# Patient Record
Sex: FEMALE | Race: BLACK OR AFRICAN AMERICAN | Employment: UNEMPLOYED | URBAN - METROPOLITAN AREA
[De-identification: names, ages, dates, MRNs, and addresses within clinical notes are randomized per-mention and may not be internally consistent; named-entity substitution may affect disease eponyms.]

---

## 2019-12-09 ENCOUNTER — APPOINTMENT (OUTPATIENT)
Dept: GENERAL RADIOLOGY | Age: 49
DRG: 286 | End: 2019-12-09
Payer: COMMERCIAL

## 2019-12-09 ENCOUNTER — HOSPITAL ENCOUNTER (INPATIENT)
Age: 49
LOS: 4 days | Discharge: HOME | DRG: 286 | End: 2019-12-13
Attending: EMERGENCY MEDICINE | Admitting: INTERNAL MEDICINE
Payer: COMMERCIAL

## 2019-12-09 DIAGNOSIS — R06.00 DYSPNEA, UNSPECIFIED TYPE: Primary | ICD-10-CM

## 2019-12-09 DIAGNOSIS — R73.9 HYPERGLYCEMIA: ICD-10-CM

## 2019-12-09 PROBLEM — I50.43 CHF (CONGESTIVE HEART FAILURE), NYHA CLASS I, ACUTE ON CHRONIC, COMBINED (HCC): Status: ACTIVE | Noted: 2019-12-09

## 2019-12-09 LAB
A/G RATIO: 1.2 (ref 1.1–2.2)
ALBUMIN SERPL-MCNC: 3.9 G/DL (ref 3.4–5)
ALP BLD-CCNC: 133 U/L (ref 40–129)
ALT SERPL-CCNC: 34 U/L (ref 10–40)
ANION GAP SERPL CALCULATED.3IONS-SCNC: 12 MMOL/L (ref 3–16)
AST SERPL-CCNC: 21 U/L (ref 15–37)
BACTERIA: ABNORMAL /HPF
BASE EXCESS VENOUS: 0 (ref -3–3)
BILIRUB SERPL-MCNC: 0.3 MG/DL (ref 0–1)
BILIRUBIN URINE: NEGATIVE
BLOOD, URINE: NEGATIVE
BUN BLDV-MCNC: 44 MG/DL (ref 7–20)
CALCIUM SERPL-MCNC: 9.9 MG/DL (ref 8.3–10.6)
CHLORIDE BLD-SCNC: 95 MMOL/L (ref 99–110)
CLARITY: CLEAR
CO2: 22 MMOL/L (ref 21–32)
COLOR: YELLOW
CREAT SERPL-MCNC: 1.8 MG/DL (ref 0.6–1.1)
EPITHELIAL CELLS, UA: ABNORMAL /HPF
GFR AFRICAN AMERICAN: 36
GFR NON-AFRICAN AMERICAN: 30
GLOBULIN: 3.2 G/DL
GLUCOSE BLD-MCNC: 252 MG/DL (ref 70–99)
GLUCOSE BLD-MCNC: 433 MG/DL (ref 70–99)
GLUCOSE BLD-MCNC: 620 MG/DL (ref 70–99)
GLUCOSE BLD-MCNC: 87 MG/DL (ref 70–99)
GLUCOSE BLD-MCNC: >600 MG/DL (ref 70–99)
GLUCOSE URINE: >=1000 MG/DL
HCO3 VENOUS: 26 MMOL/L (ref 23–29)
HCT VFR BLD CALC: 31.5 % (ref 36–48)
HEMOGLOBIN: 10.2 G/DL (ref 12–16)
KETONES, URINE: NEGATIVE MG/DL
LACTATE: 0.89 MMOL/L (ref 0.4–2)
LEUKOCYTE ESTERASE, URINE: NEGATIVE
MCH RBC QN AUTO: 30.4 PG (ref 26–34)
MCHC RBC AUTO-ENTMCNC: 32.3 G/DL (ref 31–36)
MCV RBC AUTO: 94 FL (ref 80–100)
MICROSCOPIC EXAMINATION: YES
MUCUS: ABNORMAL /LPF
NITRITE, URINE: NEGATIVE
O2 SAT, VEN: 81 %
PCO2, VEN: 48.8 MM HG (ref 40–50)
PDW BLD-RTO: 13.2 % (ref 12.4–15.4)
PERFORMED ON: ABNORMAL
PERFORMED ON: NORMAL
PH UA: 6 (ref 5–8)
PH VENOUS: 7.33 (ref 7.35–7.45)
PLATELET # BLD: 159 K/UL (ref 135–450)
PMV BLD AUTO: 10.4 FL (ref 5–10.5)
PO2, VEN: 49 MM HG
POC SAMPLE TYPE: ABNORMAL
POTASSIUM REFLEX MAGNESIUM: 4.9 MMOL/L (ref 3.5–5.1)
PRO-BNP: 1106 PG/ML (ref 0–124)
PROCALCITONIN: 0.04 NG/ML (ref 0–0.15)
PROTEIN UA: ABNORMAL MG/DL
RBC # BLD: 3.35 M/UL (ref 4–5.2)
RBC UA: ABNORMAL /HPF (ref 0–2)
SODIUM BLD-SCNC: 129 MMOL/L (ref 136–145)
SPECIFIC GRAVITY UA: 1.01 (ref 1–1.03)
TCO2 CALC VENOUS: 27 MMOL/L
TOTAL PROTEIN: 7.1 G/DL (ref 6.4–8.2)
TROPONIN: 0.02 NG/ML
TROPONIN: 0.03 NG/ML
URINE TYPE: ABNORMAL
UROBILINOGEN, URINE: 0.2 E.U./DL
WBC # BLD: 3.6 K/UL (ref 4–11)
WBC UA: ABNORMAL /HPF (ref 0–5)

## 2019-12-09 PROCEDURE — 83550 IRON BINDING TEST: CPT

## 2019-12-09 PROCEDURE — 83880 ASSAY OF NATRIURETIC PEPTIDE: CPT

## 2019-12-09 PROCEDURE — 2580000003 HC RX 258: Performed by: INTERNAL MEDICINE

## 2019-12-09 PROCEDURE — 83540 ASSAY OF IRON: CPT

## 2019-12-09 PROCEDURE — 81001 URINALYSIS AUTO W/SCOPE: CPT

## 2019-12-09 PROCEDURE — 6370000000 HC RX 637 (ALT 250 FOR IP): Performed by: STUDENT IN AN ORGANIZED HEALTH CARE EDUCATION/TRAINING PROGRAM

## 2019-12-09 PROCEDURE — 6370000000 HC RX 637 (ALT 250 FOR IP): Performed by: INTERNAL MEDICINE

## 2019-12-09 PROCEDURE — 84145 PROCALCITONIN (PCT): CPT

## 2019-12-09 PROCEDURE — 84443 ASSAY THYROID STIM HORMONE: CPT

## 2019-12-09 PROCEDURE — 1200000000 HC SEMI PRIVATE

## 2019-12-09 PROCEDURE — 6360000002 HC RX W HCPCS: Performed by: INTERNAL MEDICINE

## 2019-12-09 PROCEDURE — 84484 ASSAY OF TROPONIN QUANT: CPT

## 2019-12-09 PROCEDURE — 83605 ASSAY OF LACTIC ACID: CPT

## 2019-12-09 PROCEDURE — 82803 BLOOD GASES ANY COMBINATION: CPT

## 2019-12-09 PROCEDURE — 80053 COMPREHEN METABOLIC PANEL: CPT

## 2019-12-09 PROCEDURE — 93005 ELECTROCARDIOGRAM TRACING: CPT | Performed by: INTERNAL MEDICINE

## 2019-12-09 PROCEDURE — 85027 COMPLETE CBC AUTOMATED: CPT

## 2019-12-09 PROCEDURE — 99285 EMERGENCY DEPT VISIT HI MDM: CPT

## 2019-12-09 PROCEDURE — 71046 X-RAY EXAM CHEST 2 VIEWS: CPT

## 2019-12-09 PROCEDURE — 96374 THER/PROPH/DIAG INJ IV PUSH: CPT

## 2019-12-09 PROCEDURE — 82010 KETONE BODYS QUAN: CPT

## 2019-12-09 PROCEDURE — 83036 HEMOGLOBIN GLYCOSYLATED A1C: CPT

## 2019-12-09 PROCEDURE — 6360000002 HC RX W HCPCS: Performed by: STUDENT IN AN ORGANIZED HEALTH CARE EDUCATION/TRAINING PROGRAM

## 2019-12-09 PROCEDURE — 36415 COLL VENOUS BLD VENIPUNCTURE: CPT

## 2019-12-09 RX ORDER — FUROSEMIDE 40 MG/1
40 TABLET ORAL DAILY
Status: ON HOLD | COMMUNITY
End: 2019-12-13 | Stop reason: SDUPTHER

## 2019-12-09 RX ORDER — INSULIN LISPRO 100 [IU]/ML
0-6 INJECTION, SOLUTION INTRAVENOUS; SUBCUTANEOUS NIGHTLY
Status: DISCONTINUED | OUTPATIENT
Start: 2019-12-09 | End: 2019-12-13 | Stop reason: HOSPADM

## 2019-12-09 RX ORDER — FUROSEMIDE 10 MG/ML
40 INJECTION INTRAMUSCULAR; INTRAVENOUS ONCE
Status: COMPLETED | OUTPATIENT
Start: 2019-12-09 | End: 2019-12-09

## 2019-12-09 RX ORDER — INSULIN LISPRO 100 [IU]/ML
0-18 INJECTION, SOLUTION INTRAVENOUS; SUBCUTANEOUS
Status: DISCONTINUED | OUTPATIENT
Start: 2019-12-09 | End: 2019-12-09

## 2019-12-09 RX ORDER — CARVEDILOL 12.5 MG/1
25 TABLET ORAL 2 TIMES DAILY WITH MEALS
Status: ON HOLD | COMMUNITY
End: 2019-12-13 | Stop reason: SDUPTHER

## 2019-12-09 RX ORDER — FUROSEMIDE 10 MG/ML
40 INJECTION INTRAMUSCULAR; INTRAVENOUS 2 TIMES DAILY
Status: DISCONTINUED | OUTPATIENT
Start: 2019-12-10 | End: 2019-12-10

## 2019-12-09 RX ORDER — AMLODIPINE BESYLATE 10 MG/1
10 TABLET ORAL DAILY
Status: ON HOLD | COMMUNITY
End: 2019-12-11 | Stop reason: HOSPADM

## 2019-12-09 RX ORDER — SODIUM CHLORIDE 0.9 % (FLUSH) 0.9 %
10 SYRINGE (ML) INJECTION EVERY 12 HOURS SCHEDULED
Status: DISCONTINUED | OUTPATIENT
Start: 2019-12-09 | End: 2019-12-13 | Stop reason: HOSPADM

## 2019-12-09 RX ORDER — INSULIN LISPRO 100 [IU]/ML
0-12 INJECTION, SOLUTION INTRAVENOUS; SUBCUTANEOUS
Status: DISCONTINUED | OUTPATIENT
Start: 2019-12-10 | End: 2019-12-13 | Stop reason: HOSPADM

## 2019-12-09 RX ORDER — SODIUM CHLORIDE 0.9 % (FLUSH) 0.9 %
10 SYRINGE (ML) INJECTION PRN
Status: DISCONTINUED | OUTPATIENT
Start: 2019-12-09 | End: 2019-12-13 | Stop reason: HOSPADM

## 2019-12-09 RX ORDER — INSULIN GLARGINE 100 [IU]/ML
10 INJECTION, SOLUTION SUBCUTANEOUS NIGHTLY
Status: ON HOLD | COMMUNITY
End: 2019-12-13 | Stop reason: SDUPTHER

## 2019-12-09 RX ORDER — INDAPAMIDE 2.5 MG/1
2.5 TABLET, FILM COATED ORAL EVERY MORNING
Status: ON HOLD | COMMUNITY
End: 2019-12-11 | Stop reason: HOSPADM

## 2019-12-09 RX ORDER — CARVEDILOL 25 MG/1
25 TABLET ORAL 2 TIMES DAILY WITH MEALS
Status: DISCONTINUED | OUTPATIENT
Start: 2019-12-10 | End: 2019-12-13 | Stop reason: HOSPADM

## 2019-12-09 RX ORDER — NICOTINE POLACRILEX 4 MG
15 LOZENGE BUCCAL PRN
Status: DISCONTINUED | OUTPATIENT
Start: 2019-12-09 | End: 2019-12-13 | Stop reason: HOSPADM

## 2019-12-09 RX ORDER — INSULIN LISPRO 100 [IU]/ML
0-9 INJECTION, SOLUTION INTRAVENOUS; SUBCUTANEOUS NIGHTLY
Status: DISCONTINUED | OUTPATIENT
Start: 2019-12-09 | End: 2019-12-09

## 2019-12-09 RX ORDER — GABAPENTIN 300 MG/1
300 CAPSULE ORAL 2 TIMES DAILY
Status: DISCONTINUED | OUTPATIENT
Start: 2019-12-09 | End: 2019-12-13 | Stop reason: HOSPADM

## 2019-12-09 RX ORDER — DEXTROSE MONOHYDRATE 50 MG/ML
100 INJECTION, SOLUTION INTRAVENOUS PRN
Status: DISCONTINUED | OUTPATIENT
Start: 2019-12-09 | End: 2019-12-13 | Stop reason: HOSPADM

## 2019-12-09 RX ORDER — PIOGLITAZONEHYDROCHLORIDE 30 MG/1
30 TABLET ORAL DAILY
COMMUNITY
End: 2021-04-29 | Stop reason: ALTCHOICE

## 2019-12-09 RX ORDER — DEXTROSE MONOHYDRATE 25 G/50ML
12.5 INJECTION, SOLUTION INTRAVENOUS PRN
Status: DISCONTINUED | OUTPATIENT
Start: 2019-12-09 | End: 2019-12-13 | Stop reason: HOSPADM

## 2019-12-09 RX ORDER — LOSARTAN POTASSIUM 50 MG/1
50 TABLET ORAL DAILY
Status: ON HOLD | COMMUNITY
End: 2019-12-13 | Stop reason: SDUPTHER

## 2019-12-09 RX ORDER — ONDANSETRON 2 MG/ML
4 INJECTION INTRAMUSCULAR; INTRAVENOUS EVERY 6 HOURS PRN
Status: DISCONTINUED | OUTPATIENT
Start: 2019-12-09 | End: 2019-12-13 | Stop reason: HOSPADM

## 2019-12-09 RX ADMIN — INSULIN HUMAN 10 UNITS: 100 INJECTION, SOLUTION PARENTERAL at 15:23

## 2019-12-09 RX ADMIN — Medication 10 ML: at 22:18

## 2019-12-09 RX ADMIN — INSULIN LISPRO 9 UNITS: 100 INJECTION, SOLUTION INTRAVENOUS; SUBCUTANEOUS at 19:23

## 2019-12-09 RX ADMIN — GABAPENTIN 300 MG: 300 CAPSULE ORAL at 23:57

## 2019-12-09 RX ADMIN — ENOXAPARIN SODIUM 40 MG: 40 INJECTION SUBCUTANEOUS at 19:24

## 2019-12-09 RX ADMIN — FUROSEMIDE 40 MG: 10 INJECTION, SOLUTION INTRAMUSCULAR; INTRAVENOUS at 14:17

## 2019-12-09 ASSESSMENT — ENCOUNTER SYMPTOMS
VOMITING: 0
NAUSEA: 0
SHORTNESS OF BREATH: 1
ABDOMINAL PAIN: 0
SORE THROAT: 0
VOICE CHANGE: 0
CONSTIPATION: 0
CHEST TIGHTNESS: 1
COUGH: 0
DIARRHEA: 0
RHINORRHEA: 0

## 2019-12-10 ENCOUNTER — APPOINTMENT (OUTPATIENT)
Dept: CT IMAGING | Age: 49
DRG: 286 | End: 2019-12-10
Payer: COMMERCIAL

## 2019-12-10 LAB
ANION GAP SERPL CALCULATED.3IONS-SCNC: 14 MMOL/L (ref 3–16)
BUN BLDV-MCNC: 50 MG/DL (ref 7–20)
CALCIUM SERPL-MCNC: 9.6 MG/DL (ref 8.3–10.6)
CHLORIDE BLD-SCNC: 102 MMOL/L (ref 99–110)
CHLORIDE URINE RANDOM: 45 MMOL/L
CHOLESTEROL, TOTAL: 277 MG/DL (ref 0–199)
CO2: 23 MMOL/L (ref 21–32)
CREAT SERPL-MCNC: 2.4 MG/DL (ref 0.6–1.1)
CREATININE URINE: 178.2 MG/DL (ref 28–259)
EKG ATRIAL RATE: 71 BPM
EKG DIAGNOSIS: NORMAL
EKG P AXIS: 45 DEGREES
EKG P-R INTERVAL: 172 MS
EKG Q-T INTERVAL: 368 MS
EKG QRS DURATION: 82 MS
EKG QTC CALCULATION (BAZETT): 399 MS
EKG R AXIS: 6 DEGREES
EKG T AXIS: 47 DEGREES
EKG VENTRICULAR RATE: 71 BPM
ESTIMATED AVERAGE GLUCOSE: 449.8 MG/DL
GFR AFRICAN AMERICAN: 26
GFR NON-AFRICAN AMERICAN: 21
GLUCOSE BLD-MCNC: 117 MG/DL (ref 70–99)
GLUCOSE BLD-MCNC: 130 MG/DL (ref 70–99)
GLUCOSE BLD-MCNC: 133 MG/DL (ref 70–99)
GLUCOSE BLD-MCNC: 269 MG/DL (ref 70–99)
GLUCOSE BLD-MCNC: 411 MG/DL (ref 70–99)
GLUCOSE BLD-MCNC: 446 MG/DL (ref 70–99)
GLUCOSE BLD-MCNC: 536 MG/DL (ref 70–99)
HBA1C MFR BLD: 17.3 %
HDLC SERPL-MCNC: 72 MG/DL (ref 40–60)
IRON SATURATION: 20 % (ref 15–50)
IRON: 62 UG/DL (ref 37–145)
LDL CHOLESTEROL CALCULATED: 182 MG/DL
MAGNESIUM: 2.2 MG/DL (ref 1.8–2.4)
PERFORMED ON: ABNORMAL
POTASSIUM SERPL-SCNC: 4.8 MMOL/L (ref 3.5–5.1)
PROTEIN PROTEIN: 83.4 MG/DL
SODIUM BLD-SCNC: 139 MMOL/L (ref 136–145)
SODIUM URINE: 69 MMOL/L
TOTAL IRON BINDING CAPACITY: 304 UG/DL (ref 260–445)
TRIGL SERPL-MCNC: 116 MG/DL (ref 0–150)
TSH SERPL DL<=0.05 MIU/L-ACNC: 0.11 UIU/ML (ref 0.27–4.2)
VLDLC SERPL CALC-MCNC: 23 MG/DL

## 2019-12-10 PROCEDURE — 80061 LIPID PANEL: CPT

## 2019-12-10 PROCEDURE — 36415 COLL VENOUS BLD VENIPUNCTURE: CPT

## 2019-12-10 PROCEDURE — 6370000000 HC RX 637 (ALT 250 FOR IP): Performed by: INTERNAL MEDICINE

## 2019-12-10 PROCEDURE — 82570 ASSAY OF URINE CREATININE: CPT

## 2019-12-10 PROCEDURE — 99254 IP/OBS CNSLTJ NEW/EST MOD 60: CPT | Performed by: INTERNAL MEDICINE

## 2019-12-10 PROCEDURE — 82436 ASSAY OF URINE CHLORIDE: CPT

## 2019-12-10 PROCEDURE — 2580000003 HC RX 258: Performed by: INTERNAL MEDICINE

## 2019-12-10 PROCEDURE — 71250 CT THORAX DX C-: CPT

## 2019-12-10 PROCEDURE — 1200000000 HC SEMI PRIVATE

## 2019-12-10 PROCEDURE — G0008 ADMIN INFLUENZA VIRUS VAC: HCPCS | Performed by: STUDENT IN AN ORGANIZED HEALTH CARE EDUCATION/TRAINING PROGRAM

## 2019-12-10 PROCEDURE — 80048 BASIC METABOLIC PNL TOTAL CA: CPT

## 2019-12-10 PROCEDURE — 6360000002 HC RX W HCPCS: Performed by: INTERNAL MEDICINE

## 2019-12-10 PROCEDURE — 84156 ASSAY OF PROTEIN URINE: CPT

## 2019-12-10 PROCEDURE — 6360000002 HC RX W HCPCS: Performed by: STUDENT IN AN ORGANIZED HEALTH CARE EDUCATION/TRAINING PROGRAM

## 2019-12-10 PROCEDURE — 90686 IIV4 VACC NO PRSV 0.5 ML IM: CPT | Performed by: STUDENT IN AN ORGANIZED HEALTH CARE EDUCATION/TRAINING PROGRAM

## 2019-12-10 PROCEDURE — 83735 ASSAY OF MAGNESIUM: CPT

## 2019-12-10 PROCEDURE — 84300 ASSAY OF URINE SODIUM: CPT

## 2019-12-10 RX ORDER — ONDANSETRON 4 MG/1
4 TABLET, FILM COATED ORAL EVERY 8 HOURS PRN
Status: ON HOLD | COMMUNITY
End: 2019-12-11 | Stop reason: HOSPADM

## 2019-12-10 RX ORDER — BUTALBITAL, ACETAMINOPHEN AND CAFFEINE 50; 325; 40 MG/1; MG/1; MG/1
1 TABLET ORAL EVERY 4 HOURS PRN
Status: DISCONTINUED | OUTPATIENT
Start: 2019-12-10 | End: 2019-12-13 | Stop reason: HOSPADM

## 2019-12-10 RX ORDER — AMLODIPINE BESYLATE 10 MG/1
10 TABLET ORAL DAILY
Status: DISCONTINUED | OUTPATIENT
Start: 2019-12-10 | End: 2019-12-13 | Stop reason: HOSPADM

## 2019-12-10 RX ORDER — ACETAMINOPHEN 500 MG
1000 TABLET ORAL ONCE
Status: COMPLETED | OUTPATIENT
Start: 2019-12-10 | End: 2019-12-10

## 2019-12-10 RX ORDER — BUTALBITAL, ACETAMINOPHEN AND CAFFEINE 50; 325; 40 MG/1; MG/1; MG/1
1 TABLET ORAL ONCE
Status: COMPLETED | OUTPATIENT
Start: 2019-12-10 | End: 2019-12-10

## 2019-12-10 RX ADMIN — ACETAMINOPHEN 1000 MG: 500 TABLET ORAL at 00:12

## 2019-12-10 RX ADMIN — BUTALBITAL, ACETAMINOPHEN AND CAFFEINE 1 TABLET: 50; 325; 40 TABLET ORAL at 07:14

## 2019-12-10 RX ADMIN — INFLUENZA A VIRUS A/BRISBANE/02/2018 IVR-190 (H1N1) ANTIGEN (PROPIOLACTONE INACTIVATED), INFLUENZA A VIRUS A/KANSAS/14/2017 X-327 (H3N2) ANTIGEN (PROPIOLACTONE INACTIVATED), INFLUENZA B VIRUS B/MARYLAND/15/2016 ANTIGEN (PROPIOLACTONE INACTIVATED), INFLUENZA B VIRUS B/PHUKET/3073/2013 BVR-1B ANTIGEN (PROPIOLACTONE INACTIVATED) 0.5 ML: 15; 15; 15; 15 INJECTION, SUSPENSION INTRAMUSCULAR at 09:16

## 2019-12-10 RX ADMIN — GABAPENTIN 300 MG: 300 CAPSULE ORAL at 09:16

## 2019-12-10 RX ADMIN — INSULIN LISPRO 6 UNITS: 100 INJECTION, SOLUTION INTRAVENOUS; SUBCUTANEOUS at 20:20

## 2019-12-10 RX ADMIN — Medication 10 ML: at 09:16

## 2019-12-10 RX ADMIN — ENOXAPARIN SODIUM 40 MG: 40 INJECTION SUBCUTANEOUS at 17:16

## 2019-12-10 RX ADMIN — GABAPENTIN 300 MG: 300 CAPSULE ORAL at 20:19

## 2019-12-10 RX ADMIN — Medication 10 ML: at 20:21

## 2019-12-10 RX ADMIN — CARVEDILOL 25 MG: 25 TABLET, FILM COATED ORAL at 09:16

## 2019-12-10 RX ADMIN — FUROSEMIDE 40 MG: 10 INJECTION, SOLUTION INTRAMUSCULAR; INTRAVENOUS at 09:16

## 2019-12-10 RX ADMIN — INSULIN LISPRO 12 UNITS: 100 INJECTION, SOLUTION INTRAVENOUS; SUBCUTANEOUS at 17:20

## 2019-12-10 RX ADMIN — CARVEDILOL 25 MG: 25 TABLET, FILM COATED ORAL at 17:16

## 2019-12-10 RX ADMIN — INSULIN GLARGINE 20 UNITS: 100 INJECTION, SOLUTION SUBCUTANEOUS at 20:21

## 2019-12-10 RX ADMIN — INSULIN LISPRO 6 UNITS: 100 INJECTION, SOLUTION INTRAVENOUS; SUBCUTANEOUS at 12:10

## 2019-12-10 RX ADMIN — AMLODIPINE BESYLATE 10 MG: 10 TABLET ORAL at 20:20

## 2019-12-10 RX ADMIN — BUTALBITAL, ACETAMINOPHEN, AND CAFFEINE 1 TABLET: 50; 325; 40 TABLET ORAL at 17:29

## 2019-12-10 ASSESSMENT — PAIN DESCRIPTION - ONSET
ONSET: ON-GOING
ONSET: ON-GOING
ONSET: GRADUAL
ONSET: AWAKENED FROM SLEEP

## 2019-12-10 ASSESSMENT — PAIN DESCRIPTION - LOCATION
LOCATION: LEG
LOCATION: HEAD

## 2019-12-10 ASSESSMENT — PAIN - FUNCTIONAL ASSESSMENT
PAIN_FUNCTIONAL_ASSESSMENT: PREVENTS OR INTERFERES SOME ACTIVE ACTIVITIES AND ADLS
PAIN_FUNCTIONAL_ASSESSMENT: PREVENTS OR INTERFERES SOME ACTIVE ACTIVITIES AND ADLS
PAIN_FUNCTIONAL_ASSESSMENT: ACTIVITIES ARE NOT PREVENTED
PAIN_FUNCTIONAL_ASSESSMENT: ACTIVITIES ARE NOT PREVENTED

## 2019-12-10 ASSESSMENT — PAIN SCALES - GENERAL
PAINLEVEL_OUTOF10: 8
PAINLEVEL_OUTOF10: 10
PAINLEVEL_OUTOF10: 3
PAINLEVEL_OUTOF10: 9
PAINLEVEL_OUTOF10: 7
PAINLEVEL_OUTOF10: 5
PAINLEVEL_OUTOF10: 0

## 2019-12-10 ASSESSMENT — PAIN DESCRIPTION - PAIN TYPE
TYPE: CHRONIC PAIN
TYPE: CHRONIC PAIN
TYPE: NEUROPATHIC PAIN
TYPE: ACUTE PAIN

## 2019-12-10 ASSESSMENT — PAIN DESCRIPTION - PROGRESSION
CLINICAL_PROGRESSION: NOT CHANGED
CLINICAL_PROGRESSION: GRADUALLY WORSENING

## 2019-12-10 ASSESSMENT — PAIN DESCRIPTION - DESCRIPTORS
DESCRIPTORS: HEADACHE
DESCRIPTORS: BURNING
DESCRIPTORS: HEADACHE
DESCRIPTORS: HEADACHE

## 2019-12-10 ASSESSMENT — PAIN DESCRIPTION - FREQUENCY
FREQUENCY: INTERMITTENT
FREQUENCY: CONTINUOUS

## 2019-12-10 ASSESSMENT — PAIN DESCRIPTION - ORIENTATION
ORIENTATION: ANTERIOR
ORIENTATION: INNER
ORIENTATION: ANTERIOR
ORIENTATION: RIGHT;LEFT

## 2019-12-11 LAB
ANION GAP SERPL CALCULATED.3IONS-SCNC: 16 MMOL/L (ref 3–16)
BETA-HYDROXYBUTYRATE: 0.15 MMOL/L (ref 0–0.27)
BUN BLDV-MCNC: 54 MG/DL (ref 7–20)
CALCIUM SERPL-MCNC: 9.9 MG/DL (ref 8.3–10.6)
CHLORIDE BLD-SCNC: 101 MMOL/L (ref 99–110)
CO2: 22 MMOL/L (ref 21–32)
CREAT SERPL-MCNC: 2 MG/DL (ref 0.6–1.1)
GFR AFRICAN AMERICAN: 32
GFR NON-AFRICAN AMERICAN: 26
GLUCOSE BLD-MCNC: 146 MG/DL (ref 70–99)
GLUCOSE BLD-MCNC: 166 MG/DL (ref 70–99)
GLUCOSE BLD-MCNC: 170 MG/DL (ref 70–99)
GLUCOSE BLD-MCNC: 265 MG/DL (ref 70–99)
GLUCOSE BLD-MCNC: 341 MG/DL (ref 70–99)
LV EF: 58 %
LV EF: 71 %
LVEF MODALITY: NORMAL
LVEF MODALITY: NORMAL
MAGNESIUM: 2 MG/DL (ref 1.8–2.4)
PERFORMED ON: ABNORMAL
POTASSIUM SERPL-SCNC: 4.3 MMOL/L (ref 3.5–5.1)
SODIUM BLD-SCNC: 139 MMOL/L (ref 136–145)

## 2019-12-11 PROCEDURE — 80048 BASIC METABOLIC PNL TOTAL CA: CPT

## 2019-12-11 PROCEDURE — 78452 HT MUSCLE IMAGE SPECT MULT: CPT

## 2019-12-11 PROCEDURE — 2580000003 HC RX 258: Performed by: INTERNAL MEDICINE

## 2019-12-11 PROCEDURE — 3430000000 HC RX DIAGNOSTIC RADIOPHARMACEUTICAL: Performed by: INTERNAL MEDICINE

## 2019-12-11 PROCEDURE — A9502 TC99M TETROFOSMIN: HCPCS | Performed by: INTERNAL MEDICINE

## 2019-12-11 PROCEDURE — 1200000000 HC SEMI PRIVATE

## 2019-12-11 PROCEDURE — 36415 COLL VENOUS BLD VENIPUNCTURE: CPT

## 2019-12-11 PROCEDURE — 83735 ASSAY OF MAGNESIUM: CPT

## 2019-12-11 PROCEDURE — 93306 TTE W/DOPPLER COMPLETE: CPT

## 2019-12-11 PROCEDURE — 97530 THERAPEUTIC ACTIVITIES: CPT

## 2019-12-11 PROCEDURE — 6370000000 HC RX 637 (ALT 250 FOR IP): Performed by: INTERNAL MEDICINE

## 2019-12-11 PROCEDURE — 6360000002 HC RX W HCPCS: Performed by: INTERNAL MEDICINE

## 2019-12-11 PROCEDURE — 93017 CV STRESS TEST TRACING ONLY: CPT

## 2019-12-11 PROCEDURE — 99232 SBSQ HOSP IP/OBS MODERATE 35: CPT | Performed by: INTERNAL MEDICINE

## 2019-12-11 PROCEDURE — 97165 OT EVAL LOW COMPLEX 30 MIN: CPT

## 2019-12-11 RX ORDER — DOXAZOSIN 2 MG/1
2 TABLET ORAL NIGHTLY
Qty: 30 TABLET | Refills: 3 | Status: SHIPPED | OUTPATIENT
Start: 2019-12-11 | End: 2019-12-13 | Stop reason: SDUPTHER

## 2019-12-11 RX ORDER — SODIUM CHLORIDE 0.9 % (FLUSH) 0.9 %
10 SYRINGE (ML) INJECTION PRN
Status: DISCONTINUED | OUTPATIENT
Start: 2019-12-11 | End: 2019-12-13 | Stop reason: HOSPADM

## 2019-12-11 RX ORDER — GABAPENTIN 300 MG/1
300 CAPSULE ORAL 2 TIMES DAILY
Qty: 60 CAPSULE | Refills: 2 | Status: SHIPPED | OUTPATIENT
Start: 2019-12-11 | End: 2021-04-29 | Stop reason: SDUPTHER

## 2019-12-11 RX ADMIN — REGADENOSON 0.4 MG: 0.08 INJECTION, SOLUTION INTRAVENOUS at 14:38

## 2019-12-11 RX ADMIN — INSULIN LISPRO 6 UNITS: 100 INJECTION, SOLUTION INTRAVENOUS; SUBCUTANEOUS at 17:50

## 2019-12-11 RX ADMIN — TETROFOSMIN 33.2 MILLICURIE: 1.38 INJECTION, POWDER, LYOPHILIZED, FOR SOLUTION INTRAVENOUS at 14:39

## 2019-12-11 RX ADMIN — INSULIN GLARGINE 20 UNITS: 100 INJECTION, SOLUTION SUBCUTANEOUS at 20:01

## 2019-12-11 RX ADMIN — INSULIN LISPRO 4 UNITS: 100 INJECTION, SOLUTION INTRAVENOUS; SUBCUTANEOUS at 20:01

## 2019-12-11 RX ADMIN — GABAPENTIN 300 MG: 300 CAPSULE ORAL at 20:01

## 2019-12-11 RX ADMIN — CARVEDILOL 25 MG: 25 TABLET, FILM COATED ORAL at 09:21

## 2019-12-11 RX ADMIN — Medication 10 ML: at 20:14

## 2019-12-11 RX ADMIN — Medication 10 ML: at 13:54

## 2019-12-11 RX ADMIN — AMLODIPINE BESYLATE 10 MG: 10 TABLET ORAL at 09:21

## 2019-12-11 RX ADMIN — CARVEDILOL 25 MG: 25 TABLET, FILM COATED ORAL at 17:47

## 2019-12-11 RX ADMIN — INSULIN LISPRO 2 UNITS: 100 INJECTION, SOLUTION INTRAVENOUS; SUBCUTANEOUS at 09:20

## 2019-12-11 RX ADMIN — ENOXAPARIN SODIUM 40 MG: 40 INJECTION SUBCUTANEOUS at 17:47

## 2019-12-11 RX ADMIN — TETROFOSMIN 11 MILLICURIE: 1.38 INJECTION, POWDER, LYOPHILIZED, FOR SOLUTION INTRAVENOUS at 13:54

## 2019-12-11 RX ADMIN — Medication 10 ML: at 14:39

## 2019-12-11 RX ADMIN — GABAPENTIN 300 MG: 300 CAPSULE ORAL at 09:20

## 2019-12-11 ASSESSMENT — PAIN DESCRIPTION - DESCRIPTORS
DESCRIPTORS: TINGLING
DESCRIPTORS: ACHING
DESCRIPTORS: BURNING
DESCRIPTORS: BURNING

## 2019-12-11 ASSESSMENT — PAIN DESCRIPTION - LOCATION
LOCATION: BACK
LOCATION: LEG

## 2019-12-11 ASSESSMENT — PAIN SCALES - GENERAL
PAINLEVEL_OUTOF10: 8
PAINLEVEL_OUTOF10: 5
PAINLEVEL_OUTOF10: 5
PAINLEVEL_OUTOF10: 4
PAINLEVEL_OUTOF10: 5

## 2019-12-11 ASSESSMENT — PAIN DESCRIPTION - PROGRESSION
CLINICAL_PROGRESSION: NOT CHANGED
CLINICAL_PROGRESSION: GRADUALLY IMPROVING
CLINICAL_PROGRESSION: NOT CHANGED

## 2019-12-11 ASSESSMENT — PAIN - FUNCTIONAL ASSESSMENT
PAIN_FUNCTIONAL_ASSESSMENT: ACTIVITIES ARE NOT PREVENTED

## 2019-12-11 ASSESSMENT — PAIN DESCRIPTION - ONSET
ONSET: ON-GOING

## 2019-12-11 ASSESSMENT — PAIN DESCRIPTION - ORIENTATION
ORIENTATION: UPPER
ORIENTATION: RIGHT;LEFT

## 2019-12-11 ASSESSMENT — PAIN DESCRIPTION - FREQUENCY
FREQUENCY: CONTINUOUS

## 2019-12-11 ASSESSMENT — PAIN DESCRIPTION - PAIN TYPE
TYPE: NEUROPATHIC PAIN
TYPE: ACUTE PAIN

## 2019-12-12 ENCOUNTER — APPOINTMENT (OUTPATIENT)
Dept: CARDIAC CATH/INVASIVE PROCEDURES | Age: 49
DRG: 286 | End: 2019-12-12
Payer: COMMERCIAL

## 2019-12-12 PROBLEM — R79.89 ELEVATED TROPONIN: Status: ACTIVE | Noted: 2019-12-12

## 2019-12-12 PROBLEM — R77.8 ELEVATED TROPONIN: Status: ACTIVE | Noted: 2019-12-12

## 2019-12-12 PROBLEM — R94.39 ABNORMAL MYOCARDIAL PERFUSION STUDY: Status: ACTIVE | Noted: 2019-12-12

## 2019-12-12 LAB
ANION GAP SERPL CALCULATED.3IONS-SCNC: 15 MMOL/L (ref 3–16)
BUN BLDV-MCNC: 48 MG/DL (ref 7–20)
CALCIUM SERPL-MCNC: 10.1 MG/DL (ref 8.3–10.6)
CHLORIDE BLD-SCNC: 101 MMOL/L (ref 99–110)
CO2: 22 MMOL/L (ref 21–32)
CREAT SERPL-MCNC: 1.8 MG/DL (ref 0.6–1.1)
CREATININE URINE: 87.6 MG/DL (ref 28–259)
GFR AFRICAN AMERICAN: 36
GFR NON-AFRICAN AMERICAN: 30
GLUCOSE BLD-MCNC: 135 MG/DL (ref 70–99)
GLUCOSE BLD-MCNC: 164 MG/DL (ref 70–99)
GLUCOSE BLD-MCNC: 185 MG/DL (ref 70–99)
GLUCOSE BLD-MCNC: 233 MG/DL (ref 70–99)
GLUCOSE BLD-MCNC: 250 MG/DL (ref 70–99)
MAGNESIUM: 1.9 MG/DL (ref 1.8–2.4)
PERFORMED ON: ABNORMAL
POTASSIUM SERPL-SCNC: 4.2 MMOL/L (ref 3.5–5.1)
PRO-BNP: 623 PG/ML (ref 0–124)
PROTEIN PROTEIN: 67.2 MG/DL
SODIUM BLD-SCNC: 138 MMOL/L (ref 136–145)

## 2019-12-12 PROCEDURE — 6360000004 HC RX CONTRAST MEDICATION: Performed by: INTERNAL MEDICINE

## 2019-12-12 PROCEDURE — 2060000000 HC ICU INTERMEDIATE R&B

## 2019-12-12 PROCEDURE — 6370000000 HC RX 637 (ALT 250 FOR IP): Performed by: INTERNAL MEDICINE

## 2019-12-12 PROCEDURE — 2500000003 HC RX 250 WO HCPCS

## 2019-12-12 PROCEDURE — 93458 L HRT ARTERY/VENTRICLE ANGIO: CPT | Performed by: INTERNAL MEDICINE

## 2019-12-12 PROCEDURE — C1769 GUIDE WIRE: HCPCS

## 2019-12-12 PROCEDURE — 82570 ASSAY OF URINE CREATININE: CPT

## 2019-12-12 PROCEDURE — 36415 COLL VENOUS BLD VENIPUNCTURE: CPT

## 2019-12-12 PROCEDURE — 6360000002 HC RX W HCPCS

## 2019-12-12 PROCEDURE — 2580000003 HC RX 258: Performed by: INTERNAL MEDICINE

## 2019-12-12 PROCEDURE — 93458 L HRT ARTERY/VENTRICLE ANGIO: CPT

## 2019-12-12 PROCEDURE — 99233 SBSQ HOSP IP/OBS HIGH 50: CPT | Performed by: INTERNAL MEDICINE

## 2019-12-12 PROCEDURE — 83880 ASSAY OF NATRIURETIC PEPTIDE: CPT

## 2019-12-12 PROCEDURE — B2111ZZ FLUOROSCOPY OF MULTIPLE CORONARY ARTERIES USING LOW OSMOLAR CONTRAST: ICD-10-PCS | Performed by: INTERNAL MEDICINE

## 2019-12-12 PROCEDURE — 83735 ASSAY OF MAGNESIUM: CPT

## 2019-12-12 PROCEDURE — 80048 BASIC METABOLIC PNL TOTAL CA: CPT

## 2019-12-12 PROCEDURE — C1760 CLOSURE DEV, VASC: HCPCS

## 2019-12-12 PROCEDURE — 4A023N7 MEASUREMENT OF CARDIAC SAMPLING AND PRESSURE, LEFT HEART, PERCUTANEOUS APPROACH: ICD-10-PCS | Performed by: INTERNAL MEDICINE

## 2019-12-12 PROCEDURE — 2709999900 HC NON-CHARGEABLE SUPPLY

## 2019-12-12 PROCEDURE — 6360000002 HC RX W HCPCS: Performed by: INTERNAL MEDICINE

## 2019-12-12 PROCEDURE — 84156 ASSAY OF PROTEIN URINE: CPT

## 2019-12-12 PROCEDURE — C1894 INTRO/SHEATH, NON-LASER: HCPCS

## 2019-12-12 RX ORDER — ACETAMINOPHEN 325 MG/1
650 TABLET ORAL EVERY 4 HOURS PRN
Status: DISCONTINUED | OUTPATIENT
Start: 2019-12-12 | End: 2019-12-13 | Stop reason: HOSPADM

## 2019-12-12 RX ORDER — SODIUM CHLORIDE 9 MG/ML
INJECTION, SOLUTION INTRAVENOUS CONTINUOUS
Status: ACTIVE | OUTPATIENT
Start: 2019-12-12 | End: 2019-12-12

## 2019-12-12 RX ADMIN — GABAPENTIN 300 MG: 300 CAPSULE ORAL at 08:19

## 2019-12-12 RX ADMIN — INSULIN LISPRO 6 UNITS: 100 INJECTION, SOLUTION INTRAVENOUS; SUBCUTANEOUS at 12:11

## 2019-12-12 RX ADMIN — AMLODIPINE BESYLATE 10 MG: 10 TABLET ORAL at 08:19

## 2019-12-12 RX ADMIN — Medication 10 ML: at 08:21

## 2019-12-12 RX ADMIN — CARVEDILOL 25 MG: 25 TABLET, FILM COATED ORAL at 08:19

## 2019-12-12 RX ADMIN — CARVEDILOL 25 MG: 25 TABLET, FILM COATED ORAL at 19:00

## 2019-12-12 RX ADMIN — SODIUM CHLORIDE: 9 INJECTION, SOLUTION INTRAVENOUS at 15:00

## 2019-12-12 RX ADMIN — INSULIN LISPRO 2 UNITS: 100 INJECTION, SOLUTION INTRAVENOUS; SUBCUTANEOUS at 21:56

## 2019-12-12 RX ADMIN — INSULIN LISPRO 2 UNITS: 100 INJECTION, SOLUTION INTRAVENOUS; SUBCUTANEOUS at 08:21

## 2019-12-12 RX ADMIN — ENOXAPARIN SODIUM 40 MG: 40 INJECTION SUBCUTANEOUS at 19:00

## 2019-12-12 RX ADMIN — INSULIN GLARGINE 20 UNITS: 100 INJECTION, SOLUTION SUBCUTANEOUS at 21:55

## 2019-12-12 RX ADMIN — GABAPENTIN 300 MG: 300 CAPSULE ORAL at 20:11

## 2019-12-12 RX ADMIN — IOPAMIDOL 100 ML: 755 INJECTION, SOLUTION INTRAVENOUS at 14:50

## 2019-12-12 ASSESSMENT — PAIN DESCRIPTION - PROGRESSION
CLINICAL_PROGRESSION: NOT CHANGED

## 2019-12-12 ASSESSMENT — PAIN SCALES - GENERAL
PAINLEVEL_OUTOF10: 0
PAINLEVEL_OUTOF10: 3
PAINLEVEL_OUTOF10: 0
PAINLEVEL_OUTOF10: 6
PAINLEVEL_OUTOF10: 0
PAINLEVEL_OUTOF10: 0

## 2019-12-12 ASSESSMENT — PAIN DESCRIPTION - ONSET
ONSET: ON-GOING
ONSET: ON-GOING

## 2019-12-12 ASSESSMENT — PAIN DESCRIPTION - DIRECTION: RADIATING_TOWARDS: BACK

## 2019-12-12 ASSESSMENT — PAIN DESCRIPTION - DESCRIPTORS
DESCRIPTORS: PRESSURE
DESCRIPTORS: TINGLING

## 2019-12-12 ASSESSMENT — PAIN DESCRIPTION - LOCATION
LOCATION: LEG
LOCATION: CHEST

## 2019-12-12 ASSESSMENT — PAIN - FUNCTIONAL ASSESSMENT
PAIN_FUNCTIONAL_ASSESSMENT: ACTIVITIES ARE NOT PREVENTED
PAIN_FUNCTIONAL_ASSESSMENT: ACTIVITIES ARE NOT PREVENTED

## 2019-12-12 ASSESSMENT — PAIN DESCRIPTION - FREQUENCY
FREQUENCY: CONTINUOUS
FREQUENCY: CONTINUOUS

## 2019-12-12 ASSESSMENT — PAIN DESCRIPTION - PAIN TYPE
TYPE: CHRONIC PAIN
TYPE: NEUROPATHIC PAIN

## 2019-12-12 ASSESSMENT — PAIN DESCRIPTION - ORIENTATION
ORIENTATION: RIGHT;LEFT
ORIENTATION: ANTERIOR;POSTERIOR

## 2019-12-13 VITALS
HEIGHT: 61 IN | BODY MASS INDEX: 43.45 KG/M2 | SYSTOLIC BLOOD PRESSURE: 146 MMHG | OXYGEN SATURATION: 95 % | TEMPERATURE: 97 F | HEART RATE: 70 BPM | RESPIRATION RATE: 18 BRPM | DIASTOLIC BLOOD PRESSURE: 86 MMHG | WEIGHT: 230.16 LBS

## 2019-12-13 LAB
ANION GAP SERPL CALCULATED.3IONS-SCNC: 12 MMOL/L (ref 3–16)
BUN BLDV-MCNC: 38 MG/DL (ref 7–20)
CALCIUM SERPL-MCNC: 9.5 MG/DL (ref 8.3–10.6)
CHLORIDE BLD-SCNC: 105 MMOL/L (ref 99–110)
CO2: 23 MMOL/L (ref 21–32)
CREAT SERPL-MCNC: 1.5 MG/DL (ref 0.6–1.1)
GFR AFRICAN AMERICAN: 44
GFR NON-AFRICAN AMERICAN: 37
GLUCOSE BLD-MCNC: 124 MG/DL (ref 70–99)
GLUCOSE BLD-MCNC: 148 MG/DL (ref 70–99)
MAGNESIUM: 1.9 MG/DL (ref 1.8–2.4)
PERFORMED ON: ABNORMAL
POTASSIUM SERPL-SCNC: 4.2 MMOL/L (ref 3.5–5.1)
SODIUM BLD-SCNC: 140 MMOL/L (ref 136–145)

## 2019-12-13 PROCEDURE — 80048 BASIC METABOLIC PNL TOTAL CA: CPT

## 2019-12-13 PROCEDURE — 6370000000 HC RX 637 (ALT 250 FOR IP): Performed by: INTERNAL MEDICINE

## 2019-12-13 PROCEDURE — 83735 ASSAY OF MAGNESIUM: CPT

## 2019-12-13 PROCEDURE — 2580000003 HC RX 258: Performed by: INTERNAL MEDICINE

## 2019-12-13 PROCEDURE — 36415 COLL VENOUS BLD VENIPUNCTURE: CPT

## 2019-12-13 PROCEDURE — 99232 SBSQ HOSP IP/OBS MODERATE 35: CPT | Performed by: INTERNAL MEDICINE

## 2019-12-13 RX ORDER — INSULIN LISPRO 100 [IU]/ML
0-6 INJECTION, SOLUTION INTRAVENOUS; SUBCUTANEOUS NIGHTLY
Qty: 1 PEN | Refills: 1 | Status: SHIPPED | OUTPATIENT
Start: 2019-12-13 | End: 2019-12-13

## 2019-12-13 RX ORDER — INSULIN LISPRO 100 [IU]/ML
0-6 INJECTION, SOLUTION INTRAVENOUS; SUBCUTANEOUS NIGHTLY
Qty: 1 PEN | Refills: 1 | Status: SHIPPED | OUTPATIENT
Start: 2019-12-13 | End: 2021-07-06 | Stop reason: CLARIF

## 2019-12-13 RX ORDER — FUROSEMIDE 40 MG/1
40 TABLET ORAL DAILY
Status: DISCONTINUED | OUTPATIENT
Start: 2019-12-13 | End: 2019-12-13 | Stop reason: HOSPADM

## 2019-12-13 RX ORDER — FUROSEMIDE 40 MG/1
40 TABLET ORAL DAILY
Qty: 60 TABLET | Refills: 3 | Status: SHIPPED | OUTPATIENT
Start: 2019-12-13 | End: 2020-02-07 | Stop reason: ALTCHOICE

## 2019-12-13 RX ORDER — ASPIRIN 81 MG/1
81 TABLET, CHEWABLE ORAL DAILY
Status: DISCONTINUED | OUTPATIENT
Start: 2019-12-13 | End: 2019-12-13 | Stop reason: HOSPADM

## 2019-12-13 RX ORDER — CARVEDILOL 12.5 MG/1
25 TABLET ORAL 2 TIMES DAILY WITH MEALS
Qty: 60 TABLET | Refills: 3 | Status: SHIPPED | OUTPATIENT
Start: 2019-12-13 | End: 2021-07-06 | Stop reason: CLARIF

## 2019-12-13 RX ORDER — ASPIRIN 81 MG/1
81 TABLET, CHEWABLE ORAL DAILY
Qty: 30 TABLET | Refills: 3 | Status: ON HOLD | OUTPATIENT
Start: 2019-12-13 | End: 2021-07-13 | Stop reason: SDUPTHER

## 2019-12-13 RX ORDER — DOXAZOSIN 2 MG/1
2 TABLET ORAL NIGHTLY
Qty: 30 TABLET | Refills: 3 | Status: SHIPPED | OUTPATIENT
Start: 2019-12-13 | End: 2021-04-29

## 2019-12-13 RX ORDER — INSULIN LISPRO 100 [IU]/ML
0-12 INJECTION, SOLUTION INTRAVENOUS; SUBCUTANEOUS
Qty: 1 PEN | Refills: 1 | Status: ON HOLD | OUTPATIENT
Start: 2019-12-13 | End: 2021-08-26 | Stop reason: HOSPADM

## 2019-12-13 RX ORDER — INSULIN GLARGINE 100 [IU]/ML
20 INJECTION, SOLUTION SUBCUTANEOUS NIGHTLY
Qty: 1 VIAL | Refills: 3 | Status: ON HOLD | OUTPATIENT
Start: 2019-12-13 | End: 2021-07-13 | Stop reason: SDUPTHER

## 2019-12-13 RX ORDER — LOSARTAN POTASSIUM 50 MG/1
50 TABLET ORAL DAILY
Qty: 30 TABLET | Refills: 3 | Status: SHIPPED | OUTPATIENT
Start: 2019-12-13 | End: 2021-04-29 | Stop reason: ALTCHOICE

## 2019-12-13 RX ORDER — INSULIN LISPRO 100 [IU]/ML
0-12 INJECTION, SOLUTION INTRAVENOUS; SUBCUTANEOUS
Qty: 1 PEN | Refills: 1 | Status: SHIPPED | OUTPATIENT
Start: 2019-12-13 | End: 2019-12-13

## 2019-12-13 RX ORDER — INSULIN GLARGINE 100 [IU]/ML
20 INJECTION, SOLUTION SUBCUTANEOUS NIGHTLY
Qty: 1 VIAL | Refills: 3 | Status: SHIPPED | OUTPATIENT
Start: 2019-12-13 | End: 2019-12-13 | Stop reason: SDUPTHER

## 2019-12-13 RX ADMIN — GABAPENTIN 300 MG: 300 CAPSULE ORAL at 08:40

## 2019-12-13 RX ADMIN — CARVEDILOL 25 MG: 25 TABLET, FILM COATED ORAL at 08:40

## 2019-12-13 RX ADMIN — AMLODIPINE BESYLATE 10 MG: 10 TABLET ORAL at 08:40

## 2019-12-13 RX ADMIN — Medication 10 ML: at 08:41

## 2019-12-13 ASSESSMENT — PAIN SCALES - GENERAL
PAINLEVEL_OUTOF10: 0

## 2019-12-13 ASSESSMENT — PAIN DESCRIPTION - PROGRESSION
CLINICAL_PROGRESSION: NOT CHANGED

## 2020-01-08 NOTE — PROGRESS NOTES
[address]   Congestive Heart Failure    Primary Care Doctor:  No primary care provider on file. Primary Cardiologist: Timo Doss       Chief Complaint:  Fatigue and chest pain    History of Present Illness:  Jd Hudson is a 47 y.o. female with PMH HTN, HLD, DM,  who presents today for hospital f/u. Jd Hudson was admitted 12/9/19 and discharged 12/17/19. Hospital course: presented with sudden onset of chest pressure shortness of breath while she was walking towards the parking lot.  She has been having increased weight gain and leg swelling for the last 2 days, she did not check her weight.   Past medical history of CHF diabetes mellitus hypertension, noncompliant with medications  ED work-up revealed sodium of 129 glucose of 620 creatinine of 1.8, she was 87% on room air on arrival, the time of my examination she is feeling better but is still on nasal cannula.  She received 1 dose of Lasix 40 mg with improvement in chest pressure. Chest x-ray with mild cardiomegaly with pulmonary vascular congestion, right paramediastinal opacity which is indeterminate likely lymphadenopathy.    Acute hypoxic respiratory failure requiting oxygen support, hypoxic on arrival. , likely due to pulmonary congestion and volume overload. Stress test with intermediate risk leading to LHC which was non obstructive   -BNP elevated to 1100  -On discharge she is on  losartan, lasix, propranol and cardura per nephro    Since hospitalization she reports continued chest pain, dyspnea, and fatigue and denies palpitations, orthopnea, PND, exertional chest pressure/discomfort, early saiety, edema, syncope. She has a sleep study about 4 years ago positive for JEAN, never treated- will f/u. We discussed CAD risk factors today and she is agreeable to start statin. Also she admits to craving corn starch - will check iron studies    hospital weight on d/c was 230lb and discharge home weight was 232lb.   Daily wts since that time have decreased. Today's home wt: 227      Baseline Weight: 230    Admit BNP: 1106   Discharge BNP: 623    EF: 55-60%  Cardiac Imaging: Echo 12/9/19   Summary   Left ventricular cavity size is normal. There is mild concentric left   ventricular hypertrophy. Left ventricular function is normal with ejection fraction estimated at   55-60%. No regional wall motion abnormalities are noted. Normal diastolic function. Mild tricuspid regurgitation. Estimated pulmonary artery systolic pressure is at 38 mmHg assuming a right   atrial pressure of 3 mmHg. ST 12/9/19  Summary    Overall findings represent a intermediate risk scan.    Normal LV size and systolic function.    Moderate size, reversible defect involving the apex and apical lateral    cardiac segments. Above findings are concerning for ischemia.    ECG: Non-diagnostic EKG response due to failure to reach target heart rate.    No ischemic changes with Lexiscan. [facility] 12/9/19  LEFT MAIN:  Normal.     LEFT ANTERIOR DESCENDING:  The LAD courses to and wraps around the apex. It gave off two very small diagonal branches in the mid vessel. There  is mild diffuse disease especially in the mid vessel, but no significant  focal critical obstructive lesions. There was diffuse disease in the  small diagonal branches.     LEFT CIRCUMFLEX:  Circumflex consists of a very small high rising  marginal branch followed by small-to-moderate sized second marginal  branch, a small-to-moderate sized posterolateral branch. The circumflex  was free of significant obstructive disease.     RIGHT CORONARY ARTERY:  Right coronary artery is a dominant vessel. It  gives off a large PDA and two posterolateral branches. The right  coronary artery is normal.     IMPRESSION:  1. Mild small branch vessel disease, but otherwise no significant major  critical obstructive disease in the main epicardial vessel.   2.  Successful Angio-Seal of the right femoral arteriotomy site.     Device: No     Activity: below baseline  Can you walk 1-2 blocks or do a moderate amount of house/yard work? Yes  Cardiac Rehab Referral: No     NYHA Class: II     Sodium Restrictions: 3g  Fluid Restrictions: 48-64 oz/day  Sodium and fluid restriction compliance: improved since hosp    Pt Education: The patient has received education on the following topics: dietary sodium restriction, heart failure medications, the importance of physical activity, symptom management and weight monitoring     JEAN Yes Treated: No        Past Medical History:   has a past medical history of CHF (congestive heart failure) ([facility] [address] Diabetes mellitus ([facility] [address] and Hypertension. Surgical History:   has no past surgical history on file. Social History:   reports that she has never smoked. She has never used smokeless tobacco. She reports previous alcohol use. She reports previous drug use. Family History:   No family history on file. Home Medications:  Prior to Admission medications    Medication Sig Start Date End Date Taking?  Authorizing Provider   aspirin 81 MG chewable tablet Take 1 tablet by mouth daily 12/13/19   Shantelle Thorne MD   insulin glargine (LANTUS) 100 UNIT/ML injection vial Inject 20 Units into the skin nightly Pt reports noncompliant 12/13/19   Shantelle Thorne MD   insulin lispro, 1 Unit Dial, 100 UNIT/ML SOPN Inject 0-12 Units into the skin 3 times daily (with meals) 12/13/19   Shantelle Thorne MD   insulin lispro, 1 Unit Dial, 100 UNIT/ML SOPN Inject 0-6 Units into the skin nightly 12/13/19   Shantelle Thorne MD   carvedilol (COREG) 12.5 MG tablet Take 2 tablets by mouth 2 times daily (with meals) 12/13/19   Shantelle Thorne MD   doxazosin (CARDURA) 2 MG tablet Take 1 tablet by mouth nightly 12/13/19   Shantelle Thorne MD   losartan (COZAAR) 50 MG tablet Take 1 tablet by mouth daily 12/13/19   Shantelle Thorne MD   furosemide (LASIX) 40 MG tablet Take 1 tablet by mouth daily 12/13/19   Shantelle Thorne MD gabapentin (NEURONTIN) 300 MG capsule Take 1 capsule by mouth 2 times daily for 90 days. 12/11/19 3/10/20  Kellie Zacarias MD   pioglitazone (ACTOS) 30 MG tablet Take 30 mg by mouth daily    Historical Provider, MD   butalbital-acetaminophen-caffeine (FIORICET, ESGIC) -40 MG per tablet Take 1 tablet by mouth every 4 hours as needed for Headaches 5/27/15   Jennifer Beyer MD        Allergies:  Patient has no known allergies. ROS:   Review of Systems   Constitutional: Positive for fatigue. Respiratory: Positive for shortness of breath. Cardiovascular: Positive for chest pain. Negative for palpitations and leg swelling. Gastrointestinal: Negative. Genitourinary: Negative. Musculoskeletal: Positive for myalgias. Leg cramps   Skin: Negative. Neurological: Positive for dizziness. Hematological: Negative. Psychiatric/Behavioral: Negative. Physical Examination:    Vitals:    01/10/20 0914   BP: (!) 152/90   Site: Left Upper Arm   Position: Sitting   Cuff Size: Medium Adult   Pulse: 81   SpO2: 97%   Weight: 235 lb 12.8 oz (107 kg)   Height: 5' 1\" (1.549 m)           Physical Exam  Constitutional:       Appearance: Normal appearance. She is obese. HENT:      Head: Normocephalic and atraumatic. Eyes:      Extraocular Movements: Extraocular movements intact. Pupils: Pupils are equal, round, and reactive to light. Neck:      Musculoskeletal: Normal range of motion and neck supple. Cardiovascular:      Rate and Rhythm: Normal rate and regular rhythm. Pulses: Normal pulses. Heart sounds: Normal heart sounds. Pulmonary:      Effort: Pulmonary effort is normal.      Breath sounds: Normal breath sounds. Abdominal:      Palpations: Abdomen is soft. Musculoskeletal: Normal range of motion. Skin:     General: Skin is warm and dry. Neurological:      General: No focal deficit present. Mental Status: She is alert and oriented to person, place, and time.

## 2020-01-10 ENCOUNTER — OFFICE VISIT (OUTPATIENT)
Dept: CARDIOLOGY CLINIC | Age: 50
End: 2020-01-10
Payer: COMMERCIAL

## 2020-01-10 VITALS
DIASTOLIC BLOOD PRESSURE: 90 MMHG | BODY MASS INDEX: 44.52 KG/M2 | HEIGHT: 61 IN | OXYGEN SATURATION: 97 % | WEIGHT: 235.8 LBS | HEART RATE: 81 BPM | SYSTOLIC BLOOD PRESSURE: 152 MMHG

## 2020-01-10 PROBLEM — E11.65 TYPE 2 DIABETES MELLITUS WITH HYPERGLYCEMIA, WITH LONG-TERM CURRENT USE OF INSULIN (HCC): Status: ACTIVE | Noted: 2020-01-10

## 2020-01-10 PROBLEM — I10 ESSENTIAL HYPERTENSION: Status: ACTIVE | Noted: 2020-01-10

## 2020-01-10 PROBLEM — G47.33 OSA (OBSTRUCTIVE SLEEP APNEA): Status: ACTIVE | Noted: 2020-01-10

## 2020-01-10 PROBLEM — Z79.4 TYPE 2 DIABETES MELLITUS WITH HYPERGLYCEMIA, WITH LONG-TERM CURRENT USE OF INSULIN (HCC): Status: ACTIVE | Noted: 2020-01-10

## 2020-01-10 PROCEDURE — 99214 OFFICE O/P EST MOD 30 MIN: CPT | Performed by: NURSE PRACTITIONER

## 2020-01-10 RX ORDER — ONDANSETRON 4 MG/1
4 TABLET, FILM COATED ORAL EVERY 12 HOURS PRN
Status: ON HOLD | COMMUNITY
Start: 2019-12-17 | End: 2021-07-13 | Stop reason: SDUPTHER

## 2020-01-10 RX ORDER — ATORVASTATIN CALCIUM 20 MG/1
20 TABLET, FILM COATED ORAL DAILY
Qty: 90 TABLET | Refills: 1 | Status: SHIPPED | OUTPATIENT
Start: 2020-01-10 | End: 2021-07-06 | Stop reason: CLARIF

## 2020-01-10 RX ORDER — NITROGLYCERIN 0.4 MG/1
0.4 TABLET SUBLINGUAL EVERY 5 MIN PRN
Qty: 25 TABLET | Refills: 3 | Status: ON HOLD | OUTPATIENT
Start: 2020-01-10 | End: 2021-07-13 | Stop reason: SDUPTHER

## 2020-01-10 ASSESSMENT — ENCOUNTER SYMPTOMS
SHORTNESS OF BREATH: 1
GASTROINTESTINAL NEGATIVE: 1

## 2020-01-10 NOTE — LETTER
Can someone please figure out how to refer her to the resident's clinic for DM management and f/u.  Thanks, nay

## 2020-01-10 NOTE — PATIENT INSTRUCTIONS
Instructions:   1. Medications: start atorvastatin 20mg once a day in the evening  2. Labs: one month before next OV  3. Referrals: call sleep center to re-evaluate sleep apnea  4. Lifestyle Recommendations: Weigh yourself every day in the morning after urination, call Ashley Valverde if wt increases 2-3lb in one day or 5lb in one week, Limit sodium to 2000mg/day and fluids to 2L or 64oz/day. 5. BP goal <135/85  6.  Follow up: one month with Dr. Kenya Escamilla: [phone]

## 2020-01-11 PROBLEM — R77.8 ELEVATED TROPONIN: Status: RESOLVED | Noted: 2019-12-12 | Resolved: 2020-01-11

## 2020-01-11 PROBLEM — R79.89 ELEVATED TROPONIN: Status: RESOLVED | Noted: 2019-12-12 | Resolved: 2020-01-11

## 2020-02-07 ENCOUNTER — OFFICE VISIT (OUTPATIENT)
Dept: CARDIOLOGY CLINIC | Age: 50
End: 2020-02-07
Payer: COMMERCIAL

## 2020-02-07 VITALS
BODY MASS INDEX: 46.14 KG/M2 | DIASTOLIC BLOOD PRESSURE: 80 MMHG | WEIGHT: 244.2 LBS | SYSTOLIC BLOOD PRESSURE: 166 MMHG | HEART RATE: 75 BPM

## 2020-02-07 PROCEDURE — 99214 OFFICE O/P EST MOD 30 MIN: CPT | Performed by: INTERNAL MEDICINE

## 2020-02-07 RX ORDER — TORSEMIDE 100 MG/1
100 TABLET ORAL DAILY
Qty: 90 TABLET | Refills: 1 | Status: SHIPPED | OUTPATIENT
Start: 2020-02-07 | End: 2021-03-16 | Stop reason: ALTCHOICE

## 2020-02-07 RX ORDER — SPIRONOLACTONE 25 MG/1
25 TABLET ORAL DAILY
Qty: 90 TABLET | Refills: 1 | Status: SHIPPED | OUTPATIENT
Start: 2020-02-07 | End: 2020-02-13

## 2020-02-07 RX ORDER — SPIRONOLACTONE 50 MG/1
25 TABLET, FILM COATED ORAL DAILY
Qty: 90 TABLET | Refills: 2 | Status: SHIPPED
Start: 2020-02-07 | End: 2020-02-07 | Stop reason: DRUGHIGH

## 2020-02-07 RX ORDER — SPIRONOLACTONE 50 MG/1
50 TABLET, FILM COATED ORAL DAILY
COMMUNITY
End: 2020-02-07

## 2020-02-07 NOTE — PROGRESS NOTES
MG SL tablet Place 1 tablet under the tongue every 5 minutes as needed for Chest pain 1/10/20  Yes Evan Cantu, APRN - CNP   aspirin 81 MG chewable tablet Take 1 tablet by mouth daily 12/13/19  Yes Shilrey Montilla MD   insulin glargine (LANTUS) 100 UNIT/ML injection vial Inject 20 Units into the skin nightly Pt reports noncompliant 12/13/19  Yes Shirley Montilla MD   insulin lispro, 1 Unit Dial, 100 UNIT/ML SOPN Inject 0-12 Units into the skin 3 times daily (with meals) 12/13/19  Yes Shirley Montilla MD   insulin lispro, 1 Unit Dial, 100 UNIT/ML SOPN Inject 0-6 Units into the skin nightly 12/13/19  Yes Shirley Montilla MD   carvedilol (COREG) 12.5 MG tablet Take 2 tablets by mouth 2 times daily (with meals) 12/13/19  Yes Shirley Montilla MD   doxazosin (CARDURA) 2 MG tablet Take 1 tablet by mouth nightly 12/13/19  Yes Shirley Montilla MD   losartan (COZAAR) 50 MG tablet Take 1 tablet by mouth daily 12/13/19  Yes Shirley Montilla MD   gabapentin (NEURONTIN) 300 MG capsule Take 1 capsule by mouth 2 times daily for 90 days. 12/11/19 3/10/20 Yes Marcos Loera MD   pioglitazone (ACTOS) 30 MG tablet Take 30 mg by mouth daily   Yes Historical Provider, MD   butalbital-acetaminophen-caffeine (FIORICET, ESGIC) -40 MG per tablet Take 1 tablet by mouth every 4 hours as needed for Headaches 5/27/15  Yes Avery Portillo MD          Allergy:     Patient has no known allergies. Review of Systems:     All 12 point review of symptoms completed. Pertinent positives identified in the HPI, all other review of symptoms negative as below. CONSTITUTIONAL: No fatigue  SKIN: No rash or pruritis. EYES: No visual changes or diplopia. No scleral icterus. ENT: No Headaches, hearing loss or vertigo. No mouth sores or sore throat. CARDIOVASCULAR: No chest pain/chest pressure/chest discomfort. No palpitations. No edema. RESPIRATORY: No dyspnea. No cough or wheezing, no sputum production. GASTROINTESTINAL: No N/V/D. Normal mood and affect   Neurologic: Normal gross motor and sensory exam.  Cranial nerves intact        Labs:     Lab Results   Component Value Date    WBC 3.6 (L) 12/09/2019    HGB 10.2 (L) 12/09/2019    HCT 31.5 (L) 12/09/2019    MCV 94.0 12/09/2019     12/09/2019     Lab Results   Component Value Date     12/13/2019    K 4.2 12/13/2019     12/13/2019    CO2 23 12/13/2019    BUN 38 (H) 12/13/2019    CREATININE 1.5 (H) 12/13/2019    GLUCOSE 148 (H) 12/13/2019    CALCIUM 9.5 12/13/2019    PROT 7.1 12/09/2019    LABALBU 3.9 12/09/2019    BILITOT 0.3 12/09/2019    ALKPHOS 133 (H) 12/09/2019    AST 21 12/09/2019    ALT 34 12/09/2019    LABGLOM 37 (A) 12/13/2019    GFRAA 44 (A) 12/13/2019    AGRATIO 1.2 12/09/2019    GLOB 3.2 12/09/2019         Lab Results   Component Value Date    CHOL 277 (H) 12/10/2019    CHOL 164 05/25/2015     Lab Results   Component Value Date    TRIG 116 12/10/2019    TRIG 95 05/25/2015     Lab Results   Component Value Date    HDL 72 (H) 12/10/2019    HDL 55 05/25/2015     Lab Results   Component Value Date    LDLCALC 182 (H) 12/10/2019    [address] 05/25/2015     Lab Results   Component Value Date    LABVLDL 23 12/10/2019    LABVLDL 19 05/25/2015     No results found for: CHOLHDLRATIO    No results found for: INR, PROTIME    The 10-year ASCVD risk score (Esme Curry, et al., 2013) is: 20.4%    Values used to calculate the score:      Age: 47 years      Sex: Female      Is Non- : Yes      Diabetic: Yes      Tobacco smoker: No      Systolic Blood Pressure: 398 mmHg      Is BP treated: Yes      HDL Cholesterol: 72 mg/dL      Total Cholesterol: 277 mg/dL      Assessment / Plan:      Diagnosis Orders   1. CHF (congestive heart failure), NYHA class I, acute on chronic, combined ([address]  Basic Metabolic Panel   2. Essential hypertension  Basic Metabolic Panel      1.  Chronic HFpEF:   Most likely patient has chronic HFpEF (though echo suggests normal systolic

## 2020-02-17 ENCOUNTER — TELEPHONE (OUTPATIENT)
Dept: CARDIOLOGY CLINIC | Age: 50
End: 2020-02-17

## 2020-02-17 NOTE — TELEPHONE ENCOUNTER
Pt saw Alba Araujo on 2/7/2020. GEB switched Lasix to Torsemide 100 mg daily. However, pt stated she was not informed to stop the lasix and she took a lasix with the torsemide 2/8/2020 and 2/10/2020. Pt c/o of head spinning, dizziness, \"vertigo feeling. \"  She saw Dr. Ignacia Ellis on 2/13/2020 who instructed pt on how to take the Torsemide in regards to weight gain. Pt has not had to take anymore Torsemide. Last dose was on 2/10/2020. She stated her weight is around 227 lb, with actual 2-3 lb weight loss. Pt stopped her Spirolactone on 2/15/2020 d/t to BP running low all weekend:  88/77, 88/70, 90/77, and this morning 89/72. Pt still c/o of feeling disoriented. Pt would like a return call to discuss BP and medications.      [phone]

## 2020-02-18 NOTE — TELEPHONE ENCOUNTER
GEB would like the pt to get a BMP now, decrease coreg to 12.5 mg bid, and torsemide 50 mg daily. Pt verbalized understanding. Pt needs to f/u in 2-3 weeks. Pt refused to make appointment now explained she will have to call back later to schedule, she said she needs to get her finances in order before making the appointment.

## 2021-03-24 RX ORDER — TORSEMIDE 100 MG/1
200 TABLET ORAL 2 TIMES DAILY
Qty: 120 TABLET | Refills: 5 | Status: SHIPPED | OUTPATIENT
Start: 2021-03-24 | End: 2021-04-14 | Stop reason: SDUPTHER

## 2021-04-14 RX ORDER — TORSEMIDE 100 MG/1
200 TABLET ORAL 2 TIMES DAILY
Qty: 240 TABLET | Refills: 5 | Status: SHIPPED | OUTPATIENT
Start: 2021-04-14 | End: 2021-07-06 | Stop reason: ALTCHOICE

## 2021-06-26 DIAGNOSIS — N18.5 CKD (CHRONIC KIDNEY DISEASE) STAGE 5, GFR LESS THAN 15 ML/MIN (HCC): Primary | ICD-10-CM

## 2021-07-06 ENCOUNTER — APPOINTMENT (OUTPATIENT)
Dept: GENERAL RADIOLOGY | Age: 51
DRG: 981 | End: 2021-07-06
Payer: MEDICARE

## 2021-07-06 ENCOUNTER — HOSPITAL ENCOUNTER (INPATIENT)
Age: 51
LOS: 7 days | Discharge: HOME | DRG: 981 | End: 2021-07-13
Attending: EMERGENCY MEDICINE | Admitting: INTERNAL MEDICINE
Payer: MEDICARE

## 2021-07-06 DIAGNOSIS — N17.9 AKI (ACUTE KIDNEY INJURY) (HCC): Primary | ICD-10-CM

## 2021-07-06 LAB
A/G RATIO: 0.7 (ref 1.1–2.2)
ALBUMIN SERPL-MCNC: 3.6 G/DL (ref 3.4–5)
ALP BLD-CCNC: 156 U/L (ref 40–129)
ALT SERPL-CCNC: 21 U/L (ref 10–40)
ANION GAP SERPL CALCULATED.3IONS-SCNC: 13 MMOL/L (ref 3–16)
AST SERPL-CCNC: 15 U/L (ref 15–37)
BACTERIA: ABNORMAL /HPF
BASOPHILS ABSOLUTE: 0 K/UL (ref 0–0.2)
BASOPHILS RELATIVE PERCENT: 0.6 %
BILIRUB SERPL-MCNC: <0.2 MG/DL (ref 0–1)
BILIRUBIN URINE: NEGATIVE
BLOOD, URINE: NEGATIVE
BUN BLDV-MCNC: 106 MG/DL (ref 7–20)
CALCIUM SERPL-MCNC: 9.4 MG/DL (ref 8.3–10.6)
CHLORIDE BLD-SCNC: 97 MMOL/L (ref 99–110)
CLARITY: CLEAR
CO2: 22 MMOL/L (ref 21–32)
COLOR: YELLOW
CREAT SERPL-MCNC: 5.1 MG/DL (ref 0.6–1.1)
EOSINOPHILS ABSOLUTE: 0.3 K/UL (ref 0–0.6)
EOSINOPHILS RELATIVE PERCENT: 4.9 %
EPITHELIAL CELLS, UA: ABNORMAL /HPF (ref 0–5)
GFR AFRICAN AMERICAN: 11
GFR NON-AFRICAN AMERICAN: 9
GLOBULIN: 4.9 G/DL
GLUCOSE BLD-MCNC: 255 MG/DL (ref 70–99)
GLUCOSE URINE: NEGATIVE MG/DL
HCT VFR BLD CALC: 29.3 % (ref 36–48)
HEMOGLOBIN: 9.6 G/DL (ref 12–16)
KETONES, URINE: NEGATIVE MG/DL
LEUKOCYTE ESTERASE, URINE: ABNORMAL
LYMPHOCYTES ABSOLUTE: 0.9 K/UL (ref 1–5.1)
LYMPHOCYTES RELATIVE PERCENT: 17.9 %
MCH RBC QN AUTO: 30.1 PG (ref 26–34)
MCHC RBC AUTO-ENTMCNC: 32.8 G/DL (ref 31–36)
MCV RBC AUTO: 92 FL (ref 80–100)
MICROSCOPIC EXAMINATION: YES
MONOCYTES ABSOLUTE: 0.7 K/UL (ref 0–1.3)
MONOCYTES RELATIVE PERCENT: 12.8 %
NEUTROPHILS ABSOLUTE: 3.3 K/UL (ref 1.7–7.7)
NEUTROPHILS RELATIVE PERCENT: 63.8 %
NITRITE, URINE: NEGATIVE
OSMOLALITY URINE: 339 MOSM/KG (ref 390–1070)
PDW BLD-RTO: 16.4 % (ref 12.4–15.4)
PH UA: 6 (ref 5–8)
PLATELET # BLD: 175 K/UL (ref 135–450)
PMV BLD AUTO: 9.1 FL (ref 5–10.5)
POTASSIUM SERPL-SCNC: 4.6 MMOL/L (ref 3.5–5.1)
PRO-BNP: 2769 PG/ML (ref 0–124)
PROTEIN UA: 30 MG/DL
RBC # BLD: 3.18 M/UL (ref 4–5.2)
RBC UA: ABNORMAL /HPF (ref 0–4)
SODIUM BLD-SCNC: 132 MMOL/L (ref 136–145)
SODIUM URINE: 77 MMOL/L
SPECIFIC GRAVITY UA: 1.02 (ref 1–1.03)
TOTAL PROTEIN: 8.5 G/DL (ref 6.4–8.2)
URINE REFLEX TO CULTURE: YES
URINE TYPE: ABNORMAL
UROBILINOGEN, URINE: 0.2 E.U./DL
WBC # BLD: 5.2 K/UL (ref 4–11)
WBC UA: ABNORMAL /HPF (ref 0–5)

## 2021-07-06 PROCEDURE — 81001 URINALYSIS AUTO W/SCOPE: CPT

## 2021-07-06 PROCEDURE — 84300 ASSAY OF URINE SODIUM: CPT

## 2021-07-06 PROCEDURE — 82570 ASSAY OF URINE CREATININE: CPT

## 2021-07-06 PROCEDURE — 36415 COLL VENOUS BLD VENIPUNCTURE: CPT

## 2021-07-06 PROCEDURE — 87184 SC STD DISK METHOD PER PLATE: CPT

## 2021-07-06 PROCEDURE — 87077 CULTURE AEROBIC IDENTIFY: CPT

## 2021-07-06 PROCEDURE — 83880 ASSAY OF NATRIURETIC PEPTIDE: CPT

## 2021-07-06 PROCEDURE — 83935 ASSAY OF URINE OSMOLALITY: CPT

## 2021-07-06 PROCEDURE — 1200000000 HC SEMI PRIVATE

## 2021-07-06 PROCEDURE — 87186 SC STD MICRODIL/AGAR DIL: CPT

## 2021-07-06 PROCEDURE — 80053 COMPREHEN METABOLIC PANEL: CPT

## 2021-07-06 PROCEDURE — 99285 EMERGENCY DEPT VISIT HI MDM: CPT

## 2021-07-06 PROCEDURE — 85025 COMPLETE CBC W/AUTO DIFF WBC: CPT

## 2021-07-06 PROCEDURE — 2580000003 HC RX 258: Performed by: PHYSICIAN ASSISTANT

## 2021-07-06 PROCEDURE — 87086 URINE CULTURE/COLONY COUNT: CPT

## 2021-07-06 PROCEDURE — 71046 X-RAY EXAM CHEST 2 VIEWS: CPT

## 2021-07-06 PROCEDURE — 93005 ELECTROCARDIOGRAM TRACING: CPT | Performed by: PHYSICIAN ASSISTANT

## 2021-07-06 PROCEDURE — 83036 HEMOGLOBIN GLYCOSYLATED A1C: CPT

## 2021-07-06 PROCEDURE — 73562 X-RAY EXAM OF KNEE 3: CPT

## 2021-07-06 RX ORDER — INSULIN LISPRO 100 [IU]/ML
0-12 INJECTION, SOLUTION INTRAVENOUS; SUBCUTANEOUS
Status: DISCONTINUED | OUTPATIENT
Start: 2021-07-07 | End: 2021-07-13 | Stop reason: HOSPADM

## 2021-07-06 RX ORDER — DEXTROSE MONOHYDRATE 50 MG/ML
100 INJECTION, SOLUTION INTRAVENOUS PRN
Status: DISCONTINUED | OUTPATIENT
Start: 2021-07-06 | End: 2021-07-13 | Stop reason: HOSPADM

## 2021-07-06 RX ORDER — PANTOPRAZOLE SODIUM 40 MG/1
40 TABLET, DELAYED RELEASE ORAL DAILY PRN
COMMUNITY
End: 2022-02-02

## 2021-07-06 RX ORDER — NIFEDIPINE 30 MG/1
30 TABLET, FILM COATED, EXTENDED RELEASE ORAL 2 TIMES DAILY
Status: DISCONTINUED | OUTPATIENT
Start: 2021-07-06 | End: 2021-07-09

## 2021-07-06 RX ORDER — HEPARIN SODIUM 5000 [USP'U]/ML
5000 INJECTION, SOLUTION INTRAVENOUS; SUBCUTANEOUS EVERY 8 HOURS SCHEDULED
Status: DISCONTINUED | OUTPATIENT
Start: 2021-07-06 | End: 2021-07-13 | Stop reason: HOSPADM

## 2021-07-06 RX ORDER — NICOTINE POLACRILEX 4 MG
15 LOZENGE BUCCAL PRN
Status: DISCONTINUED | OUTPATIENT
Start: 2021-07-06 | End: 2021-07-13 | Stop reason: HOSPADM

## 2021-07-06 RX ORDER — SODIUM CHLORIDE 0.9 % (FLUSH) 0.9 %
5-40 SYRINGE (ML) INJECTION PRN
Status: DISCONTINUED | OUTPATIENT
Start: 2021-07-06 | End: 2021-07-13 | Stop reason: HOSPADM

## 2021-07-06 RX ORDER — CARVEDILOL 12.5 MG/1
12.5 TABLET ORAL 2 TIMES DAILY
Status: DISCONTINUED | OUTPATIENT
Start: 2021-07-06 | End: 2021-07-09

## 2021-07-06 RX ORDER — DEXTROSE MONOHYDRATE 25 G/50ML
12.5 INJECTION, SOLUTION INTRAVENOUS PRN
Status: DISCONTINUED | OUTPATIENT
Start: 2021-07-06 | End: 2021-07-13 | Stop reason: HOSPADM

## 2021-07-06 RX ORDER — ATORVASTATIN CALCIUM 40 MG/1
40 TABLET, FILM COATED ORAL DAILY
Status: ON HOLD | COMMUNITY
End: 2021-07-13 | Stop reason: SDUPTHER

## 2021-07-06 RX ORDER — HYDRALAZINE HYDROCHLORIDE 100 MG/1
100 TABLET, FILM COATED ORAL EVERY 6 HOURS PRN
Status: ON HOLD | COMMUNITY
End: 2021-07-13 | Stop reason: HOSPADM

## 2021-07-06 RX ORDER — GABAPENTIN 300 MG/1
300 CAPSULE ORAL NIGHTLY
Status: DISCONTINUED | OUTPATIENT
Start: 2021-07-06 | End: 2021-07-13 | Stop reason: HOSPADM

## 2021-07-06 RX ORDER — ONDANSETRON 4 MG/1
4 TABLET, ORALLY DISINTEGRATING ORAL EVERY 8 HOURS PRN
Status: DISCONTINUED | OUTPATIENT
Start: 2021-07-06 | End: 2021-07-13 | Stop reason: HOSPADM

## 2021-07-06 RX ORDER — ONDANSETRON 2 MG/ML
4 INJECTION INTRAMUSCULAR; INTRAVENOUS EVERY 6 HOURS PRN
Status: DISCONTINUED | OUTPATIENT
Start: 2021-07-06 | End: 2021-07-13 | Stop reason: HOSPADM

## 2021-07-06 RX ORDER — SODIUM CHLORIDE 0.9 % (FLUSH) 0.9 %
5-40 SYRINGE (ML) INJECTION EVERY 12 HOURS SCHEDULED
Status: DISCONTINUED | OUTPATIENT
Start: 2021-07-06 | End: 2021-07-13 | Stop reason: HOSPADM

## 2021-07-06 RX ORDER — ATORVASTATIN CALCIUM 40 MG/1
40 TABLET, FILM COATED ORAL NIGHTLY
Status: DISCONTINUED | OUTPATIENT
Start: 2021-07-06 | End: 2021-07-13 | Stop reason: HOSPADM

## 2021-07-06 RX ORDER — INSULIN LISPRO 100 [IU]/ML
0-6 INJECTION, SOLUTION INTRAVENOUS; SUBCUTANEOUS NIGHTLY
Status: DISCONTINUED | OUTPATIENT
Start: 2021-07-06 | End: 2021-07-13 | Stop reason: HOSPADM

## 2021-07-06 RX ORDER — SODIUM CHLORIDE 450 MG/100ML
INJECTION, SOLUTION INTRAVENOUS CONTINUOUS
Status: DISCONTINUED | OUTPATIENT
Start: 2021-07-06 | End: 2021-07-07

## 2021-07-06 RX ORDER — SODIUM CHLORIDE 9 MG/ML
25 INJECTION, SOLUTION INTRAVENOUS PRN
Status: DISCONTINUED | OUTPATIENT
Start: 2021-07-06 | End: 2021-07-13 | Stop reason: HOSPADM

## 2021-07-06 RX ORDER — ASPIRIN 81 MG/1
81 TABLET, CHEWABLE ORAL DAILY
Status: DISCONTINUED | OUTPATIENT
Start: 2021-07-07 | End: 2021-07-07

## 2021-07-06 RX ORDER — ACETAMINOPHEN 650 MG/1
650 SUPPOSITORY RECTAL EVERY 6 HOURS PRN
Status: DISCONTINUED | OUTPATIENT
Start: 2021-07-06 | End: 2021-07-13 | Stop reason: HOSPADM

## 2021-07-06 RX ORDER — POLYETHYLENE GLYCOL 3350 17 G/17G
17 POWDER, FOR SOLUTION ORAL DAILY PRN
Status: DISCONTINUED | OUTPATIENT
Start: 2021-07-06 | End: 2021-07-13 | Stop reason: HOSPADM

## 2021-07-06 RX ORDER — ACETAMINOPHEN 325 MG/1
650 TABLET ORAL EVERY 6 HOURS PRN
Status: DISCONTINUED | OUTPATIENT
Start: 2021-07-06 | End: 2021-07-13 | Stop reason: HOSPADM

## 2021-07-06 RX ADMIN — SODIUM CHLORIDE: 450 INJECTION, SOLUTION INTRAVENOUS at 20:08

## 2021-07-06 ASSESSMENT — PAIN SCALES - GENERAL: PAINLEVEL_OUTOF10: 5

## 2021-07-06 NOTE — ED PROVIDER NOTES
[address] [facility] ENCOUNTER          PHYSICIAN ASSISTANT NOTE       Date of evaluation: 7/6/2021    Chief Complaint     Abnormal Lab (states she was at her nephrologist today and he told her to come to the ED because he wants labs and to start her on dialysis.)      History of Present Illness     Amy Rogel is a 45 y.o. female with past medical history significant for diastolic heart failure, diabetes, hypertension, CKD stage 4/5, who presents for abnormal lab. Patient reportedly was evaluated earlier today at outpatient nephrology appt and was told to present to the emergency department for further lab work and likely admission. Patient does not regularly follow-up, so did not have recent lab work. Nephrologist told patient that she may need to start dialysis. Patient states she does still make urine regularly and has no complaints with this. Does state that she tripped and fell a couple of days ago and landed on her right knee. States she has still been able to ambulate, but does endorse some pain to her right knee. Denies any fevers, chills, chest pain, shortness of breath, abdominal pain, nausea, vomiting, peripheral edema. Review of Systems     Review of Systems   See HPI, all others negative. Past Medical, Surgical, Family, and Social History     She has a past medical history of CHF (congestive heart failure) ([address] Diabetes mellitus ([address] and Hypertension. She has no past surgical history on file. Her family history is not on file. She reports that she has never smoked. She has never used smokeless tobacco. She reports previous alcohol use. She reports previous drug use.     Medications     Previous Medications    ASPIRIN 81 MG CHEWABLE TABLET    Take 1 tablet by mouth daily    ATORVASTATIN (LIPITOR) 20 MG TABLET    Take 1 tablet by mouth daily    BUTALBITAL-ACETAMINOPHEN-CAFFEINE (FIORICET, ESGIC) -40 MG PER TABLET    Take 1 tablet by mouth every 4 hours as needed for Headaches    CARVEDILOL (COREG) 12.5 MG TABLET    Take 2 tablets by mouth 2 times daily (with meals)    CARVEDILOL (COREG) 12.5 MG TABLET    Take 1 tablet by mouth 2 times daily    GABAPENTIN (NEURONTIN) 100 MG CAPSULE    Take 3 capsules by mouth 2 times daily for 90 days. INSULIN GLARGINE (LANTUS) 100 UNIT/ML INJECTION VIAL    Inject 20 Units into the skin nightly Pt reports noncompliant    INSULIN LISPRO, 1 UNIT DIAL, 100 UNIT/ML SOPN    Inject 0-12 Units into the skin 3 times daily (with meals)    INSULIN LISPRO, 1 UNIT DIAL, 100 UNIT/ML SOPN    Inject 0-6 Units into the skin nightly    NIFEDIPINE (PROCARDIA XL) 30 MG EXTENDED RELEASE TABLET    Take 30 mg by mouth 2 times daily    NITROGLYCERIN (NITROSTAT) 0.4 MG SL TABLET    Place 1 tablet under the tongue every 5 minutes as needed for Chest pain    ONDANSETRON (ZOFRAN) 4 MG TABLET    Take 4 mg by mouth daily    SITAGLIPTIN (JANUVIA) 50 MG TABLET    Take 50 mg by mouth daily       Allergies     She has No Known Allergies. Physical Exam     INITIAL VITALS: BP: 115/61, Temp: 97.8 °F (36.6 °C), Pulse: 81, Resp: 18, SpO2: 97 %  Physical Exam  Constitutional:       General: She is not in acute distress. Appearance: Normal appearance. She is normal weight. She is not ill-appearing, toxic-appearing or diaphoretic. HENT:      Head: Normocephalic and atraumatic. Mouth/Throat:      Mouth: Mucous membranes are moist.      Pharynx: Oropharynx is clear. Eyes:      Conjunctiva/sclera: Conjunctivae normal.   Cardiovascular:      Rate and Rhythm: Normal rate. Pulmonary:      Effort: Pulmonary effort is normal. No respiratory distress. Abdominal:      General: There is no distension. Palpations: Abdomen is soft. Tenderness: There is no abdominal tenderness. Musculoskeletal:         General: Normal range of motion. Cervical back: Normal range of motion. Right lower leg: No edema. Left lower leg: No edema.       Comments: Mild tenderness along medial joint line of right knee. Minimal swelling noted. No overlying warmth or erythema. No laxity noted. Skin:     General: Skin is warm and dry. Neurological:      General: No focal deficit present. Mental Status: She is alert and oriented to person, place, and time. Diagnostic Results     RADIOLOGY:  XR CHEST (2 VW)   Final Result      Stable mild cardiomegaly and pulmonary vascular congestion without evidence of pulmonary edema. XR KNEE RIGHT (3 VIEWS)   Final Result   Impression:    No acute osseous injury. Joint effusion. Mild medial compartment osteoarthritis.           LABS:   Results for orders placed or performed during the hospital encounter of 07/06/21   CBC Auto Differential   Result Value Ref Range    WBC 5.2 4.0 - 11.0 K/uL    RBC 3.18 (L) 4.00 - 5.20 M/uL    Hemoglobin 9.6 (L) 12.0 - 16.0 g/dL    Hematocrit 29.3 (L) 36.0 - 48.0 %    MCV 92.0 80.0 - 100.0 fL    MCH 30.1 26.0 - 34.0 pg    MCHC 32.8 31.0 - 36.0 g/dL    RDW 16.4 (H) 12.4 - 15.4 %    Platelets 209 881 - 540 K/uL    MPV 9.1 5.0 - 10.5 fL    Neutrophils % 63.8 %    Lymphocytes % 17.9 %    Monocytes % 12.8 %    Eosinophils % 4.9 %    Basophils % 0.6 %    Neutrophils Absolute 3.3 1.7 - 7.7 K/uL    Lymphocytes Absolute 0.9 (L) 1.0 - 5.1 K/uL    Monocytes Absolute 0.7 0.0 - 1.3 K/uL    Eosinophils Absolute 0.3 0.0 - 0.6 K/uL    Basophils Absolute 0.0 0.0 - 0.2 K/uL   Comprehensive Metabolic Panel   Result Value Ref Range    Sodium 132 (L) 136 - 145 mmol/L    Potassium 4.6 3.5 - 5.1 mmol/L    Chloride 97 (L) 99 - 110 mmol/L    CO2 22 21 - 32 mmol/L    Anion Gap 13 3 - 16    Glucose 255 (H) 70 - 99 mg/dL     (HH) 7 - 20 mg/dL    CREATININE 5.1 (HH) 0.6 - 1.1 mg/dL    GFR Non-African American 9 (A) >60    GFR  11 (A) >60    Calcium 9.4 8.3 - 10.6 mg/dL    Total Protein 8.5 (H) 6.4 - 8.2 g/dL    Albumin 3.6 3.4 - 5.0 g/dL    Albumin/Globulin Ratio 0.7 (L) 1.1 - 2.2 Total Bilirubin <0.2 0.0 - 1.0 mg/dL    Alkaline Phosphatase 156 (H) 40 - 129 U/L    ALT 21 10 - 40 U/L    AST 15 15 - 37 U/L    Globulin 4.9 g/dL   Urinalysis Reflex to Culture    Specimen: Urine, clean catch   Result Value Ref Range    Color, UA Yellow Straw/Yellow    Clarity, UA Clear Clear    Glucose, Ur Negative Negative mg/dL    Bilirubin Urine Negative Negative    Ketones, Urine Negative Negative mg/dL    Specific Gravity, UA 1.020 1.005 - 1.030    Blood, Urine Negative Negative    pH, UA 6.0 5.0 - 8.0    Protein, UA 30 (A) Negative mg/dL    Urobilinogen, Urine 0.2 <2.0 E.U./dL    Nitrite, Urine Negative Negative    Leukocyte Esterase, Urine MODERATE (A) Negative    Microscopic Examination YES     Urine Type Voided     Urine Reflex to Culture Yes    Brain Natriuretic Peptide   Result Value Ref Range    Pro-BNP 2,769 (H) 0 - 124 pg/mL   Microscopic Urinalysis   Result Value Ref Range    WBC, UA  (A) 0 - 5 /HPF    RBC, UA 0-2 0 - 4 /HPF    Epithelial Cells, UA 0-1 0 - 5 /HPF    Bacteria, UA 4+ (A) None Seen /HPF     RECENT VITALS:  BP: (!) 148/77, Temp: 97.8 °F (36.6 °C), Pulse: 81, Resp: 18, SpO2: 93 %     Procedures       ED Course     Nursing Notes, Past Medical Hx,Past Surgical Hx, Social Hx, Allergies, and Family Hx were reviewed. The patient was given the following medications:  Orders Placed This Encounter   Medications    0.45 % sodium chloride infusion       CONSULTS:  IP CONSULT TO NEPHROLOGY  IP CONSULT TO HOSPITALIST    MEDICAL DECISION MAKING / ASSESSMENT / Edward Ladi is a 45 y.o. female presenting from outpatient nephrology appointment for lab work and further evaluation for worsening CKD. Lab work performed including CBC and BMP. CBC shows no leukocytosis, does show mild anemia with hemoglobin of 9.6, which does appear to be around patient's baseline. BMP shows normal electrolytes with potassium of 4.2, but does show BUN of 106 and creatinine of 5.1.   Most recent lab values in our system were from approximately 2 years ago and revealed creatinine between 1.5 and 2.5. Patient is not having any difficulty urinating, was able to provide urine sample without any problems. Patient does not appear to be fluid overloaded, has no significant lower extremity edema and lungs sound clear, normal O2 saturation on room air. Patient also does not appear to be dry, oropharynx is moist.  Spoke with on-call nephrologist who recommended add-on of urine sodium, urine osmolality, and urine creatinine. Also recommended starting patient on half-normal saline at 75ml/hr with addition of 75 mEq bicarb. X-ray right knee ordered due to patient's complaints of recent mechanical fall and show medial osteoarthritis but no acute abnormality. Patient has no significant laxity and is able to ambulate without difficulty. At this time, we feel patient warrants admission for ongoing management of elevated creatinine in the setting of also elevated BNP and chest x-ray showing mild pulmonary vascular congestion without signs of pulmonary edema. This patient was also evaluated by the attending physician. All care plans were discussed and agreed upon. Clinical Impression     1. HARRIETT (acute kidney injury) ([facility] [address]        Disposition     PATIENT REFERRED TO:  No follow-up provider specified.     DISCHARGE MEDICATIONS:  New Prescriptions    No medications on file       DISPOSITION Decision To Admit 07/06/2021 08:03:14 PM       Bridgett Leblanc PA-C  07/07/21 0025

## 2021-07-07 ENCOUNTER — TELEPHONE (OUTPATIENT)
Dept: BARIATRICS/WEIGHT MGMT | Age: 51
End: 2021-07-07

## 2021-07-07 ENCOUNTER — APPOINTMENT (OUTPATIENT)
Dept: GENERAL RADIOLOGY | Age: 51
DRG: 981 | End: 2021-07-07
Payer: MEDICARE

## 2021-07-07 LAB
ANION GAP SERPL CALCULATED.3IONS-SCNC: 12 MMOL/L (ref 3–16)
BASOPHILS ABSOLUTE: 0.1 K/UL (ref 0–0.2)
BASOPHILS RELATIVE PERCENT: 1.4 %
BUN BLDV-MCNC: 112 MG/DL (ref 7–20)
CALCIUM SERPL-MCNC: 9.1 MG/DL (ref 8.3–10.6)
CHLORIDE BLD-SCNC: 98 MMOL/L (ref 99–110)
CO2: 25 MMOL/L (ref 21–32)
CREAT SERPL-MCNC: 4.3 MG/DL (ref 0.6–1.1)
CREATININE URINE: 49.6 MG/DL (ref 28–259)
EKG ATRIAL RATE: 79 BPM
EKG DIAGNOSIS: NORMAL
EKG P AXIS: 51 DEGREES
EKG P-R INTERVAL: 150 MS
EKG Q-T INTERVAL: 384 MS
EKG QRS DURATION: 80 MS
EKG QTC CALCULATION (BAZETT): 440 MS
EKG R AXIS: -8 DEGREES
EKG T AXIS: 45 DEGREES
EKG VENTRICULAR RATE: 79 BPM
EOSINOPHILS ABSOLUTE: 0.2 K/UL (ref 0–0.6)
EOSINOPHILS RELATIVE PERCENT: 5.7 %
ESTIMATED AVERAGE GLUCOSE: 151.3 MG/DL
GFR AFRICAN AMERICAN: 13
GFR NON-AFRICAN AMERICAN: 11
GLUCOSE BLD-MCNC: 138 MG/DL (ref 70–99)
GLUCOSE BLD-MCNC: 141 MG/DL (ref 70–99)
GLUCOSE BLD-MCNC: 141 MG/DL (ref 70–99)
GLUCOSE BLD-MCNC: 171 MG/DL (ref 70–99)
GLUCOSE BLD-MCNC: 192 MG/DL (ref 70–99)
GLUCOSE BLD-MCNC: 201 MG/DL (ref 70–99)
HBA1C MFR BLD: 6.9 %
HCT VFR BLD CALC: 25.1 % (ref 36–48)
HEMOGLOBIN: 8.2 G/DL (ref 12–16)
INR BLD: 1.1 (ref 0.88–1.12)
LYMPHOCYTES ABSOLUTE: 1.1 K/UL (ref 1–5.1)
LYMPHOCYTES RELATIVE PERCENT: 27.7 %
MCH RBC QN AUTO: 29.5 PG (ref 26–34)
MCHC RBC AUTO-ENTMCNC: 32.5 G/DL (ref 31–36)
MCV RBC AUTO: 90.9 FL (ref 80–100)
MONOCYTES ABSOLUTE: 0.6 K/UL (ref 0–1.3)
MONOCYTES RELATIVE PERCENT: 15.8 %
NEUTROPHILS ABSOLUTE: 2 K/UL (ref 1.7–7.7)
NEUTROPHILS RELATIVE PERCENT: 49.4 %
PDW BLD-RTO: 16 % (ref 12.4–15.4)
PERFORMED ON: ABNORMAL
PLATELET # BLD: 156 K/UL (ref 135–450)
PMV BLD AUTO: 9.2 FL (ref 5–10.5)
POTASSIUM REFLEX MAGNESIUM: 4 MMOL/L (ref 3.5–5.1)
PROTHROMBIN TIME: 12.5 SEC (ref 9.9–12.7)
RBC # BLD: 2.76 M/UL (ref 4–5.2)
SODIUM BLD-SCNC: 135 MMOL/L (ref 136–145)
WBC # BLD: 4 K/UL (ref 4–11)

## 2021-07-07 PROCEDURE — 2580000003 HC RX 258: Performed by: INTERNAL MEDICINE

## 2021-07-07 PROCEDURE — 71045 X-RAY EXAM CHEST 1 VIEW: CPT

## 2021-07-07 PROCEDURE — 1200000000 HC SEMI PRIVATE

## 2021-07-07 PROCEDURE — 99223 1ST HOSP IP/OBS HIGH 75: CPT | Performed by: SURGERY

## 2021-07-07 PROCEDURE — 76937 US GUIDE VASCULAR ACCESS: CPT | Performed by: SURGERY

## 2021-07-07 PROCEDURE — 85025 COMPLETE CBC W/AUTO DIFF WBC: CPT

## 2021-07-07 PROCEDURE — 90935 HEMODIALYSIS ONE EVALUATION: CPT | Performed by: INTERNAL MEDICINE

## 2021-07-07 PROCEDURE — 02HV33Z INSERTION OF INFUSION DEVICE INTO SUPERIOR VENA CAVA, PERCUTANEOUS APPROACH: ICD-10-PCS | Performed by: STUDENT IN AN ORGANIZED HEALTH CARE EDUCATION/TRAINING PROGRAM

## 2021-07-07 PROCEDURE — 6360000002 HC RX W HCPCS: Performed by: INTERNAL MEDICINE

## 2021-07-07 PROCEDURE — 6370000000 HC RX 637 (ALT 250 FOR IP): Performed by: INTERNAL MEDICINE

## 2021-07-07 PROCEDURE — 86704 HEP B CORE ANTIBODY TOTAL: CPT

## 2021-07-07 PROCEDURE — 99223 1ST HOSP IP/OBS HIGH 75: CPT | Performed by: INTERNAL MEDICINE

## 2021-07-07 PROCEDURE — 86706 HEP B SURFACE ANTIBODY: CPT

## 2021-07-07 PROCEDURE — 90935 HEMODIALYSIS ONE EVALUATION: CPT

## 2021-07-07 PROCEDURE — 85610 PROTHROMBIN TIME: CPT

## 2021-07-07 PROCEDURE — 36556 INSERT NON-TUNNEL CV CATH: CPT | Performed by: SURGERY

## 2021-07-07 PROCEDURE — 36415 COLL VENOUS BLD VENIPUNCTURE: CPT

## 2021-07-07 PROCEDURE — 5A1D70Z PERFORMANCE OF URINARY FILTRATION, INTERMITTENT, LESS THAN 6 HOURS PER DAY: ICD-10-PCS | Performed by: INTERNAL MEDICINE

## 2021-07-07 PROCEDURE — 87340 HEPATITIS B SURFACE AG IA: CPT

## 2021-07-07 PROCEDURE — 80048 BASIC METABOLIC PNL TOTAL CA: CPT

## 2021-07-07 RX ORDER — HEPARIN SODIUM 1000 [USP'U]/ML
10000 INJECTION, SOLUTION INTRAVENOUS; SUBCUTANEOUS ONCE
Status: COMPLETED | OUTPATIENT
Start: 2021-07-07 | End: 2021-07-08

## 2021-07-07 RX ORDER — HEPARIN SODIUM 1000 [USP'U]/ML
1500 INJECTION, SOLUTION INTRAVENOUS; SUBCUTANEOUS ONCE
Status: COMPLETED | OUTPATIENT
Start: 2021-07-07 | End: 2021-07-07

## 2021-07-07 RX ORDER — TRAMADOL HYDROCHLORIDE 50 MG/1
50 TABLET ORAL EVERY 6 HOURS PRN
Status: COMPLETED | OUTPATIENT
Start: 2021-07-07 | End: 2021-07-11

## 2021-07-07 RX ORDER — PROCHLORPERAZINE EDISYLATE 5 MG/ML
10 INJECTION INTRAMUSCULAR; INTRAVENOUS ONCE
Status: COMPLETED | OUTPATIENT
Start: 2021-07-07 | End: 2021-07-07

## 2021-07-07 RX ADMIN — Medication 5 ML: at 20:16

## 2021-07-07 RX ADMIN — CARVEDILOL 12.5 MG: 12.5 TABLET, FILM COATED ORAL at 08:54

## 2021-07-07 RX ADMIN — NIFEDIPINE 30 MG: 30 TABLET, EXTENDED RELEASE ORAL at 08:55

## 2021-07-07 RX ADMIN — TRAMADOL HYDROCHLORIDE 50 MG: 50 TABLET, FILM COATED ORAL at 13:48

## 2021-07-07 RX ADMIN — CARVEDILOL 12.5 MG: 12.5 TABLET, FILM COATED ORAL at 20:14

## 2021-07-07 RX ADMIN — ATORVASTATIN CALCIUM 40 MG: 40 TABLET, FILM COATED ORAL at 20:14

## 2021-07-07 RX ADMIN — INSULIN GLARGINE 20 UNITS: 100 INJECTION, SOLUTION SUBCUTANEOUS at 20:06

## 2021-07-07 RX ADMIN — HEPARIN SODIUM 1500 UNITS: 1000 INJECTION INTRAVENOUS; SUBCUTANEOUS at 18:27

## 2021-07-07 RX ADMIN — ASPIRIN 81 MG: 81 TABLET, CHEWABLE ORAL at 08:55

## 2021-07-07 RX ADMIN — HEPARIN SODIUM 5000 UNITS: 5000 INJECTION INTRAVENOUS; SUBCUTANEOUS at 13:50

## 2021-07-07 RX ADMIN — INSULIN LISPRO 1 UNITS: 100 INJECTION, SOLUTION INTRAVENOUS; SUBCUTANEOUS at 08:55

## 2021-07-07 RX ADMIN — NIFEDIPINE 30 MG: 30 TABLET, EXTENDED RELEASE ORAL at 20:13

## 2021-07-07 RX ADMIN — SODIUM CHLORIDE: 450 INJECTION, SOLUTION INTRAVENOUS at 08:54

## 2021-07-07 RX ADMIN — INSULIN LISPRO 2 UNITS: 100 INJECTION, SOLUTION INTRAVENOUS; SUBCUTANEOUS at 13:50

## 2021-07-07 RX ADMIN — PROCHLORPERAZINE EDISYLATE 10 MG: 5 INJECTION INTRAMUSCULAR; INTRAVENOUS at 04:03

## 2021-07-07 RX ADMIN — EPOETIN ALFA-EPBX 5000 UNITS: 10000 INJECTION, SOLUTION INTRAVENOUS; SUBCUTANEOUS at 18:28

## 2021-07-07 RX ADMIN — HEPARIN SODIUM 5000 UNITS: 5000 INJECTION INTRAVENOUS; SUBCUTANEOUS at 20:11

## 2021-07-07 RX ADMIN — IRON SUCROSE 100 MG: 20 INJECTION, SOLUTION INTRAVENOUS at 20:48

## 2021-07-07 RX ADMIN — GABAPENTIN 300 MG: 300 CAPSULE ORAL at 20:14

## 2021-07-07 RX ADMIN — INSULIN LISPRO 1 UNITS: 100 INJECTION, SOLUTION INTRAVENOUS; SUBCUTANEOUS at 20:06

## 2021-07-07 RX ADMIN — ACETAMINOPHEN 650 MG: 325 TABLET, FILM COATED ORAL at 04:03

## 2021-07-07 ASSESSMENT — PAIN DESCRIPTION - PAIN TYPE: TYPE: ACUTE PAIN;SURGICAL PAIN

## 2021-07-07 ASSESSMENT — PAIN SCALES - GENERAL
PAINLEVEL_OUTOF10: 0
PAINLEVEL_OUTOF10: 9
PAINLEVEL_OUTOF10: 7
PAINLEVEL_OUTOF10: 0

## 2021-07-07 ASSESSMENT — PAIN DESCRIPTION - PROGRESSION: CLINICAL_PROGRESSION: NOT CHANGED

## 2021-07-07 ASSESSMENT — PAIN DESCRIPTION - FREQUENCY: FREQUENCY: CONTINUOUS

## 2021-07-07 ASSESSMENT — PAIN DESCRIPTION - ONSET: ONSET: ON-GOING

## 2021-07-07 ASSESSMENT — PAIN DESCRIPTION - DESCRIPTORS: DESCRIPTORS: ACHING;TIGHTNESS

## 2021-07-07 ASSESSMENT — PAIN DESCRIPTION - LOCATION: LOCATION: NECK

## 2021-07-07 ASSESSMENT — PAIN DESCRIPTION - ORIENTATION: ORIENTATION: RIGHT

## 2021-07-07 NOTE — ED NOTES
Pharmacy  Note  - Admission Medication History    List of [id number] medications is complete. I reviewed Rx fill history via \"Complete Dispense Report\" in [employer], and spoke to patient.       The following changes made to [id number] medication list:    ADDED:   1) hydralazine 100 mg   2) pantoprazole 40 mg      REMOVED:  1) sitagliptin 50 mg       Please call with questions:  [phone] ([address] [place] [address])    7/6/2021 8:47 PM  Mitesh Wong  PharmD Candidate   Class of 2023

## 2021-07-07 NOTE — CONSULTS
Cardiology Consultation                                                                    Pt Name: Carmen Jimenes  Age: 45 y.o. Sex: female  : 1970  Location: 2942/8021-07    Referring Physician: Vijay La MD  Primary cardiologist: Dr. Eamon Garza MD.      Reason for Consult:     Reason for Consultation/Chief Complaint: Clearance for peritoneal dialysis    HPI:      Carmen Jimenes is a 45 y.o. female with a past medical history of CHF, DM, HTN, CKD 4/5 presented to the hospital from her nephrologist office due to abnormal labs. She was told that she may possibly need to be started on dialysis. The ED labs showed a BUN of 106, creatinine of 5.14 from 2 years ago that was approximately 1.5-2.5. Nephrology was consulted and recommended patient start a dialysis Vas-Cath. However patient will likely need a more permanent solution for dialysis and patient chose to have peritoneal dialysis. General surgery consulted cardiology to have preop clearance. Histories     Past Medical History:   has a past medical history of CHF (congestive heart failure) ([facility] [address] Diabetes mellitus ([facility] [address] and Hypertension. Surgical History:   has no past surgical history on file. Social History:   reports that she has never smoked. She has never used smokeless tobacco. She reports previous alcohol use. She reports previous drug use. Family History:  No evidence for sudden cardiac death or premature CAD      Medications:       Home Medications  Were reviewed and are listed in nursing record. and/or listed below  Prior to Admission medications    Medication Sig Start Date End Date Taking?  Authorizing Provider   carvedilol (COREG) 12.5 MG tablet Take 1 tablet by mouth 2 times daily 21  Yes Rodney Urrutia MD   atorvastatin (LIPITOR) 40 MG tablet Take 40 mg by mouth daily   Yes Historical Provider, MD   pantoprazole (PROTONIX) 40 MG tablet Take 40 mg by mouth daily as needed   Yes Historical Provider, MD   hydrALAZINE (APRESOLINE) 100 MG tablet Take 100 mg by mouth every 6 hours as needed    Yes Historical Provider, MD   NIFEdipine (PROCARDIA XL) 30 MG extended release tablet Take 30 mg by mouth 2 times daily 3/11/21  Yes Historical Provider, MD   gabapentin (NEURONTIN) 100 MG capsule Take 3 capsules by mouth 2 times daily for 90 days.  4/29/21 7/28/21 Yes Liam Grey MD   ondansetron (ZOFRAN) 4 MG tablet Take 4 mg by mouth every 12 hours as needed  12/17/19  Yes Historical Provider, MD   aspirin 81 MG chewable tablet Take 1 tablet by mouth daily 12/13/19  Yes Aron Kay MD   insulin glargine (LANTUS) 100 UNIT/ML injection vial Inject 20 Units into the skin nightly Pt reports noncompliant 12/13/19  Yes Aron Kay MD   insulin lispro, 1 Unit Dial, 100 UNIT/ML SOPN Inject 0-12 Units into the skin 3 times daily (with meals) 12/13/19  Yes Aron Kay MD   butalbital-acetaminophen-caffeine (FIORICET, ESGIC) -95 MG per tablet Take 1 tablet by mouth every 4 hours as needed for Headaches 5/27/15  Yes Rema Colvin MD   nitroGLYCERIN (NITROSTAT) 0.4 MG SL tablet Place 1 tablet under the tongue every 5 minutes as needed for Chest pain 1/10/20   Barrtet Choudhary, APRN - CNP          Inpatient Medications:   heparin (porcine)  10,000 Units Intracatheter Once    atorvastatin  40 mg Oral Nightly    carvedilol  12.5 mg Oral BID    gabapentin  300 mg Oral Nightly    insulin glargine  20 Units Subcutaneous Nightly    NIFEdipine  30 mg Oral BID    sodium chloride flush  5-40 mL Intravenous 2 times per day    heparin (porcine)  5,000 Units Subcutaneous 3 times per day    insulin lispro  0-12 Units Subcutaneous TID WC    insulin lispro  0-6 Units Subcutaneous Nightly       IV drips:   dextrose      sodium chloride         PRN:  traMADol, glucose, dextrose, glucagon (rDNA), dextrose, sodium chloride flush, sodium chloride, ondansetron **OR** ondansetron, polyethylene glycol, acetaminophen **OR** acetaminophen    Allergy:     Patient has no known allergies. Review of Systems:     All 12 point review of symptoms completed. Pertinent positives identified in the HPI, all other review of symptoms negative as below. CONSTITUTIONAL: No fatigue  SKIN: No rash or pruritis. EYES: No visual changes or diplopia. No scleral icterus. ENT: No Headaches, hearing loss or vertigo. No mouth sores or sore throat. CARDIOVASCULAR: No chest pain/chest pressure/chest discomfort. No palpitations. No edema. RESPIRATORY: No dyspnea. No cough or wheezing, no sputum production. GASTROINTESTINAL: No N/V/D. No abdominal pain, appetite loss, blood in stools. GENITOURINARY: No dysuria, trouble voiding, or hematuria. MUSCULOSKELETAL:  No gait disturbance, weakness or joint complaints. NEUROLOGICAL: No headache, diplopia, change in muscle strength, numbness or tingling. No change in gait, balance, coordination, mood, affect, memory, mentation, behavior. ENDOCRINE: No excessive thirst, fluid intake, or urination. No tremor. HEMATOLOGIC: No abnormal bruising or bleeding. ALLERGY: No nasal congestion or hives.       Physical Examination:     Vitals:    07/07/21 0645 07/07/21 0820 07/07/21 0822 07/07/21 1306   BP: (!) 147/78 122/74  114/71   Pulse: 78 72 69 73   Resp: 16 16  16   Temp: 98.1 °F (36.7 °C) 97.9 °F (36.6 °C)  98 °F (36.7 °C)   TempSrc: Oral Oral  Oral   SpO2: 98% 95%  93%   Weight:       Height:           Wt Readings from Last 3 Encounters:   07/06/21 189 lb 13.1 oz (86.1 kg)   07/06/21 189 lb 12.8 oz (86.1 kg)   04/29/21 190 lb (86.2 kg)         General Appearance:  Alert, cooperative, no distress, appears stated age Appropriate weight   Head:  Normocephalic, without obvious abnormality, atraumatic   Eyes:  PERRL, conjunctiva/corneas clear EOM intact  Ears normal   Throat no lesions       Nose: Nares normal, no drainage or sinus tenderness   Throat: Lips, mucosa, and tongue normal   Neck: Supple, symmetrical, trachea midline, no adenopathy, thyroid: not enlarged, symmetric, no tenderness/mass/nodules, no carotid bruit. Lungs:   Respirations unlabored, clear to auscultation bilaterally, without any wheezes, rubs or ronchi. Chest Wall:  No tenderness or deformity   Heart:  Regular rhythm, rate is controlled, S1, S2 normal, there is no murmur, there is no rub or gallop, no jvd, no bilateral lower extremity edema   Abdomen:   Soft, non-tender, bowel sounds active all four quadrants,  no masses, no organomegaly       Extremities: Extremities normal, atraumatic, no cyanosis. Pulses: 2+ and symmetric   Skin: Skin color, texture, turgor normal, no rashes or lesions   Pysch: Normal mood and affect   Neurologic: Normal gross motor and sensory exam.  Cranial nerves intact        Labs:     Recent Labs     07/06/21  1633 07/07/21  0630   * 135*   K 4.6 4.0   * 112*   CREATININE 5.1* 4.3*   CL 97* 98*   CO2 22 25   GLUCOSE 255* 138*   CALCIUM 9.4 9.1     Recent Labs     07/06/21  1633 07/06/21  1633 07/07/21  0630   WBC 5.2  --  4.0   HGB 9.6*  --  8.2*   HCT 29.3*  --  25.1*     --  156   MCV 92.0   < > 90.9    < > = values in this interval not displayed. No results for input(s): CHOLTOT, TRIG, HDL in the last 72 hours. Invalid input(s): LIPIDCOMM, CHOLHDL, VLDCHOL, LDL  Recent Labs     07/07/21  1035   INR 1.10     No results for input(s): CKTOTAL, CKMB, CKMBINDEX, TROPONINI in the last 72 hours. No results for input(s): BNP in the last 72 hours. No results for input(s): TSH in the last 72 hours.   No results for input(s): CHOL, HDL, LDLCALC, TRIG in the last 72 hours.]    Lab Results   Component Value Date    TROPONINI 0.03 (H) 12/09/2019         Imaging:     Telemetry:    I have personally reviewed patient's ECG which shows     Assessment / Plan:     ESRD currently on hemodialysis  Presented with a creatinine of 5.1, surgery was consulted to place Vas-Cath however need for long-term dialysis was discussed with patient; patient wants to seed with peritoneal dialysis. General surgery was consulted and required cardiac clearance prior to surgery. Patient currently does not complain of chest pain, with last LHC being normal, echocardiogram shows  EF of 55 to 60%, no wall motion abnormalities, normal diastolic function, mitral valve normal, and normal aortic valve. EKG shows NSR.   -Patient is moderate risk for a low risk procedure. No more cardiac work-up needed. May proceed with PD catheter placement with acceptable risk. CHF  Currently not in acute exacerbation  -Continue Coreg 12.5 mg p.o. twice daily. Patient has been seen and staffed with Dr. Everardo Luz MD.    Kari BebetoDO PGY-2  07/07/21             Chiang is a 47years old overweight woman with history of hypertension, hyperlipidemia, poorly controlled diabetes mellitus, poor compliance with medications, HFpEF.     Echo 10/2018: Normal     Nuclear stress test 2015: Normal     Nuc stress 12/11/19: apical and apicolateral wall ischemia.      Echo 12/11/19: normal.      LHC 12/12/19: Very mild diffuse disease. Patient presented to the emergency room on 7/6 from her nephrologist office due to abnormal labs. She was told that she may possibly need to be started on dialysis. The ED labs showed a BUN of 106, creatinine of 5.14 from 2 years ago that was approximately 1.5-2.5. Nephrology was consulted and recommended patient start a dialysis Vas-Cath. However patient will likely need a more permanent solution for dialysis and patient chose to have peritoneal dialysis. General surgery consulted cardiology to have preop clearance. Assessment and plan:  1. Chronic HFpEF. Patient is not in acute exacerbation. 2.  Essential hypertension. BP is well controlled  3. HLP  4.  Mild CAD  5.   Preoperative evaluation for peritoneal dialysis catheter placement    -Patient is intermediate risk for any perioperative cardiovascular complications and may proceed with surgery without any further testing.  -Please continue Coreg 12.5 twice daily and Lipitor perioperatively  -Continue with nifedipine 30 mg twice daily for optimal BP. Original note by resident was edited based on my personal history and exam of the patient. I have personally reviewed the reports and images of labs, radiological studies, cardiac studies including ECG's and telemetry, current and old medical records. The note was completed using EMR. Every effort was made to ensure accuracy; however, inadvertent computerized transcription errors may be present. All questions and concerns were addressed to the patient/family. Alternatives to my treatment were discussed.      Regino Pak MD, [facility], [address]  [address] [address] [address] [address]  Office Phone Number: [phone]

## 2021-07-07 NOTE — TELEPHONE ENCOUNTER
Patient is currently admitted at [facility] on 6th floor rm 2248.     Please contact April, [address] [facility] [employer]    Regarding HD cath placement

## 2021-07-07 NOTE — PROGRESS NOTES
Hospitalist Progress Note      PCP: Enrique MENESES    Date of Admission: 7/6/2021    CC uremia. Hospital course  Patient is a 67-year-old female with history of diabetes mellitus type 2, CKD 4, hypertension was sent from nephrology office for uremia. Subjective  Seen and examined today. Complain of generalized weakness. Denies any lightheadedness, nausea, vomiting. Medications:  Reviewed    Infusion Medications    dextrose      sodium chloride       Scheduled Medications    heparin (porcine)  10,000 Units Intracatheter Once    atorvastatin  40 mg Oral Nightly    carvedilol  12.5 mg Oral BID    gabapentin  300 mg Oral Nightly    insulin glargine  20 Units Subcutaneous Nightly    NIFEdipine  30 mg Oral BID    sodium chloride flush  5-40 mL Intravenous 2 times per day    heparin (porcine)  5,000 Units Subcutaneous 3 times per day    insulin lispro  0-12 Units Subcutaneous TID WC    insulin lispro  0-6 Units Subcutaneous Nightly     PRN Meds: traMADol, glucose, dextrose, glucagon (rDNA), dextrose, sodium chloride flush, sodium chloride, ondansetron **OR** ondansetron, polyethylene glycol, acetaminophen **OR** acetaminophen      Intake/Output Summary (Last 24 hours) at 7/7/2021 1347  Last data filed at 7/7/2021 0645  Gross per 24 hour   Intake --   Output 950 ml   Net -950 ml       Physical Exam Performed:    /71   Pulse 73   Temp 98 °F (36.7 °C) (Oral)   Resp 16   Ht 5' (1.524 m)   Wt 189 lb 13.1 oz (86.1 kg)   SpO2 93%   BMI 37.07 kg/m²     General appearance: No apparent distress, appears stated age and cooperative. HEENT: Pupils equal, round, and reactive to light. Conjunctivae/corneas clear. Neck: Supple, with full range of motion. No jugular venous distention. Trachea midline. Respiratory:  Normal respiratory effort. Clear to auscultation, bilaterally without Rales/Wheezes/Rhonchi.   Cardiovascular: Regular rate and rhythm with normal S1/S2 without murmurs, rubs or gallops. Abdomen: Soft, non-tender, non-distended with normal bowel sounds. Musculoskeletal: No clubbing, cyanosis or edema bilaterally. Full range of motion without deformity. Skin: Skin color, texture, turgor normal.  No rashes or lesions. Neurologic:  Neurovascularly intact without any focal sensory/motor deficits. Cranial nerves: II-XII intact, grossly non-focal.  Psychiatric: Alert and oriented, thought content appropriate, normal insight  Capillary Refill: Brisk,< 3 seconds   Peripheral Pulses: +2 palpable, equal bilaterally       Labs:   Recent Labs     07/06/21  1633 07/07/21  0630   WBC 5.2 4.0   HGB 9.6* 8.2*   HCT 29.3* 25.1*    156     Recent Labs     07/06/21  1633 07/07/21  0630   * 135*   K 4.6 4.0   CL 97* 98*   CO2 22 25   * 112*   CREATININE 5.1* 4.3*   CALCIUM 9.4 9.1     Recent Labs     07/06/21  1633   AST 15   ALT 21   BILITOT <0.2   ALKPHOS 156*     Recent Labs     07/07/21  1035   INR 1.10     No results for input(s): Migdalia Dec in the last 72 hours. Urinalysis:      Lab Results   Component Value Date    NITRU Negative 07/06/2021    WBCUA  07/06/2021    BACTERIA 4+ 07/06/2021    RBCUA 0-2 07/06/2021    BLOODU Negative 07/06/2021    SPECGRAV 1.020 07/06/2021    GLUCOSEU Negative 07/06/2021       Radiology:  XR CHEST PORTABLE   Final Result      Right internal jugular line with tip overlying the distal superior vena cava. No pneumothorax is seen                  XR CHEST (2 VW)   Final Result      Stable mild cardiomegaly and pulmonary vascular congestion without evidence of pulmonary edema. XR KNEE RIGHT (3 VIEWS)   Final Result   Impression:    No acute osseous injury. Joint effusion. Mild medial compartment osteoarthritis. Assessment/Plan:    Active Hospital Problems    Diagnosis Date Noted    HARRIETT (acute kidney injury) ([facility] [address] [N17.9] 07/06/2021     #HARRIETT on CKD stage IV now ESRD  Patient is having uremia.   Discussed with nephrology Vas-Cath placed today. Plan for HD. Surgery on board patient will need PD catheter. #Hypertension stable  Continue nifedipine, Coreg. #Diabetes mellitus type 2 with diabetic neuropathy  Hemoglobin A1c 6.9% 7/6/2021. Continue Lantus 20 units along with insulin sliding scale. We will adjust doses per need. #Chronic diastolic heart failure not in exacerbation. DVT Prophylaxis: heparin  Diet: ADULT DIET; Regular; 4 carb choices (60 gm/meal); Low Fat/Low Chol/High Fiber/2 gm Na; Low Potassium (Less than 3000 mg/day); Low Phosphorus (Less than 1000 mg); Less than 60 gm  Code Status: Full Code    PT/OT Eval Status: N/A    Dispo - inpatient. PD cath placement on Friday.      Margot Plummer MD

## 2021-07-07 NOTE — CARE COORDINATION
Case Management Assessment           Initial Evaluation                Date / Time of Evaluation: 7/7/2021 2:14 PM                 Assessment Completed by: JULIAN Ogden LSW    Patient Name: Olayinka Peterson     YOB: 1970  Diagnosis: HARRIETT (acute kidney injury) [facility]) [N17.9]     Date / Time: 7/6/2021  6:49 PM    Patient Admission Status: Inpatient    If patient is discharged prior to next notation, then this note serves as note for discharge by case management. Current PCP: Angel Haywood Patient: No    Chart Reviewed: Yes  Patient/ Family Interviewed: Yes    Initial assessment completed at bedside with: Patient    Hospitalization in the last 30 days: No    Emergency Contacts:  Extended Emergency Contact Information  Primary Emergency Contact: [address] Phone: [phone]  Relation: Parent  Secondary Emergency Contact: Yeimi Chiang Phone: [phone]  Relation: Brother/Sister    Advance Directives:   Code Status: Full 2021 Guerda Burrelly: No    Financial  Payor: /     Pre-cert required for SNF: Yes    Pharmacy    [address], [address] [place] [address] Thelda Flatten [phone]  [facility]  Phone: [phone] Fax: [phone]    [address] [place] [address], [address]  [address] Located within [facility] [address]  Phone: [phone] Fax: [phone]      Potential assistance Purchasing Medications: Potential Assistance Purchasing Medications: Yes  Does Patient want to participate in local refill/ meds to beds program?: Yes    Meds To Beds General Rules:  1. Can ONLY be done Monday- Friday between 8:30am-5pm  2. Prescription(s) must be in pharmacy by 3pm to be filled same day  3. Copy of patient's insurance/ prescription drug card and patient face sheet must be sent along with the prescription(s)  4.  Cost of Rx cannot be added to hospital bill. If financial assistance is needed, please contact unit  or ;  or  CANNOT provide pharmacy voucher for patients co-pays  5. Patients can then  the prescription on their way out of the hospital at discharge, or pharmacy can deliver to the bedside if staff is available. (payment due at time of pick-up or delivery - cash, check, or card accepted)     Able to afford home medications/ co-pay costs: No    ADLS  Support Systems: Children, Family Members, Friends/Neighbors, Parent    PT AM-PAC:   /24  OT AM-PAC:   /24    HOUSING  Home Environment: Pt lives in a 1st floor apartment with family  Steps: 3 step to get in     Plans to RETURN to current housing: Yes  Barriers to RETURNING to current housing: None    Home Care Information  Currently ACTIVE with [facility] [address]: No  Home Care Agency: Not Applicable    Currently ACTIVE with [facility]: No      Durable Medical Equipment  DME Provider: None   Equipment:     Home Oxygen and [address] [place] prior to admission: No  [address]: Not Applicable  Other Respiratory Equipment: Pt identified that she has an O2 machine at home but does not use it. Informed of need to bring portable home O2 tank on day of DISCHARGE for nursing to connect prior to leaving: No  Verbalized agreement/Understanding: No  Person to bring portable tank at discharge: N/A    Dialysis  Active with HD/PD prior to admission: No  Nephrologist: [address] [place] [address] Avenue:  Not Applicable    DISCHARGE PLAN:  Disposition: Home- No Services Needed    Transportation PLAN for discharge: family     Factors facilitating achievement of predicted outcomes: Family support    Barriers to discharge: Not medically ready    Additional Case Management Notes: Pt is from home w/family, plans to return home at [facility], family will transport. Pt will have new HD needs.  SW will continue to follow    The Plan for Transition of Care is related to the following treatment goals of HARRIETT (acute kidney injury) ([facility] [address] [N17.9]    The Patient and/or patient representative Shar Sun and her family were provided with a choice of provider and agrees with the discharge plan Yes    Freedom of choice list was provided with basic dialogue that supports the patient's individualized plan of care/goals and shares the quality data associated with the providers.  Not Indicated    Care Transition patient: No    JULIAN Mas, [facility]  Case Management Department  Ph: [phone]   Fax: [phone]

## 2021-07-07 NOTE — CONSULTS
Bariatric Surgery   Resident Consult Note    Reason for Consult: Long-term dialysis access/PD catheter placement; Temporary HD line    History of Present Illness:   Annette Palomino is a 45 y.o. female with CHF (EF on ECHO in 2019 55-60%), DM, HTN, and CKD that has progressed to ESRD for whom Surgery has been consulted for establishment of long-term dialysis access after the patient has chosen to pursue peritoneal dialysis. The patient was admitted for her current hospitalization secondary to uremia discovered at an outpatient Nephrology follow-up appointment. The patient currently is complaining of generalized malaise. She reports no fevers, no chills, no nausea, and no vomiting. The patient reports inconsistent bowel movements, though she is passing flatus and has no abdominal complaints. She reports no bulges/no hernias. The patient has undergone a laparoscopic cholecystectomy in the past. She recalls no other previous abdominal surgeries. The patient reports no personal or family history of IBD. She states that she has never undergone a colonoscopy. Past Medical History:        Diagnosis Date    [facility]estive [facility]) [facility] [address]     Diabetes mellitus ([facility] [address]     Hypertension      Past Surgical History:     History reviewed. No pertinent surgical history. Allergies:  Patient has no known allergies. Medications:   Home Meds  No current facility-administered medications on file prior to encounter.      Current Outpatient Medications on File Prior to Encounter   Medication Sig Dispense Refill    carvedilol (COREG) 12.5 MG tablet Take 1 tablet by mouth 2 times daily 60 tablet 3    atorvastatin (LIPITOR) 40 MG tablet Take 40 mg by mouth daily      pantoprazole (PROTONIX) 40 MG tablet Take 40 mg by mouth daily as needed      hydrALAZINE (APRESOLINE) 100 MG tablet Take 100 mg by mouth every 6 hours as needed       NIFEdipine (PROCARDIA XL) 30 MG extended release tablet Take 30 mg by mouth 2 times daily  gabapentin (NEURONTIN) 100 MG capsule Take 3 capsules by mouth 2 times daily for 90 days. 180 capsule 2    ondansetron (ZOFRAN) 4 MG tablet Take 4 mg by mouth every 12 hours as needed       aspirin 81 MG chewable tablet Take 1 tablet by mouth daily 30 tablet 3    insulin glargine (LANTUS) 100 UNIT/ML injection vial Inject 20 Units into the skin nightly Pt reports noncompliant 1 vial 3    insulin lispro, 1 Unit Dial, 100 UNIT/ML SOPN Inject 0-12 Units into the skin 3 times daily (with meals) 1 pen 1    butalbital-acetaminophen-caffeine (FIORICET, ESGIC) -40 MG per tablet Take 1 tablet by mouth every 4 hours as needed for Headaches 30 tablet 0    nitroGLYCERIN (NITROSTAT) 0.4 MG SL tablet Place 1 tablet under the tongue every 5 minutes as needed for Chest pain 25 tablet 3     Current Meds  heparin (porcine) injection 10,000 Units, Once  atorvastatin (LIPITOR) tablet 40 mg, Nightly  carvedilol (COREG) tablet 12.5 mg, BID  gabapentin (NEURONTIN) capsule 300 mg, Nightly  insulin glargine (LANTUS;BASAGLAR) injection pen 20 Units, Nightly  NIFEdipine (ADALAT CC) extended release tablet 30 mg, BID  glucose (GLUTOSE) 40 % oral gel 15 g, PRN  dextrose 50 % IV solution, PRN  glucagon (rDNA) injection 1 mg, PRN  dextrose 5 % solution, PRN  sodium chloride flush 0.9 % injection 5-40 mL, 2 times per day  sodium chloride flush 0.9 % injection 5-40 mL, PRN  0.9 % sodium chloride infusion, PRN  ondansetron (ZOFRAN-ODT) disintegrating tablet 4 mg, Q8H PRN   Or  ondansetron (ZOFRAN) injection 4 mg, Q6H PRN  polyethylene glycol (GLYCOLAX) packet 17 g, Daily PRN  acetaminophen (TYLENOL) tablet 650 mg, Q6H PRN   Or  acetaminophen (TYLENOL) suppository 650 mg, Q6H PRN  heparin (porcine) injection 5,000 Units, 3 times per day  insulin lispro (1 Unit Dial) 0-12 Units, TID WC  insulin lispro (1 Unit Dial) 0-6 Units, Nightly      Family History:   History reviewed. No pertinent family history.     Social History:   TOBACCO: reports that she has never smoked. She has never used smokeless tobacco.  ETOH:   reports previous alcohol use. DRUGS:   reports previous drug use. ROS: A 14 point review of systems was conducted, significant findings as noted in HPI. All other systems negative. Physical exam:    Vitals:    07/07/21 0010 07/07/21 0645 07/07/21 0820 07/07/21 0822   BP: (!) 151/74 (!) 147/78 122/74    Pulse: 75 78 72 69   Resp: 16 16 16    Temp: 98.6 °F (37 °C) 98.1 °F (36.7 °C) 97.9 °F (36.6 °C)    TempSrc: Oral Oral Oral    SpO2: 97% 98% 95%    Weight:       Height:         General appearance: Alert, no acute distress, grooming appropriate  Eyes: No gross deficits  Neck: Trachea midline, small well-healed scar on lateral right base of the neck consistent with previous tunneled CVC  Chest/Lungs: Normal effort, breathing room air, symmetric chest rise  Cardiovascular: Regular rate and rhythm  Abdomen: Soft, non-tender, non-distended, no guarding/rigidity, no hernias, well-healed scars consistent with previous laparoscopic cholecystectomy   Skin: Warm and dry, no rashes  Extremities: No edema, no cyanosis  Neuro: A&Ox3, no focal deficits, sensation intact    Labs:    CBC:   Recent Labs     07/06/21  1633 07/07/21  0630   WBC 5.2 4.0   HGB 9.6* 8.2*   HCT 29.3* 25.1*   MCV 92.0 90.9    156     BMP:   Recent Labs     07/06/21  1633 07/07/21  0630   * 135*   K 4.6 4.0   CL 97* 98*   CO2 22 25   * 112*   CREATININE 5.1* 4.3*     PT/INR: No results for input(s): PROTIME, INR in the last 72 hours. APTT: No results for input(s): APTT in the last 72 hours.   Liver Profile:  Lab Results   Component Value Date    AST 15 07/06/2021    ALT 21 07/06/2021    BILITOT <0.2 07/06/2021    ALKPHOS 156 07/06/2021     Lab Results   Component Value Date    CHOL 277 12/10/2019    HDL 72 12/10/2019    TRIG 116 12/10/2019     UA:   Lab Results   Component Value Date    COLORU Yellow 07/06/2021    PHUR 6.0 07/06/2021    WBCUA  07/06/2021    RBCUA 0-2 07/06/2021    MUCUS Rare 12/09/2019    BACTERIA 4+ 07/06/2021    CLARITYU Clear 07/06/2021    SPECGRAV 1.020 07/06/2021    LEUKOCYTESUR MODERATE 07/06/2021    UROBILINOGEN 0.2 07/06/2021    BILIRUBINUR Negative 07/06/2021    BLOODU Negative 07/06/2021    GLUCOSEU Negative 07/06/2021       Imaging:   XR CHEST (2 VW)   Final Result      Stable mild cardiomegaly and pulmonary vascular congestion without evidence of pulmonary edema. XR KNEE RIGHT (3 VIEWS)   Final Result   Impression:    No acute osseous injury. Joint effusion. Mild medial compartment osteoarthritis. Assessment/Plan: This is a 45 y.o. female with CHF (EF on ECHO in 2019 55-60%), DM, HTN, and CKD that has progressed to ESRD for whom Surgery has been consulted for establishment of long-term dialysis access after the patient has chosen to pursue peritoneal dialysis. - Temporary vascath placed at the bedside for treatment of uremia/pre-operative optimization      - Post-procedural CXR reveals line termination in the SVC and no PTX     - Line is OK to use   - Risks, benefits, and alternatives of PD catheter placement discussed with the patient, after which she wishes to pursue laparoscopic PD catheter placement.       - Patient consented for the procedure     - Surgery tentatively scheduled for Friday  - Patient follows with Dr. Carly Goddard; therefore, he has been consulted for pre-operative cardiology risk stratification/optimization  - Bowel regimen begun   - The patient was discussed with Dr. Consuello Holstein, MD, MPH  PGY-3 General Surgery  07/07/21  12:14 PM

## 2021-07-07 NOTE — CONSULTS
Nephrology Consult Note                                                                                                                                                                                                                                                                                                                                                               Office : [phone]     Fax :[phone]              Patient's Name: Mariza Manzanares  11:08 AM  7/7/2021    Reason for Consult:  Abnormal Renal labs   Requesting Physician:  Bruce Haywood      Chief Complaint:  Abnormal renal labs and was told by nephrologist will need dialysis    History of Present Ilness:    Mariza Manzanares is a 45 y.o. female with DM type 2, HTN, CKD stage 4/5 and CHF-diastolic who was initially seen by nephrologist at clinic. Pt complained of malaise x 3 weeks which had not been improving. Did not produce ordered labs on visit request due to issues with losing her insurance coverage and not being able to afford. Pt c/o pain with ambulation due to recent fall on right knee however still able to ambulate. Denies SOB, dyspnea, orthopnea, lower limb edema. No fever. Past Medical History:   Diagnosis Date    [facility]congestive heart failure) ([facility] [address]     Diabetes mellitus ([facility] [address]     Hypertension        History reviewed. No pertinent surgical history. History reviewed. No pertinent family history. reports that she has never smoked. She has never used smokeless tobacco. She reports previous alcohol use. She reports previous drug use. Allergies:  Patient has no known allergies.     Current Medications:    heparin (porcine) injection 10,000 Units, Once  atorvastatin (LIPITOR) tablet 40 mg, Nightly  carvedilol (COREG) tablet 12.5 mg, BID  gabapentin (NEURONTIN) capsule 300 mg, Nightly  insulin glargine (LANTUS;BASAGLAR) injection pen 20 Units, Nightly  NIFEdipine (ADALAT CC) extended release tablet 30 mg, BID  glucose (GLUTOSE) 40 % oral gel 15 g, PRN  dextrose 50 % IV solution, PRN  glucagon (rDNA) injection 1 mg, PRN  dextrose 5 % solution, PRN  sodium chloride flush 0.9 % injection 5-40 mL, 2 times per day  sodium chloride flush 0.9 % injection 5-40 mL, PRN  0.9 % sodium chloride infusion, PRN  ondansetron (ZOFRAN-ODT) disintegrating tablet 4 mg, Q8H PRN   Or  ondansetron (ZOFRAN) injection 4 mg, Q6H PRN  polyethylene glycol (GLYCOLAX) packet 17 g, Daily PRN  acetaminophen (TYLENOL) tablet 650 mg, Q6H PRN   Or  acetaminophen (TYLENOL) suppository 650 mg, Q6H PRN  heparin (porcine) injection 5,000 Units, 3 times per day  insulin lispro (1 Unit Dial) 0-12 Units, TID WC  insulin lispro (1 Unit Dial) 0-6 Units, Nightly        Review of Systems:   14 point ROS obtained but were negative except mentioned in HPI      Physical exam:     Vitals:  /74   Pulse 69   Temp 97.9 °F (36.6 °C) (Oral)   Resp 16   Ht 5' (1.524 m)   Wt 189 lb 13.1 oz (86.1 kg)   SpO2 95%   BMI 37.07 kg/m²   Constitutional:  Alert and oriented x3. Left lateral decubitus position with nil distress  Skin: no rash, turgor wnl  Heent:  eomi, mmm  Neck: no bruits or jvd noted  Cardiovascular:  S1, S2 without m/r/g  Respiratory: Clear to auscultation without w/r/r  Abdomen:  +bs, soft, nt, nd  Ext: Nil lower extremity edema  Psychiatric: mood and affect appropriate  Musculoskeletal:  Rom, muscular strength intact, slight pain with movement of right lower limb.      Data:   Labs:  CBC:   Recent Labs     07/06/21  1633 07/07/21  0630   WBC 5.2 4.0   HGB 9.6* 8.2*    156     BMP:    Recent Labs     07/06/21  1633 07/07/21  0630   * 135*   K 4.6 4.0   CL 97* 98*   CO2 22 25   * 112*   CREATININE 5.1* 4.3*   GLUCOSE 255* 138*     Ca/Mg/Phos:   Recent Labs     07/06/21  1633 07/07/21  0630   CALCIUM 9.4 9.1     Hepatic:   Recent Labs     07/06/21  1633   AST 15   ALT 21   BILITOT <0.2   ALKPHOS 156*     Troponin: No results for input(s): TROPONINI in the last 72 hours. BNP: No results for input(s): BNP in the last 72 hours. Lipids: No results for input(s): CHOL, TRIG, HDL, LDLCALC, LABVLDL in the last 72 hours. ABGs: No results for input(s): PHART, PO2ART, JRQ2LKF in the last 72 hours. INR:   Recent Labs     07/07/21  1035   INR 1.10     UA:  Recent Labs     07/06/21  1924   COLORU Yellow   CLARITYU Clear   GLUCOSEU Negative   BILIRUBINUR Negative   KETUA Negative   SPECGRAV 1.020   BLOODU Negative   PHUR 6.0   PROTEINU 30*   UROBILINOGEN 0.2   NITRU Negative   LEUKOCYTESUR MODERATE*   LABMICR YES   URINETYPE Voided      Urine Microscopic:   Recent Labs     07/06/21 1924   BACTERIA 4+*   WBCUA *   RBCUA 0-2   EPIU 0-1     Urine Culture: No results for input(s): LABURIN in the last 72 hours. Urine Chemistry:   Recent Labs     07/06/21  2012   NAUR 77             IMAGING:  XR CHEST (2 VW)   Final Result      Stable mild cardiomegaly and pulmonary vascular congestion without evidence of pulmonary edema. XR KNEE RIGHT (3 VIEWS)   Final Result   Impression:    No acute osseous injury. Joint effusion. Mild medial compartment osteoarthritis. Assessment/Plan   1. CKD 4/5   - Pt appears uremic   - For HD catheter. Consulted Surgery for placement so pt could receive hemodialysis. 2. HTN   - On carvedilol and Nifedipine. To maintain SPB < 140    3. DM type 2   - On sliding scale    3. Anemia   - Hb: 8.2   - On procrit    4. Acid- base/ Electrolyte imbalance    - Pt for hemodialysis.      5. DM Neuropathy   - On Gabapentin        Thank you for allowing us to participate in care of Thelorene Steele MD  Feel free to contact me   [facility] [address]  Office : [phone]  Fax :[phone]

## 2021-07-07 NOTE — PROGRESS NOTES
Physician Progress Note      PATIENT:               Haris Cardozo  CSN #:                  [id number]  :                       1970  ADMIT DATE:       2021 6:49 PM  [address] [place] DATE:  RESPONDING  PROVIDER #:        Lisa Carias MD          QUERY TEXT:    Pt admitted with HARRIETT and has CHF documented. If possible, please document in   progress notes and discharge summary further specificity regarding the type   and acuity of CHF:      The medical record reflects the following:  Risk Factors: HTN, CKD 4/5, HARRIETT  Clinical Indicators: PROBNP 2,769. CXRAY: Stable mild cardiomegaly and   pulmonary vascular congestion without evidence of pulmonary edema. ECHO: EF   55-60%, 2 years ago. Per H&P: Elevated proBNP and chest x-ray consistent with mild CHF. Treatment: Continue on Coreg, hydralazine, nifedipine to achieve good blood   pressure control, I&), daily weights  Options provided:  -- Acute on Chronic Diastolic CHF/HFpEF  -- Acute Diastolic CHF/HFpEF  -- Chronic Diastolic CHF/HFpEF  -- Other - I will add my own diagnosis  -- Disagree - Not applicable / Not valid  -- Disagree - Clinically unable to determine / Unknown  -- Refer to Clinical Documentation Reviewer    PROVIDER RESPONSE TEXT:    This patient has chronic diastolic CHF/HFpEF.     Query created by: Beatriz Grubbs on 2021 9:54 AM      Electronically signed by:  Lisa Carias MD 2021 10:19 AM

## 2021-07-07 NOTE — PROGRESS NOTES
Pt c/o soreness and \"pulling\" at right vascath site. Site is clean dry and intact with secure dressing. New order received for tramadol. Pt educated on non-medical interventions to increase comfort. Tramadol given as ordered. Will cont to monitor for comfort.

## 2021-07-07 NOTE — ED PROVIDER NOTES
ED Attending Attestation Note     Date of evaluation: 7/6/2021    This patient was seen by the advance practice provider. I have seen and examined the patient, agree with the workup, evaluation, management and diagnosis. The care plan has been discussed. My assessment reveals well-appearing female in no acute distress and in by nephrology for worsening renal function with elevated BUN. She is minimally symptomatic with no signs of fluid overload alert oriented in no acute distress. Will admit for continued evaluation possible dialysis but no emergent need at this time.      Guera Lilly MD  07/06/21 2556

## 2021-07-07 NOTE — H&P
Hospital Medicine History & Physical      PCP: [address] [place] [address]    Date of Admission: 7/6/2021    Date of Service: Pt seen/examined on 7/6/2021 and Admitted to Inpatient with expected LOS greater than two midnights due to medical therapy. Chief Complaint: Abnormal lab    History Of Present Illness:      45 y.o. female who presents from his nephrologist office due to abnormal labs. Patient was told that her labs needs to be monitored and possibly will be started on dialysis. Patient has no complaints at this time. Denies fever, chills, chest pain, shortness of breath, leg swelling, orthopnea, abdominal pain, urinary symptoms, diarrhea. Patient states that she had nausea and vomiting over the past week but it has resolved since then. Patient has had a mechanical fall couple of days ago, landing on her right knee. Patient is able to ambulate but complains of some pain during movement. Past Medical History:          Diagnosis Date    [facility]congestive [facility] failure) ([facility] [address]     Diabetes mellitus ([facility] [address]     Hypertension        Past Surgical History:      History reviewed. No pertinent surgical history. Medications Prior to Admission:      Prior to Admission medications    Medication Sig Start Date End Date Taking? Authorizing Provider   carvedilol (COREG) 12.5 MG tablet Take 1 tablet by mouth 2 times daily 7/6/21  Yes Romina Kang MD   atorvastatin (LIPITOR) 40 MG tablet Take 40 mg by mouth daily   Yes Historical Provider, MD   pantoprazole (PROTONIX) 40 MG tablet Take 40 mg by mouth daily as needed   Yes Historical Provider, MD   hydrALAZINE (APRESOLINE) 100 MG tablet Take 100 mg by mouth every 6 hours as needed    Yes Historical Provider, MD   NIFEdipine (PROCARDIA XL) 30 MG extended release tablet Take 30 mg by mouth 2 times daily 3/11/21  Yes Historical Provider, MD   gabapentin (NEURONTIN) 100 MG capsule Take 3 capsules by mouth 2 times daily for 90 days.  4/29/21 7/28/21 Yes Bhavin MD Patrick   ondansetron (ZOFRAN) 4 MG tablet Take 4 mg by mouth every 12 hours as needed  12/17/19  Yes Historical Provider, MD   aspirin 81 MG chewable tablet Take 1 tablet by mouth daily 12/13/19  Yes Ingrid Napier MD   insulin glargine (LANTUS) 100 UNIT/ML injection vial Inject 20 Units into the skin nightly Pt reports noncompliant 12/13/19  Yes Ingrid Napier MD   insulin lispro, 1 Unit Dial, 100 UNIT/ML SOPN Inject 0-12 Units into the skin 3 times daily (with meals) 12/13/19  Yes Ingrid Napier MD   butalbital-acetaminophen-caffeine (FIORICET, ESGIC) -41 MG per tablet Take 1 tablet by mouth every 4 hours as needed for Headaches 5/27/15  Yes Kobe Wilks MD   nitroGLYCERIN (NITROSTAT) 0.4 MG SL tablet Place 1 tablet under the tongue every 5 minutes as needed for Chest pain 1/10/20   ROGER Mccracken - CNP       Allergies:  Patient has no known allergies. Social History:    TOBACCO:   reports that she has never smoked. She has never used smokeless tobacco.  ETOH:   reports previous alcohol use. E-Cigarettes/Vaping Use     Questions Responses    E-Cigarette/Vaping Use     Start Date     Passive Exposure     Quit Date     Counseling Given     Comments           Family History:    Reviewed in detail and negative for DM, CAD, Cancer, CVA. Positive as follows:    History reviewed. No pertinent family history. REVIEW OF SYSTEMS COMPLETED:   Pertinent positives as noted in the HPI. All other systems reviewed and negative. PHYSICAL EXAM PERFORMED:    BP (!) 148/77   Pulse 81   Temp 97.8 °F (36.6 °C) (Oral)   Resp 18   SpO2 93%     General appearance:  No apparent distress, appears stated age and cooperative. HEENT:  Normal cephalic, atraumatic without obvious deformity. Pupils equal, round, and reactive to light. Extra ocular muscles intact. Conjunctivae/corneas clear. Neck: Supple, with full range of motion. No jugular venous distention. Trachea midline.   Respiratory:  Normal respiratory effort. Clear to auscultation, bilaterally without Rales/Wheezes/Rhonchi. Cardiovascular:  Regular rate and rhythm with normal S1/S2 without murmurs, rubs or gallops. Abdomen: Soft, non-tender, non-distended with normal bowel sounds. Musculoskeletal:  No clubbing, cyanosis or edema bilaterally. Full range of motion without deformity. Skin: Skin color, texture, turgor normal.  No rashes or lesions. Neurologic:  Neurovascularly intact without any focal sensory/motor deficits. Cranial nerves: II-XII intact, grossly non-focal.  Psychiatric:  Alert and oriented, thought content appropriate, normal insight  Capillary Refill: Brisk,3 seconds, normal  Peripheral Pulses: +2 palpable, equal bilaterally       Labs:     Recent Labs     07/06/21  1633   WBC 5.2   HGB 9.6*   HCT 29.3*        Recent Labs     07/06/21  1633   *   K 4.6   CL 97*   CO2 22   *   CREATININE 5.1*   CALCIUM 9.4     Recent Labs     07/06/21  1633   AST 15   ALT 21   BILITOT <0.2   ALKPHOS 156*     No results for input(s): INR in the last 72 hours. No results for input(s): [place] in the last 72 hours. Urinalysis:      Lab Results   Component Value Date    NITRU Negative 07/06/2021    WBCUA  07/06/2021    BACTERIA 4+ 07/06/2021    RBCUA 0-2 07/06/2021    BLOODU Negative 07/06/2021    SPECGRAV 1.020 07/06/2021    GLUCOSEU Negative 07/06/2021       Radiology:   EKG:  I have reviewed the EKG with the following interpretation: N/A      XR CHEST (2 VW)   Final Result      Stable mild cardiomegaly and pulmonary vascular congestion without evidence of pulmonary edema. XR KNEE RIGHT (3 VIEWS)   Final Result   Impression:    No acute osseous injury. Joint effusion. Mild medial compartment osteoarthritis. 2D echocardiogram from December 2019   Left ventricular cavity size is normal. There is mild concentric left   ventricular hypertrophy.    Left ventricular function is normal with ejection fraction estimated at   55-60%. No regional wall motion abnormalities are noted. Normal diastolic function. Mild tricuspid regurgitation. Estimated pulmonary artery systolic pressure is at 38 mmHg assuming a right   atrial pressure of 3 mmHg. ASSESSMENT:    Active Hospital Problems    Diagnosis Date Noted    HARRIETT (acute kidney injury) ([facility] [address] [N17.9] 07/06/2021   #HARRIETT on CKD with azotemia  #Essential hypertension  #DM-2 with hyperglycemia and neuropathy  #Abnormal urine analysis, patient remains asymptomatic  #Elevated proBNP and chest x-ray consistent with mild CHF. Her echocardiogram from 2 years ago has above    PLAN:  -Will continue on bicarb infusion as recommended by nephrology  -Monitor electrolytes and renal function  -Continue on Coreg, hydralazine, nifedipine to achieve good blood pressure control  -Continue home doses of Lantus and add medium dose SSI  -Continue on home doses of statins  -Continue on home medications appropriately  -Nephrology has been consulted by ED  -Supportive therapy    DVT Prophylaxis: Heparin  Diet: ADULT DIET; Regular; 4 carb choices (60 gm/meal); Low Fat/Low Chol/High Fiber/2 gm Na; Low Potassium (Less than 3000 mg/day); Low Phosphorus (Less than 1000 mg); Less than 60 gm  Code Status: Full Code    PT/OT Eval Status: As tolerated    Dispo -GMF with telemetry       Xiomy Cronin MD    Thank you Evelina Downs for the opportunity to be involved in this patient's care. If you have any questions or concerns please feel free to contact me at [phone].

## 2021-07-07 NOTE — PROCEDURES
Marino Rodriguez is a 45 y.o. female patient. 1. HARRIETT (acute kidney injury) ([facility]      Past Medical History:   Diagnosis Date    CHF (congestive heart failure) ([facility])     Diabetes mellitus ([facility]     Hypertension      Blood pressure 122/74, pulse 69, temperature 97.9 °F (36.6 °C), temperature source Oral, resp. rate 16, height 5' (1.524 m), weight 189 lb 13.1 oz (86.1 kg), SpO2 95 %. Central Line    Date/Time: 7/7/2021 11:35 AM  Performed by: Mary Kay Anaya MD  Authorized by: Olaf Ramirez MD   Consent: Written consent obtained. Risks and benefits: risks, benefits and alternatives were discussed  Consent given by: patient  Patient understanding: patient states understanding of the procedure being performed  Patient consent: the patient's understanding of the procedure matches consent given  Procedure consent: procedure consent matches procedure scheduled  Relevant documents: relevant documents present and verified  Test results: test results available and properly labeled  Site marked: the operative site was marked  Imaging studies: imaging studies available  Required items: required blood products, implants, devices, and special equipment available  Patient identity confirmed: verbally with patient  Time out: Immediately prior to procedure a \"time out\" was called to verify the correct patient, procedure, equipment, support staff and site/side marked as required.   Indications: vascular access  Anesthesia: local infiltration    Sedation:  Patient sedated: no    Preparation: skin prepped with ChloraPrep  Skin prep agent dried: skin prep agent completely dried prior to procedure  Sterile barriers: all five maximum sterile barriers used - cap, mask, sterile gown, sterile gloves, and large sterile sheet  Hand hygiene: hand hygiene performed prior to central venous catheter insertion  Location details: right internal jugular  Patient position: Trendelenburg  Catheter type: triple lumen  Catheter size: 12 Fr  Pre-procedure: landmarks identified  Ultrasound guidance: yes  Sterile ultrasound techniques: sterile gel and sterile probe covers were used  Number of attempts: 1  Successful placement: yes  Post-procedure: line sutured and dressing applied  Assessment: blood return through all ports,  free fluid flow and placement verified by x-ray  Patient tolerance: patient tolerated the procedure well with no immediate complications  Comments: EBL: 3 cc             Insertion of non-tunneled R IJ central venous catheter. CXR confirms Right internal jugular line with tip termination in the distal superior vena cava.     Deisi Francis MD  7/7/2021

## 2021-07-07 NOTE — PROGRESS NOTES
Gabapentin 300 mg BID ordered from home med list.  This medication is renally eliminated. Patient with HARRIETT. Will change to gabapentin 300 mg nightly per renal dose adjustment policy. Estimated Creatinine Clearance: 13 mL/min (A) (based on SCr of 5.1 mg/dL [facility])). Pharmacy will continue to monitor renal function and adjust dose as necessary. Please call with any questions.     Thanks  Henna Lord RPh 7/6/2021, 10:51 PM

## 2021-07-08 LAB
ALBUMIN SERPL-MCNC: 3.5 G/DL (ref 3.4–5)
ANION GAP SERPL CALCULATED.3IONS-SCNC: 10 MMOL/L (ref 3–16)
BASOPHILS ABSOLUTE: 0 K/UL (ref 0–0.2)
BASOPHILS RELATIVE PERCENT: 0.3 %
BUN BLDV-MCNC: 72 MG/DL (ref 7–20)
CALCIUM SERPL-MCNC: 9.1 MG/DL (ref 8.3–10.6)
CHLORIDE BLD-SCNC: 100 MMOL/L (ref 99–110)
CO2: 26 MMOL/L (ref 21–32)
CREAT SERPL-MCNC: 3.5 MG/DL (ref 0.6–1.1)
EOSINOPHILS ABSOLUTE: 0.1 K/UL (ref 0–0.6)
EOSINOPHILS RELATIVE PERCENT: 2.5 %
GFR AFRICAN AMERICAN: 17
GFR NON-AFRICAN AMERICAN: 14
GLUCOSE BLD-MCNC: 127 MG/DL (ref 70–99)
GLUCOSE BLD-MCNC: 131 MG/DL (ref 70–99)
GLUCOSE BLD-MCNC: 131 MG/DL (ref 70–99)
GLUCOSE BLD-MCNC: 147 MG/DL (ref 70–99)
GLUCOSE BLD-MCNC: 179 MG/DL (ref 70–99)
HBV SURFACE AB TITR SER: <3.5 MIU/ML
HCT VFR BLD CALC: 26.4 % (ref 36–48)
HEMOGLOBIN: 8.7 G/DL (ref 12–16)
HEPATITIS B SURFACE ANTIGEN INTERPRETATION: NORMAL
LYMPHOCYTES ABSOLUTE: 1.1 K/UL (ref 1–5.1)
LYMPHOCYTES RELATIVE PERCENT: 18.4 %
MAGNESIUM: 1.9 MG/DL (ref 1.8–2.4)
MCH RBC QN AUTO: 29.7 PG (ref 26–34)
MCHC RBC AUTO-ENTMCNC: 32.9 G/DL (ref 31–36)
MCV RBC AUTO: 90.2 FL (ref 80–100)
MONOCYTES ABSOLUTE: 0.8 K/UL (ref 0–1.3)
MONOCYTES RELATIVE PERCENT: 14.7 %
NEUTROPHILS ABSOLUTE: 3.7 K/UL (ref 1.7–7.7)
NEUTROPHILS RELATIVE PERCENT: 64.1 %
PDW BLD-RTO: 15.7 % (ref 12.4–15.4)
PERFORMED ON: ABNORMAL
PHOSPHORUS: 4.3 MG/DL (ref 2.5–4.9)
PLATELET # BLD: 190 K/UL (ref 135–450)
PMV BLD AUTO: 9.1 FL (ref 5–10.5)
POTASSIUM SERPL-SCNC: 4.3 MMOL/L (ref 3.5–5.1)
RBC # BLD: 2.93 M/UL (ref 4–5.2)
SODIUM BLD-SCNC: 136 MMOL/L (ref 136–145)
WBC # BLD: 5.7 K/UL (ref 4–11)

## 2021-07-08 PROCEDURE — 2580000003 HC RX 258: Performed by: INTERNAL MEDICINE

## 2021-07-08 PROCEDURE — 6370000000 HC RX 637 (ALT 250 FOR IP): Performed by: INTERNAL MEDICINE

## 2021-07-08 PROCEDURE — 6360000002 HC RX W HCPCS: Performed by: STUDENT IN AN ORGANIZED HEALTH CARE EDUCATION/TRAINING PROGRAM

## 2021-07-08 PROCEDURE — 80069 RENAL FUNCTION PANEL: CPT

## 2021-07-08 PROCEDURE — 90935 HEMODIALYSIS ONE EVALUATION: CPT | Performed by: INTERNAL MEDICINE

## 2021-07-08 PROCEDURE — 99232 SBSQ HOSP IP/OBS MODERATE 35: CPT | Performed by: SURGERY

## 2021-07-08 PROCEDURE — 6360000002 HC RX W HCPCS

## 2021-07-08 PROCEDURE — 36415 COLL VENOUS BLD VENIPUNCTURE: CPT

## 2021-07-08 PROCEDURE — 83735 ASSAY OF MAGNESIUM: CPT

## 2021-07-08 PROCEDURE — 85025 COMPLETE CBC W/AUTO DIFF WBC: CPT

## 2021-07-08 PROCEDURE — 90935 HEMODIALYSIS ONE EVALUATION: CPT

## 2021-07-08 PROCEDURE — 1200000000 HC SEMI PRIVATE

## 2021-07-08 PROCEDURE — 6360000002 HC RX W HCPCS: Performed by: INTERNAL MEDICINE

## 2021-07-08 RX ORDER — SODIUM CHLORIDE 9 MG/ML
INJECTION, SOLUTION INTRAVENOUS CONTINUOUS
Status: DISCONTINUED | OUTPATIENT
Start: 2021-07-09 | End: 2021-07-08

## 2021-07-08 RX ORDER — MAGNESIUM SULFATE IN WATER 40 MG/ML
2000 INJECTION, SOLUTION INTRAVENOUS ONCE
Status: COMPLETED | OUTPATIENT
Start: 2021-07-08 | End: 2021-07-08

## 2021-07-08 RX ORDER — ZOLPIDEM TARTRATE 5 MG/1
5 TABLET ORAL NIGHTLY PRN
Status: DISCONTINUED | OUTPATIENT
Start: 2021-07-08 | End: 2021-07-13 | Stop reason: HOSPADM

## 2021-07-08 RX ADMIN — NIFEDIPINE 30 MG: 30 TABLET, EXTENDED RELEASE ORAL at 21:21

## 2021-07-08 RX ADMIN — Medication 10 ML: at 12:54

## 2021-07-08 RX ADMIN — HEPARIN SODIUM 10000 UNITS: 1000 INJECTION INTRAVENOUS; SUBCUTANEOUS at 10:26

## 2021-07-08 RX ADMIN — CARVEDILOL 12.5 MG: 12.5 TABLET, FILM COATED ORAL at 12:48

## 2021-07-08 RX ADMIN — HEPARIN SODIUM 5000 UNITS: 5000 INJECTION INTRAVENOUS; SUBCUTANEOUS at 21:28

## 2021-07-08 RX ADMIN — INSULIN LISPRO 1 UNITS: 100 INJECTION, SOLUTION INTRAVENOUS; SUBCUTANEOUS at 21:25

## 2021-07-08 RX ADMIN — ZOLPIDEM TARTRATE 5 MG: 5 TABLET ORAL at 23:59

## 2021-07-08 RX ADMIN — CARVEDILOL 12.5 MG: 12.5 TABLET, FILM COATED ORAL at 21:21

## 2021-07-08 RX ADMIN — TRAMADOL HYDROCHLORIDE 50 MG: 50 TABLET, FILM COATED ORAL at 12:51

## 2021-07-08 RX ADMIN — HEPARIN SODIUM 5000 UNITS: 5000 INJECTION INTRAVENOUS; SUBCUTANEOUS at 06:43

## 2021-07-08 RX ADMIN — ATORVASTATIN CALCIUM 40 MG: 40 TABLET, FILM COATED ORAL at 21:21

## 2021-07-08 RX ADMIN — NIFEDIPINE 30 MG: 30 TABLET, EXTENDED RELEASE ORAL at 12:48

## 2021-07-08 RX ADMIN — GABAPENTIN 300 MG: 300 CAPSULE ORAL at 21:21

## 2021-07-08 RX ADMIN — MAGNESIUM SULFATE HEPTAHYDRATE 2000 MG: 40 INJECTION, SOLUTION INTRAVENOUS at 12:47

## 2021-07-08 RX ADMIN — Medication 10 ML: at 21:21

## 2021-07-08 RX ADMIN — HEPARIN SODIUM 5000 UNITS: 5000 INJECTION INTRAVENOUS; SUBCUTANEOUS at 15:38

## 2021-07-08 RX ADMIN — INSULIN GLARGINE 20 UNITS: 100 INJECTION, SOLUTION SUBCUTANEOUS at 21:27

## 2021-07-08 ASSESSMENT — PAIN DESCRIPTION - ORIENTATION: ORIENTATION: RIGHT

## 2021-07-08 ASSESSMENT — PAIN DESCRIPTION - PAIN TYPE: TYPE: ACUTE PAIN

## 2021-07-08 ASSESSMENT — PAIN DESCRIPTION - ONSET: ONSET: ON-GOING

## 2021-07-08 ASSESSMENT — PAIN DESCRIPTION - DESCRIPTORS: DESCRIPTORS: ACHING;CONSTANT

## 2021-07-08 ASSESSMENT — PAIN SCALES - GENERAL
PAINLEVEL_OUTOF10: 0
PAINLEVEL_OUTOF10: 0
PAINLEVEL_OUTOF10: 10
PAINLEVEL_OUTOF10: 0

## 2021-07-08 ASSESSMENT — PAIN - FUNCTIONAL ASSESSMENT: PAIN_FUNCTIONAL_ASSESSMENT: ACTIVITIES ARE NOT PREVENTED

## 2021-07-08 ASSESSMENT — PAIN DESCRIPTION - FREQUENCY: FREQUENCY: CONTINUOUS

## 2021-07-08 ASSESSMENT — PAIN DESCRIPTION - LOCATION: LOCATION: NECK

## 2021-07-08 ASSESSMENT — PAIN DESCRIPTION - PROGRESSION: CLINICAL_PROGRESSION: NOT CHANGED

## 2021-07-08 NOTE — CARE COORDINATION
SW Following, Pt from home w/family, plans to return home at DC, family will transport, Pt will have new HD needs. SW will continue to follow for DC needs. Pt scheduled for PD cath placement tomorrow.     Electronically signed by JULIAN Lafleur, FORREST on 7/8/2021 at 2:57 [phone]

## 2021-07-08 NOTE — PROGRESS NOTES
Lucien Peralta with surgery in regards to vancomycin order for  On call to the OR expiring. Asking if order can be re-written.  Dr. Radha Peralta to reorder/retime

## 2021-07-08 NOTE — PROGRESS NOTES
PRE-OP NOTE  Department of Surgery      Chief Complaint or Reason for Surgery: Need for long term peritoneal dialysis    Procedure: Peritoneal dialysis catheter placement  Expected time:     Plan  1. Diet: NPO at midnight  2. IVF: NS 50 cc/hr, starting at midnight  3. Antibiotics: Vancomycin on call to the OR. 4. Labs to be drawn: INR  1.10, CBC, renal panel and magnesium sent with morning labs tomorrow  5. Anesthesia: to see patient  6. Consent: Obtained and placed in the patient's chart  7. Pulmonary: CXR:  WNL  8. Cardiac: EK/6 NSR  9.  Pregnancy test: Ordered with morning labs    Heena Lao DO  21  6:57 AM

## 2021-07-08 NOTE — PROGRESS NOTES
Pt complained of right IJ vascath site discomfort. Ice pack applied and assisted pt with reposition. Dressing C/D/I. Will continue to monitor.

## 2021-07-08 NOTE — PROGRESS NOTES
Hospitalist Progress Note      PCP: Noe MENESES    Date of Admission: 7/6/2021    CC uremia. Hospital course  Patient is a 58-year-old female with history of diabetes mellitus type 2, CKD 4, hypertension was sent from nephrology office for uremia. Status post Vas-Cath placement on 7/7    Subjective  Seen and examined today during dialysis. Complaining that her Leung catheter is bothering her. Tolerating dialysis. Denies any nausea, vomiting, fever, chills. No acute event reported overnight    Medications:  Reviewed    Infusion Medications    dextrose      sodium chloride       Scheduled Medications    magnesium sulfate  2,000 mg Intravenous Once    vancomycin  1,250 mg Intravenous On Call to OR    atorvastatin  40 mg Oral Nightly    carvedilol  12.5 mg Oral BID    gabapentin  300 mg Oral Nightly    insulin glargine  20 Units Subcutaneous Nightly    NIFEdipine  30 mg Oral BID    sodium chloride flush  5-40 mL Intravenous 2 times per day    heparin (porcine)  5,000 Units Subcutaneous 3 times per day    insulin lispro  0-12 Units Subcutaneous TID WC    insulin lispro  0-6 Units Subcutaneous Nightly     PRN Meds: traMADol, glucose, dextrose, glucagon (rDNA), dextrose, sodium chloride flush, sodium chloride, ondansetron **OR** ondansetron, polyethylene glycol, acetaminophen **OR** acetaminophen      Intake/Output Summary (Last 24 hours) at 7/8/2021 1412  Last data filed at 7/8/2021 1247  Gross per 24 hour   Intake 860 ml   Output 5250 ml   Net -4390 ml       Physical Exam Performed:    /76   Pulse 83   Temp 98.4 °F (36.9 °C) (Oral)   Resp 16   Ht 5' (1.524 m)   Wt 182 lb 15.7 oz (83 kg)   SpO2 95%   BMI 35.74 kg/m²     General appearance: No apparent distress, appears stated age and cooperative. HEENT: Pupils equal, round, and reactive to light. Conjunctivae/corneas clear. Neck: Supple, with full range of motion. No jugular venous distention. Trachea midline.   Vas-Cath on right side. Respiratory:  Normal respiratory effort. Clear to auscultation, bilaterally without Rales/Wheezes/Rhonchi. Cardiovascular: Regular rate and rhythm with normal S1/S2 without murmurs, rubs or gallops. Abdomen: Soft, non-tender, non-distended with normal bowel sounds. Musculoskeletal: No clubbing, cyanosis or edema bilaterally. Full range of motion without deformity. Skin: Skin color, texture, turgor normal.  No rashes or lesions. Neurologic:  Neurovascularly intact without any focal sensory/motor deficits. Cranial nerves: II-XII intact, grossly non-focal.  Psychiatric: Alert and oriented, thought content appropriate, normal insight  Capillary Refill: Brisk,< 3 seconds   Peripheral Pulses: +2 palpable, equal bilaterally       Labs:   Recent Labs     07/06/21  1633 07/07/21  0630 07/08/21  0425   WBC 5.2 4.0 5.7   HGB 9.6* 8.2* 8.7*   HCT 29.3* 25.1* 26.4*    156 190     Recent Labs     07/06/21  1633 07/07/21  0630 07/08/21  0425   * 135* 136   K 4.6 4.0 4.3   CL 97* 98* 100   CO2 22 25 26   * 112* 72*   CREATININE 5.1* 4.3* 3.5*   CALCIUM 9.4 9.1 9.1   PHOS  --   --  4.3     Recent Labs     07/06/21  1633   AST 15   ALT 21   BILITOT <0.2   ALKPHOS 156*     Recent Labs     07/07/21  1035   INR 1.10     No results for input(s): CKTOTAL, TROPONINI in the last 72 hours. Urinalysis:      Lab Results   Component Value Date    NITRU Negative 07/06/2021    WBCUA  07/06/2021    BACTERIA 4+ 07/06/2021    RBCUA 0-2 07/06/2021    BLOODU Negative 07/06/2021    SPECGRAV 1.020 07/06/2021    GLUCOSEU Negative 07/06/2021       Radiology:  XR CHEST PORTABLE   Final Result      Right internal jugular line with tip overlying the distal superior vena cava. No pneumothorax is seen                  XR CHEST (2 VW)   Final Result      Stable mild cardiomegaly and pulmonary vascular congestion without evidence of pulmonary edema.          XR KNEE RIGHT (3 VIEWS)   Final Result   Impression:    No acute osseous injury. Joint effusion. Mild medial compartment osteoarthritis. Assessment/Plan:    Active Hospital Problems    Diagnosis Date Noted    Uremia [N19]     CKD (chronic kidney disease) stage 5, GFR less than 15 ml/min ([facility]) [N18.5]     Electrolyte imbalance [E87.8]     Anemia [D64.9]     HARRIETT (acute kidney injury) ([facility] [N17.9] 07/06/2021     #HARRIETT on CKD stage IV now ESRD  Patient is having uremia. S/p Vas-Cath placement on 7/7 received HD on 7/7 and today. Surgery on board patient plan for PD catheter on Friday. #Hypertension stable  Continue nifedipine, Coreg. #Diabetes mellitus type 2 with diabetic neuropathy  Hemoglobin A1c 6.9% 7/6/2021. Continue Lantus 20 units along with insulin sliding scale. We will adjust doses per need. #Chronic diastolic heart failure not in exacerbation. DVT Prophylaxis: heparin  Diet: ADULT DIET; Regular; 4 carb choices (60 gm/meal); Low Fat/Low Chol/High Fiber/2 gm Na; Low Potassium (Less than 3000 mg/day); Low Phosphorus (Less than 1000 mg); Less than 60 gm  Diet NPO  Code Status: Full Code    PT/OT Eval Status: N/A    Dispo - inpatient. PD cath placement on Friday.      Esme Diaz MD

## 2021-07-08 NOTE — PROGRESS NOTES
Treatment time 2 hr first treatment  Net UF: 1000 ml    Pre weight: 85.5 KG  Post weight:84.5 kg bedscale  EDW: TBD    Access used: RT IJ CVC  Access function:good    Medications or blood products given: retacrit, heparin,     Regular outpatient schedule: TBD    Summary of response to treatment:tolerated HD tx well    Copy of dialysis treatment record placed in chart, to be scanned into EMR.

## 2021-07-08 NOTE — PROGRESS NOTES
Received report from dialysis nurse, pt returned to room. A+O x3. RA resp easy. Vital signs stable,afebrile. Voices no complaints. Call light in reach. Bed alarm in place.

## 2021-07-08 NOTE — PROGRESS NOTES
Treatment time: 3    Net UF: 1000    Pre weight: 84  Post weight: 83  EDW: tbd    Access used: cvc  Access function: well    Medications or blood products given: none    Regular outpatient schedule: tbd    Summary of response to treatment: tolerated well. No issues voiced during HD. Report called to primary RN. Pt stable upon leaving unit    Copy of dialysis treatment record placed in chart, to be scanned into EMR.

## 2021-07-08 NOTE — PROGRESS NOTES
Pt returned from hd. Vs as charted complaining of pain 10/10 at right vas cath site. Dressing reinforced. Pt given ice pack and pain medication repositioned with pillows. Fall precautions in place. Lenug removed. Pt given morning meds. Pt htn at 150/72 but given morning bp meds. IV mag infusing.

## 2021-07-08 NOTE — PROGRESS NOTES
PROCEDURE:  Patient seen on hemodialysis at 11:09 AM    PHYSICIAN:  Owen Buck MD    INDICATION:  End-stage renal disease    Wt Readings from Last 3 Encounters:   07/06/21 189 lb 13.1 oz (86.1 kg)   07/06/21 189 lb 12.8 oz (86.1 kg)   04/29/21 190 lb (86.2 kg)     Temp Readings from Last 3 Encounters:   07/08/21 98.5 °F (36.9 °C) (Oral)   07/06/21 97.5 °F (36.4 °C)   04/29/21 98.2 °F (36.8 °C)     BP Readings from Last 3 Encounters:   07/08/21 (!) 143/88   07/06/21 111/65   04/29/21 (!) 147/74     Pulse Readings from Last 3 Encounters:   07/08/21 84   07/06/21 75   04/29/21 77      In: 400   Out: 3500 [Urine:2100]     CBC:   Recent Labs     07/06/21  1633 07/07/21  0630 07/08/21  0425   WBC 5.2 4.0 5.7   HGB 9.6* 8.2* 8.7*    156 190     BMP:    Recent Labs     07/06/21  1633 07/07/21  0630 07/08/21  0425   * 135* 136   K 4.6 4.0 4.3   CL 97* 98* 100   CO2 22 25 26   * 112* 72*   CREATININE 5.1* 4.3* 3.5*   GLUCOSE 255* 138* 147*     BMD:   Lab Results   Component Value Date    CALCIUM 9.1 07/08/2021    PHOS 4.3 07/08/2021       Iron:     Lab Results   Component Value Date    IRON 62 12/09/2019    TIBC 304 12/09/2019            RX:  See dialysis flowsheet for specifics on access, blood flow rate, dialysate baths, duration of dialysis, anticoagulation and other technical information.     COMMENTS:      Owen Buck MD, MD  Cell - [phone]  Pager -  [phone]

## 2021-07-09 ENCOUNTER — ANESTHESIA (OUTPATIENT)
Dept: OPERATING ROOM | Age: 51
DRG: 981 | End: 2021-07-09
Payer: MEDICARE

## 2021-07-09 ENCOUNTER — ANESTHESIA EVENT (OUTPATIENT)
Dept: OPERATING ROOM | Age: 51
DRG: 981 | End: 2021-07-09
Payer: MEDICARE

## 2021-07-09 VITALS — DIASTOLIC BLOOD PRESSURE: 91 MMHG | SYSTOLIC BLOOD PRESSURE: 200 MMHG | OXYGEN SATURATION: 97 % | TEMPERATURE: 95.2 F

## 2021-07-09 LAB
ABO/RH: NORMAL
ALBUMIN SERPL-MCNC: 3.6 G/DL (ref 3.4–5)
ANION GAP SERPL CALCULATED.3IONS-SCNC: 12 MMOL/L (ref 3–16)
ANTIBODY SCREEN: NORMAL
BASOPHILS ABSOLUTE: 0 K/UL (ref 0–0.2)
BASOPHILS RELATIVE PERCENT: 0.6 %
BUN BLDV-MCNC: 44 MG/DL (ref 7–20)
CALCIUM SERPL-MCNC: 9.6 MG/DL (ref 8.3–10.6)
CHLORIDE BLD-SCNC: 97 MMOL/L (ref 99–110)
CO2: 28 MMOL/L (ref 21–32)
CREAT SERPL-MCNC: 3.5 MG/DL (ref 0.6–1.1)
EOSINOPHILS ABSOLUTE: 0.2 K/UL (ref 0–0.6)
EOSINOPHILS RELATIVE PERCENT: 3.1 %
GFR AFRICAN AMERICAN: 17
GFR NON-AFRICAN AMERICAN: 14
GLUCOSE BLD-MCNC: 104 MG/DL (ref 70–99)
GLUCOSE BLD-MCNC: 114 MG/DL (ref 70–99)
GLUCOSE BLD-MCNC: 147 MG/DL (ref 70–99)
GLUCOSE BLD-MCNC: 152 MG/DL (ref 70–99)
GONADOTROPIN, CHORIONIC (HCG) QUANT: <5 MIU/ML
HCT VFR BLD CALC: 27.8 % (ref 36–48)
HEMOGLOBIN: 9.3 G/DL (ref 12–16)
HEPATITIS B CORE TOTAL ANTIBODY: NEGATIVE
LYMPHOCYTES ABSOLUTE: 1.3 K/UL (ref 1–5.1)
LYMPHOCYTES RELATIVE PERCENT: 25.9 %
MAGNESIUM: 2.4 MG/DL (ref 1.8–2.4)
MCH RBC QN AUTO: 30 PG (ref 26–34)
MCHC RBC AUTO-ENTMCNC: 33.5 G/DL (ref 31–36)
MCV RBC AUTO: 89.6 FL (ref 80–100)
MONOCYTES ABSOLUTE: 1 K/UL (ref 0–1.3)
MONOCYTES RELATIVE PERCENT: 19.5 %
NEUTROPHILS ABSOLUTE: 2.6 K/UL (ref 1.7–7.7)
NEUTROPHILS RELATIVE PERCENT: 50.9 %
ORGANISM: ABNORMAL
PDW BLD-RTO: 16 % (ref 12.4–15.4)
PERFORMED ON: ABNORMAL
PHOSPHORUS: 4.9 MG/DL (ref 2.5–4.9)
PLATELET # BLD: 196 K/UL (ref 135–450)
PMV BLD AUTO: 8.8 FL (ref 5–10.5)
POTASSIUM SERPL-SCNC: 4.5 MMOL/L (ref 3.5–5.1)
RBC # BLD: 3.1 M/UL (ref 4–5.2)
SODIUM BLD-SCNC: 137 MMOL/L (ref 136–145)
URINE CULTURE, ROUTINE: ABNORMAL
WBC # BLD: 5 K/UL (ref 4–11)

## 2021-07-09 PROCEDURE — 6360000002 HC RX W HCPCS: Performed by: INTERNAL MEDICINE

## 2021-07-09 PROCEDURE — 2580000003 HC RX 258: Performed by: STUDENT IN AN ORGANIZED HEALTH CARE EDUCATION/TRAINING PROGRAM

## 2021-07-09 PROCEDURE — 6370000000 HC RX 637 (ALT 250 FOR IP): Performed by: STUDENT IN AN ORGANIZED HEALTH CARE EDUCATION/TRAINING PROGRAM

## 2021-07-09 PROCEDURE — 2709999900 HC NON-CHARGEABLE SUPPLY: Performed by: SURGERY

## 2021-07-09 PROCEDURE — 6360000002 HC RX W HCPCS: Performed by: STUDENT IN AN ORGANIZED HEALTH CARE EDUCATION/TRAINING PROGRAM

## 2021-07-09 PROCEDURE — 3700000001 HC ADD 15 MINUTES (ANESTHESIA): Performed by: SURGERY

## 2021-07-09 PROCEDURE — 2500000003 HC RX 250 WO HCPCS: Performed by: NURSE ANESTHETIST, CERTIFIED REGISTERED

## 2021-07-09 PROCEDURE — 7100000000 HC PACU RECOVERY - FIRST 15 MIN: Performed by: SURGERY

## 2021-07-09 PROCEDURE — 2580000003 HC RX 258: Performed by: SURGERY

## 2021-07-09 PROCEDURE — 2500000003 HC RX 250 WO HCPCS: Performed by: SURGERY

## 2021-07-09 PROCEDURE — 7100000001 HC PACU RECOVERY - ADDTL 15 MIN: Performed by: SURGERY

## 2021-07-09 PROCEDURE — 2580000003 HC RX 258: Performed by: INTERNAL MEDICINE

## 2021-07-09 PROCEDURE — 3700000000 HC ANESTHESIA ATTENDED CARE: Performed by: SURGERY

## 2021-07-09 PROCEDURE — 99233 SBSQ HOSP IP/OBS HIGH 50: CPT | Performed by: INTERNAL MEDICINE

## 2021-07-09 PROCEDURE — 3E1M39Z IRRIGATION OF PERITONEAL CAVITY USING DIALYSATE, PERCUTANEOUS APPROACH: ICD-10-PCS | Performed by: SURGERY

## 2021-07-09 PROCEDURE — 49324 LAP INSERT TUNNEL IP CATH: CPT | Performed by: SURGERY

## 2021-07-09 PROCEDURE — 36415 COLL VENOUS BLD VENIPUNCTURE: CPT

## 2021-07-09 PROCEDURE — 86900 BLOOD TYPING SEROLOGIC ABO: CPT

## 2021-07-09 PROCEDURE — 86901 BLOOD TYPING SEROLOGIC RH(D): CPT

## 2021-07-09 PROCEDURE — 0DQU4ZZ REPAIR OMENTUM, PERCUTANEOUS ENDOSCOPIC APPROACH: ICD-10-PCS | Performed by: SURGERY

## 2021-07-09 PROCEDURE — 49435 INSERT SUBQ EXTEN TO IP CATH: CPT | Performed by: SURGERY

## 2021-07-09 PROCEDURE — 6360000002 HC RX W HCPCS: Performed by: SURGERY

## 2021-07-09 PROCEDURE — 80069 RENAL FUNCTION PANEL: CPT

## 2021-07-09 PROCEDURE — 86923 COMPATIBILITY TEST ELECTRIC: CPT

## 2021-07-09 PROCEDURE — 85025 COMPLETE CBC W/AUTO DIFF WBC: CPT

## 2021-07-09 PROCEDURE — C1750 CATH, HEMODIALYSIS,LONG-TERM: HCPCS | Performed by: SURGERY

## 2021-07-09 PROCEDURE — 84702 CHORIONIC GONADOTROPIN TEST: CPT

## 2021-07-09 PROCEDURE — 6360000002 HC RX W HCPCS: Performed by: NURSE ANESTHETIST, CERTIFIED REGISTERED

## 2021-07-09 PROCEDURE — 49326 LAP W/OMENTOPEXY ADD-ON: CPT | Performed by: SURGERY

## 2021-07-09 PROCEDURE — 3600000004 HC SURGERY LEVEL 4 BASE: Performed by: SURGERY

## 2021-07-09 PROCEDURE — 2580000003 HC RX 258: Performed by: NURSE ANESTHETIST, CERTIFIED REGISTERED

## 2021-07-09 PROCEDURE — 86850 RBC ANTIBODY SCREEN: CPT

## 2021-07-09 PROCEDURE — P9016 RBC LEUKOCYTES REDUCED: HCPCS

## 2021-07-09 PROCEDURE — 0WHG43Z INSERTION OF INFUSION DEVICE INTO PERITONEAL CAVITY, PERCUTANEOUS ENDOSCOPIC APPROACH: ICD-10-PCS | Performed by: SURGERY

## 2021-07-09 PROCEDURE — 6370000000 HC RX 637 (ALT 250 FOR IP): Performed by: SURGERY

## 2021-07-09 PROCEDURE — 3600000014 HC SURGERY LEVEL 4 ADDTL 15MIN: Performed by: SURGERY

## 2021-07-09 PROCEDURE — 83735 ASSAY OF MAGNESIUM: CPT

## 2021-07-09 PROCEDURE — 1200000000 HC SEMI PRIVATE

## 2021-07-09 RX ORDER — FENTANYL CITRATE 50 UG/ML
INJECTION, SOLUTION INTRAMUSCULAR; INTRAVENOUS PRN
Status: DISCONTINUED | OUTPATIENT
Start: 2021-07-09 | End: 2021-07-09 | Stop reason: SDUPTHER

## 2021-07-09 RX ORDER — SODIUM CHLORIDE 9 MG/ML
INJECTION, SOLUTION INTRAVENOUS CONTINUOUS PRN
Status: DISCONTINUED | OUTPATIENT
Start: 2021-07-09 | End: 2021-07-09 | Stop reason: SDUPTHER

## 2021-07-09 RX ORDER — MAGNESIUM HYDROXIDE 1200 MG/15ML
LIQUID ORAL CONTINUOUS PRN
Status: COMPLETED | OUTPATIENT
Start: 2021-07-09 | End: 2021-07-09

## 2021-07-09 RX ORDER — OXYCODONE HYDROCHLORIDE 5 MG/1
5 TABLET ORAL EVERY 4 HOURS PRN
Status: DISCONTINUED | OUTPATIENT
Start: 2021-07-09 | End: 2021-07-13 | Stop reason: HOSPADM

## 2021-07-09 RX ORDER — HEPARIN SODIUM (PORCINE) LOCK FLUSH IV SOLN 100 UNIT/ML 100 UNIT/ML
SOLUTION INTRAVENOUS PRN
Status: DISCONTINUED | OUTPATIENT
Start: 2021-07-09 | End: 2021-07-09 | Stop reason: ALTCHOICE

## 2021-07-09 RX ORDER — ROCURONIUM BROMIDE 10 MG/ML
INJECTION, SOLUTION INTRAVENOUS PRN
Status: DISCONTINUED | OUTPATIENT
Start: 2021-07-09 | End: 2021-07-09 | Stop reason: SDUPTHER

## 2021-07-09 RX ORDER — ONDANSETRON 2 MG/ML
INJECTION INTRAMUSCULAR; INTRAVENOUS PRN
Status: DISCONTINUED | OUTPATIENT
Start: 2021-07-09 | End: 2021-07-09 | Stop reason: SDUPTHER

## 2021-07-09 RX ORDER — OXYCODONE HYDROCHLORIDE 5 MG/1
10 TABLET ORAL EVERY 4 HOURS PRN
Status: DISCONTINUED | OUTPATIENT
Start: 2021-07-09 | End: 2021-07-13 | Stop reason: HOSPADM

## 2021-07-09 RX ORDER — CARVEDILOL 6.25 MG/1
6.25 TABLET ORAL 2 TIMES DAILY
Status: DISCONTINUED | OUTPATIENT
Start: 2021-07-09 | End: 2021-07-13

## 2021-07-09 RX ORDER — LIDOCAINE HCL/PF 100 MG/5ML
SYRINGE (ML) INJECTION PRN
Status: DISCONTINUED | OUTPATIENT
Start: 2021-07-09 | End: 2021-07-09 | Stop reason: SDUPTHER

## 2021-07-09 RX ORDER — BUPIVACAINE HYDROCHLORIDE 5 MG/ML
INJECTION, SOLUTION EPIDURAL; INTRACAUDAL PRN
Status: DISCONTINUED | OUTPATIENT
Start: 2021-07-09 | End: 2021-07-09 | Stop reason: ALTCHOICE

## 2021-07-09 RX ORDER — PROPOFOL 10 MG/ML
INJECTION, EMULSION INTRAVENOUS PRN
Status: DISCONTINUED | OUTPATIENT
Start: 2021-07-09 | End: 2021-07-09 | Stop reason: SDUPTHER

## 2021-07-09 RX ORDER — MAGNESIUM CARB/ALUMINUM HYDROX 105-160MG
TABLET,CHEWABLE ORAL PRN
Status: DISCONTINUED | OUTPATIENT
Start: 2021-07-09 | End: 2021-07-09 | Stop reason: ALTCHOICE

## 2021-07-09 RX ORDER — PROMETHAZINE HYDROCHLORIDE 25 MG/ML
12.5 INJECTION, SOLUTION INTRAMUSCULAR; INTRAVENOUS EVERY 6 HOURS PRN
Status: COMPLETED | OUTPATIENT
Start: 2021-07-09 | End: 2021-07-12

## 2021-07-09 RX ADMIN — ONDANSETRON 4 MG: 2 INJECTION INTRAMUSCULAR; INTRAVENOUS at 15:49

## 2021-07-09 RX ADMIN — HEPARIN SODIUM 5000 UNITS: 5000 INJECTION INTRAVENOUS; SUBCUTANEOUS at 06:31

## 2021-07-09 RX ADMIN — HEPARIN SODIUM 5000 UNITS: 5000 INJECTION INTRAVENOUS; SUBCUTANEOUS at 20:40

## 2021-07-09 RX ADMIN — Medication 100 MG: at 15:41

## 2021-07-09 RX ADMIN — CARVEDILOL 6.25 MG: 6.25 TABLET, FILM COATED ORAL at 20:40

## 2021-07-09 RX ADMIN — ROCURONIUM BROMIDE 10 MG: 10 INJECTION INTRAVENOUS at 16:10

## 2021-07-09 RX ADMIN — ROCURONIUM BROMIDE 10 MG: 10 INJECTION INTRAVENOUS at 15:58

## 2021-07-09 RX ADMIN — ROCURONIUM BROMIDE 30 MG: 10 INJECTION INTRAVENOUS at 15:41

## 2021-07-09 RX ADMIN — FENTANYL CITRATE 25 MCG: 50 INJECTION, SOLUTION INTRAMUSCULAR; INTRAVENOUS at 16:29

## 2021-07-09 RX ADMIN — ATORVASTATIN CALCIUM 40 MG: 40 TABLET, FILM COATED ORAL at 20:40

## 2021-07-09 RX ADMIN — OXYCODONE 10 MG: 5 TABLET ORAL at 20:40

## 2021-07-09 RX ADMIN — ONDANSETRON 4 MG: 2 INJECTION INTRAMUSCULAR; INTRAVENOUS at 11:51

## 2021-07-09 RX ADMIN — ROCURONIUM BROMIDE 10 MG: 10 INJECTION INTRAVENOUS at 16:08

## 2021-07-09 RX ADMIN — FENTANYL CITRATE 25 MCG: 50 INJECTION, SOLUTION INTRAMUSCULAR; INTRAVENOUS at 15:41

## 2021-07-09 RX ADMIN — ZOLPIDEM TARTRATE 5 MG: 5 TABLET ORAL at 23:17

## 2021-07-09 RX ADMIN — ACETAMINOPHEN 650 MG: 325 TABLET, FILM COATED ORAL at 23:16

## 2021-07-09 RX ADMIN — PHENYLEPHRINE HYDROCHLORIDE 100 MCG: 10 INJECTION, SOLUTION INTRAMUSCULAR; INTRAVENOUS; SUBCUTANEOUS at 16:20

## 2021-07-09 RX ADMIN — FENTANYL CITRATE 25 MCG: 50 INJECTION, SOLUTION INTRAMUSCULAR; INTRAVENOUS at 15:49

## 2021-07-09 RX ADMIN — SODIUM CHLORIDE: 9 INJECTION, SOLUTION INTRAVENOUS at 15:41

## 2021-07-09 RX ADMIN — PROPOFOL 100 MG: 10 INJECTION, EMULSION INTRAVENOUS at 15:41

## 2021-07-09 RX ADMIN — ONDANSETRON 4 MG: 2 INJECTION INTRAMUSCULAR; INTRAVENOUS at 04:55

## 2021-07-09 RX ADMIN — Medication 10 ML: at 09:10

## 2021-07-09 RX ADMIN — VANCOMYCIN HYDROCHLORIDE 1250 MG: 10 INJECTION, POWDER, LYOPHILIZED, FOR SOLUTION INTRAVENOUS at 15:48

## 2021-07-09 RX ADMIN — PROMETHAZINE HYDROCHLORIDE 12.5 MG: 25 INJECTION INTRAMUSCULAR; INTRAVENOUS at 23:16

## 2021-07-09 RX ADMIN — Medication 10 ML: at 20:41

## 2021-07-09 RX ADMIN — ONDANSETRON 4 MG: 2 INJECTION INTRAMUSCULAR; INTRAVENOUS at 17:49

## 2021-07-09 RX ADMIN — FENTANYL CITRATE 25 MCG: 50 INJECTION, SOLUTION INTRAMUSCULAR; INTRAVENOUS at 16:08

## 2021-07-09 RX ADMIN — SUGAMMADEX 200 MG: 100 INJECTION, SOLUTION INTRAVENOUS at 16:42

## 2021-07-09 RX ADMIN — GABAPENTIN 300 MG: 300 CAPSULE ORAL at 20:40

## 2021-07-09 ASSESSMENT — PAIN DESCRIPTION - ONSET: ONSET: ON-GOING

## 2021-07-09 ASSESSMENT — PULMONARY FUNCTION TESTS
PIF_VALUE: 0
PIF_VALUE: 22
PIF_VALUE: 0
PIF_VALUE: 22
PIF_VALUE: 0
PIF_VALUE: 0
PIF_VALUE: 1
PIF_VALUE: 20
PIF_VALUE: 0
PIF_VALUE: 20
PIF_VALUE: 23
PIF_VALUE: 0
PIF_VALUE: 1
PIF_VALUE: 19
PIF_VALUE: 23
PIF_VALUE: 23
PIF_VALUE: 22
PIF_VALUE: 21
PIF_VALUE: 19
PIF_VALUE: 1
PIF_VALUE: 20
PIF_VALUE: 22
PIF_VALUE: 0
PIF_VALUE: 23
PIF_VALUE: 22
PIF_VALUE: 20
PIF_VALUE: 0
PIF_VALUE: 22
PIF_VALUE: 0
PIF_VALUE: 21
PIF_VALUE: 22
PIF_VALUE: 19
PIF_VALUE: 21
PIF_VALUE: 0
PIF_VALUE: 24
PIF_VALUE: 1
PIF_VALUE: 19
PIF_VALUE: 0
PIF_VALUE: 0
PIF_VALUE: 23
PIF_VALUE: 22
PIF_VALUE: 21
PIF_VALUE: 1
PIF_VALUE: 22
PIF_VALUE: 0
PIF_VALUE: 13
PIF_VALUE: 20
PIF_VALUE: 23
PIF_VALUE: 20
PIF_VALUE: 20
PIF_VALUE: 22
PIF_VALUE: 22
PIF_VALUE: 19
PIF_VALUE: 20
PIF_VALUE: 23
PIF_VALUE: 21
PIF_VALUE: 19
PIF_VALUE: 1
PIF_VALUE: 0
PIF_VALUE: 22
PIF_VALUE: 4
PIF_VALUE: 20
PIF_VALUE: 1
PIF_VALUE: 23
PIF_VALUE: 22
PIF_VALUE: 20
PIF_VALUE: 3
PIF_VALUE: 19
PIF_VALUE: 0
PIF_VALUE: 20
PIF_VALUE: 19
PIF_VALUE: 23
PIF_VALUE: 22
PIF_VALUE: 23
PIF_VALUE: 2
PIF_VALUE: 22
PIF_VALUE: 22
PIF_VALUE: 24
PIF_VALUE: 0
PIF_VALUE: 22
PIF_VALUE: 0
PIF_VALUE: 22
PIF_VALUE: 0
PIF_VALUE: 19
PIF_VALUE: 13
PIF_VALUE: 19
PIF_VALUE: 22
PIF_VALUE: 1
PIF_VALUE: 3
PIF_VALUE: 22
PIF_VALUE: 1
PIF_VALUE: 4
PIF_VALUE: 23
PIF_VALUE: 22
PIF_VALUE: 0
PIF_VALUE: 21

## 2021-07-09 ASSESSMENT — PAIN SCALES - GENERAL
PAINLEVEL_OUTOF10: 0
PAINLEVEL_OUTOF10: 0
PAINLEVEL_OUTOF10: 7
PAINLEVEL_OUTOF10: 0
PAINLEVEL_OUTOF10: 0
PAINLEVEL_OUTOF10: 6
PAINLEVEL_OUTOF10: 10
PAINLEVEL_OUTOF10: 0
PAINLEVEL_OUTOF10: 7
PAINLEVEL_OUTOF10: 0
PAINLEVEL_OUTOF10: 3

## 2021-07-09 ASSESSMENT — PAIN DESCRIPTION - LOCATION: LOCATION: NECK

## 2021-07-09 ASSESSMENT — PAIN DESCRIPTION - PROGRESSION
CLINICAL_PROGRESSION: GRADUALLY IMPROVING

## 2021-07-09 ASSESSMENT — PAIN DESCRIPTION - PAIN TYPE: TYPE: ACUTE PAIN

## 2021-07-09 ASSESSMENT — PAIN DESCRIPTION - ORIENTATION: ORIENTATION: RIGHT

## 2021-07-09 ASSESSMENT — PAIN DESCRIPTION - FREQUENCY: FREQUENCY: CONTINUOUS

## 2021-07-09 ASSESSMENT — PAIN - FUNCTIONAL ASSESSMENT: PAIN_FUNCTIONAL_ASSESSMENT: ACTIVITIES ARE NOT PREVENTED

## 2021-07-09 ASSESSMENT — PAIN DESCRIPTION - DESCRIPTORS: DESCRIPTORS: NAGGING

## 2021-07-09 NOTE — ANESTHESIA PRE PROCEDURE
Department of Anesthesiology  Preprocedure Note       Name:  Marino Rodriguez   Age:  45 y.o.  :  1970                                          MRN:  [id number]         Date:  2021      Surgeon: Daryl Martinez): Andrews Londono DO    Procedure: Procedure(s):  LAPAROSCOPIC PERITONEAL DIALYSIS CATHETER INSERTION    Medications prior to admission:   Prior to Admission medications    Medication Sig Start Date End Date Taking? Authorizing Provider   carvedilol (COREG) 12.5 MG tablet Take 1 tablet by mouth 2 times daily 21  Yes Apolinar Ventura MD   atorvastatin (LIPITOR) 40 MG tablet Take 40 mg by mouth daily   Yes Historical Provider, MD   pantoprazole (PROTONIX) 40 MG tablet Take 40 mg by mouth daily as needed   Yes Historical Provider, MD   hydrALAZINE (APRESOLINE) 100 MG tablet Take 100 mg by mouth every 6 hours as needed    Yes Historical Provider, MD   NIFEdipine (PROCARDIA XL) 30 MG extended release tablet Take 30 mg by mouth 2 times daily 3/11/21  Yes Historical Provider, MD   gabapentin (NEURONTIN) 100 MG capsule Take 3 capsules by mouth 2 times daily for 90 days.  21 Yes Apolinar Ventura MD   ondansetron (ZOFRAN) 4 MG tablet Take 4 mg by mouth every 12 hours as needed  19  Yes Historical Provider, MD   aspirin 81 MG chewable tablet Take 1 tablet by mouth daily 19  Yes Patrice Person MD   insulin glargine (LANTUS) 100 UNIT/ML injection vial Inject 20 Units into the skin nightly Pt reports noncompliant 19  Yes Patrice Person MD   insulin lispro, 1 Unit Dial, 100 UNIT/ML SOPN Inject 0-12 Units into the skin 3 times daily (with meals) 19  Yes Patrice Person MD   butalbital-acetaminophen-caffeine (FIORICET, ESGIC) -08 MG per tablet Take 1 tablet by mouth every 4 hours as needed for Headaches 5/27/15  Yes Lynette Pratt MD   nitroGLYCERIN (NITROSTAT) 0.4 MG SL tablet Place 1 tablet under the tongue every 5 minutes as needed for Chest pain 1/10/20   Nay Hitchcock APRN - CNP       Current medications:    Current Facility-Administered Medications   Medication Dose Route Frequency Provider Last Rate Last Admin    carvedilol (COREG) tablet 6.25 mg  6.25 mg Oral BID Monica Machuca MD        sodium chloride 0.9 % irrigation    Continuous PRN Fatoumata Shade, DO   3,000 mL at 07/09/21 1606    heparin flush 100 UNIT/ML injection    PRN Fatoumata Shade, DO   10 mL at 07/09/21 1606    mineral oil liquid    PRN Fatoumata Shade, DO   15 mL at 07/09/21 1606    zolpidem (AMBIEN) tablet 5 mg  5 mg Oral Nightly PRN Philipp Heath MD   5 mg at 07/08/21 2359    traMADol (ULTRAM) tablet 50 mg  50 mg Oral Q6H PRN Philipp Heath MD   50 mg at 07/08/21 1251    atorvastatin (LIPITOR) tablet 40 mg  40 mg Oral Nightly Ted Romo MD   40 mg at 07/08/21 2121    gabapentin (NEURONTIN) capsule 300 mg  300 mg Oral Nightly Ted Romo MD   300 mg at 07/08/21 2121    insulin glargine (LANTUS;BASAGLAR) injection pen 20 Units  20 Units Subcutaneous Nightly Ted Romo MD   20 Units at 07/08/21 2127    glucose (GLUTOSE) 40 % oral gel 15 g  15 g Oral PRN Ted Romo MD        dextrose 50 % IV solution  12.5 g Intravenous PRN Ted Romo MD        glucagon (rDNA) injection 1 mg  1 mg Intramuscular PRN Ted Rmoo MD        dextrose 5 % solution  100 mL/hr Intravenous PRN Ted Romo MD        sodium chloride flush 0.9 % injection 5-40 mL  5-40 mL Intravenous 2 times per day Ted Romo MD   10 mL at 07/09/21 0910    sodium chloride flush 0.9 % injection 5-40 mL  5-40 mL Intravenous PRN Ted Romo MD        0.9 % sodium chloride infusion  25 mL Intravenous NISH Romo MD        ondansetron (ZOFRAN-ODT) disintegrating tablet 4 mg  4 mg Oral Q8H PRN Ted Romo MD        Or    ondansetron (ZOFRAN) injection 4 mg  4 mg Intravenous Q6H PRN Ted Romo MD   4 mg at 07/09/21 1151  polyethylene glycol (GLYCOLAX) packet 17 g  17 g Oral Daily PRN Jorge Luis Santacruz MD        acetaminophen (TYLENOL) tablet 650 mg  650 mg Oral Q6H PRN Jorge Luis Santacruz MD   650 mg at 07/07/21 0403    Or    acetaminophen (TYLENOL) suppository 650 mg  650 mg Rectal Q6H PRN Jorge Luis Santacruz MD        heparin (porcine) injection 5,000 Units  5,000 Units Subcutaneous 3 times per day Jorge Luis Santacruz MD   5,000 Units at 07/09/21 0631    insulin lispro (1 Unit Dial) 0-12 Units  0-12 Units Subcutaneous TID WC Jorge Luis Santacruz MD   2 Units at 07/07/21 1350    insulin lispro (1 Unit Dial) 0-6 Units  0-6 Units Subcutaneous Nightly Jorge Luis Santacruz MD   1 Units at 07/08/21 2125     Facility-Administered Medications Ordered in Other Encounters   Medication Dose Route Frequency Provider Last Rate Last Admin    0.9 % sodium chloride infusion   Intravenous Continuous PRN Vinod Alert, APRN - CRNA   New Bag at 07/09/21 1541    propofol injection   Intravenous PRN [place] Alert, APRN - CRNA   100 mg at 07/09/21 1541    rocuronium (ZEMURON) injection   Intravenous PRN [place] Alert, APRN - CRNA   10 mg at 07/09/21 1610    lidocaine injection   Intravenous PRN Vinod Alert, APRN - CRNA   100 mg at 07/09/21 1541    fentaNYL (SUBLIMAZE) injection   Intravenous PRN [place] Alert, APRN - CRNA   25 mcg at 07/09/21 1608    ondansetron (ZOFRAN) injection   Intravenous PRN Vinod Alert, APRN - CRNA   4 mg at 07/09/21 1549    phenylephrine (JAVAD-SYNEPHRINE) injection   Intravenous PRN [place] Alert, APRN - CRNA   100 mcg at 07/09/21 1620       Allergies:  No Known Allergies    Problem List:    Patient Active Problem List   Diagnosis Code    CHF (congestive heart failure), NYHA class I, acute on chronic, combined ([facility]) I50.43    Abnormal myocardial perfusion study R94.39    Essential hypertension I10    Type 2 diabetes mellitus with hyperglycemia, with long-term current use of insulin (HCC) E11.65, Z79.4    JEAN (obstructive sleep apnea) G47.33    HARRIETT (acute kidney injury) ([facility] N17.9    Uremia N19    Chronic kidney disease with peritoneal dialysis as preferred modality, stage 5 (HCC) N18.5    Electrolyte imbalance E87.8    Anemia D64.9       Past Medical History:        Diagnosis Date    CHF (congestive heart failure) ([facility]     Diabetes mellitus ([facility]     Hypertension        Past Surgical History:  History reviewed. No pertinent surgical history. Social History:    Social History     Tobacco Use    Smoking status: Never Smoker    Smokeless tobacco: Never Used   Substance Use Topics    Alcohol use: Not Currently                                Counseling given: Not Answered      Vital Signs (Current):   Vitals:    07/08/21 2353 07/09/21 0445 07/09/21 0905 07/09/21 1146   BP: 102/66 (!) 144/82 (!) 89/58 121/73   Pulse: 72 74 77 73   Resp: 16 16 17 16   Temp: 98.4 °F (36.9 °C) 98.5 °F (36.9 °C) 97.8 °F (36.6 °C) 97.2 °F (36.2 °C)   TempSrc: Oral Oral Oral Oral   SpO2: 95% 95% 96% 97%   Weight:       Height:                                                  BP Readings from Last 3 Encounters:   07/09/21 121/73   07/09/21 (!) 78/48   07/06/21 111/65       NPO Status:                                                                                 BMI:   Wt Readings from Last 3 Encounters:   07/08/21 182 lb 15.7 oz (83 kg)   07/06/21 189 lb 12.8 oz (86.1 kg)   04/29/21 190 lb (86.2 kg)     Body mass index is 35.74 kg/m².     CBC:   Lab Results   Component Value Date    WBC 5.0 07/09/2021    RBC 3.10 07/09/2021    HGB 9.3 07/09/2021    HCT 27.8 07/09/2021    MCV 89.6 07/09/2021    RDW 16.0 07/09/2021     07/09/2021       CMP:   Lab Results   Component Value Date     07/09/2021    K 4.5 07/09/2021    K 4.0 07/07/2021    CL 97 07/09/2021    CO2 28 07/09/2021    BUN 44 07/09/2021    CREATININE 3.5 07/09/2021    GFRAA 17 07/09/2021    AGRATIO 0.7 07/06/2021    LABGLOM 14 07/09/2021    GLUCOSE 114 07/09/2021    PROT 8.5 07/06/2021    CALCIUM 9.6 07/09/2021    BILITOT <0.2 07/06/2021    ALKPHOS 156 07/06/2021    AST 15 07/06/2021    ALT 21 07/06/2021       POC Tests:   Recent Labs     07/09/21  0703   POCGLU 104*       Coags:   Lab Results   Component Value Date    PROTIME 12.5 07/07/2021    INR 1.10 07/07/2021       HCG (If Applicable): No results found for: PREGTESTUR, PREGSERUM, HCG, HCGQUANT     ABGs: No results found for: PHART, PO2ART, EOE7PBZ, VEZ5VYY, BEART, N1UZXQQW     Type & Screen (If Applicable):  No results found for: LABABO, LABRH    Drug/Infectious Status (If Applicable):  No results found for: HIV, HEPCAB    COVID-19 Screening (If Applicable): No results found for: COVID19        Anesthesia Evaluation  Patient summary reviewed and Nursing notes reviewed  Airway: Mallampati: II  TM distance: >3 FB   Neck ROM: full  Mouth opening: > = 3 FB Dental:          Pulmonary:normal exam  breath sounds clear to auscultation  (+) sleep apnea: on CPAP,                             Cardiovascular:  Exercise tolerance: no interval change,   (+) hypertension: mild, CHF: no interval change,       ECG reviewed    Rate: normal  Echocardiogram reviewed         Beta Blocker:  Dose within 24 Hrs         Neuro/Psych:   Negative Neuro/Psych ROS              GI/Hepatic/Renal:   (+) renal disease:,           Endo/Other:    (+) DiabetesType II DM, well controlled, , .                 Abdominal:       Abdomen: soft. Vascular: negative vascular ROS. Other Findings:             Anesthesia Plan      general     ASA 3       Induction: intravenous. MIPS: Postoperative opioids intended and Prophylactic antiemetics administered. Anesthetic plan and risks discussed with patient. Use of blood products discussed with patient whom consented to blood products. Plan discussed with attending and CRNA.     Attending anesthesiologist reviewed and agrees with Preprocedure content              Chriss Goes, DO   7/9/2021

## 2021-07-09 NOTE — PROGRESS NOTES
PACU Transfer to Floor Note  LAPAROSCOPIC PERITONEAL DIALYSIS CATHETER Devonte Loco    Current Allergies: Patient has no known allergies. Pt meets criteria as per Davon Score and ASPAN Standards to transfer to next phase of care. Recent Labs     07/09/21  0703 07/09/21  1703   POCGLU 104* 152*       Vitals:    07/09/21 1715   BP: (!)157/73   Pulse: 78   Resp: 15   Temp: 96.9   SpO2: 99%     Vitals within 20% of pt's admission vitals as per DAVON SCORE    SpO2: 99 %    O2 Flow Rate (L/min): 3 L/min      Intake/Output Summary (Last 24 hours) at 7/9/2021 1748  Last data filed at 7/9/2021 1707  Gross per 24 hour   Intake 260 ml   Output 15 ml   Net 245 ml       Pain assessment:  none    Pain Level: 0    Patient was assessed for alterations to skin integrity. There were not alterations observed.     Is patient incontinent: no    Handoff report given by phone returning to room [facility] RN        7/9/2021 5:48 PM

## 2021-07-09 NOTE — PROGRESS NOTES
Office : [phone]     Fax :[phone]         Renal Progress Note  Subjective:   Admit Date: 7/6/2021     HPI: Fabricio Rodriguez is a 45 y.o. female with DM type 2, HTN, CKD stage 4/5 and CHF-diastolic who was initially seen by nephrologist at clinic. Pt complained of malaise x 3 weeks which had not been improving. Did not produce ordered labs on visit request due to issues with losing her insurance coverage and not being able to afford. Pt c/o pain with ambulation due to recent fall on right knee however still able to ambulate. Denies SOB, dyspnea, orthopnea, lower limb edema. No fever. Interval History: Pt c/o nausea today. BP low 89/58. Tolerated dialysis well yesterday. Overall, feels better and more energetic after receiving first round of hemodialysis. Denies vomiting. No lightheadedness or dizziness.        DIET Diet NPO  Medications:   Scheduled Meds:   vancomycin  1,250 mg Intravenous On Call to OR    carvedilol  6.25 mg Oral BID    atorvastatin  40 mg Oral Nightly    gabapentin  300 mg Oral Nightly    insulin glargine  20 Units Subcutaneous Nightly    sodium chloride flush  5-40 mL Intravenous 2 times per day    heparin (porcine)  5,000 Units Subcutaneous 3 times per day    insulin lispro  0-12 Units Subcutaneous TID WC    insulin lispro  0-6 Units Subcutaneous Nightly     Continuous Infusions:   dextrose      sodium chloride         Labs:  CBC:   Recent Labs     07/07/21  0630 07/08/21  0425 07/09/21  0443   WBC 4.0 5.7 5.0   HGB 8.2* 8.7* 9.3*    190 196     BMP:    Recent Labs     07/07/21  0630 07/08/21  0425 07/09/21  0443   * 136 137   K 4.0 4.3 4.5   CL 98* 100 97*   CO2 25 26 28   * 72* 44*   CREATININE 4.3* 3.5* 3.5*   GLUCOSE 138* 147* 114*     Ca/Mg/Phos:   Recent Labs     07/07/21  0630 07/08/21  0425 07/09/21  0443   CALCIUM 9.1 9.1 9.6   MG  --  1.90 2.40   PHOS  --  4.3 4.9     Hepatic:   Recent Labs     07/06/21  1633   AST 15   ALT 21   BILITOT <0.2   ALKPHOS 156*     Troponin: No results for input(s): TROPONINI in the last 72 hours. BNP: No results for input(s): BNP in the last 72 hours. Lipids: No results for input(s): CHOL, TRIG, HDL, LDLCALC, LABVLDL in the last 72 hours. ABGs: No results for input(s): PHART, PO2ART, TVV8HCF in the last 72 hours.   INR:   Recent Labs     07/07/21  1035   INR 1.10     UA:  Recent Labs     07/06/21 1924   COLORU Yellow   CLARITYU Clear   GLUCOSEU Negative   BILIRUBINUR Negative   KETUA Negative   SPECGRAV 1.020   BLOODU Negative   PHUR 6.0   PROTEINU 30*   UROBILINOGEN 0.2   NITRU Negative   LEUKOCYTESUR MODERATE*   LABMICR YES   URINETYPE Voided      Urine Microscopic:   Recent Labs     07/06/21 1924   BACTERIA 4+*   WBCUA *   RBCUA 0-2   EPIU 0-1     Urine Culture:   Recent Labs     07/06/21 2013   LABURIN >100,000 CFU/ml     Urine Chemistry:   Recent Labs     07/06/21  2012   LABCREA 49.6   NAUR 77       Objective:   Vitals: BP (!) 89/58   Pulse 77   Temp 97.8 °F (36.6 °C) (Oral)   Resp 17   Ht 5' (1.524 m)   Wt 182 lb 15.7 oz (83 kg)   SpO2 96%   BMI 35.74 kg/m²    Wt Readings from Last 3 Encounters:   07/08/21 182 lb 15.7 oz (83 kg)   07/06/21 189 lb 12.8 oz (86.1 kg)   04/29/21 190 lb (86.2 kg)      24HR INTAKE/OUTPUT:      Intake/Output Summary (Last 24 hours) at 7/9/2021 1023  Last data filed at 7/9/2021 0910  Gross per 24 hour   Intake 470 ml   Output 1750 ml   Net -1280 ml     Constitutional:  Alert, awake, sitting up in bed in no apparent distress  NECK: supple, no JVD  Cardiovascular:  S1, S2 without m/r/g  Respiratory:  CTAB without w/r/r  Abdomen: +bs, soft, nt  Ext: nil LE edema    Assessment and Plan:       IMAGING:  XR CHEST PORTABLE   Final Result      Right internal jugular line with tip overlying the distal superior vena cava. No pneumothorax is seen                  XR CHEST (2 VW)   Final Result      Stable mild cardiomegaly and pulmonary vascular congestion without evidence of pulmonary edema. XR KNEE RIGHT (3 VIEWS)   Final Result   Impression:    No acute osseous injury. Joint effusion. Mild medial compartment osteoarthritis. Assessment/Plan     Assessment/Plan   1. CKD 4/5 -> ESRD              - Received dialysis and tolerated well   - For PD catheter placement   - For HD cesar.                   2. HTN   - Hypotensive today: 89/58               - On carvedilol and Nifedipine. To maintain SPB < 140.     - Hold antiHTN meds.      3. DM type 2              - On sliding scale     3. Anemia              - Hb: 9.3              - On procrit   - Stable     4. Acid- base/ Electrolyte imbalance                - Received hemodialysis. Electrolytes normal      5.  DM Neuropathy              - On Gabapentin          Kade Elizabeth MD , MD  [facility] [address] [place] [address] [phone]

## 2021-07-09 NOTE — PROGRESS NOTES
From OR to pacu bay 16 accompanied by Vikram Lui and monitors applied. Intraop meds discussed and pt arrives able to state needs and denies abd pain. She has pre-existing pain to hemodialysis site on right neck which she requests an ice pack be placed.

## 2021-07-09 NOTE — CARE COORDINATION
Case Management Assessment           Daily Note                 Date/ Time of Note: 7/9/2021 12:56 PM         Note completed by: Irving Wilcox RN    Patient Name: Amy Rogel  YOB: 1970    Diagnosis:HARRIETT (acute kidney injury) [facility]) [N17.9]  Patient Admission Status: Inpatient    Date of Admission:7/6/2021  6:49 PM Length of Stay: 3 GLOS: GMLOS: 4    Current Plan of Care: PD catheter placement today per surgery, HD 07/10/21 and then plan for HD line removal  ________________________________________________________________________________________  PT AM-PAC:   / 24 per last evaluation on: not ordered    OT AM-PAC:   / 24 per last evaluation on: not ordered    DME Needs for discharge:   ________________________________________________________________________________________  Discharge Plan: Home    Tentative discharge date: 07/10/21     Current barriers to discharge: PD catheter placement, HD on 07/10/21 and then line removal, medical stability and clearance for DC    Referrals completed: Not Applicable    Resources/ information provided: Not indicated at this time  ________________________________________________________________________________________  Case Management Notes: MARK continues to follow for discharge planning and needs. MARK spoke with Dr Isabel Garcia in regards to plans for DC 07/10/21, CM noted patient is pending medicaid and encouraged new scripts be sent to pharmacy to be filled today via meds to beds in case of need for medication voucher assistance from CM. Dr Isabel Garcia agreeable and will review, CM also updated nursing to be aware of meds to beds to be completed today. MARK spoke with Dr Jennifre Lantigua regarding HD for patient at [facility]. Dr Jennifer Lantigua reports patient WILL NOT need OP HD set up at [facility], he plans to have patient get HD 07/10/21 and then plans to remove HD line after HD and prior to DC.  Patient will then see Dr Jennifer Lantigua in office next week and t hen 10 days after PD catheter placement patient will start in-office training for home PD. Patient plans for return home at [facility] and family to transport at [facility]. No further needs noted at this time. Allean Epley and her family were provided with choice of provider; she and her family are in agreement with the discharge plan.     Care Transition Patient: Macrina Marcelo RN  [facility]  Case Management Department  Ph: [phone]  Fax: [phone]

## 2021-07-09 NOTE — PROGRESS NOTES
Cardiology Progress note                                                                  Pt Name: Donis Guillaume  Age: 45 y.o. Sex: female  : 1970  Location: 7647/7429-26    Referring Physician: Ankit Campbell MD  Primary cardiologist: Dr. Jovi Davis MD.      Reason for Consult:     Reason for Consultation/Chief Complaint: Clearance for peritoneal dialysis    HPI:      Donis Guillaume is a 45 y.o. female with a past medical history of CHF, DM, HTN, CKD 4/5 presented to the hospital from her nephrologist office due to abnormal labs. She was told that she may possibly need to be started on dialysis. The ED labs showed a BUN of 106, creatinine of 5.14 from 2 years ago that was approximately 1.5-2.5. Nephrology was consulted and recommended patient start a dialysis Vas-Cath. However patient will likely need a more permanent solution for dialysis and patient chose to have peritoneal dialysis. General surgery consulted cardiology to have preop clearance. Interval Hx:  Patient is planned for PD catheter placement today. She denies any complaints. She had HD yesterday, UF 1 liter, today low BP. Histories     Past Medical History:   has a past medical history of CHF (congestive heart failure) ([address] Diabetes mellitus ([address] and Hypertension. Surgical History:   has no past surgical history on file. Social History:   reports that she has never smoked. She has never used smokeless tobacco. She reports previous alcohol use. She reports previous drug use. Family History:  No evidence for sudden cardiac death or premature CAD      Medications:       Home Medications  Were reviewed and are listed in nursing record. and/or listed below  Prior to Admission medications    Medication Sig Start Date End Date Taking?  Authorizing Provider   carvedilol (COREG) 12.5 MG tablet Take 1 tablet by mouth 2 times daily 21  Yes Liam Grey MD   atorvastatin (LIPITOR) 40 MG tablet Take 40 mg by mouth daily   Yes Historical Provider, MD   pantoprazole (PROTONIX) 40 MG tablet Take 40 mg by mouth daily as needed   Yes Historical Provider, MD   hydrALAZINE (APRESOLINE) 100 MG tablet Take 100 mg by mouth every 6 hours as needed    Yes Historical Provider, MD   NIFEdipine (PROCARDIA XL) 30 MG extended release tablet Take 30 mg by mouth 2 times daily 3/11/21  Yes Historical Provider, MD   gabapentin (NEURONTIN) 100 MG capsule Take 3 capsules by mouth 2 times daily for 90 days.  4/29/21 7/28/21 Yes Carolyn Jang MD   ondansetron (ZOFRAN) 4 MG tablet Take 4 mg by mouth every 12 hours as needed  12/17/19  Yes Historical Provider, MD   aspirin 81 MG chewable tablet Take 1 tablet by mouth daily 12/13/19  Yes Gerardo Ochoa MD   insulin glargine (LANTUS) 100 UNIT/ML injection vial Inject 20 Units into the skin nightly Pt reports noncompliant 12/13/19  Yes Gerardo Ochoa MD   insulin lispro, 1 Unit Dial, 100 UNIT/ML SOPN Inject 0-12 Units into the skin 3 times daily (with meals) 12/13/19  Yes Gerardo Ochoa MD   butalbital-acetaminophen-caffeine (FIORICET, ESGIC) -09 MG per tablet Take 1 tablet by mouth every 4 hours as needed for Headaches 5/27/15  Yes Geeta Rasmussen MD   nitroGLYCERIN (NITROSTAT) 0.4 MG SL tablet Place 1 tablet under the tongue every 5 minutes as needed for Chest pain 1/10/20   Kane Done, APRN - CNP          Inpatient Medications:   vancomycin  1,250 mg Intravenous On Call to OR    carvedilol  6.25 mg Oral BID    atorvastatin  40 mg Oral Nightly    gabapentin  300 mg Oral Nightly    insulin glargine  20 Units Subcutaneous Nightly    sodium chloride flush  5-40 mL Intravenous 2 times per day    heparin (porcine)  5,000 Units Subcutaneous 3 times per day    insulin lispro  0-12 Units Subcutaneous TID WC    insulin lispro  0-6 Units Subcutaneous Nightly       IV drips:   dextrose      sodium chloride         PRN:  zolpidem, traMADol, glucose, dextrose, glucagon (rDNA), dextrose, sodium chloride flush, sodium chloride, ondansetron **OR** ondansetron, polyethylene glycol, acetaminophen **OR** acetaminophen    Allergy:     Patient has no known allergies. Review of Systems:     All 12 point review of symptoms completed. Pertinent positives identified in the HPI, all other review of symptoms negative as below. CONSTITUTIONAL: No fatigue  SKIN: No rash or pruritis. EYES: No visual changes or diplopia. No scleral icterus. ENT: No Headaches, hearing loss or vertigo. No mouth sores or sore throat. CARDIOVASCULAR: No chest pain/chest pressure/chest discomfort. No palpitations. No edema. RESPIRATORY: No dyspnea. No cough or wheezing, no sputum production. GASTROINTESTINAL: No N/V/D. No abdominal pain, appetite loss, blood in stools. GENITOURINARY: No dysuria, trouble voiding, or hematuria. MUSCULOSKELETAL:  No gait disturbance, weakness or joint complaints. NEUROLOGICAL: No headache, diplopia, change in muscle strength, numbness or tingling. No change in gait, balance, coordination, mood, affect, memory, mentation, behavior. ENDOCRINE: No excessive thirst, fluid intake, or urination. No tremor. HEMATOLOGIC: No abnormal bruising or bleeding. ALLERGY: No nasal congestion or hives.       Physical Examination:     Vitals:    07/08/21 2353 07/09/21 0445 07/09/21 0905 07/09/21 1146   BP: 102/66 (!) 144/82 (!) 89/58 121/73   Pulse: 72 74 77 73   Resp: 16 16 17 16   Temp: 98.4 °F (36.9 °C) 98.5 °F (36.9 °C) 97.8 °F (36.6 °C) 97.2 °F (36.2 °C)   TempSrc: Oral Oral Oral Oral   SpO2: 95% 95% 96% 97%   Weight:       Height:           Wt Readings from Last 3 Encounters:   07/08/21 182 lb 15.7 oz (83 kg)   07/06/21 189 lb 12.8 oz (86.1 kg)   04/29/21 190 lb (86.2 kg)         General Appearance:  Alert, cooperative, no distress, appears stated age Appropriate weight   Head:  Normocephalic, without obvious abnormality, atraumatic   Eyes:  PERRL, conjunctiva/corneas clear EOM intact  Ears normal   Throat no lesions       Nose: Nares normal, no drainage or sinus tenderness   Throat: Lips, mucosa, and tongue normal   Neck: Supple, symmetrical, trachea midline, no adenopathy, thyroid: not enlarged, symmetric, no tenderness/mass/nodules, no carotid bruit. Lungs:   Respirations unlabored, clear to auscultation bilaterally, without any wheezes, rubs or ronchi. Chest Wall:  No tenderness or deformity   Heart:  Regular rhythm, rate is controlled, S1, S2 normal, there is no murmur, there is no rub or gallop, no jvd, no bilateral lower extremity edema   Abdomen:   Soft, non-tender, bowel sounds active all four quadrants,  no masses, no organomegaly       Extremities: Extremities normal, atraumatic, no cyanosis. Pulses: 2+ and symmetric   Skin: Skin color, texture, turgor normal, no rashes or lesions   Pysch: Normal mood and affect   Neurologic: Normal gross motor and sensory exam.  Cranial nerves intact        Labs:     Recent Labs     07/06/21  1633 07/07/21  0630 07/08/21 0425 07/09/21 0443   * 135* 136 137   K 4.6 4.0 4.3 4.5   * 112* 72* 44*   CREATININE 5.1* 4.3* 3.5* 3.5*   CL 97* 98* 100 97*   CO2 22 25 26 28   GLUCOSE 255* 138* 147* 114*   CALCIUM 9.4 9.1 9.1 9.6   MG  --   --  1.90 2.40     Recent Labs     07/06/21  1633 07/06/21  1633 07/07/21  0630 07/07/21  0630 07/08/21 0425 07/08/21  0425 07/09/21  0443   WBC 5.2  --  4.0  --  5.7  --  5.0   HGB 9.6*  --  8.2*  --  8.7*  --  9.3*   HCT 29.3*  --  25.1*  --  26.4*  --  27.8*     --  156  --  190  --  196   MCV 92.0   < > 90.9   < > 90.2   < > 89.6    < > = values in this interval not displayed. No results for input(s): CHOLTOT, TRIG, HDL in the last 72 hours. Invalid input(s): LIPIDCOMM, CHOLHDL, VLDCHOL, LDL  Recent Labs     07/07/21  1035   INR 1.10     No results for input(s): CKTOTAL, CKMB, CKMBINDEX, TROPONINI in the last 72 hours. No results for input(s): BNP in the last 72 hours.   No results for input(s): TSH in the last 72 hours. No results for input(s): CHOL, HDL, LDLCALC, TRIG in the last 72 hours.]    Lab Results   Component Value Date    TROPONINI 0.03 (H) 12/09/2019         Imaging:     Telemetry:    I have personally reviewed patient's ECG which shows     Assessment / Plan:     ESRD currently on hemodialysis  Presented with a creatinine of 5.1, surgery was consulted to place Vas-Cath however need for long-term dialysis was discussed with patient; patient wants to seed with peritoneal dialysis. General surgery was consulted and required cardiac clearance prior to surgery. Patient currently does not complain of chest pain, with last LHC being normal, echocardiogram shows  EF of 55 to 60%, no wall motion abnormalities, normal diastolic function, mitral valve normal, and normal aortic valve. EKG shows NSR.   -Patient is moderate risk for a low risk procedure. No more cardiac work-up needed. May proceed with PD catheter placement with acceptable risk. Planned for Surgery 7/9. Dispo: patient may proceed to surgery. Will be signing off at this time, please contact us if there is any question. CHF  Currently not in acute exacerbation  -Continue Coreg 12.5 mg p.o. twice daily. Patient has been seen and staffed with Dr. Danielle Jackson MD.    Jude Metz DO PGY-2  07/09/21               Chiang is a 47years old overweight woman with history of hypertension, hyperlipidemia, poorly controlled diabetes mellitus, poor compliance with medications, HFpEF.     Echo 10/2018: Normal     Nuclear stress test 2015: Normal     Nuc stress 12/11/19: apical and apicolateral wall ischemia.      Echo 12/11/19: normal.      LHC 12/12/19: Very mild diffuse disease.       Patient presented to the emergency room on 7/6 from her nephrologist office due to abnormal labs. She was told that she may possibly need to be started on dialysis.  The ED labs showed a BUN of 106, creatinine of 5.14 from 2 years ago that was approximately 1.5-2.5.  Nephrology was consulted and recommended patient start a dialysis Vas-Cath.  However patient will likely need a more permanent solution for dialysis and patient chose to have peritoneal dialysis.  General surgery consulted cardiology to have preop clearance.     Assessment and plan:  1. Chronic HFpEF. Patient is not in acute exacerbation. 2.  Essential hypertension. BP is low. 3. HLP  4.  Mild CAD  5. Preoperative evaluation for peritoneal dialysis catheter placement     -Patient is intermediate risk for any perioperative cardiovascular complications and may proceed with surgery without any further testing.  -Will decrease Coreg 6.25 twice daily  -Cw Lipitor perioperatively  -Will stop nifedipine 30 mg due to low BP. There are no further recommendations at this time and hence we will now sign off. Patient may follow up in our clinic once discharged from the hospital for further cardiac care. Original note by resident was edited based on my personal history and exam of the patient. I have personally reviewed the reports and images of labs, radiological studies, cardiac studies including ECG's and telemetry, current and old medical records. The note was completed using EMR. Every effort was made to ensure accuracy; however, inadvertent computerized transcription errors may be present. All questions and concerns were addressed to the patient/family. Alternatives to my treatment were discussed.      Efrain Knight MD, [facility], [address]  [address] [address]  Office Phone Number: [phone]

## 2021-07-09 NOTE — PROGRESS NOTES
normal S1/S2 without murmurs, rubs or gallops. Abdomen: Soft, non-tender, non-distended with normal bowel sounds. Musculoskeletal: No clubbing, cyanosis or edema bilaterally. Full range of motion without deformity. Skin: Skin color, texture, turgor normal.  No rashes or lesions. Neurologic:  Neurovascularly intact without any focal sensory/motor deficits. Cranial nerves: II-XII intact, grossly non-focal.  Psychiatric: Alert and oriented, thought content appropriate, normal insight  Capillary Refill: Brisk,< 3 seconds   Peripheral Pulses: +2 palpable, equal bilaterally       Labs:   Recent Labs     07/07/21 0630 07/08/21  0425 07/09/21  0443   WBC 4.0 5.7 5.0   HGB 8.2* 8.7* 9.3*   HCT 25.1* 26.4* 27.8*    190 196     Recent Labs     07/07/21 0630 07/08/21 0425 07/09/21  0443   * 136 137   K 4.0 4.3 4.5   CL 98* 100 97*   CO2 25 26 28   * 72* 44*   CREATININE 4.3* 3.5* 3.5*   CALCIUM 9.1 9.1 9.6   PHOS  --  4.3 4.9     Recent Labs     07/06/21  1633   AST 15   ALT 21   BILITOT <0.2   ALKPHOS 156*     Recent Labs     07/07/21  1035   INR 1.10     No results for input(s): CKTOTAL, TROPONINI in the last 72 hours. Urinalysis:      Lab Results   Component Value Date    NITRU Negative 07/06/2021    WBCUA  07/06/2021    BACTERIA 4+ 07/06/2021    RBCUA 0-2 07/06/2021    BLOODU Negative 07/06/2021    SPECGRAV 1.020 07/06/2021    GLUCOSEU Negative 07/06/2021       Radiology:  XR CHEST PORTABLE   Final Result      Right internal jugular line with tip overlying the distal superior vena cava. No pneumothorax is seen                  XR CHEST (2 VW)   Final Result      Stable mild cardiomegaly and pulmonary vascular congestion without evidence of pulmonary edema. XR KNEE RIGHT (3 VIEWS)   Final Result   Impression:    No acute osseous injury. Joint effusion. Mild medial compartment osteoarthritis.               Assessment/Plan:    Active Hospital Problems    Diagnosis Date Noted    Uremia [N19]     Chronic kidney disease with peritoneal dialysis as preferred modality, stage 5 (HCC) [N18.5]     Electrolyte imbalance [E87.8]     Anemia [D64.9]     HARRIETT (acute kidney injury) ([address] [N17.9] 07/06/2021     #HARRIETT on CKD stage IV now ESRD  Patient is having uremia. S/p Vas-Cath placement on 7/7 received HD on 7/7 and today. Surgery on board patient plan for PD catheter today    #Hypertension   Pressure on low side. Discontinue nifedipine. Continue Coreg. #Diabetes mellitus type 2 with diabetic neuropathy  Hemoglobin A1c 6.9% 7/6/2021. Continue Lantus 20 units along with insulin sliding scale. We will adjust doses per need. #Chronic diastolic heart failure not in exacerbation. DVT Prophylaxis: heparin  Diet: Diet NPO  Code Status: Full Code    PT/OT Eval Status: N/A    Dispo - inpatient. PD cath placement today. Discussed with patient she has medication refills at home. She will be needing prescription if there are any changes of medication.     Talita Beavers MD

## 2021-07-09 NOTE — PROGRESS NOTES
Surgery Daily Progress Note      CC: ESRD    Subjective :  No acute events, tolerating diet, tolerating HD throught temporary HD chuckie, Hemodynamically stable. ROS: A 14 point review of systems was conducted, significant findings as noted in HPI. All other systems negative. Objective     Exam:  Vitals:    07/08/21 1556 07/08/21 2038 07/08/21 2353 07/09/21 0445   BP: (!) 109/57 135/78 102/66 (!) 144/82   Pulse: 76 75 72 74   Resp: 14 16 16 16   Temp: 97.6 °F (36.4 °C) 98.1 °F (36.7 °C) 98.4 °F (36.9 °C) 98.5 °F (36.9 °C)   TempSrc: Oral Oral Oral Oral   SpO2: 95% 98% 95% 95%   Weight:       Height:             General appearance: alert, no acute distress, grooming appropriate  Neuro: A&Ox3, no focal deficits, sensation intact  Chest/Lungs: normal effort, no accessory muscle use, on RA  HEENT: R IJ vas cath in place   Cardiovascular: RRR  Abdomen: soft, non/appropriately-tender, non-distended, no guarding/rigidity  Extremities: no edema, no cyanosis        ASSESSMENT/PLAN:   This is a 45 y.o. female with CHF (EF on ECHO in 2019 55-60%), DM, HTN, and CKD that has progressed to ESRD for whom Surgery has been consulted for establishment of long-term dialysis access after the patient has chosen to pursue peritoneal dialysis. - plan for PD catheter tomorrow  - will preop and consent is in the chart  - NPO at midnight  - Encourage IS and deep breathing  - Encourage OOB, ambulation  - will discuss with staff      Leda Arreola MD  Gen.  Surgery Resident PGY4  07/09/21  5:06 AM  [phone]

## 2021-07-09 NOTE — ANESTHESIA POSTPROCEDURE EVALUATION
Department of Anesthesiology  Postprocedure Note    Patient: Raeann Pascual  MRN: [id number]  YOB: 1970  Date of evaluation: 7/9/2021  Time:  7:34 PM     Procedure Summary     Date: 07/09/21 Room / Location: [address] [place] [address]    Anesthesia Start: 0427 Anesthesia Stop: 1707    Procedure: LAPAROSCOPIC PERITONEAL DIALYSIS CATHETER INSERTION, OMENTOPLEXY (N/A ) Diagnosis:       ESRD (end stage renal disease) ([facility] [address]      (ESRD (end stage renal disease) ([facility] [address] [N18.6])    Surgeons: Jeanette Dick DO Responsible Provider: Mehdi Oneal DO    Anesthesia Type: general ASA Status: 3          Anesthesia Type: general    Otto Phase I: Otto Score: 9    Otto Phase II:      Last vitals: Reviewed and per EMR flowsheets.        Anesthesia Post Evaluation    Patient location during evaluation: PACU  Patient participation: complete - patient participated  Level of consciousness: awake  Pain score: 2  Airway patency: patent  Nausea & Vomiting: no nausea and no vomiting  Complications: no  Cardiovascular status: blood pressure returned to baseline  Respiratory status: acceptable  Hydration status: euvolemic

## 2021-07-10 ENCOUNTER — APPOINTMENT (OUTPATIENT)
Dept: GENERAL RADIOLOGY | Age: 51
DRG: 981 | End: 2021-07-10
Payer: MEDICARE

## 2021-07-10 LAB
ALBUMIN SERPL-MCNC: 3.7 G/DL (ref 3.4–5)
ANION GAP SERPL CALCULATED.3IONS-SCNC: 16 MMOL/L (ref 3–16)
ANION GAP SERPL CALCULATED.3IONS-SCNC: 18 MMOL/L (ref 3–16)
BANDED NEUTROPHILS RELATIVE PERCENT: 12 % (ref 0–7)
BASOPHILS ABSOLUTE: 0 K/UL (ref 0–0.2)
BASOPHILS RELATIVE PERCENT: 0 %
BUN BLDV-MCNC: 60 MG/DL (ref 7–20)
BUN BLDV-MCNC: 64 MG/DL (ref 7–20)
CALCIUM SERPL-MCNC: 9.2 MG/DL (ref 8.3–10.6)
CALCIUM SERPL-MCNC: 9.3 MG/DL (ref 8.3–10.6)
CHLORIDE BLD-SCNC: 95 MMOL/L (ref 99–110)
CHLORIDE BLD-SCNC: 98 MMOL/L (ref 99–110)
CO2: 22 MMOL/L (ref 21–32)
CO2: 24 MMOL/L (ref 21–32)
CREAT SERPL-MCNC: 5 MG/DL (ref 0.6–1.1)
CREAT SERPL-MCNC: 5.4 MG/DL (ref 0.6–1.1)
EOSINOPHILS ABSOLUTE: 0.1 K/UL (ref 0–0.6)
EOSINOPHILS RELATIVE PERCENT: 1 %
GFR AFRICAN AMERICAN: 10
GFR AFRICAN AMERICAN: 11
GFR NON-AFRICAN AMERICAN: 8
GFR NON-AFRICAN AMERICAN: 9
GLUCOSE BLD-MCNC: 113 MG/DL (ref 70–99)
GLUCOSE BLD-MCNC: 123 MG/DL (ref 70–99)
GLUCOSE BLD-MCNC: 126 MG/DL (ref 70–99)
GLUCOSE BLD-MCNC: 127 MG/DL (ref 70–99)
GLUCOSE BLD-MCNC: 143 MG/DL (ref 70–99)
GLUCOSE BLD-MCNC: 170 MG/DL (ref 70–99)
HCT VFR BLD CALC: 23.9 % (ref 36–48)
HCT VFR BLD CALC: 28 % (ref 36–48)
HEMOGLOBIN: 7.8 G/DL (ref 12–16)
HEMOGLOBIN: 9.2 G/DL (ref 12–16)
LACTIC ACID, SEPSIS: 1 MMOL/L (ref 0.4–1.9)
LYMPHOCYTES ABSOLUTE: 1.4 K/UL (ref 1–5.1)
LYMPHOCYTES RELATIVE PERCENT: 10 %
MAGNESIUM: 2.3 MG/DL (ref 1.8–2.4)
MCH RBC QN AUTO: 29.8 PG (ref 26–34)
MCH RBC QN AUTO: 30.1 PG (ref 26–34)
MCHC RBC AUTO-ENTMCNC: 32.7 G/DL (ref 31–36)
MCHC RBC AUTO-ENTMCNC: 32.8 G/DL (ref 31–36)
MCV RBC AUTO: 90.8 FL (ref 80–100)
MCV RBC AUTO: 92 FL (ref 80–100)
MONOCYTES ABSOLUTE: 1.5 K/UL (ref 0–1.3)
MONOCYTES RELATIVE PERCENT: 11 %
NEUTROPHILS ABSOLUTE: 10.5 K/UL (ref 1.7–7.7)
NEUTROPHILS RELATIVE PERCENT: 66 %
PDW BLD-RTO: 15.9 % (ref 12.4–15.4)
PDW BLD-RTO: 15.9 % (ref 12.4–15.4)
PERFORMED ON: ABNORMAL
PHOSPHORUS: 6.8 MG/DL (ref 2.5–4.9)
PLATELET # BLD: 181 K/UL (ref 135–450)
PLATELET # BLD: 185 K/UL (ref 135–450)
PMV BLD AUTO: 8.4 FL (ref 5–10.5)
PMV BLD AUTO: 8.8 FL (ref 5–10.5)
POTASSIUM REFLEX MAGNESIUM: 4.6 MMOL/L (ref 3.5–5.1)
POTASSIUM SERPL-SCNC: 4.9 MMOL/L (ref 3.5–5.1)
PROCALCITONIN: 75.07 NG/ML (ref 0–0.15)
RBC # BLD: 2.6 M/UL (ref 4–5.2)
RBC # BLD: 3.09 M/UL (ref 4–5.2)
SODIUM BLD-SCNC: 135 MMOL/L (ref 136–145)
SODIUM BLD-SCNC: 138 MMOL/L (ref 136–145)
WBC # BLD: 13.5 K/UL (ref 4–11)
WBC # BLD: 8.6 K/UL (ref 4–11)

## 2021-07-10 PROCEDURE — 80069 RENAL FUNCTION PANEL: CPT

## 2021-07-10 PROCEDURE — 6360000002 HC RX W HCPCS: Performed by: INTERNAL MEDICINE

## 2021-07-10 PROCEDURE — 2580000003 HC RX 258: Performed by: INTERNAL MEDICINE

## 2021-07-10 PROCEDURE — 71045 X-RAY EXAM CHEST 1 VIEW: CPT

## 2021-07-10 PROCEDURE — 87641 MR-STAPH DNA AMP PROBE: CPT

## 2021-07-10 PROCEDURE — 6360000002 HC RX W HCPCS: Performed by: STUDENT IN AN ORGANIZED HEALTH CARE EDUCATION/TRAINING PROGRAM

## 2021-07-10 PROCEDURE — 6370000000 HC RX 637 (ALT 250 FOR IP): Performed by: STUDENT IN AN ORGANIZED HEALTH CARE EDUCATION/TRAINING PROGRAM

## 2021-07-10 PROCEDURE — 84145 PROCALCITONIN (PCT): CPT

## 2021-07-10 PROCEDURE — 85027 COMPLETE CBC AUTOMATED: CPT

## 2021-07-10 PROCEDURE — 87040 BLOOD CULTURE FOR BACTERIA: CPT

## 2021-07-10 PROCEDURE — 36415 COLL VENOUS BLD VENIPUNCTURE: CPT

## 2021-07-10 PROCEDURE — 1200000000 HC SEMI PRIVATE

## 2021-07-10 PROCEDURE — 99233 SBSQ HOSP IP/OBS HIGH 50: CPT | Performed by: INTERNAL MEDICINE

## 2021-07-10 PROCEDURE — 83735 ASSAY OF MAGNESIUM: CPT

## 2021-07-10 PROCEDURE — 85025 COMPLETE CBC W/AUTO DIFF WBC: CPT

## 2021-07-10 PROCEDURE — 83605 ASSAY OF LACTIC ACID: CPT

## 2021-07-10 RX ORDER — SODIUM CHLORIDE 9 MG/ML
INJECTION, SOLUTION INTRAVENOUS CONTINUOUS
Status: ACTIVE | OUTPATIENT
Start: 2021-07-10 | End: 2021-07-10

## 2021-07-10 RX ADMIN — INSULIN LISPRO 1 UNITS: 100 INJECTION, SOLUTION INTRAVENOUS; SUBCUTANEOUS at 21:13

## 2021-07-10 RX ADMIN — ZOLPIDEM TARTRATE 5 MG: 5 TABLET ORAL at 22:57

## 2021-07-10 RX ADMIN — CEFEPIME HYDROCHLORIDE 1000 MG: 1 INJECTION, POWDER, FOR SOLUTION INTRAMUSCULAR; INTRAVENOUS at 08:49

## 2021-07-10 RX ADMIN — CARVEDILOL 6.25 MG: 6.25 TABLET, FILM COATED ORAL at 21:10

## 2021-07-10 RX ADMIN — ATORVASTATIN CALCIUM 40 MG: 40 TABLET, FILM COATED ORAL at 21:10

## 2021-07-10 RX ADMIN — INSULIN LISPRO 2 UNITS: 100 INJECTION, SOLUTION INTRAVENOUS; SUBCUTANEOUS at 18:12

## 2021-07-10 RX ADMIN — HEPARIN SODIUM 5000 UNITS: 5000 INJECTION INTRAVENOUS; SUBCUTANEOUS at 21:20

## 2021-07-10 RX ADMIN — OXYCODONE 5 MG: 5 TABLET ORAL at 14:31

## 2021-07-10 RX ADMIN — SODIUM CHLORIDE: 9 INJECTION, SOLUTION INTRAVENOUS at 03:28

## 2021-07-10 RX ADMIN — OXYCODONE 5 MG: 5 TABLET ORAL at 23:00

## 2021-07-10 RX ADMIN — GABAPENTIN 300 MG: 300 CAPSULE ORAL at 21:10

## 2021-07-10 RX ADMIN — HEPARIN SODIUM 5000 UNITS: 5000 INJECTION INTRAVENOUS; SUBCUTANEOUS at 14:31

## 2021-07-10 RX ADMIN — INSULIN GLARGINE 20 UNITS: 100 INJECTION, SOLUTION SUBCUTANEOUS at 21:12

## 2021-07-10 RX ADMIN — HEPARIN SODIUM 5000 UNITS: 5000 INJECTION INTRAVENOUS; SUBCUTANEOUS at 06:37

## 2021-07-10 RX ADMIN — PIPERACILLIN AND TAZOBACTAM 3375 MG: 3; .375 INJECTION, POWDER, LYOPHILIZED, FOR SOLUTION INTRAVENOUS at 14:31

## 2021-07-10 RX ADMIN — PIPERACILLIN AND TAZOBACTAM 3375 MG: 3; .375 INJECTION, POWDER, LYOPHILIZED, FOR SOLUTION INTRAVENOUS at 22:56

## 2021-07-10 ASSESSMENT — PAIN DESCRIPTION - LOCATION
LOCATION: NECK
LOCATION: ABDOMEN;NECK

## 2021-07-10 ASSESSMENT — PAIN DESCRIPTION - ORIENTATION
ORIENTATION: RIGHT
ORIENTATION: LEFT;MID

## 2021-07-10 ASSESSMENT — PAIN SCALES - GENERAL
PAINLEVEL_OUTOF10: 6
PAINLEVEL_OUTOF10: 0
PAINLEVEL_OUTOF10: 7
PAINLEVEL_OUTOF10: 5
PAINLEVEL_OUTOF10: 0

## 2021-07-10 ASSESSMENT — PAIN DESCRIPTION - PAIN TYPE
TYPE: ACUTE PAIN
TYPE: ACUTE PAIN

## 2021-07-10 ASSESSMENT — PAIN DESCRIPTION - FREQUENCY
FREQUENCY: CONTINUOUS
FREQUENCY: CONTINUOUS

## 2021-07-10 ASSESSMENT — PAIN DESCRIPTION - ONSET
ONSET: ON-GOING
ONSET: ON-GOING

## 2021-07-10 ASSESSMENT — PAIN DESCRIPTION - PROGRESSION
CLINICAL_PROGRESSION: NOT CHANGED
CLINICAL_PROGRESSION: NOT CHANGED

## 2021-07-10 ASSESSMENT — PAIN DESCRIPTION - DESCRIPTORS
DESCRIPTORS: DISCOMFORT;ACHING
DESCRIPTORS: DISCOMFORT;PATIENT UNABLE TO DESCRIBE

## 2021-07-10 NOTE — PROGRESS NOTES
General Surgery  Post-operative Note      Procedure(s) Performed: lap PD cath insertion    Subjective:   -Febrile 103.5, tachy 115s, hypotensive SBP 90s-100s. -UOP 100cc postop; + n/v, pain controlled on current regimen      Objective:  Anesthesia type: General      I/O    Intra op    Post op     Fluids  250 mL 0 mL     EBL 15 mL 0 mL     Urine 0 mL 100 mL     Vitals:   Vitals:    07/09/21 2039 07/09/21 2310 07/10/21 0018 07/10/21 0100   BP:  107/61 (!) 91/55    Pulse:  115 110 105   Resp:  18 16    Temp:  103.5 °F (39.7 °C) 102.3 °F (39.1 °C) 99.1 °F (37.3 °C)   TempSrc:  Oral Oral Oral   SpO2: 99% 93% 98%    Weight:       Height:           Physical Exam:  Post-op vital signs:  Stable   General appearance: alert, no acute distress  Neck: trachea midline, neck supple, R IJ CVC   Chest/Lungs: CTAB,normal effort with no accessory muscle use on 3L NC  Cardiovascular: RRR, well perfused  Abdomen: Soft, appropriately-tender, PD cath LLQ covered in gauze, dressing without strikethough  Skin: warm and dry, no rashes  Extremities: no edema, no cyanosis  Genitourinary: Grossly normal  Neuro: A&Ox3, no focal deficits, sensation intact    Assessment and Plan  This is a 45y.o. year old female with CHF, HTN, CKD4/5 POD0 status post lap PD cath insertion.     Pain management: Roxicodone pain panel, PRN tylenol, PRN tramadol  Cardiovasc: hemodynamically stable, will continue to monitor  Respiratory:  IS ordered to bedside, encourage hourly IS and deep breathing, wean oxygen as tolerated  Fluids:  none, Diet: General diet (carb controlled)  : Urine output is adequate   Ambulation: OOB to chair, encourage ambulation  Prophylaxis: SCDs, SQH  Antibiotics: none  Wound: Local wound care  Other: fever workup per primary     Giles Nelson MD  PGY1, General Surgery  07/10/21  1:03 AM  [phone]

## 2021-07-10 NOTE — PROGRESS NOTES
Hospitalist Progress Note      PCP: Jesse MENESES    Date of Admission: 7/6/2021    CC uremia. Hospital course  Patient is a 48-year-old female with history of diabetes mellitus type 2, CKD 4, hypertension was sent from nephrology office for uremia. Status post Vas-Cath placement on 7/7  S/p lap PD cath insertion on 7/9    Subjective  Patient is feeling tired complaining of abdominal pain. Denies any nausea, vomiting. She was febrile yesterday with T-max of 103.5  Medications:  Reviewed    Infusion Medications    dextrose      sodium chloride       Scheduled Medications    piperacillin-tazobactam  3,375 mg Intravenous Q12H    carvedilol  6.25 mg Oral BID    atorvastatin  40 mg Oral Nightly    gabapentin  300 mg Oral Nightly    insulin glargine  20 Units Subcutaneous Nightly    sodium chloride flush  5-40 mL Intravenous 2 times per day    heparin (porcine)  5,000 Units Subcutaneous 3 times per day    insulin lispro  0-12 Units Subcutaneous TID WC    insulin lispro  0-6 Units Subcutaneous Nightly     PRN Meds: oxyCODONE **OR** oxyCODONE, promethazine, zolpidem, traMADol, glucose, dextrose, glucagon (rDNA), dextrose, sodium chloride flush, sodium chloride, ondansetron **OR** ondansetron, polyethylene glycol, acetaminophen **OR** acetaminophen      Intake/Output Summary (Last 24 hours) at 7/10/2021 1317  Last data filed at 7/9/2021 2335  Gross per 24 hour   Intake 250 ml   Output 135 ml   Net 115 ml       Physical Exam Performed:    /79   Pulse 79   Temp 98.2 °F (36.8 °C) (Oral)   Resp 20   Ht 5' (1.524 m)   Wt 182 lb 15.7 oz (83 kg)   SpO2 91%   BMI 35.74 kg/m²     General appearance: NAD, l tired  HEENT: Pupils equal, round, and reactive to light. Conjunctivae/corneas clear. Neck: Supple, with full range of motion. No jugular venous distention. Trachea midline. Vas-Cath on right side. Respiratory:  Normal respiratory effort.  Clear to auscultation, bibasilar diminished breathing sounds with mild rales. Cardiovascular: Regular rate and rhythm with normal S1/S2 without murmurs, rubs or gallops. Abdomen: Soft, non-tender, non-distended with normal bowel sounds. PD cath  Musculoskeletal: No clubbing, cyanosis or edema bilaterally. Skin: Skin color, texture, turgor normal.  No rashes or lesions. Neurologic:  Neurovascularly intact without any focal sensory/motor deficits. Cranial nerves: II-XII intact, grossly non-focal.  Psychiatric: Alert and oriented, thought content appropriate, normal insight  Capillary Refill: Brisk,< 3 seconds   Peripheral Pulses: +2 palpable, equal bilaterally       Labs:   Recent Labs     07/09/21  0443 07/10/21  0043 07/10/21  1024   WBC 5.0 8.6 13.5*   HGB 9.3* 9.2* 7.8*   HCT 27.8* 28.0* 23.9*    185 181     Recent Labs     07/08/21  0425 07/08/21  0425 07/09/21  0443 07/10/21  0718     --  137 138  135*   K 4.3   < > 4.5 4.9  4.6     --  97* 98*  95*   CO2 26  --  28 22  24   BUN 72*  --  44* 64*  60*   CREATININE 3.5*  --  3.5* 5.4*  5.0*   CALCIUM 9.1  --  9.6 9.3  9.2   PHOS 4.3  --  4.9 6.8*    < > = values in this interval not displayed. No results for input(s): AST, ALT, BILIDIR, BILITOT, ALKPHOS in the last 72 hours. No results for input(s): INR in the last 72 hours. No results for input(s): Shellia Bugler in the last 72 hours. Urinalysis:      Lab Results   Component Value Date    NITRU Negative 07/06/2021    WBCUA  07/06/2021    BACTERIA 4+ 07/06/2021    RBCUA 0-2 07/06/2021    BLOODU Negative 07/06/2021    SPECGRAV 1.020 07/06/2021    GLUCOSEU Negative 07/06/2021       Radiology:  XR CHEST PORTABLE   Final Result      New increased density consistent with airspace disease throughout the right lung and left base. XR CHEST PORTABLE   Final Result      Right internal jugular line with tip overlying the distal superior vena cava.  No pneumothorax is seen                  XR CHEST (2 VW) Final Result      Stable mild cardiomegaly and pulmonary vascular congestion without evidence of pulmonary edema. XR KNEE RIGHT (3 VIEWS)   Final Result   Impression:    No acute osseous injury. Joint effusion. Mild medial compartment osteoarthritis. Assessment/Plan:    Active Hospital Problems    Diagnosis Date Noted    Uremia [N19]     CKD (chronic kidney disease) stage 5, GFR less than 15 ml/min ([facility]) [N18.5]     Electrolyte imbalance [E87.8]     Anemia [D64.9]     HARRIETT (acute kidney injury) ([facility] [address] [N17.9] 07/06/2021     Fever suspected aspiration pneumonia  Patient had emesis during hospitalization  Chest x-ray consult bibasilar infiltrates. Continue IV Rocephin. Follow-up on urine cultures, blood cultures  Follow-up on procalcitonin, strep, Legionella antigen. #HARRIETT on CKD stage IV now ESRD  Patient is having uremia. S/p Vas-Cath placement on 7/7 received HD on 7/7 and today. S/p PD cath placement on 7/9  Discussed with nephrology will plan for HD tomorrow. #Hypertension   Pressure on low side. Discontinue nifedipine. Continue Coreg. #Diabetes mellitus type 2 with diabetic neuropathy  Hemoglobin A1c 6.9% 7/6/2021. Continue Lantus 20 units along with insulin sliding scale. We will adjust doses per need. #Chronic diastolic heart failure not in exacerbation. DVT Prophylaxis: heparin  Diet: ADULT DIET; Regular; 3 carb choices (45 gm/meal); Low Potassium (Less than 3000 mg/day); Low Phosphorus (Less than 1000 mg); Less than 60 gm  Code Status: Full Code    PT/OT Eval Status: N/A    Dispo - inpatient. Follow-up on blood cultures, urine cultures if negative likely discharge in 1 to 2 days. Discussed with patient she has medication refills at home. She will be needing prescription if there are any changes of medication.     Anthony Romano MD

## 2021-07-10 NOTE — FLOWSHEET NOTE
07/10/21 0018   Vitals   Temp 102.3 °F (39.1 °C)   Temp Source Oral   Pulse 110   Heart Rate Source Monitor   Resp 16   BP (!) 91/55   MAP (mmHg) 67   BP Location Right upper arm   BP Upper/Lower Upper   BP Method Automatic   Patient Position High fowlers   Level of Consciousness Alert (0)   MEWS Score 5   Patient Currently in Pain Yes   Cardiac Rhythm ST   Pain Assessment   Pain Assessment 0-10   Pain Level 7   Oxygen Therapy   SpO2 98 %   Pulse Oximeter Device Mode Intermittent   Pulse Oximeter Device Location Finger   O2 Device Nasal cannula   O2 Flow Rate (L/min) 3 L/min     Messaged Dr. Loura Scheuermann to update. Patient states that she is feeling better. Has not vomited since phenergan. Surgical site still clean/dry/intact.

## 2021-07-10 NOTE — PROGRESS NOTES
Surgery Daily Progress Note      CC: s/p lap PD cath insertion    SUBJECTIVE:  -Febrile and tachyardic overnight Tmax 103.5, AF this morning;  -Denies abd pain, n/v, on 2L O2      ROS:   A 14 point review of systems was conducted, significant findings as noted above. All other systems negative.       OBJECTIVE:    PHYSICAL EXAM:  Vitals:    07/10/21 0018 07/10/21 0100 07/10/21 0326 07/10/21 0555   BP: (!) 91/55 86/60 99/62    Pulse: 110 105 88 79   Resp: 16 16 14    Temp: 102.3 °F (39.1 °C) 99.1 °F (37.3 °C) 99.2 °F (37.3 °C) 98.1 °F (36.7 °C)   TempSrc: Oral Oral Oral Oral   SpO2: 98% 98% 98%    Weight:       Height:           General appearance: alert, no acute distress  Neck: trachea midline, neck supple, R IJ CVC   Chest/Lungs: CTAB,normal effort with no accessory muscle use on 3L NC  Cardiovascular: RRR, well perfused  Abdomen: Soft, appropriately-tender, Gauze and tegaderm over PD cath in L abdomen; dressing without strikethough  Neuro: A&Ox3, no gross motor or sensory neuro deficits  Extremities: no edema, no cyanosis      ASSESSMENT & PLAN:   Fabricio Rodriguez is a 45 y.o. female with CHF, HTN, CKD4/5 POD1 status post lap PD cath insertion.    - fevers overnight, fever workup per medical team, recommend blood cultures cx, cxr, ua  -will continue to follow   -pt to get HD today     Taylor Estevez MD, PGY-1  07/10/21 6:29 AM  Pager: [phone]

## 2021-07-10 NOTE — PROGRESS NOTES
Zosyn 3.375 g every 8 hours ordered for patient. This medication is renally eliminated. Will change to Zosyn 3.375 G every 12 hours per renal dose adjustment policy. Estimated Creatinine Clearance: 12 mL/min (A) (based on SCr of 5.4 mg/dL [facility])). Pharmacy will continue to monitor renal function and adjust dose as necessary. Please call with any questions. Thanks! Tanisha Servin, Pharm. D., [facility]  [phone]  7/10/2021 10:20 AM

## 2021-07-10 NOTE — PROGRESS NOTES
Office : [phone]     Fax :[phone]         Renal Progress Note  Subjective:   Admit Date: 7/6/2021     HPI   45 yr old lady on HD   Had PD catheter placement yesterday        Interval History:     Had fever spike last night with T-max 103  Feeling weak  Awake and alert and responding to simple commands  Blood pressures running low stable  Likely aspirated        DIET ADULT DIET; Regular; 3 carb choices (45 gm/meal); Low Potassium (Less than 3000 mg/day); Low Phosphorus (Less than 1000 mg); Less than 60 gm  Medications:   Scheduled Meds:   piperacillin-tazobactam  3,375 mg Intravenous Q12H    carvedilol  6.25 mg Oral BID    atorvastatin  40 mg Oral Nightly    gabapentin  300 mg Oral Nightly    insulin glargine  20 Units Subcutaneous Nightly    sodium chloride flush  5-40 mL Intravenous 2 times per day    heparin (porcine)  5,000 Units Subcutaneous 3 times per day    insulin lispro  0-12 Units Subcutaneous TID WC    insulin lispro  0-6 Units Subcutaneous Nightly     Continuous Infusions:   dextrose      sodium chloride         Labs:  CBC:   Recent Labs     07/09/21 0443 07/10/21  0043 07/10/21  1024   WBC 5.0 8.6 13.5*   HGB 9.3* 9.2* 7.8*    185 181     BMP:    Recent Labs     07/08/21  0425 07/08/21  0425 07/09/21  0443 07/10/21  0718     --  137 138  135*   K 4.3   < > 4.5 4.9  4.6     --  97* 98*  95*   CO2 26  --  28 22  24   BUN 72*  --  44* 64*  60*   CREATININE 3.5*  --  3.5* 5.4*  5.0*   GLUCOSE 147*  --  114* 126*  127*    < > = values in this interval not displayed.      Ca/Mg/Phos:   Recent Labs     07/08/21 0425 07/09/21  0443 07/10/21  0718   CALCIUM 9.1 9.6 9.3  9.2   MG 1.90 2.40 2.30   PHOS 4.3 4.9 6.8*     Hepatic: No results for input(s): AST, ALT, ALB, BILITOT, ALKPHOS in the last 72 hours. Troponin: No results for input(s): TROPONINI in the last 72 hours. BNP: No results for input(s): BNP in the last 72 hours. Lipids: No results for input(s): CHOL, TRIG, HDL, LDLCALC, LABVLDL in the last 72 hours. ABGs: No results for input(s): PHART, PO2ART, FXD7HFN in the last 72 hours. INR: No results for input(s): INR in the last 72 hours. UA:No results for input(s): Pau Pen, GLUCOSEU, BILIRUBINUR, Wyline Nain, BLOODU, PHUR, PROTEINU, UROBILINOGEN, NITRU, LEUKOCYTESUR, LABMICR, URINETYPE in the last 72 hours. Urine Microscopic: No results for input(s): LABCAST, BACTERIA, COMU, HYALCAST, WBCUA, RBCUA, EPIU in the last 72 hours. Urine Culture: No results for input(s): LABURIN in the last 72 hours. Urine Chemistry: No results for input(s): Yessi Moreno, PROTEINUR, NAUR in the last 72 hours. Objective:   Vitals: /79   Pulse 79   Temp 98.2 °F (36.8 °C) (Oral)   Resp 20   Ht 5' (1.524 m)   Wt 182 lb 15.7 oz (83 kg)   SpO2 91%   BMI 35.74 kg/m²    Wt Readings from Last 3 Encounters:   07/08/21 182 lb 15.7 oz (83 kg)   07/06/21 189 lb 12.8 oz (86.1 kg)   04/29/21 190 lb (86.2 kg)      24HR INTAKE/OUTPUT:      Intake/Output Summary (Last 24 hours) at 7/10/2021 1337  Last data filed at 7/9/2021 2335  Gross per 24 hour   Intake 250 ml   Output 135 ml   Net 115 ml     Constitutional:  Alert, awake, no apparent distress  NECK: supple, no JVD  Cardiovascular:  S1, S2 without m/r/g  Respiratory:  CTA B without w/r/r  Abdomen: +bs, soft, nt  Ext: no  LE edema    Assessment and Plan:       IMAGING:  XR CHEST PORTABLE   Final Result      New increased density consistent with airspace disease throughout the right lung and left base.          XR CHEST PORTABLE   Final Result      Right internal jugular line with tip overlying the distal superior vena cava. No pneumothorax is seen                  XR CHEST (2 VW)   Final Result      Stable mild cardiomegaly and pulmonary vascular congestion without evidence of pulmonary edema. XR KNEE RIGHT (3 VIEWS)   Final Result   Impression:    No acute osseous injury. Joint effusion. Mild medial compartment osteoarthritis. Assessment/Plan     1. ESRD on hemodialysis    She is in process of being transitioned to peritoneal dialysis. She had a PD catheter placement yesterday. 2.  Fever with chills.   Secondary to aspiration pneumonia  Started on Zosyn    Pressure is low , tachycardic and feeling weak  Electrolytes are normal  We will do dialysis tomorrow instead of today when she is more stable        Jane Pavon MD , MD  [facility] [address]

## 2021-07-10 NOTE — PROGRESS NOTES
Patient is extremely weak and nauseous from surgery. Was given zofran at 1800, still vomiting x4. Messaged Dr. Srinivas Hernandez to update.

## 2021-07-11 LAB
ALBUMIN SERPL-MCNC: 3.2 G/DL (ref 3.4–5)
AMORPHOUS: ABNORMAL /HPF
ANION GAP SERPL CALCULATED.3IONS-SCNC: 14 MMOL/L (ref 3–16)
BACTERIA: ABNORMAL /HPF
BASOPHILS ABSOLUTE: 0 K/UL (ref 0–0.2)
BASOPHILS RELATIVE PERCENT: 0.3 %
BILIRUBIN URINE: ABNORMAL
BLOOD BANK DISPENSE STATUS: NORMAL
BLOOD BANK PRODUCT CODE: NORMAL
BLOOD, URINE: NEGATIVE
BPU ID: NORMAL
BUN BLDV-MCNC: 78 MG/DL (ref 7–20)
CALCIUM SERPL-MCNC: 8.3 MG/DL (ref 8.3–10.6)
CHLORIDE BLD-SCNC: 95 MMOL/L (ref 99–110)
CLARITY: CLEAR
CO2: 24 MMOL/L (ref 21–32)
COLOR: YELLOW
CREAT SERPL-MCNC: 6.3 MG/DL (ref 0.6–1.1)
DESCRIPTION BLOOD BANK: NORMAL
EOSINOPHILS ABSOLUTE: 0.3 K/UL (ref 0–0.6)
EOSINOPHILS RELATIVE PERCENT: 3 %
EPITHELIAL CELLS, UA: ABNORMAL /HPF (ref 0–5)
GFR AFRICAN AMERICAN: 8
GFR NON-AFRICAN AMERICAN: 7
GLUCOSE BLD-MCNC: 131 MG/DL (ref 70–99)
GLUCOSE BLD-MCNC: 153 MG/DL (ref 70–99)
GLUCOSE BLD-MCNC: 161 MG/DL (ref 70–99)
GLUCOSE BLD-MCNC: 162 MG/DL (ref 70–99)
GLUCOSE BLD-MCNC: 210 MG/DL (ref 70–99)
GLUCOSE URINE: NEGATIVE MG/DL
HCT VFR BLD CALC: 21.3 % (ref 36–48)
HCT VFR BLD CALC: 25.9 % (ref 36–48)
HEMOGLOBIN: 7.1 G/DL (ref 12–16)
HEMOGLOBIN: 8.8 G/DL (ref 12–16)
KETONES, URINE: ABNORMAL MG/DL
LEUKOCYTE ESTERASE, URINE: ABNORMAL
LYMPHOCYTES ABSOLUTE: 1.3 K/UL (ref 1–5.1)
LYMPHOCYTES RELATIVE PERCENT: 15.1 %
MAGNESIUM: 2.3 MG/DL (ref 1.8–2.4)
MCH RBC QN AUTO: 30.8 PG (ref 26–34)
MCHC RBC AUTO-ENTMCNC: 33.5 G/DL (ref 31–36)
MCV RBC AUTO: 91.8 FL (ref 80–100)
MICROSCOPIC EXAMINATION: YES
MONOCYTES ABSOLUTE: 1.1 K/UL (ref 0–1.3)
MONOCYTES RELATIVE PERCENT: 12.1 %
MRSA SCREEN RT-PCR: NORMAL
MUCUS: ABNORMAL /LPF
NEUTROPHILS ABSOLUTE: 6.2 K/UL (ref 1.7–7.7)
NEUTROPHILS RELATIVE PERCENT: 69.5 %
NITRITE, URINE: NEGATIVE
PDW BLD-RTO: 15.9 % (ref 12.4–15.4)
PERFORMED ON: ABNORMAL
PH UA: 5.5 (ref 5–8)
PHOSPHORUS: 6.9 MG/DL (ref 2.5–4.9)
PLATELET # BLD: 158 K/UL (ref 135–450)
PMV BLD AUTO: 8.5 FL (ref 5–10.5)
POTASSIUM SERPL-SCNC: 4.5 MMOL/L (ref 3.5–5.1)
PROTEIN UA: 100 MG/DL
RBC # BLD: 2.32 M/UL (ref 4–5.2)
RBC UA: ABNORMAL /HPF (ref 0–4)
SODIUM BLD-SCNC: 133 MMOL/L (ref 136–145)
SPECIFIC GRAVITY UA: >=1.03 (ref 1–1.03)
URINE REFLEX TO CULTURE: YES
URINE TYPE: ABNORMAL
UROBILINOGEN, URINE: 0.2 E.U./DL
WBC # BLD: 8.9 K/UL (ref 4–11)
WBC UA: ABNORMAL /HPF (ref 0–5)
YEAST: PRESENT /HPF

## 2021-07-11 PROCEDURE — 80069 RENAL FUNCTION PANEL: CPT

## 2021-07-11 PROCEDURE — 83735 ASSAY OF MAGNESIUM: CPT

## 2021-07-11 PROCEDURE — 81001 URINALYSIS AUTO W/SCOPE: CPT

## 2021-07-11 PROCEDURE — 6370000000 HC RX 637 (ALT 250 FOR IP): Performed by: STUDENT IN AN ORGANIZED HEALTH CARE EDUCATION/TRAINING PROGRAM

## 2021-07-11 PROCEDURE — 85014 HEMATOCRIT: CPT

## 2021-07-11 PROCEDURE — 87086 URINE CULTURE/COLONY COUNT: CPT

## 2021-07-11 PROCEDURE — 87449 NOS EACH ORGANISM AG IA: CPT

## 2021-07-11 PROCEDURE — 6370000000 HC RX 637 (ALT 250 FOR IP): Performed by: INTERNAL MEDICINE

## 2021-07-11 PROCEDURE — 36592 COLLECT BLOOD FROM PICC: CPT

## 2021-07-11 PROCEDURE — 1200000000 HC SEMI PRIVATE

## 2021-07-11 PROCEDURE — 36430 TRANSFUSION BLD/BLD COMPNT: CPT

## 2021-07-11 PROCEDURE — 2580000003 HC RX 258: Performed by: STUDENT IN AN ORGANIZED HEALTH CARE EDUCATION/TRAINING PROGRAM

## 2021-07-11 PROCEDURE — 2580000003 HC RX 258: Performed by: INTERNAL MEDICINE

## 2021-07-11 PROCEDURE — 85025 COMPLETE CBC W/AUTO DIFF WBC: CPT

## 2021-07-11 PROCEDURE — 85018 HEMOGLOBIN: CPT

## 2021-07-11 PROCEDURE — 6360000002 HC RX W HCPCS: Performed by: INTERNAL MEDICINE

## 2021-07-11 PROCEDURE — 99233 SBSQ HOSP IP/OBS HIGH 50: CPT | Performed by: INTERNAL MEDICINE

## 2021-07-11 PROCEDURE — 90935 HEMODIALYSIS ONE EVALUATION: CPT

## 2021-07-11 PROCEDURE — 6360000002 HC RX W HCPCS: Performed by: STUDENT IN AN ORGANIZED HEALTH CARE EDUCATION/TRAINING PROGRAM

## 2021-07-11 RX ORDER — HEPARIN SODIUM 1000 [USP'U]/ML
2400 INJECTION, SOLUTION INTRAVENOUS; SUBCUTANEOUS PRN
Status: DISCONTINUED | OUTPATIENT
Start: 2021-07-11 | End: 2021-07-13 | Stop reason: HOSPADM

## 2021-07-11 RX ORDER — DIPHENHYDRAMINE HCL 25 MG
25 TABLET ORAL ONCE
Status: COMPLETED | OUTPATIENT
Start: 2021-07-11 | End: 2021-07-11

## 2021-07-11 RX ORDER — SODIUM CHLORIDE 9 MG/ML
INJECTION, SOLUTION INTRAVENOUS PRN
Status: DISCONTINUED | OUTPATIENT
Start: 2021-07-11 | End: 2021-07-13 | Stop reason: HOSPADM

## 2021-07-11 RX ADMIN — INSULIN LISPRO 2 UNITS: 100 INJECTION, SOLUTION INTRAVENOUS; SUBCUTANEOUS at 21:02

## 2021-07-11 RX ADMIN — OXYCODONE 5 MG: 5 TABLET ORAL at 21:11

## 2021-07-11 RX ADMIN — GABAPENTIN 300 MG: 300 CAPSULE ORAL at 21:02

## 2021-07-11 RX ADMIN — INSULIN GLARGINE 20 UNITS: 100 INJECTION, SOLUTION SUBCUTANEOUS at 21:03

## 2021-07-11 RX ADMIN — CARVEDILOL 6.25 MG: 6.25 TABLET, FILM COATED ORAL at 21:02

## 2021-07-11 RX ADMIN — ZOLPIDEM TARTRATE 5 MG: 5 TABLET ORAL at 22:59

## 2021-07-11 RX ADMIN — PIPERACILLIN AND TAZOBACTAM 3375 MG: 3; .375 INJECTION, POWDER, LYOPHILIZED, FOR SOLUTION INTRAVENOUS at 16:04

## 2021-07-11 RX ADMIN — HEPARIN SODIUM 5000 UNITS: 5000 INJECTION INTRAVENOUS; SUBCUTANEOUS at 05:43

## 2021-07-11 RX ADMIN — PIPERACILLIN AND TAZOBACTAM 3375 MG: 3; .375 INJECTION, POWDER, LYOPHILIZED, FOR SOLUTION INTRAVENOUS at 22:59

## 2021-07-11 RX ADMIN — INSULIN LISPRO 2 UNITS: 100 INJECTION, SOLUTION INTRAVENOUS; SUBCUTANEOUS at 09:12

## 2021-07-11 RX ADMIN — ONDANSETRON 4 MG: 2 INJECTION INTRAMUSCULAR; INTRAVENOUS at 15:56

## 2021-07-11 RX ADMIN — DIPHENHYDRAMINE HYDROCHLORIDE 25 MG: 25 TABLET ORAL at 21:42

## 2021-07-11 RX ADMIN — HEPARIN SODIUM 5000 UNITS: 5000 INJECTION INTRAVENOUS; SUBCUTANEOUS at 21:03

## 2021-07-11 RX ADMIN — DIPHENHYDRAMINE HYDROCHLORIDE 25 MG: 25 TABLET ORAL at 09:04

## 2021-07-11 RX ADMIN — ATORVASTATIN CALCIUM 40 MG: 40 TABLET, FILM COATED ORAL at 21:02

## 2021-07-11 RX ADMIN — TRAMADOL HYDROCHLORIDE 50 MG: 50 TABLET, FILM COATED ORAL at 23:13

## 2021-07-11 RX ADMIN — OXYCODONE 10 MG: 5 TABLET ORAL at 05:42

## 2021-07-11 RX ADMIN — OXYCODONE 5 MG: 5 TABLET ORAL at 17:14

## 2021-07-11 RX ADMIN — Medication 10 ML: at 09:11

## 2021-07-11 ASSESSMENT — PAIN DESCRIPTION - PAIN TYPE
TYPE: ACUTE PAIN

## 2021-07-11 ASSESSMENT — PAIN DESCRIPTION - DIRECTION
RADIATING_TOWARDS: PD CATH SITE

## 2021-07-11 ASSESSMENT — PAIN - FUNCTIONAL ASSESSMENT
PAIN_FUNCTIONAL_ASSESSMENT: PREVENTS OR INTERFERES SOME ACTIVE ACTIVITIES AND ADLS
PAIN_FUNCTIONAL_ASSESSMENT: ACTIVITIES ARE NOT PREVENTED

## 2021-07-11 ASSESSMENT — PAIN DESCRIPTION - FREQUENCY
FREQUENCY: CONTINUOUS

## 2021-07-11 ASSESSMENT — PAIN DESCRIPTION - ORIENTATION
ORIENTATION: LEFT
ORIENTATION: LEFT;MID;LOWER
ORIENTATION: LEFT;MID
ORIENTATION: LEFT;MID
ORIENTATION: LEFT;MID;LOWER

## 2021-07-11 ASSESSMENT — PAIN DESCRIPTION - DESCRIPTORS
DESCRIPTORS: SORE
DESCRIPTORS: DISCOMFORT
DESCRIPTORS: CONSTANT;SORE
DESCRIPTORS: CONSTANT;SORE;TENDER
DESCRIPTORS: DISCOMFORT

## 2021-07-11 ASSESSMENT — PAIN SCALES - GENERAL
PAINLEVEL_OUTOF10: 8
PAINLEVEL_OUTOF10: 6
PAINLEVEL_OUTOF10: 8
PAINLEVEL_OUTOF10: 3
PAINLEVEL_OUTOF10: 5
PAINLEVEL_OUTOF10: 6
PAINLEVEL_OUTOF10: 0

## 2021-07-11 ASSESSMENT — PAIN DESCRIPTION - ONSET
ONSET: ON-GOING
ONSET: PROGRESSIVE
ONSET: ON-GOING

## 2021-07-11 ASSESSMENT — PAIN DESCRIPTION - LOCATION
LOCATION: ABDOMEN

## 2021-07-11 ASSESSMENT — PAIN DESCRIPTION - PROGRESSION
CLINICAL_PROGRESSION: NOT CHANGED
CLINICAL_PROGRESSION: NOT CHANGED
CLINICAL_PROGRESSION: GRADUALLY WORSENING
CLINICAL_PROGRESSION: NOT CHANGED
CLINICAL_PROGRESSION: NOT CHANGED

## 2021-07-11 NOTE — PROGRESS NOTES
Pt c/o itching after receiving Roxicodone at 0545 and is requesting Benadryl. No rash noted.  Notified Dr. Sandra Ho

## 2021-07-11 NOTE — PROGRESS NOTES
Paged on call surgical resident, Dr. Issa Allen regarding re-evaluation of PD site. Dressing changed by HD RN and additional drainage noted. C/o abdominal discomfort near PD site. Abdominal distentnion noted. This RN asked surgical team if any abdominal scans are necessary. State that resident will be up to evaluate. Receiving 1 unit of RBC in HD. Will continue to monitor.

## 2021-07-11 NOTE — PROGRESS NOTES
H/H trending down. Serosanguinous drainage noted to PD site dressing that was not present during my assessment yesterday evening. Pt continue to c/o pain and discomfort to PD site. Paged surgery, Dr. Denisse Frank. [id number]: notified Dr. Diogo Turcios of drainage to PD cath site.

## 2021-07-11 NOTE — PROGRESS NOTES
Office : [phone]     Fax :[phone]         Renal Progress Note  Subjective:   Admit Date: 7/6/2021     HPI   45 yr old lady on HD   Had PD catheter placement on 7/9/2021       Interval History:     No fever spike overnight. More awake and alert today and responding to simple commands  Blood pressures better    Has bleeding from PD catheter site        DIET ADULT DIET; Regular; 3 carb choices (45 gm/meal); Low Potassium (Less than 3000 mg/day); Low Phosphorus (Less than 1000 mg);  Less than 60 gm  Medications:   Scheduled Meds:   piperacillin-tazobactam  3,375 mg Intravenous Q12H    carvedilol  6.25 mg Oral BID    atorvastatin  40 mg Oral Nightly    gabapentin  300 mg Oral Nightly    insulin glargine  20 Units Subcutaneous Nightly    sodium chloride flush  5-40 mL Intravenous 2 times per day    heparin (porcine)  5,000 Units Subcutaneous 3 times per day    insulin lispro  0-12 Units Subcutaneous TID WC    insulin lispro  0-6 Units Subcutaneous Nightly     Continuous Infusions:   dextrose      sodium chloride         Labs:  CBC:   Recent Labs     07/10/21  0043 07/10/21  1024 07/11/21  0549   WBC 8.6 13.5* 8.9   HGB 9.2* 7.8* 7.1*    181 158     BMP:    Recent Labs     07/09/21  0443 07/10/21  0718 07/11/21  0549    138  135* 133*   K 4.5 4.9  4.6 4.5   CL 97* 98*  95* 95*   CO2 28 22  24 24   BUN 44* 64*  60* 78*   CREATININE 3.5* 5.4*  5.0* 6.3*   GLUCOSE 114* 126*  127* 161*     Ca/Mg/Phos:   Recent Labs     07/09/21  0443 07/10/21  0718 07/11/21  0549   CALCIUM 9.6 9.3  9.2 8.3   MG 2.40 2.30 2.30   PHOS 4.9 6.8* 6.9* Hepatic: No results for input(s): AST, ALT, ALB, BILITOT, ALKPHOS in the last 72 hours. Troponin: No results for input(s): TROPONINI in the last 72 hours. BNP: No results for input(s): BNP in the last 72 hours. Lipids: No results for input(s): CHOL, TRIG, HDL, LDLCALC, LABVLDL in the last 72 hours. ABGs: No results for input(s): PHART, PO2ART, GLI4YEI in the last 72 hours. INR: No results for input(s): INR in the last 72 hours. UA:  Recent Labs     07/11/21  0018   COLORU Yellow   CLARITYU Clear   GLUCOSEU Negative   BILIRUBINUR SMALL*   KETUA TRACE*   SPECGRAV >=1.030   BLOODU Negative   PHUR 5.5   PROTEINU 100*   UROBILINOGEN 0.2   NITRU Negative   LEUKOCYTESUR SMALL*   LABMICR YES   URINETYPE Voided      Urine Microscopic:   Recent Labs     07/11/21  0018   BACTERIA 3+*   WBCUA 10-20*   RBCUA 3-4   EPIU 21-50*     Urine Culture: No results for input(s): LABURIN in the last 72 hours. Urine Chemistry: No results for input(s): Madolyn Hairston, PROTEINUR, NAUR in the last 72 hours. Objective:   Vitals: /62   Pulse 76   Temp 97.6 °F (36.4 °C) (Oral)   Resp 18   Ht 5' (1.524 m)   Wt 182 lb 15.7 oz (83 kg)   SpO2 94%   BMI 35.74 kg/m²    Wt Readings from Last 3 Encounters:   07/08/21 182 lb 15.7 oz (83 kg)   07/06/21 189 lb 12.8 oz (86.1 kg)   04/29/21 190 lb (86.2 kg)      24HR INTAKE/OUTPUT:      Intake/Output Summary (Last 24 hours) at 7/11/2021 1039  Last data filed at 7/11/2021 5078  Gross per 24 hour   Intake 1883 ml   Output 400 ml   Net 1483 ml     Constitutional:  Alert, awake, no apparent distress  NECK: supple, no JVD  Cardiovascular:  S1, S2 without m/r/g  Respiratory:  CTA B without w/r/r  Abdomen: +bs, soft, nt  Ext: no  LE edema    Assessment and Plan:       IMAGING:  XR CHEST PORTABLE   Final Result      New increased density consistent with airspace disease throughout the right lung and left base.          XR CHEST PORTABLE   Final Result      Right internal jugular line with tip overlying the distal superior vena cava. No pneumothorax is seen                  XR CHEST (2 VW)   Final Result      Stable mild cardiomegaly and pulmonary vascular congestion without evidence of pulmonary edema. XR KNEE RIGHT (3 VIEWS)   Final Result   Impression:    No acute osseous injury. Joint effusion. Mild medial compartment osteoarthritis. Assessment/Plan     1. ESRD on hemodialysis  Will get hemodialysis today  We will hold off on removing nontunneled catheter for now since there is bleeding from the PD catheter site      She is in process of being transitioned to peritoneal dialysis. She had a PD catheter placement yesterday. 2.  Fever with chills. Secondary to aspiration pneumonia  Started on Zosyn    3. Status post laparoscopic PD catheter insertion on 7 /9/2021  Bleeding noted from PD catheter exit site. Hemoglobin dropped to 7.1  Discussed with Dr. Morgan Krishnamurthy.   Will order 1 unit of packed RBC  Surgery aware of bleeding      Ramón Hagan MD , MD  [facility] [address] [place] [address] 18 07

## 2021-07-11 NOTE — PROGRESS NOTES
Blood noted to be backing up in clamped PD cath tubing. Surgery at bedside and performed dressing change.

## 2021-07-11 NOTE — PROGRESS NOTES
Hospitalist Progress Note      PCP: Jael MENESES    Date of Admission: 7/6/2021    CC uremia. Hospital course  Patient is a 50-year-old female with history of diabetes mellitus type 2, CKD 4, hypertension was sent from nephrology office for uremia. Status post Vas-Cath placement on 7/7  S/p lap PD cath insertion on 7/9    Subjective  Complaining of some abdominal pain at catheter site. Denies any nausea, vomiting, fever, chills. Feels better today. Medications:  Reviewed    Infusion Medications    dextrose      sodium chloride       Scheduled Medications    piperacillin-tazobactam  3,375 mg Intravenous Q12H    carvedilol  6.25 mg Oral BID    atorvastatin  40 mg Oral Nightly    gabapentin  300 mg Oral Nightly    insulin glargine  20 Units Subcutaneous Nightly    sodium chloride flush  5-40 mL Intravenous 2 times per day    heparin (porcine)  5,000 Units Subcutaneous 3 times per day    insulin lispro  0-12 Units Subcutaneous TID WC    insulin lispro  0-6 Units Subcutaneous Nightly     PRN Meds: oxyCODONE **OR** oxyCODONE, promethazine, zolpidem, traMADol, glucose, dextrose, glucagon (rDNA), dextrose, sodium chloride flush, sodium chloride, ondansetron **OR** ondansetron, polyethylene glycol, acetaminophen **OR** acetaminophen      Intake/Output Summary (Last 24 hours) at 7/11/2021 1306  Last data filed at 7/11/2021 0904  Gross per 24 hour   Intake 1883 ml   Output 400 ml   Net 1483 ml       Physical Exam Performed:    BP (!) 102/54   Pulse 86   Temp 97.9 °F (36.6 °C)   Resp 18   Ht 5' (1.524 m)   Wt 187 lb 9.8 oz (85.1 kg)   SpO2 94%   BMI 36.64 kg/m²     General appearance: NAD,   HEENT: Pupils equal, round, and reactive to light. Conjunctivae/corneas clear. Neck: Supple, with full range of motion. No jugular venous distention. Trachea midline. Vas-Cath on right side. Respiratory:  Normal respiratory effort.  Clear to auscultation, bibasilar diminished breathing sounds with mild rales. Cardiovascular: Regular rate and rhythm with normal S1/S2 without murmurs, rubs or gallops. Abdomen: Soft, non-tender, non-distended with normal bowel sounds. PD cath dressing covered with serogionous drainage. Musculoskeletal: No clubbing, cyanosis or edema bilaterally. Skin: Skin color, texture, turgor normal.  No rashes or lesions. Neurologic:  Neurovascularly intact without any focal sensory/motor deficits. Cranial nerves: II-XII intact, grossly non-focal.  Psychiatric: Alert and oriented, thought content appropriate, normal insight  Capillary Refill: Brisk,< 3 seconds   Peripheral Pulses: +2 palpable, equal bilaterally       Labs:   Recent Labs     07/10/21  0043 07/10/21  1024 07/11/21  0549   WBC 8.6 13.5* 8.9   HGB 9.2* 7.8* 7.1*   HCT 28.0* 23.9* 21.3*    181 158     Recent Labs     07/09/21  0443 07/10/21  0718 07/11/21  0549    138  135* 133*   K 4.5 4.9  4.6 4.5   CL 97* 98*  95* 95*   CO2 28 22  24 24   BUN 44* 64*  60* 78*   CREATININE 3.5* 5.4*  5.0* 6.3*   CALCIUM 9.6 9.3  9.2 8.3   PHOS 4.9 6.8* 6.9*     No results for input(s): AST, ALT, BILIDIR, BILITOT, ALKPHOS in the last 72 hours. No results for input(s): INR in the last 72 hours. No results for input(s): Atul Favre in the last 72 hours. Urinalysis:      Lab Results   Component Value Date    NITRU Negative 07/11/2021    WBCUA 10-20 07/11/2021    BACTERIA 3+ 07/11/2021    RBCUA 3-4 07/11/2021    BLOODU Negative 07/11/2021    SPECGRAV >=1.030 07/11/2021    GLUCOSEU Negative 07/11/2021       Radiology:  XR CHEST PORTABLE   Final Result      New increased density consistent with airspace disease throughout the right lung and left base. XR CHEST PORTABLE   Final Result      Right internal jugular line with tip overlying the distal superior vena cava.  No pneumothorax is seen                  XR CHEST (2 VW)   Final Result      Stable mild cardiomegaly and pulmonary vascular congestion without evidence of pulmonary edema. XR KNEE RIGHT (3 VIEWS)   Final Result   Impression:    No acute osseous injury. Joint effusion. Mild medial compartment osteoarthritis. Assessment/Plan:    Active Hospital Problems    Diagnosis Date Noted    Uremia [N19]     CKD (chronic kidney disease) stage 5, GFR less than 15 ml/min ([facility]) [N18.5]     Electrolyte imbalance [E87.8]     Anemia [D64.9]     HARRIETT (acute kidney injury) ([facility] [address] [N17.9] 07/06/2021     #Anemia  Hb 7.1, 2gm drop since admission. Will transfuse 1PRBc  Will have surgery re-eval PD site    #Fever suspected aspiration pneumonia  Patient had emesis during hospitalization  Chest x-ray consult bibasilar infiltrates. Continue IV Zosyn follow-up on urine cultures, blood cultures  Follow-up on procalcitonin, strep, Legionella antigen. #HARRIETT on CKD stage IV now ESRD  Patient is having uremia. S/p Vas-Cath placement on 7/7 received HD on 7/7 and today. S/p PD cath placement on 7/9  Discussed with nephrology will plan for HD tomorrow. #Hypertension   BP on lower side this morning. Discontinue nifedipine. Continue Coreg. #Diabetes mellitus type 2 with diabetic neuropathy  Hemoglobin A1c 6.9% 7/6/2021. Continue Lantus 20 units along with insulin sliding scale. We will adjust doses per need. #Chronic diastolic heart failure not in exacerbation. DVT Prophylaxis: heparin  Diet: ADULT DIET; Regular; 3 carb choices (45 gm/meal); Low Potassium (Less than 3000 mg/day); Low Phosphorus (Less than 1000 mg); Less than 60 gm  Code Status: Full Code    PT/OT Eval Status: N/A    Dispo - inpatient. Follow-up on blood cultures, urine cultures. if negative likely discharge in 1 day. Discussed with patient she has medication refills at home. She will be needing prescription if there are any changes of medication.     Philipp Heath MD

## 2021-07-12 LAB
ANION GAP SERPL CALCULATED.3IONS-SCNC: 16 MMOL/L (ref 3–16)
BASOPHILS ABSOLUTE: 0 K/UL (ref 0–0.2)
BASOPHILS RELATIVE PERCENT: 0.5 %
BUN BLDV-MCNC: 28 MG/DL (ref 7–20)
CALCIUM SERPL-MCNC: 7.7 MG/DL (ref 8.3–10.6)
CHLORIDE BLD-SCNC: 87 MMOL/L (ref 99–110)
CO2: 24 MMOL/L (ref 21–32)
CREAT SERPL-MCNC: 2.9 MG/DL (ref 0.6–1.1)
EOSINOPHILS ABSOLUTE: 0.3 K/UL (ref 0–0.6)
EOSINOPHILS RELATIVE PERCENT: 4 %
GFR AFRICAN AMERICAN: 21
GFR NON-AFRICAN AMERICAN: 17
GLUCOSE BLD-MCNC: 102 MG/DL (ref 70–99)
GLUCOSE BLD-MCNC: 150 MG/DL (ref 70–99)
GLUCOSE BLD-MCNC: 433 MG/DL (ref 70–99)
GLUCOSE BLD-MCNC: 85 MG/DL (ref 70–99)
GLUCOSE BLD-MCNC: 90 MG/DL (ref 70–99)
HCT VFR BLD CALC: 23.2 % (ref 36–48)
HEMOGLOBIN: 7.8 G/DL (ref 12–16)
L. PNEUMOPHILA SEROGP 1 UR AG: NORMAL
LYMPHOCYTES ABSOLUTE: 1.4 K/UL (ref 1–5.1)
LYMPHOCYTES RELATIVE PERCENT: 18.3 %
MCH RBC QN AUTO: 30.7 PG (ref 26–34)
MCHC RBC AUTO-ENTMCNC: 33.4 G/DL (ref 31–36)
MCV RBC AUTO: 91.8 FL (ref 80–100)
MONOCYTES ABSOLUTE: 1.1 K/UL (ref 0–1.3)
MONOCYTES RELATIVE PERCENT: 14.6 %
NEUTROPHILS ABSOLUTE: 4.7 K/UL (ref 1.7–7.7)
NEUTROPHILS RELATIVE PERCENT: 62.6 %
PDW BLD-RTO: 15.1 % (ref 12.4–15.4)
PERFORMED ON: ABNORMAL
PERFORMED ON: ABNORMAL
PERFORMED ON: NORMAL
PERFORMED ON: NORMAL
PLATELET # BLD: 156 K/UL (ref 135–450)
PMV BLD AUTO: 8.3 FL (ref 5–10.5)
POTASSIUM SERPL-SCNC: 4.1 MMOL/L (ref 3.5–5.1)
RBC # BLD: 2.53 M/UL (ref 4–5.2)
SODIUM BLD-SCNC: 127 MMOL/L (ref 136–145)
STREP PNEUMONIAE ANTIGEN, URINE: NORMAL
URINE CULTURE, ROUTINE: NORMAL
WBC # BLD: 7.5 K/UL (ref 4–11)

## 2021-07-12 PROCEDURE — 6360000002 HC RX W HCPCS: Performed by: INTERNAL MEDICINE

## 2021-07-12 PROCEDURE — 80048 BASIC METABOLIC PNL TOTAL CA: CPT

## 2021-07-12 PROCEDURE — 6360000002 HC RX W HCPCS: Performed by: STUDENT IN AN ORGANIZED HEALTH CARE EDUCATION/TRAINING PROGRAM

## 2021-07-12 PROCEDURE — 6370000000 HC RX 637 (ALT 250 FOR IP): Performed by: STUDENT IN AN ORGANIZED HEALTH CARE EDUCATION/TRAINING PROGRAM

## 2021-07-12 PROCEDURE — 99233 SBSQ HOSP IP/OBS HIGH 50: CPT | Performed by: INTERNAL MEDICINE

## 2021-07-12 PROCEDURE — 85025 COMPLETE CBC W/AUTO DIFF WBC: CPT

## 2021-07-12 PROCEDURE — 2580000003 HC RX 258: Performed by: STUDENT IN AN ORGANIZED HEALTH CARE EDUCATION/TRAINING PROGRAM

## 2021-07-12 PROCEDURE — 1200000000 HC SEMI PRIVATE

## 2021-07-12 PROCEDURE — 36592 COLLECT BLOOD FROM PICC: CPT

## 2021-07-12 PROCEDURE — 2580000003 HC RX 258: Performed by: INTERNAL MEDICINE

## 2021-07-12 RX ORDER — DOCUSATE SODIUM 100 MG/1
100 CAPSULE, LIQUID FILLED ORAL 2 TIMES DAILY
Status: DISCONTINUED | OUTPATIENT
Start: 2021-07-12 | End: 2021-07-13 | Stop reason: HOSPADM

## 2021-07-12 RX ADMIN — OXYCODONE 5 MG: 5 TABLET ORAL at 17:19

## 2021-07-12 RX ADMIN — DOCUSATE SODIUM 100 MG: 100 CAPSULE, LIQUID FILLED ORAL at 21:25

## 2021-07-12 RX ADMIN — GABAPENTIN 300 MG: 300 CAPSULE ORAL at 21:25

## 2021-07-12 RX ADMIN — CARVEDILOL 6.25 MG: 6.25 TABLET, FILM COATED ORAL at 21:25

## 2021-07-12 RX ADMIN — INSULIN LISPRO 1 UNITS: 100 INJECTION, SOLUTION INTRAVENOUS; SUBCUTANEOUS at 21:33

## 2021-07-12 RX ADMIN — Medication 10 ML: at 21:26

## 2021-07-12 RX ADMIN — ZOLPIDEM TARTRATE 5 MG: 5 TABLET ORAL at 23:28

## 2021-07-12 RX ADMIN — OXYCODONE 5 MG: 5 TABLET ORAL at 23:28

## 2021-07-12 RX ADMIN — HEPARIN SODIUM 5000 UNITS: 5000 INJECTION INTRAVENOUS; SUBCUTANEOUS at 21:33

## 2021-07-12 RX ADMIN — DOCUSATE SODIUM 100 MG: 100 CAPSULE, LIQUID FILLED ORAL at 13:45

## 2021-07-12 RX ADMIN — INSULIN GLARGINE 20 UNITS: 100 INJECTION, SOLUTION SUBCUTANEOUS at 21:33

## 2021-07-12 RX ADMIN — OXYCODONE 5 MG: 5 TABLET ORAL at 03:48

## 2021-07-12 RX ADMIN — PIPERACILLIN AND TAZOBACTAM 3375 MG: 3; .375 INJECTION, POWDER, LYOPHILIZED, FOR SOLUTION INTRAVENOUS at 09:30

## 2021-07-12 RX ADMIN — CARVEDILOL 6.25 MG: 6.25 TABLET, FILM COATED ORAL at 09:31

## 2021-07-12 RX ADMIN — EPOETIN ALFA-EPBX 10000 UNITS: 10000 INJECTION, SOLUTION INTRAVENOUS; SUBCUTANEOUS at 11:33

## 2021-07-12 RX ADMIN — PROMETHAZINE HYDROCHLORIDE 12.5 MG: 25 INJECTION INTRAMUSCULAR; INTRAVENOUS at 09:34

## 2021-07-12 RX ADMIN — ATORVASTATIN CALCIUM 40 MG: 40 TABLET, FILM COATED ORAL at 21:25

## 2021-07-12 RX ADMIN — HEPARIN SODIUM 5000 UNITS: 5000 INJECTION INTRAVENOUS; SUBCUTANEOUS at 14:39

## 2021-07-12 RX ADMIN — HEPARIN SODIUM 5000 UNITS: 5000 INJECTION INTRAVENOUS; SUBCUTANEOUS at 05:58

## 2021-07-12 RX ADMIN — PIPERACILLIN AND TAZOBACTAM 3375 MG: 3; .375 INJECTION, POWDER, LYOPHILIZED, FOR SOLUTION INTRAVENOUS at 22:08

## 2021-07-12 ASSESSMENT — PAIN SCALES - GENERAL
PAINLEVEL_OUTOF10: 7
PAINLEVEL_OUTOF10: 0
PAINLEVEL_OUTOF10: 6
PAINLEVEL_OUTOF10: 6
PAINLEVEL_OUTOF10: 5

## 2021-07-12 ASSESSMENT — PAIN DESCRIPTION - PROGRESSION
CLINICAL_PROGRESSION: NOT CHANGED
CLINICAL_PROGRESSION: NOT CHANGED

## 2021-07-12 ASSESSMENT — PAIN DESCRIPTION - DESCRIPTORS
DESCRIPTORS: SORE
DESCRIPTORS: SORE;TENDER

## 2021-07-12 ASSESSMENT — PAIN DESCRIPTION - LOCATION
LOCATION: ABDOMEN
LOCATION: ABDOMEN

## 2021-07-12 ASSESSMENT — PAIN DESCRIPTION - ORIENTATION
ORIENTATION: LEFT;MID;LOWER
ORIENTATION: MID;LOWER

## 2021-07-12 ASSESSMENT — PAIN DESCRIPTION - PAIN TYPE
TYPE: ACUTE PAIN
TYPE: ACUTE PAIN

## 2021-07-12 ASSESSMENT — PAIN DESCRIPTION - ONSET
ONSET: ON-GOING
ONSET: ON-GOING

## 2021-07-12 ASSESSMENT — PAIN DESCRIPTION - DIRECTION: RADIATING_TOWARDS: PD CATH SITE

## 2021-07-12 ASSESSMENT — PAIN DESCRIPTION - FREQUENCY
FREQUENCY: CONTINUOUS
FREQUENCY: CONTINUOUS

## 2021-07-12 NOTE — PROGRESS NOTES
Office : [phone]     Fax :[phone]         Renal Progress Note  Subjective:   Admit Date: 7/6/2021     HPI   45 yr old lady on HD   Had PD catheter placement on 7/9/2021       Interval History:     No fever spike overnight. Blood pressures better    Bleeding from PD cath site better         DIET ADULT DIET; Regular; Low Phosphorus (Less than 1000 mg); Less than 60 gm  Medications:   Scheduled Meds:   piperacillin-tazobactam  3,375 mg Intravenous Q12H    carvedilol  6.25 mg Oral BID    atorvastatin  40 mg Oral Nightly    gabapentin  300 mg Oral Nightly    insulin glargine  20 Units Subcutaneous Nightly    sodium chloride flush  5-40 mL Intravenous 2 times per day    heparin (porcine)  5,000 Units Subcutaneous 3 times per day    insulin lispro  0-12 Units Subcutaneous TID WC    insulin lispro  0-6 Units Subcutaneous Nightly     Continuous Infusions:   sodium chloride      dextrose      sodium chloride         Labs:  CBC:   Recent Labs     07/10/21  1024 07/10/21  1024 07/11/21  0549 07/11/21  1614 07/12/21  0600   WBC 13.5*  --  8.9  --  7.5   HGB 7.8*   < > 7.1* 8.8* 7.8*     --  158  --  156    < > = values in this interval not displayed.      BMP:    Recent Labs     07/10/21  0718 07/11/21  0549 07/12/21  0600     135* 133* 127*   K 4.9  4.6 4.5 4.1   CL 98*  95* 95* 87*   CO2 22  24 24 24   BUN 64*  60* 78* 28*   CREATININE 5.4*  5.0* 6.3* 2.9*   GLUCOSE 126*  127* 161* 433*     Ca/Mg/Phos:   Recent Labs     07/10/21  0718 07/11/21  0549 07/12/21  0600   CALCIUM 9.3  9.2 8.3 7.7*   MG 2.30 2.30  --    PHOS 6.8* 6.9*  -- Hepatic: No results for input(s): AST, ALT, ALB, BILITOT, ALKPHOS in the last 72 hours. Troponin: No results for input(s): TROPONINI in the last 72 hours. BNP: No results for input(s): BNP in the last 72 hours. Lipids: No results for input(s): CHOL, TRIG, HDL, LDLCALC, LABVLDL in the last 72 hours. ABGs: No results for input(s): PHART, PO2ART, IIB8YRV in the last 72 hours. INR: No results for input(s): INR in the last 72 hours. UA:  Recent Labs     07/11/21 0018   COLORU Yellow   CLARITYU Clear   GLUCOSEU Negative   BILIRUBINUR SMALL*   KETUA TRACE*   SPECGRAV >=1.030   BLOODU Negative   PHUR 5.5   PROTEINU 100*   UROBILINOGEN 0.2   NITRU Negative   LEUKOCYTESUR SMALL*   LABMICR YES   URINETYPE Voided      Urine Microscopic:   Recent Labs     07/11/21 0018   BACTERIA 3+*   WBCUA 10-20*   RBCUA 3-4   EPIU 21-50*     Urine Culture:   Recent Labs     07/11/21  0018   LABURIN No growth at 18 to 36 hours     Urine Chemistry: No results for input(s): CLUR, LABCREA, PROTEINUR, NAUR in the last 72 hours. Objective:   Vitals: /77   Pulse 75   Temp 98.5 °F (36.9 °C) (Oral)   Resp 18   Ht 5' (1.524 m)   Wt 187 lb 6.3 oz (85 kg)   SpO2 94%   BMI 36.60 kg/m²    Wt Readings from Last 3 Encounters:   07/11/21 187 lb 6.3 oz (85 kg)   07/06/21 189 lb 12.8 oz (86.1 kg)   04/29/21 190 lb (86.2 kg)      24HR INTAKE/OUTPUT:      Intake/Output Summary (Last 24 hours) at 7/12/2021 0920  Last data filed at 7/12/2021 0403  Gross per 24 hour   Intake 1450 ml   Output 1100 ml   Net 350 ml     Constitutional:  Alert, awake, no apparent distress  NECK: supple, no JVD  Cardiovascular:  S1, S2 without m/r/g  Respiratory:  CTA B without w/r/r  Abdomen: +bs, soft, nt  Ext: no  LE edema    Assessment and Plan:       IMAGING:  XR CHEST PORTABLE   Final Result      New increased density consistent with airspace disease throughout the right lung and left base.          XR CHEST PORTABLE   Final Result      Right internal jugular line with tip overlying the distal superior vena cava. No pneumothorax is seen                  XR CHEST (2 VW)   Final Result      Stable mild cardiomegaly and pulmonary vascular congestion without evidence of pulmonary edema. XR KNEE RIGHT (3 VIEWS)   Final Result   Impression:    No acute osseous injury. Joint effusion. Mild medial compartment osteoarthritis. Assessment/Plan     1. ESRD on hemodialysis  HD in am   Monitor another day   Ok to d.c Home in am tomorrow     She is in process of being transitioned to peritoneal dialysis. She had a PD catheter placement     2. Fever with chills. Secondary to aspiration pneumonia  Started on Zosyn    3. Status post laparoscopic PD catheter insertion on 7 /9/2021  Bleeding noted from PD catheter exit site.    - prbc as needed   - EPO     Rodney Urrutia MD , MD  [facility] [address] [place] [address] [phone]

## 2021-07-12 NOTE — PROGRESS NOTES
General Surgery  Interval Note:    Called to evaluate surgical site given patient's declining hemoglobin and drainage from PD catheter insertion site. Dressing changed previously by dialysis team; however, noted upon arrival to have some sanguinous strikethrough at the catheter insertion site even after the recent change. Abdomen otherwise appears soft without evidence of hematoma development, appropriate tenderness to palpation at surgical sites. Plan:  - Continue to monitor abdominal exam and labs; should her exam change or should her hemoglobin continue to down-trend, will discuss need for further evaluation. Brandt Lords Laverle Lundborg, MD  PGY-2, General Surgery  07/11/21  4:33 PM  [phone]

## 2021-07-12 NOTE — OP NOTE
DATE OF PROCEDURE:  7/9/21     PREOPERATIVE DIAGNOSIS: CKD stage 5 requiring dialysis     POSTOPERATIVE DIAGNOSIS: Same as pre-op     PROCEDURES PERFORMED:  1. Laparoscopic peritoneal dialysis catheter placement. 2. Laparoscopic omentopexy. 3. Subcutaneous Extension     SURGEON:  Rosalino Chowdary DO     ASSISTANT: Sabine Montes De Oca     ANESTHESIA:  General endotracheal.     COMPLICATIONS:  None. CONDITION:  Stable. EBL: 10 cc     INDICATIONS FOR PROCEDURE:  The patient is a 45year old obese female consented for PD catheter placement     DESCRIPTION OF PROCEDURE:  The patient was brought to the operating suite, laid in supine position with arms outstretched, and after induction of general endotracheal anesthesia; tube was confirmed to be in place with end-tidal CO2, and secured. Leung catheter was placed with clear return of urine. The patient was prepped and draped in usual sterile manner with Ioban placed on top of the skin. A small incision was made at the left midclavicular line using the Veress needle. Abdomen was entered and pneumoperitoneum established with 12 mm of CO2. A 5-mm 30-degree camera housed in a 5-mm Optiview trocar was used to enter the abdomen under direct visualization in the left upper quadrant. Once inside the abdomen, an additional 5-mm trocar was placed on the right side. Attention was paid to the omentum which was seen to be long and draping down into the pelvis. This was grasped with a laparoscopic grasper and brought up to the upper left abdomen above just above umbilicus. A suture passer device with #0 Vicryl suture was then used to create an omentopexy and picture was taken after this was accomplished. Hemostasis was maintained. After this, a 62 cm dual-cuff coil-tipped catheter was then  introduced into the left rectus muscle under direct visualization. Deep cuff was positioned in the rectus muscle.      Due to the patients BMI of 36.60 with large abdomen a subcutaneous extension was then performed and catheter was tunneled superiorly and to the right to give easier access. Titaneum luerlocks were applied. At that point, 2 liters of fluid was instilled into the abdomen and removed without difficulty. One last look laparoscopically was performed after catheter was clipped, clamped, and heparin locked. The catheter was seen to be appropriately going down in the pelvis and omentopexy was seen to be hemostatic. Pneumoperitoneum was evacuated. The patient tolerated the procedure well and was brought to the postanesthesia care unit in stable condition. All sponge, needle, and instrument counts were correct x2. I personally spoke to the patient's family at the end of the procedure.

## 2021-07-12 NOTE — CARE COORDINATION
Case Management Assessment           Daily Note                 Date/ Time of Note: 7/12/2021 3:16 PM         Note completed by: Morro Kate RN    Patient Name: Donis Guillaume  YOB: 1970    Diagnosis:HARRIETT (acute kidney injury) [facility]) [N17.9]  Patient Admission Status: Inpatient    Date of Admission:7/6/2021  6:49 PM Length of Stay: 6 GLOS: GMLOS: 4    Current Plan of Care:  Per  MD documantation   Status post Vas-Cath placement on 7/7  S/p lap PD cath insertion on 7/9  HD 07/13/21 and then plan for HD line removal  IV antibx for 1 more day  HD in am   Likely DC to home tomorrow  ________________________________________________________________________________________  PT AM-PAC:   / 24 per last evaluation on: not ordered    OT AM-PAC:   / 24 per last evaluation on: not ordered    DME Needs for discharge:   ________________________________________________________________________________________  Discharge Plan: Home    Tentative discharge date: 07/13/21     Current barriers to discharge: PD catheter placement, HD on 07/13/21 and then line removal, medical stability and clearance for DC    Referrals completed: Not Applicable    Resources/ information provided: Not indicated at this time  ________________________________________________________________________________________  Case Management Notes: CM continues to follow for discharge planning and needs. CM spoke with Dr Jacques Jacome in regards to plans for DC 07/13/21,     CM noted patient is pending medicaid and encouraged new scripts be sent to pharmacy to be filled today via [facility]:  in case of need for medication voucher assistance from CM. MARK spoke with Dr Esme Arciniega regarding HD for patient at [place]. Dr Esme Arciniega reports patient WILL NOT need OP HD set up at [place], he plans to have patient get HD 07/13/21 and then plans to remove HD line after HD and prior to DC.      Patient will then see Dr Esme Arciniega in office next week and then 10 days after PD catheter placement patient will start in-office training for home PD. Patient plans for return home at [place] and family to transport at [place]. No further needs noted at this time. Brisa Shields and her family were provided with choice of provider; she and her family are in agreement with the discharge plan.     Care Transition Patient: Macrina Palacios RN  [facility]  Case Management Department  Ph: [phone]: [phone]

## 2021-07-12 NOTE — PROGRESS NOTES
Point of Care Note:  General Surgery  Lauryn Stacy    8:33 AM  7/12/2021    Came to evaluate patient's PD catheter dressing from overnight, as last night her dressing necessitated a change due to strike through. On evaluation, the patient's abdomen is soft, non-tender to palpation, no evidence of hematoma, erythema, or ecchymosis. The patient denies any acute complaints, although she states that her neck is sore. An abdominal binder was placed around her abdomen. PD catheter dressing remains c/d/i at this time with no strike through.     Claudine Delgado DO, MS  PGY1, General Surgery  07/12/21  8:34 AM  [phone]

## 2021-07-12 NOTE — PROGRESS NOTES
Hospitalist Progress Note      PCP: Allison MENESES    Date of Admission: 7/6/2021    CC uremia. Hospital course  Patient is a 76-year-old female with history of diabetes mellitus type 2, CKD 4, hypertension was sent from nephrology office for uremia. Status post Vas-Cath placement on 7/7  S/p lap PD cath insertion on 7/9      Subjective  No further abdominal pain at catheter site. Had some nausea this AM,, was given phenergan, now drowsy    No BM past several days but declined bowel med. Thinks it is because she has not hd much to eat. Denies vomiting, fever, chills. No fever overnight. Medications:  Reviewed    Infusion Medications    sodium chloride      dextrose      sodium chloride       Scheduled Medications    epoetin sundar-epbx  10,000 Units Subcutaneous Once    piperacillin-tazobactam  3,375 mg Intravenous Q12H    carvedilol  6.25 mg Oral BID    atorvastatin  40 mg Oral Nightly    gabapentin  300 mg Oral Nightly    insulin glargine  20 Units Subcutaneous Nightly    sodium chloride flush  5-40 mL Intravenous 2 times per day    heparin (porcine)  5,000 Units Subcutaneous 3 times per day    insulin lispro  0-12 Units Subcutaneous TID WC    insulin lispro  0-6 Units Subcutaneous Nightly     PRN Meds: sodium chloride, heparin (porcine), oxyCODONE **OR** oxyCODONE, zolpidem, glucose, dextrose, glucagon (rDNA), dextrose, sodium chloride flush, sodium chloride, ondansetron **OR** ondansetron, polyethylene glycol, acetaminophen **OR** acetaminophen      Intake/Output Summary (Last 24 hours) at 7/12/2021 1024  Last data filed at 7/12/2021 0403  Gross per 24 hour   Intake 1450 ml   Output 1100 ml   Net 350 ml       Physical Exam Performed:    /77   Pulse 75   Temp 98.5 °F (36.9 °C) (Oral)   Resp 18   Ht 5' (1.524 m)   Wt 187 lb 6.3 oz (85 kg)   SpO2 94%   BMI 36.60 kg/m²     General appearance: NAD,   Neck: Supple. Vas-Cath on right side.   Respiratory:  Normal respiratory effort. Clear to auscultation   Cardiovascular: Regular rate and rhythm with normal S1/S2 without murmurs  Abdomen: Soft, non-tender, non-distended with normal bowel sounds. PD cath dressing C/D/I. Abdominal binder around abdomen  Musculoskeletal: No clubbing, cyanosis or edema bilaterally. Skin: Skin color, texture, turgor normal.  No rashes or lesions. Neurologic:  grossly non-focal.  Psychiatric: Alert and oriented, thought content appropriate, normal insight  Capillary Refill: Brisk,< 3 seconds   Peripheral Pulses: +2 palpable, equal bilaterally       Labs:   Recent Labs     07/10/21  1024 07/10/21  1024 07/11/21  0549 07/11/21  1614 07/12/21  0600   WBC 13.5*  --  8.9  --  7.5   HGB 7.8*   < > 7.1* 8.8* 7.8*   HCT 23.9*   < > 21.3* 25.9* 23.2*     --  158  --  156    < > = values in this interval not displayed. Recent Labs     07/10/21  0718 07/11/21  0549 07/12/21  0600     135* 133* 127*   K 4.9  4.6 4.5 4.1   CL 98*  95* 95* 87*   CO2 22  24 24 24   BUN 64*  60* 78* 28*   CREATININE 5.4*  5.0* 6.3* 2.9*   CALCIUM 9.3  9.2 8.3 7.7*   PHOS 6.8* 6.9*  --      No results for input(s): AST, ALT, BILIDIR, BILITOT, ALKPHOS in the last 72 hours. No results for input(s): INR in the last 72 hours. No results for input(s): Towana Ball in the last 72 hours. Urinalysis:      Lab Results   Component Value Date    NITRU Negative 07/11/2021    WBCUA 10-20 07/11/2021    BACTERIA 3+ 07/11/2021    RBCUA 3-4 07/11/2021    BLOODU Negative 07/11/2021    SPECGRAV >=1.030 07/11/2021    GLUCOSEU Negative 07/11/2021       Radiology:  XR CHEST PORTABLE   Final Result      New increased density consistent with airspace disease throughout the right lung and left base. XR CHEST PORTABLE   Final Result      Right internal jugular line with tip overlying the distal superior vena cava.  No pneumothorax is seen                  XR CHEST (2 VW)   Final Result      Stable mild cardiomegaly and pulmonary vascular congestion without evidence of pulmonary edema. XR KNEE RIGHT (3 VIEWS)   Final Result   Impression:    No acute osseous injury. Joint effusion. Mild medial compartment osteoarthritis. Assessment/Plan:   19-year-old female with history of diabetes mellitus type 2, CKD 4, hypertension was sent from nephrology office for uremia. #HARRIETT on CKD stage IV now ESRD  Patient admitted with significant uremia. S/p Vas-Cath placement on 7/7 received HD on 7/7 and 7/11. S/p PD cath placement on 7/9  HD/PD plans per nephrology      #Acute on chronic anemia  Acute anemia: Hb was noted to be 7.1 on 7/11, representing 2gm drop since admission, probably related to periprocedural blood loss. S/p 1PRBc. Abdominal binder has been fortified; no further evidence of bleeding  Chronic anemia: Secondary to CKD /end-stage renal disease: CANDELARIA per nephrology  -O/p F/u: will benefit from updated anemia labs      # Probable Aspiration pneumonia with Sepsis  -Was noted with fever ( up to 103) while hospitalized on 7/9-7/10, with new leukocytosis 13.5, and increased oxygen requirement. Had emesis during hospitalization, Chest x-ray 7/10:  New increased density consistent with airspace disease throughout the right lung and left base. Hence aspiration was suspected with possible pneumonitis, probable pneumonia and sepsis. -Was treated with IV Zosyn   - Urine cultures, blood cultures: NGTD; Legionella antigen: Not detected.;  Procalcitonin 75: although difficult to interpret in the setting of end-stage renal disease.   -Sepsis : resolving  -Continue Zosyn  -Probably transition to PO antibx at discharge to complete course. #Diabetes mellitus type 2 with diabetic neuropathy  Hemoglobin A1c 6.9% 7/6/2021. Continue Lantus 20 units along with insulin sliding scale. Home BG monitoring.   Mod intensity Statin if no C/i      #Hypertension   - Nifedipine discontinued with soft Bps  -

## 2021-07-13 VITALS
HEART RATE: 70 BPM | RESPIRATION RATE: 16 BRPM | HEIGHT: 60 IN | BODY MASS INDEX: 37.01 KG/M2 | WEIGHT: 188.49 LBS | DIASTOLIC BLOOD PRESSURE: 89 MMHG | TEMPERATURE: 98.2 F | SYSTOLIC BLOOD PRESSURE: 177 MMHG | OXYGEN SATURATION: 99 %

## 2021-07-13 LAB
ALBUMIN SERPL-MCNC: 2.4 G/DL (ref 3.4–5)
ANION GAP SERPL CALCULATED.3IONS-SCNC: 9 MMOL/L (ref 3–16)
BASOPHILS ABSOLUTE: 0 K/UL (ref 0–0.2)
BASOPHILS RELATIVE PERCENT: 0.7 %
BUN BLDV-MCNC: 30 MG/DL (ref 7–20)
CALCIUM SERPL-MCNC: 6.9 MG/DL (ref 8.3–10.6)
CHLORIDE BLD-SCNC: 109 MMOL/L (ref 99–110)
CO2: 21 MMOL/L (ref 21–32)
CREAT SERPL-MCNC: 2.7 MG/DL (ref 0.6–1.1)
EOSINOPHILS ABSOLUTE: 0.3 K/UL (ref 0–0.6)
EOSINOPHILS RELATIVE PERCENT: 4.4 %
GFR AFRICAN AMERICAN: 22
GFR NON-AFRICAN AMERICAN: 18
GLUCOSE BLD-MCNC: 161 MG/DL (ref 70–99)
GLUCOSE BLD-MCNC: 165 MG/DL (ref 70–99)
GLUCOSE BLD-MCNC: 54 MG/DL (ref 70–99)
GLUCOSE BLD-MCNC: 67 MG/DL (ref 70–99)
GLUCOSE BLD-MCNC: 76 MG/DL (ref 70–99)
HCT VFR BLD CALC: 25.4 % (ref 36–48)
HEMOGLOBIN: 8.6 G/DL (ref 12–16)
LYMPHOCYTES ABSOLUTE: 1.5 K/UL (ref 1–5.1)
LYMPHOCYTES RELATIVE PERCENT: 23.1 %
MAGNESIUM: 1.6 MG/DL (ref 1.8–2.4)
MCH RBC QN AUTO: 30.9 PG (ref 26–34)
MCHC RBC AUTO-ENTMCNC: 33.8 G/DL (ref 31–36)
MCV RBC AUTO: 91.2 FL (ref 80–100)
MONOCYTES ABSOLUTE: 0.9 K/UL (ref 0–1.3)
MONOCYTES RELATIVE PERCENT: 13.7 %
NEUTROPHILS ABSOLUTE: 3.8 K/UL (ref 1.7–7.7)
NEUTROPHILS RELATIVE PERCENT: 58.1 %
PDW BLD-RTO: 15.2 % (ref 12.4–15.4)
PERFORMED ON: ABNORMAL
PERFORMED ON: NORMAL
PHOSPHORUS: 3.6 MG/DL (ref 2.5–4.9)
PLATELET # BLD: 178 K/UL (ref 135–450)
PMV BLD AUTO: 8.1 FL (ref 5–10.5)
POTASSIUM SERPL-SCNC: 3.3 MMOL/L (ref 3.5–5.1)
RBC # BLD: 2.79 M/UL (ref 4–5.2)
SODIUM BLD-SCNC: 139 MMOL/L (ref 136–145)
WBC # BLD: 6.5 K/UL (ref 4–11)

## 2021-07-13 PROCEDURE — 6360000002 HC RX W HCPCS: Performed by: STUDENT IN AN ORGANIZED HEALTH CARE EDUCATION/TRAINING PROGRAM

## 2021-07-13 PROCEDURE — 83735 ASSAY OF MAGNESIUM: CPT

## 2021-07-13 PROCEDURE — 6370000000 HC RX 637 (ALT 250 FOR IP): Performed by: INTERNAL MEDICINE

## 2021-07-13 PROCEDURE — 90935 HEMODIALYSIS ONE EVALUATION: CPT | Performed by: INTERNAL MEDICINE

## 2021-07-13 PROCEDURE — 6370000000 HC RX 637 (ALT 250 FOR IP): Performed by: STUDENT IN AN ORGANIZED HEALTH CARE EDUCATION/TRAINING PROGRAM

## 2021-07-13 PROCEDURE — 90935 HEMODIALYSIS ONE EVALUATION: CPT

## 2021-07-13 PROCEDURE — 80069 RENAL FUNCTION PANEL: CPT

## 2021-07-13 PROCEDURE — 85025 COMPLETE CBC W/AUTO DIFF WBC: CPT

## 2021-07-13 RX ORDER — NIFEDIPINE 30 MG/1
30 TABLET, EXTENDED RELEASE ORAL 2 TIMES DAILY
Qty: 30 TABLET | Refills: 3 | Status: ON HOLD
Start: 2021-07-13 | End: 2021-11-16 | Stop reason: HOSPADM

## 2021-07-13 RX ORDER — LANOLIN ALCOHOL/MO/W.PET/CERES
400 CREAM (GRAM) TOPICAL DAILY
Qty: 7 TABLET | Refills: 0 | Status: ON HOLD | OUTPATIENT
Start: 2021-07-13 | End: 2021-08-22 | Stop reason: ALTCHOICE

## 2021-07-13 RX ORDER — CARVEDILOL 12.5 MG/1
12.5 TABLET ORAL 2 TIMES DAILY
Qty: 60 TABLET | Refills: 3 | Status: SHIPPED | OUTPATIENT
Start: 2021-07-13 | End: 2022-07-10

## 2021-07-13 RX ORDER — INSULIN GLARGINE 100 [IU]/ML
6 INJECTION, SOLUTION SUBCUTANEOUS NIGHTLY
Qty: 1 VIAL | Refills: 3 | Status: ON HOLD
Start: 2021-07-13 | End: 2021-08-26 | Stop reason: HOSPADM

## 2021-07-13 RX ORDER — MAGNESIUM SULFATE IN WATER 40 MG/ML
2000 INJECTION, SOLUTION INTRAVENOUS ONCE
Status: DISCONTINUED | OUTPATIENT
Start: 2021-07-13 | End: 2021-07-13 | Stop reason: HOSPADM

## 2021-07-13 RX ORDER — ATORVASTATIN CALCIUM 40 MG/1
40 TABLET, FILM COATED ORAL DAILY
Qty: 30 TABLET | Refills: 3 | Status: SHIPPED | OUTPATIENT
Start: 2021-07-13 | End: 2022-06-24 | Stop reason: CLARIF

## 2021-07-13 RX ORDER — ONDANSETRON 4 MG/1
4 TABLET, FILM COATED ORAL EVERY 12 HOURS PRN
Qty: 14 TABLET | Refills: 0 | Status: SHIPPED | OUTPATIENT
Start: 2021-07-13 | End: 2022-06-24 | Stop reason: CLARIF

## 2021-07-13 RX ORDER — ASPIRIN 81 MG/1
81 TABLET, CHEWABLE ORAL DAILY
Qty: 30 TABLET | Refills: 3 | Status: ON HOLD | OUTPATIENT
Start: 2021-07-13 | End: 2021-12-09 | Stop reason: HOSPADM

## 2021-07-13 RX ORDER — INSULIN GLARGINE 100 [IU]/ML
10 INJECTION, SOLUTION SUBCUTANEOUS NIGHTLY
Qty: 1 VIAL | Refills: 3 | Status: SHIPPED | OUTPATIENT
Start: 2021-07-13 | End: 2021-07-13 | Stop reason: SDUPTHER

## 2021-07-13 RX ORDER — CARVEDILOL 12.5 MG/1
12.5 TABLET ORAL 2 TIMES DAILY
Status: DISCONTINUED | OUTPATIENT
Start: 2021-07-13 | End: 2021-07-13 | Stop reason: HOSPADM

## 2021-07-13 RX ORDER — LEVOFLOXACIN 750 MG/1
750 TABLET ORAL
Qty: 2 TABLET | Refills: 0 | Status: SHIPPED | OUTPATIENT
Start: 2021-07-13 | End: 2021-07-17

## 2021-07-13 RX ORDER — NITROGLYCERIN 0.4 MG/1
0.4 TABLET SUBLINGUAL EVERY 5 MIN PRN
Qty: 5 TABLET | Refills: 1 | Status: SHIPPED | OUTPATIENT
Start: 2021-07-13

## 2021-07-13 RX ORDER — GABAPENTIN 100 MG/1
300 CAPSULE ORAL 2 TIMES DAILY
Qty: 180 CAPSULE | Refills: 2 | Status: ON HOLD | OUTPATIENT
Start: 2021-07-13 | End: 2021-11-16

## 2021-07-13 RX ORDER — NIFEDIPINE 30 MG/1
30 TABLET, FILM COATED, EXTENDED RELEASE ORAL DAILY
Status: DISCONTINUED | OUTPATIENT
Start: 2021-07-13 | End: 2021-07-13 | Stop reason: HOSPADM

## 2021-07-13 RX ADMIN — NIFEDIPINE 30 MG: 30 TABLET, EXTENDED RELEASE ORAL at 14:20

## 2021-07-13 RX ADMIN — CARVEDILOL 12.5 MG: 12.5 TABLET, FILM COATED ORAL at 14:20

## 2021-07-13 RX ADMIN — HEPARIN SODIUM 5000 UNITS: 5000 INJECTION INTRAVENOUS; SUBCUTANEOUS at 05:18

## 2021-07-13 RX ADMIN — ACETAMINOPHEN 650 MG: 325 TABLET, FILM COATED ORAL at 15:58

## 2021-07-13 RX ADMIN — HEPARIN SODIUM 5000 UNITS: 5000 INJECTION INTRAVENOUS; SUBCUTANEOUS at 14:20

## 2021-07-13 ASSESSMENT — PAIN SCALES - GENERAL
PAINLEVEL_OUTOF10: 0
PAINLEVEL_OUTOF10: 8

## 2021-07-13 NOTE — FLOWSHEET NOTE
Treatment time: 3 hours  Net UF: 0 ml     Pre weight: 85.5 kg   Post weight: 85.5 kg  EDW: HARRIETT     Access used: RIJ TDC  Access function: Good with  ml/min     Medications or blood products given: None     Regular outpatient schedule: HARRIETT     Summary of response to treatment: Tolerated tx well. No HD related complaints or complications. RIJ TDC removed at the end of HD. No complications noted. Pressure applied to site for 15min.       Copy of dialysis treatment record placed in chart, to be scanned into EMR.       07/13/21 0950 07/13/21 1335   Treatment   Time On  --  0957   Time Off  --  1257   Vital Signs   BP (!) 194/97 (!) 199/93   Temp 98.3 °F (36.8 °C) 98.2 °F (36.8 °C)   Pulse 74 76   Resp 18 18   Dialysis Bath   K+ (Potassium) 4  --    Ca+ (Calcium) 2.25  --    Na+ (Sodium) 138  --    HCO3 (Bicarb) 32  --

## 2021-07-13 NOTE — DISCHARGE SUMMARY
Hospital Discharge Summary    Patient's PCP: Troy Mason  Admit Date: 7/6/2021   Discharge Date: 7/13/2021    Admitting Physician: Dr. Norman Louis MD  Discharge Physician: Dr. Filomena Dickerson MD   Consults: nephrology and general surgery    HPI: 45 y.o. female who presents from his nephrologist office due to abnormal labs. Patient was told that her labs needs to be monitored and possibly will be started on dialysis. Patient has no complaints at this time. Denies fever, chills, chest pain, shortness of breath, leg swelling, orthopnea, abdominal pain, urinary symptoms, diarrhea.         Brief hospital course:  Given the concern of the patients presentation and the concern of the possible multi-factorial etiology contributing to patients symptomatology. Patient was admitted and evaluated and found to have:         Discharge Diagnoses:    #HARRIETT on CKD stage IV now ESRD  Patient admitted with significant uremia. S/p Vas-Cath placement on 7/7 received HD on 7/7 and 7/11. S/p PD cath placement on 7/9  S/p sessions of HD  PD  on discharge  F/u with nephrology        #Acute on chronic anemia  Acute anemia: Hb was noted to be 7.1 on 7/11, representing 2gm drop since admission, probably related to periprocedural blood loss. S/p 1PRBc. Abdominal binder has been fortified; no further evidence of bleeding  Chronic anemia: Secondary to CKD /end-stage renal disease: CANDELARIA per nephrology  -O/p F/u: will benefit from updated anemia labs with PCP        # Probable Aspiration pneumonia with Sepsis  -Was noted with fever ( up to 103) while hospitalized on 7/9-7/10, with new leukocytosis 13.5, and increased oxygen requirement. Had emesis during hospitalization, Chest x-ray 7/10:  New increased density consistent with airspace disease throughout the right lung and left base. Hence aspiration was suspected with possible pneumonitis, probable pneumonia and sepsis.   -Was treated with IV Zosyn   - Urine cultures, blood cultures: NGTD; Legionella antigen: Not detected.;  Procalcitonin 75: although difficult to interpret in the setting of end-stage renal disease.   -Sepsis : resolving  -Continue Zosyn: to transition to PO antibx at discharge to complete course.           #Diabetes mellitus type 2 with diabetic neuropathy  Hemoglobin A1c 6.9% 7/6/2021. Pxt states she was not taking Lantus at home. Has been on Lantus 20 here and not needing much SSI, but Hypoglycemia noted this AM with Lantus 20 units along with insulin sliding scale. DC on Lantus 6 units and her SSI  Home BG monitoring and adjustment of insulin as needed: she expresses understanding  F/u with PCP  Mod intensity Statin if no C/i        #Hypertension    - Continue Coreg  - Nifedipine resumed  - Monitor Bps o/p  - F/u with PCP        #Chronic diastolic heart failure not in exacerbation.  - UF with dialysis  - Monitor fluid status           Physical Exam: /85   Pulse 73   Temp 98.1 °F (36.7 °C) (Oral)   Resp 17   Ht 5' (1.524 m)   Wt 187 lb 6.3 oz (85 kg)   SpO2 93%   BMI 36.60 kg/m²     Recent Labs     07/12/21  0713 07/12/21  1105 07/12/21  1554 07/12/21  2131 07/13/21  0733   POCGLU 85 90 102* 150* 67*     General appearance: NAD,   Neck: Supple. Respiratory:  Normal respiratory effort. Clear to auscultation   Cardiovascular: Regular rate and rhythm with normal S1/S2 without murmurs  Abdomen: Soft, non-tender, non-distended with normal bowel sounds. PD cath dressing C/D/I. Abdominal binder around abdomen  Musculoskeletal: No clubbing, cyanosis or edema bilaterally. Skin: Skin color, texture, turgor normal.  No rashes or lesions.   Neurologic:  grossly non-focal.  Psychiatric: Alert and oriented, thought content appropriate, normal insight  Capillary Refill: Brisk,< 3 seconds   Peripheral Pulses: +2 palpable, equal bilaterally           LABS:  Recent Labs     07/13/21  0705   WBC 6.5   HGB 8.6*         Recent Labs     07/13/21  0705    K 3.3*      CO2 21   BUN 30*   CREATININE 2.7*   GLUCOSE 54*     No results for input(s): INR in the last 72 hours. Discharge Medications:   Key, [facility] Medication Instructions TBV:[id number]    Printed on:07/13/21 3834   Medication Information                      aspirin 81 MG chewable tablet  Take 1 tablet by mouth daily             atorvastatin (LIPITOR) 40 MG tablet  Take 1 tablet by mouth daily             butalbital-acetaminophen-caffeine (FIORICET, ESGIC) -40 MG per tablet  Take 1 tablet by mouth every 4 hours as needed for Headaches             carvedilol (COREG) 12.5 MG tablet  Take 1 tablet by mouth 2 times daily             gabapentin (NEURONTIN) 100 MG capsule  Take 3 capsules by mouth 2 times daily for 90 days. insulin glargine (LANTUS) 100 UNIT/ML injection vial  Inject 6 Units into the skin nightly Pt reports noncompliant             insulin lispro, 1 Unit Dial, 100 UNIT/ML SOPN  Inject 0-12 Units into the skin 3 times daily (with meals)             levoFLOXacin (LEVAQUIN) 750 MG tablet  Take 1 tablet by mouth every 48 hours for 4 days             magnesium oxide (MAG-OX) 400 (240 Mg) MG tablet  Take 1 tablet by mouth daily             NIFEdipine (PROCARDIA XL) 30 MG extended release tablet  Take 1 tablet by mouth 2 times daily             nitroGLYCERIN (NITROSTAT) 0.4 MG SL tablet  Place 1 tablet under the tongue every 5 minutes as needed for Chest pain             ondansetron (ZOFRAN) 4 MG tablet  Take 1 tablet by mouth every 12 hours as needed for Nausea or Vomiting             pantoprazole (PROTONIX) 40 MG tablet  Take 40 mg by mouth daily as needed                Activity: activity as tolerated  Diet: cardiac diet and diabetic diet  Wound Care: Skin glue was used to cover your incision(s). It will fall off on its own in about 10 days. You may shower, but do not scrub the incision sites directly or soak (tub, pool, etc.).      Disposition: home  Discharged Condition: Stable  Follow Up: Primary Care Physician in one week    Total time spent on discharge, finalizing medications, referrals and arranging outpatient follow up was more than 45 minutes    Thank you Dr. Deepika Sanchez for the opportunity to be involved in this patients care. If you have any questions or concerns please feel free to contact me at [phone].

## 2021-07-13 NOTE — PROGRESS NOTES
Pt seen on HD   dian well   Bp elevated - meds adjusted  No N/V/D   NO fever     D/c HD cath post HD     Ok to go home

## 2021-07-13 NOTE — FLOWSHEET NOTE
07/09/21 1507   Encounter Summary   Services provided to: Patient   Referral/Consult From: Katrin   Continue Visiting   (7/9/21, BRETT. )   Complexity of Encounter Moderate   Length of Encounter 15 minutes   Routine   Type Initial   Assessment Approachable   Intervention Nurtured hope   Outcome Expressed gratitude

## 2021-07-13 NOTE — PROGRESS NOTES
Acknowledged and stated understanding of discharge instructions. All questions answered, personal belongings were sent with patient. Transported via wheel chair to [facility].

## 2021-07-13 NOTE — PROGRESS NOTES
Surgery Daily Progress Note      CC: s/p lap PD cath insertion    SUBJECTIVE:  - afebrile, hemodynamically stable, no acute events overnight. No acute complaints. Sleeping well. Tolerating diet. ROS:   A 14 point review of systems was conducted, significant findings as noted above. All other systems negative. OBJECTIVE:    PHYSICAL EXAM:  Vitals:    07/12/21 1716 07/12/21 2115 07/13/21 0045 07/13/21 0500   BP: (!) 157/76 (!) 147/83 (!) 141/69 137/85   Pulse: 79 82 67 73   Resp: 18 19 17 17   Temp: 98.7 °F (37.1 °C) 98.8 °F (37.1 °C) 98.2 °F (36.8 °C) 98.1 °F (36.7 °C)   TempSrc: Oral Oral Oral Oral   SpO2: 95% 93% 94% 93%   Weight:       Height:           General appearance: alert, no acute distress  Neck: trachea midline, neck supple  Chest/Lungs:normal effort with no accessory muscle use on RA  Cardiovascular: RRR, well perfused  Abdomen: Soft, appropriately-tender, Gauze and tegaderm over PD cath in L abdomen; dressing C/D  Neuro: A&Ox3, no gross motor or sensory neuro deficits  Extremities: no edema, no cyanosis      ASSESSMENT & PLAN:   Karla Hernandez is a 45 y.o. female with CHF, HTN, CKD4/5 POD1 status post lap PD cath insertion.    - Hgb drop yesterday, concern for strikethrough on dressing, surgery re-engaged for evaluation for bleeding. Dressing is clean this morning.   - Hgb 8.6 from 7.8, little concern for intra-abdominal bleeding  - old blood from port sites will take a few cycles to washout of the abdomen, therefore some blood in the dialsylate is not unexpected.   - ok to discharge from a surgical standpoint    Clay Amaya MD   Gen Surg PGY 4  7/13/2021  6:19 AM  [phone]

## 2021-07-14 LAB
BLOOD CULTURE, ROUTINE: NORMAL
CULTURE, BLOOD 2: NORMAL

## 2021-07-28 NOTE — ADT AUTH CERT
Renal Failure, Acute - Care Day 7 (7/12/2021) by Fransisco Copeland RN       Review Status Review Entered   Completed 7/28/2021 07:36      Criteria Review      Care Day: 7 Care Date: 7/12/2021 Level of Care: Telemetry    Guideline Day 2    Level Of Care    (X) Floor    7/28/2021 7:36 AM EDT by Karely evans    Clinical Status    ( ) * Electrolyte abnormalities absent or improved    7/28/2021 7:36 AM EDT by Karely Meade      Na 127, Ca 7.7    (X) * Acid-base abnormalities absent or improved    7/28/2021 7:36 AM EDT by Justice Myers no issues noted    (X) * Hypotension absent    7/28/2021 7:36 AM EDT by Karely Meade      147/83    Activity    (X) Activity as tolerated    7/28/2021 7:36 AM EDT by Justice Myers as tolerated    Routes    (X) Oral diet as tolerated    7/28/2021 7:36 AM EDT by Karely Meade      reg    Interventions    (X) Possible dialysis or renal replacement therapy    7/28/2021 7:36 AM EDT by Karely Meade      dialysis    (X) Monitor electrolytes, renal function tests, acid-base, and volume status    7/28/2021 7:36 AM EDT by Justice Myers labs noted    Medications    (X) Possible medical therapies    7/28/2021 7:36 AM EDT by Karely Meade      dialysis    * Milestone   Additional Notes   7/12/21       VS- temp 98.8, HR 82, RR 19, /83, O2 93%          Labs- Na 127, Cl 87, BUN 28, Creat 2.9, GFR 17, Glucose 433, Ca 7.7, Hgb 7.8       General surgery-   Came to evaluate patient's PD catheter dressing from overnight, as last night her dressing necessitated a change due to strike through. On evaluation, the patient's abdomen is soft, non-tender to palpation, no evidence of hematoma, erythema, or ecchymosis. The patient denies any acute complaints, although she states that her neck is sore. An abdominal binder was placed around her abdomen.  PD catheter dressing remains c/d/i at this time with no strike through.                 Nephrology-   HPI    45 yr old lady on HD    Had PD catheter placement on 7/9/2021                      Interval History:        No fever spike overnight. Blood pressures better       Bleeding from PD cath site better       Assessment/Plan        1.  ESRD on hemodialysis   HD in am    Monitor another day    Ok to d.c Home in am tomorrow       She is in process of being transitioned to peritoneal dialysis. She had a PD catheter placement        2.  Fever with chills.  Secondary to aspiration pneumonia   Started on Zosyn       3.  Status post laparoscopic PD catheter insertion on 7 /9/2021   Bleeding noted from PD catheter exit site. - prbc as needed    - EPO      DIET ADULT DIET; Regular; Low Phosphorus (Less than 1000 mg); Less than 60 gm               Internal medicine-   Subjective   No further abdominal pain at catheter site.     Had some nausea this AM,, was given phenergan, now drowsy       No BM past several days but declined bowel med. Thinks it is because she has not hd much to eat.        Assessment/Plan:   58-year-old female with history of diabetes mellitus type 2, CKD 4, hypertension was sent from nephrology office for uremia.           #HARRIETT on CKD stage IV now ESRD   Patient admitted with significant uremia. S/p Vas-Cath placement on 7/7 received HD on 7/7 and 7/11. S/p PD cath placement on 7/9   HD/PD plans per nephrology           #Acute on chronic anemia   Acute anemia: Hb was noted to be 7.1 on 7/11, representing 2gm drop since admission, probably related to periprocedural blood loss. S/p 1PRBc. Abdominal binder has been fortified; no further evidence of bleeding   Chronic anemia: Secondary to CKD /end-stage renal disease: CANDELARIA per nephrology   -O/p F/u: will benefit from updated anemia labs           # Probable Aspiration pneumonia with Sepsis   -Was noted with fever ( up to 103) while hospitalized on 7/9-7/10, with new leukocytosis 13.5, and increased oxygen requirement.  Had emesis during hospitalization, Chest x-ray 7/10:  New increased density consistent with airspace disease throughout the right lung and left base. Hence aspiration was suspected with possible pneumonitis, probable pneumonia and sepsis. -Was treated with IV Zosyn    - Urine cultures, blood cultures: NGTD; Legionella antigen: Not detected. ;  Procalcitonin 75: although difficult to interpret in the setting of end-stage renal disease.    -Sepsis : resolving   -Continue Zosyn   -Probably transition to PO antibx at discharge to complete course.               #Diabetes mellitus type 2 with diabetic neuropathy   Hemoglobin A1c 6.9% 7/6/2021. Continue Lantus 20 units along with insulin sliding scale. Home BG monitoring. Mod intensity Statin if no C/i           #Hypertension   - Nifedipine discontinued with soft Bps   - Continue Coreg   - Monitor Bps           #Chronic diastolic heart failure not in exacerbation.   - UF with dialysis   - Monitor fluid status               DVT Prophylaxis: heparin   Diet: ADULT DIET; Regular; Low Phosphorus (Less than 1000 mg);  Less than 60 gm   Code Status: Full Code       PT/OT Eval Status: N/A               Disposition - inpatient.     IV antibx for 1 more day   HD in am   Likely DC to home tomorrow   Discussed with patient she has medication refills at home.  She will be needing prescription if there are any changes of medication.             Summary- Patient in Telemetry unit          Patient received Lipitor 40mg PO x1, Coreg 6.25mg PO x2, Colace 100mg PO x2, Retacrit [id number] SC x1, Neurontin 300mg PO x1, Heparin 5000units SC x3, Lantus 20units SC x1, Insulin SC QID SS, Zosyn 3375mg IV x2, Roxicodone 5mg PO x3, Phenergna 12.5mg IM x1, Ambien 5mg PO x1         Renal Failure, Acute - Care Day 6 (7/11/2021) by Farzana Su RN       Review Status Review Entered   Completed 7/28/2021 07:26      Criteria Review      Care Day: 6 Care Date: 7/11/2021 Level of Care: Telemetry Guideline Day 2    Level Of Care    (X) Floor    7/28/2021 7:26 AM EDT by [employer]      tele    Clinical Status    ( ) * Electrolyte abnormalities absent or improved    7/28/2021 7:26 AM EDT by Elmira GAMEVIL      Na 133, PO4 6.9    (X) * Acid-base abnormalities absent or improved    7/28/2021 7:26 AM EDT by Alana Mode no issues noted    (X) * Hypotension absent    7/28/2021 7:26 AM EDT by Swati GAMEVIL      154/70    Activity    (X) Activity as tolerated    7/28/2021 7:26 AM EDT by Alana Mode as tolerated    Routes    (X) Oral diet as tolerated    7/28/2021 7:26 AM EDT by Swati GAMEVIL      carb control    Interventions    (X) Possible dialysis or renal replacement therapy    7/28/2021 7:26 AM EDT by [facility]      dialysis    (X) Monitor electrolytes, renal function tests, acid-base, and volume status    7/28/2021 7:26 AM EDT by Alana Mode labs noted    Medications    (X) Possible medical therapies    7/28/2021 7:26 AM EDT by Swati GAMEVIL      dialysis    * Milestone   Additional Notes   7/11/21       VS- temp 98.3, HR 83, RR 18, /70, O2 94%       O2- 2L NC          Labs- Na 133, Cl 95, BUN 78, Creat 6.3, GFR 7, Glucose 210, PO4 6.9, Alb 3.2, Hgb 7.1       UA- small bili, trace ketones, protein 100, small leukocytes, mucus 1+, WBC 10-20, Epi 21-50, bacteria 3+, yeast +       Nephrology-   HPI    45 yr old lady on HD    Had PD catheter placement on 7/9/2021                      Interval History:       No fever spike overnight. More awake and alert today and responding to simple commands   Blood pressures better       Has bleeding from PD catheter site       Assessment/Plan        1.  ESRD on hemodialysis   Will get hemodialysis today   We will hold off on removing nontunneled catheter for now since there is bleeding from the PD catheter site           She is in process of being transitioned to peritoneal dialysis.    She had a PD Discussed with patient she has medication refills at home.  She will be needing prescription if there are any changes of medication.                           General surgery-   Called to evaluate surgical site given patient's declining hemoglobin and drainage from PD catheter insertion site.  Dressing changed previously by dialysis team; however, noted upon arrival to have some sanguinous strikethrough at the catheter insertion site even after the recent change.  Abdomen otherwise appears soft without evidence of hematoma development, appropriate tenderness to palpation at surgical sites.       Plan:   - Continue to monitor abdominal exam and labs; should her exam change or should her hemoglobin continue to down-trend, will discuss need for further evaluation.              Summary- Patient in Telemetry unit          Patient received Lipitor 40mg PO x1, Coreg 6.25mg PO x1, Benadryl 25mg PO x2, Neurontin 300mg PO x1, Heparin 5000units SC x2, Lantus 20units SC x1, Insulin SC QID SS, Zosyn 3375mg IV x2, Zofran 4mg IV x1,Roxicodone 5mg PO x2, Roxicodone 10mg PO x1, Ultram  50mg PO x1, Ambien 5mg PO x1          Renal Failure, Acute - Care Day 4 (7/9/2021) by Lolis Fitch RN       Review Status Review Entered   Completed 7/28/2021 07:20      Criteria Review      Care Day: 4 Care Date: 7/9/2021 Level of Care: Telemetry    Guideline Day 2    Level Of Care    (X) Floor    7/28/2021 7:20 AM EDT by Gladis Villalba      [facility]    Clinical Status    (X) * Electrolyte abnormalities absent or improved    7/28/2021 7:20 AM EDT by Natividad Mendoza no issues noted    (X) * Acid-base abnormalities absent or improved    7/28/2021 7:20 AM EDT by Natividad Mendoza no issues noted    ( ) * Hypotension absent    7/28/2021 7:20 AM EDT by Gladis Villalba      14/53    Activity    (X) Activity as tolerated    7/28/2021 7:20 AM EDT by Natividad Mendoza as tolerated    Routes    (X) Oral diet as tolerated    7/28/2021 7:20 AM EDT by Helena Cheadle      carb control    Interventions    (X) Possible dialysis or renal replacement therapy    7/28/2021 7:20 AM EDT by Helena Cheadle      dialysis noted    (X) Monitor electrolytes, renal function tests, acid-base, and volume status    7/28/2021 7:20 AM EDT by Ramón Lord labs noted    Medications    (X) Possible medical therapies    7/28/2021 7:20 AM EDT by Helena Cheadle      dialysis    * Milestone   Additional Notes   7/9/21       VS- temp 99.4, , RR 16, BP 89/58, O2 97%       O2- 2L NC          Labs- Cl 97, BUN 44, Creat 3.5, GFR 14, Glucose 147, Hgb 9.3,           Nephrology-   Interval History: Pt c/o nausea today. BP low 89/58. Tolerated dialysis well yesterday. Overall, feels better and more energetic after receiving first round of hemodialysis. Denies vomiting. No lightheadedness or dizziness.       Pt doing better    HD in am    PD cath placement    D/c HD cath in am post HD and discharge in am                    Internal medicine-   Subjective   Complaining of some nausea this morning denies any vomiting.  Noted to be hypotensive.  Denies any chest pain, shortness of breath.       #HARRIETT on CKD stage IV now ESRD   Patient is having uremia. S/p Vas-Cath placement on 7/7 received HD on 7/7 and today. Surgery on board patient plan for PD catheter today       #Hypertension   Pressure on low side. Discontinue nifedipine.  Continue Coreg.       #Diabetes mellitus type 2 with diabetic neuropathy   Hemoglobin A1c 6.9% 7/6/2021. Continue Lantus 20 units along with insulin sliding scale. We will adjust doses per need.       #Chronic diastolic heart failure not in exacerbation.           DVT Prophylaxis: heparin   Diet: Diet NPO   Code Status: Full Code       PT/OT Eval Status: N/A       Dispo - inpatient. PD cath placement today.    Discussed with patient she has medication refills at home.  She will be needing prescription if there are any changes of medication.           Cardiology-   Assessment / Plan:       ESRD currently on hemodialysis   Presented with a creatinine of 5.1, surgery was consulted to place Vas-Cath however need for long-term dialysis was discussed with patient; patient wants to seed with peritoneal dialysis.  General surgery was consulted and required cardiac clearance prior to surgery.  Patient currently does not complain of chest pain, with last LHC being normal, echocardiogram shows   EF of 55 to 60%, no wall motion abnormalities, normal diastolic function, mitral valve normal, and normal aortic valve.  EKG shows NSR.    -Patient is moderate risk for a low risk procedure.  No more cardiac work-up needed.  May proceed with PD catheter placement with acceptable risk. Planned for Surgery 7/9.        Dispo: patient may proceed to surgery. Will be signing off at this time, please contact us if there is any question.       CHF   Currently not in acute exacerbation   -Continue Coreg 12.5 mg p.o. twice daily.           Procedure-   DATE OF PROCEDURE:  7/9/21       PREOPERATIVE DIAGNOSIS: CKD stage 5 requiring dialysis       POSTOPERATIVE DIAGNOSIS: Same as pre-op       PROCEDURES PERFORMED:   1. Laparoscopic peritoneal dialysis catheter placement. 2. Laparoscopic omentopexy.    3. Subcutaneous Extension       ANESTHESIA:  General endotracheal.       COMPLICATIONS:  None.       CONDITION:  Stable.       EBL: 10 cc       INDICATIONS FOR PROCEDURE:  The patient is a 45year old obese female consented for PD catheter placement             Summary- Patient in Telemetry unit          Patient received Lipitor 40mg PO x1, Coreg 6.25mg PO x1, Neurontin 300mg PO x1, Heparin 5000units SC x2, Insulin SC QID SS, Vanc 1250mg IV x1, Tylenol 650mg PO x1, Zofran 4mg IV x3, Roxicodone 10mg PO x1, Phenergan 12.5mg IM x1, Ambien 5mg PO x1         Renal Failure, Acute - Care Day 2 (7/7/2021) by Conner Melgar RN       Review Status Review Entered   Completed 7/28/2021 07:12      Criteria Review      Care Day: 2 Care Date: 7/7/2021 Level of Care: Telemetry    Guideline Day 2    Level Of Care    (X) Floor    7/28/2021 7:12 AM EDT by Stella Dubois      tele    Clinical Status    ( ) * Electrolyte abnormalities absent or improved    7/28/2021 7:12 AM EDT by Stella Dubois      Na 135    (X) * Acid-base abnormalities absent or improved    7/28/2021 7:12 AM EDT by Stella Dubois      no issues noted    (X) * Hypotension absent    7/28/2021 7:12 AM EDT by Stella Dubois      162/87    Activity    (X) Activity as tolerated    7/28/2021 7:12 AM EDT by Jane Pump as tolerated    Routes    ( ) Oral diet as tolerated    7/28/2021 7:12 AM EDT by Stella Dubois      carb control    Interventions    (X) Possible dialysis or renal replacement therapy    7/28/2021 7:12 AM EDT by Jane Pump for PD cath placement    (X) Monitor electrolytes, renal function tests, acid-base, and volume status    7/28/2021 7:12 AM EDT by Jane Pump labs noted    Medications    (X) Possible medical therapies    7/28/2021 7:12 AM EDT by Jane Pump for PD cath placement    * Milestone   Additional Notes   7/7/21      VS- temp 98, HR 73, RR 20, /87, O2 93%          Labs- Na 135, CL 98, , Creat 4.3, GFR 11, Glucose 192, Hgb 8.2,        CXR-   Right internal jugular line with tip overlying the distal superior vena cava. No pneumothorax is seen            Bariatrics-   Nhi Brian is a 45 y.o. female with CHF (EF on ECHO in 2019 55-60%), DM, HTN, and CKD that has progressed to ESRD for whom Surgery has been consulted for establishment of long-term dialysis access after the patient has chosen to pursue peritoneal dialysis. The patient was admitted for her current hospitalization secondary to uremia discovered at an outpatient Nephrology follow-up appointment. The patient currently is complaining of generalized malaise.  She reports no fevers, no chills, no nausea, and no vomiting. The patient reports inconsistent bowel movements, though she is passing flatus and has no abdominal complaints. She reports no bulges/no hernias.        The patient has undergone a laparoscopic cholecystectomy in the past. She recalls no other previous abdominal surgeries. The patient reports no personal or family history of IBD. She states that she has never undergone a colonoscopy.           Assessment/Plan: This is a 45 y.o. female with CHF (EF on ECHO in 2019 55-60%), DM, HTN, and CKD that has progressed to ESRD for whom Surgery has been consulted for establishment of long-term dialysis access after the patient has chosen to pursue peritoneal dialysis.       - Temporary vascath placed at the bedside for treatment of uremia/pre-operative optimization       - Post-procedural CXR reveals line termination in the SVC and no PTX      - Line is OK to use    - Risks, benefits, and alternatives of PD catheter placement discussed with the patient, after which she wishes to pursue laparoscopic PD catheter placement.       - Patient consented for the procedure      - Surgery tentatively scheduled for Friday   - Patient follows with Dr. Cecil Reich; therefore, he has been consulted for pre-operative cardiology risk stratification/optimization   - Bowel regimen begun    - The patient was discussed with Dr. María Elena Carreon   Nephrology-   CKD 5    Uremia    HTN    Anemia        Plan    - pt seen on HD dian well    - PD cath placement    - sx following    - labs in am                    Internal medicine-   Subjective   Seen and examined today.  Complain of generalized weakness.  Denies any lightheadedness, nausea, vomiting.       #HARRIETT on CKD stage IV now ESRD   Patient is having uremia. Discussed with nephrology Vas-Cath placed today. Plan for HD.    Surgery on board patient will need PD catheter.       #Hypertension stable   Continue nifedipine, Coreg.       #Diabetes mellitus type 2 with diabetic neuropathy   Hemoglobin A1c 6.9% 7/6/2021. Continue Lantus 20 units along with insulin sliding scale. We will adjust doses per need.       #Chronic diastolic heart failure not in exacerbation.           DVT Prophylaxis: heparin   Diet: ADULT DIET; Regular; 4 carb choices (60 gm/meal); Low Fat/Low Chol/High Fiber/2 gm Na; Low Potassium (Less than 3000 mg/day); Low Phosphorus (Less than 1000 mg); Less than 60 gm   Code Status: Full Code       PT/OT Eval Status: N/A       Dispo - inpatient. PD cath placement on Friday.                       Cardiology-   Assessment and plan:   1.  Chronic HFpEF.  Patient is not in acute exacerbation.    2.  Essential hypertension.  BP is well controlled   3.  HLP   4.  Mild CAD   5.  Preoperative evaluation for peritoneal dialysis catheter placement       -Patient is intermediate risk for any perioperative cardiovascular complications and may proceed with surgery without any further testing.   -Please continue Coreg 12.5 twice daily and Lipitor perioperatively   -Continue with nifedipine 30 mg twice daily for optimal BP.                 Summary- Patient in Telemetry unit          Patient received ASA 81mg PO x1, Lipitor 40mg PO x1, Coreg 12.5mg PO x2, Retacrit 5000units IV x1, Neurontin 300mg PO x1, Heparin 1500units IV x1, Heparin 5000units SC x2, Lantus 20units SC x1, Insulin SC QID SS, Venofer 100mg IV x1, Adalat 30mg PO x2, Compazine 10mg IV x1, 0.45 IV Gtt @ 75ml/hr, Tylenol 650mg PO x1, Ultram 50mg PO x1

## 2021-08-22 ENCOUNTER — APPOINTMENT (OUTPATIENT)
Dept: GENERAL RADIOLOGY | Age: 51
DRG: 919 | End: 2021-08-22
Payer: COMMERCIAL

## 2021-08-22 ENCOUNTER — APPOINTMENT (OUTPATIENT)
Dept: CT IMAGING | Age: 51
DRG: 919 | End: 2021-08-22
Payer: COMMERCIAL

## 2021-08-22 ENCOUNTER — HOSPITAL ENCOUNTER (INPATIENT)
Age: 51
LOS: 4 days | Discharge: HOME | DRG: 919 | End: 2021-08-26
Attending: STUDENT IN AN ORGANIZED HEALTH CARE EDUCATION/TRAINING PROGRAM | Admitting: INTERNAL MEDICINE
Payer: COMMERCIAL

## 2021-08-22 DIAGNOSIS — R10.9 ABDOMINAL PAIN, UNSPECIFIED ABDOMINAL LOCATION: Primary | ICD-10-CM

## 2021-08-22 LAB
ALBUMIN SERPL-MCNC: 3.6 G/DL (ref 3.4–5)
ALP BLD-CCNC: 174 U/L (ref 40–129)
ALT SERPL-CCNC: 26 U/L (ref 10–40)
ANION GAP SERPL CALCULATED.3IONS-SCNC: 21 MMOL/L (ref 3–16)
APPEARANCE FLUID: NORMAL
AST SERPL-CCNC: 17 U/L (ref 15–37)
BASE EXCESS VENOUS: -0.9 MMOL/L (ref -2–3)
BASOPHILS ABSOLUTE: 0 K/UL (ref 0–0.2)
BASOPHILS RELATIVE PERCENT: 0.3 %
BILIRUB SERPL-MCNC: <0.2 MG/DL (ref 0–1)
BILIRUBIN DIRECT: <0.2 MG/DL (ref 0–0.3)
BILIRUBIN, INDIRECT: ABNORMAL MG/DL (ref 0–1)
BUN BLDV-MCNC: 82 MG/DL (ref 7–20)
CALCIUM SERPL-MCNC: 9.6 MG/DL (ref 8.3–10.6)
CARBOXYHEMOGLOBIN: 0.9 % (ref 0–1.5)
CELL COUNT FLUID TYPE: NORMAL
CHLORIDE BLD-SCNC: 93 MMOL/L (ref 99–110)
CLOT EVALUATION: NORMAL
CO2: 19 MMOL/L (ref 21–32)
COLOR FLUID: NORMAL
CREAT SERPL-MCNC: 5.2 MG/DL (ref 0.6–1.1)
EOSINOPHILS ABSOLUTE: 0.3 K/UL (ref 0–0.6)
EOSINOPHILS RELATIVE PERCENT: 4.2 %
FLUID TYPE: NORMAL
GFR AFRICAN AMERICAN: 10
GFR NON-AFRICAN AMERICAN: 9
GLUCOSE BLD-MCNC: 192 MG/DL (ref 70–99)
GLUCOSE BLD-MCNC: 326 MG/DL (ref 70–99)
GLUCOSE, FLUID: 158 MG/DL
HCO3 VENOUS: 25.2 MMOL/L (ref 24–28)
HCT VFR BLD CALC: 35 % (ref 36–48)
HEMOGLOBIN, VEN, REDUCED: 77.2 %
HEMOGLOBIN: 11.7 G/DL (ref 12–16)
INR BLD: 1.03 (ref 0.88–1.12)
LACTIC ACID: 2.3 MMOL/L (ref 0.4–2)
LIPASE: 60 U/L (ref 13–60)
LYMPHOCYTES ABSOLUTE: 0.6 K/UL (ref 1–5.1)
LYMPHOCYTES RELATIVE PERCENT: 8.4 %
LYMPHOCYTES, BODY FLUID: 6 %
MACROPHAGE FLUID: 1 %
MAGNESIUM: 1.5 MG/DL (ref 1.8–2.4)
MCH RBC QN AUTO: 31.3 PG (ref 26–34)
MCHC RBC AUTO-ENTMCNC: 33.4 G/DL (ref 31–36)
MCV RBC AUTO: 93.8 FL (ref 80–100)
METHEMOGLOBIN VENOUS: 0.6 % (ref 0–1.5)
MONOCYTE, FLUID: 2 %
MONOCYTES ABSOLUTE: 0.6 K/UL (ref 0–1.3)
MONOCYTES RELATIVE PERCENT: 8.1 %
NEUTROPHIL, FLUID: 91 %
NEUTROPHILS ABSOLUTE: 5.7 K/UL (ref 1.7–7.7)
NEUTROPHILS RELATIVE PERCENT: 79 %
NUCLEATED CELLS FLUID: NORMAL /CUMM
NUMBER OF CELLS COUNTED FLUID: 100
O2 SAT, VEN: 22 %
PCO2, VEN: 46.4 MMHG (ref 41–51)
PDW BLD-RTO: 13.7 % (ref 12.4–15.4)
PERFORMED ON: ABNORMAL
PH VENOUS: 7.34 (ref 7.35–7.45)
PHOSPHORUS: 4.9 MG/DL (ref 2.5–4.9)
PLATELET # BLD: 211 K/UL (ref 135–450)
PMV BLD AUTO: 9.2 FL (ref 5–10.5)
PO2, VEN: <30 MMHG (ref 25–40)
POTASSIUM REFLEX MAGNESIUM: 4.1 MMOL/L (ref 3.5–5.1)
PROTEIN FLUID: 0.6 G/DL
PROTHROMBIN TIME: 11.6 SEC (ref 9.9–12.7)
RBC # BLD: 3.74 M/UL (ref 4–5.2)
RBC FLUID: 2800 /CUMM
SODIUM BLD-SCNC: 133 MMOL/L (ref 136–145)
TCO2 CALC VENOUS: 27 MMOL/L
TOTAL PROTEIN: 8 G/DL (ref 6.4–8.2)
TROPONIN: 0.29 NG/ML
WBC # BLD: 7.3 K/UL (ref 4–11)

## 2021-08-22 PROCEDURE — 87150 DNA/RNA AMPLIFIED PROBE: CPT

## 2021-08-22 PROCEDURE — 85025 COMPLETE CBC W/AUTO DIFF WBC: CPT

## 2021-08-22 PROCEDURE — 90945 DIALYSIS ONE EVALUATION: CPT

## 2021-08-22 PROCEDURE — 2580000003 HC RX 258: Performed by: STUDENT IN AN ORGANIZED HEALTH CARE EDUCATION/TRAINING PROGRAM

## 2021-08-22 PROCEDURE — 87206 SMEAR FLUORESCENT/ACID STAI: CPT

## 2021-08-22 PROCEDURE — 5A1D70Z PERFORMANCE OF URINARY FILTRATION, INTERMITTENT, LESS THAN 6 HOURS PER DAY: ICD-10-PCS | Performed by: INTERNAL MEDICINE

## 2021-08-22 PROCEDURE — 80048 BASIC METABOLIC PNL TOTAL CA: CPT

## 2021-08-22 PROCEDURE — 87116 MYCOBACTERIA CULTURE: CPT

## 2021-08-22 PROCEDURE — 96375 TX/PRO/DX INJ NEW DRUG ADDON: CPT

## 2021-08-22 PROCEDURE — 87015 SPECIMEN INFECT AGNT CONCNTJ: CPT

## 2021-08-22 PROCEDURE — 83735 ASSAY OF MAGNESIUM: CPT

## 2021-08-22 PROCEDURE — 84100 ASSAY OF PHOSPHORUS: CPT

## 2021-08-22 PROCEDURE — 82803 BLOOD GASES ANY COMBINATION: CPT

## 2021-08-22 PROCEDURE — 87070 CULTURE OTHR SPECIMN AEROBIC: CPT

## 2021-08-22 PROCEDURE — 84484 ASSAY OF TROPONIN QUANT: CPT

## 2021-08-22 PROCEDURE — 36415 COLL VENOUS BLD VENIPUNCTURE: CPT

## 2021-08-22 PROCEDURE — 74176 CT ABD & PELVIS W/O CONTRAST: CPT

## 2021-08-22 PROCEDURE — 85610 PROTHROMBIN TIME: CPT

## 2021-08-22 PROCEDURE — 96365 THER/PROPH/DIAG IV INF INIT: CPT

## 2021-08-22 PROCEDURE — 99283 EMERGENCY DEPT VISIT LOW MDM: CPT

## 2021-08-22 PROCEDURE — 83605 ASSAY OF LACTIC ACID: CPT

## 2021-08-22 PROCEDURE — 6360000002 HC RX W HCPCS: Performed by: INTERNAL MEDICINE

## 2021-08-22 PROCEDURE — 6360000002 HC RX W HCPCS: Performed by: STUDENT IN AN ORGANIZED HEALTH CARE EDUCATION/TRAINING PROGRAM

## 2021-08-22 PROCEDURE — 71045 X-RAY EXAM CHEST 1 VIEW: CPT

## 2021-08-22 PROCEDURE — 87040 BLOOD CULTURE FOR BACTERIA: CPT

## 2021-08-22 PROCEDURE — 1200000000 HC SEMI PRIVATE

## 2021-08-22 PROCEDURE — 80076 HEPATIC FUNCTION PANEL: CPT

## 2021-08-22 PROCEDURE — 83690 ASSAY OF LIPASE: CPT

## 2021-08-22 PROCEDURE — 2060000000 HC ICU INTERMEDIATE R&B

## 2021-08-22 PROCEDURE — 6370000000 HC RX 637 (ALT 250 FOR IP): Performed by: STUDENT IN AN ORGANIZED HEALTH CARE EDUCATION/TRAINING PROGRAM

## 2021-08-22 PROCEDURE — 89051 BODY FLUID CELL COUNT: CPT

## 2021-08-22 PROCEDURE — 82945 GLUCOSE OTHER FLUID: CPT

## 2021-08-22 PROCEDURE — 87205 SMEAR GRAM STAIN: CPT

## 2021-08-22 PROCEDURE — 84157 ASSAY OF PROTEIN OTHER: CPT

## 2021-08-22 RX ORDER — POLYETHYLENE GLYCOL 3350 17 G/17G
17 POWDER, FOR SOLUTION ORAL DAILY PRN
Status: DISCONTINUED | OUTPATIENT
Start: 2021-08-22 | End: 2021-08-26 | Stop reason: HOSPADM

## 2021-08-22 RX ORDER — INSULIN LISPRO 100 [IU]/ML
0-6 INJECTION, SOLUTION INTRAVENOUS; SUBCUTANEOUS
Status: DISCONTINUED | OUTPATIENT
Start: 2021-08-23 | End: 2021-08-26 | Stop reason: HOSPADM

## 2021-08-22 RX ORDER — BUTALBITAL, ACETAMINOPHEN AND CAFFEINE 50; 325; 40 MG/1; MG/1; MG/1
1 TABLET ORAL EVERY 4 HOURS PRN
Status: DISCONTINUED | OUTPATIENT
Start: 2021-08-22 | End: 2021-08-26 | Stop reason: HOSPADM

## 2021-08-22 RX ORDER — GENTAMICIN SULFATE 1 MG/G
CREAM TOPICAL PRN
Status: DISCONTINUED | OUTPATIENT
Start: 2021-08-22 | End: 2021-08-26 | Stop reason: HOSPADM

## 2021-08-22 RX ORDER — PANTOPRAZOLE SODIUM 40 MG/1
40 TABLET, DELAYED RELEASE ORAL
Status: DISCONTINUED | OUTPATIENT
Start: 2021-08-23 | End: 2021-08-26 | Stop reason: HOSPADM

## 2021-08-22 RX ORDER — ATORVASTATIN CALCIUM 40 MG/1
40 TABLET, FILM COATED ORAL DAILY
Status: DISCONTINUED | OUTPATIENT
Start: 2021-08-23 | End: 2021-08-26 | Stop reason: HOSPADM

## 2021-08-22 RX ORDER — GABAPENTIN 300 MG/1
300 CAPSULE ORAL 2 TIMES DAILY
Status: DISCONTINUED | OUTPATIENT
Start: 2021-08-22 | End: 2021-08-26 | Stop reason: HOSPADM

## 2021-08-22 RX ORDER — LINEZOLID 2 MG/ML
600 INJECTION, SOLUTION INTRAVENOUS EVERY 12 HOURS
Status: DISCONTINUED | OUTPATIENT
Start: 2021-08-22 | End: 2021-08-23

## 2021-08-22 RX ORDER — MORPHINE SULFATE 4 MG/ML
4 INJECTION, SOLUTION INTRAMUSCULAR; INTRAVENOUS ONCE
Status: DISCONTINUED | OUTPATIENT
Start: 2021-08-22 | End: 2021-08-22

## 2021-08-22 RX ORDER — DEXTROSE MONOHYDRATE 25 G/50ML
12.5 INJECTION, SOLUTION INTRAVENOUS PRN
Status: DISCONTINUED | OUTPATIENT
Start: 2021-08-22 | End: 2021-08-26 | Stop reason: HOSPADM

## 2021-08-22 RX ORDER — ASPIRIN 81 MG/1
81 TABLET, CHEWABLE ORAL DAILY
Status: DISCONTINUED | OUTPATIENT
Start: 2021-08-23 | End: 2021-08-26 | Stop reason: HOSPADM

## 2021-08-22 RX ORDER — SODIUM CHLORIDE 9 MG/ML
25 INJECTION, SOLUTION INTRAVENOUS PRN
Status: DISCONTINUED | OUTPATIENT
Start: 2021-08-22 | End: 2021-08-26 | Stop reason: HOSPADM

## 2021-08-22 RX ORDER — ACETAMINOPHEN 325 MG/1
650 TABLET ORAL EVERY 6 HOURS PRN
Status: DISCONTINUED | OUTPATIENT
Start: 2021-08-22 | End: 2021-08-26 | Stop reason: HOSPADM

## 2021-08-22 RX ORDER — ONDANSETRON 2 MG/ML
4 INJECTION INTRAMUSCULAR; INTRAVENOUS EVERY 6 HOURS PRN
Status: DISCONTINUED | OUTPATIENT
Start: 2021-08-22 | End: 2021-08-26 | Stop reason: HOSPADM

## 2021-08-22 RX ORDER — INSULIN LISPRO 100 [IU]/ML
0-3 INJECTION, SOLUTION INTRAVENOUS; SUBCUTANEOUS NIGHTLY
Status: DISCONTINUED | OUTPATIENT
Start: 2021-08-22 | End: 2021-08-26 | Stop reason: HOSPADM

## 2021-08-22 RX ORDER — NICOTINE POLACRILEX 4 MG
15 LOZENGE BUCCAL PRN
Status: DISCONTINUED | OUTPATIENT
Start: 2021-08-22 | End: 2021-08-26 | Stop reason: HOSPADM

## 2021-08-22 RX ORDER — HEPARIN SODIUM 5000 [USP'U]/ML
5000 INJECTION, SOLUTION INTRAVENOUS; SUBCUTANEOUS EVERY 8 HOURS SCHEDULED
Status: DISCONTINUED | OUTPATIENT
Start: 2021-08-22 | End: 2021-08-26 | Stop reason: HOSPADM

## 2021-08-22 RX ORDER — INSULIN LISPRO 100 [IU]/ML
5 INJECTION, SOLUTION INTRAVENOUS; SUBCUTANEOUS
Status: DISCONTINUED | OUTPATIENT
Start: 2021-08-23 | End: 2021-08-26 | Stop reason: HOSPADM

## 2021-08-22 RX ORDER — ACETAMINOPHEN 650 MG/1
650 SUPPOSITORY RECTAL EVERY 6 HOURS PRN
Status: DISCONTINUED | OUTPATIENT
Start: 2021-08-22 | End: 2021-08-26 | Stop reason: HOSPADM

## 2021-08-22 RX ORDER — SODIUM CHLORIDE 0.9 % (FLUSH) 0.9 %
5-40 SYRINGE (ML) INJECTION EVERY 12 HOURS SCHEDULED
Status: DISCONTINUED | OUTPATIENT
Start: 2021-08-22 | End: 2021-08-26 | Stop reason: HOSPADM

## 2021-08-22 RX ORDER — SODIUM CHLORIDE 0.9 % (FLUSH) 0.9 %
5-40 SYRINGE (ML) INJECTION PRN
Status: DISCONTINUED | OUTPATIENT
Start: 2021-08-22 | End: 2021-08-26 | Stop reason: HOSPADM

## 2021-08-22 RX ORDER — ONDANSETRON 4 MG/1
4 TABLET, ORALLY DISINTEGRATING ORAL EVERY 8 HOURS PRN
Status: DISCONTINUED | OUTPATIENT
Start: 2021-08-22 | End: 2021-08-26 | Stop reason: HOSPADM

## 2021-08-22 RX ORDER — NIFEDIPINE 30 MG/1
30 TABLET, FILM COATED, EXTENDED RELEASE ORAL 2 TIMES DAILY
Status: DISCONTINUED | OUTPATIENT
Start: 2021-08-22 | End: 2021-08-26 | Stop reason: HOSPADM

## 2021-08-22 RX ORDER — CARVEDILOL 12.5 MG/1
12.5 TABLET ORAL 2 TIMES DAILY
Status: DISCONTINUED | OUTPATIENT
Start: 2021-08-22 | End: 2021-08-26 | Stop reason: HOSPADM

## 2021-08-22 RX ORDER — DEXTROSE MONOHYDRATE 50 MG/ML
100 INJECTION, SOLUTION INTRAVENOUS PRN
Status: DISCONTINUED | OUTPATIENT
Start: 2021-08-22 | End: 2021-08-26 | Stop reason: HOSPADM

## 2021-08-22 RX ADMIN — VANCOMYCIN HYDROCHLORIDE 2250 MG: 10 INJECTION, POWDER, LYOPHILIZED, FOR SOLUTION INTRAVENOUS at 17:39

## 2021-08-22 RX ADMIN — HEPARIN SODIUM 5000 UNITS: 5000 INJECTION INTRAVENOUS; SUBCUTANEOUS at 22:23

## 2021-08-22 RX ADMIN — HYDROMORPHONE HYDROCHLORIDE 1 MG: 1 INJECTION, SOLUTION INTRAMUSCULAR; INTRAVENOUS; SUBCUTANEOUS at 20:54

## 2021-08-22 RX ADMIN — INSULIN GLARGINE 10 UNITS: 100 INJECTION, SOLUTION SUBCUTANEOUS at 22:23

## 2021-08-22 RX ADMIN — LINEZOLID 600 MG: 600 INJECTION, SOLUTION INTRAVENOUS at 21:05

## 2021-08-22 RX ADMIN — Medication 10 ML: at 21:01

## 2021-08-22 RX ADMIN — CEFEPIME HYDROCHLORIDE 2000 MG: 2 INJECTION, POWDER, FOR SOLUTION INTRAVENOUS at 14:23

## 2021-08-22 RX ADMIN — HYDROMORPHONE HYDROCHLORIDE 1 MG: 1 INJECTION, SOLUTION INTRAMUSCULAR; INTRAVENOUS; SUBCUTANEOUS at 14:26

## 2021-08-22 RX ADMIN — INSULIN LISPRO 2 UNITS: 100 INJECTION, SOLUTION INTRAVENOUS; SUBCUTANEOUS at 22:23

## 2021-08-22 RX ADMIN — CARVEDILOL 12.5 MG: 12.5 TABLET, FILM COATED ORAL at 21:01

## 2021-08-22 RX ADMIN — GABAPENTIN 300 MG: 300 CAPSULE ORAL at 21:01

## 2021-08-22 ASSESSMENT — PAIN SCALES - GENERAL
PAINLEVEL_OUTOF10: 10
PAINLEVEL_OUTOF10: 8
PAINLEVEL_OUTOF10: 6
PAINLEVEL_OUTOF10: 10

## 2021-08-22 ASSESSMENT — ENCOUNTER SYMPTOMS
ABDOMINAL PAIN: 1
SHORTNESS OF BREATH: 1
ALLERGIC/IMMUNOLOGIC NEGATIVE: 1
DIARRHEA: 1
VOMITING: 1
EYES NEGATIVE: 1
CONSTIPATION: 0
NAUSEA: 1
COUGH: 0
BLOOD IN STOOL: 0
ABDOMINAL DISTENTION: 1
RESPIRATORY NEGATIVE: 1

## 2021-08-22 ASSESSMENT — PAIN DESCRIPTION - PAIN TYPE: TYPE: CHRONIC PAIN

## 2021-08-22 ASSESSMENT — PAIN DESCRIPTION - LOCATION: LOCATION: ABDOMEN

## 2021-08-22 NOTE — ED PROVIDER NOTES
[id number] [facility] RESIDENT NOTE       Date of evaluation: 8/22/2021    Chief Complaint     Abdominal Pain    History of Present Illness     Olivia Young is a 45 y.o. female with a history of ESRD on peritoneal dialysis (catheter placed 7/9), anemia, DM, HTN, diastolic heart failure presenting with abdominal pain. Pt states the pain started this morning while she was draining her peritoneal dialysis fluid. She states that she is having pain all over her body. She also has had multiple episodes of vomiting and diarrhea over the past several days. She denies any blood in her vomit or stool. She reports chills, but has not measured a temperature. Review of Systems     Review of Systems   Constitutional: Positive for chills. HENT: Negative. Eyes: Negative. Respiratory: Negative. Cardiovascular: Negative. Gastrointestinal: Positive for abdominal pain, diarrhea, nausea and vomiting. Endocrine: Negative. Genitourinary: Negative. Negative for dysuria. Musculoskeletal: Negative. Skin: Negative. Allergic/Immunologic: Negative. Neurological: Negative. Hematological: Negative. Psychiatric/Behavioral: Negative. All other systems reviewed and are negative. Past Medical, Surgical, Family, and Social History     She has a past medical history of CHF (congestive heart failure) ([facility] [address] Diabetes mellitus ([facility] [address] and Hypertension. She has a past surgical history that includes Dialysis Catheter Insertion (N/A, 7/9/2021). Her family history is not on file. She reports that she has never smoked. She has never used smokeless tobacco. She reports previous alcohol use. She reports previous drug use.     Medications     Previous Medications    ASPIRIN 81 MG CHEWABLE TABLET    Take 1 tablet by mouth daily    ATORVASTATIN (LIPITOR) 40 MG TABLET    Take 1 tablet by mouth daily    BUTALBITAL-ACETAMINOPHEN-CAFFEINE (FIORICET, ESGIC) -40 MG PER soft.      Tenderness: There is generalized abdominal tenderness. There is no guarding or rebound. Comments: PD catheter in place in LLQ   Musculoskeletal:      Cervical back: Neck supple. Lymphadenopathy:      Cervical: No cervical adenopathy. Skin:     General: Skin is warm and dry. Capillary Refill: Capillary refill takes less than 2 seconds. Findings: No erythema or rash. Neurological:      Mental Status: She is alert and oriented to person, place, and time. DiagnosticResults     RADIOLOGY:  XR CHEST PORTABLE   Final Result      1. Mild bibasilar parenchymal opacities, likely atelectasis. CT ABDOMEN PELVIS WO CONTRAST Additional Contrast? None   Final Result      1. No acute intra-abdominopelvic abnormality. 2.  Small to moderate ascites and intraperitoneal free air likely relates to presence of peritoneal dialysis catheter. 3.  Enlarged uterus likely on the basis of uterine fibroids. Pelvic ultrasound would provide better evaluation.           LABS:   Results for orders placed or performed during the hospital encounter of 08/22/21   CBC Auto Differential   Result Value Ref Range    WBC 7.3 4.0 - 11.0 K/uL    RBC 3.74 (L) 4.00 - 5.20 M/uL    Hemoglobin 11.7 (L) 12.0 - 16.0 g/dL    Hematocrit 35.0 (L) 36.0 - 48.0 %    MCV 93.8 80.0 - 100.0 fL    MCH 31.3 26.0 - 34.0 pg    MCHC 33.4 31.0 - 36.0 g/dL    RDW 13.7 12.4 - 15.4 %    Platelets 852 458 - 902 K/uL    MPV 9.2 5.0 - 10.5 fL    Neutrophils % 79.0 %    Lymphocytes % 8.4 %    Monocytes % 8.1 %    Eosinophils % 4.2 %    Basophils % 0.3 %    Neutrophils Absolute 5.7 1.7 - 7.7 K/uL    Lymphocytes Absolute 0.6 (L) 1.0 - 5.1 K/uL    Monocytes Absolute 0.6 0.0 - 1.3 K/uL    Eosinophils Absolute 0.3 0.0 - 0.6 K/uL    Basophils Absolute 0.0 0.0 - 0.2 K/uL   Basic Metabolic Panel w/ Reflex to MG   Result Value Ref Range    Sodium 133 (L) 136 - 145 mmol/L    Potassium reflex Magnesium 4.1 3.5 - 5.1 mmol/L    Chloride 93 (L) 99 - 110 mmol/L    CO2 19 (L) 21 - 32 mmol/L    Anion Gap 21 (H) 3 - 16    Glucose 192 (H) 70 - 99 mg/dL    BUN 82 (HH) 7 - 20 mg/dL    CREATININE 5.2 (HH) 0.6 - 1.1 mg/dL    GFR Non-African American 9 (A) >60    GFR  10 (A) >60    Calcium 9.6 8.3 - 10.6 mg/dL   Hepatic Function Panel   Result Value Ref Range    Total Protein 8.0 6.4 - 8.2 g/dL    Albumin 3.6 3.4 - 5.0 g/dL    Alkaline Phosphatase 174 (H) 40 - 129 U/L    ALT 26 10 - 40 U/L    AST 17 15 - 37 U/L    Total Bilirubin <0.2 0.0 - 1.0 mg/dL    Bilirubin, Direct <0.2 0.0 - 0.3 mg/dL    Bilirubin, Indirect see below 0.0 - 1.0 mg/dL   Lipase   Result Value Ref Range    Lipase 60.0 13.0 - 60.0 U/L   Blood Gas, Venous   Result Value Ref Range    pH, Moise 7.343 (L) 7.350 - 7.450    pCO2, Moise 46.4 41.0 - 51.0 mmHg    pO2, Moise <30.0 25 - 40 mmHg    HCO3, Venous 25.2 24.0 - 28.0 mmol/L    Base Excess, Moise -0.9 -2.0 - 3.0 mmol/L    O2 Sat, Moise 22 Not established %    Carboxyhemoglobin 0.9 0.0 - 1.5 %    MetHgb, Moise 0.6 0.0 - 1.5 %    TC02 (Calc), Moise 27 mmol/L    Hemoglobin, Moise, Reduced 77.20 %   Body fluid cell count with differential   Result Value Ref Range    Cell Count Fluid Type Peritoneal Fl. Color, Fluid Pale Yellow     Appearance, Fluid Cloudy     Clot Eval. see below     Nucl Cell, Fluid 27,388 /cumm    RBC, Fluid 2,800 /cumm    Neutrophil Count, Fluid 91 %    Lymphocytes, Body Fluid 6 %    Monocyte Count, Fluid 2 %    Macrophages 1 %    Number of Cells Counted Fluid 100      RECENT VITALS:  BP: (!) 163/98, Temp: 100.4 °F (38 °C), Pulse: 101,Resp: 20, SpO2: 94 %     Procedures     None    ED Course     Nursing Notes, Past Medical Hx, Past Surgical Hx, Social Hx, Allergies, and Family Hx were reviewed. The patient was given the followingmedications:  Orders Placed This Encounter   Medications    cefepime (MAXIPIME) 2000 mg IVPB minibag     Order Specific Question:   Antimicrobial Indications     Answer:    Other     Order Specific Question:   Other Abx Indication     Answer: Suspected Sepsis of Pulmonary Origin - Nosocomial    vancomycin (VANCOCIN) 2,250 mg in dextrose 5 % 500 mL IVPB     Order Specific Question:   Antimicrobial Indications     Answer: Other     Order Specific Question:   Other Abx Indication     Answer: Suspected Sepsis of Pulmonary Origin - Nosocomial    HYDROmorphone (DILAUDID) injection 1 mg    DISCONTD: morphine injection 4 mg    gentamicin (GARAMYCIN) 0.1 % cream       CONSULTS:  IP CONSULT TO HOSPITALIST  IP CONSULT TO [facility] / ASSESSMENT / Gerardo La is a 45 y.o. female with a history as above, on peritoneal dialysis at home, presenting with abdominal pain, vomiting, diarrhea. On exam, she is febrile with a temperature of 100.4 °F.  She has diffuse abdominal tenderness. Differential diagnosis includes gastroenteritis, colitis, peritonitis especially given her PD. We performed a CT abd/pelvis which did not show any acute findings. I discussed with the patient's nephrologist Dr. Elan Ames who sent the patient to the emergency department today. Recommended admission with Vanc and Cefepime. Blood cultures and body fluid cell count and culture ordered. Labs show renal failure but no indications for emergency dialysis. This patient was also evaluated by the attending physician. All care plans werediscussed and agreed upon. Clinical Impression     1. Abdominal pain, unspecified abdominal location        Disposition     PATIENT REFERRED TO:  No follow-up provider specified.     DISCHARGE MEDICATIONS:  New Prescriptions    No medications on file       Carrie Escalera MD  Resident  08/22/21 [id number]

## 2021-08-22 NOTE — H&P
Internal Medicine  PGY 1  History & Physical      CC abd pain    History Obtained From:  patient    HISTORY OF PRESENT ILLNESS:  Leesa Starkey is a 45 y.o. female PMHx ESRD on PD, HTN, CHF, T2DM, recent hospitalization (7/2021) for aspiration pneumonia with sepsis p/w abdominal pain. Developed abdominal cramping yesterday evening which worsened this morning when she woke up. Pain became unbearable and she developed nausea with bilious vomiting which prompted her to come to the ED. Of note, she began PD ~1mo ago. In the ED, was febrile to 100.4, Cr 5.2 (baseline 2.5-3) and pH 7.343. She was given a dose of vanc + cefepime and dilaudid. Admitted for concern for SBP. Past Medical History:        Diagnosis Date    CHF (congestive heart failure) ([facility] [address]     Diabetes mellitus ([facility] [address]     Hypertension    ·     Past Surgical History:        Procedure Laterality Date    DIALYSIS CATHETER INSERTION N/A 7/9/2021    LAPAROSCOPIC PERITONEAL DIALYSIS CATHETER INSERTION, OMENTOPLEXY performed by Fatoumata Clement DO at [address]   ·     Medications Priorto Admission:    · Not in a hospital admission. Allergies:  Patient has no known allergies. Social History:   · TOBACCO:   reports that she has never smoked. She has never used smokeless tobacco.  · ETOH:   reports previous alcohol use. · DRUGS : None  · Patient currently lives with family  ·   Family History:   · History reviewed. No pertinent family history. Review of Systems   Constitutional: Positive for chills, fatigue and fever. Respiratory: Positive for shortness of breath. Negative for cough. Cardiovascular: Positive for chest pain. Negative for leg swelling. Gastrointestinal: Positive for abdominal distention, abdominal pain, diarrhea, nausea and vomiting. Negative for blood in stool and constipation. Genitourinary: Positive for decreased urine volume. Negative for frequency. Neurological: Positive for weakness. Negative for light-headedness and headaches. hours.    Invalid input(s): KETONESU    XR CHEST PORTABLE   Final Result      1. Mild bibasilar parenchymal opacities, likely atelectasis. CT ABDOMEN PELVIS WO CONTRAST Additional Contrast? None   Final Result      1. No acute intra-abdominopelvic abnormality. 2.  Small to moderate ascites and intraperitoneal free air likely relates to presence of peritoneal dialysis catheter. 3.  Enlarged uterus likely on the basis of uterine fibroids. Pelvic ultrasound would provide better evaluation. EKG:  Echo:      ASSESSMENT AND PLAN:  Lisa Vazquez is a 45 y.o. female PMHx ESRD on PD, HTN, CHF, T2DM, recent hospitalization (7/2021) for aspiration pneumonia with sepsis p/w abdominal pain    Abdominal pain: need to rule out SBP given PD, milky yellow fluid per pt report, and significant diffuse abdominal pain. Also want to rule out ischemic bowel given benign exam with significant findings. However, CT was unconvincing for these. Infectious gastroenteritis (bacterial vs viral) also on differential given diarrhea and N/V. Also r/o cardiac etiology but less likely  - linezolid + zosyn  - lactate  - peritoneal fluid studies, cultures  - bacterial pathogens PCR, Cdiff stool studies  - MRSA swab  - 500ml NS bolus  - blood cultures  - UA with reflex  - dilaudid pain panel q6h  - trop, EKG  - PT/OT  - cont tele    ESRD on PD  - nephro consulted    HTN: currently hypertensive likely 2/2 pain  - home carvedilol, nifedipine  - hold torsemide and spironolactone while awaiting lactate results    HLD  - home ASA, statin    T2DM: takes 20 lantus, 10 lispro with meals at home  - 10 lantus, 5 lispro with meals, + LDSSI    Chronic pain  - home gabapentin      Will discuss with attending physician Dr Seth Law.     Code Status:Full code  FEN: Regular diet, low sodium, low K, low Ph, 3 carb choices  PPX: heparin, protonix  DISPO: Margarito Justin MD  8/22/2021,  4:20 PM

## 2021-08-22 NOTE — ED NOTES
Patient complains of abdominal pain 10/10. Patient reports diarrhea since morning. Left lower PD catheter in place.      Gurmeet Sidhu RN  08/22/21 [id number]

## 2021-08-22 NOTE — ED PROVIDER NOTES
ED Attending Attestation Note     Date of evaluation: 8/22/2021    This patient was seen by the resident. I have seen and examined the patient, agree with the workup, evaluation, management and diagnosis. The care plan has been discussed. My assessment reveals patient who appears uncomfortable with diffuse abdominal pain with palpation, however it is nondistended. She is febrile here. Given her PD dialysis, will cover her with vancomycin and cefepime.  She will need admission to the hospital. Hemodynamically stable     Cornelius Wilson MD  08/22/21 3003

## 2021-08-23 PROBLEM — K66.8 FREE INTRAPERITONEAL AIR: Status: ACTIVE | Noted: 2021-08-23

## 2021-08-23 PROBLEM — R10.84 GENERALIZED ABDOMINAL PAIN: Status: ACTIVE | Noted: 2021-08-22

## 2021-08-23 PROBLEM — T85.71XA PERITONITIS ASSOCIATED WITH PERITONEAL DIALYSIS (HCC): Status: ACTIVE | Noted: 2021-08-23

## 2021-08-23 PROBLEM — R78.81 POSITIVE BLOOD CULTURE: Status: ACTIVE | Noted: 2021-08-23

## 2021-08-23 LAB
ALBUMIN SERPL-MCNC: 2.8 G/DL (ref 3.4–5)
ANION GAP SERPL CALCULATED.3IONS-SCNC: 17 MMOL/L (ref 3–16)
ANION GAP SERPL CALCULATED.3IONS-SCNC: 17 MMOL/L (ref 3–16)
BASOPHILS ABSOLUTE: 0 K/UL (ref 0–0.2)
BASOPHILS RELATIVE PERCENT: 0.2 %
BUN BLDV-MCNC: 72 MG/DL (ref 7–20)
BUN BLDV-MCNC: 74 MG/DL (ref 7–20)
C DIFF TOXIN/ANTIGEN: NORMAL
CALCIUM SERPL-MCNC: 8.5 MG/DL (ref 8.3–10.6)
CALCIUM SERPL-MCNC: 8.7 MG/DL (ref 8.3–10.6)
CHLORIDE BLD-SCNC: 88 MMOL/L (ref 99–110)
CHLORIDE BLD-SCNC: 90 MMOL/L (ref 99–110)
CO2: 18 MMOL/L (ref 21–32)
CO2: 19 MMOL/L (ref 21–32)
CREAT SERPL-MCNC: 5 MG/DL (ref 0.6–1.1)
CREAT SERPL-MCNC: 5.7 MG/DL (ref 0.6–1.1)
EKG ATRIAL RATE: 90 BPM
EKG DIAGNOSIS: NORMAL
EKG P AXIS: 62 DEGREES
EKG P-R INTERVAL: 166 MS
EKG Q-T INTERVAL: 360 MS
EKG QRS DURATION: 90 MS
EKG QTC CALCULATION (BAZETT): 440 MS
EKG R AXIS: 0 DEGREES
EKG T AXIS: 57 DEGREES
EKG VENTRICULAR RATE: 90 BPM
EOSINOPHILS ABSOLUTE: 0.1 K/UL (ref 0–0.6)
EOSINOPHILS RELATIVE PERCENT: 0.7 %
GFR AFRICAN AMERICAN: 11
GFR AFRICAN AMERICAN: 9
GFR NON-AFRICAN AMERICAN: 8
GFR NON-AFRICAN AMERICAN: 9
GLUCOSE BLD-MCNC: 112 MG/DL (ref 70–99)
GLUCOSE BLD-MCNC: 118 MG/DL (ref 70–99)
GLUCOSE BLD-MCNC: 123 MG/DL (ref 70–99)
GLUCOSE BLD-MCNC: 137 MG/DL (ref 70–99)
GLUCOSE BLD-MCNC: 193 MG/DL (ref 70–99)
GLUCOSE BLD-MCNC: 311 MG/DL (ref 70–99)
HCT VFR BLD CALC: 29.9 % (ref 36–48)
HEMOGLOBIN: 10 G/DL (ref 12–16)
LYMPHOCYTES ABSOLUTE: 0.8 K/UL (ref 1–5.1)
LYMPHOCYTES RELATIVE PERCENT: 10.3 %
MAGNESIUM: 1.5 MG/DL (ref 1.8–2.4)
MAGNESIUM: 1.9 MG/DL (ref 1.8–2.4)
MCH RBC QN AUTO: 31.2 PG (ref 26–34)
MCHC RBC AUTO-ENTMCNC: 33.4 G/DL (ref 31–36)
MCV RBC AUTO: 93.2 FL (ref 80–100)
MONOCYTES ABSOLUTE: 0.6 K/UL (ref 0–1.3)
MONOCYTES RELATIVE PERCENT: 8.1 %
NEUTROPHILS ABSOLUTE: 6.1 K/UL (ref 1.7–7.7)
NEUTROPHILS RELATIVE PERCENT: 80.7 %
PDW BLD-RTO: 13.7 % (ref 12.4–15.4)
PERFORMED ON: ABNORMAL
PHOSPHORUS: 4.7 MG/DL (ref 2.5–4.9)
PLATELET # BLD: 159 K/UL (ref 135–450)
PMV BLD AUTO: 9.9 FL (ref 5–10.5)
POTASSIUM SERPL-SCNC: 3.3 MMOL/L (ref 3.5–5.1)
POTASSIUM SERPL-SCNC: 4.1 MMOL/L (ref 3.5–5.1)
RBC # BLD: 3.2 M/UL (ref 4–5.2)
SODIUM BLD-SCNC: 124 MMOL/L (ref 136–145)
SODIUM BLD-SCNC: 125 MMOL/L (ref 136–145)
WBC # BLD: 7.6 K/UL (ref 4–11)

## 2021-08-23 PROCEDURE — 2060000000 HC ICU INTERMEDIATE R&B

## 2021-08-23 PROCEDURE — 97162 PT EVAL MOD COMPLEX 30 MIN: CPT

## 2021-08-23 PROCEDURE — 6370000000 HC RX 637 (ALT 250 FOR IP): Performed by: STUDENT IN AN ORGANIZED HEALTH CARE EDUCATION/TRAINING PROGRAM

## 2021-08-23 PROCEDURE — 85025 COMPLETE CBC W/AUTO DIFF WBC: CPT

## 2021-08-23 PROCEDURE — 6360000002 HC RX W HCPCS

## 2021-08-23 PROCEDURE — 2500000003 HC RX 250 WO HCPCS: Performed by: STUDENT IN AN ORGANIZED HEALTH CARE EDUCATION/TRAINING PROGRAM

## 2021-08-23 PROCEDURE — 99223 1ST HOSP IP/OBS HIGH 75: CPT | Performed by: INTERNAL MEDICINE

## 2021-08-23 PROCEDURE — 80069 RENAL FUNCTION PANEL: CPT

## 2021-08-23 PROCEDURE — 2580000003 HC RX 258

## 2021-08-23 PROCEDURE — 36415 COLL VENOUS BLD VENIPUNCTURE: CPT

## 2021-08-23 PROCEDURE — 2580000003 HC RX 258: Performed by: STUDENT IN AN ORGANIZED HEALTH CARE EDUCATION/TRAINING PROGRAM

## 2021-08-23 PROCEDURE — 87449 NOS EACH ORGANISM AG IA: CPT

## 2021-08-23 PROCEDURE — 93005 ELECTROCARDIOGRAM TRACING: CPT | Performed by: STUDENT IN AN ORGANIZED HEALTH CARE EDUCATION/TRAINING PROGRAM

## 2021-08-23 PROCEDURE — 2500000003 HC RX 250 WO HCPCS: Performed by: INTERNAL MEDICINE

## 2021-08-23 PROCEDURE — 90945 DIALYSIS ONE EVALUATION: CPT | Performed by: INTERNAL MEDICINE

## 2021-08-23 PROCEDURE — 6360000002 HC RX W HCPCS: Performed by: STUDENT IN AN ORGANIZED HEALTH CARE EDUCATION/TRAINING PROGRAM

## 2021-08-23 PROCEDURE — 6370000000 HC RX 637 (ALT 250 FOR IP): Performed by: INTERNAL MEDICINE

## 2021-08-23 PROCEDURE — 97530 THERAPEUTIC ACTIVITIES: CPT

## 2021-08-23 PROCEDURE — 83735 ASSAY OF MAGNESIUM: CPT

## 2021-08-23 PROCEDURE — 97535 SELF CARE MNGMENT TRAINING: CPT

## 2021-08-23 PROCEDURE — 87324 CLOSTRIDIUM AG IA: CPT

## 2021-08-23 PROCEDURE — 99222 1ST HOSP IP/OBS MODERATE 55: CPT | Performed by: SURGERY

## 2021-08-23 PROCEDURE — 93010 ELECTROCARDIOGRAM REPORT: CPT | Performed by: INTERNAL MEDICINE

## 2021-08-23 PROCEDURE — 87505 NFCT AGENT DETECTION GI: CPT

## 2021-08-23 PROCEDURE — 97166 OT EVAL MOD COMPLEX 45 MIN: CPT

## 2021-08-23 PROCEDURE — 1200000000 HC SEMI PRIVATE

## 2021-08-23 RX ORDER — POLYETHYLENE GLYCOL 3350 17 G/17G
17 POWDER, FOR SOLUTION ORAL 2 TIMES DAILY
Status: DISCONTINUED | OUTPATIENT
Start: 2021-08-23 | End: 2021-08-26 | Stop reason: HOSPADM

## 2021-08-23 RX ORDER — POTASSIUM CHLORIDE 20 MEQ/1
40 TABLET, EXTENDED RELEASE ORAL ONCE
Status: COMPLETED | OUTPATIENT
Start: 2021-08-23 | End: 2021-08-23

## 2021-08-23 RX ORDER — MAGNESIUM SULFATE HEPTAHYDRATE 40 MG/ML
2000 INJECTION, SOLUTION INTRAVENOUS ONCE
Status: COMPLETED | OUTPATIENT
Start: 2021-08-23 | End: 2021-08-23

## 2021-08-23 RX ADMIN — GABAPENTIN 300 MG: 300 CAPSULE ORAL at 08:33

## 2021-08-23 RX ADMIN — SODIUM CHLORIDE 25 ML: 9 INJECTION, SOLUTION INTRAVENOUS at 19:52

## 2021-08-23 RX ADMIN — ONDANSETRON 4 MG: 2 INJECTION INTRAMUSCULAR; INTRAVENOUS at 01:53

## 2021-08-23 RX ADMIN — NIFEDIPINE 30 MG: 30 TABLET, EXTENDED RELEASE ORAL at 00:24

## 2021-08-23 RX ADMIN — HEPARIN SODIUM 5000 UNITS: 5000 INJECTION INTRAVENOUS; SUBCUTANEOUS at 21:14

## 2021-08-23 RX ADMIN — Medication 10 ML: at 08:23

## 2021-08-23 RX ADMIN — BUTALBITAL, ACETAMINOPHEN, AND CAFFEINE 1 TABLET: 50; 325; 40 TABLET ORAL at 20:34

## 2021-08-23 RX ADMIN — INSULIN LISPRO 5 UNITS: 100 INJECTION, SOLUTION INTRAVENOUS; SUBCUTANEOUS at 08:54

## 2021-08-23 RX ADMIN — MAGNESIUM SULFATE HEPTAHYDRATE 2000 MG: 2 INJECTION, SOLUTION INTRAVENOUS at 08:48

## 2021-08-23 RX ADMIN — POLYETHYLENE GLYCOL 3350 17 G: 17 POWDER, FOR SOLUTION ORAL at 12:41

## 2021-08-23 RX ADMIN — HYDROMORPHONE HYDROCHLORIDE 0.5 MG: 1 INJECTION, SOLUTION INTRAMUSCULAR; INTRAVENOUS; SUBCUTANEOUS at 01:52

## 2021-08-23 RX ADMIN — Medication 5 MG: at 12:41

## 2021-08-23 RX ADMIN — HEPARIN SODIUM 5000 UNITS: 5000 INJECTION INTRAVENOUS; SUBCUTANEOUS at 06:16

## 2021-08-23 RX ADMIN — HEPARIN SODIUM 5000 UNITS: 5000 INJECTION INTRAVENOUS; SUBCUTANEOUS at 13:35

## 2021-08-23 RX ADMIN — ATORVASTATIN CALCIUM 40 MG: 40 TABLET, FILM COATED ORAL at 08:33

## 2021-08-23 RX ADMIN — LINEZOLID 600 MG: 600 INJECTION, SOLUTION INTRAVENOUS at 08:30

## 2021-08-23 RX ADMIN — NIFEDIPINE 30 MG: 30 TABLET, EXTENDED RELEASE ORAL at 08:33

## 2021-08-23 RX ADMIN — METRONIDAZOLE 500 MG: 500 INJECTION, SOLUTION INTRAVENOUS at 19:53

## 2021-08-23 RX ADMIN — POTASSIUM CHLORIDE 40 MEQ: 1500 TABLET, EXTENDED RELEASE ORAL at 08:32

## 2021-08-23 RX ADMIN — PIPERACILLIN AND TAZOBACTAM 3375 MG: 3; .375 INJECTION, POWDER, LYOPHILIZED, FOR SOLUTION INTRAVENOUS at 09:49

## 2021-08-23 RX ADMIN — NIFEDIPINE 30 MG: 30 TABLET, EXTENDED RELEASE ORAL at 20:34

## 2021-08-23 RX ADMIN — GABAPENTIN 300 MG: 300 CAPSULE ORAL at 20:34

## 2021-08-23 RX ADMIN — PIPERACILLIN AND TAZOBACTAM 3375 MG: 3; .375 INJECTION, POWDER, LYOPHILIZED, FOR SOLUTION INTRAVENOUS at 00:22

## 2021-08-23 RX ADMIN — ASPIRIN 81 MG: 81 TABLET, CHEWABLE ORAL at 08:33

## 2021-08-23 RX ADMIN — HYDROMORPHONE HYDROCHLORIDE 0.5 MG: 1 INJECTION, SOLUTION INTRAMUSCULAR; INTRAVENOUS; SUBCUTANEOUS at 18:25

## 2021-08-23 RX ADMIN — PANTOPRAZOLE SODIUM 40 MG: 40 TABLET, DELAYED RELEASE ORAL at 06:16

## 2021-08-23 RX ADMIN — CEFTRIAXONE SODIUM 2000 MG: 2 INJECTION, POWDER, FOR SOLUTION INTRAMUSCULAR; INTRAVENOUS at 12:42

## 2021-08-23 RX ADMIN — INSULIN LISPRO 1 UNITS: 100 INJECTION, SOLUTION INTRAVENOUS; SUBCUTANEOUS at 18:33

## 2021-08-23 RX ADMIN — INSULIN LISPRO 5 UNITS: 100 INJECTION, SOLUTION INTRAVENOUS; SUBCUTANEOUS at 18:33

## 2021-08-23 RX ADMIN — CARVEDILOL 12.5 MG: 12.5 TABLET, FILM COATED ORAL at 11:32

## 2021-08-23 RX ADMIN — BUTALBITAL, ACETAMINOPHEN, AND CAFFEINE 1 TABLET: 50; 325; 40 TABLET ORAL at 12:41

## 2021-08-23 RX ADMIN — ACETAMINOPHEN 650 MG: 325 TABLET ORAL at 01:53

## 2021-08-23 RX ADMIN — INSULIN LISPRO 5 UNITS: 100 INJECTION, SOLUTION INTRAVENOUS; SUBCUTANEOUS at 12:56

## 2021-08-23 RX ADMIN — CARVEDILOL 12.5 MG: 12.5 TABLET, FILM COATED ORAL at 20:34

## 2021-08-23 RX ADMIN — INSULIN GLARGINE 10 UNITS: 100 INJECTION, SOLUTION SUBCUTANEOUS at 21:15

## 2021-08-23 ASSESSMENT — PAIN DESCRIPTION - PAIN TYPE
TYPE: ACUTE PAIN

## 2021-08-23 ASSESSMENT — PAIN DESCRIPTION - DESCRIPTORS
DESCRIPTORS: HEADACHE
DESCRIPTORS: HEADACHE
DESCRIPTORS: ACHING;HEADACHE

## 2021-08-23 ASSESSMENT — PAIN DESCRIPTION - PROGRESSION
CLINICAL_PROGRESSION: GRADUALLY WORSENING
CLINICAL_PROGRESSION: GRADUALLY IMPROVING

## 2021-08-23 ASSESSMENT — PAIN DESCRIPTION - ONSET
ONSET: GRADUAL
ONSET: GRADUAL

## 2021-08-23 ASSESSMENT — PAIN SCALES - GENERAL
PAINLEVEL_OUTOF10: 8
PAINLEVEL_OUTOF10: 5
PAINLEVEL_OUTOF10: 0
PAINLEVEL_OUTOF10: 5
PAINLEVEL_OUTOF10: 9
PAINLEVEL_OUTOF10: 4
PAINLEVEL_OUTOF10: 0
PAINLEVEL_OUTOF10: 0
PAINLEVEL_OUTOF10: 4

## 2021-08-23 ASSESSMENT — PAIN DESCRIPTION - LOCATION
LOCATION: HEAD
LOCATION: HEAD
LOCATION: ABDOMEN
LOCATION: HEAD;BACK

## 2021-08-23 ASSESSMENT — ENCOUNTER SYMPTOMS
ABDOMINAL PAIN: 1
SHORTNESS OF BREATH: 1

## 2021-08-23 ASSESSMENT — PAIN DESCRIPTION - FREQUENCY
FREQUENCY: CONTINUOUS
FREQUENCY: CONTINUOUS

## 2021-08-23 NOTE — PROGRESS NOTES
Progress Note    Admit Date: 8/22/2021  Day: 1  Diet: ADULT DIET; Regular; 3 carb choices (45 gm/meal); Low Sodium (2 gm); Low Potassium (Less than 3000 mg/day); Low Phosphorus (Less than 1000 mg); 1200 ml    CC: Abdominal Pain    Interval history:   Pt seen at bedside. Pt complained about lower abdominal pain on palpation. Pt denied any fever, chills, nausea or vomiting. Pt had diarrhea before and during admission. HPI:   Pt is a  45 y.o. F with  PMHx ESRD on PD, HTN, CHF, T2DM, recent hospitalization (7/2021) for aspiration pneumonia with sepsis p/w lower quadrant abdominal pain. Evening before coming to the ED is when it got started. It first presented with SOB which turned into a abdominal pain with nausea, diarrhea and yellow vomiting with no blood. .Pain became unbearable which prompted her to come to the ED. She began PD ~1mo ago.     In the ED, was febrile to 100.4, Cr 5.2 (baseline 2.5-3) and pH 7.343. She was given a dose of vanc + cefepime and dilaudid. Admitted for concern for SBP. Echo Dec 2019- EF 5-60%. Cardiac MRI showed EF 71% with apical dysfunction consistent with ischemia.      Medications:     Scheduled Meds:   polyethylene glycol  17 g Oral BID    cefTRIAXone (ROCEPHIN) IV  2,000 mg Intravenous Q24H    sodium chloride flush  5-40 mL Intravenous 2 times per day    heparin (porcine)  5,000 Units Subcutaneous 3 times per day    aspirin  81 mg Oral Daily    atorvastatin  40 mg Oral Daily    carvedilol  12.5 mg Oral BID    gabapentin  300 mg Oral BID    insulin glargine  10 Units Subcutaneous Nightly    insulin lispro  5 Units Subcutaneous TID WC    insulin lispro  0-6 Units Subcutaneous TID WC    insulin lispro  0-3 Units Subcutaneous Nightly    NIFEdipine  30 mg Oral BID    pantoprazole  40 mg Oral QAM AC     Continuous Infusions:   sodium chloride      dextrose       PRN Meds:gentamicin, sodium chloride flush, sodium chloride, ondansetron **OR** ondansetron, polyethylene glycol, acetaminophen **OR** acetaminophen, butalbital-acetaminophen-caffeine, glucose, dextrose, glucagon (rDNA), dextrose, HYDROmorphone **OR** HYDROmorphone    Objective:   Vitals:   T-max:  Patient Vitals for the past 8 hrs:   BP Temp Temp src Pulse Resp SpO2   08/23/21 1153 133/60 98.6 °F (37 °C) Oral 78 16 94 %   08/23/21 0816 137/76 98.1 °F (36.7 °C) Oral 80 18 98 %       Intake/Output Summary (Last 24 hours) at 8/23/2021 1506  Last data filed at 8/23/2021 0950  Gross per 24 hour   Intake 804.28 ml   Output 1079 ml   Net -274.72 ml       Review of Systems   Constitutional: Negative for chills and fever. Respiratory: Positive for shortness of breath. Gastrointestinal: Positive for abdominal pain. All other systems reviewed and are negative. Physical Exam  Vitals and nursing note reviewed. Constitutional:       General: She is in acute distress. Appearance: Normal appearance. Cardiovascular:      Rate and Rhythm: Normal rate and regular rhythm. Pulses: Normal pulses. Heart sounds: Normal heart sounds. Pulmonary:      Effort: Pulmonary effort is normal.      Breath sounds: Normal breath sounds. Abdominal:      Palpations: Abdomen is soft. Tenderness: There is abdominal tenderness. There is guarding. Comments: Lower quadrant tenderness across the lower abdomen. Scars in the abdomen from previous surgeries. Skin:     General: Skin is warm. Neurological:      General: No focal deficit present. Mental Status: She is alert and oriented to person, place, and time.          LABS:    CBC:   Recent Labs     08/22/21  1304 08/23/21  0416   WBC 7.3 7.6   HGB 11.7* 10.0*   HCT 35.0* 29.9*    159   MCV 93.8 93.2     Renal:    Recent Labs     08/22/21  1304 08/22/21  2108 08/23/21  0416   *  --  125*   K 4.1  --  3.3*   CL 93*  --  90*   CO2 19*  --  18*   BUN 82*  --  72*   CREATININE 5.2*  --  5.0*   GLUCOSE 192*  --  311*   CALCIUM 9.6  --  8.5   MG  -- 1.50* 1.50*   PHOS  --  4.9 4.7   ANIONGAP 21*  --  17*     Hepatic:   Recent Labs     08/22/21  1304 08/23/21  0416   AST 17  --    ALT 26  --    BILITOT <0.2  --    BILIDIR <0.2  --    PROT 8.0  --    LABALBU 3.6 2.8*   ALKPHOS 174*  --      Troponin:   Recent Labs     08/22/21  2108   TROPONINI 0.29*     BNP: No results for input(s): BNP in the last 72 hours. Lipids: No results for input(s): CHOL, HDL in the last 72 hours. Invalid input(s): LDLCALCU, TRIGLYCERIDE  ABGs:  No results for input(s): PHART, WHS5XDW, PO2ART, GYF8ZQH, BEART, THGBART, K9LFQLPE, BHQ8TYO in the last 72 hours. INR:   Recent Labs     08/22/21  1304   INR 1.03     Lactate: No results for input(s): LACTATE in the last 72 hours. Cultures:  -----------------------------------------------------------------  RAD:   XR CHEST PORTABLE   Final Result      1. Mild bibasilar parenchymal opacities, likely atelectasis. CT ABDOMEN PELVIS WO CONTRAST Additional Contrast? None   Final Result      1. No acute intra-abdominopelvic abnormality. 2.  Small to moderate ascites and intraperitoneal free air likely relates to presence of peritoneal dialysis catheter. 3.  Enlarged uterus likely on the basis of uterine fibroids. Pelvic ultrasound would provide better evaluation. Assessment/Plan:   Nelda Moran is a 45 y.o. female PMHx ESRD on PD, HTN, CHF, T2DM, recent hospitalization (7/2021) for aspiration pneumonia with sepsis p/w abdominal pain     Abdominal pain 2/2 PDRP vs SBP  PD-milky yellow fluid per pt report, and significant diffuse abdominal pain. CT Small ascites and intraperitoneal free air like from peritoneal dialysis catheter. Enlarged Uterus with fibroids. Infectious gastroenteritis (bacterial vs viral) also on differential given diarrhea and N/V. Cardiogenic causes from recent Cardiac Mri findings and HTN could support mesenteric ischemia.    - Vanc and ceftriaxone ( 08/23- )  - lactate  - peritoneal fluid studies,

## 2021-08-23 NOTE — PROGRESS NOTES
Disconnected from [facility] per protocol. Effluent: cloudy, milky yellow   Total time: 9 hr 14 min   Total UF:  317 ml. Total Volume:  8004 ml. Dwell time gained:  0 hr 8 min. Pt Tolerated procedure. Slow draining at times.    Report given to: Evens Gutierrez RN  Last BM: 8/23/21

## 2021-08-23 NOTE — PROGRESS NOTES
Progress Note  Admit Date: 8/22/2021         CC: F/U for  abdominal pain/ SBP    Interval History:   I have seen the patient independently from the resident . I discussed the care with the resident. I personally reviewed the HPI, PH, FH, SH, ROS and medications. I repeated pertinent portions of the examination and reviewed the relevant imaging and laboratory data. I agree with the findings, assessment and plan as documented, with the following addendum:    Ongoing abdominal pain likely 2/2 SBP now w/ blood cx growing gram positive rods. Scheduled Medications:    polyethylene glycol  17 g Oral BID    cefTRIAXone (ROCEPHIN) IV  2,000 mg Intravenous Q24H    sodium chloride flush  5-40 mL Intravenous 2 times per day    heparin (porcine)  5,000 Units Subcutaneous 3 times per day    aspirin  81 mg Oral Daily    atorvastatin  40 mg Oral Daily    carvedilol  12.5 mg Oral BID    gabapentin  300 mg Oral BID    insulin glargine  10 Units Subcutaneous Nightly    insulin lispro  5 Units Subcutaneous TID WC    insulin lispro  0-6 Units Subcutaneous TID WC    insulin lispro  0-3 Units Subcutaneous Nightly    NIFEdipine  30 mg Oral BID    pantoprazole  40 mg Oral QAM AC      PRN Medications: gentamicin, sodium chloride flush, sodium chloride, ondansetron **OR** ondansetron, polyethylene glycol, acetaminophen **OR** acetaminophen, butalbital-acetaminophen-caffeine, glucose, dextrose, glucagon (rDNA), dextrose, HYDROmorphone **OR** HYDROmorphone  Diet: ADULT DIET; Regular; 3 carb choices (45 gm/meal); Low Sodium (2 gm); Low Potassium (Less than 3000 mg/day);  Low Phosphorus (Less than 1000 mg); 1200 ml    Continuous Infusions:   sodium chloride      dextrose         PHYSICAL EXAM:  /60   Pulse 78   Temp 98.6 °F (37 °C) (Oral)   Resp 16   Ht 5' (1.524 m)   Wt 183 lb 13.8 oz (83.4 kg)   SpO2 94%   BMI 35.91 kg/m²   Recent Labs     08/22/21  2148 08/23/21  0813 08/23/21  1253   POCGLU 326* 123* 112* Intake/Output Summary (Last 24 hours) at 8/23/2021 1458  Last data filed at 8/23/2021 0950  Gross per 24 hour   Intake 804.28 ml   Output 1079 ml   Net -274.72 ml     LABS:  Recent Labs     08/22/21  1304 08/23/21  0416   WBC 7.3 7.6   HGB 11.7* 10.0*   HCT 35.0* 29.9*    159                                                                    Recent Labs     08/22/21  1304 08/23/21  0416   * 125*   K 4.1 3.3*   CL 93* 90*   CO2 19* 18*   BUN 82* 72*   CREATININE 5.2* 5.0*   GLUCOSE 192* 311*     Recent Labs     08/22/21  1304   AST 17   ALT 26   BILITOT <0.2   ALKPHOS 174*     Recent Labs     08/22/21  2108   TROPONINI 0.29*       Recent Labs     08/22/21  1304   INR 1.03       Assessment & Plan:      SBP 2/2 CAPD:  Now w/ blood cx growing gram positive rods. On iv rocephin. Add vanc. Consult IID. Nephro following for PD. Disposition:   Inpatient.       Full Code

## 2021-08-23 NOTE — PROGRESS NOTES
Clinical Pharmacy Consult Note    Admit date: 8/22/2021    Subjective/Objective:  Leesa Starkey is a 45 y.o. female PMHx ESRD on PD, HTN, CHF, T2DM, recent hospitalization (7/2021) for aspiration pneumonia with sepsis p/w abdominal pain. Developed abdominal cramping yesterday evening which worsened this morning when she woke up. Pain became unbearable and she developed nausea with bilious vomiting which prompted her to come to the ED. Of note, she began PD ~1mo ago. Pharmacy is consulted to dose Vancomycin per Dr. Matthias Sorto per possible SBP. Pertinent Medications:  Vancomycin -- day # 2  --- 2250 mg (25mg/kg) dose in ER 8/22  LCeftriaxone 2g IVBP Q24H- (8/23- current)    Recent Labs     08/22/21  1304 08/22/21  2108 08/23/21  0416   *  --  125*   K 4.1  --  3.3*   CL 93*  --  90*   CO2 19*  --  18*   PHOS  --  4.9 4.7   BUN 82*  --  72*   CREATININE 5.2*  --  5.0*       Estimated Creatinine Clearance: 13 mL/min (A) (based on SCr of 5 mg/dL (H)). Recent Labs     08/22/21  1304 08/23/21  0416   WBC 7.3 7.6   HGB 11.7* 10.0*   HCT 35.0* 29.9*   MCV 93.8 93.2    159       Height:  5' (152.4 cm)  Weight: 183 lb 13.8 oz (83.4 kg)    Micro:  Date Site Micro Susceptibility   8/22 Blood Pending                    Assessment/Plan:  1. SBP:  Vancomycin and Zosyn - day #2  Vancomycin  · Patient is currently receiving PD. · Patient received 25mg/kg dose in ER last night (2250mg). No dose needed today. · Will check random vancomycin level with AM labs to ensure adequate dose was given (ordered). · Clinical pharmacist will follow-up in AM.  · Renal function will be monitored closely and dosing will be adjusted as appropriate. Ceftriaxone  · Continue 2g [id number]    Please call with any questions. Thank you for consulting pharmacy!   Gamaliel Mata, PharmD  PGY-1 Pharmacy Resident  [id number]  8/23/2021 12:00 PM

## 2021-08-23 NOTE — PROGRESS NOTES
Patient ordered Zosyn 4.5 gm IV Q 12 hrs. Per policy will interchange to extended infusion therapy. Will start Zosyn 3.375 gm IV Q 12 hrs each dose infused over 4 hours. If extended infusion dosing is not wanted, please discontinue and restart traditional dosing with this preference in the admin instructions. Thanks! Estimated Creatinine Clearance: 12 mL/min (A) (based on SCr of 5.2 mg/dL [facility])). Please call pharmacy with any questions! Evie Nelson, Pharm. D., [facility]  [phone]  8/22/2021 8:55 PM

## 2021-08-23 NOTE — PROGRESS NOTES
Physical Therapy    Facility/Department: [place] [address] [facility]  Initial Assessment & Tx.     NAME: Nelda Moran  : 1970  MRN: [id number]    Date of Service: 2021    Discharge Recommendations:  Nelda Moran scored a 13/24 on the AM-PAC short mobility form. Current research shows that an AM-PAC score of 17 or less is typically not associated with a discharge to the patient's home setting. Based on the patient's AM-PAC score and their current functional mobility deficits, it is recommended that the patient have 3-5 sessions per week of Physical Therapy at d/c to increase the patient's independence. Please see assessment section for further patient specific details. If patient discharges prior to next session this note will serve as a discharge summary. Please see below for the latest assessment towards goals. PT Equipment Recommendations  Equipment Needed:  (defer to next level of care.)    Assessment   Body structures, Functions, Activity limitations: Decreased functional mobility ; Decreased posture;Decreased endurance;Decreased strength;Decreased balance; Increased pain  Assessment: Patient was agreeable to PT assessment today (with encouragement) however limited in her activity 2/2 abdominal pain. She is functioning well below her mobility baseline & requiring minimal<>moderate assist to complete bed mobility, sit<>stand txfs & bed>chair step pivot txfs. Inc'd time & encouragement required for all activity 2/2 pain & global weakness of the BLEs. She will continue to benefit from skilled PT services during her hospital stay to address the above stated deficits & to maximize her functional independence prior to returning home with her family.  If patient is not agreeable to inpatient PT following her acute care stay then she will benefit/require HHPT services & 24 hour assist.   Treatment Diagnosis: Dec'd mobility  Prognosis: Good  Decision Making: Medium Complexity  PT Education: PT Role;Transfer Training;Functional Mobility Training;General Safety  Patient Education: Pt will benefit from continued edu  Barriers to Learning: n/a  REQUIRES PT FOLLOW UP: Yes  Activity Tolerance  Activity Tolerance: Patient limited by fatigue;Patient limited by pain       Patient Diagnosis(es): The encounter diagnosis was Abdominal pain, unspecified abdominal location. has a past medical history of CHF (congestive heart failure) ([facility] [address] Diabetes mellitus ([facility] [address] and Hypertension. has a past surgical history that includes Dialysis Catheter Insertion (N/A, 7/9/2021). Restrictions  Position Activity Restriction  Other position/activity restrictions: up with assistance  Vision/Hearing  Vision: Within Functional Limits  Hearing: Within functional limits     Subjective  General  Chart Reviewed: Yes  Patient assessed for rehabilitation services?: Yes  Additional Pertinent Hx: 44 y/o F presents with abdominal pain. Admitted for concern for SBP. PMH: ESRD on peritoneal dialysis (catheter placed 7/9), anemia, DM, HTN, diastolic heart failure  Response To Previous Treatment: Not applicable  Family / Caregiver Present: No  Referring Practitioner: Estela Lopez MD  Referral Date : 08/22/21  Diagnosis: abdominal pain  Follows Commands: Within Functional Limits  General Comment  Comments: Pt resting in bed upon PT arrival.  Subjective  Subjective: Pt agreeable to PT evaluation/tx (with encouragement)  Pain Screening  Patient Currently in Pain: Yes (5/10 abdominal pain described as cramping. RN aware)  Vital Signs  Patient Currently in Pain: Yes (5/10 abdominal pain described as cramping.  RN aware)       Orientation  Orientation  Overall Orientation Status: Within Normal Limits  Social/Functional History  Social/Functional History  Lives With: Family  Type of Home:  (townhouse)  Home Layout: Able to Live on Main level with bedroom/bathroom  Home Access: Stairs to enter without rails  Entrance Stairs - Number of Steps: 4  Bathroom Shower/Tub: Tub/Shower unit  Home Equipment: 4 wheeled walker  ADL Assistance: Independent (pt reports taking sponge baths at home since PD catheter placed)  Homemaking Assistance: Needs assistance (cooking & grocery shopping. If pt goes to the grocery store she uses a scooter)  Ambulation Assistance: Independent (with rollator)  Transfer Assistance: Independent (with rollator)  Active : Yes  Leisure & Hobbies: Shopping, pt typically drives her grandson to school       Objective     Observation/Palpation  Posture: Fair    AROM RLE (degrees)  RLE AROM: WFL  AROM LLE (degrees)  LLE AROM : WFL  Strength RLE  Comment: global weakness with strength assessed to be at least 3/5 as observed through functional mobility  Strength LLE  Comment: global weakness with strength assessed to be at least 3/5 as observed through functional mobility  Tone RLE  RLE Tone: Normotonic  Tone LLE  LLE Tone: Normotonic  Sensation  Overall Sensation Status: WNL  Bed mobility  Supine to Sit: Minimal assistance (with HOB elevated & +bed railing use. Effortful for pt to complete. Inc'd time & encouragement. Inc'd assistance for trunk ascension)  Transfers  Sit to Stand: Minimal Assistance (up to RW. Inc'd time to complete & effortful. Pt maintains significant trunk flexion once in standing & unable to achieve full upright posture 2/2 abdominal pain)  Stand to sit: Minimal Assistance (with cues for positioning & hand placement.)  Bed to Chair: Moderate assistance;Minimal assistance (via a step pivot txf. Inc'd trunk flexion with transition & dec'd B foot clearance & step length.  Effortful for pt to complete & limited in mobility progression 2/2 abdominal pain & pt reporting BLE weakness.)  Ambulation  Ambulation?: No  Stairs/Curb  Stairs?: No     Balance  Posture: Fair  Sitting - Static:  (S at EOB)  Sitting - Dynamic:  (S at EOB)  Standing - Static:  (Mary with RW)  Standing - Dynamic:  (Mary<>ModA with B HHA)        Plan   Plan  Times per week: 2-5  Current Treatment Recommendations: Strengthening, Safety Education & Training, Balance Training, Endurance Training, Patient/Caregiver Education & Training, Functional Mobility Training, Equipment Evaluation, Education, & procurement, Transfer Training, Gait Training, Stair training, Pain Management  Safety Devices  Type of devices: Call light within reach, Left in chair, Nurse notified  Restraints  Initially in place: No                                               AM-PAC Score  AM-PAC Inpatient Mobility Raw Score : 13 (08/23/21 1038)  AM-PAC Inpatient T-Scale Score : 36.74 (08/23/21 1038)  Mobility Inpatient CMS 0-100% Score: 64.91 (08/23/21 1038)  Mobility Inpatient CMS G-Code Modifier : CL (08/23/21 1038)          Goals  Short term goals  Time Frame for Short term goals: DC  Short term goal 1: Pt will complete bed mobility with S  Short term goal 2: Pt will complete sit<>stand txf & bed<>chair txf with S  Short term goal 3: Pt will tolerate ambulation assessment  Short term goal 4: Pt will tolerat stair assessment.   Patient Goals   Patient goals : to decrease pain       Therapy Time   Individual Concurrent Group Co-treatment   Time In 0942         Time Out 1023         Minutes 41         Timed Code Treatment Minutes: [address], PT

## 2021-08-23 NOTE — PLAN OF CARE
Problem: Pain:  Goal: Pain level will decrease  Description: Pain level will decrease  Outcome: Ongoing   Patient has voiced pain throughout this shift. PRN tylenol 650mg PO administered as ordered, PRN dilaudid 0.5mg Iv administered as needed. Pain medication effective for short duration.

## 2021-08-23 NOTE — PROGRESS NOTES
Occupational Therapy   Occupational Therapy Initial Assessment and Treatment note   Date: 2021   Patient Name: Servando Mason  MRN: [id number]     : 1970    Date of Service: 2021    Discharge Recommendations:Scottie Chiang scored a 16/24 on the AM-PAC ADL Inpatient form. Current research shows that an AM-PAC score of 17 or less is typically not associated with a discharge to the patient's home setting. Based on the patient's AM-PAC score and their current ADL deficits, it is recommended that the patient have 3-5 sessions per week of Occupational Therapy at d/c to increase the patient's independence. Please see assessment section for further patient specific details. If patient discharges prior to next session this note will serve as a discharge summary. Please see below for the latest assessment towards goals. ,pac     OT Equipment Recommendations  Equipment Needed: No    Assessment   Performance deficits / Impairments: Decreased functional mobility ; Decreased endurance;Decreased ADL status; Decreased safe awareness;Decreased cognition  Assessment: Pt from home -alone. Pt demo functioning far below baseline level. Pt needing assist with functional transfers /LE ADLs. Pt needing incresed time / effort to complete. Recommend ongoing inpt OT at d/c. If home would need need 24hr assist / [address]  Treatment Diagnosis: impaired ADLs / functional mobility and decreased endurance 2/2 PD cath infection  Prognosis: Fair  Decision Making: Medium Complexity  OT Education: OT Role;Plan of Care;ADL Adaptive Strategies;IADL Safety  Patient Education: verb understanding / reinforce as needed  REQUIRES OT FOLLOW UP: Yes  Activity Tolerance  Activity Tolerance: Patient Tolerated treatment well;Patient limited by fatigue;Patient limited by pain  Activity Tolerance: Pt demo impaired activity tolerance / fatigues easily with minimal activity. Pt on RA.   Safety Devices  Safety Devices in place: Yes  Type of devices: Bed alarm in place; Left in bed;Nurse notified;Call light within reach           Patient Diagnosis(es): The encounter diagnosis was Abdominal pain, unspecified abdominal location. has a past medical history of CHF (congestive heart failure) ([facility] [address] Diabetes mellitus ([facility] [address] and Hypertension. has a past surgical history that includes Dialysis Catheter Insertion (N/A, 7/9/2021). Treatment Diagnosis: impaired ADLs / functional mobility and decreased endurance 2/2 PD cath infection      Restrictions  Position Activity Restriction  Other position/activity restrictions: up with assistance    Subjective   General  Chart Reviewed: Yes  Patient assessed for rehabilitation services?: Yes  Additional Pertinent Hx: Admit 8/22 with abdominal pain/ sepsis      Recent PD cath placement                                      PMHX: ESRD on PD, HTN, CHF, T2DM, recent hospitalization (7/2021) for aspiration pneumonia  Diagnosis: Abdominal pain / sepsis  Subjective  Subjective: \" I feel pretty yucky\" pt found up in chair - requesting to use bathroom. Pt agreeable for OT eval and tx     Social/Functional History  Social/Functional History  Lives With: Family  Type of Home:  ([facility])  Home Layout: Able to Live on Main level with bedroom/bathroom  Home Access: Stairs to enter without rails  Entrance Stairs - Number of Steps: 4  Bathroom Shower/Tub: Tub/Shower unit  Home Equipment: 4 wheeled walker  ADL Assistance: Independent (pt reports taking sponge baths at home since PD catheter placed)  Homemaking Assistance: Needs assistance (cooking & grocery shopping. If pt goes to the grocery store she uses a scooter)  Ambulation Assistance: Independent (with rollator)  Transfer Assistance: Independent (with rollator)  Active : Yes  Leisure & Hobbies: Shopping, pt typically drives her grandson to school       Objective Treatment included functional transfer training, ADL's and pt. education.        Orientation  Overall Orientation Status: Within Functional Limits  Observation/Palpation  Posture: Fair  Balance  Sitting Balance: Supervision  Standing Balance: Minimal assistance (to CGA with walker)  Standing Balance  Time: 2mins x2 and 3 mins x 1, 4 mins x 1  Activity: functional transfers /stance for pericare/ functional mobility to /from bathroom  Functional Mobility  Functional - Mobility Device: Rolling Walker  Activity: To/from bathroom  Assist Level: Minimal assistance  Functional Mobility Comments: Pt needing close assist - control of walker / staying close 2/2 increased fatigue with mobility attempts  Toilet Transfers  Toilet - Technique: Ambulating  Equipment Used: Standard toilet  Toilet Transfer: Contact guard assistance  Toilet Transfers Comments: heavy assist with rail  ADL  Feeding: Setup  Grooming: Increased time to complete;Contact guard assistance (unable to tolerate stance at sink for washing hands)  LE Dressing: Moderate assistance;Maximum assistance (pt unable to don brief over feet / Min A over hips. Pt max assisst with socks)  Toileting: Maximum assistance; Moderate assistance (incont of liquid stool 2/2 urgency. Pt needing assist with completion of tolieting 2/2 fatigue while sitting on commode)  Tone RUE  RUE Tone: Normotonic  Tone LUE  LUE Tone: Normotonic  Coordination  Movements Are Fluid And Coordinated: Yes     Bed mobility  Sit to Supine: Moderate assistance (with HOB elevated Karlyn December)  Transfers  Sit to stand: Contact guard assistance  Stand to sit: Contact guard assistance  Transfer Comments: v.cues for hand placement  Vision - Basic Assessment  Prior Vision: No visual deficits  Cognition  Overall Cognitive Status: Exceptions  Arousal/Alertness: Appropriate responses to stimuli  Following Commands:  Follows one step commands with increased time  Attention Span: Attends with cues to redirect  Memory: Decreased short term memory  Safety Judgement: Decreased awareness of need for assistance  Insights: Decreased awareness of

## 2021-08-23 NOTE — CONSULTS
General Surgery   Resident Consult Note    Reason for consult: peritonitis    Chief Complaint: abdominal pain    History obtained from: patient EMR    History of Present Illness :    Soni Newberry is a 45 y.o. female with history of ESRD w/ hemodialysis via PD catheter (7/9), HTN, CHF, T2DM, and recent hospitalization for aspiration pneumonia with sepsis who presented to ED on 7/22 for lower abdominal pain and for whom general surgery is consulted due to possible peritonitis. Patient had been experiencing nausea, vomiting, and diarrhea prior to coming to ED. Patient reports she was advised by nephrology to come to ED. Patient reports that prior to diarrhea, patient was constipated for 3-5 days. Patient had fever of up to 100.4 in ED. Patient denies any blood in stool or any bloody emesis. Today patient reports cramping sensation in abdominal region. Patient received dialysis through PD catheter on Sunday and reports she was having increased cramping during draining compared to normal. Patient reports denies any new changes since PD catheter placement. Past Medical History:        Diagnosis Date    [facility]congest[facility] heart failure) ([facility] [address]     Diabetes mellitus ([facility] [address]     Hypertension        Past Surgical History:           Procedure Laterality Date    DIALYSIS CATHETER INSERTION N/A 7/9/2021    LAPAROSCOPIC PERITONEAL DIALYSIS CATHETER INSERTION, OMENTOPLEXY performed by Malcolm Phipps DO at [facility] OR       Allergies:  Patient has no known allergies. Medications:   Home Meds  No current facility-administered medications on file prior to encounter.      Current Outpatient Medications on File Prior to Encounter   Medication Sig Dispense Refill    spironolactone (ALDACTONE) 50 MG tablet Take 1 tablet by mouth daily 30 tablet 3    torsemide (DEMADEX) 100 MG tablet Take 2 tablets by mouth daily 30 tablet 3    NIFEdipine (PROCARDIA XL) 30 MG extended release tablet Take 1 tablet by mouth 2 times daily 30 tablet 3    carvedilol (COREG) 12.5 MG tablet Take 1 tablet by mouth 2 times daily 60 tablet 3    atorvastatin (LIPITOR) 40 MG tablet Take 1 tablet by mouth daily 30 tablet 3    ondansetron (ZOFRAN) 4 MG tablet Take 1 tablet by mouth every 12 hours as needed for Nausea or Vomiting 14 tablet 0    gabapentin (NEURONTIN) 100 MG capsule Take 3 capsules by mouth 2 times daily for 90 days.  180 capsule 2    aspirin 81 MG chewable tablet Take 1 tablet by mouth daily 30 tablet 3    insulin glargine (LANTUS) 100 UNIT/ML injection vial Inject 6 Units into the skin nightly Pt reports noncompliant (Patient taking differently: Inject 6 Units into the skin nightly Pt reports noncompliant   States 20units at HS) 1 vial 3    pantoprazole (PROTONIX) 40 MG tablet Take 40 mg by mouth daily as needed      insulin lispro, 1 Unit Dial, 100 UNIT/ML SOPN Inject 0-12 Units into the skin 3 times daily (with meals) (Patient taking differently: Inject 0-12 Units into the skin 3 times daily (with meals) 10 units with meals) 1 pen 1    butalbital-acetaminophen-caffeine (FIORICET, ESGIC) -40 MG per tablet Take 1 tablet by mouth every 4 hours as needed for Headaches 30 tablet 0    nitroGLYCERIN (NITROSTAT) 0.4 MG SL tablet Place 1 tablet under the tongue every 5 minutes as needed for Chest pain 5 tablet 1     Current Meds  polyethylene glycol (GLYCOLAX) packet 17 g, BID  cefTRIAXone (ROCEPHIN) 2000 mg IVPB in D5W 50ml minibag, Q24H  vancomycin (VANCOCIN) intermittent dosing (placeholder), RX Placeholder  metronidazole (FLAGYL) 500 mg in NaCl 100 mL IVPB premix, Q8H  gentamicin (GARAMYCIN) 0.1 % cream, PRN  sodium chloride flush 0.9 % injection 5-40 mL, 2 times per day  sodium chloride flush 0.9 % injection 5-40 mL, PRN  0.9 % sodium chloride infusion, PRN  heparin (porcine) injection 5,000 Units, 3 times per day  ondansetron (ZOFRAN-ODT) disintegrating tablet 4 mg, Q8H PRN   Or  ondansetron (ZOFRAN) injection 4 mg, Q6H PRN  polyethylene glycol (GLYCOLAX) packet 17 g, Daily PRN  acetaminophen (TYLENOL) tablet 650 mg, Q6H PRN   Or  acetaminophen (TYLENOL) suppository 650 mg, Q6H PRN  aspirin chewable tablet 81 mg, Daily  atorvastatin (LIPITOR) tablet 40 mg, Daily  butalbital-acetaminophen-caffeine (FIORICET, ESGIC) per tablet 1 tablet, Q4H PRN  carvedilol (COREG) tablet 12.5 mg, BID  gabapentin (NEURONTIN) capsule 300 mg, BID  insulin glargine (LANTUS;BASAGLAR) injection pen 10 Units, Nightly  insulin lispro (1 Unit Dial) 5 Units, TID WC  insulin lispro (1 Unit Dial) 0-6 Units, TID WC  insulin lispro (1 Unit Dial) 0-3 Units, Nightly  glucose (GLUTOSE) 40 % oral gel 15 g, PRN  dextrose 50 % IV solution, PRN  glucagon (rDNA) injection 1 mg, PRN  dextrose 5 % solution, PRN  NIFEdipine (ADALAT CC) extended release tablet 30 mg, BID  pantoprazole (PROTONIX) tablet 40 mg, QAM AC  HYDROmorphone (DILAUDID) injection 0.25 mg, Q6H PRN   Or  HYDROmorphone (DILAUDID) injection 0.5 mg, Q6H PRN        Family History:   History reviewed. No pertinent family history. Social History:   TOBACCO:   reports that she has never smoked. She has never used smokeless tobacco.  ETOH:   reports previous alcohol use. DRUGS:   reports previous drug use. Review of Systems:   A 14 point review of systems was conducted, significant findings as noted in HPI. All other systems negative.        Physical exam:    Vitals:    08/23/21 0540 08/23/21 0816 08/23/21 1153 08/23/21 1825   BP: 116/61 137/76 133/60 134/67   Pulse: 80 80 78 84   Resp: 20 18 16 18   Temp: 98.1 °F (36.7 °C) 98.1 °F (36.7 °C) 98.6 °F (37 °C) 98.4 °F (36.9 °C)   TempSrc: Oral Oral Oral Oral   SpO2:  98% 94% 95%   Weight: 183 lb 13.8 oz (83.4 kg)      Height:           General appearance: alert, resting in bed  Neuro: A&Ox3, no focal deficits  HENT: trachea midline, no JVD, no LAD  Eyes: PERRLA, no scleral icterus  Chest/Lungs: normal respiratory effort, symmetric chest rise, on RA  Cardiovascular: RRR  Abdomen: soft, tender to palpation in upper and lower quadrants, no guarding/rigidity; PD catheter to LLQ w/o surrounding erythema or increased tenderness  Skin: warm and dry  Extremities: no edema, no cyanosis    Labs:    CBC:   Recent Labs     08/22/21  1304 08/23/21  0416   WBC 7.3 7.6   HGB 11.7* 10.0*   HCT 35.0* 29.9*   MCV 93.8 93.2    159     BMP:   Recent Labs     08/22/21  1304 08/22/21  2108 08/23/21  0416 08/23/21  1456   *  --  125* 124*   K 4.1  --  3.3* 4.1   CL 93*  --  90* 88*   CO2 19*  --  18* 19*   PHOS  --  4.9 4.7  --    BUN 82*  --  72* 74*   CREATININE 5.2*  --  5.0* 5.7*     PT/INR:   Recent Labs     08/22/21  1304   PROTIME 11.6   INR 1.03     APTT: No results for input(s): APTT in the last 72 hours. Liver Profile:  Lab Results   Component Value Date    AST 17 08/22/2021    ALT 26 08/22/2021    BILIDIR <0.2 08/22/2021    BILITOT <0.2 08/22/2021    ALKPHOS 174 08/22/2021     Lab Results   Component Value Date    CHOL 277 12/10/2019    HDL 72 12/10/2019    TRIG 116 12/10/2019     UA:   Lab Results   Component Value Date    COLORU Yellow 07/11/2021    PHUR 5.5 07/11/2021    WBCUA 10-20 07/11/2021    RBCUA 3-4 07/11/2021    MUCUS 1+ 07/11/2021    YEAST Present 07/11/2021    BACTERIA 3+ 07/11/2021    CLARITYU Clear 07/11/2021    SPECGRAV >=1.030 07/11/2021    LEUKOCYTESUR SMALL 07/11/2021    UROBILINOGEN 0.2 07/11/2021    BILIRUBINUR SMALL 07/11/2021    BLOODU Negative 07/11/2021    GLUCOSEU Negative 07/11/2021    AMORPHOUS 2+ 07/11/2021       Imaging:   XR CHEST PORTABLE   Final Result      1. Mild bibasilar parenchymal opacities, likely atelectasis. CT ABDOMEN PELVIS WO CONTRAST Additional Contrast? None   Final Result      1. No acute intra-abdominopelvic abnormality. 2.  Small to moderate ascites and intraperitoneal free air likely relates to presence of peritoneal dialysis catheter. 3.  Enlarged uterus likely on the basis of uterine fibroids.  Pelvic ultrasound would provide

## 2021-08-23 NOTE — PROGRESS NOTES
CCPD Order   Exchanges: 4   Exchange Volume: 2000 ml   Total Time: 8 hrs   Dextrose: 2.5%   Last Fill: 0 ml   Total Volume: 8000 ml     Orders verified. Supplies taken to pt's room. Report received. Cycler set up, primed and pre tested. Dressing changed on [facility] Cath site. Pt connected to cycler. CCPD initiated without problem. Initial effluent cloudy.

## 2021-08-23 NOTE — PLAN OF CARE
Problem: Pain:  Goal: Pain level will decrease  Description: Pain level will decrease  8/23/2021 1904 by Josue Duran RN  Outcome: Ongoing  Patient complains of abdominal pain and tenderness with light palpation. Last medicated at 1825. Resting comfortably at present. Will continue to monitor comfort level and treat as needed. Problem: ACTIVITY INTOLERANCE/IMPAIRED MOBILITY  Goal: Mobility/activity is maintained at optimum level for patient  Outcome: Ongoing  Patient is alert and oriented. Able to follow commands and verbalize needs. Ambulates with one person assistance, walker and gait belt. Gait weak and unsteady at times. Resting in bed with call light in reach and bed alarm activated. Will gradually increase activity and monitor tolerance.      8/23/2021 0700 by Mono Cortes RN  Outcome: Ongoing

## 2021-08-23 NOTE — PROGRESS NOTES
4 Eyes Admission Assessment     I agree as the admission nurse that 2 RN's have performed a thorough Head to Toe Skin Assessment on the patient. ALL assessment sites listed below have been assessed on admission. Areas assessed by both nurses:   [x]   Head, Face, and Ears   [x]   Shoulders, Back, and Chest  [x]   Arms, Elbows, and Hands   [x]   Coccyx, Sacrum, and Ischium  [x]   Legs, Feet, and Heels        Does the Patient have Skin Breakdown? No         Margarito Prevention initiated:  Yes   Wound Care Orders initiated:  NA      [facility] nurse consulted for Pressure Injury (Stage 3,4, Unstageable, DTI, NWPT, and Complex wounds) or Margarito score 18 or lower:  No       Patient noted with scab like abrasion to left knee and scabbed area to mid right abd. No open areas noted. PD access site left abd covered with clean dressing.   Nurse 1 eSignature: Electronically signed by Babita Mckeon RN on 8/23/21 at 12:42 AM EDT    **SHARE this note so that the co-signing nurse is able to place an eSignature**    Nurse 2 eSignature: {Esignature:[id number]}

## 2021-08-24 LAB
ALBUMIN SERPL-MCNC: 2.9 G/DL (ref 3.4–5)
ANION GAP SERPL CALCULATED.3IONS-SCNC: 17 MMOL/L (ref 3–16)
APPEARANCE FLUID: NORMAL
BASOPHILS ABSOLUTE: 0 K/UL (ref 0–0.2)
BASOPHILS RELATIVE PERCENT: 0.3 %
BUN BLDV-MCNC: 70 MG/DL (ref 7–20)
CALCIUM SERPL-MCNC: 9.2 MG/DL (ref 8.3–10.6)
CELL COUNT FLUID TYPE: NORMAL
CHLORIDE BLD-SCNC: 92 MMOL/L (ref 99–110)
CLOT EVALUATION: NORMAL
CO2: 20 MMOL/L (ref 21–32)
COLOR FLUID: COLORLESS
CREAT SERPL-MCNC: 4.8 MG/DL (ref 0.6–1.1)
EOSINOPHIL FLUID: 5 %
EOSINOPHILS ABSOLUTE: 0.4 K/UL (ref 0–0.6)
EOSINOPHILS RELATIVE PERCENT: 8.5 %
GFR AFRICAN AMERICAN: 12
GFR NON-AFRICAN AMERICAN: 10
GI BACTERIAL PATHOGENS BY PCR: NORMAL
GLUCOSE BLD-MCNC: 121 MG/DL (ref 70–99)
GLUCOSE BLD-MCNC: 164 MG/DL (ref 70–99)
GLUCOSE BLD-MCNC: 187 MG/DL (ref 70–99)
GLUCOSE BLD-MCNC: 188 MG/DL (ref 70–99)
GLUCOSE BLD-MCNC: 262 MG/DL (ref 70–99)
HCT VFR BLD CALC: 31 % (ref 36–48)
HEMOGLOBIN: 10.4 G/DL (ref 12–16)
LACTIC ACID: 1.1 MMOL/L (ref 0.4–2)
LYMPHOCYTES ABSOLUTE: 0.8 K/UL (ref 1–5.1)
LYMPHOCYTES RELATIVE PERCENT: 16 %
LYMPHOCYTES, BODY FLUID: 2 %
MAGNESIUM: 1.8 MG/DL (ref 1.8–2.4)
MCH RBC QN AUTO: 31.3 PG (ref 26–34)
MCHC RBC AUTO-ENTMCNC: 33.4 G/DL (ref 31–36)
MCV RBC AUTO: 93.8 FL (ref 80–100)
MESOTHELIAL FLUID: 1 %
MONOCYTE, FLUID: 6 %
MONOCYTES ABSOLUTE: 0.4 K/UL (ref 0–1.3)
MONOCYTES RELATIVE PERCENT: 8.8 %
NEUTROPHIL, FLUID: 86 %
NEUTROPHILS ABSOLUTE: 3.3 K/UL (ref 1.7–7.7)
NEUTROPHILS RELATIVE PERCENT: 66.4 %
NUCLEATED CELLS FLUID: 1333 /CUMM
NUMBER OF CELLS COUNTED FLUID: 100
PDW BLD-RTO: 13.3 % (ref 12.4–15.4)
PERFORMED ON: ABNORMAL
PHOSPHORUS: 5.2 MG/DL (ref 2.5–4.9)
PLATELET # BLD: 167 K/UL (ref 135–450)
PMV BLD AUTO: 9.1 FL (ref 5–10.5)
POTASSIUM SERPL-SCNC: 3.5 MMOL/L (ref 3.5–5.1)
RBC # BLD: 3.31 M/UL (ref 4–5.2)
RBC FLUID: <1000 /CUMM
REPORT: NORMAL
SODIUM BLD-SCNC: 129 MMOL/L (ref 136–145)
TROPONIN: 0.25 NG/ML
VANCOMYCIN RANDOM: 25.3 UG/ML
WBC # BLD: 4.9 K/UL (ref 4–11)

## 2021-08-24 PROCEDURE — 2580000003 HC RX 258

## 2021-08-24 PROCEDURE — 6370000000 HC RX 637 (ALT 250 FOR IP): Performed by: STUDENT IN AN ORGANIZED HEALTH CARE EDUCATION/TRAINING PROGRAM

## 2021-08-24 PROCEDURE — 97110 THERAPEUTIC EXERCISES: CPT

## 2021-08-24 PROCEDURE — 87205 SMEAR GRAM STAIN: CPT

## 2021-08-24 PROCEDURE — 6370000000 HC RX 637 (ALT 250 FOR IP): Performed by: INTERNAL MEDICINE

## 2021-08-24 PROCEDURE — 83735 ASSAY OF MAGNESIUM: CPT

## 2021-08-24 PROCEDURE — 6360000002 HC RX W HCPCS

## 2021-08-24 PROCEDURE — 2060000000 HC ICU INTERMEDIATE R&B

## 2021-08-24 PROCEDURE — 89051 BODY FLUID CELL COUNT: CPT

## 2021-08-24 PROCEDURE — 84484 ASSAY OF TROPONIN QUANT: CPT

## 2021-08-24 PROCEDURE — 2580000003 HC RX 258: Performed by: STUDENT IN AN ORGANIZED HEALTH CARE EDUCATION/TRAINING PROGRAM

## 2021-08-24 PROCEDURE — 99233 SBSQ HOSP IP/OBS HIGH 50: CPT | Performed by: INTERNAL MEDICINE

## 2021-08-24 PROCEDURE — 87070 CULTURE OTHR SPECIMN AEROBIC: CPT

## 2021-08-24 PROCEDURE — 85025 COMPLETE CBC W/AUTO DIFF WBC: CPT

## 2021-08-24 PROCEDURE — 97116 GAIT TRAINING THERAPY: CPT

## 2021-08-24 PROCEDURE — 6360000002 HC RX W HCPCS: Performed by: STUDENT IN AN ORGANIZED HEALTH CARE EDUCATION/TRAINING PROGRAM

## 2021-08-24 PROCEDURE — 80202 ASSAY OF VANCOMYCIN: CPT

## 2021-08-24 PROCEDURE — 36415 COLL VENOUS BLD VENIPUNCTURE: CPT

## 2021-08-24 PROCEDURE — 83605 ASSAY OF LACTIC ACID: CPT

## 2021-08-24 PROCEDURE — 2500000003 HC RX 250 WO HCPCS: Performed by: STUDENT IN AN ORGANIZED HEALTH CARE EDUCATION/TRAINING PROGRAM

## 2021-08-24 PROCEDURE — 99232 SBSQ HOSP IP/OBS MODERATE 35: CPT | Performed by: SURGERY

## 2021-08-24 PROCEDURE — 80069 RENAL FUNCTION PANEL: CPT

## 2021-08-24 PROCEDURE — 2500000003 HC RX 250 WO HCPCS: Performed by: INTERNAL MEDICINE

## 2021-08-24 PROCEDURE — 90945 DIALYSIS ONE EVALUATION: CPT

## 2021-08-24 PROCEDURE — 99232 SBSQ HOSP IP/OBS MODERATE 35: CPT | Performed by: INTERNAL MEDICINE

## 2021-08-24 RX ORDER — MAGNESIUM SULFATE HEPTAHYDRATE 40 MG/ML
2000 INJECTION, SOLUTION INTRAVENOUS ONCE
Status: COMPLETED | OUTPATIENT
Start: 2021-08-24 | End: 2021-08-24

## 2021-08-24 RX ORDER — POTASSIUM CHLORIDE 20 MEQ/1
40 TABLET, EXTENDED RELEASE ORAL ONCE
Status: COMPLETED | OUTPATIENT
Start: 2021-08-24 | End: 2021-08-24

## 2021-08-24 RX ADMIN — ONDANSETRON 4 MG: 2 INJECTION INTRAMUSCULAR; INTRAVENOUS at 05:21

## 2021-08-24 RX ADMIN — CARVEDILOL 12.5 MG: 12.5 TABLET, FILM COATED ORAL at 20:39

## 2021-08-24 RX ADMIN — PANTOPRAZOLE SODIUM 40 MG: 40 TABLET, DELAYED RELEASE ORAL at 05:21

## 2021-08-24 RX ADMIN — CEFTRIAXONE SODIUM 2000 MG: 2 INJECTION, POWDER, FOR SOLUTION INTRAMUSCULAR; INTRAVENOUS at 13:16

## 2021-08-24 RX ADMIN — INSULIN LISPRO 1 UNITS: 100 INJECTION, SOLUTION INTRAVENOUS; SUBCUTANEOUS at 21:30

## 2021-08-24 RX ADMIN — MAGNESIUM SULFATE HEPTAHYDRATE 2000 MG: 2 INJECTION, SOLUTION INTRAVENOUS at 08:10

## 2021-08-24 RX ADMIN — METRONIDAZOLE 500 MG: 500 INJECTION, SOLUTION INTRAVENOUS at 03:21

## 2021-08-24 RX ADMIN — ATORVASTATIN CALCIUM 40 MG: 40 TABLET, FILM COATED ORAL at 08:02

## 2021-08-24 RX ADMIN — INSULIN LISPRO 5 UNITS: 100 INJECTION, SOLUTION INTRAVENOUS; SUBCUTANEOUS at 18:08

## 2021-08-24 RX ADMIN — INSULIN LISPRO 1 UNITS: 100 INJECTION, SOLUTION INTRAVENOUS; SUBCUTANEOUS at 08:18

## 2021-08-24 RX ADMIN — SODIUM CHLORIDE 25 ML: 9 INJECTION, SOLUTION INTRAVENOUS at 03:21

## 2021-08-24 RX ADMIN — BUTALBITAL, ACETAMINOPHEN, AND CAFFEINE 1 TABLET: 50; 325; 40 TABLET ORAL at 05:21

## 2021-08-24 RX ADMIN — METRONIDAZOLE 500 MG: 500 INJECTION, SOLUTION INTRAVENOUS at 20:38

## 2021-08-24 RX ADMIN — INSULIN LISPRO 1 UNITS: 100 INJECTION, SOLUTION INTRAVENOUS; SUBCUTANEOUS at 18:08

## 2021-08-24 RX ADMIN — GABAPENTIN 300 MG: 300 CAPSULE ORAL at 20:39

## 2021-08-24 RX ADMIN — METRONIDAZOLE 500 MG: 500 INJECTION, SOLUTION INTRAVENOUS at 11:55

## 2021-08-24 RX ADMIN — Medication 10 ML: at 08:02

## 2021-08-24 RX ADMIN — ASPIRIN 81 MG: 81 TABLET, CHEWABLE ORAL at 08:02

## 2021-08-24 RX ADMIN — HEPARIN SODIUM 5000 UNITS: 5000 INJECTION INTRAVENOUS; SUBCUTANEOUS at 05:21

## 2021-08-24 RX ADMIN — Medication 10 ML: at 20:39

## 2021-08-24 RX ADMIN — POTASSIUM CHLORIDE 40 MEQ: 1500 TABLET, EXTENDED RELEASE ORAL at 08:02

## 2021-08-24 RX ADMIN — HEPARIN SODIUM 5000 UNITS: 5000 INJECTION INTRAVENOUS; SUBCUTANEOUS at 21:30

## 2021-08-24 RX ADMIN — GABAPENTIN 300 MG: 300 CAPSULE ORAL at 08:02

## 2021-08-24 RX ADMIN — HYDROMORPHONE HYDROCHLORIDE 0.5 MG: 1 INJECTION, SOLUTION INTRAMUSCULAR; INTRAVENOUS; SUBCUTANEOUS at 00:11

## 2021-08-24 RX ADMIN — INSULIN LISPRO 5 UNITS: 100 INJECTION, SOLUTION INTRAVENOUS; SUBCUTANEOUS at 13:58

## 2021-08-24 RX ADMIN — INSULIN LISPRO 5 UNITS: 100 INJECTION, SOLUTION INTRAVENOUS; SUBCUTANEOUS at 08:19

## 2021-08-24 RX ADMIN — NIFEDIPINE 30 MG: 30 TABLET, EXTENDED RELEASE ORAL at 08:02

## 2021-08-24 RX ADMIN — HEPARIN SODIUM 5000 UNITS: 5000 INJECTION INTRAVENOUS; SUBCUTANEOUS at 14:01

## 2021-08-24 RX ADMIN — INSULIN GLARGINE 10 UNITS: 100 INJECTION, SOLUTION SUBCUTANEOUS at 21:29

## 2021-08-24 RX ADMIN — SODIUM CHLORIDE 25 ML: 9 INJECTION, SOLUTION INTRAVENOUS at 20:38

## 2021-08-24 RX ADMIN — POLYETHYLENE GLYCOL 3350 17 G: 17 POWDER, FOR SOLUTION ORAL at 08:02

## 2021-08-24 RX ADMIN — NIFEDIPINE 30 MG: 30 TABLET, EXTENDED RELEASE ORAL at 20:39

## 2021-08-24 RX ADMIN — CARVEDILOL 12.5 MG: 12.5 TABLET, FILM COATED ORAL at 08:02

## 2021-08-24 ASSESSMENT — PAIN DESCRIPTION - PROGRESSION: CLINICAL_PROGRESSION: GRADUALLY WORSENING

## 2021-08-24 ASSESSMENT — PAIN DESCRIPTION - FREQUENCY
FREQUENCY: CONTINUOUS
FREQUENCY: CONTINUOUS

## 2021-08-24 ASSESSMENT — ENCOUNTER SYMPTOMS
ABDOMINAL PAIN: 1
SHORTNESS OF BREATH: 0
DIARRHEA: 1
CONSTIPATION: 0
NAUSEA: 0
VOMITING: 0
ABDOMINAL DISTENTION: 0
COUGH: 0

## 2021-08-24 ASSESSMENT — PAIN SCALES - GENERAL
PAINLEVEL_OUTOF10: 0
PAINLEVEL_OUTOF10: 5
PAINLEVEL_OUTOF10: 0
PAINLEVEL_OUTOF10: 7
PAINLEVEL_OUTOF10: 0

## 2021-08-24 ASSESSMENT — PAIN DESCRIPTION - LOCATION
LOCATION: HEAD
LOCATION: ABDOMEN

## 2021-08-24 ASSESSMENT — PAIN DESCRIPTION - PAIN TYPE
TYPE: ACUTE PAIN
TYPE: ACUTE PAIN

## 2021-08-24 ASSESSMENT — PAIN DESCRIPTION - ONSET
ONSET: ON-GOING
ONSET: ON-GOING

## 2021-08-24 ASSESSMENT — PAIN DESCRIPTION - DESCRIPTORS
DESCRIPTORS: HEADACHE
DESCRIPTORS: SORE

## 2021-08-24 NOTE — PROGRESS NOTES
Clinical Pharmacy Progress Note    - Vancomycin has been discontinued - Pharmacy will sign off vancomycin dosing  - If vancomycin is resumed, please re-consult Pharmacy    Please call with questions:  [phone] ([facility])    Lex VEGAS    8/24/2021 8:30 AM

## 2021-08-24 NOTE — PLAN OF CARE
Problem: Pain:  Goal: Pain level will decrease  Description: Pain level will decrease  Outcome: Ongoing  Patient has continued to deny pain this shift and decline pain medication. Resting comfortably at present. No physical signs of pain. Instructed to notify staff of pain issues. Verbalized understanding. Will monitor comfort level. Problem: HEMODYNAMIC STATUS  Goal: Patient has stable vital signs and fluid balance  Outcome: Ongoing  Patient has remained afebrile. NSR on monitor. Will monitor. Problem: Falls - Risk of:  Goal: Will remain free from falls  Description: Will remain free from falls  Outcome: Ongoing  Alert and oriented. Patient is more vibrant and interactive today. Ambulates with contact guard assistance and no assistive devices today. States that she feels stronger today. In bed with alarm activated and call light in reach. Instructed her to continue to call for assistance with ambulation. Verbalized understanding. Will monitor.

## 2021-08-24 NOTE — FLOWSHEET NOTE
08/23/21 2150   Vitals   /62   Temp 98.8 °F (37.1 °C)   Temp Source Oral   Pulse 85   Resp 19   Weight 190 lb 0.6 oz (86.2 kg)   Cycler   Verification of Prescription CCPD   Total Volume Programmed 8000 mL   Therapy Time (Hours:Minutes) 08:00   Fill Volume 2000 mL   Last Fill Volume 0 mL   Dextrose Setting Same (Nonextraneal)   I Drain Alarm 0 mL   Number of Cycles 4   Bag Volume 5000 mL   Number of Bags Used 2   Dianeal Solution Other (Comment)  (delflex 2.5% in 5000 ml)   I Drain (mL) 0 mL     CCPD Order   Exchanges: 4   Exchange Volume: 2000 ml   Total Time: 8 hrs   Dextrose: 2.5%   Last Fill: 0 ml   Total Volume: 8000 ml    Unable to obtain effluent specimen, PD cavity dry, HD CN notified that PD RN will send sample in AM with disconnect. Orders verified. Supplies taken to pt's room. Report received. Cycler set up, primed and pre tested. Dressing changed on [facility] Cath site. Pt connected to cycler. CCPD initiated without problem. Initial effluent clear.

## 2021-08-24 NOTE — CONSULTS
Nephrology Consult Note                                                                                                                                                                                                                                                                                                                                                               Office : [phone]     Fax :[phone]              Patient's Name: Amy Rogel  8/23/2021    Reason for Consult:  ESRD   Requesting Physician:  Yolanda Miller      Chief Complaint:  abd pain     History of Present Ilness:    Amy Rogel is a 45 y.o. female with ESRD   Came with abd pain   Pt is on PD   abd pain is better   Fluid is cloudy   No Diarrhea   Solute intake poor   Drinking fluids - Na worse   Pt seen on PD   Constipation +    Past Medical History:   Diagnosis Date    CHF (congestive heart failure) ([address]     Diabetes mellitus [facility] [address]     Hypertension        Past Surgical History:   Procedure Laterality Date    DIALYSIS CATHETER INSERTION N/A 7/9/2021    LAPAROSCOPIC PERITONEAL DIALYSIS CATHETER INSERTION, OMENTOPLEXY performed by Shakir Lackey DO at [address]       History reviewed. No pertinent family history. reports that she has never smoked. She has never used smokeless tobacco. She reports previous alcohol use. She reports previous drug use. Allergies:  Patient has no known allergies.     Current Medications:    polyethylene glycol (GLYCOLAX) packet 17 g, BID  cefTRIAXone (ROCEPHIN) 2000 mg IVPB in D5W 50ml minibag, Q24H  vancomycin (VANCOCIN) intermittent dosing (placeholder), RX Placeholder  metronidazole (FLAGYL) 500 mg in NaCl 100 mL IVPB premix, Q8H  gentamicin (GARAMYCIN) 0.1 % cream, PRN  sodium chloride flush 0.9 % injection 5-40 mL, 2 times per day  sodium chloride flush 0.9 % injection 5-40 mL, PRN  0.9 % sodium chloride infusion, PRN  heparin (porcine) injection 5,000 Units, 3 times per day  ondansetron (ZOFRAN-ODT) disintegrating tablet 4 mg, Q8H PRN   Or  ondansetron (ZOFRAN) injection 4 mg, Q6H PRN  polyethylene glycol (GLYCOLAX) packet 17 g, Daily PRN  acetaminophen (TYLENOL) tablet 650 mg, Q6H PRN   Or  acetaminophen (TYLENOL) suppository 650 mg, Q6H PRN  aspirin chewable tablet 81 mg, Daily  atorvastatin (LIPITOR) tablet 40 mg, Daily  butalbital-acetaminophen-caffeine (FIORICET, ESGIC) per tablet 1 tablet, Q4H PRN  carvedilol (COREG) tablet 12.5 mg, BID  gabapentin (NEURONTIN) capsule 300 mg, BID  insulin glargine (LANTUS;BASAGLAR) injection pen 10 Units, Nightly  insulin lispro (1 Unit Dial) 5 Units, TID WC  insulin lispro (1 Unit Dial) 0-6 Units, TID WC  insulin lispro (1 Unit Dial) 0-3 Units, Nightly  glucose (GLUTOSE) 40 % oral gel 15 g, PRN  dextrose 50 % IV solution, PRN  glucagon (rDNA) injection 1 mg, PRN  dextrose 5 % solution, PRN  NIFEdipine (ADALAT CC) extended release tablet 30 mg, BID  pantoprazole (PROTONIX) tablet 40 mg, QAM AC  HYDROmorphone (DILAUDID) injection 0.25 mg, Q6H PRN   Or  HYDROmorphone (DILAUDID) injection 0.5 mg, Q6H PRN        Review of Systems:   14 point ROS obtained but were negative except mentioned in HPI      Physical exam:     Vitals:  /62   Pulse 80   Temp 98.4 °F (36.9 °C) (Oral)   Resp 17   Ht 5' (1.524 m)   Wt 183 lb 13.8 oz (83.4 kg)   SpO2 93%   BMI 35.91 kg/m²   Constitutional:  OAA X3 NAD  Skin: no rash, turgor wnl  Heent:  eomi, mmm  Neck: no bruits or jvd noted  Cardiovascular:  S1, S2 without m/r/g  Respiratory: CTA B without w/r/r  Abdomen:  +bs, soft, ttp   Ext: + lower extremity edema  Psychiatric: mood and affect appropriate  Musculoskeletal:  Rom, muscular strength intact    Data:   Labs:  CBC:   Recent Labs     08/22/21  1304 08/23/21  0416   WBC 7.3 7.6   HGB 11.7* 10.0*    159     BMP:    Recent Labs     08/22/21  1304 08/23/21  0416 08/23/21  1456   * 125* 124*   K 4.1 3.3* 4.1   CL 93* 90* 88*   CO2 19* 18* 19*   BUN 82* 72* 74*   CREATININE 5.2* 5.0* 5.7*   GLUCOSE 192* 311* 137*     Ca/Mg/Phos:   Recent Labs     08/22/21  1304 08/22/21  2108 08/23/21  0416 08/23/21  1456   CALCIUM 9.6  --  8.5 8.7   MG  --  1.50* 1.50* 1.90   PHOS  --  4.9 4.7  --      Hepatic:   Recent Labs     08/22/21  1304   AST 17   ALT 26   BILITOT <0.2   ALKPHOS 174*     Troponin:   Recent Labs     08/22/21 2108   TROPONINI 0.29*     BNP: No results for input(s): BNP in the last 72 hours. Lipids: No results for input(s): CHOL, TRIG, HDL, LDLCALC, LABVLDL in the last 72 hours. ABGs: No results for input(s): PHART, PO2ART, WFE2ANT in the last 72 hours. INR:   Recent Labs     08/22/21  1304   INR 1.03     UA:No results for input(s): Cesario Ross, GLUCOSEU, BILIRUBINUR, Tae Husky, BLOODU, PHUR, PROTEINU, UROBILINOGEN, NITRU, LEUKOCYTESUR, Graham Heckle in the last 72 hours. Urine Microscopic: No results for input(s): LABCAST, BACTERIA, COMU, HYALCAST, WBCUA, RBCUA, EPIU in the last 72 hours. Urine Culture: No results for input(s): LABURIN in the last 72 hours. Urine Chemistry: No results for input(s): Durán Gitelman, PROTEINUR, NAUR in the last 72 hours. IMAGING:  XR CHEST PORTABLE   Final Result      1. Mild bibasilar parenchymal opacities, likely atelectasis. CT ABDOMEN PELVIS WO CONTRAST Additional Contrast? None   Final Result      1. No acute intra-abdominopelvic abnormality. 2.  Small to moderate ascites and intraperitoneal free air likely relates to presence of peritoneal dialysis catheter. 3.  Enlarged uterus likely on the basis of uterine fibroids. Pelvic ultrasound would provide better evaluation. Assessment/Plan   1. ESRD     2. HTN    3. Anemia    4. Acid- base/ Electrolyte imbalance     5.  PD peritonitis     Plan   - cont CCPD   - Abx   - CX   - monitor cell count   - labs in am   - encourage solute intake   - dec free water intake   - d/w pt in length                 Thank you for allowing us to participate in care of Luke Tovar MD  Feel free to contact me   [facility] [address]  Office : [phone]  Fax :[phone]

## 2021-08-24 NOTE — PROGRESS NOTES
Progress Note    Admit Date: 8/22/2021  Diet: ADULT DIET; Regular; 3 carb choices (45 gm/meal); Low Sodium (2 gm); Low Potassium (Less than 3000 mg/day); Low Phosphorus (Less than 1000 mg); 1200 ml    CC: abd pain    Interval history:  - abdominal pain much improved  - diarrhea x3 yest that improved with bisacodyl  - still on PD, Cr mildly improved today  - Mg and K repletion  - Na up to 129  - on ceftriaxone and metro, off vanc    HPI: Pt is a  51 y.o. F with  PMHx ESRD on PD, HTN, CHF, T2DM, recent hospitalization (7/2021) for aspiration pneumonia with sepsis p/w lower quadrant abdominal pain. Evening before coming to the ED is when it got started. It first presented with SOB which turned into a abdominal pain with nausea, diarrhea and yellow vomiting with no blood. .Pain became unbearable which prompted her to come to the ED. She began PD ~1mo ago.     In the ED, was febrile to 100.4, Cr 5.2 (baseline 2.5-3) and pH 7.343. She was given a dose of vanc + cefepime and dilaudid. Admitted for concern for SBP. Echo Dec 2019- EF 5-60%. Cardiac MRI showed EF 71% with apical dysfunction consistent with ischemia.      Medications:     Scheduled Meds:   polyethylene glycol  17 g Oral BID    cefTRIAXone (ROCEPHIN) IV  2,000 mg Intravenous Q24H    metroNIDAZOLE  500 mg Intravenous Q8H    sodium chloride flush  5-40 mL Intravenous 2 times per day    heparin (porcine)  5,000 Units Subcutaneous 3 times per day    aspirin  81 mg Oral Daily    atorvastatin  40 mg Oral Daily    carvedilol  12.5 mg Oral BID    gabapentin  300 mg Oral BID    insulin glargine  10 Units Subcutaneous Nightly    insulin lispro  5 Units Subcutaneous TID WC    insulin lispro  0-6 Units Subcutaneous TID WC    insulin lispro  0-3 Units Subcutaneous Nightly    NIFEdipine  30 mg Oral BID    pantoprazole  40 mg Oral QAM AC     Continuous Infusions:   sodium chloride 25 mL (08/24/21 0321)    dextrose       PRN Meds:gentamicin, sodium chloride flush, sodium chloride, ondansetron **OR** ondansetron, polyethylene glycol, acetaminophen **OR** acetaminophen, butalbital-acetaminophen-caffeine, glucose, dextrose, glucagon (rDNA), dextrose, HYDROmorphone **OR** HYDROmorphone    Objective:   Vitals:   T-max:  Patient Vitals for the past 8 hrs:   BP Temp Temp src Pulse Resp SpO2   08/24/21 0756 133/67 98.4 °F (36.9 °C) Oral 82 16 90 %   08/24/21 0321 (!) 118/58 98.8 °F (37.1 °C) Oral 78 16 96 %       Intake/Output Summary (Last 24 hours) at 8/24/2021 0941  Last data filed at 8/23/2021 2150  Gross per 24 hour   Intake 480 ml   Output 0 ml   Net 480 ml       Review of Systems   Constitutional: Negative for chills and fever. Respiratory: Negative for cough and shortness of breath. Cardiovascular: Negative for chest pain and leg swelling. Gastrointestinal: Positive for abdominal pain and diarrhea. Negative for abdominal distention, constipation, nausea and vomiting. Neurological: Negative for light-headedness and headaches. Physical Exam  Constitutional:       Appearance: Normal appearance. HENT:      Mouth/Throat:      Mouth: Mucous membranes are dry. Pharynx: Oropharynx is clear. Eyes:      Extraocular Movements: Extraocular movements intact. Pupils: Pupils are equal, round, and reactive to light. Cardiovascular:      Rate and Rhythm: Normal rate and regular rhythm. Pulses: Normal pulses. Heart sounds: Normal heart sounds. Pulmonary:      Effort: Pulmonary effort is normal.      Breath sounds: Normal breath sounds. Abdominal:      General: Abdomen is flat. Bowel sounds are normal. There is no distension. Palpations: Abdomen is soft. Tenderness: There is no abdominal tenderness. Musculoskeletal:      Right lower leg: No edema. Left lower leg: No edema. Skin:     General: Skin is warm and dry. Neurological:      General: No focal deficit present.       Mental Status: She is alert and oriented to person, place, and time. LABS:    CBC:   Recent Labs     08/22/21  1304 08/23/21  0416 08/24/21  0543   WBC 7.3 7.6 4.9   HGB 11.7* 10.0* 10.4*   HCT 35.0* 29.9* 31.0*    159 167   MCV 93.8 93.2 93.8     Renal:    Recent Labs     08/22/21  1304 08/22/21  2108 08/23/21  0416 08/23/21  1456 08/24/21  0543   NA   < >  --  125* 124* 129*   K  --   --  3.3* 4.1 3.5   CL   < >  --  90* 88* 92*   CO2   < >  --  18* 19* 20*   BUN   < >  --  72* 74* 70*   CREATININE   < >  --  5.0* 5.7* 4.8*   GLUCOSE   < >  --  311* 137* 262*   CALCIUM   < >  --  8.5 8.7 9.2   MG   < > 1.50* 1.50* 1.90 1.80   PHOS  --  4.9 4.7  --  5.2*   ANIONGAP   < >  --  17* 17* 17*    < > = values in this interval not displayed. Hepatic:   Recent Labs     08/22/21  1304 08/23/21  0416 08/24/21  0543   AST 17  --   --    ALT 26  --   --    BILITOT <0.2  --   --    BILIDIR <0.2  --   --    PROT 8.0  --   --    LABALBU 3.6 2.8* 2.9*   ALKPHOS 174*  --   --      Troponin:   Recent Labs     08/22/21 2108   TROPONINI 0.29*     BNP: No results for input(s): BNP in the last 72 hours. Lipids: No results for input(s): CHOL, HDL in the last 72 hours. Invalid input(s): LDLCALCU, TRIGLYCERIDE  ABGs:  No results for input(s): PHART, PVL0PMO, PO2ART, AMA9NWN, BEART, THGBART, A1YWPHCB, DVD1QPQ in the last 72 hours. INR:   Recent Labs     08/22/21  1304   INR 1.03     Lactate: No results for input(s): LACTATE in the last 72 hours. Cultures:  -----------------------------------------------------------------  RAD:   XR CHEST PORTABLE   Final Result      1. Mild bibasilar parenchymal opacities, likely atelectasis. CT ABDOMEN PELVIS WO CONTRAST Additional Contrast? None   Final Result      1. No acute intra-abdominopelvic abnormality. 2.  Small to moderate ascites and intraperitoneal free air likely relates to presence of peritoneal dialysis catheter. 3.  Enlarged uterus likely on the basis of uterine fibroids.  Pelvic ultrasound would provide better evaluation. Assessment/Plan:   Jesus Topete F 76 y. o. female PMHx ESRD on PD, HTN, CHF, T2DM, recent hospitalization (7/2021) for aspiration pneumonia with sepsis p/w abdominal pain    Abdominal pain: 2/2 PD-related peritonitis vs SBP. Uterine fibroids also may contribute to abd pain but has improved with antibiotics making peritonitis more likely. Blood culture gram positive cocci possible contaminant  - ceftriaxone + metronidazole  - FU BCxs, fluids cultures  - FU identification and sens of gram pos cocci  - FU MRSA swab  - FU UA with reflex  - lactate normalized, stop trending  - PT/OT  - cont tele  - OP gyn on discharge    Tropinemia: no chest pain and likely clearance issue in setting of ESRD. Rpt downtrending  - stop trend trops    ESRD on PD: renal panel mildly improved with   - nephro following  - cont PD    Diarrhea: 3x yesterday that improved with bisacodyl. Cdiff negative  - FU bacterial pathogens PCR    HTN: pressures on the soft side  - will continue carvedilol, nifedipine  - hold torsemide and spironolactone while pressures soft and correcting HARRIETT     T2DM: takes 20 lantus, 10 lispro with meals at home  - 10 lantus, 5 lispro with meals, + LDSSI     Chronic pain  - home gabapentin    Code Status: Full code  FEN: Regular diet; low Na, K, Ph; 3 carb choices; 1200ml fluid restriction  PPX: heparin, protonix  DISPO: IP, likely dc tomorrow    Galileo Cao MD, PGY-1  08/24/21  9:41 AM    This patient has been staffed and discussed with Dr. Roge Nicolas.

## 2021-08-24 NOTE — PROGRESS NOTES
Clinical Pharmacy Consult Note    Admit date: 8/22/2021    Subjective/Objective:  Loi Pathak is a 45 y.o. female PMHx ESRD on PD, HTN, CHF, T2DM, recent hospitalization (7/2021) for aspiration pneumonia with sepsis p/w abdominal pain. Developed abdominal cramping yesterday evening which worsened this morning when she woke up. Pain became unbearable and she developed nausea with bilious vomiting which prompted her to come to the ED. Of note, she began PD ~1mo ago. Pharmacy is consulted to dose Vancomycin per Dr. Elizabeth Morgan per possible SBP. Pertinent Medications:  Vancomycin -- day # 2  --- 2250 mg (25mg/kg) dose in ER 8/22  LCeftriaxone 2g IVBP Q24H- (8/23- current)  Metronidazole 500mg IVPB Q8hr- Day #2 (8/23-current)  Date Dose Level   8/22 2250mg @ 1730    8/24  25.3mcg/mL @ 0545            Recent Labs     08/23/21  0416 08/23/21  0416 08/23/21  1456 08/24/21  0543   *   < > 124* 129*   K 3.3*   < > 4.1 3.5   CL 90*   < > 88* 92*   CO2 18*   < > 19* 20*   PHOS 4.7  --   --  5.2*   BUN 72*   < > 74* 70*   CREATININE 5.0*   < > 5.7* 4.8*    < > = values in this interval not displayed. Estimated Creatinine Clearance: 13 mL/min (A) (based on SCr of 4.8 mg/dL (H)). Recent Labs     08/23/21  0416 08/24/21  0543   WBC 7.6 4.9   HGB 10.0* 10.4*   HCT 29.9* 31.0*   MCV 93.2 93.8    167       Height:  5' (152.4 cm)  Weight: 190 lb 0.6 oz (86.2 kg)    Micro:  Date Site Micro Susceptibility   8/22 Blood 1/2 Gram Positive Rods                    Assessment/Plan:  1. SBP:  Vancomycin and Ceftriaxone- day #3      Metronidazole- Day # 2  Vancomycin  · Patient is currently receiving PD. · Patient received 25mg/kg dose in ER 8/22. Random level this AM of 25.3mcg/mL indicating patient received an adequate dose. · Will recheck vancomycin level AM 8/26 (not ordered) to monitor for clearance.   · Clinical pharmacist will follow-up in AM.  · Renal function will be monitored closely and dosing will be adjusted as appropriate. Ceftriaxone  · Continue 2g [id number]     Metronidazole  · Continue 500mg Q8H    Please call with any questions. Thank you for consulting pharmacy!   Agusto Jimenez, PharmD  PGY-1 Pharmacy Resident  [id number]  8/24/2021 7:11 AM

## 2021-08-24 NOTE — PROGRESS NOTES
Physical Therapy  Facility/Department: [place] [address] [facility]  Daily Treatment Note  NAME: Angy Gregory  : 1970  MRN: [id number]    Date of Service: 2021    Discharge Recommendations:Scottie Chiang scored a 17 on the AM-PAC short mobility form. Current research shows that an AM-PAC score of 17 or less is typically not associated with a discharge to the patient's home setting. Based on the patient's AM-PAC score and their current functional mobility deficits, it is recommended that the patient have 3-5 sessions per week of Physical Therapy at d/c to increase the patient's independence. Please see assessment section for further patient specific details. If patient discharges prior to next session this note will serve as a discharge summary. Please see below for the latest assessment towards goals. Assessment   Body structures, Functions, Activity limitations: Decreased functional mobility ; Decreased posture;Decreased endurance;Decreased strength;Decreased balance; Increased pain;Decreased safe awareness  Assessment: Pt tolerated session well. Pt with significant improvement with mobility this date . Pt is now supervision for bed mobility and CGA for ambulation with RW. Pt continues to be below baseline and requires continued skilled PT to increase functional mobility . Treatment Diagnosis: Dec'd mobility  Prognosis: Good  Decision Making: Medium Complexity  PT Education: PT Role;Transfer Training;Functional Mobility Training;General Safety;Gait Training;Home Exercise Program;Adaptive Device Training;Goals;Plan of Care  Patient Education: Pt will benefit from continued edu  REQUIRES PT FOLLOW UP: Yes     Patient Diagnosis(es): The encounter diagnosis was Abdominal pain, unspecified abdominal location. has a past medical history of CHF (congestive heart failure) ([address] Diabetes mellitus ([facility] [address] and Hypertension.    has a past surgical history that includes Dialysis Catheter Insertion (N/A, 7/9/2021). Restrictions  Position Activity Restriction  Other position/activity restrictions: up with assistance  Subjective   General  Chart Reviewed: Yes  Additional Pertinent Hx: 46 y/o F presents with abdominal pain. Admitted for concern for SBP. PMH: ESRD on peritoneal dialysis (catheter placed 7/9), anemia, DM, HTN, diastolic heart failure  Response To Previous Treatment: Not applicable  Family / Caregiver Present: No  Referring Practitioner: Roseann Strong MD  Subjective  Subjective: Pt supine in bed. Pt reports feeling alot better today . Pain Screening  Patient Currently in Pain: Denies  Vital Signs  Patient Currently in Pain: Denies       Orientation  Orientation  Overall Orientation Status: Within Normal Limits  Cognition      Objective   Bed mobility  Supine to Sit: Supervision  Sit to Supine: Supervision  Comment: from slightly elevated HOB  Transfers  Sit to Stand: Contact guard assistance (Pt sit <> stand from EOB and chair with CGA and RW.)  Stand to sit: Contact guard assistance  Ambulation  Ambulation?: Yes  More Ambulation?: Yes  Ambulation 1  Surface: level tile  Device: No Device  Assistance: Minimal assistance  Quality of Gait: pt with decreased step length and lateral lean to right. pt attempting to furnature walk . 2 LOB min- mod A to correct. Gait Deviations: Slow Bharti;Staggers;Decreased step length;Shuffles;Decreased step height  Distance: 75 ft  Ambulation 2  Surface - 2: level tile  Device 2: Rolling Walker  Assistance 2: Contact guard assistance  Quality of Gait 2: decreased step length and height, cues for RW management, slight lateral lean right but no LOB. Gait Deviations: Slow Bharti;Decreased step length;Decreased step height  Distance: 150 ft  Comments: educated pt to use RW at this time due to increase safety. Pt verbalized understanding.   Stairs/Curb  Stairs?: No        Exercises  Straight Leg Raise: x10 BLE  Quad Sets: x10 BLE  Gluteal Sets: x10  Hip Abduction: X10

## 2021-08-24 NOTE — FLOWSHEET NOTE
CCPD treatment completed and patient disconnected from cycler per protocol. Effluent: cloudy yellow Fibrin (no) Specimen collected and sent to lab (yes)  Total time: 8hr 32min  Total UF:  -486 ml. Total Volume:  8007 ml. Dwell time gained:  9min. Tolerance of procedure : Tolerated tx well. No complaints or complications.       08/24/21 0930   Vitals   /64   Temp 98.6 °F (37 °C)   Temp Source Oral   Pulse 82   Resp 16   Weight 191 lb 5.8 oz (86.8 kg)   Cycler   Ultrafiltration (UF) (mL) -486 mL   Average Dwell Time (Hours:Minutes) 92   Lost Dwell Time (Hours:Minutes) 0

## 2021-08-24 NOTE — CARE COORDINATION
Case Management Assessment           Initial Evaluation                Date / Time of Evaluation: 8/24/2021 2:47 PM                 Assessment Completed by: Caitlyn Porter RN    Patient Name: Lauryn Stacy     YOB: 1970  Diagnosis: Abdominal pain [R10.9]  Abdominal pain, unspecified abdominal location [R10.9]     Date / Time: 8/22/2021 12:16 PM    Patient Admission Status: Inpatient    If patient is discharged prior to next notation, then this note serves as note for discharge by case management. Current PCP: Angel Haywood Patient: No    Chart Reviewed: Yes  Patient/ Family Interviewed: Yes    Initial assessment completed at bedside with: patient    Hospitalization in the last 30 days: No    Emergency Contacts:  Extended Emergency Contact Information  Primary Emergency Contact: [address] [facility] Phone: [phone]  Relation: Parent  Secondary Emergency Contact: Yeimi Chiang Phone: [phone]  Relation: Brother/Sister    Advance Directives:   Code Status: Full [address]: No    Financial  Payor: Valeria Corbin / Plan: Modesto Choi / Product Type: *No Product type* /     Pre-cert required for SNF: Yes    Pharmacy    [facility], [address] [place] [address] [phone] Herb Douse [phone]  [address] [place] [address] [facility]  Phone: [phone] Fax: [phone]    [address] [place] [address]. [address] [place] [address]  Phone: [phone] Fax: [phone]      Potential assistance Purchasing Medications: Potential Assistance Purchasing Medications: No  Does Patient want to participate in local refill/ meds to beds program?: Yes    Meds To Beds General Rules:  1. Can ONLY be done Monday- Friday between 8:30am-5pm  2. Prescription(s) must be in pharmacy by 3pm to be filled same day  3. Copy of patient's insurance/ prescription drug card and patient face sheet must be sent along with the prescription(s)  4. Cost of Rx cannot be added to hospital bill. If financial assistance is needed, please contact unit  or ;  or  CANNOT provide pharmacy voucher for patients co-pays  5. Patients can then  the prescription on their way out of the hospital at discharge, or pharmacy can deliver to the bedside if staff is available. (payment due at time of pick-up or delivery - cash, check, or card accepted)     Able to afford home medications/ co-pay costs: Yes    ADLS  Support Systems: Children, Family Members    PT AM-PAC: 13 /24  OT AM-PAC: 16 /24    HOUSING  Home Environment: From home with son and grandson  Steps: 4 to get in and 13 upstairs, pt states she can manage steps    Plans to RETURN to current housing: Yes  Barriers to RETURNING to current housing: medical complications    Home Care Information  Currently ACTIVE with [address]: No  Home Care Agency: Not Applicable        Durable Medical Equipment  DME Provider: n/a  Equipment: walker    Home Oxygen and [address] prior to admission: Yes  [address]: pt not sure  Other Respiratory Equipment: no  Pt has O2 at home but does not use it. Dialysis  Active with HD/PD prior to admission: Yes  Nephrologist: Dr Amelia Farrell:  PD at home 3 x a day    DISCHARGE PLAN:  Disposition: Home- No Services Needed    Transportation PLAN for discharge: family     Factors facilitating achievement of predicted outcomes: Family support, Motivated, Cooperative, Pleasant and Has needed Durable Medical Equipment at home    Barriers to discharge: Decreased endurance, Medical complications and Medication managment    Additional Case Management Notes: Pt from home with son and grandson, pt uses a walker as needed.  Pt is declining [facility] both nursing and therapy, states she does not feel she needs it, states she did much better with therapy today. Pt states her mother will likely transport her home. The Plan for Transition of Care is related to the following treatment goals of Abdominal pain [R10.9]  Abdominal pain, unspecified abdominal location [R10.9]    The Patient and/or patient representative Hayley Nix and her family were provided with a choice of provider and agrees with the discharge plan Yes    Freedom of choice list was provided with basic dialogue that supports the patient's individualized plan of care/goals and shares the quality data associated with the providers.  Yes    Care Transition patient: No    Elenore Curling, RN  [facility]  Case Management Department  Ph: [phone]   Fax: [phone]

## 2021-08-24 NOTE — PROGRESS NOTES
CCPD Order   Exchanges: 4   Exchange Volume: 2000 ml   Total Time: 8 hrs   Dextrose: 2.5%   Last Fill: 0 ml   Total Volume: 8000 ml     Orders verified. Supplies taken to pt's room. Report received. Cycler set up, primed and pre tested. Dressing changed on [facility] Cath site. Pt connected to cycler. CCPD initiated without problem. Initial effluent clear.

## 2021-08-24 NOTE — PROGRESS NOTES
Bariatric Surgery   Daily Progress Note  Patient: Prabhjot Olivera      CC: PD catheter peritonitis    SUBJECTIVE:   Patient rested well overnight. Remains afebrile and HDS. Denies abdominal pain this morning. Completed PD overnight. ROS:   A 14 point review of systems was conducted, significant findings as noted above. All other systems negative. OBJECTIVE:    PHYSICAL EXAM:    Vitals:    08/23/21 1948 08/23/21 2150 08/24/21 0010 08/24/21 0321   BP: 133/62 125/62 (!) 157/85 (!) 118/58   Pulse: 80 85 85 78   Resp: 17 19 15 16   Temp: 98.4 °F (36.9 °C) 98.8 °F (37.1 °C) 98.7 °F (37.1 °C) 98.8 °F (37.1 °C)   TempSrc: Oral Oral Oral Oral   SpO2: 93%   96%   Weight:  190 lb 0.6 oz (86.2 kg)     Height:           General appearance: alert, no acute distress, grooming appropriate  Eyes: No scleral icterus, EOM grossly intact  Neck: trachea midline, no JVD, no lymphadenopathy, neck supple  Chest/Lungs: Equal excursion bilaterally, normal effort with no accessory muscle use on RA  Cardiovascular: RRR, brisk capillary refill  Abdomen: Soft, non-tender, no rebound, guarding, or rigidity, PD catheter in place at left abdomen without surrounding erythema or increased tenderness  Skin: warm and dry, no rashes  Extremities: no edema, no cyanosis  Neuro: A&Ox3, no focal deficits, sensation intact    LABS:   Recent Labs     08/23/21  0416 08/24/21  0543   WBC 7.6 4.9   HGB 10.0* 10.4*   HCT 29.9* 31.0*   MCV 93.2 93.8    167        Recent Labs     08/22/21  1304 08/22/21  2108 08/23/21  0416 08/23/21  1456   NA   < >  --  125* 124*   K  --   --  3.3* 4.1   CL   < >  --  90* 88*   CO2   < >  --  18* 19*   PHOS  --  4.9 4.7  --    BUN   < >  --  72* 74*   CREATININE   < >  --  5.0* 5.7*    < > = values in this interval not displayed.         Recent Labs     08/22/21  1304   AST 17   ALT 26   BILIDIR <0.2   BILITOT <0.2   ALKPHOS 174*        Recent Labs     08/22/21  1304   LIPASE 60.0        Recent Labs     08/22/21  1304 PROT 8.0   INR 1.03        Recent Labs     08/22/21 2108   TROPONINI 0.29*         ASSESSMENT & PLAN:   Mirella Nettles is a 45 y.o. female with history of ESRD w/ hemodialysis via PD catheter (7/9), HTN, CHF, T2DM, and recent hospitalization for aspiration pneumonia with sepsis who presented to ED on 7/22 for lower quadrant abdominal pain and for whom general surgery is consulted due to possible peritonitis. - Dialysis management per primary  - Peritoneal fluid with WBC 27,388, NGTD  - Blood cultures from 8/22 with gram positive rods  - C. Diff negative  - Continue antibiotics, will continue to follow ID recs  - General surgery will be available if PD catheter removal is recommended.     Pasha Beatty DO  PGY1, General Surgery  08/24/21  6:44 AM  [phone]

## 2021-08-24 NOTE — PROGRESS NOTES
ID Follow-up NOTE    CC:   Peritonitis, GPR bacteremia  Antibiotics: Ceftriaxone, Metronidazole    Admit Date: 2021  Hospital Day: 3    Subjective:     Patient reports she is better, less pain, able to eat      Objective:     Afebrile    Patient Vitals for the past 8 hrs:   BP Temp Temp src Pulse Resp SpO2   21 0756 133/67 98.4 °F (36.9 °C) Oral 82 16 90 %   21 0321 (!) 118/58 98.8 °F (37.1 °C) Oral 78 16 96 %     I/O last 3 completed shifts: In: 480 [P.O.:480]  Out: 0   No intake/output data recorded. EXAM:  GENERAL: No apparent distress.     HEENT: Membranes moist, no oral lesion  NECK:  Supple, no lymphadenopathy  LUNGS: Clear b/l, no rales, no dullness  CARDIAC: RRR, no murmur appreciated  ABD:  + BS, soft - mid abd tender with no guarding / rebound, PD catheter in place  EXT:  No rash, no edema, no lesions  NEURO: No focal neurologic findings  PSYCH: Orientation, sensorium, mood normal  LINES:  Peripheral iv       Data Review:  Lab Results   Component Value Date    WBC 4.9 2021    HGB 10.4 (L) 2021    HCT 31.0 (L) 2021    MCV 93.8 2021     2021     Lab Results   Component Value Date    CREATININE 4.8 (H) 2021    BUN 70 (H) 2021     (L) 2021    K 3.5 2021    CL 92 (L) 2021    CO2 20 (L) 2021       Hepatic Function Panel:   Lab Results   Component Value Date    ALKPHOS 174 2021    ALT 26 2021    AST 17 2021    PROT 8.0 2021    BILITOT <0.2 2021    BILIDIR <0.2 2021    IBILI see below 2021    LABALBU 2.9 2021       Micro:   BC 1: GPC + GPR   BC 2: no growth to date      Body fluid cx: GS 4+ wbc, no organism; culture NGTD;  (WBC 27,388; RBC 2, 800; PMN 91%;  glucose 158; protein 0.6)   AFB smear neg, cult in process      Nasal MRSA DNA: in process  : GI bacterial pathogens PCR: pending    C diff ag / ab: neg      Imagin/22 CT abd/pelvis  Impression   1.  No acute intra-abdominopelvic abnormality.     2.  Small to moderate ascites and intraperitoneal free air likely relates to presence of peritoneal dialysis catheter. 3.  Enlarged uterus likely on the basis of uterine fibroids. Pelvic ultrasound would provide better evaluation      8/22 CXR   Impression   1.  Mild bibasilar parenchymal opacities, likely atelectasis. Scheduled Meds:   magnesium sulfate  2,000 mg Intravenous Once    polyethylene glycol  17 g Oral BID    cefTRIAXone (ROCEPHIN) IV  2,000 mg Intravenous Q24H    metroNIDAZOLE  500 mg Intravenous Q8H    sodium chloride flush  5-40 mL Intravenous 2 times per day    heparin (porcine)  5,000 Units Subcutaneous 3 times per day    aspirin  81 mg Oral Daily    atorvastatin  40 mg Oral Daily    carvedilol  12.5 mg Oral BID    gabapentin  300 mg Oral BID    insulin glargine  10 Units Subcutaneous Nightly    insulin lispro  5 Units Subcutaneous TID WC    insulin lispro  0-6 Units Subcutaneous TID WC    insulin lispro  0-3 Units Subcutaneous Nightly    NIFEdipine  30 mg Oral BID    pantoprazole  40 mg Oral QAM AC       Continuous Infusions:   sodium chloride 25 mL (08/24/21 0321)    dextrose         PRN Meds:  gentamicin, sodium chloride flush, sodium chloride, ondansetron **OR** ondansetron, polyethylene glycol, acetaminophen **OR** acetaminophen, butalbital-acetaminophen-caffeine, glucose, dextrose, glucagon (rDNA), dextrose, HYDROmorphone **OR** HYDROmorphone      Assessment:     44 yo woman with obesity, CRF on PD (PD catheter placed 7/9), DM, HTN, CHF     Hosp 7/6 - 15 - initiated on dialysis in July, has HD catheter placed, had PD catheter 79. Hospital complicated by aspiration pneumonia     Presents 8/22 with abd pain, n /v.  PD fluid cloudy  In ED T 100.4, CXR basilar densities.     CT with air, mod ascites  Admit and started no empiric antibiotics     PD fluid 27,388 - 91%PMN  BC + GPR and GPC in 1 set     IMP/  Fever  Abd pain, nausea / vomiting - improved     PD related vs secondary peritonitis   - very high PD fluid WBC is concerning for secondary bowel problem like perforation   - less pain, fluid now appear clear (8/24)    + BC unclear significance - GPR could be skin brittany. GPR could be Clostridium from bowel    Afebrile, no leukocytosis    Plan:     Cont ceftriaxone + metronidazole     Repeat abd fluid cell count and diff, cult - ordered 8/23  Will f/u on [facility] isolate ID / sensitivity  Will review abd CT  Surgical consult reviewed, following     Medical Decision Making:   The following items were considered in medical decision making:  Discussion of patient care with other providers  Reviewed clinical lab tests  Reviewed radiology tests  Reviewed other diagnostic tests/interventions  Independent review of radiologic images - will review CT with radiologist  Microbiology cultures and other micro tests reviewed      Discussed with pt, RN  Ayse Gilbert MD

## 2021-08-25 LAB
ALBUMIN SERPL-MCNC: 2.7 G/DL (ref 3.4–5)
ANION GAP SERPL CALCULATED.3IONS-SCNC: 14 MMOL/L (ref 3–16)
APPEARANCE FLUID: CLEAR
BASOPHILS ABSOLUTE: 0 K/UL (ref 0–0.2)
BASOPHILS RELATIVE PERCENT: 0.3 %
BODY FLUID CULTURE, STERILE: NORMAL
BUN BLDV-MCNC: 59 MG/DL (ref 7–20)
CALCIUM SERPL-MCNC: 8.9 MG/DL (ref 8.3–10.6)
CELL COUNT FLUID TYPE: NORMAL
CHLORIDE BLD-SCNC: 95 MMOL/L (ref 99–110)
CLOT EVALUATION: NORMAL
CO2: 22 MMOL/L (ref 21–32)
COLOR FLUID: COLORLESS
CREAT SERPL-MCNC: 3.9 MG/DL (ref 0.6–1.1)
EOSINOPHIL FLUID: 7 %
EOSINOPHILS ABSOLUTE: 0.5 K/UL (ref 0–0.6)
EOSINOPHILS RELATIVE PERCENT: 10.1 %
GFR AFRICAN AMERICAN: 15
GFR NON-AFRICAN AMERICAN: 12
GLUCOSE BLD-MCNC: 152 MG/DL (ref 70–99)
GLUCOSE BLD-MCNC: 162 MG/DL (ref 70–99)
GLUCOSE BLD-MCNC: 182 MG/DL (ref 70–99)
GLUCOSE BLD-MCNC: 221 MG/DL (ref 70–99)
GLUCOSE BLD-MCNC: 232 MG/DL (ref 70–99)
GRAM STAIN RESULT: NORMAL
HCT VFR BLD CALC: 29.5 % (ref 36–48)
HEMOGLOBIN: 9.8 G/DL (ref 12–16)
LYMPHOCYTES ABSOLUTE: 0.9 K/UL (ref 1–5.1)
LYMPHOCYTES RELATIVE PERCENT: 19.6 %
LYMPHOCYTES, BODY FLUID: 22 %
MACROPHAGE FLUID: 31 %
MAGNESIUM: 2.1 MG/DL (ref 1.8–2.4)
MCH RBC QN AUTO: 31.1 PG (ref 26–34)
MCHC RBC AUTO-ENTMCNC: 33.2 G/DL (ref 31–36)
MCV RBC AUTO: 93.9 FL (ref 80–100)
MONOCYTES ABSOLUTE: 0.5 K/UL (ref 0–1.3)
MONOCYTES RELATIVE PERCENT: 10.9 %
NEUTROPHIL, FLUID: 40 %
NEUTROPHILS ABSOLUTE: 2.9 K/UL (ref 1.7–7.7)
NEUTROPHILS RELATIVE PERCENT: 59.1 %
NUCLEATED CELLS FLUID: 24 /CUMM
PDW BLD-RTO: 13.1 % (ref 12.4–15.4)
PERFORMED ON: ABNORMAL
PHOSPHORUS: 4.7 MG/DL (ref 2.5–4.9)
PLATELET # BLD: 178 K/UL (ref 135–450)
PMV BLD AUTO: 8.9 FL (ref 5–10.5)
POTASSIUM SERPL-SCNC: 3.3 MMOL/L (ref 3.5–5.1)
RBC # BLD: 3.14 M/UL (ref 4–5.2)
RBC FLUID: 3 /CUMM
SODIUM BLD-SCNC: 131 MMOL/L (ref 136–145)
WBC # BLD: 4.8 K/UL (ref 4–11)

## 2021-08-25 PROCEDURE — 6360000002 HC RX W HCPCS: Performed by: STUDENT IN AN ORGANIZED HEALTH CARE EDUCATION/TRAINING PROGRAM

## 2021-08-25 PROCEDURE — 6360000002 HC RX W HCPCS

## 2021-08-25 PROCEDURE — 90945 DIALYSIS ONE EVALUATION: CPT

## 2021-08-25 PROCEDURE — 99233 SBSQ HOSP IP/OBS HIGH 50: CPT | Performed by: INTERNAL MEDICINE

## 2021-08-25 PROCEDURE — 87205 SMEAR GRAM STAIN: CPT

## 2021-08-25 PROCEDURE — 87070 CULTURE OTHR SPECIMN AEROBIC: CPT

## 2021-08-25 PROCEDURE — 94760 N-INVAS EAR/PLS OXIMETRY 1: CPT

## 2021-08-25 PROCEDURE — 80069 RENAL FUNCTION PANEL: CPT

## 2021-08-25 PROCEDURE — 99232 SBSQ HOSP IP/OBS MODERATE 35: CPT | Performed by: INTERNAL MEDICINE

## 2021-08-25 PROCEDURE — 87641 MR-STAPH DNA AMP PROBE: CPT

## 2021-08-25 PROCEDURE — 2580000003 HC RX 258: Performed by: STUDENT IN AN ORGANIZED HEALTH CARE EDUCATION/TRAINING PROGRAM

## 2021-08-25 PROCEDURE — 2060000000 HC ICU INTERMEDIATE R&B

## 2021-08-25 PROCEDURE — 89050 BODY FLUID CELL COUNT: CPT

## 2021-08-25 PROCEDURE — 83735 ASSAY OF MAGNESIUM: CPT

## 2021-08-25 PROCEDURE — 6370000000 HC RX 637 (ALT 250 FOR IP): Performed by: STUDENT IN AN ORGANIZED HEALTH CARE EDUCATION/TRAINING PROGRAM

## 2021-08-25 PROCEDURE — 36415 COLL VENOUS BLD VENIPUNCTURE: CPT

## 2021-08-25 PROCEDURE — 99231 SBSQ HOSP IP/OBS SF/LOW 25: CPT | Performed by: SURGERY

## 2021-08-25 PROCEDURE — 2580000003 HC RX 258

## 2021-08-25 PROCEDURE — 85025 COMPLETE CBC W/AUTO DIFF WBC: CPT

## 2021-08-25 PROCEDURE — 2500000003 HC RX 250 WO HCPCS: Performed by: INTERNAL MEDICINE

## 2021-08-25 RX ORDER — POTASSIUM CHLORIDE 20 MEQ/1
40 TABLET, EXTENDED RELEASE ORAL ONCE
Status: COMPLETED | OUTPATIENT
Start: 2021-08-25 | End: 2021-08-25

## 2021-08-25 RX ORDER — OXYCODONE HYDROCHLORIDE AND ACETAMINOPHEN 5; 325 MG/1; MG/1
1 TABLET ORAL ONCE
Status: COMPLETED | OUTPATIENT
Start: 2021-08-25 | End: 2021-08-25

## 2021-08-25 RX ADMIN — ASPIRIN 81 MG: 81 TABLET, CHEWABLE ORAL at 09:57

## 2021-08-25 RX ADMIN — GABAPENTIN 300 MG: 300 CAPSULE ORAL at 21:22

## 2021-08-25 RX ADMIN — OXYCODONE HYDROCHLORIDE AND ACETAMINOPHEN 1 TABLET: 5; 325 TABLET ORAL at 17:35

## 2021-08-25 RX ADMIN — INSULIN LISPRO 1 UNITS: 100 INJECTION, SOLUTION INTRAVENOUS; SUBCUTANEOUS at 12:59

## 2021-08-25 RX ADMIN — CARVEDILOL 12.5 MG: 12.5 TABLET, FILM COATED ORAL at 21:22

## 2021-08-25 RX ADMIN — SODIUM CHLORIDE 25 ML: 9 INJECTION, SOLUTION INTRAVENOUS at 19:36

## 2021-08-25 RX ADMIN — POTASSIUM CHLORIDE 40 MEQ: 1500 TABLET, EXTENDED RELEASE ORAL at 09:58

## 2021-08-25 RX ADMIN — CEFTRIAXONE SODIUM 2000 MG: 2 INJECTION, POWDER, FOR SOLUTION INTRAMUSCULAR; INTRAVENOUS at 14:07

## 2021-08-25 RX ADMIN — BUTALBITAL, ACETAMINOPHEN, AND CAFFEINE 1 TABLET: 50; 325; 40 TABLET ORAL at 02:34

## 2021-08-25 RX ADMIN — HEPARIN SODIUM 5000 UNITS: 5000 INJECTION INTRAVENOUS; SUBCUTANEOUS at 21:22

## 2021-08-25 RX ADMIN — METRONIDAZOLE 500 MG: 500 INJECTION, SOLUTION INTRAVENOUS at 02:37

## 2021-08-25 RX ADMIN — INSULIN LISPRO 2 UNITS: 100 INJECTION, SOLUTION INTRAVENOUS; SUBCUTANEOUS at 18:39

## 2021-08-25 RX ADMIN — Medication 10 ML: at 09:59

## 2021-08-25 RX ADMIN — CARVEDILOL 12.5 MG: 12.5 TABLET, FILM COATED ORAL at 09:57

## 2021-08-25 RX ADMIN — Medication 10 ML: at 19:37

## 2021-08-25 RX ADMIN — METRONIDAZOLE 500 MG: 500 INJECTION, SOLUTION INTRAVENOUS at 12:32

## 2021-08-25 RX ADMIN — METRONIDAZOLE 500 MG: 500 INJECTION, SOLUTION INTRAVENOUS at 19:37

## 2021-08-25 RX ADMIN — HEPARIN SODIUM 5000 UNITS: 5000 INJECTION INTRAVENOUS; SUBCUTANEOUS at 06:03

## 2021-08-25 RX ADMIN — INSULIN LISPRO 5 UNITS: 100 INJECTION, SOLUTION INTRAVENOUS; SUBCUTANEOUS at 11:01

## 2021-08-25 RX ADMIN — NIFEDIPINE 30 MG: 30 TABLET, EXTENDED RELEASE ORAL at 21:22

## 2021-08-25 RX ADMIN — INSULIN GLARGINE 10 UNITS: 100 INJECTION, SOLUTION SUBCUTANEOUS at 21:21

## 2021-08-25 RX ADMIN — NIFEDIPINE 30 MG: 30 TABLET, EXTENDED RELEASE ORAL at 09:57

## 2021-08-25 RX ADMIN — PANTOPRAZOLE SODIUM 40 MG: 40 TABLET, DELAYED RELEASE ORAL at 06:04

## 2021-08-25 RX ADMIN — INSULIN LISPRO 1 UNITS: 100 INJECTION, SOLUTION INTRAVENOUS; SUBCUTANEOUS at 11:01

## 2021-08-25 RX ADMIN — INSULIN LISPRO 1 UNITS: 100 INJECTION, SOLUTION INTRAVENOUS; SUBCUTANEOUS at 21:21

## 2021-08-25 RX ADMIN — HEPARIN SODIUM 5000 UNITS: 5000 INJECTION INTRAVENOUS; SUBCUTANEOUS at 14:12

## 2021-08-25 RX ADMIN — ATORVASTATIN CALCIUM 40 MG: 40 TABLET, FILM COATED ORAL at 09:57

## 2021-08-25 RX ADMIN — GABAPENTIN 300 MG: 300 CAPSULE ORAL at 09:57

## 2021-08-25 RX ADMIN — INSULIN LISPRO 5 UNITS: 100 INJECTION, SOLUTION INTRAVENOUS; SUBCUTANEOUS at 13:00

## 2021-08-25 RX ADMIN — INSULIN LISPRO 5 UNITS: 100 INJECTION, SOLUTION INTRAVENOUS; SUBCUTANEOUS at 18:39

## 2021-08-25 RX ADMIN — SODIUM CHLORIDE 25 ML: 9 INJECTION, SOLUTION INTRAVENOUS at 02:35

## 2021-08-25 ASSESSMENT — PAIN DESCRIPTION - PROGRESSION
CLINICAL_PROGRESSION: NOT CHANGED
CLINICAL_PROGRESSION: GRADUALLY WORSENING
CLINICAL_PROGRESSION: NOT CHANGED

## 2021-08-25 ASSESSMENT — PAIN SCALES - GENERAL
PAINLEVEL_OUTOF10: 4
PAINLEVEL_OUTOF10: 0
PAINLEVEL_OUTOF10: 0
PAINLEVEL_OUTOF10: 3
PAINLEVEL_OUTOF10: 6
PAINLEVEL_OUTOF10: 0

## 2021-08-25 ASSESSMENT — ENCOUNTER SYMPTOMS
ABDOMINAL PAIN: 1
DIARRHEA: 1
NAUSEA: 0
ABDOMINAL DISTENTION: 0
SHORTNESS OF BREATH: 0
COUGH: 0
VOMITING: 0
CONSTIPATION: 0

## 2021-08-25 ASSESSMENT — PAIN DESCRIPTION - ORIENTATION
ORIENTATION: RIGHT;MID
ORIENTATION: MID;LEFT

## 2021-08-25 ASSESSMENT — PAIN DESCRIPTION - PAIN TYPE
TYPE: ACUTE PAIN

## 2021-08-25 ASSESSMENT — PAIN DESCRIPTION - FREQUENCY
FREQUENCY: INTERMITTENT
FREQUENCY: INTERMITTENT
FREQUENCY: CONTINUOUS

## 2021-08-25 ASSESSMENT — PAIN - FUNCTIONAL ASSESSMENT
PAIN_FUNCTIONAL_ASSESSMENT: ACTIVITIES ARE NOT PREVENTED

## 2021-08-25 ASSESSMENT — PAIN DESCRIPTION - ONSET
ONSET: ON-GOING
ONSET: GRADUAL
ONSET: ON-GOING

## 2021-08-25 ASSESSMENT — PAIN DESCRIPTION - LOCATION
LOCATION: ABDOMEN
LOCATION: ABDOMEN
LOCATION: HEAD

## 2021-08-25 ASSESSMENT — PAIN DESCRIPTION - DIRECTION: RADIATING_TOWARDS: LEFT FLANK

## 2021-08-25 ASSESSMENT — PAIN DESCRIPTION - DESCRIPTORS
DESCRIPTORS: HEADACHE
DESCRIPTORS: ACHING
DESCRIPTORS: ACHING;OTHER (COMMENT)

## 2021-08-25 NOTE — PROGRESS NOTES
Nephrology  Note                                                                                                                                                                                                                                                                                                                                                               Office : [phone]     Fax :[phone]              Patient's Name: Olivia Young  8/24/2021    Pt on PD   dian well   Abd pain better   Cell count better         Past Medical History:   Diagnosis Date    [facility]     Diabetes mellitus [facility]     Hypertension        Past Surgical History:   Procedure Laterality Date    DIALYSIS CATHETER INSERTION N/A 7/9/2021    LAPAROSCOPIC PERITONEAL DIALYSIS CATHETER INSERTION, OMENTOPLEXY performed by Kelley Varghese DO at [address]       History reviewed. No pertinent family history. reports that she has never smoked. She has never used smokeless tobacco. She reports previous alcohol use. She reports previous drug use. Allergies:  Patient has no known allergies.     Current Medications:    polyethylene glycol (GLYCOLAX) packet 17 g, BID  cefTRIAXone (ROCEPHIN) 2000 mg IVPB in D5W 50ml minibag, Q24H  metronidazole (FLAGYL) 500 mg in NaCl 100 mL IVPB premix, Q8H  gentamicin (GARAMYCIN) 0.1 % cream, PRN  sodium chloride flush 0.9 % injection 5-40 mL, 2 times per day  sodium chloride flush 0.9 % injection 5-40 mL, PRN  0.9 % sodium chloride infusion, PRN  heparin (porcine) injection 5,000 Units, 3 times per day  ondansetron (ZOFRAN-ODT) disintegrating tablet 4 mg, Q8H PRN   Or  ondansetron (ZOFRAN) injection 4 mg, Q6H PRN  polyethylene glycol (GLYCOLAX) packet 17 g, Daily PRN  acetaminophen (TYLENOL) tablet 650 mg, Q6H PRN   Or  acetaminophen (TYLENOL) suppository 650 mg, Q6H PRN  aspirin chewable tablet 81 mg, Daily  atorvastatin (LIPITOR) tablet 40 mg, Daily  butalbital-acetaminophen-caffeine (FIORICET, ESGIC) per tablet 1 tablet, Q4H PRN  carvedilol (COREG) tablet 12.5 mg, BID  gabapentin (NEURONTIN) capsule 300 mg, BID  insulin glargine (LANTUS;BASAGLAR) injection pen 10 Units, Nightly  insulin lispro (1 Unit Dial) 5 Units, TID WC  insulin lispro (1 Unit Dial) 0-6 Units, TID WC  insulin lispro (1 Unit Dial) 0-3 Units, Nightly  glucose (GLUTOSE) 40 % oral gel 15 g, PRN  dextrose 50 % IV solution, PRN  glucagon (rDNA) injection 1 mg, PRN  dextrose 5 % solution, PRN  NIFEdipine (ADALAT CC) extended release tablet 30 mg, BID  pantoprazole (PROTONIX) tablet 40 mg, QAM AC          Physical exam:     Vitals:  BP (!) 157/95   Pulse 79   Temp 98.3 °F (36.8 °C) (Oral)   Resp 16   Ht 5' (1.524 m)   Wt 188 lb 7.9 oz (85.5 kg)   SpO2 93%   BMI 36.81 kg/m²   Constitutional:  OAA X3 NAD  Skin: no rash, turgor wnl  Heent:  eomi, mmm  Neck: no bruits or jvd noted  Cardiovascular:  S1, S2 without m/r/g  Respiratory: CTA B without w/r/r  Abdomen:  +bs, soft, ttp   Ext: + lower extremity edema  Psychiatric: mood and affect appropriate  Musculoskeletal:  Rom, muscular strength intact    Data:   Labs:  CBC:   Recent Labs     08/22/21  1304 08/23/21  0416 08/24/21  0543   WBC 7.3 7.6 4.9   HGB 11.7* 10.0* 10.4*    159 167     BMP:    Recent Labs     08/23/21  0416 08/23/21  1456 08/24/21  0543   * 124* 129*   K 3.3* 4.1 3.5   CL 90* 88* 92*   CO2 18* 19* 20*   BUN 72* 74* 70*   CREATININE 5.0* 5.7* 4.8*   GLUCOSE 311* 137* 262*     Ca/Mg/Phos:   Recent Labs     08/22/21  1304 08/22/21  2108 08/23/21  0416 08/23/21  1456 08/24/21  0543   CALCIUM   < >  --  8.5 8.7 9.2   MG   < > 1.50* 1.50* 1.90 1.80   PHOS  --  4.9 4.7  --  5.2*    < > = values in this interval not displayed.      Hepatic:   Recent Labs     08/22/21  1304   AST 17   ALT 26   BILITOT <0.2   ALKPHOS 174*     Troponin:   Recent Labs     08/22/21  2108 08/24/21  0903   TROPONINI 0.29* 0.25*     BNP: No results for input(s): BNP in the last 72 hours. Lipids: No results for input(s): CHOL, TRIG, HDL, LDLCALC, LABVLDL in the last 72 hours. ABGs: No results for input(s): PHART, PO2ART, EDI0HZE in the last 72 hours. INR:   Recent Labs     08/22/21  1304   INR 1.03     UA:No results for input(s): Rolin Cory, GLUCOSEU, BILIRUBINUR, Jeffry Bracket, BLOODU, PHUR, PROTEINU, UROBILINOGEN, NITRU, LEUKOCYTESUR, Kirke Ape in the last 72 hours. Urine Microscopic: No results for input(s): LABCAST, BACTERIA, COMU, HYALCAST, WBCUA, RBCUA, EPIU in the last 72 hours. Urine Culture: No results for input(s): LABURIN in the last 72 hours. Urine Chemistry: No results for input(s): Eugune Mihai, PROTEINUR, NAUR in the last 72 hours. IMAGING:  XR CHEST PORTABLE   Final Result      1. Mild bibasilar parenchymal opacities, likely atelectasis. CT ABDOMEN PELVIS WO CONTRAST Additional Contrast? None   Final Result      1. No acute intra-abdominopelvic abnormality. 2.  Small to moderate ascites and intraperitoneal free air likely relates to presence of peritoneal dialysis catheter. 3.  Enlarged uterus likely on the basis of uterine fibroids. Pelvic ultrasound would provide better evaluation. Assessment/Plan   1. ESRD     2. HTN    3. Anemia    4. Acid- base/ Electrolyte imbalance     5.  PD peritonitis     Plan   - cont CCPD   - Abx   - CX - reviewed   - monitor cell count   - labs in am   - encourage solute intake   - dec free water intake   - d/w pt in length                 Thank you for allowing us to participate in care of Yulia Gold MD  Feel free to contact me   [facility] [address]  Office : [phone]  Fax :[phone]

## 2021-08-25 NOTE — FLOWSHEET NOTE
Treatment time: 9 Hours 39 minutes  Net UF: 506 ml  Dwell Time: 4 minutes Gained    Treatment completed without complications or complaints from patient. Effluent clear, yellow and lines taped to patient per protocol. Patient resting comfortably with VSS upon exiting room. Copy of dialysis treatment record placed in chart, to be scanned into EMR and report given to Mick Obrien RN.

## 2021-08-25 NOTE — PLAN OF CARE
Problem: Pain:  Goal: Control of acute pain  Description: Control of acute pain  8/25/2021 0123 by Jung Caceres RN  Outcome: Met This Shift     Problem: OXYGENATION/RESPIRATORY FUNCTION  Goal: Patient will achieve/maintain normal respiratory rate/effort  Description: Respiratory rate and effort will be within normal limits for the patient  8/25/2021 0123 by Jung Caceres RN  Outcome: Met This Shift     Problem: HEMODYNAMIC STATUS  Goal: Patient has stable vital signs and fluid balance  8/25/2021 0123 by Jung Caceres RN  Note: BP (!) 162/83   Pulse 79   Temp 98.5 °F (36.9 °C) (Oral)   Resp 16   Ht 5' (1.524 m)   Wt 188 lb 7.9 oz (85.5 kg)   SpO2 96%   BMI 36.81 kg/m²        Problem: FLUID AND ELECTROLYTE IMBALANCE  Goal: Fluid and electrolyte balance are achieved/maintained  8/25/2021 0123 by Jung Caceres RN  Outcome: Ongoing  Note: PD at night

## 2021-08-25 NOTE — PROGRESS NOTES
ID Follow-up NOTE    CC:   Peritonitis, GPR bacteremia  Antibiotics: Ceftriaxone, Metronidazole    Admit Date: 8/22/2021  Hospital Day: 4    Subjective:     Patient reports she is better, less pain, able to eat      Objective:     Afebrile    Patient Vitals for the past 8 hrs:   BP Temp Temp src Pulse Resp SpO2   08/25/21 1219 -- 98.1 °F (36.7 °C) Oral -- -- 100 %   08/25/21 0922 -- -- -- -- 18 99 %   08/25/21 0852 (!) 146/88 97.9 °F (36.6 °C) Oral 76 20 99 %     I/O last 3 completed shifts: In: 460 [P.O.:460]  Out: 507   No intake/output data recorded. EXAM:  GENERAL: No apparent distress.     HEENT: Membranes moist, no oral lesion  NECK:  Supple, no lymphadenopathy  LUNGS: Clear b/l, no rales, no dullness  CARDIAC: RRR, no murmur appreciated  ABD:  + BS, soft - mid abd tender with no guarding / rebound, PD catheter in place  EXT:  No rash, no edema, no lesions  NEURO: No focal neurologic findings  PSYCH: Orientation, sensorium, mood normal  LINES:  Peripheral iv       Data Review:  Lab Results   Component Value Date    WBC 4.8 08/25/2021    HGB 9.8 (L) 08/25/2021    HCT 29.5 (L) 08/25/2021    MCV 93.9 08/25/2021     08/25/2021     Lab Results   Component Value Date    CREATININE 3.9 (H) 08/25/2021    BUN 59 (H) 08/25/2021     (L) 08/25/2021    K 3.3 (L) 08/25/2021    CL 95 (L) 08/25/2021    CO2 22 08/25/2021       Hepatic Function Panel:   Lab Results   Component Value Date    ALKPHOS 174 08/22/2021    ALT 26 08/22/2021    AST 17 08/22/2021    PROT 8.0 08/22/2021    BILITOT <0.2 08/22/2021    BILIDIR <0.2 08/22/2021    IBILI see below 08/22/2021    LABALBU 2.7 08/25/2021       Micro:  8/25 Body cult sent     8/22 BC 1: Bacillus species  8/22 BC 2: no growth to date     8/22 Body fluid cx: GS 4+ wbc, no organism; culture NGTD;  (WBC 27,388; RBC 2, 800; PMN 91%;  glucose 158; protein 0.6)  8/22 AFB smear neg, cult in process     8/22: GI bacterial pathogens PCR: neg  8/23 C diff ag / ab: neg    Imagin/22 CT abd/pelvis  Impression   1.  No acute intra-abdominopelvic abnormality.     2.  Small to moderate ascites and intraperitoneal free air likely relates to presence of peritoneal dialysis catheter. 3.  Enlarged uterus likely on the basis of uterine fibroids. Pelvic ultrasound would provide better evaluation       CXR   Impression   1.  Mild bibasilar parenchymal opacities, likely atelectasis. Scheduled Meds:   polyethylene glycol  17 g Oral BID    cefTRIAXone (ROCEPHIN) IV  2,000 mg IntraVENous Q24H    metroNIDAZOLE  500 mg IntraVENous Q8H    sodium chloride flush  5-40 mL IntraVENous 2 times per day    heparin (porcine)  5,000 Units Subcutaneous 3 times per day    aspirin  81 mg Oral Daily    atorvastatin  40 mg Oral Daily    carvedilol  12.5 mg Oral BID    gabapentin  300 mg Oral BID    insulin glargine  10 Units Subcutaneous Nightly    insulin lispro  5 Units Subcutaneous TID WC    insulin lispro  0-6 Units Subcutaneous TID WC    insulin lispro  0-3 Units Subcutaneous Nightly    NIFEdipine  30 mg Oral BID    pantoprazole  40 mg Oral QAM AC       Continuous Infusions:   sodium chloride 25 mL (21 0235)    dextrose         PRN Meds:  gentamicin, sodium chloride flush, sodium chloride, ondansetron **OR** ondansetron, polyethylene glycol, acetaminophen **OR** acetaminophen, butalbital-acetaminophen-caffeine, glucose, dextrose, glucagon (rDNA), dextrose      Assessment:     46 yo woman with obesity, CRF on PD (PD catheter placed ), DM, HTN, CHF     Hosp  - 15 - initiated on dialysis in July, has HD catheter placed, had PD catheter 79. Hospital complicated by aspiration pneumonia     Presents  with abd pain, n /v.  PD fluid cloudy  In ED T 100.4, CXR basilar densities.     CT with air, mod ascites  Admit and started no empiric antibiotics     PD fluid 27,388 - 91%PMN  BC + GPR and GPC in 1 set    F/u PD fluid    -  - WBC 4,333, 86%PMN   -  - WBC 24 - 40%PMN     IMP/  Fever  Abd pain, nausea / vomiting - improved     PD related vs secondary peritonitis   - very high PD fluid WBC is concerning for secondary bowel problem like perforation   - less pain, fluid now appear clear (8/24)   - PD fluid now clear, WBC 24 (8/25)    + BC x 1 Bacillus sp, contaminant     Afebrile, no leukocytosis    Plan:     Cont ceftriaxone + metronidazole     At discharge, further rx IP, no systemic antibiotics    Medical Decision Making:   The following items were considered in medical decision making:  Discussion of patient care with other providers  Reviewed clinical lab tests  Reviewed radiology tests  Reviewed other diagnostic tests/interventions  Independent review of radiologic images - will review CT with radiologist  Microbiology cultures and other micro tests reviewed      Discussed with pt, her mother, RN  Kirsty Luna MD

## 2021-08-25 NOTE — PROGRESS NOTES
Progress Note    Admit Date: 8/22/2021  Diet: ADULT DIET; Regular; 3 carb choices (45 gm/meal); Low Sodium (2 gm); Low Potassium (Less than 3000 mg/day); Low Phosphorus (Less than 1000 mg); 1200 ml    CC: abd pain    Interval history:  - abdominal pain much improved  - diarrhea x3 yest that improved with bisacodyl  - still on PD, Cr mildly improved today  - Mg and K repletion  - Na up to 129  - on ceftriaxone and metro, off vanc    HPI: Pt is a  51 y.o. F with  PMHx ESRD on PD, HTN, CHF, T2DM, recent hospitalization (7/2021) for aspiration pneumonia with sepsis p/w lower quadrant abdominal pain. Evening before coming to the ED is when it got started. It first presented with SOB which turned into a abdominal pain with nausea, diarrhea and yellow vomiting with no blood. .Pain became unbearable which prompted her to come to the ED. She began PD ~1mo ago.     In the ED, was febrile to 100.4, Cr 5.2 (baseline 2.5-3) and pH 7.343. She was given a dose of vanc + cefepime and dilaudid. Admitted for concern for SBP. Echo Dec 2019- EF 5-60%. Cardiac MRI showed EF 71% with apical dysfunction consistent with ischemia.      Medications:     Scheduled Meds:   potassium chloride  40 mEq Oral Once    polyethylene glycol  17 g Oral BID    cefTRIAXone (ROCEPHIN) IV  2,000 mg IntraVENous Q24H    metroNIDAZOLE  500 mg IntraVENous Q8H    sodium chloride flush  5-40 mL IntraVENous 2 times per day    heparin (porcine)  5,000 Units Subcutaneous 3 times per day    aspirin  81 mg Oral Daily    atorvastatin  40 mg Oral Daily    carvedilol  12.5 mg Oral BID    gabapentin  300 mg Oral BID    insulin glargine  10 Units Subcutaneous Nightly    insulin lispro  5 Units Subcutaneous TID WC    insulin lispro  0-6 Units Subcutaneous TID WC    insulin lispro  0-3 Units Subcutaneous Nightly    NIFEdipine  30 mg Oral BID    pantoprazole  40 mg Oral QAM AC     Continuous Infusions:   sodium chloride 25 mL (08/25/21 0235)    dextrose PRN Meds:gentamicin, sodium chloride flush, sodium chloride, ondansetron **OR** ondansetron, polyethylene glycol, acetaminophen **OR** acetaminophen, butalbital-acetaminophen-caffeine, glucose, dextrose, glucagon (rDNA), dextrose    Objective:   Vitals:   T-max:  Patient Vitals for the past 8 hrs:   BP Temp Temp src Pulse Resp SpO2 Weight   08/25/21 0922 -- -- -- -- 18 99 % --   08/25/21 0852 (!) 146/88 97.9 °F (36.6 °C) Oral 76 20 99 % --   08/25/21 0640 (!) 156/74 98.4 °F (36.9 °C) -- 67 14 -- 187 lb 9.8 oz (85.1 kg)   08/25/21 0447 (!) 156/74 98.4 °F (36.9 °C) Oral 74 16 93 % --       Intake/Output Summary (Last 24 hours) at 8/25/2021 3807  Last data filed at 8/25/2021 0640  Gross per 24 hour   Intake --   Output 21 ml   Net -21 ml       Review of Systems   Constitutional: Negative for chills and fever. Respiratory: Negative for cough and shortness of breath. Cardiovascular: Negative for chest pain and leg swelling. Gastrointestinal: Positive for abdominal pain and diarrhea. Negative for abdominal distention, constipation, nausea and vomiting. Neurological: Negative for light-headedness and headaches. Physical Exam  Constitutional:       Appearance: Normal appearance. HENT:      Mouth/Throat:      Mouth: Mucous membranes are dry. Pharynx: Oropharynx is clear. Eyes:      Extraocular Movements: Extraocular movements intact. Pupils: Pupils are equal, round, and reactive to light. Cardiovascular:      Rate and Rhythm: Normal rate and regular rhythm. Pulses: Normal pulses. Heart sounds: Normal heart sounds. Pulmonary:      Effort: Pulmonary effort is normal.      Breath sounds: Normal breath sounds. Abdominal:      General: Abdomen is flat. Bowel sounds are normal. There is no distension. Palpations: Abdomen is soft. Tenderness: There is abdominal tenderness. Comments: Minimal tenderness to palpation   Musculoskeletal:      Right lower leg: No edema. Left lower leg: No edema. Skin:     General: Skin is warm and dry. Neurological:      General: No focal deficit present. Mental Status: She is alert and oriented to person, place, and time. LABS:    CBC:   Recent Labs     08/23/21  0416 08/24/21  0543 08/25/21  0543   WBC 7.6 4.9 4.8   HGB 10.0* 10.4* 9.8*   HCT 29.9* 31.0* 29.5*    167 178   MCV 93.2 93.8 93.9     Renal:    Recent Labs     08/23/21  0416 08/23/21  0416 08/23/21  1456 08/24/21  0543 08/25/21  0543   *   < > 124* 129* 131*   K 3.3*   < > 4.1 3.5 3.3*   CL 90*   < > 88* 92* 95*   CO2 18*   < > 19* 20* 22   BUN 72*   < > 74* 70* 59*   CREATININE 5.0*   < > 5.7* 4.8* 3.9*   GLUCOSE 311*   < > 137* 262* 232*   CALCIUM 8.5   < > 8.7 9.2 8.9   MG 1.50*   < > 1.90 1.80 2.10   PHOS 4.7  --   --  5.2* 4.7   ANIONGAP 17*   < > 17* 17* 14    < > = values in this interval not displayed. Hepatic:   Recent Labs     08/22/21  1304 08/22/21  1304 08/23/21  0416 08/24/21  0543 08/25/21  0543   AST 17  --   --   --   --    ALT 26  --   --   --   --    BILITOT <0.2  --   --   --   --    BILIDIR <0.2  --   --   --   --    PROT 8.0  --   --   --   --    LABALBU 3.6   < > 2.8* 2.9* 2.7*   ALKPHOS 174*  --   --   --   --     < > = values in this interval not displayed. Troponin:   Recent Labs     08/22/21  2108 08/24/21  0903   TROPONINI 0.29* 0.25*     BNP: No results for input(s): BNP in the last 72 hours. Lipids: No results for input(s): CHOL, HDL in the last 72 hours. Invalid input(s): LDLCALCU, TRIGLYCERIDE  ABGs:  No results for input(s): PHART, PRA2IER, PO2ART, YML4MVM, BEART, THGBART, R9WOQYHL, TCR4YCG in the last 72 hours. INR:   Recent Labs     08/22/21  1304   INR 1.03     Lactate: No results for input(s): LACTATE in the last 72 hours. Cultures:  -----------------------------------------------------------------  RAD:   XR CHEST PORTABLE   Final Result      1. Mild bibasilar parenchymal opacities, likely atelectasis.

## 2021-08-25 NOTE — DISCHARGE SUMMARY
INTERNAL MEDICINE DEPARTMENT AT [address] [place] [address]  DISCHARGE SUMMARY    Patient ID: Olayinka Peterson                                             Discharge Date: 8/26/2021   Patient's PCP: Sylvia Hamilton                                          Discharge Physician: Cali Novoa MD MD  Admit Date: 8/22/2021   Admitting Physician: Tiffanie Gorman MD    PROBLEMS DURING HOSPITALIZATION:  Present on Admission:   Abdominal pain   Peritonitis associated with peritoneal dialysis ([address]   Free intraperitoneal air   Positive blood culture      DISCHARGE DIAGNOSES:    HPI: Pt is O  77 y.o. F with  PMHx ESRD on PD, HTN, CHF, T2DM, recent hospitalization (7/2021) for aspiration pneumonia with sepsis p/w lower quadrant abdominal pain. Evening before coming to the ED is when it got started. It first presented with SOB which turned into a abdominal pain with nausea, diarrhea and yellow vomiting with no blood.  .Pain became unbearable which prompted her to come to the ED. She began PD ~1mo ago.     In the ED, was febrile to 100.4, Cr 5.2 (baseline 2.5-3) and pH 7.343. She was given a dose of vanc + cefepime and dilaudid. Admitted for concern for SBP. She was transitioned to vancomycin and ceftriaxone. Fluid studies showed 27k nucleated cells with 91% neutrophils concerning for bacterial peritonitis. No organisms visualized on gram stain. Blood cultures came back positive for bacillus most likely a contaminant. Transitioned to ceftriaxone and metronidazole. Pt continued to improve with repeat fluid studies showing 1300 nucleated cells with 86% neutrophils. Pt was hyponatremic to 124 on admission which improved to 131 with PD and fluid restriction. Cr improved with PD while inpatient and electrolytes were appropriately repleted. Determined that peritoneal dialysis line did not need to be removed. Repeat peritoneal fluid studies substantially improved. Patient was discharged on intraperitoneal ceftriaxone and oral flagyl.     The following issues were addressed during hospitalization:  Bacterial peritonitis secondary to peritoneal dialysis  NSTEMI type II  Hyponatremia  ESRD on PD  HTN  T2DM    Physical Exam:  /84   Pulse 71   Temp 98.6 °F (37 °C) (Oral)   Resp 25   Ht 5' (1.524 m)   Wt 189 lb 6 oz (85.9 kg)   SpO2 94%   BMI 36.98 kg/m²     Physical Exam  Constitutional:       Appearance: Normal appearance. HENT:      Mouth/Throat:      Mouth: Mucous membranes are moist.      Pharynx: Oropharynx is clear. Eyes:      Extraocular Movements: Extraocular movements intact. Pupils: Pupils are equal, round, and reactive to light. Cardiovascular:      Rate and Rhythm: Normal rate and regular rhythm. Pulses: Normal pulses. Heart sounds: Normal heart sounds. Pulmonary:      Effort: Pulmonary effort is normal.      Breath sounds: Normal breath sounds. Abdominal:      General: Abdomen is flat. Bowel sounds are normal. There is no distension. Palpations: Abdomen is soft. Tenderness: There is abdominal tenderness. There is no guarding or rebound. Comments: Minimal tenderness to palpation of lower abdomen   Musculoskeletal:      Right lower leg: No edema. Left lower leg: No edema. Neurological:      General: No focal deficit present. Mental Status: She is alert and oriented to person, place, and time.            Consults: ID, nephrology and general surgery  Significant Diagnostic Studies:  CT scan abdomen pelvis, CXR  Treatments: vancomycin, cefepime, ceftriaxone, metronidazole, peritoneal dialysis  Disposition: home  Discharged Condition: Stable  Follow Up: Primary Care Physician in one week, nephrology in one week, OBGYN in 1 month    DISCHARGE MEDICATION:     Medication List      START taking these medications    metroNIDAZOLE 500 MG tablet  Commonly known as: FLAGYL  Take 1 tablet by mouth 3 times daily for 10 days        CHANGE how you take these medications    insulin glargine 100 UNIT/ML injection pen  Commonly known as: LANTUS;BASAGLAR  Inject 20 Units into the skin nightly  What changed:   · how much to take  · additional instructions     insulin lispro (1 Unit Dial) 100 UNIT/ML Sopn  Inject 10 Units into the skin 3 times daily (with meals)  What changed: how much to take        CONTINUE taking these medications    aspirin 81 MG chewable tablet  Take 1 tablet by mouth daily     atorvastatin 40 MG tablet  Commonly known as: LIPITOR  Take 1 tablet by mouth daily     butalbital-acetaminophen-caffeine -40 MG per tablet  Commonly known as: FIORICET, ESGIC  Take 1 tablet by mouth every 4 hours as needed for Headaches     carvedilol 12.5 MG tablet  Commonly known as: Coreg  Take 1 tablet by mouth 2 times daily     gabapentin 100 MG capsule  Commonly known as: NEURONTIN  Take 3 capsules by mouth 2 times daily for 90 days.      NIFEdipine 30 MG extended release tablet  Commonly known as: PROCARDIA XL  Take 1 tablet by mouth 2 times daily     nitroGLYCERIN 0.4 MG SL tablet  Commonly known as: Nitrostat  Place 1 tablet under the tongue every 5 minutes as needed for Chest pain     ondansetron 4 MG tablet  Commonly known as: ZOFRAN  Take 1 tablet by mouth every 12 hours as needed for Nausea or Vomiting     pantoprazole 40 MG tablet  Commonly known as: PROTONIX     spironolactone 50 MG tablet  Commonly known as: Aldactone  Take 1 tablet by mouth daily     torsemide 100 MG tablet  Commonly known as: DEMADEX  Take 2 tablets by mouth daily        STOP taking these medications    magnesium oxide 400 (240 Mg) MG tablet  Commonly known as: MAG-OX           Where to Get Your Medications      These medications were sent to [address], [place] [facility] [phone] Herb Douse [phone]  [address], [facility] [address]    Phone: [phone]   · insulin glargine 100 UNIT/ML injection pen  · insulin lispro (1 Unit Dial) 100 UNIT/ML Sopn     These medications were sent to [facility], [address] [facility]    Phone: [phone]   · metroNIDAZOLE 500 MG tablet       Activity: activity as tolerated  Diet: cardiac diet, diabetic diet and renal diet  Wound Care: none needed    Time Spent on discharge is more than 30 minutes    Signed:  Yarely Zamorano MD,  MD, PGY-1  8/26/2021

## 2021-08-25 NOTE — PROGRESS NOTES
Bariatric Surgery   Daily Progress Note  Patient: Jeramie Rendon      CC: PD catheter peritonitis    SUBJECTIVE:   No acute events overnight. Denies abdominal pain this morning. Tolerating diet. Completed PD overnight. ROS:   A 14 point review of systems was conducted, significant findings as noted above. All other systems negative. OBJECTIVE:    PHYSICAL EXAM:    Vitals:    08/24/21 1846 08/24/21 1936 08/24/21 2335 08/25/21 0447   BP: (!) 140/73 (!) 157/95 (!) 162/83 (!) 156/74   Pulse: 76 79 79 74   Resp: 18 16 16 16   Temp: 98.2 °F (36.8 °C) 98.3 °F (36.8 °C) 98.5 °F (36.9 °C) 98.4 °F (36.9 °C)   TempSrc: Oral Oral Oral Oral   SpO2: 94% 93% 96% 93%   Weight: 188 lb 7.9 oz (85.5 kg)      Height:           General appearance: alert, no acute distress, grooming appropriate  Eyes: No scleral icterus, EOM grossly intact  Neck: trachea midline, no JVD, no lymphadenopathy, neck supple  Chest/Lungs: Equal excursion bilaterally, normal effort with no accessory muscle use on RA  Cardiovascular: RRR, brisk capillary refill  Abdomen: Soft, non-tender, no rebound, guarding, or rigidity, PD catheter in place at left abdomen without surrounding erythema or increased tenderness  Skin: warm and dry, no rashes  Extremities: no edema, no cyanosis  Neuro: A&Ox3, no focal deficits, sensation intact    LABS:   Recent Labs     08/24/21  0543 08/25/21  0543   WBC 4.9 4.8   HGB 10.4* 9.8*   HCT 31.0* 29.5*   MCV 93.8 93.9    178        Recent Labs     08/24/21  0543 08/25/21  0543   * 131*   K 3.5 3.3*   CL 92* 95*   CO2 20* 22   PHOS 5.2* 4.7   BUN 70* 59*   CREATININE 4.8* 3.9*        Recent Labs     08/22/21  1304   AST 17   ALT 26   BILIDIR <0.2   BILITOT <0.2   ALKPHOS 174*        Recent Labs     08/22/21  1304   LIPASE 60.0        Recent Labs     08/22/21  1304   PROT 8.0   INR 1.03        Recent Labs     08/22/21  2108 08/24/21  0903   TROPONINI 0.29* 0.25*         ASSESSMENT & PLAN:   Jeramie Rendon is a 45 y.o. female with history of ESRD w/ hemodialysis via PD catheter (7/9), HTN, CHF, T2DM, and recent hospitalization for aspiration pneumonia with sepsis who presented to ED on 7/22 for lower quadrant abdominal pain and for whom general surgery is consulted due to possible peritonitis. - Dialysis management per primary  - Peritoneal fluid with WBC 27,388, NGTD, no Acid fast observed. However with no organisms  - Blood culture 1 from 8/22 with gram positive rods, however Blood culture 2 with NGTD, possible contaminated blood culture, will monitor closely   - C. Diff negative  - Continue antibiotics, will continue to follow ID recs  - General surgery will be available if PD catheter removal is recommended.     Kathy Jacobo DO  PGY1, General Surgery  08/25/21  6:46 AM  [phone]

## 2021-08-26 VITALS
TEMPERATURE: 98.3 F | SYSTOLIC BLOOD PRESSURE: 139 MMHG | WEIGHT: 189.38 LBS | HEIGHT: 60 IN | DIASTOLIC BLOOD PRESSURE: 84 MMHG | HEART RATE: 71 BPM | OXYGEN SATURATION: 94 % | RESPIRATION RATE: 25 BRPM | BODY MASS INDEX: 37.18 KG/M2

## 2021-08-26 LAB
ALBUMIN SERPL-MCNC: 2.9 G/DL (ref 3.4–5)
ANION GAP SERPL CALCULATED.3IONS-SCNC: 13 MMOL/L (ref 3–16)
APPEARANCE FLUID: CLEAR
BASOPHILS ABSOLUTE: 0 K/UL (ref 0–0.2)
BASOPHILS RELATIVE PERCENT: 0.4 %
BLOOD CULTURE, ROUTINE: ABNORMAL
BUN BLDV-MCNC: 55 MG/DL (ref 7–20)
CALCIUM SERPL-MCNC: 9.3 MG/DL (ref 8.3–10.6)
CELL COUNT FLUID TYPE: NORMAL
CHLORIDE BLD-SCNC: 99 MMOL/L (ref 99–110)
CLOT EVALUATION: NORMAL
CO2: 22 MMOL/L (ref 21–32)
COLOR FLUID: COLORLESS
CREAT SERPL-MCNC: 3.4 MG/DL (ref 0.6–1.1)
CULTURE, BLOOD 2: NORMAL
EOSINOPHIL FLUID: 2 %
EOSINOPHILS ABSOLUTE: 0.5 K/UL (ref 0–0.6)
EOSINOPHILS RELATIVE PERCENT: 12 %
FLUID PATH CONSULT: NO
GFR AFRICAN AMERICAN: 17
GFR NON-AFRICAN AMERICAN: 14
GLUCOSE BLD-MCNC: 158 MG/DL (ref 70–99)
GLUCOSE BLD-MCNC: 184 MG/DL (ref 70–99)
GLUCOSE BLD-MCNC: 255 MG/DL (ref 70–99)
HCT VFR BLD CALC: 30.1 % (ref 36–48)
HEMOGLOBIN: 10.2 G/DL (ref 12–16)
LYMPHOCYTES ABSOLUTE: 1.3 K/UL (ref 1–5.1)
LYMPHOCYTES RELATIVE PERCENT: 30.7 %
LYMPHOCYTES, BODY FLUID: 36 %
MACROPHAGE FLUID: 2 %
MAGNESIUM: 2 MG/DL (ref 1.8–2.4)
MCH RBC QN AUTO: 31.6 PG (ref 26–34)
MCHC RBC AUTO-ENTMCNC: 33.9 G/DL (ref 31–36)
MCV RBC AUTO: 93.2 FL (ref 80–100)
MESOTHELIAL FLUID: 1 %
MONOCYTE, FLUID: 52 %
MONOCYTES ABSOLUTE: 0.5 K/UL (ref 0–1.3)
MONOCYTES RELATIVE PERCENT: 10.8 %
MRSA SCREEN RT-PCR: NORMAL
NEUTROPHIL, FLUID: 7 %
NEUTROPHILS ABSOLUTE: 1.9 K/UL (ref 1.7–7.7)
NEUTROPHILS RELATIVE PERCENT: 46.1 %
NUCLEATED CELLS FLUID: 46 /CUMM
NUMBER OF CELLS COUNTED FLUID: 100
ORGANISM: ABNORMAL
ORGANISM: ABNORMAL
PDW BLD-RTO: 13.4 % (ref 12.4–15.4)
PERFORMED ON: ABNORMAL
PERFORMED ON: ABNORMAL
PHOSPHORUS: 4.6 MG/DL (ref 2.5–4.9)
PLATELET # BLD: 199 K/UL (ref 135–450)
PMV BLD AUTO: 8.8 FL (ref 5–10.5)
POTASSIUM SERPL-SCNC: 3.8 MMOL/L (ref 3.5–5.1)
RBC # BLD: 3.23 M/UL (ref 4–5.2)
RBC FLUID: 2 /CUMM
SODIUM BLD-SCNC: 134 MMOL/L (ref 136–145)
WBC # BLD: 4.2 K/UL (ref 4–11)

## 2021-08-26 PROCEDURE — 6370000000 HC RX 637 (ALT 250 FOR IP): Performed by: INTERNAL MEDICINE

## 2021-08-26 PROCEDURE — 99232 SBSQ HOSP IP/OBS MODERATE 35: CPT | Performed by: INTERNAL MEDICINE

## 2021-08-26 PROCEDURE — 6360000002 HC RX W HCPCS: Performed by: STUDENT IN AN ORGANIZED HEALTH CARE EDUCATION/TRAINING PROGRAM

## 2021-08-26 PROCEDURE — 99233 SBSQ HOSP IP/OBS HIGH 50: CPT | Performed by: INTERNAL MEDICINE

## 2021-08-26 PROCEDURE — 80069 RENAL FUNCTION PANEL: CPT

## 2021-08-26 PROCEDURE — 89051 BODY FLUID CELL COUNT: CPT

## 2021-08-26 PROCEDURE — 2500000003 HC RX 250 WO HCPCS: Performed by: INTERNAL MEDICINE

## 2021-08-26 PROCEDURE — 85025 COMPLETE CBC W/AUTO DIFF WBC: CPT

## 2021-08-26 PROCEDURE — 99231 SBSQ HOSP IP/OBS SF/LOW 25: CPT | Performed by: SURGERY

## 2021-08-26 PROCEDURE — 6370000000 HC RX 637 (ALT 250 FOR IP): Performed by: STUDENT IN AN ORGANIZED HEALTH CARE EDUCATION/TRAINING PROGRAM

## 2021-08-26 PROCEDURE — 83735 ASSAY OF MAGNESIUM: CPT

## 2021-08-26 PROCEDURE — 2580000003 HC RX 258: Performed by: STUDENT IN AN ORGANIZED HEALTH CARE EDUCATION/TRAINING PROGRAM

## 2021-08-26 PROCEDURE — 36415 COLL VENOUS BLD VENIPUNCTURE: CPT

## 2021-08-26 RX ORDER — INSULIN LISPRO 100 [IU]/ML
10 INJECTION, SOLUTION INTRAVENOUS; SUBCUTANEOUS
Qty: 5 PEN | Refills: 1 | Status: ON HOLD | OUTPATIENT
Start: 2021-08-26 | End: 2021-12-09 | Stop reason: SDUPTHER

## 2021-08-26 RX ORDER — METRONIDAZOLE 500 MG/1
500 TABLET ORAL 3 TIMES DAILY
Qty: 30 TABLET | Refills: 0 | Status: SHIPPED | OUTPATIENT
Start: 2021-08-26 | End: 2021-09-05

## 2021-08-26 RX ADMIN — CARVEDILOL 12.5 MG: 12.5 TABLET, FILM COATED ORAL at 09:06

## 2021-08-26 RX ADMIN — METRONIDAZOLE 500 MG: 500 INJECTION, SOLUTION INTRAVENOUS at 03:00

## 2021-08-26 RX ADMIN — SODIUM CHLORIDE 25 ML: 9 INJECTION, SOLUTION INTRAVENOUS at 12:33

## 2021-08-26 RX ADMIN — NIFEDIPINE 30 MG: 30 TABLET, EXTENDED RELEASE ORAL at 09:06

## 2021-08-26 RX ADMIN — ASPIRIN 81 MG: 81 TABLET, CHEWABLE ORAL at 09:03

## 2021-08-26 RX ADMIN — INSULIN LISPRO 3 UNITS: 100 INJECTION, SOLUTION INTRAVENOUS; SUBCUTANEOUS at 12:59

## 2021-08-26 RX ADMIN — POLYETHYLENE GLYCOL 3350 17 G: 17 POWDER, FOR SOLUTION ORAL at 09:03

## 2021-08-26 RX ADMIN — GABAPENTIN 300 MG: 300 CAPSULE ORAL at 09:06

## 2021-08-26 RX ADMIN — BUTALBITAL, ACETAMINOPHEN, AND CAFFEINE 1 TABLET: 50; 325; 40 TABLET ORAL at 00:43

## 2021-08-26 RX ADMIN — INSULIN LISPRO 1 UNITS: 100 INJECTION, SOLUTION INTRAVENOUS; SUBCUTANEOUS at 09:08

## 2021-08-26 RX ADMIN — PANTOPRAZOLE SODIUM 40 MG: 40 TABLET, DELAYED RELEASE ORAL at 05:12

## 2021-08-26 RX ADMIN — ATORVASTATIN CALCIUM 40 MG: 40 TABLET, FILM COATED ORAL at 09:06

## 2021-08-26 RX ADMIN — HEPARIN SODIUM 5000 UNITS: 5000 INJECTION INTRAVENOUS; SUBCUTANEOUS at 05:12

## 2021-08-26 RX ADMIN — INSULIN LISPRO 5 UNITS: 100 INJECTION, SOLUTION INTRAVENOUS; SUBCUTANEOUS at 12:59

## 2021-08-26 RX ADMIN — Medication 10 ML: at 09:06

## 2021-08-26 RX ADMIN — INSULIN LISPRO 5 UNITS: 100 INJECTION, SOLUTION INTRAVENOUS; SUBCUTANEOUS at 09:08

## 2021-08-26 ASSESSMENT — PAIN DESCRIPTION - DESCRIPTORS: DESCRIPTORS: HEADACHE

## 2021-08-26 ASSESSMENT — PAIN DESCRIPTION - LOCATION: LOCATION: HEAD

## 2021-08-26 ASSESSMENT — PAIN SCALES - GENERAL
PAINLEVEL_OUTOF10: 0
PAINLEVEL_OUTOF10: 4
PAINLEVEL_OUTOF10: 0

## 2021-08-26 ASSESSMENT — PAIN DESCRIPTION - FREQUENCY: FREQUENCY: CONTINUOUS

## 2021-08-26 ASSESSMENT — PAIN DESCRIPTION - PAIN TYPE: TYPE: ACUTE PAIN

## 2021-08-26 ASSESSMENT — PAIN DESCRIPTION - ONSET: ONSET: GRADUAL

## 2021-08-26 NOTE — PROGRESS NOTES
Discharge note: Patient has been seen by doctor. Discharge order obtained, and discharge instructions reviewed. Patient educated, using the teach back method, about follow up instructions and discharge instructions. A completed copy of the AVS instructions given to patient and all questions answered. IV catheter removed without complaints, catheter intact, site WNL. [facility] home with family.

## 2021-08-26 NOTE — PROGRESS NOTES
Physician Progress Note      PATIENT:               Liz Farias  CSN #:                  [id number]  :                       1970  ADMIT DATE:       2021 12:16 PM  DISCH DATE:  RESPONDING  PROVIDER #:        Syed Bergman MD          QUERY TEXT:    Pt admitted with peritonitis and has CHF documented in [address]. continuing to   receive coreg. Initially presented with SOB. If possible, please document in   progress notes and discharge summary further specificity regarding the type   and acuity of CHF:    The medical record reflects the following:  Risk Factors: 45 y.o. female w/HTN, DM2, peritonitis, ESRD, ECHO of 2019   w/EF 60%  Clinical Indicators: Initially presented with SOB  Treatment: Continuing home coreg  Options provided:  -- Chronic Diastolic CHF/HFpEF  -- Other - I will add my own diagnosis  -- Disagree - Not applicable / Not valid  -- Disagree - Clinically unable to determine / Unknown  -- Refer to Clinical Documentation Reviewer    PROVIDER RESPONSE TEXT:    This patient has chronic diastolic CHF/HFpEF.     Query created by: Suus Hickman on 2021 5:29 PM      Electronically signed by:  Syed Bergman MD 2021 8:54 PM

## 2021-08-26 NOTE — FLOWSHEET NOTE
CCPD Order   Exchanges: 4   Exchange Volume: 2000 ml   Total Time: 8 hrs   Dextrose: 2.5%   Last Fill: 0 ml   Total Volume: 8000 ml     Orders verified. Supplies taken to pt's room. Report received. Cycler set up, primed and pre tested. Dressing changed on [facility] Cath site. Pt connected to cycler. CCPD initiated without problem. Initial effluent clear. Unable to draw initial fluid for cell count due to initial drain 1 ml. Will send in am. Report provided to Hari Villanueva, [address] [place] [address].

## 2021-08-26 NOTE — PROGRESS NOTES
Bariatric Surgery   Daily Progress Note  Patient: Loi Pathak      CC: PD catheter peritonitis    SUBJECTIVE:   No issues overnight. Patient remains afebrile and HDS. Patient completed PD twice yesterday, one in the AM, one in the PM. Denies any abdominal pain, tolerating diet. ROS:   A 14 point review of systems was conducted, significant findings as noted above. All other systems negative. OBJECTIVE:    PHYSICAL EXAM:    Vitals:    08/25/21 1940 08/25/21 2105 08/25/21 2311 08/26/21 0303   BP: (!) 142/72 (!) 142/72 (!) 155/78 (!) 142/69   Pulse: 74 74 74 61   Resp: 18 10 17 14   Temp: 98.4 °F (36.9 °C) 98.4 °F (36.9 °C) 98.5 °F (36.9 °C) 98.4 °F (36.9 °C)   TempSrc: Oral  Oral Oral   SpO2: 100%  99% 100%   Weight:  192 lb 0.3 oz (87.1 kg)     Height:           General appearance: alert, no acute distress, grooming appropriate  Eyes: No scleral icterus, EOM grossly intact  Neck: trachea midline, no JVD, no lymphadenopathy, neck supple  Chest/Lungs: Equal excursion bilaterally, normal effort with no accessory muscle use on RA  Cardiovascular: RRR, brisk capillary refill  Abdomen: Soft, non-tender, no rebound, guarding, or rigidity, PD catheter in place at left abdomen without surrounding erythema or increased tenderness  Skin: warm and dry, no rashes  Extremities: no edema, no cyanosis  Neuro: A&Ox3, no focal deficits, sensation intact    LABS:   Recent Labs     08/25/21  0543 08/26/21  0427   WBC 4.8 4.2   HGB 9.8* 10.2*   HCT 29.5* 30.1*   MCV 93.9 93.2    199        Recent Labs     08/25/21  0543 08/26/21  0427   * 134*   K 3.3* 3.8   CL 95* 99   CO2 22 22   PHOS 4.7 4.6   BUN 59* 55*   CREATININE 3.9* 3.4*        No results for input(s): AST, ALT, ALB, BILIDIR, BILITOT, ALKPHOS in the last 72 hours. No results for input(s): LIPASE, AMYLASE in the last 72 hours. No results for input(s): PROT, INR, APTT in the last 72 hours.      Recent Labs     08/24/21  0903   TROPONINI 0.25* ASSESSMENT & PLAN:   Rainer Bocanegra is a 45 y.o. female with history of ESRD w/ hemodialysis via PD catheter (7/9), HTN, CHF, T2DM, and recent hospitalization for aspiration pneumonia with sepsis who presented to ED on 7/22 for lower quadrant abdominal pain and for whom general surgery is consulted due to possible peritonitis. - Dialysis management per primary  - Peritoneal fluid with WBC 27,388, no Acid fast observed. No organisms seen, and NGTD  - Blood cultures from 8/22 negative with bacillus contaminant  - C. Diff negative, GI bacterial pathogens negative  - Continue antibiotics, will continue to follow ID recs   - Recommending transition to cefdinir + Flagyl intraperitoneal on discharge , no systemic abx  - General surgery will be available if PD catheter removal is required, please call if plan changes.      Candie Sever, DO  PGY1, General Surgery  08/26/21  6:50 AM  [phone]

## 2021-08-26 NOTE — PROGRESS NOTES
Nephrology  Note                                                                                                                                                                                                                                                                                                                                                               Office : [phone]     Fax :[phone]              Patient's Name: Curt Dimas  8/25/2021    Pt on PD   dian well   Abd pain worse    Cell count better         Past Medical History:   Diagnosis Date    [facility]est[facility] [address]     Diabetes mellitus ([facility] [address]     Hypertension        Past Surgical History:   Procedure Laterality Date    DIALYSIS CATHETER INSERTION N/A 7/9/2021    LAPAROSCOPIC PERITONEAL DIALYSIS CATHETER INSERTION, OMENTOPLEXY performed by Kathleen Cowan DO at [address]       History reviewed. No pertinent family history. reports that she has never smoked. She has never used smokeless tobacco. She reports previous alcohol use. She reports previous drug use. Allergies:  Patient has no known allergies.     Current Medications:    polyethylene glycol (GLYCOLAX) packet 17 g, BID  cefTRIAXone (ROCEPHIN) 2000 mg IVPB in D5W 50ml minibag, Q24H  metronidazole (FLAGYL) 500 mg in NaCl 100 mL IVPB premix, Q8H  gentamicin (GARAMYCIN) 0.1 % cream, PRN  sodium chloride flush 0.9 % injection 5-40 mL, 2 times per day  sodium chloride flush 0.9 % injection 5-40 mL, PRN  0.9 % sodium chloride infusion, PRN  heparin (porcine) injection 5,000 Units, 3 times per day  ondansetron (ZOFRAN-ODT) disintegrating tablet 4 mg, Q8H PRN   Or  ondansetron (ZOFRAN) injection 4 mg, Q6H PRN  polyethylene glycol (GLYCOLAX) packet 17 g, Daily PRN  acetaminophen (TYLENOL) tablet 650 mg, Q6H PRN   Or  acetaminophen (TYLENOL) suppository 650 mg, Q6H PRN  aspirin chewable tablet 81 mg, Daily  atorvastatin (LIPITOR) tablet 40 mg, Daily  butalbital-acetaminophen-caffeine (FIORICET, ESGIC) per tablet 1 tablet, Q4H PRN  carvedilol (COREG) tablet 12.5 mg, BID  gabapentin (NEURONTIN) capsule 300 mg, BID  insulin glargine (LANTUS;BASAGLAR) injection pen 10 Units, Nightly  insulin lispro (1 Unit Dial) 5 Units, TID WC  insulin lispro (1 Unit Dial) 0-6 Units, TID WC  insulin lispro (1 Unit Dial) 0-3 Units, Nightly  glucose (GLUTOSE) 40 % oral gel 15 g, PRN  dextrose 50 % IV solution, PRN  glucagon (rDNA) injection 1 mg, PRN  dextrose 5 % solution, PRN  NIFEdipine (ADALAT CC) extended release tablet 30 mg, BID  pantoprazole (PROTONIX) tablet 40 mg, QAM AC          Physical exam:     Vitals:  BP (!) 142/72   Pulse 74   Temp 98.4 °F (36.9 °C)   Resp 10   Ht 5' (1.524 m)   Wt 192 lb 0.3 oz (87.1 kg)   SpO2 100%   BMI 37.50 kg/m²   Constitutional:  OAA X3 NAD  Skin: no rash, turgor wnl  Heent:  eomi, mmm  Neck: no bruits or jvd noted  Cardiovascular:  S1, S2 without m/r/g  Respiratory: CTA B without w/r/r  Abdomen:  +bs, soft, ttp   Ext: + lower extremity edema  Psychiatric: mood and affect appropriate  Musculoskeletal:  Rom, muscular strength intact    Data:   Labs:  CBC:   Recent Labs     08/23/21  0416 08/24/21  0543 08/25/21  0543   WBC 7.6 4.9 4.8   HGB 10.0* 10.4* 9.8*    167 178     BMP:    Recent Labs     08/23/21  1456 08/24/21  0543 08/25/21  0543   * 129* 131*   K 4.1 3.5 3.3*   CL 88* 92* 95*   CO2 19* 20* 22   BUN 74* 70* 59*   CREATININE 5.7* 4.8* 3.9*   GLUCOSE 137* 262* 232*     Ca/Mg/Phos:   Recent Labs     08/23/21  0416 08/23/21  0416 08/23/21  1456 08/24/21  0543 08/25/21  0543   CALCIUM 8.5   < > 8.7 9.2 8.9   MG 1.50*   < > 1.90 1.80 2.10   PHOS 4.7  --   --  5.2* 4.7    < > = values in this interval not displayed. Hepatic:   No results for input(s): AST, ALT, ALB, BILITOT, ALKPHOS in the last 72 hours.   Troponin:   Recent Labs     08/24/21  0903   TROPONINI 0.25*     BNP: No results for input(s): BNP in the last 72 hours. Lipids: No results for input(s): CHOL, TRIG, HDL, LDLCALC, LABVLDL in the last 72 hours. ABGs: No results for input(s): PHART, PO2ART, [id number] in the last 72 hours. INR:   No results for input(s): INR in the last 72 hours. UA:No results for input(s): Crewe Pio, GLUCOSEU, BILIRUBINUR, Amarilis Shells, BLOODU, PHUR, PROTEINU, UROBILINOGEN, NITRU, LEUKOCYTESUR, LABMICR, URINETYPE in the last 72 hours. Urine Microscopic: No results for input(s): LABCAST, BACTERIA, COMU, HYALCAST, WBCUA, RBCUA, EPIU in the last 72 hours. Urine Culture: No results for input(s): LABURIN in the last 72 hours. Urine Chemistry: No results for input(s): Jass Hashimoto, PROTEINUR, NAUR in the last 72 hours. IMAGING:  XR CHEST PORTABLE   Final Result      1. Mild bibasilar parenchymal opacities, likely atelectasis. CT ABDOMEN PELVIS WO CONTRAST Additional Contrast? None   Final Result      1. No acute intra-abdominopelvic abnormality. 2.  Small to moderate ascites and intraperitoneal free air likely relates to presence of peritoneal dialysis catheter. 3.  Enlarged uterus likely on the basis of uterine fibroids. Pelvic ultrasound would provide better evaluation. Assessment/Plan   1. ESRD     2. HTN    3. Anemia    4. Acid- base/ Electrolyte imbalance     5.  PD peritonitis     Plan   - cont CCPD   - Abx   - CX - reviewed   - monitor cell count today if better can go home with IP abx   - labs in am   - encourage solute intake   - dec free water intake   - d/w pt in length                 Thank you for allowing us to participate in care of Renetta MD Leigh  Feel free to contact me   [facility] [address]  Office : [phone]  Fax :[phone]

## 2021-08-26 NOTE — PROGRESS NOTES
Nephrology  Note                                                                                                                                                                                                                                                                                                                                                               Office : [phone]     Fax :[phone]              Patient's Name: Olivia Young  8/26/2021    Pt on PD   dian well   Abd pain better   Cell count better         Past Medical History:   Diagnosis Date    [facility]congest[facility]) [facility] [address]     Diabetes mellitus ([facility] [address]     Hypertension        Past Surgical History:   Procedure Laterality Date    DIALYSIS CATHETER INSERTION N/A 7/9/2021    LAPAROSCOPIC PERITONEAL DIALYSIS CATHETER INSERTION, OMENTOPLEXY performed by Kelley Varghese DO at [address]       History reviewed. No pertinent family history. reports that she has never smoked. She has never used smokeless tobacco. She reports previous alcohol use. She reports previous drug use. Allergies:  Patient has no known allergies.     Current Medications:    insulin glargine (LANTUS;BASAGLAR) injection pen 15 Units, Nightly  polyethylene glycol (GLYCOLAX) packet 17 g, BID  cefTRIAXone (ROCEPHIN) 2000 mg IVPB in D5W 50ml minibag, Q24H  metronidazole (FLAGYL) 500 mg in NaCl 100 mL IVPB premix, Q8H  gentamicin (GARAMYCIN) 0.1 % cream, PRN  sodium chloride flush 0.9 % injection 5-40 mL, 2 times per day  sodium chloride flush 0.9 % injection 5-40 mL, PRN  0.9 % sodium chloride infusion, PRN  heparin (porcine) injection 5,000 Units, 3 times per day  ondansetron (ZOFRAN-ODT) disintegrating tablet 4 mg, Q8H PRN   Or  ondansetron (ZOFRAN) injection 4 mg, Q6H PRN  polyethylene glycol (GLYCOLAX) packet 17 g, Daily PRN  acetaminophen (TYLENOL) tablet 650 mg, Q6H PRN   Or  acetaminophen (TYLENOL) suppository 650 mg, Q6H PRN  aspirin chewable tablet 81 mg, Daily  atorvastatin (LIPITOR) tablet 40 mg, Daily  butalbital-acetaminophen-caffeine (FIORICET, ESGIC) per tablet 1 tablet, Q4H PRN  carvedilol (COREG) tablet 12.5 mg, BID  gabapentin (NEURONTIN) capsule 300 mg, BID  insulin lispro (1 Unit Dial) 5 Units, TID WC  insulin lispro (1 Unit Dial) 0-6 Units, TID WC  insulin lispro (1 Unit Dial) 0-3 Units, Nightly  glucose (GLUTOSE) 40 % oral gel 15 g, PRN  dextrose 50 % IV solution, PRN  glucagon (rDNA) injection 1 mg, PRN  dextrose 5 % solution, PRN  NIFEdipine (ADALAT CC) extended release tablet 30 mg, BID  pantoprazole (PROTONIX) tablet 40 mg, QAM AC          Physical exam:     Vitals:  BP (!) 159/67   Pulse 68   Temp 98.4 °F (36.9 °C) (Oral)   Resp 11   Ht 5' (1.524 m)   Wt 192 lb 0.3 oz (87.1 kg)   SpO2 94%   BMI 37.50 kg/m²   Constitutional:  OAA X3 NAD  Skin: no rash, turgor wnl  Heent:  eomi, mmm  Neck: no bruits or jvd noted  Cardiovascular:  S1, S2 without m/r/g  Respiratory: CTA B without w/r/r  Abdomen:  +bs, soft, ttp   Ext: + lower extremity edema  Psychiatric: mood and affect appropriate  Musculoskeletal:  Rom, muscular strength intact    Data:   Labs:  CBC:   Recent Labs     08/24/21 0543 08/25/21  0543 08/26/21 0427   WBC 4.9 4.8 4.2   HGB 10.4* 9.8* 10.2*    178 199     BMP:    Recent Labs     08/24/21  0543 08/25/21  0543 08/26/21  0427   * 131* 134*   K 3.5 3.3* 3.8   CL 92* 95* 99   CO2 20* 22 22   BUN 70* 59* 55*   CREATININE 4.8* 3.9* 3.4*   GLUCOSE 262* 232* 184*     Ca/Mg/Phos:   Recent Labs     08/24/21  0543 08/25/21  0543 08/26/21  0427   CALCIUM 9.2 8.9 9.3   MG 1.80 2.10 2.00   PHOS 5.2* 4.7 4.6     Hepatic:   No results for input(s): AST, ALT, ALB, BILITOT, ALKPHOS in the last 72 hours. Troponin:   Recent Labs     08/24/21  0903   TROPONINI 0.25*     BNP: No results for input(s): BNP in the last 72 hours. Lipids: No results for input(s): CHOL, TRIG, HDL, LDLCALC, LABVLDL in the last 72 hours.   ABGs: No results for input(s): PHART, PO2ART, LLI2QYX in the last 72 hours. INR:   No results for input(s): INR in the last 72 hours. UA:No results for input(s): Hamp Feeler, GLUCOSEU, BILIRUBINUR, Tyson Pane, BLOODU, PHUR, PROTEINU, UROBILINOGEN, NITRU, LEUKOCYTESUR, LABMICR, URINETYPE in the last 72 hours. Urine Microscopic: No results for input(s): LABCAST, BACTERIA, COMU, HYALCAST, WBCUA, RBCUA, EPIU in the last 72 hours. Urine Culture: No results for input(s): LABURIN in the last 72 hours. Urine Chemistry: No results for input(s): Randolm Reins, PROTEINUR, NAUR in the last 72 hours. IMAGING:  XR CHEST PORTABLE   Final Result      1. Mild bibasilar parenchymal opacities, likely atelectasis. CT ABDOMEN PELVIS WO CONTRAST Additional Contrast? None   Final Result      1. No acute intra-abdominopelvic abnormality. 2.  Small to moderate ascites and intraperitoneal free air likely relates to presence of peritoneal dialysis catheter. 3.  Enlarged uterus likely on the basis of uterine fibroids. Pelvic ultrasound would provide better evaluation. Assessment/Plan   1. ESRD     2. HTN    3. Anemia    4. Acid- base/ Electrolyte imbalance     5.  PD peritonitis     Plan   - cont CCPD   - Abx   - CX - reviewed   - monitor cell count today if better can go home with IP abx   - labs in am   - encourage solute intake   - dec free water intake   - d/w pt in length                 Thank you for allowing us to participate in care of Shirley Mcknight MD  Feel free to contact me   [facility] [address]  Office : [phone]  Fax :[phone]

## 2021-08-26 NOTE — PLAN OF CARE
Problem: Fluid Volume:  Goal: Will show no signs or symptoms of fluid imbalance  Description: Will show no signs or symptoms of fluid imbalance  Outcome: Ongoing  Note: PD @ night     Problem: Physical Regulation:  Goal: Will remain free from infection  Description: Will remain free from infection  Outcome: Ongoing  Note: Receiving ivabx for peritoneal infec

## 2021-08-26 NOTE — PROGRESS NOTES
ID Follow-up NOTE    CC:   Peritonitis, positive BC (GPR - contaminant)   Antibiotics: Ceftriaxone, Metronidazole    Admit Date: 2021  Hospital Day: 5    Subjective:     Patient reports NO abd pain, able to eat      Objective:     Afebrile    Patient Vitals for the past 8 hrs:   BP Temp Temp src Pulse Resp SpO2   21 0722 (!) 159/67 98.4 °F (36.9 °C) Oral 68 11 94 %     I/O last 3 completed shifts: In: 460 [P.O.:460]  Out: 1   No intake/output data recorded. EXAM:  GENERAL: No apparent distress.     HEENT: Membranes moist, no oral lesion  NECK:  Supple, no lymphadenopathy  LUNGS: Clear b/l, no rales, no dullness  CARDIAC: RRR, no murmur appreciated  ABD:  + BS, soft - NO abd tenderness, PD catheter in place  EXT:  No rash, no edema, no lesions  NEURO: No focal neurologic findings  PSYCH: Orientation, sensorium, mood normal  LINES:  Peripheral iv       Data Review:  Lab Results   Component Value Date    WBC 4.2 2021    HGB 10.2 (L) 2021    HCT 30.1 (L) 2021    MCV 93.2 2021     2021     Lab Results   Component Value Date    CREATININE 3.4 (H) 2021    BUN 55 (H) 2021     (L) 2021    K 3.8 2021    CL 99 2021    CO2 22 2021       Hepatic Function Panel:   Lab Results   Component Value Date    ALKPHOS 174 2021    ALT 26 2021    AST 17 2021    PROT 8.0 2021    BILITOT <0.2 2021    BILIDIR <0.2 2021    IBILI see below 2021    LABALBU 2.9 2021       Micro:   Body cult -      BC 1: Bacillus species   BC 2: no growth to date      Body fluid cx: GS 4+ wbc, no organism; culture NGTD;  (WBC 27,388; RBC 2, 800; PMN 91%;  glucose 158; protein 0.6)   AFB smear neg, cult in process     : GI bacterial pathogens PCR: neg   C diff ag / ab: neg      Imagin/22 CT abd/pelvis  Impression   1.  No acute intra-abdominopelvic abnormality.     2.  Small to moderate ascites and intraperitoneal free air likely relates to presence of peritoneal dialysis catheter. 3.  Enlarged uterus likely on the basis of uterine fibroids. Pelvic ultrasound would provide better evaluation      8/22 CXR   Impression   1.  Mild bibasilar parenchymal opacities, likely atelectasis. Scheduled Meds:   insulin glargine  15 Units Subcutaneous Nightly    polyethylene glycol  17 g Oral BID    cefTRIAXone (ROCEPHIN) IV  2,000 mg IntraVENous Q24H    metroNIDAZOLE  500 mg IntraVENous Q8H    sodium chloride flush  5-40 mL IntraVENous 2 times per day    heparin (porcine)  5,000 Units Subcutaneous 3 times per day    aspirin  81 mg Oral Daily    atorvastatin  40 mg Oral Daily    carvedilol  12.5 mg Oral BID    gabapentin  300 mg Oral BID    insulin lispro  5 Units Subcutaneous TID WC    insulin lispro  0-6 Units Subcutaneous TID WC    insulin lispro  0-3 Units Subcutaneous Nightly    NIFEdipine  30 mg Oral BID    pantoprazole  40 mg Oral QAM AC       Continuous Infusions:   sodium chloride 25 mL (08/25/21 1936)    dextrose         PRN Meds:  gentamicin, sodium chloride flush, sodium chloride, ondansetron **OR** ondansetron, polyethylene glycol, acetaminophen **OR** acetaminophen, butalbital-acetaminophen-caffeine, glucose, dextrose, glucagon (rDNA), dextrose      Assessment:     44 yo woman with obesity, CRF on PD (PD catheter placed 7/9), DM, HTN, CHF     Hosp 7/6 - 15 - initiated on dialysis in July, has HD catheter placed, had PD catheter 79. Hospital complicated by aspiration pneumonia     Presents 8/22 with abd pain, n /v.  PD fluid cloudy  In ED T 100.4, CXR basilar densities.     CT with air, mod ascites  Admit and started no empiric antibiotics     PD fluid 27,388 - 91%PMN  BC + GPR and GPC in 1 set    F/u PD fluid    - 8/24 - WBC 4,333, 86%PMN   - 8/25 - WBC 24 - 40%PMN   - 8/26 - WBC 46 - 7%PMN     IMP/  Fever  Abd pain, nausea / vomiting - improved     PD related vs secondary peritonitis   - very high PD fluid WBC is concerning for secondary bowel problem like perforation   - less pain, fluid now appear clear (8/24)   - PD fluid now clear, WBC 24 (8/25), 46 (8/26)    + BC x 1 Bacillus sp, contaminant     Afebrile, no leukocytosis    Plan:     Cont ceftriaxone + metronidazole     At discharge, no further systemic antibiotics, intra-peritoneal antibiotics per Renal / Dr Deloras Baumgarten Making:   The following items were considered in medical decision making:  Discussion of patient care with other providers  Reviewed clinical lab tests  Reviewed radiology tests  Reviewed other diagnostic tests/interventions  Independent review of radiologic images - reviewed CT with radiologist 8/24  Microbiology cultures and other micro tests reviewed      Discussed with pt  Jonathon Mckeon MD

## 2021-08-27 LAB
BODY FLUID CULTURE, STERILE: NORMAL
GRAM STAIN RESULT: NORMAL

## 2021-08-28 LAB
BODY FLUID CULTURE, STERILE: NORMAL
GRAM STAIN RESULT: NORMAL

## 2021-10-05 LAB
AFB CULTURE (MYCOBACTERIA): NORMAL
AFB SMEAR: NORMAL

## 2021-11-09 ENCOUNTER — APPOINTMENT (OUTPATIENT)
Dept: CT IMAGING | Age: 51
DRG: 907 | End: 2021-11-09
Payer: COMMERCIAL

## 2021-11-09 ENCOUNTER — HOSPITAL ENCOUNTER (INPATIENT)
Age: 51
LOS: 6 days | Discharge: HOME | DRG: 907 | End: 2021-11-16
Attending: EMERGENCY MEDICINE | Admitting: INTERNAL MEDICINE
Payer: COMMERCIAL

## 2021-11-09 DIAGNOSIS — T85.71XA PERITONITIS ASSOCIATED WITH PERITONEAL DIALYSIS, INITIAL ENCOUNTER (HCC): Primary | ICD-10-CM

## 2021-11-09 LAB
A/G RATIO: 0.7 (ref 1.1–2.2)
ALBUMIN SERPL-MCNC: 3.2 G/DL (ref 3.4–5)
ALP BLD-CCNC: 125 U/L (ref 40–129)
ALT SERPL-CCNC: 43 U/L (ref 10–40)
ANION GAP SERPL CALCULATED.3IONS-SCNC: 26 MMOL/L (ref 3–16)
AST SERPL-CCNC: 42 U/L (ref 15–37)
BASE EXCESS VENOUS: -2.4 MMOL/L (ref -2–3)
BASOPHILS ABSOLUTE: 0 K/UL (ref 0–0.2)
BASOPHILS RELATIVE PERCENT: 0.1 %
BILIRUB SERPL-MCNC: 0.6 MG/DL (ref 0–1)
BUN BLDV-MCNC: 96 MG/DL (ref 7–20)
CALCIUM SERPL-MCNC: 8.6 MG/DL (ref 8.3–10.6)
CARBOXYHEMOGLOBIN: 2.2 % (ref 0–1.5)
CHLORIDE BLD-SCNC: 85 MMOL/L (ref 99–110)
CO2: 17 MMOL/L (ref 21–32)
CREAT SERPL-MCNC: 7.6 MG/DL (ref 0.6–1.1)
EOSINOPHILS ABSOLUTE: 0 K/UL (ref 0–0.6)
EOSINOPHILS RELATIVE PERCENT: 0.5 %
GFR AFRICAN AMERICAN: 7
GFR NON-AFRICAN AMERICAN: 6
GLUCOSE BLD-MCNC: 203 MG/DL (ref 70–99)
HCO3 VENOUS: 25.2 MMOL/L (ref 24–28)
HCT VFR BLD CALC: 32.2 % (ref 36–48)
HEMOGLOBIN, VEN, REDUCED: 36.4 %
HEMOGLOBIN: 10.7 G/DL (ref 12–16)
LACTIC ACID: 2 MMOL/L (ref 0.4–2)
LIPASE: 16 U/L (ref 13–60)
LYMPHOCYTES ABSOLUTE: 0.9 K/UL (ref 1–5.1)
LYMPHOCYTES RELATIVE PERCENT: 10.8 %
MCH RBC QN AUTO: 30.8 PG (ref 26–34)
MCHC RBC AUTO-ENTMCNC: 33.1 G/DL (ref 31–36)
MCV RBC AUTO: 92.9 FL (ref 80–100)
METHEMOGLOBIN VENOUS: 0.5 % (ref 0–1.5)
MONOCYTES ABSOLUTE: 0.8 K/UL (ref 0–1.3)
MONOCYTES RELATIVE PERCENT: 9 %
NEUTROPHILS ABSOLUTE: 6.8 K/UL (ref 1.7–7.7)
NEUTROPHILS RELATIVE PERCENT: 79.6 %
O2 SAT, VEN: 63 %
PCO2, VEN: 55.9 MMHG (ref 41–51)
PDW BLD-RTO: 14.8 % (ref 12.4–15.4)
PH VENOUS: 7.26 (ref 7.35–7.45)
PLATELET # BLD: 302 K/UL (ref 135–450)
PMV BLD AUTO: 9.2 FL (ref 5–10.5)
PO2, VEN: 36.6 MMHG (ref 25–40)
POTASSIUM REFLEX MAGNESIUM: 6.3 MMOL/L (ref 3.5–5.1)
RBC # BLD: 3.47 M/UL (ref 4–5.2)
SODIUM BLD-SCNC: 128 MMOL/L (ref 136–145)
TCO2 CALC VENOUS: 27 MMOL/L
TOTAL PROTEIN: 7.9 G/DL (ref 6.4–8.2)
WBC # BLD: 8.6 K/UL (ref 4–11)

## 2021-11-09 PROCEDURE — 96365 THER/PROPH/DIAG IV INF INIT: CPT

## 2021-11-09 PROCEDURE — 83690 ASSAY OF LIPASE: CPT

## 2021-11-09 PROCEDURE — 87186 SC STD MICRODIL/AGAR DIL: CPT

## 2021-11-09 PROCEDURE — 86900 BLOOD TYPING SEROLOGIC ABO: CPT

## 2021-11-09 PROCEDURE — 96375 TX/PRO/DX INJ NEW DRUG ADDON: CPT

## 2021-11-09 PROCEDURE — 82945 GLUCOSE OTHER FLUID: CPT

## 2021-11-09 PROCEDURE — 36415 COLL VENOUS BLD VENIPUNCTURE: CPT

## 2021-11-09 PROCEDURE — 87077 CULTURE AEROBIC IDENTIFY: CPT

## 2021-11-09 PROCEDURE — 87205 SMEAR GRAM STAIN: CPT

## 2021-11-09 PROCEDURE — 99223 1ST HOSP IP/OBS HIGH 75: CPT | Performed by: SURGERY

## 2021-11-09 PROCEDURE — 74177 CT ABD & PELVIS W/CONTRAST: CPT

## 2021-11-09 PROCEDURE — 82803 BLOOD GASES ANY COMBINATION: CPT

## 2021-11-09 PROCEDURE — 6360000002 HC RX W HCPCS: Performed by: EMERGENCY MEDICINE

## 2021-11-09 PROCEDURE — 80053 COMPREHEN METABOLIC PANEL: CPT

## 2021-11-09 PROCEDURE — 96367 TX/PROPH/DG ADDL SEQ IV INF: CPT

## 2021-11-09 PROCEDURE — 76376 3D RENDER W/INTRP POSTPROCES: CPT

## 2021-11-09 PROCEDURE — 84157 ASSAY OF PROTEIN OTHER: CPT

## 2021-11-09 PROCEDURE — 89051 BODY FLUID CELL COUNT: CPT

## 2021-11-09 PROCEDURE — 71260 CT THORAX DX C+: CPT

## 2021-11-09 PROCEDURE — 99284 EMERGENCY DEPT VISIT MOD MDM: CPT

## 2021-11-09 PROCEDURE — 86901 BLOOD TYPING SEROLOGIC RH(D): CPT

## 2021-11-09 PROCEDURE — 6360000004 HC RX CONTRAST MEDICATION: Performed by: EMERGENCY MEDICINE

## 2021-11-09 PROCEDURE — 84133 ASSAY OF URINE POTASSIUM: CPT

## 2021-11-09 PROCEDURE — 83605 ASSAY OF LACTIC ACID: CPT

## 2021-11-09 PROCEDURE — 87070 CULTURE OTHR SPECIMN AEROBIC: CPT

## 2021-11-09 PROCEDURE — 85025 COMPLETE CBC W/AUTO DIFF WBC: CPT

## 2021-11-09 RX ORDER — ONDANSETRON 2 MG/ML
4 INJECTION INTRAMUSCULAR; INTRAVENOUS ONCE
Status: COMPLETED | OUTPATIENT
Start: 2021-11-09 | End: 2021-11-09

## 2021-11-09 RX ORDER — MORPHINE SULFATE 4 MG/ML
4 INJECTION, SOLUTION INTRAMUSCULAR; INTRAVENOUS ONCE
Status: COMPLETED | OUTPATIENT
Start: 2021-11-09 | End: 2021-11-09

## 2021-11-09 RX ADMIN — IOPAMIDOL 80 ML: 755 INJECTION, SOLUTION INTRAVENOUS at 20:40

## 2021-11-09 RX ADMIN — MORPHINE SULFATE 4 MG: 4 INJECTION INTRAVENOUS at 20:59

## 2021-11-09 RX ADMIN — ONDANSETRON 4 MG: 2 INJECTION INTRAMUSCULAR; INTRAVENOUS at 21:02

## 2021-11-09 ASSESSMENT — PAIN SCALES - GENERAL
PAINLEVEL_OUTOF10: 8
PAINLEVEL_OUTOF10: 7
PAINLEVEL_OUTOF10: 10
PAINLEVEL_OUTOF10: 9

## 2021-11-09 ASSESSMENT — PAIN DESCRIPTION - LOCATION: LOCATION: ABDOMEN

## 2021-11-09 ASSESSMENT — PAIN DESCRIPTION - DESCRIPTORS: DESCRIPTORS: CRAMPING

## 2021-11-09 ASSESSMENT — PAIN DESCRIPTION - PAIN TYPE: TYPE: ACUTE PAIN

## 2021-11-09 ASSESSMENT — PAIN DESCRIPTION - FREQUENCY: FREQUENCY: CONTINUOUS

## 2021-11-10 ENCOUNTER — APPOINTMENT (OUTPATIENT)
Dept: ULTRASOUND IMAGING | Age: 51
DRG: 907 | End: 2021-11-10
Payer: COMMERCIAL

## 2021-11-10 ENCOUNTER — APPOINTMENT (OUTPATIENT)
Dept: GENERAL RADIOLOGY | Age: 51
DRG: 907 | End: 2021-11-10
Payer: COMMERCIAL

## 2021-11-10 LAB
ALBUMIN SERPL-MCNC: 2.6 G/DL (ref 3.4–5)
ANION GAP SERPL CALCULATED.3IONS-SCNC: 20 MMOL/L (ref 3–16)
APPEARANCE FLUID: CLEAR
APPEARANCE FLUID: NORMAL
APPEARANCE FLUID: NORMAL
BASOPHILS ABSOLUTE: 0 K/UL (ref 0–0.2)
BASOPHILS RELATIVE PERCENT: 0.1 %
BUN BLDV-MCNC: 93 MG/DL (ref 7–20)
CALCIUM SERPL-MCNC: 8.3 MG/DL (ref 8.3–10.6)
CELL COUNT FLUID TYPE: NORMAL
CHLORIDE BLD-SCNC: 84 MMOL/L (ref 99–110)
CLOT EVALUATION: NORMAL
CO2: 19 MMOL/L (ref 21–32)
COLOR FLUID: COLORLESS
COLOR FLUID: YELLOW
COLOR FLUID: YELLOW
CREAT SERPL-MCNC: 7.4 MG/DL (ref 0.6–1.1)
EOSINOPHILS ABSOLUTE: 0.2 K/UL (ref 0–0.6)
EOSINOPHILS RELATIVE PERCENT: 2.4 %
FLUID TYPE: NORMAL
GFR AFRICAN AMERICAN: 7
GFR NON-AFRICAN AMERICAN: 6
GLUCOSE BLD-MCNC: 228 MG/DL (ref 70–99)
GLUCOSE BLD-MCNC: 244 MG/DL (ref 70–99)
GLUCOSE BLD-MCNC: 258 MG/DL (ref 70–99)
GLUCOSE BLD-MCNC: 260 MG/DL (ref 70–99)
GLUCOSE BLD-MCNC: 272 MG/DL (ref 70–99)
GLUCOSE BLD-MCNC: 340 MG/DL (ref 70–99)
GLUCOSE, FLUID: 182 MG/DL
HCT VFR BLD CALC: 28.6 % (ref 36–48)
HEMOGLOBIN: 9.7 G/DL (ref 12–16)
LYMPHOCYTES ABSOLUTE: 0.6 K/UL (ref 1–5.1)
LYMPHOCYTES RELATIVE PERCENT: 8.2 %
LYMPHOCYTES, BODY FLUID: 6 %
LYMPHOCYTES, BODY FLUID: 6 %
Lab: 4.7 MEQ/L
MAGNESIUM: 1.9 MG/DL (ref 1.8–2.4)
MCH RBC QN AUTO: 30.8 PG (ref 26–34)
MCHC RBC AUTO-ENTMCNC: 33.8 G/DL (ref 31–36)
MCV RBC AUTO: 91.2 FL (ref 80–100)
MONOCYTE, FLUID: 1 %
MONOCYTE, FLUID: 2 %
MONOCYTES ABSOLUTE: 0.7 K/UL (ref 0–1.3)
MONOCYTES RELATIVE PERCENT: 10.8 %
NEUTROPHIL, FLUID: 92 %
NEUTROPHIL, FLUID: 93 %
NEUTROPHILS ABSOLUTE: 5.4 K/UL (ref 1.7–7.7)
NEUTROPHILS RELATIVE PERCENT: 78.5 %
NUCLEATED CELLS FLUID: 472 /CUMM
NUCLEATED CELLS FLUID: NORMAL /CUMM
NUCLEATED CELLS FLUID: NORMAL /CUMM
NUMBER OF CELLS COUNTED FLUID: 100
NUMBER OF CELLS COUNTED FLUID: 100
PDW BLD-RTO: 14.3 % (ref 12.4–15.4)
PERFORMED ON: ABNORMAL
PHOSPHORUS: 5.6 MG/DL (ref 2.5–4.9)
PLATELET # BLD: 255 K/UL (ref 135–450)
PMV BLD AUTO: 8.5 FL (ref 5–10.5)
POTASSIUM SERPL-SCNC: 3.6 MMOL/L (ref 3.5–5.1)
POTASSIUM SERPL-SCNC: 5 MMOL/L (ref 3.5–5.1)
POTASSIUM SERPL-SCNC: 6.1 MMOL/L (ref 3.5–5.1)
PROTEIN FLUID: 1.6 G/DL
RBC # BLD: 3.14 M/UL (ref 4–5.2)
RBC FLUID: 33 /CUMM
RBC FLUID: 4600 /CUMM
RBC FLUID: 900 /CUMM
SODIUM BLD-SCNC: 123 MMOL/L (ref 136–145)
VANCOMYCIN RANDOM: 22.5 UG/ML
WBC # BLD: 6.9 K/UL (ref 4–11)

## 2021-11-10 PROCEDURE — 80069 RENAL FUNCTION PANEL: CPT

## 2021-11-10 PROCEDURE — 6370000000 HC RX 637 (ALT 250 FOR IP): Performed by: INTERNAL MEDICINE

## 2021-11-10 PROCEDURE — 89050 BODY FLUID CELL COUNT: CPT

## 2021-11-10 PROCEDURE — 3E1M39Z IRRIGATION OF PERITONEAL CAVITY USING DIALYSATE, PERCUTANEOUS APPROACH: ICD-10-PCS | Performed by: INTERNAL MEDICINE

## 2021-11-10 PROCEDURE — 6360000002 HC RX W HCPCS: Performed by: INTERNAL MEDICINE

## 2021-11-10 PROCEDURE — 84132 ASSAY OF SERUM POTASSIUM: CPT

## 2021-11-10 PROCEDURE — 90945 DIALYSIS ONE EVALUATION: CPT

## 2021-11-10 PROCEDURE — 2580000003 HC RX 258: Performed by: INTERNAL MEDICINE

## 2021-11-10 PROCEDURE — 87040 BLOOD CULTURE FOR BACTERIA: CPT

## 2021-11-10 PROCEDURE — 80202 ASSAY OF VANCOMYCIN: CPT

## 2021-11-10 PROCEDURE — 87070 CULTURE OTHR SPECIMN AEROBIC: CPT

## 2021-11-10 PROCEDURE — 83735 ASSAY OF MAGNESIUM: CPT

## 2021-11-10 PROCEDURE — 76705 ECHO EXAM OF ABDOMEN: CPT

## 2021-11-10 PROCEDURE — 83036 HEMOGLOBIN GLYCOSYLATED A1C: CPT

## 2021-11-10 PROCEDURE — 99232 SBSQ HOSP IP/OBS MODERATE 35: CPT | Performed by: SURGERY

## 2021-11-10 PROCEDURE — 85025 COMPLETE CBC W/AUTO DIFF WBC: CPT

## 2021-11-10 PROCEDURE — 90945 DIALYSIS ONE EVALUATION: CPT | Performed by: INTERNAL MEDICINE

## 2021-11-10 PROCEDURE — 89051 BODY FLUID CELL COUNT: CPT

## 2021-11-10 PROCEDURE — 71045 X-RAY EXAM CHEST 1 VIEW: CPT

## 2021-11-10 PROCEDURE — 1200000000 HC SEMI PRIVATE

## 2021-11-10 PROCEDURE — 99223 1ST HOSP IP/OBS HIGH 75: CPT | Performed by: INTERNAL MEDICINE

## 2021-11-10 PROCEDURE — 87205 SMEAR GRAM STAIN: CPT

## 2021-11-10 PROCEDURE — 36415 COLL VENOUS BLD VENIPUNCTURE: CPT

## 2021-11-10 RX ORDER — POLYETHYLENE GLYCOL 3350 17 G/17G
17 POWDER, FOR SOLUTION ORAL 2 TIMES DAILY
Status: DISCONTINUED | OUTPATIENT
Start: 2021-11-10 | End: 2021-11-16 | Stop reason: HOSPADM

## 2021-11-10 RX ORDER — TORSEMIDE 100 MG/1
200 TABLET ORAL DAILY
Status: DISCONTINUED | OUTPATIENT
Start: 2021-11-10 | End: 2021-11-16 | Stop reason: HOSPADM

## 2021-11-10 RX ORDER — SODIUM CHLORIDE 0.9 % (FLUSH) 0.9 %
5-40 SYRINGE (ML) INJECTION PRN
Status: DISCONTINUED | OUTPATIENT
Start: 2021-11-10 | End: 2021-11-16 | Stop reason: HOSPADM

## 2021-11-10 RX ORDER — GENTAMICIN SULFATE 1 MG/G
CREAM TOPICAL 2 TIMES DAILY
Status: DISCONTINUED | OUTPATIENT
Start: 2021-11-10 | End: 2021-11-16

## 2021-11-10 RX ORDER — NIFEDIPINE 30 MG/1
30 TABLET, FILM COATED, EXTENDED RELEASE ORAL 2 TIMES DAILY
Status: DISCONTINUED | OUTPATIENT
Start: 2021-11-10 | End: 2021-11-12

## 2021-11-10 RX ORDER — ATORVASTATIN CALCIUM 40 MG/1
40 TABLET, FILM COATED ORAL DAILY
Status: DISCONTINUED | OUTPATIENT
Start: 2021-11-10 | End: 2021-11-16 | Stop reason: HOSPADM

## 2021-11-10 RX ORDER — CARVEDILOL 12.5 MG/1
12.5 TABLET ORAL 2 TIMES DAILY WITH MEALS
Status: DISCONTINUED | OUTPATIENT
Start: 2021-11-10 | End: 2021-11-16 | Stop reason: HOSPADM

## 2021-11-10 RX ORDER — MORPHINE SULFATE 2 MG/ML
2 INJECTION, SOLUTION INTRAMUSCULAR; INTRAVENOUS ONCE
Status: COMPLETED | OUTPATIENT
Start: 2021-11-10 | End: 2021-11-10

## 2021-11-10 RX ORDER — DEXTROSE MONOHYDRATE 50 MG/ML
100 INJECTION, SOLUTION INTRAVENOUS PRN
Status: DISCONTINUED | OUTPATIENT
Start: 2021-11-10 | End: 2021-11-16 | Stop reason: HOSPADM

## 2021-11-10 RX ORDER — ACETAMINOPHEN 650 MG/1
650 SUPPOSITORY RECTAL EVERY 6 HOURS PRN
Status: DISCONTINUED | OUTPATIENT
Start: 2021-11-10 | End: 2021-11-16 | Stop reason: HOSPADM

## 2021-11-10 RX ORDER — INSULIN LISPRO 100 [IU]/ML
0-3 INJECTION, SOLUTION INTRAVENOUS; SUBCUTANEOUS NIGHTLY
Status: DISCONTINUED | OUTPATIENT
Start: 2021-11-10 | End: 2021-11-11

## 2021-11-10 RX ORDER — ACETAMINOPHEN 325 MG/1
650 TABLET ORAL EVERY 6 HOURS PRN
Status: DISCONTINUED | OUTPATIENT
Start: 2021-11-10 | End: 2021-11-16 | Stop reason: HOSPADM

## 2021-11-10 RX ORDER — SODIUM CHLORIDE 0.9 % (FLUSH) 0.9 %
5-40 SYRINGE (ML) INJECTION EVERY 12 HOURS SCHEDULED
Status: DISCONTINUED | OUTPATIENT
Start: 2021-11-10 | End: 2021-11-16 | Stop reason: HOSPADM

## 2021-11-10 RX ORDER — ONDANSETRON 2 MG/ML
4 INJECTION INTRAMUSCULAR; INTRAVENOUS EVERY 6 HOURS PRN
Status: DISCONTINUED | OUTPATIENT
Start: 2021-11-10 | End: 2021-11-16 | Stop reason: HOSPADM

## 2021-11-10 RX ORDER — POLYETHYLENE GLYCOL 3350 17 G/17G
17 POWDER, FOR SOLUTION ORAL DAILY
Status: DISCONTINUED | OUTPATIENT
Start: 2021-11-10 | End: 2021-11-10

## 2021-11-10 RX ORDER — HEPARIN SODIUM 5000 [USP'U]/ML
5000 INJECTION, SOLUTION INTRAVENOUS; SUBCUTANEOUS EVERY 8 HOURS SCHEDULED
Status: DISCONTINUED | OUTPATIENT
Start: 2021-11-10 | End: 2021-11-16 | Stop reason: HOSPADM

## 2021-11-10 RX ORDER — SODIUM CHLORIDE 9 MG/ML
25 INJECTION, SOLUTION INTRAVENOUS PRN
Status: DISCONTINUED | OUTPATIENT
Start: 2021-11-10 | End: 2021-11-16 | Stop reason: HOSPADM

## 2021-11-10 RX ORDER — INSULIN LISPRO 100 [IU]/ML
10 INJECTION, SOLUTION INTRAVENOUS; SUBCUTANEOUS
Status: DISCONTINUED | OUTPATIENT
Start: 2021-11-10 | End: 2021-11-11

## 2021-11-10 RX ORDER — DEXTROSE MONOHYDRATE 25 G/50ML
12.5 INJECTION, SOLUTION INTRAVENOUS PRN
Status: DISCONTINUED | OUTPATIENT
Start: 2021-11-10 | End: 2021-11-16 | Stop reason: HOSPADM

## 2021-11-10 RX ORDER — POLYETHYLENE GLYCOL 3350 17 G/17G
17 POWDER, FOR SOLUTION ORAL DAILY PRN
Status: DISCONTINUED | OUTPATIENT
Start: 2021-11-10 | End: 2021-11-16 | Stop reason: HOSPADM

## 2021-11-10 RX ORDER — ONDANSETRON 4 MG/1
4 TABLET, ORALLY DISINTEGRATING ORAL EVERY 8 HOURS PRN
Status: DISCONTINUED | OUTPATIENT
Start: 2021-11-10 | End: 2021-11-16 | Stop reason: HOSPADM

## 2021-11-10 RX ORDER — SPIRONOLACTONE 50 MG/1
50 TABLET, FILM COATED ORAL DAILY
Status: DISCONTINUED | OUTPATIENT
Start: 2021-11-10 | End: 2021-11-16 | Stop reason: HOSPADM

## 2021-11-10 RX ORDER — NICOTINE POLACRILEX 4 MG
15 LOZENGE BUCCAL PRN
Status: DISCONTINUED | OUTPATIENT
Start: 2021-11-10 | End: 2021-11-16 | Stop reason: HOSPADM

## 2021-11-10 RX ORDER — OXYCODONE HYDROCHLORIDE 5 MG/1
5 TABLET ORAL EVERY 4 HOURS PRN
Status: DISCONTINUED | OUTPATIENT
Start: 2021-11-10 | End: 2021-11-16 | Stop reason: HOSPADM

## 2021-11-10 RX ORDER — SIMETHICONE 80 MG
80 TABLET,CHEWABLE ORAL EVERY 6 HOURS PRN
Status: DISCONTINUED | OUTPATIENT
Start: 2021-11-10 | End: 2021-11-16 | Stop reason: HOSPADM

## 2021-11-10 RX ADMIN — MORPHINE SULFATE 2 MG: 2 INJECTION, SOLUTION INTRAMUSCULAR; INTRAVENOUS at 05:45

## 2021-11-10 RX ADMIN — INSULIN GLARGINE 20 UNITS: 100 INJECTION, SOLUTION SUBCUTANEOUS at 21:00

## 2021-11-10 RX ADMIN — NIFEDIPINE 30 MG: 30 TABLET, EXTENDED RELEASE ORAL at 08:42

## 2021-11-10 RX ADMIN — CARVEDILOL 12.5 MG: 12.5 TABLET, FILM COATED ORAL at 08:42

## 2021-11-10 RX ADMIN — OXYCODONE 5 MG: 5 TABLET ORAL at 17:06

## 2021-11-10 RX ADMIN — POLYETHYLENE GLYCOL 3350 17 G: 17 POWDER, FOR SOLUTION ORAL at 21:04

## 2021-11-10 RX ADMIN — GENTAMICIN SULFATE: 1 CREAM TOPICAL at 12:59

## 2021-11-10 RX ADMIN — CEFEPIME 1000 MG: 1 INJECTION, POWDER, FOR SOLUTION INTRAMUSCULAR; INTRAVENOUS at 00:13

## 2021-11-10 RX ADMIN — INSULIN LISPRO 10 UNITS: 100 INJECTION, SOLUTION INTRAVENOUS; SUBCUTANEOUS at 12:54

## 2021-11-10 RX ADMIN — ACETAMINOPHEN 650 MG: 325 TABLET ORAL at 04:39

## 2021-11-10 RX ADMIN — CEFEPIME 1000 MG: 1 INJECTION, POWDER, FOR SOLUTION INTRAMUSCULAR; INTRAVENOUS at 23:36

## 2021-11-10 RX ADMIN — SODIUM CHLORIDE, PRESERVATIVE FREE 5 ML: 5 INJECTION INTRAVENOUS at 22:11

## 2021-11-10 RX ADMIN — NALOXEGOL OXALATE 12.5 MG: 12.5 TABLET, FILM COATED ORAL at 10:27

## 2021-11-10 RX ADMIN — SIMETHICONE 80 MG: 80 TABLET, CHEWABLE ORAL at 18:53

## 2021-11-10 RX ADMIN — BISACODYL 5 MG: 5 TABLET, COATED ORAL at 10:27

## 2021-11-10 RX ADMIN — ACETAMINOPHEN 650 MG: 325 TABLET ORAL at 21:45

## 2021-11-10 RX ADMIN — SODIUM CHLORIDE, PRESERVATIVE FREE 10 ML: 5 INJECTION INTRAVENOUS at 08:43

## 2021-11-10 RX ADMIN — INSULIN LISPRO 10 UNITS: 100 INJECTION, SOLUTION INTRAVENOUS; SUBCUTANEOUS at 17:40

## 2021-11-10 RX ADMIN — SPIRONOLACTONE 50 MG: 50 TABLET ORAL at 08:42

## 2021-11-10 RX ADMIN — POLYETHYLENE GLYCOL 3350 17 G: 17 POWDER, FOR SOLUTION ORAL at 10:27

## 2021-11-10 RX ADMIN — GENTAMICIN SULFATE: 1 CREAM TOPICAL at 21:20

## 2021-11-10 RX ADMIN — INSULIN LISPRO 1 UNITS: 100 INJECTION, SOLUTION INTRAVENOUS; SUBCUTANEOUS at 21:00

## 2021-11-10 RX ADMIN — VANCOMYCIN HYDROCHLORIDE 1250 MG: 10 INJECTION, POWDER, LYOPHILIZED, FOR SOLUTION INTRAVENOUS at 00:51

## 2021-11-10 RX ADMIN — HEPARIN SODIUM 5000 UNITS: 5000 INJECTION INTRAVENOUS; SUBCUTANEOUS at 21:05

## 2021-11-10 RX ADMIN — HEPARIN SODIUM 5000 UNITS: 5000 INJECTION INTRAVENOUS; SUBCUTANEOUS at 13:28

## 2021-11-10 RX ADMIN — INSULIN LISPRO 10 UNITS: 100 INJECTION, SOLUTION INTRAVENOUS; SUBCUTANEOUS at 09:04

## 2021-11-10 RX ADMIN — HEPARIN SODIUM 5000 UNITS: 5000 INJECTION INTRAVENOUS; SUBCUTANEOUS at 05:39

## 2021-11-10 RX ADMIN — ATORVASTATIN CALCIUM 40 MG: 40 TABLET, FILM COATED ORAL at 08:42

## 2021-11-10 ASSESSMENT — ENCOUNTER SYMPTOMS
SORE THROAT: 0
APNEA: 0
NAUSEA: 0
CHEST TIGHTNESS: 0
CONSTIPATION: 0
ABDOMINAL PAIN: 1
VOMITING: 0
COUGH: 0
SHORTNESS OF BREATH: 0
DIARRHEA: 0

## 2021-11-10 ASSESSMENT — PAIN DESCRIPTION - ONSET
ONSET: ON-GOING

## 2021-11-10 ASSESSMENT — PAIN DESCRIPTION - DESCRIPTORS
DESCRIPTORS: ACHING
DESCRIPTORS: ACHING;CRAMPING
DESCRIPTORS: ACHING

## 2021-11-10 ASSESSMENT — PAIN DESCRIPTION - PROGRESSION
CLINICAL_PROGRESSION: NOT CHANGED

## 2021-11-10 ASSESSMENT — PAIN DESCRIPTION - FREQUENCY
FREQUENCY: CONTINUOUS

## 2021-11-10 ASSESSMENT — PAIN DESCRIPTION - LOCATION
LOCATION: ABDOMEN

## 2021-11-10 ASSESSMENT — PAIN DESCRIPTION - PAIN TYPE
TYPE: ACUTE PAIN

## 2021-11-10 ASSESSMENT — PAIN SCALES - GENERAL
PAINLEVEL_OUTOF10: 7
PAINLEVEL_OUTOF10: 5
PAINLEVEL_OUTOF10: 7
PAINLEVEL_OUTOF10: 8
PAINLEVEL_OUTOF10: 5
PAINLEVEL_OUTOF10: 6
PAINLEVEL_OUTOF10: 5
PAINLEVEL_OUTOF10: 6

## 2021-11-10 ASSESSMENT — PAIN DESCRIPTION - ORIENTATION
ORIENTATION: MID

## 2021-11-10 NOTE — PLAN OF CARE
Problem: Falls - Risk of:  Goal: Will remain free from falls  Description: Will remain free from falls  Outcome: Ongoing  Note: Pt at risk for falls. Bed locked in the lowest position with the alarm on. Pt calls out appropriately.

## 2021-11-10 NOTE — ED TRIAGE NOTES
Patient complains of abdominal pain since this past Friday. Patient states that she was too weak to take medications today.   Last PD drain @ 7

## 2021-11-10 NOTE — PROGRESS NOTES
Pt admitted to room 2982. Pt A&Ox4. Rating abdominal pain 8/10. VS noted in chart. Admission assessment complete. Telemetry applied. Pt oriented to room and has no further questions at this time. Will continue to monitor.

## 2021-11-10 NOTE — ED NOTES
Bed: B20-20  Expected date:   Expected time:   Means of arrival:   Comments:  Mariia London RN  11/09/21 1911

## 2021-11-10 NOTE — PLAN OF CARE
Problem: Falls - Risk of:  Goal: Will remain free from falls  Description: Will remain free from falls  11/10/2021 1436 by Ana Laura Jimenez RN  Outcome: Ongoing  Note: Discussed with pt importance to keep bed low and locked with alarm activated, nonskid socks on when out of bed, call light and belongings within reach- will monitor. Problem: Falls - Risk of:  Goal: Absence of physical injury  Description: Absence of physical injury  Outcome: Ongoing  Note: No new physical injury noted. Problem: Pain:  Goal: Pain level will decrease  Description: Pain level will decrease  Outcome: Ongoing  Note: Patient complains of pain at level 6/10. Patient describes pain as ache. Patient requests pain medication. Patient medicated with tylenol . Will continue to monitor. Problem: OXYGENATION/RESPIRATORY FUNCTION  Goal: Patient will maintain patent airway  Outcome: Ongoing  Note: Pt able to maintain patent airway.

## 2021-11-10 NOTE — PROGRESS NOTES
Clinical Pharmacy Progress Note    Vancomycin - Management by Pharmacy    Consult Date(s):  11/10/21  Consulting Provider(s):  Dr. Sydney Heredia / Plan  1)  Peritonitis with tunnel infection - Vancomycin   Concurrent Antimicrobials: Cefepime    Day of Vanc Therapy: 1   Current Dosing Method: Intermittent due to ESRD on PD  o Therapeutic Goal: Trough >15mcg/mL  o Received 1250mg IV x1 overnight. Random level this AM = 22.5. Will not give any more Vancomycin today. o Will check random level in AM 11/11.  Will continue to monitor clinical condition and make adjustments to regimen as appropriate. Please call with questions--  Thanks--  Tim Sidhu, JessicaD, BCPS, BCGP  [id number] ([facility])   11/10/2021 10:08 AM        Interval update: Tolerating PD. PD fluid cultures pending. Subjective/Objective:   Rosalinda Sanders is a 45 y.o. y/o female with a PMHx that includes ESRD on PD, HTN, and DM 2 who is admitted with abdominal pain 2/2 PD associated peritonitis with tunnel infection. Pharmacy is consulted to dose Vancomycin. Current antibiotics:  Cefepime 1000mg IV q24h (11/10-current)  Vancomycin - Pharmacy to dose   Intermittent dosing (11/10-current)    Ht Readings from Last 1 Encounters:   11/10/21 5' (1.524 m)     Wt Readings from Last 1 Encounters:   11/10/21 181 lb 7 oz (82.3 kg)       Vanc Level(s) / Doses:  Date Time Dose Level / Type of Level Interpretation   11/10 00:51 1250mg x1     11/10 08:41  Random = 22.5mcg/mL Intermittent dosing   11/11 06:00  Random = ordered           Note: Serum levels collected for AUC-based dosing may be high if collected in close proximity to the dose administered. This is not necessarily an indicator of toxicity.     Recent Labs     11/09/21 2052 11/09/21 2053   CREATININE 7.6*  --    BUN 96*  --    WBC  --  8.6     CrCl is not estimated 2/2 ERSD on PD    Cultures & Sensitivities:  Date Site Micro Susceptibility / Result   11/9 PD fluid In proces 11/10 PD luid In process

## 2021-11-10 NOTE — PROGRESS NOTES
Pt seen on PD   Pt has PD peritonitis with tunnel infection   Abx     Constipation management     Bran Galan MD

## 2021-11-10 NOTE — ED NOTES
Report given to receiving RN on [id number] [facility], [facility]  11/10/21 0384

## 2021-11-10 NOTE — CARE COORDINATION
CM attempted to meet with pt to complete initial assessment. Pt was sleeping, unable to be awakened. CM will attempt visit again.         Electronically signed by JULIAN Álvraez, FORREST on 11/10/2021 at 3:25 PM
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
## 2021-11-10 NOTE — PROGRESS NOTES
Surgery Daily Progress Note      CC: abd pain    SUBJECTIVE:  Patient resting comfortably this morning. Denies abdominal pain. Endorses nausea yesterday, resolved currently. Currently tolerating PD cath dialysis. ROS:   A 14 point review of systems was conducted, significant findings as noted above. All other systems negative. OBJECTIVE:    PHYSICAL EXAM:  Vitals:    11/09/21 2345 11/10/21 0015 11/10/21 0356 11/10/21 0417   BP:  122/76 129/76    Pulse:  92 93    Resp:  17 18    Temp:   98.4 °F (36.9 °C)    TempSrc:   Oral    SpO2:  94%     Weight: 188 lb 0.8 oz (85.3 kg)  181 lb 7 oz (82.3 kg) 181 lb 7 oz (82.3 kg)   Height:    5' (1.524 m)       General appearance: alert, no acute distress, grooming appropriate  HEENT: Normocephalic, atraumatic; EOMI; moist mucous membranes  Neck: trachea midline, no JVD, no lymphadenopathy  Chest/Lungs: Normal inspiratory effort, symmetric chest rise, no accessory muscle use  Cardiovascular: Regular rate and rhythm; perfusing extremities  Abdomen: soft, non tender, non-distended, no guarding/rigidity, PD cath in place at left abdomen without surrounding erythema   Skin: warm and dry, no rashes  Extremities: no edema, no cyanosis  Neuro: no gross sensory or motor neuro deficits    ASSESSMENT & PLAN:   Baltazar Frost is a 45 y.o. female with history of CHF, CKD on PD who presents for abdominal pain. The patient is AF, HDS, clinical exam notable for diffuse abdominal tenderness, no peritonitis. Labs showing lactate 2, no leukocytosis. CT a/p showing nonspecific mildly dilated loop of small bowel c/f ileus vs obstruction.     - follow up culture  - follow up nephro plan  - cont to tolerate PD dialysis   - consider advancing diet if continues to have benign abdominal exam      Madisyn Nelson MD, PGY-1  11/10/21 5:59 AM  Pager: [phone]

## 2021-11-10 NOTE — PROGRESS NOTES
CCPD Order   Exchanges: 5   Exchange Volume: 2000 ml   Total Time: 10 hrs   Dextrose: 2.5%   Last Fill: 0 ml   Total Volume: 09836 ml     Orders verified. Supplies taken to pt's room. Report received. Cycler set up, primed and pre tested. Stained, brown color dressing noted to catheter site. Dressing changed, site cleansed. Brownish drainage with foul odor noted to site. Pt connected to cycler. CCPD initiated without problem. Initial effluent cloudy yellow. Obtained specimen and sent to lab.
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
## 2021-11-10 NOTE — PROGRESS NOTES
Patient admitted earlier this morning by my colleague for abdominal pain with several etiologies including possible ileus as well as a PD catheter infection. Complains of some nausea but no vomiting. She is still having pain although it was controlled with IV pain medicines. Attempt to decrease narcotics, concern will worsen bowel dysfunction.    IV antibiotics are ordered, vancomycin/cefepime  Body fluid culture \"collected\", will follow-up on results  Serial abdominal exams, appreciate surgical involvement    [facility]

## 2021-11-11 LAB
ALBUMIN SERPL-MCNC: 2.7 G/DL (ref 3.4–5)
ANION GAP SERPL CALCULATED.3IONS-SCNC: 17 MMOL/L (ref 3–16)
BASOPHILS ABSOLUTE: 0 K/UL (ref 0–0.2)
BASOPHILS RELATIVE PERCENT: 0.3 %
BODY FLUID CULTURE, STERILE: ABNORMAL
BUN BLDV-MCNC: 69 MG/DL (ref 7–20)
CALCIUM SERPL-MCNC: 8 MG/DL (ref 8.3–10.6)
CHLORIDE BLD-SCNC: 87 MMOL/L (ref 99–110)
CO2: 23 MMOL/L (ref 21–32)
CREAT SERPL-MCNC: 7.1 MG/DL (ref 0.6–1.1)
EOSINOPHILS ABSOLUTE: 0.3 K/UL (ref 0–0.6)
EOSINOPHILS RELATIVE PERCENT: 3.6 %
ESTIMATED AVERAGE GLUCOSE: 162.8 MG/DL
GFR AFRICAN AMERICAN: 7
GFR NON-AFRICAN AMERICAN: 6
GLUCOSE BLD-MCNC: 125 MG/DL (ref 70–99)
GLUCOSE BLD-MCNC: 180 MG/DL (ref 70–99)
GLUCOSE BLD-MCNC: 282 MG/DL (ref 70–99)
GLUCOSE BLD-MCNC: 305 MG/DL (ref 70–99)
GLUCOSE BLD-MCNC: 384 MG/DL (ref 70–99)
GLUCOSE BLD-MCNC: 55 MG/DL (ref 70–99)
GLUCOSE BLD-MCNC: 90 MG/DL (ref 70–99)
GRAM STAIN RESULT: ABNORMAL
HBA1C MFR BLD: 7.3 %
HCT VFR BLD CALC: 27.2 % (ref 36–48)
HEMOGLOBIN: 9.3 G/DL (ref 12–16)
LYMPHOCYTES ABSOLUTE: 0.7 K/UL (ref 1–5.1)
LYMPHOCYTES RELATIVE PERCENT: 10.5 %
MAGNESIUM: 1.8 MG/DL (ref 1.8–2.4)
MCH RBC QN AUTO: 30.8 PG (ref 26–34)
MCHC RBC AUTO-ENTMCNC: 34.1 G/DL (ref 31–36)
MCV RBC AUTO: 90.5 FL (ref 80–100)
MONOCYTES ABSOLUTE: 0.9 K/UL (ref 0–1.3)
MONOCYTES RELATIVE PERCENT: 13.4 %
NEUTROPHILS ABSOLUTE: 5.1 K/UL (ref 1.7–7.7)
NEUTROPHILS RELATIVE PERCENT: 72.2 %
ORGANISM: ABNORMAL
PDW BLD-RTO: 14.3 % (ref 12.4–15.4)
PERFORMED ON: ABNORMAL
PERFORMED ON: NORMAL
PHOSPHORUS: 4.7 MG/DL (ref 2.5–4.9)
PLATELET # BLD: 271 K/UL (ref 135–450)
PMV BLD AUTO: 8.1 FL (ref 5–10.5)
POTASSIUM SERPL-SCNC: 3.2 MMOL/L (ref 3.5–5.1)
RBC # BLD: 3.01 M/UL (ref 4–5.2)
SODIUM BLD-SCNC: 127 MMOL/L (ref 136–145)
VANCOMYCIN RANDOM: 15.4 UG/ML
WBC # BLD: 7.1 K/UL (ref 4–11)

## 2021-11-11 PROCEDURE — 80202 ASSAY OF VANCOMYCIN: CPT

## 2021-11-11 PROCEDURE — 6370000000 HC RX 637 (ALT 250 FOR IP): Performed by: INTERNAL MEDICINE

## 2021-11-11 PROCEDURE — 2580000003 HC RX 258: Performed by: INTERNAL MEDICINE

## 2021-11-11 PROCEDURE — 83735 ASSAY OF MAGNESIUM: CPT

## 2021-11-11 PROCEDURE — 87641 MR-STAPH DNA AMP PROBE: CPT

## 2021-11-11 PROCEDURE — 90945 DIALYSIS ONE EVALUATION: CPT

## 2021-11-11 PROCEDURE — 36415 COLL VENOUS BLD VENIPUNCTURE: CPT

## 2021-11-11 PROCEDURE — 1200000000 HC SEMI PRIVATE

## 2021-11-11 PROCEDURE — 2700000000 HC OXYGEN THERAPY PER DAY

## 2021-11-11 PROCEDURE — 6360000002 HC RX W HCPCS: Performed by: INTERNAL MEDICINE

## 2021-11-11 PROCEDURE — 80069 RENAL FUNCTION PANEL: CPT

## 2021-11-11 PROCEDURE — 85025 COMPLETE CBC W/AUTO DIFF WBC: CPT

## 2021-11-11 PROCEDURE — 99232 SBSQ HOSP IP/OBS MODERATE 35: CPT | Performed by: SURGERY

## 2021-11-11 PROCEDURE — 94150 VITAL CAPACITY TEST: CPT

## 2021-11-11 PROCEDURE — 2500000003 HC RX 250 WO HCPCS: Performed by: INTERNAL MEDICINE

## 2021-11-11 PROCEDURE — 94761 N-INVAS EAR/PLS OXIMETRY MLT: CPT

## 2021-11-11 PROCEDURE — 6370000000 HC RX 637 (ALT 250 FOR IP): Performed by: STUDENT IN AN ORGANIZED HEALTH CARE EDUCATION/TRAINING PROGRAM

## 2021-11-11 PROCEDURE — 6360000002 HC RX W HCPCS

## 2021-11-11 PROCEDURE — 90945 DIALYSIS ONE EVALUATION: CPT | Performed by: INTERNAL MEDICINE

## 2021-11-11 RX ORDER — INSULIN LISPRO 100 [IU]/ML
0-12 INJECTION, SOLUTION INTRAVENOUS; SUBCUTANEOUS
Status: DISCONTINUED | OUTPATIENT
Start: 2021-11-11 | End: 2021-11-11

## 2021-11-11 RX ORDER — DOCUSATE SODIUM 100 MG/1
100 CAPSULE, LIQUID FILLED ORAL 2 TIMES DAILY
Status: DISCONTINUED | OUTPATIENT
Start: 2021-11-11 | End: 2021-11-16 | Stop reason: HOSPADM

## 2021-11-11 RX ORDER — INSULIN LISPRO 100 [IU]/ML
12 INJECTION, SOLUTION INTRAVENOUS; SUBCUTANEOUS
Status: DISCONTINUED | OUTPATIENT
Start: 2021-11-11 | End: 2021-11-11

## 2021-11-11 RX ORDER — INSULIN LISPRO 100 [IU]/ML
0-3 INJECTION, SOLUTION INTRAVENOUS; SUBCUTANEOUS NIGHTLY
Status: DISCONTINUED | OUTPATIENT
Start: 2021-11-11 | End: 2021-11-11

## 2021-11-11 RX ORDER — MAGNESIUM SULFATE IN WATER 40 MG/ML
2000 INJECTION, SOLUTION INTRAVENOUS ONCE
Status: COMPLETED | OUTPATIENT
Start: 2021-11-11 | End: 2021-11-11

## 2021-11-11 RX ORDER — INSULIN LISPRO 100 [IU]/ML
0-6 INJECTION, SOLUTION INTRAVENOUS; SUBCUTANEOUS NIGHTLY
Status: DISCONTINUED | OUTPATIENT
Start: 2021-11-11 | End: 2021-11-11

## 2021-11-11 RX ORDER — INSULIN LISPRO 100 [IU]/ML
10 INJECTION, SOLUTION INTRAVENOUS; SUBCUTANEOUS
Status: DISCONTINUED | OUTPATIENT
Start: 2021-11-11 | End: 2021-11-12

## 2021-11-11 RX ORDER — INSULIN LISPRO 100 [IU]/ML
0-6 INJECTION, SOLUTION INTRAVENOUS; SUBCUTANEOUS
Status: DISCONTINUED | OUTPATIENT
Start: 2021-11-11 | End: 2021-11-16 | Stop reason: HOSPADM

## 2021-11-11 RX ADMIN — GENTAMICIN SULFATE: 1 CREAM TOPICAL at 08:28

## 2021-11-11 RX ADMIN — MAGNESIUM SULFATE HEPTAHYDRATE 2000 MG: 2 INJECTION, SOLUTION INTRAVENOUS at 11:11

## 2021-11-11 RX ADMIN — SPIRONOLACTONE 50 MG: 50 TABLET ORAL at 08:22

## 2021-11-11 RX ADMIN — VANCOMYCIN HYDROCHLORIDE 1000 MG: 10 INJECTION, POWDER, LYOPHILIZED, FOR SOLUTION INTRAVENOUS at 11:04

## 2021-11-11 RX ADMIN — INSULIN LISPRO 2 UNITS: 100 INJECTION, SOLUTION INTRAVENOUS; SUBCUTANEOUS at 12:29

## 2021-11-11 RX ADMIN — SODIUM CHLORIDE 25 ML: 9 INJECTION, SOLUTION INTRAVENOUS at 11:03

## 2021-11-11 RX ADMIN — CARVEDILOL 12.5 MG: 12.5 TABLET, FILM COATED ORAL at 17:25

## 2021-11-11 RX ADMIN — HEPARIN SODIUM 5000 UNITS: 5000 INJECTION INTRAVENOUS; SUBCUTANEOUS at 06:55

## 2021-11-11 RX ADMIN — DEXTROSE MONOHYDRATE 12.5 G: 25 INJECTION, SOLUTION INTRAVENOUS at 15:48

## 2021-11-11 RX ADMIN — SODIUM CHLORIDE, PRESERVATIVE FREE 10 ML: 5 INJECTION INTRAVENOUS at 08:22

## 2021-11-11 RX ADMIN — SODIUM CHLORIDE, PRESERVATIVE FREE 10 ML: 5 INJECTION INTRAVENOUS at 21:23

## 2021-11-11 RX ADMIN — SODIUM CHLORIDE 25 ML: 9 INJECTION, SOLUTION INTRAVENOUS at 11:06

## 2021-11-11 RX ADMIN — ATORVASTATIN CALCIUM 40 MG: 40 TABLET, FILM COATED ORAL at 08:22

## 2021-11-11 RX ADMIN — HEPARIN SODIUM 5000 UNITS: 5000 INJECTION INTRAVENOUS; SUBCUTANEOUS at 13:45

## 2021-11-11 RX ADMIN — ONDANSETRON 4 MG: 2 INJECTION INTRAMUSCULAR; INTRAVENOUS at 07:19

## 2021-11-11 RX ADMIN — GENTAMICIN SULFATE: 1 CREAM TOPICAL at 21:23

## 2021-11-11 RX ADMIN — OXYCODONE 5 MG: 5 TABLET ORAL at 17:24

## 2021-11-11 RX ADMIN — INSULIN LISPRO 10 UNITS: 100 INJECTION, SOLUTION INTRAVENOUS; SUBCUTANEOUS at 08:24

## 2021-11-11 RX ADMIN — OXYCODONE 5 MG: 5 TABLET ORAL at 08:33

## 2021-11-11 RX ADMIN — HEPARIN SODIUM 5000 UNITS: 5000 INJECTION INTRAVENOUS; SUBCUTANEOUS at 21:22

## 2021-11-11 RX ADMIN — INSULIN LISPRO 12 UNITS: 100 INJECTION, SOLUTION INTRAVENOUS; SUBCUTANEOUS at 12:28

## 2021-11-11 ASSESSMENT — PAIN DESCRIPTION - PROGRESSION
CLINICAL_PROGRESSION: NOT CHANGED

## 2021-11-11 ASSESSMENT — PAIN DESCRIPTION - DESCRIPTORS: DESCRIPTORS: ACHING

## 2021-11-11 ASSESSMENT — PAIN - FUNCTIONAL ASSESSMENT: PAIN_FUNCTIONAL_ASSESSMENT: PREVENTS OR INTERFERES SOME ACTIVE ACTIVITIES AND ADLS

## 2021-11-11 ASSESSMENT — PAIN DESCRIPTION - PAIN TYPE
TYPE: ACUTE PAIN
TYPE: ACUTE PAIN

## 2021-11-11 ASSESSMENT — PAIN SCALES - GENERAL
PAINLEVEL_OUTOF10: 0
PAINLEVEL_OUTOF10: 9
PAINLEVEL_OUTOF10: 6
PAINLEVEL_OUTOF10: 9
PAINLEVEL_OUTOF10: 5

## 2021-11-11 ASSESSMENT — PAIN DESCRIPTION - ORIENTATION: ORIENTATION: MID

## 2021-11-11 ASSESSMENT — PAIN DESCRIPTION - LOCATION
LOCATION: ABDOMEN
LOCATION: ABDOMEN

## 2021-11-11 ASSESSMENT — PAIN DESCRIPTION - ONSET: ONSET: ON-GOING

## 2021-11-11 ASSESSMENT — PAIN DESCRIPTION - FREQUENCY: FREQUENCY: CONTINUOUS

## 2021-11-11 NOTE — PROGRESS NOTES
Clinical Pharmacy Progress Note    Vancomycin - Management by Pharmacy    Consult Date(s):  11/10/21  Consulting Provider(s):  Dr. Saqib Edmond / Plan  1)  Peritonitis with tunnel infection and possible PNA - Vancomycin   Concurrent Antimicrobials: Cefepime    Day of Vanc Therapy: 2   Current Dosing Method: Intermittent due to ESRD on PD  o Therapeutic Goal: Trough >15mcg/mL  o Random level this AM = 15.4. Will give Vancomycin 1000mg IV x1 today. o Will check random level in AM 11/12.  Will continue to monitor clinical condition and make adjustments to regimen as appropriate. Please call with questions--  Thanks--  Leroy Kahn, PharmD, BCPS, BCGP  [id number] ([facility])   11/11/2021 9:40 AM        Interval update:   PD fluid growing Staph aureus (PBP2 negative - likely MSSA). Febrile overnight with possible PNA on CXR. Subjective/Objective:   Keith Rodriguez is a 45 y.o. y/o female with a PMHx that includes ESRD on PD, HTN, and DM 2 who is admitted with abdominal pain 2/2 PD associated peritonitis with tunnel infection. Pharmacy is consulted to dose Vancomycin. Current antibiotics:  Cefepime 1000mg IV q24h (11/10-current)  Vancomycin - Pharmacy to dose   Intermittent dosing (11/10-current)    Ht Readings from Last 1 Encounters:   11/10/21 5' (1.524 m)     Wt Readings from Last 1 Encounters:   11/10/21 184 lb 4.9 oz (83.6 kg)       Vanc Level(s) / Doses:  Date Time Dose Level / Type of Level Interpretation   11/10 00:51 1250mg x1     11/10 08:41  Random = 22.5mcg/mL Intermittent dosing   11/11 07:12  Random = 15.4mcg/mL Intermittent dosing          Note: Serum levels collected for AUC-based dosing may be high if collected in close proximity to the dose administered. This is not necessarily an indicator of toxicity.     Recent Labs     11/09/21  2052 11/09/21  2053 11/10/21  1132 11/11/21  0713   CREATININE 7.6*  --  7.4* 7.1*   BUN 96*  --  93* 69*   WBC  --  8.6 6.9 7.1     CrCl is not estimated 2/2 ERSD on PD    Cultures & Sensitivities:  Date Site Micro Susceptibility / Result   11/9 PD fluid Staph aureus PBP2 negative - likely MSSA   11/10 PD luid In process    11/10 Blood x2 sent    11/11 MRSA Nasal PCR ordered

## 2021-11-11 NOTE — PLAN OF CARE
Problem: Falls - Risk of:  Goal: Will remain free from falls  Description: Will remain free from falls  Outcome: Ongoing  Note: Discussed with pt importance to keep bed low and locked with alarm activated, nonskid socks on when out of bed, call light and belongings within reach- will monitor. Problem: Falls - Risk of:  Goal: Absence of physical injury  Description: Absence of physical injury  Outcome: Ongoing  Note: No new injury noted. Problem: Pain:  Goal: Pain level will decrease  Description: Pain level will decrease  Outcome: Ongoing  Note: Patient complains of pain at level 6/10. Patient describes pain as ache. Patient requests pain medication. Patient medicated with oxycodone . Will continue to monitor. Problem: OXYGENATION/RESPIRATORY FUNCTION  Goal: Patient will maintain patent airway  Outcome: Ongoing  Note: Pt able to maintain patent airway.

## 2021-11-11 NOTE — CARE COORDINATION
CM following. Pt from home with family, DC plan tbd. Current barriers:  IV abx, bcx & UA pending, CXR showing possible pneumonia. CM will continue to monitor for DC needs and recs.         Electronically signed by JULIAN Villegas, FORREST on 11/11/2021 at 3:45 PM

## 2021-11-11 NOTE — PROGRESS NOTES
Pt complained of nausea without emesis. Medicated with Zofran 4 mg ivp as ordered. Will continue to monitor alteration in comfort.

## 2021-11-11 NOTE — PROGRESS NOTES
Pt on 2 liters NC, sats 92%. Denies any SOB or chest pain. Temp or 101 and 100. Medical Resident aware and new orders obtained. BC x 2, PCXR, UA/CS. Currently on Cefepime IVPB Q 24 hrs and Vancomycin. PD instilling without difficulty. Continue to monitor.
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
## 2021-11-11 NOTE — FLOWSHEET NOTE
Disconnected from [facility] per protocol. Effluent: cloudy with fibrin  Total time: 12 hr 42 min   Total UF:  -193 ml. Total Volume:  29411 ml. Dwell time gained:  0 hr 28 min.   Pt Tolerated procedure: good  Report given to: Dinh Dunlap RN  Last BM: 11/11/21

## 2021-11-11 NOTE — PROGRESS NOTES
Surgery Daily Progress Note      CC: abd pain    SUBJECTIVE:  Patient resting comfortably this morning. Denies abdominal pain. Tolerating reg diet, no nausea/vomiting. Currently tolerating PD cath dialysis. Tmax 101 yesterday,  otherwise HDS.     ROS:   A 14 point review of systems was conducted, significant findings as noted above. All other systems negative. OBJECTIVE:    PHYSICAL EXAM:  Vitals:    11/10/21 2057 11/10/21 2335 11/11/21 0013 11/11/21 0410   BP:  109/72 114/73 103/67   Pulse:  91 90 87   Resp: 20 20 20 18   Temp: 100 °F (37.8 °C) 98.3 °F (36.8 °C) 98 °F (36.7 °C) 98.5 °F (36.9 °C)   TempSrc:  Oral Oral Oral   SpO2:  93% 95% 94%   Weight:       Height:           General appearance: alert, no acute distress, grooming appropriate  HEENT: Normocephalic, atraumatic; EOMI; moist mucous membranes  Neck: trachea midline, no JVD, no lymphadenopathy  Chest/Lungs: Normal inspiratory effort, symmetric chest rise, no accessory muscle use  Cardiovascular: Regular rate and rhythm; perfusing extremities  Abdomen: soft, non tender, non-distended, no guarding/rigidity, PD cath in place at left abdomen without surrounding erythema   Skin: warm and dry, no rashes  Extremities: no edema, no cyanosis  Neuro: no gross sensory or motor neuro deficits    ASSESSMENT & PLAN:   Jenna Simmonds is a 45 y.o. female with history of CHF, CKD on PD who presents for abdominal pain. The patient is AF, HDS, clinical exam notable for diffuse abdominal tenderness, no peritonitis. Labs showing lactate 2, no leukocytosis. CT a/p showing nonspecific mildly dilated loop of small bowel c/f ileus vs obstruction.     - follow up culture  - PD fluid analysis with [id number] nucleated cells, cx growing staph aureus   - given fever workup, CXR showing possible pneumonia, bcx and UA pending  continue cefepime and vanc  -  nephro following  - will continue to salvage PD cath with IV abx       Lizzeth Vizcaino MD, PGY-1  11/11/21 6:12 AM  Pager: [phone]

## 2021-11-11 NOTE — PROGRESS NOTES
[facility] Order   Exchanges: 5   Exchange Volume: 2,000 ml   Total Time: 10 hrs   Dextrose: 2.5%   Last Fill: 0 ml   Total Volume: 10,000 ml     Orders verified. Supplies taken to pt's room. Report received from [facility], Cycler set up, primed and pre tested. Dressing changed on [facility] Cath site. Pt connected to cycler. [facility] initiated without problem. Initial effluent clear.      Electronically signed by Mal Hernandez RN on 11/11/2021 at 4:59 PM

## 2021-11-11 NOTE — PROGRESS NOTES
UF: -1058 ML  Total Time: 16 hours and 10 minutes(order was for 10 hours)  Alarm: 11 times over night  [facility] removed fluid: color hazy yellow ; with fibrin noticed, PD fluid sent for cell count per Dr. Denzel Ramos order  Pt tolerated fair   Dressing to PD site changed. Brown prululent drainage from PD site. Gent cream applied to site.   VS:  127/79,  93,  101 temp,  resp 26 NC 1L sat 91%    Placed CCPD flow sheets in the chart for EMR scan

## 2021-11-12 LAB
ALBUMIN SERPL-MCNC: 2.3 G/DL (ref 3.4–5)
ANION GAP SERPL CALCULATED.3IONS-SCNC: 18 MMOL/L (ref 3–16)
APPEARANCE FLUID: CLEAR
BASOPHILS ABSOLUTE: 0 K/UL (ref 0–0.2)
BASOPHILS RELATIVE PERCENT: 0.3 %
BODY FLUID CULTURE, STERILE: ABNORMAL
BUN BLDV-MCNC: 59 MG/DL (ref 7–20)
CALCIUM SERPL-MCNC: 7.8 MG/DL (ref 8.3–10.6)
CELL COUNT FLUID TYPE: NORMAL
CHLORIDE BLD-SCNC: 87 MMOL/L (ref 99–110)
CLOT EVALUATION: NORMAL
CO2: 22 MMOL/L (ref 21–32)
COLOR FLUID: COLORLESS
CREAT SERPL-MCNC: 6.7 MG/DL (ref 0.6–1.1)
EOSINOPHIL FLUID: 3 %
EOSINOPHILS ABSOLUTE: 0.3 K/UL (ref 0–0.6)
EOSINOPHILS RELATIVE PERCENT: 3.8 %
GFR AFRICAN AMERICAN: 8
GFR NON-AFRICAN AMERICAN: 6
GLUCOSE BLD-MCNC: 219 MG/DL (ref 70–99)
GLUCOSE BLD-MCNC: 223 MG/DL (ref 70–99)
GLUCOSE BLD-MCNC: 224 MG/DL (ref 70–99)
GLUCOSE BLD-MCNC: 234 MG/DL (ref 70–99)
GLUCOSE BLD-MCNC: 308 MG/DL (ref 70–99)
GLUCOSE BLD-MCNC: 327 MG/DL (ref 70–99)
GRAM STAIN RESULT: ABNORMAL
HCT VFR BLD CALC: 26.7 % (ref 36–48)
HEMOGLOBIN: 9.1 G/DL (ref 12–16)
LYMPHOCYTES ABSOLUTE: 0.9 K/UL (ref 1–5.1)
LYMPHOCYTES RELATIVE PERCENT: 12.8 %
LYMPHOCYTES, BODY FLUID: 10 %
MAGNESIUM: 2 MG/DL (ref 1.8–2.4)
MCH RBC QN AUTO: 30.8 PG (ref 26–34)
MCHC RBC AUTO-ENTMCNC: 33.9 G/DL (ref 31–36)
MCV RBC AUTO: 90.6 FL (ref 80–100)
MONOCYTE, FLUID: 19 %
MONOCYTES ABSOLUTE: 1 K/UL (ref 0–1.3)
MONOCYTES RELATIVE PERCENT: 13.9 %
MRSA SCREEN RT-PCR: NORMAL
NEUTROPHIL, FLUID: 68 %
NEUTROPHILS ABSOLUTE: 4.9 K/UL (ref 1.7–7.7)
NEUTROPHILS RELATIVE PERCENT: 69.2 %
NUCLEATED CELLS FLUID: 147 /CUMM
NUMBER OF CELLS COUNTED FLUID: 100
ORGANISM: ABNORMAL
PDW BLD-RTO: 14.2 % (ref 12.4–15.4)
PERFORMED ON: ABNORMAL
PHOSPHORUS: 4.3 MG/DL (ref 2.5–4.9)
PLATELET # BLD: 277 K/UL (ref 135–450)
PMV BLD AUTO: 7.9 FL (ref 5–10.5)
POTASSIUM SERPL-SCNC: 3.3 MMOL/L (ref 3.5–5.1)
RBC # BLD: 2.94 M/UL (ref 4–5.2)
RBC FLUID: <1000 /CUMM
SODIUM BLD-SCNC: 127 MMOL/L (ref 136–145)
VANCOMYCIN RANDOM: 28.8 UG/ML
WBC # BLD: 7.1 K/UL (ref 4–11)

## 2021-11-12 PROCEDURE — 6370000000 HC RX 637 (ALT 250 FOR IP): Performed by: INTERNAL MEDICINE

## 2021-11-12 PROCEDURE — 6360000002 HC RX W HCPCS: Performed by: STUDENT IN AN ORGANIZED HEALTH CARE EDUCATION/TRAINING PROGRAM

## 2021-11-12 PROCEDURE — 87340 HEPATITIS B SURFACE AG IA: CPT

## 2021-11-12 PROCEDURE — 97530 THERAPEUTIC ACTIVITIES: CPT

## 2021-11-12 PROCEDURE — 90945 DIALYSIS ONE EVALUATION: CPT | Performed by: INTERNAL MEDICINE

## 2021-11-12 PROCEDURE — 99231 SBSQ HOSP IP/OBS SF/LOW 25: CPT | Performed by: SURGERY

## 2021-11-12 PROCEDURE — 97161 PT EVAL LOW COMPLEX 20 MIN: CPT

## 2021-11-12 PROCEDURE — 2580000003 HC RX 258: Performed by: INTERNAL MEDICINE

## 2021-11-12 PROCEDURE — 6360000002 HC RX W HCPCS: Performed by: INTERNAL MEDICINE

## 2021-11-12 PROCEDURE — 6370000000 HC RX 637 (ALT 250 FOR IP): Performed by: STUDENT IN AN ORGANIZED HEALTH CARE EDUCATION/TRAINING PROGRAM

## 2021-11-12 PROCEDURE — 97535 SELF CARE MNGMENT TRAINING: CPT

## 2021-11-12 PROCEDURE — 97165 OT EVAL LOW COMPLEX 30 MIN: CPT

## 2021-11-12 PROCEDURE — 80202 ASSAY OF VANCOMYCIN: CPT

## 2021-11-12 PROCEDURE — 85025 COMPLETE CBC W/AUTO DIFF WBC: CPT

## 2021-11-12 PROCEDURE — 83735 ASSAY OF MAGNESIUM: CPT

## 2021-11-12 PROCEDURE — 90945 DIALYSIS ONE EVALUATION: CPT

## 2021-11-12 PROCEDURE — 80069 RENAL FUNCTION PANEL: CPT

## 2021-11-12 PROCEDURE — 86706 HEP B SURFACE ANTIBODY: CPT

## 2021-11-12 PROCEDURE — 1200000000 HC SEMI PRIVATE

## 2021-11-12 PROCEDURE — 36415 COLL VENOUS BLD VENIPUNCTURE: CPT

## 2021-11-12 PROCEDURE — 99222 1ST HOSP IP/OBS MODERATE 55: CPT | Performed by: INTERNAL MEDICINE

## 2021-11-12 RX ORDER — IBUPROFEN 400 MG/1
400 TABLET ORAL
Status: DISPENSED | OUTPATIENT
Start: 2021-11-12 | End: 2021-11-13

## 2021-11-12 RX ORDER — PROMETHAZINE HYDROCHLORIDE 25 MG/ML
12.5 INJECTION, SOLUTION INTRAMUSCULAR; INTRAVENOUS
Status: COMPLETED | OUTPATIENT
Start: 2021-11-12 | End: 2021-11-12

## 2021-11-12 RX ORDER — INSULIN LISPRO 100 [IU]/ML
5 INJECTION, SOLUTION INTRAVENOUS; SUBCUTANEOUS
Status: DISCONTINUED | OUTPATIENT
Start: 2021-11-12 | End: 2021-11-15

## 2021-11-12 RX ADMIN — SPIRONOLACTONE 50 MG: 50 TABLET ORAL at 08:46

## 2021-11-12 RX ADMIN — OXYCODONE 5 MG: 5 TABLET ORAL at 08:46

## 2021-11-12 RX ADMIN — RIFAMPIN 300 MG: 300 CAPSULE ORAL at 18:02

## 2021-11-12 RX ADMIN — CEFAZOLIN 2000 MG: 10 INJECTION, POWDER, FOR SOLUTION INTRAVENOUS at 14:50

## 2021-11-12 RX ADMIN — ONDANSETRON 4 MG: 2 INJECTION INTRAMUSCULAR; INTRAVENOUS at 22:11

## 2021-11-12 RX ADMIN — CARVEDILOL 12.5 MG: 12.5 TABLET, FILM COATED ORAL at 08:46

## 2021-11-12 RX ADMIN — ATORVASTATIN CALCIUM 40 MG: 40 TABLET, FILM COATED ORAL at 08:46

## 2021-11-12 RX ADMIN — HEPARIN SODIUM 5000 UNITS: 5000 INJECTION INTRAVENOUS; SUBCUTANEOUS at 05:06

## 2021-11-12 RX ADMIN — CARVEDILOL 12.5 MG: 12.5 TABLET, FILM COATED ORAL at 16:14

## 2021-11-12 RX ADMIN — INSULIN LISPRO 2 UNITS: 100 INJECTION, SOLUTION INTRAVENOUS; SUBCUTANEOUS at 18:04

## 2021-11-12 RX ADMIN — INSULIN LISPRO 5 UNITS: 100 INJECTION, SOLUTION INTRAVENOUS; SUBCUTANEOUS at 18:04

## 2021-11-12 RX ADMIN — SODIUM CHLORIDE 25 ML: 9 INJECTION, SOLUTION INTRAVENOUS at 00:08

## 2021-11-12 RX ADMIN — HEPARIN SODIUM 5000 UNITS: 5000 INJECTION INTRAVENOUS; SUBCUTANEOUS at 22:11

## 2021-11-12 RX ADMIN — OXYCODONE 5 MG: 5 TABLET ORAL at 16:15

## 2021-11-12 RX ADMIN — ONDANSETRON 4 MG: 2 INJECTION INTRAMUSCULAR; INTRAVENOUS at 08:46

## 2021-11-12 RX ADMIN — SODIUM CHLORIDE 25 ML: 9 INJECTION, SOLUTION INTRAVENOUS at 14:50

## 2021-11-12 RX ADMIN — CEFEPIME 1000 MG: 1 INJECTION, POWDER, FOR SOLUTION INTRAMUSCULAR; INTRAVENOUS at 00:09

## 2021-11-12 RX ADMIN — SODIUM CHLORIDE, PRESERVATIVE FREE 10 ML: 5 INJECTION INTRAVENOUS at 08:38

## 2021-11-12 RX ADMIN — GENTAMICIN SULFATE: 1 CREAM TOPICAL at 08:38

## 2021-11-12 RX ADMIN — HEPARIN SODIUM 5000 UNITS: 5000 INJECTION INTRAVENOUS; SUBCUTANEOUS at 14:49

## 2021-11-12 RX ADMIN — SODIUM CHLORIDE, PRESERVATIVE FREE 10 ML: 5 INJECTION INTRAVENOUS at 22:12

## 2021-11-12 RX ADMIN — OXYCODONE 5 MG: 5 TABLET ORAL at 00:26

## 2021-11-12 RX ADMIN — POTASSIUM BICARBONATE 40 MEQ: 782 TABLET, EFFERVESCENT ORAL at 00:44

## 2021-11-12 RX ADMIN — PROMETHAZINE HYDROCHLORIDE 12.5 MG: 25 INJECTION INTRAMUSCULAR; INTRAVENOUS at 09:26

## 2021-11-12 ASSESSMENT — PAIN DESCRIPTION - PROGRESSION
CLINICAL_PROGRESSION: NOT CHANGED
CLINICAL_PROGRESSION: NOT CHANGED

## 2021-11-12 ASSESSMENT — PAIN SCALES - GENERAL
PAINLEVEL_OUTOF10: 9
PAINLEVEL_OUTOF10: 7
PAINLEVEL_OUTOF10: 3
PAINLEVEL_OUTOF10: 7
PAINLEVEL_OUTOF10: 7
PAINLEVEL_OUTOF10: 4

## 2021-11-12 ASSESSMENT — ENCOUNTER SYMPTOMS
CHEST TIGHTNESS: 0
SHORTNESS OF BREATH: 0
CONSTIPATION: 0
NAUSEA: 0
ABDOMINAL PAIN: 1
COUGH: 0
DIARRHEA: 0
VOMITING: 0
SORE THROAT: 0
APNEA: 0

## 2021-11-12 ASSESSMENT — PAIN DESCRIPTION - LOCATION
LOCATION: ABDOMEN

## 2021-11-12 ASSESSMENT — PAIN DESCRIPTION - DESCRIPTORS
DESCRIPTORS: ACHING

## 2021-11-12 ASSESSMENT — PAIN DESCRIPTION - FREQUENCY
FREQUENCY: CONTINUOUS

## 2021-11-12 ASSESSMENT — PAIN DESCRIPTION - PAIN TYPE
TYPE: ACUTE PAIN

## 2021-11-12 ASSESSMENT — PAIN DESCRIPTION - ONSET
ONSET: ON-GOING
ONSET: ON-GOING

## 2021-11-12 ASSESSMENT — PAIN DESCRIPTION - ORIENTATION: ORIENTATION: MID

## 2021-11-12 NOTE — PROGRESS NOTES
Nephrology Progress Note      Admit Date: 11/9/2021  Diet: ADULT DIET; Regular; 3 carb choices (45 gm/meal); Low Fat/Low Chol/High Fiber/2 gm Na; Low Phosphorus (Less than 1000 mg); Less than 60 gm; 1200 ml    Chief Complaint: Abdominal Pain    Interval history:   Patient still does endorse some abdominal pain. Peritoneal fluid grew MSSA. Patient tolerating dialysis well. Does continue to have some electrolyte abnormalities, but nothing acute.     Medications:     Scheduled Meds:   insulin lispro (1 Unit Dial)  5 Units SubCUTAneous TID WC    insulin glargine  10 Units SubCUTAneous Nightly    docusate sodium  100 mg Oral BID    insulin lispro  0-6 Units SubCUTAneous TID WC    atorvastatin  40 mg Oral Daily    carvedilol  12.5 mg Oral BID WC    [Held by provider] NIFEdipine  30 mg Oral BID    spironolactone  50 mg Oral Daily    torsemide  200 mg Oral Daily    sodium chloride flush  5-40 mL IntraVENous 2 times per day    heparin (porcine)  5,000 Units SubCUTAneous 3 times per day    polyethylene glycol  17 g Oral BID    naloxegol  12.5 mg Oral QAM    gentamicin   Topical BID    cefepime  1,000 mg IntraVENous Q24H    vancomycin (VANCOCIN) intermittent dosing (placeholder)   Other See Admin Instructions     Continuous Infusions:   dextrose      sodium chloride 25 mL (11/12/21 0008)     PRN Meds:glucose, dextrose, glucagon (rDNA), dextrose, sodium chloride flush, sodium chloride, ondansetron **OR** ondansetron, polyethylene glycol, acetaminophen **OR** acetaminophen, oxyCODONE, simethicone    Objective:   Vitals:   T-max:  Patient Vitals for the past 8 hrs:   BP Temp Temp src Pulse Resp SpO2 Weight   11/12/21 0837 127/76 98.2 °F (36.8 °C) Oral 79 16 96 % --   11/12/21 0430 126/72 98.4 °F (36.9 °C) Oral 82 16 94 % --   11/12/21 0426 -- -- -- -- -- -- 190 lb 0.6 oz (86.2 kg)       Intake/Output Summary (Last 24 hours) at 11/12/2021 0842  Last data filed at 11/11/2021 1903  Gross per 24 hour   Intake 300 ml   Output -193 ml   Net 493 ml       Review of Systems   Constitutional: Negative for activity change, appetite change, chills, diaphoresis, fever and unexpected weight change. HENT: Negative for congestion and sore throat. Eyes: Negative for visual disturbance. Respiratory: Negative for apnea, cough, chest tightness and shortness of breath. Cardiovascular: Negative for chest pain, palpitations and leg swelling. Gastrointestinal: Positive for abdominal pain. Negative for constipation, diarrhea, nausea and vomiting. Endocrine: Negative for cold intolerance, heat intolerance, polydipsia, polyphagia and polyuria. Genitourinary: Negative for difficulty urinating. Musculoskeletal: Negative for arthralgias and myalgias. Neurological: Negative for weakness and headaches. Psychiatric/Behavioral: Negative for confusion and sleep disturbance. All other systems reviewed and are negative. Physical Exam  Vitals reviewed. Constitutional:       General: She is not in acute distress. Appearance: She is well-developed and well-groomed. HENT:      Head: Normocephalic and atraumatic. Mouth/Throat:      Mouth: Mucous membranes are moist.      Pharynx: Oropharynx is clear. Eyes:      General: No scleral icterus. Extraocular Movements: Extraocular movements intact. Conjunctiva/sclera: Conjunctivae normal.      Pupils: Pupils are equal, round, and reactive to light. Cardiovascular:      Rate and Rhythm: Normal rate and regular rhythm. Pulses: Normal pulses. Heart sounds: Normal heart sounds, S1 normal and S2 normal. No murmur heard. Pulmonary:      Effort: Pulmonary effort is normal. No respiratory distress. Breath sounds: Normal breath sounds and air entry. Abdominal:      General: Abdomen is flat. Bowel sounds are normal.      Palpations: Abdomen is soft. Tenderness: There is no abdominal tenderness.    Musculoskeletal:         General: Normal range of motion. Cervical back: Full passive range of motion without pain, normal range of motion and neck supple. Skin:     General: Skin is warm and dry. Neurological:      General: No focal deficit present. Mental Status: She is alert and oriented to person, place, and time. Cranial Nerves: Cranial nerves are intact. Sensory: Sensation is intact. Motor: Motor function is intact. Coordination: Coordination is intact. Gait: Gait is intact. Deep Tendon Reflexes: Reflexes are normal and symmetric. Psychiatric:         Attention and Perception: Attention and perception normal.         Mood and Affect: Mood normal.         Speech: Speech normal.         Behavior: Behavior normal. Behavior is cooperative. Thought Content: Thought content normal.         Cognition and Memory: Cognition and memory normal.         Judgment: Judgment normal.         LABS:    CBC:   Recent Labs     11/10/21  1132 11/11/21  0713 11/12/21  0630   WBC 6.9 7.1 7.1   HGB 9.7* 9.3* 9.1*   HCT 28.6* 27.2* 26.7*    271 277   MCV 91.2 90.5 90.6     Renal:    Recent Labs     11/10/21  1132 11/11/21  0713 11/12/21  0630   * 127* 127*   K 3.6 3.2* 3.3*   CL 84* 87* 87*   CO2 19* 23 22   BUN 93* 69* 59*   CREATININE 7.4* 7.1* 6.7*   GLUCOSE 244* 282* 234*   CALCIUM 8.3 8.0* 7.8*   MG 1.90 1.80 2.00   PHOS 5.6* 4.7 4.3   ANIONGAP 20* 17* 18*     Hepatic:   Recent Labs     11/09/21  2052 11/09/21  2052 11/10/21  1132 11/11/21  0713 11/12/21  0630   AST 42*  --   --   --   --    ALT 43*  --   --   --   --    BILITOT 0.6  --   --   --   --    PROT 7.9  --   --   --   --    LABALBU 3.2*   < > 2.6* 2.7* 2.3*   ALKPHOS 125  --   --   --   --     < > = values in this interval not displayed. Troponin: No results for input(s): TROPONINI in the last 72 hours. BNP: No results for input(s): BNP in the last 72 hours. Lipids: No results for input(s): CHOL, HDL in the last 72 hours.     Invalid input(s): LDLCALCU, TRIGLYCERIDE  ABGs:  No results for input(s): PHART, ULV5WKK, PO2ART, GLV9CFY, BEART, THGBART, M5PHSSLR, QZN1ISS in the last 72 hours. INR: No results for input(s): INR in the last 72 hours. Lactate: No results for input(s): LACTATE in the last 72 hours. Cultures:  -----------------------------------------------------------------  RAD:   XR CHEST PORTABLE   Final Result      Increased bibasilar airspace disease could be due to atelectasis and/or pneumonia. US ABDOMEN LIMITED   Final Result   1. Complex collection along the drainage catheter of unclear significance. This may relate to a small abscess or hematoma. CT THORACIC RECONSTRUCTION WO POST PROCESS   Final Result      No acute fracture in the thoracic or lumbar spine. CT LUMBAR RECONSTRUCTION WO POST PROCESS   Final Result      No acute fracture in the thoracic or lumbar spine. CT ABDOMEN PELVIS W IV CONTRAST Additional Contrast? None   Final Result      CHEST:      1. Parenchymal bands, likely atelectasis or scar. 2. No acute traumatic abnormality. ABDOMEN/PELVIS:      1. Nonspecific mildly dilated loop of small bowel in the left abdomen measuring 4 cm. Localized ileus versus obstruction not excluded. 2. Free air and free fluid in the setting of peritoneal dialysis. 3. Fibroid uterus      CT CHEST W CONTRAST   Final Result      CHEST:      1. Parenchymal bands, likely atelectasis or scar. 2. No acute traumatic abnormality. ABDOMEN/PELVIS:      1. Nonspecific mildly dilated loop of small bowel in the left abdomen measuring 4 cm. Localized ileus versus obstruction not excluded. 2. Free air and free fluid in the setting of peritoneal dialysis. 3. Fibroid uterus          Assessment/Plan:     Abdominal pain, 2/2 PD peritonitis  Given the patient's pain and cloudy PD fluid. CT scan did show a nonspecific mild dilated loops of small bowel with some concern for ileus versus obstruction.  PD fluid analysis did not show a nucleated cell count of 14,260. Fluid was cloudy in appearance. · Continue cefepime and vancomycin  · Continue topical Gentamycin at PD site  · Follow-up PD fluid culture results  · Follow-up blood culture results  · F/U on PD fluid labs  · Depending on the organism identified the patient may need a PD catheter exchange during this admission.     ESRD on home peritoneal dialysis. Patient is ESRD dependent and does have PD dialysis at home.   · Continue PD dialysis while admitted     Code Status: Full  DISPO: General Medical Floor     This patient has been staffed and discussed with Chioma Johnson  -----------------------------  Slivana Farah MD, PGY-3  11/12/2021  8:42 AM

## 2021-11-12 NOTE — CONSULTS
Infectious Diseases Inpatient Consult Note    Reason for Consult:   PD related peritonitis, S aureus infection  Requesting Physician:   Dr Li Celis  Primary Care Physician:  Claudene Plater A STEPHENS  History Obtained From:   Pt, EPIC    Admit Date: 11/9/2021  Hospital Day: 4    CHIEF COMPLAINT:       Chief Complaint   Patient presents with    Abdominal Pain       HISTORY OF PRESENT ILLNESS:      46 yo woman with hx CRF on PD, DM, HTN, CHF    Presents with abd pain. Pt involved in [address] 11/5. She developed abd pain over next few days. Abd pain - constant, diffuse, worse with palpation. No fever / chills. No nausea / vomiting. No change in bowels    In ED, afeb, WBC 8.6  Abd CT with dilated SB loop, free air.   Admit, started on vancomycin, cefepime    11/9 PD fluid - WBC 14,781 0 93% PMN     Today - afebrile, WBC 7.1  Pt reports less pain today  PD fluid 147 WBC - 68%PMN      Past Medical History:    Past Medical History:   Diagnosis Date    CHF (congestive heart failure) ([address]     Diabetes mellitus [facility] [address]     Hypertension        Past Surgical History:    Past Surgical History:   Procedure Laterality Date    DIALYSIS CATHETER INSERTION N/A 7/9/2021    LAPAROSCOPIC PERITONEAL DIALYSIS CATHETER INSERTION, OMENTOPLEXY performed by Jose Manuel Pace DO at [address]       Current Medications:     insulin lispro (1 Unit Dial)  5 Units SubCUTAneous TID WC    insulin glargine  10 Units SubCUTAneous Nightly    docusate sodium  100 mg Oral BID    insulin lispro  0-6 Units SubCUTAneous TID WC    atorvastatin  40 mg Oral Daily    carvedilol  12.5 mg Oral BID WC    spironolactone  50 mg Oral Daily    torsemide  200 mg Oral Daily    sodium chloride flush  5-40 mL IntraVENous 2 times per day    heparin (porcine)  5,000 Units SubCUTAneous 3 times per day    polyethylene glycol  17 g Oral BID    naloxegol  12.5 mg Oral QAM    gentamicin   Topical BID    cefepime  1,000 mg IntraVENous Q24H    vancomycin (VANCOCIN) intermittent dosing (placeholder)   Other See Admin Instructions       Allergies:  Patient has no known allergies. Social History:    TOBACCO:    None   ETOH:    None   DRUGS:   None   MARITAL STATUS:   Single   OCCUPATION:   Disabled     Family History:   No immunodeficiency    REVIEW OF SYSTEMS:    No fever / chills / sweats. No weight loss. No visual change, eye pain, eye discharge. No oral lesion, sore throat, dysphagia. Denies cough / sputum. Denies chest pain, palpitations. Denies n / v / abd pain. No diarrhea. Denies dysuria or change in urinary function. Denies joint swelling or pain. No myalgia, arthralgia. Denies skin changes, itching  Denies focal weakness, sensory change or other neurologic symptom    Denies new / worse depression, psychiatric symptoms    PHYSICAL EXAM:      Vitals:    /76   Pulse 79   Temp 98.2 °F (36.8 °C) (Oral)   Resp 16   Ht 5' (1.524 m)   Wt 186 lb 1.1 oz (84.4 kg)   SpO2 96%   BMI 36.34 kg/m²     GENERAL: No apparent distress.     HEENT: Membranes moist, no oral lesion, PERRL  NECK:  Supple, no lymphadenopathy  LUNGS: Clear b/l, no rales, no dullness  CARDIAC: RRR, no murmur appreciated  ABD:  + BS, soft - diffuse tenderness, purulence at PD exit site  EXT:  No rash, no edema, no lesions  NEURO: No focal neurologic findings  PSYCH: Orientation, sensorium, mood normal  LINES:  Peripheral iv    DATA:    Lab Results   Component Value Date    WBC 7.1 11/12/2021    HGB 9.1 (L) 11/12/2021    HCT 26.7 (L) 11/12/2021    MCV 90.6 11/12/2021     11/12/2021     Lab Results   Component Value Date    CREATININE 6.7 (HH) 11/12/2021    BUN 59 (H) 11/12/2021     (L) 11/12/2021    K 3.3 (L) 11/12/2021    CL 87 (L) 11/12/2021    CO2 22 11/12/2021       Hepatic Function Panel:   Lab Results   Component Value Date    ALKPHOS 125 11/09/2021    ALT 43 11/09/2021    AST 42 11/09/2021    PROT 7.9 11/09/2021    BILITOT 0.6 11/09/2021    BILIDIR <0.2 08/22/2021    IBILI see below 2021    LABALBU 2.3 2021 PD fluid - WBC 14,781 - 93% PMN   11/10 PD fluid WBC 12,260 -   11/10 PD fluid  - 92%PMN    Micro:   PD fluid - heavy S aureus, oxacillin sensitive  11/10 PD fluid cult - light S aureus  11/10 BC no growth to date   Nasal MRSA DNA probe neg    Imagin/9 Chest / Abd / Pelvic CT  CHEST:   1. Parenchymal bands, likely atelectasis or scar. 2. No acute traumatic abnormality. ABDOMEN/PELVIS:   1. Nonspecific mildly dilated loop of small bowel in the left abdomen measuring 4 cm. Localized ileus versus obstruction not excluded. 2. Free air and free fluid in the setting of peritoneal dialysis. 3. Fibroid uterus      IMPRESSION:      Patient Active Problem List   Diagnosis    CHF (congestive heart failure), NYHA class I, acute on chronic, combined ([facility])    Abnormal myocardial perfusion study    Essential hypertension    Type 2 diabetes mellitus with hyperglycemia, with long-term current use of insulin ([facility])    JEAN (obstructive sleep apnea)    HARRIETT (acute kidney injury) ([address]    Uremia    CKD (chronic kidney disease) stage 5, GFR less than 15 ml/min ([facility])    Electrolyte imbalance    Anemia    Abdominal pain    Peritonitis associated with peritoneal dialysis ([address]    Free intraperitoneal air    Positive blood culture       Hx CRF on PD, DM, HTN, CHF    Abd pain  PD related peritonitis  Cult + S aureus with evidence catheter exit infection, both criteria for consideration of catheter removal      RECOMMENDATIONS:    Change to ancef   D/c vancomycin, cefepime  Intraperitoneal therapy per Renal    Daily PD fluid counts    May need PD catheter infection given S aureus, discussed with Renal / Dr Tim Villalpando, will attempt salvage    Medical Decision Making:   The following items were considered in medical decision making:  Discussion of patient care with other providers  Reviewed clinical lab tests  Reviewed radiology tests  Reviewed other diagnostic tests/interventions  Independent review of radiologic images  Microbiology cultures and other micro tests reviewed      Risk of Complications/Morbidity: High   Illness(es)/ Infection present that pose threat to bodily function. There is potential for severe exacerbation of infection/side effects of treatment.   Therapy requires intensive monitoring for antimicrobial agent toxicity    Discussed with pt, RN, Dr Francesco Mae MD

## 2021-11-12 NOTE — PROGRESS NOTES
Surgery Daily Progress Note      CC: abd pain    SUBJECTIVE:  Patient resting comfortably this morning. Endoreses abdominal and back pain. Tolerating reg diet, no nausea/vomiting. Having gas, no BM. Currently tolerating PD cath dialysis. Currently on 1 L NC Remains AF, HDS.     ROS:   A 14 point review of systems was conducted, significant findings as noted above. All other systems negative. OBJECTIVE:    PHYSICAL EXAM:  Vitals:    11/11/21 1941 11/12/21 0011 11/12/21 0426 11/12/21 0430   BP: 122/86 109/63  126/72   Pulse: 85 83  82   Resp: 16 16  16   Temp: 99.2 °F (37.3 °C) 98.6 °F (37 °C)  98.4 °F (36.9 °C)   TempSrc: Oral Oral  Oral   SpO2: 96% 96%  94%   Weight:   190 lb 0.6 oz (86.2 kg)    Height:           General appearance: alert, no acute distress, grooming appropriate  HEENT: Normocephalic, atraumatic; EOMI; moist mucous membranes  Neck: trachea midline, no JVD, no lymphadenopathy  Chest/Lungs: Normal inspiratory effort, symmetric chest rise, no accessory muscle use  Cardiovascular: Regular rate and rhythm; perfusing extremities  Abdomen: soft, minimally tender, non-distended, no guarding/rigidity, PD cath in place at left abdomen without surrounding erythema   Skin: warm and dry, no rashes  Extremities: no edema, no cyanosis  Neuro: no gross sensory or motor neuro deficits    ASSESSMENT & PLAN:   James Mcgregor is a 45 y.o. female with history of CHF, CKD on PD who presents for abdominal pain. The patient is AF, HDS, clinical exam notable for diffuse abdominal tenderness, no peritonitis. Labs showing lactate 2, no leukocytosis.  CT a/p showing nonspecific mildly dilated loop of small bowel c/f ileus vs obstruction.    - PD fluid analysis with 59220 nucleated cells, cx growing staph aureus   - given fever workup, CXR showing possible pneumonia, bcx and UA pending  continue cefepime and vanc  -  nephro following  - will continue to salvage PD cath with IV abx       Edyta Noriega MD, PGY-1  11/12/21 6:18 AM  Pager: [phone]

## 2021-11-12 NOTE — PROGRESS NOTES
Pt seen on PD   abd pain +   Abx to cont   Constipation better     No need for Cath removal at this time     Pain control - will give NSAIDs x 4 doses       Stop Cefepime and add rifampin       Ref -   · PMID:  [id number]   · DOI:   [id number]            Stacey Cunningham MD

## 2021-11-12 NOTE — CONSULTS
Clinical Pharmacy Progress Note    Vancomycin has been discontinued. Will sign off pharmacy to dose Vancomycin consult. If medication is restarted and pharmacy is to manage dosing, please re-consult at that time.     Please call with questions--  Thanks--  Sophia Corbin, PharmD, [facility], [place]  [id number] ([facility])   11/12/2021 12:46 PM

## 2021-11-12 NOTE — CARE COORDINATION
CM following. Pt from home with family, no current [facility]; has home dialysis with [facility] [phone]. No current home therapy needed per PT & OT notes; liaison, [place] was following for possible need. CM will continue to follow for DC needs and recs.         Electronically signed by JULIAN Garcia, FORREST on 11/12/2021 at 3:48 PM

## 2021-11-12 NOTE — PROGRESS NOTES
Assessed pt at 1547 for vital signs and pt was diaphoretic and said she felt tired- immediately checked blood sugar and it was 55- gave pt 1/2 amp d50 and rechecked at 1609 and it was 125 and she said she felt better- MD notified.

## 2021-11-12 NOTE — PROGRESS NOTES
Physical Therapy  Facility/Department: [address]  Daily Treatment Note  NAME: Miriam Lopez  : 1970  MRN: [id number]    Date of Service: 2021    Discharge Recommendations:    Miriam Lopez scored a 20/24 on the AM-PAC short mobility form. Current research shows that an AM-PAC score of 18 or greater is typically associated with a discharge to the patient's home setting. Based on the patient's AM-PAC score and their current functional mobility deficits, it is recommended that the patient have 2-3 sessions per week of Physical Therapy at d/c to increase the patient's independence. At this time, this patient demonstrates the endurance and safety to discharge home with OP PT and a follow up treatment frequency of 2-3x/wk. Please see assessment section for further patient specific details. If patient discharges prior to next session this note will serve as a discharge summary. Please see below for the latest assessment towards goals. PT Equipment Recommendations  Equipment Needed: No    Assessment   Assessment: Pt functioning at her baseline. Pt reports she has no concerns regarding D/C home and no need for further acute PT. Recommend pt use RW vs rollator at all times d/t h/o multiple falls. Pt may benefit from outpt PT for right knee OA. D/C acute PT. Decision Making: Low Complexity  PT Education: PT Role; General Safety  No Skilled PT: At baseline function  REQUIRES PT FOLLOW UP: No  Activity Tolerance  Activity Tolerance: Patient Tolerated treatment well     Patient Diagnosis(es): The encounter diagnosis was Peritonitis associated with peritoneal dialysis, initial encounter ([facility] [address] has a past medical history of CHF (congestive heart failure) ([facility] [address] Diabetes mellitus ([facility] [address] and Hypertension. has a past surgical history that includes Dialysis Catheter Insertion (N/A, 2021).     Restrictions  Position Activity Restriction  Other position/activity restrictions: up as

## 2021-11-12 NOTE — FLOWSHEET NOTE
Disconnected from [facility] per protocol.     Effluent: yellow and clear  Total time: 11 hr 41 min   Total UF:  -340 ml. Total Volume:  9986 ml.   Dwell time gained:  0 hr 38 min gained  Pt Tolerated procedure: without any difficulty  Report given to: Che Jeffrey RN  Last BM: 11/12/2021 11/12/21 0745   Vitals   /78   Temp 98.7 °F (37.1 °C)   Temp Source Oral   Pulse 77   Resp 18   Weight 186 lb 1.1 oz (84.4 kg)       Electronically signed by Deshawn Mehta RN on 11/12/2021 at 10:33 AM

## 2021-11-12 NOTE — FLOWSHEET NOTE
Disconnected from [facility] per protocol. Effluent: yellow and clear  Total time: 11 hr 41 min   Total UF:  -340 ml. Total Volume:  9986 ml.   Dwell time gained:  0 hr 38 min gained  Pt Tolerated procedure: without any difficulty  Report given to: Melony Beckwith RN  Last BM: 11/12/2021    Electronically signed by Kay Newby RN on 11/12/2021 at 10:13 AM

## 2021-11-12 NOTE — PROGRESS NOTES
Clinical Pharmacy Progress Note    Vancomycin - Management by Pharmacy    Consult Date(s):  11/10/21  Consulting Provider(s):  Dr. Jose Alberto Bee / Plan  1)  Peritonitis with tunnel infection and possible PNA - Vancomycin   Concurrent Antimicrobials: Cefepime    Day of Vanc Therapy: 2   Current Dosing Method: Intermittent due to ESRD on PD  o Therapeutic Goal: Trough >15mcg/mL  o Random level this AM = 28.8 mcg/mL. Will not give any Vancomycin today. o Levels will predictably remain > 15 through at least 11/14. Will check next random level 11/14 AM.   Will continue to monitor clinical condition and make adjustments to regimen as appropriate. · PD fluid cultures growing MSSA. MRSA nasal swab negative - so PNA not likely to be MRSA. Could consider de-escalating abx regimen to Ceftriaxone 2000mg IV q24h to treat both MSSA peritonitis and Pneumonia. ID eval pending - defer abx decisions to them. Please call with questions--  Thanks--  Alric Koyanagi, PharmD, BCPS, BCGP  [id number] ([facility])   11/12/2021 9:28 AM        Interval update:   PD fluid growing Staph aureus (PBP2 negative - likely MSSA). Afebrile last 24 hrs. Subjective/Objective:   Daniel Zuniga is a 45 y.o. y/o female with a PMHx that includes ESRD on PD, HTN, and DM 2 who is admitted with abdominal pain 2/2 PD associated peritonitis with tunnel infection. Pharmacy is consulted to dose Vancomycin.     Current antibiotics:  Cefepime 1000mg IV q24h (11/10-current)  Vancomycin - Pharmacy to dose   Intermittent dosing (11/10-current)    Ht Readings from Last 1 Encounters:   11/10/21 5' (1.524 m)     Wt Readings from Last 1 Encounters:   11/12/21 190 lb 0.6 oz (86.2 kg)       Vanc Level(s) / Doses:  Date Time Dose Level / Type of Level Interpretation   11/10 00:51 1250mg x1     11/10 08:41  Random = 22.5mcg/mL Intermittent dosing   11/11 07:12  Random = 15.4mcg/mL Intermittent dosing   11/11 11:04 1000mg x1     11/12 06:43  Random = 28.8mcg/mL Intermittent dosing   Note: Serum levels collected for AUC-based dosing may be high if collected in close proximity to the dose administered. This is not necessarily an indicator of toxicity.     Recent Labs     11/10/21  1132 11/11/21  0713 11/12/21  0630   CREATININE 7.4* 7.1* 6.7*   BUN 93* 69* 59*   WBC 6.9 7.1 7.1     CrCl is not estimated 2/2 ERSD on PD    Cultures & Sensitivities:  Date Site Micro Susceptibility / Result   11/9 PD fluid MSSA Cefazolin, Oxacillin   11/10 PD luid MSSA    11/10 Blood x2 No growth to date    11/11 MRSA Nasal PCR negative
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
## 2021-11-12 NOTE — PROGRESS NOTES
Pt seen on PD   Pt has PD peritonitis with tunnel infection   Abx   Constipation management     Tunneled USG - reviewed     No need for Cath removal    Chris Wilson MD

## 2021-11-12 NOTE — PROGRESS NOTES
supine at session start. Patient Currently in Pain: Yes  Pain Assessment  Pain Assessment: 0-10  Pain Level: 7  Pain Type: Acute pain  Pain Location: Abdomen  Pain Orientation: Mid  Pain Descriptors: Aching  Pain Frequency: Continuous  Non-Pharmaceutical Pain Intervention(s): Emotional support; Distraction; Rest; Repositioned  Response to Pain Intervention: Patient Satisfied    Vital Signs  Temp: 98.2 °F (36.8 °C)  Temp Source: Oral  Pulse: 84  Heart Rate Source: Monitor  Resp: 18  BP: 122/79  BP Location: Right upper arm  Patient Position: Semi fowlers  Level of Consciousness: Alert (0)  MEWS Score: 1  Patient Currently in Pain: Yes  Oxygen Therapy  SpO2: 94 %    Social/Functional History  Social/Functional History  Lives With: Son (and grandson, son works)  Type of Home: Apartment  Home Layout: One level  Home Access: Stairs to enter without rails (4 LISSET)  Bathroom Shower/Tub: Tub/Shower unit  Bathroom Toilet: Standard (vanity close by)  Home Equipment:  (None)  ADL Assistance: Independent  Homemaking Assistance: Independent  Ambulation Assistance: Independent  Transfer Assistance: Independent  Active : Yes  Occupation: Unemployed  Additional Comments: Pt reports \"a lot\" of falls due to R knee giving out 2/2 arthritis. Objective   Vision: Within Functional Limits  Hearing: Within functional limits             Balance  Sitting Balance: Supervision  Standing Balance: Supervision (no device)  Functional Mobility  Functional - Mobility Device: No device  Activity: To/from bathroom  Assist Level: Supervision  Toilet Transfers  Toilet - Technique: Ambulating  Equipment Used: Standard toilet  Toilet Transfer: Supervision     ADL  Feeding: Independent (eating lunch in bed upon exit)  LE Dressing: Supervision (to don B socks seated EOB)  Additional Comments: Pt refusing further ADL 2/2 pain at this time despite education and encouragement, but pt reporting she feels she is at functional baseline.      Tone RUE  RUE Tone: Normotonic  Tone LUE  LUE Tone: Normotonic  Coordination  Movements Are Fluid And Coordinated: Yes        Bed mobility  Supine to Sit: Modified independent  Sit to Supine: Modified independent  Scooting: Modified independent  Comment: HOB slightly raised     Transfers  Sit to stand: Supervision  Stand to sit: Supervision        Cognition  Overall Cognitive Status: WFL           Sensation  Overall Sensation Status: WFL                       BUE strength and AROM are WFL based on functional presentation. Education: Role of OT, safe t/f training, safe use of DME, awareness of deficits, discharge planning, ADL as therapeutic exercise, importance of [address]  Times per week: Eval, tx, and d/c    AM-PAC Score        AM-PAC Inpatient Daily Activity Raw Score: 21 (11/12/21 1250)  AM-PAC Inpatient ADL T-Scale Score : 44.27 (11/12/21 1250)  ADL Inpatient CMS 0-100% Score: 32.79 (11/12/21 1250)  ADL Inpatient CMS G-Code Modifier : Mehran Hyatt (11/12/21 1250)    Goals  Short term goals  Time Frame for Short term goals: 1 week  Short term goal 1: Pt will complete all ADL and functional mobility/transfers at SBA level or higher. GOAL MET. Short term goal 2: Pt will state understanding of importance of OOB. GOAL MET. Patient Goals   Patient goals : \"I want to feel better. \"       Therapy Time   Individual Concurrent Group Co-treatment   Time In 1125         Time Out 1148         Minutes 23         Timed Code Treatment Minutes: 8 Minutes       If patient is discharged prior to next treatment session, this note will serve as the discharge summary.   Karlo Johnson, OTR/L #[id number]

## 2021-11-13 LAB
ALBUMIN SERPL-MCNC: 2.4 G/DL (ref 3.4–5)
ANION GAP SERPL CALCULATED.3IONS-SCNC: 13 MMOL/L (ref 3–16)
APPEARANCE FLUID: CLEAR
BASOPHILS ABSOLUTE: 0 K/UL (ref 0–0.2)
BASOPHILS RELATIVE PERCENT: 0.4 %
BUN BLDV-MCNC: 51 MG/DL (ref 7–20)
CALCIUM SERPL-MCNC: 7.7 MG/DL (ref 8.3–10.6)
CELL COUNT FLUID TYPE: NORMAL
CHLORIDE BLD-SCNC: 89 MMOL/L (ref 99–110)
CLOT EVALUATION: NORMAL
CO2: 26 MMOL/L (ref 21–32)
COLOR FLUID: COLORLESS
CREAT SERPL-MCNC: 5.4 MG/DL (ref 0.6–1.1)
EOSINOPHILS ABSOLUTE: 0.4 K/UL (ref 0–0.6)
EOSINOPHILS RELATIVE PERCENT: 4.2 %
GFR AFRICAN AMERICAN: 10
GFR NON-AFRICAN AMERICAN: 8
GLUCOSE BLD-MCNC: 154 MG/DL (ref 70–99)
GLUCOSE BLD-MCNC: 160 MG/DL (ref 70–99)
GLUCOSE BLD-MCNC: 163 MG/DL (ref 70–99)
GLUCOSE BLD-MCNC: 171 MG/DL (ref 70–99)
GLUCOSE BLD-MCNC: 227 MG/DL (ref 70–99)
HCT VFR BLD CALC: 24.9 % (ref 36–48)
HEMOGLOBIN: 8.4 G/DL (ref 12–16)
LYMPHOCYTES ABSOLUTE: 1 K/UL (ref 1–5.1)
LYMPHOCYTES RELATIVE PERCENT: 12.2 %
LYMPHOCYTES, BODY FLUID: 17 %
MACROPHAGE FLUID: 12 %
MAGNESIUM: 1.8 MG/DL (ref 1.8–2.4)
MCH RBC QN AUTO: 30.7 PG (ref 26–34)
MCHC RBC AUTO-ENTMCNC: 33.8 G/DL (ref 31–36)
MCV RBC AUTO: 90.7 FL (ref 80–100)
MONOCYTE, FLUID: 2 %
MONOCYTES ABSOLUTE: 0.9 K/UL (ref 0–1.3)
MONOCYTES RELATIVE PERCENT: 10.9 %
NEUTROPHIL, FLUID: 69 %
NEUTROPHILS ABSOLUTE: 6 K/UL (ref 1.7–7.7)
NEUTROPHILS RELATIVE PERCENT: 72.3 %
NUCLEATED CELLS FLUID: 339 /CUMM
NUMBER OF CELLS COUNTED FLUID: 100
PDW BLD-RTO: 13.8 % (ref 12.4–15.4)
PERFORMED ON: ABNORMAL
PHOSPHORUS: 4.4 MG/DL (ref 2.5–4.9)
PLATELET # BLD: 297 K/UL (ref 135–450)
PMV BLD AUTO: 7.4 FL (ref 5–10.5)
POTASSIUM SERPL-SCNC: 3.4 MMOL/L (ref 3.5–5.1)
RBC # BLD: 2.74 M/UL (ref 4–5.2)
RBC FLUID: 500 /CUMM
SODIUM BLD-SCNC: 128 MMOL/L (ref 136–145)
WBC # BLD: 8.3 K/UL (ref 4–11)

## 2021-11-13 PROCEDURE — 6370000000 HC RX 637 (ALT 250 FOR IP): Performed by: INTERNAL MEDICINE

## 2021-11-13 PROCEDURE — 89051 BODY FLUID CELL COUNT: CPT

## 2021-11-13 PROCEDURE — 6360000002 HC RX W HCPCS: Performed by: INTERNAL MEDICINE

## 2021-11-13 PROCEDURE — 2580000003 HC RX 258: Performed by: INTERNAL MEDICINE

## 2021-11-13 PROCEDURE — 6370000000 HC RX 637 (ALT 250 FOR IP): Performed by: STUDENT IN AN ORGANIZED HEALTH CARE EDUCATION/TRAINING PROGRAM

## 2021-11-13 PROCEDURE — 85025 COMPLETE CBC W/AUTO DIFF WBC: CPT

## 2021-11-13 PROCEDURE — 83735 ASSAY OF MAGNESIUM: CPT

## 2021-11-13 PROCEDURE — 99233 SBSQ HOSP IP/OBS HIGH 50: CPT | Performed by: SURGERY

## 2021-11-13 PROCEDURE — 99233 SBSQ HOSP IP/OBS HIGH 50: CPT | Performed by: INTERNAL MEDICINE

## 2021-11-13 PROCEDURE — 80069 RENAL FUNCTION PANEL: CPT

## 2021-11-13 PROCEDURE — 1200000000 HC SEMI PRIVATE

## 2021-11-13 PROCEDURE — 36415 COLL VENOUS BLD VENIPUNCTURE: CPT

## 2021-11-13 RX ORDER — IBUPROFEN 400 MG/1
400 TABLET ORAL ONCE
Status: COMPLETED | OUTPATIENT
Start: 2021-11-13 | End: 2021-11-13

## 2021-11-13 RX ORDER — SODIUM CHLORIDE, SODIUM LACTATE, POTASSIUM CHLORIDE, CALCIUM CHLORIDE 600; 310; 30; 20 MG/100ML; MG/100ML; MG/100ML; MG/100ML
INJECTION, SOLUTION INTRAVENOUS CONTINUOUS
Status: DISCONTINUED | OUTPATIENT
Start: 2021-11-14 | End: 2021-11-13

## 2021-11-13 RX ADMIN — SODIUM CHLORIDE, PRESERVATIVE FREE 10 ML: 5 INJECTION INTRAVENOUS at 09:10

## 2021-11-13 RX ADMIN — SODIUM CHLORIDE, PRESERVATIVE FREE 10 ML: 5 INJECTION INTRAVENOUS at 22:58

## 2021-11-13 RX ADMIN — IBUPROFEN 400 MG: 400 TABLET, FILM COATED ORAL at 23:39

## 2021-11-13 RX ADMIN — OXYCODONE 5 MG: 5 TABLET ORAL at 11:31

## 2021-11-13 RX ADMIN — CARVEDILOL 12.5 MG: 12.5 TABLET, FILM COATED ORAL at 17:50

## 2021-11-13 RX ADMIN — CARVEDILOL 12.5 MG: 12.5 TABLET, FILM COATED ORAL at 09:52

## 2021-11-13 RX ADMIN — HEPARIN SODIUM 5000 UNITS: 5000 INJECTION INTRAVENOUS; SUBCUTANEOUS at 22:56

## 2021-11-13 RX ADMIN — HEPARIN SODIUM 5000 UNITS: 5000 INJECTION INTRAVENOUS; SUBCUTANEOUS at 14:04

## 2021-11-13 RX ADMIN — CEFAZOLIN 2000 MG: 10 INJECTION, POWDER, FOR SOLUTION INTRAVENOUS at 10:09

## 2021-11-13 RX ADMIN — GENTAMICIN SULFATE: 1 CREAM TOPICAL at 22:56

## 2021-11-13 RX ADMIN — RIFAMPIN 300 MG: 300 CAPSULE ORAL at 22:55

## 2021-11-13 RX ADMIN — OXYCODONE 5 MG: 5 TABLET ORAL at 23:39

## 2021-11-13 RX ADMIN — INSULIN LISPRO 2 UNITS: 100 INJECTION, SOLUTION INTRAVENOUS; SUBCUTANEOUS at 12:35

## 2021-11-13 RX ADMIN — NALOXEGOL OXALATE 12.5 MG: 12.5 TABLET, FILM COATED ORAL at 09:52

## 2021-11-13 RX ADMIN — ATORVASTATIN CALCIUM 40 MG: 40 TABLET, FILM COATED ORAL at 09:52

## 2021-11-13 RX ADMIN — DOCUSATE SODIUM 100 MG: 100 CAPSULE, LIQUID FILLED ORAL at 22:55

## 2021-11-13 RX ADMIN — ONDANSETRON 4 MG: 4 TABLET, ORALLY DISINTEGRATING ORAL at 11:30

## 2021-11-13 RX ADMIN — RIFAMPIN 300 MG: 300 CAPSULE ORAL at 09:52

## 2021-11-13 RX ADMIN — POLYETHYLENE GLYCOL 3350 17 G: 17 POWDER, FOR SOLUTION ORAL at 09:53

## 2021-11-13 RX ADMIN — POLYETHYLENE GLYCOL 3350 17 G: 17 POWDER, FOR SOLUTION ORAL at 22:57

## 2021-11-13 RX ADMIN — INSULIN LISPRO 2 UNITS: 100 INJECTION, SOLUTION INTRAVENOUS; SUBCUTANEOUS at 17:53

## 2021-11-13 RX ADMIN — INSULIN LISPRO 1 UNITS: 100 INJECTION, SOLUTION INTRAVENOUS; SUBCUTANEOUS at 10:13

## 2021-11-13 RX ADMIN — OXYCODONE 5 MG: 5 TABLET ORAL at 04:43

## 2021-11-13 RX ADMIN — IBUPROFEN 400 MG: 400 TABLET, FILM COATED ORAL at 09:55

## 2021-11-13 RX ADMIN — HEPARIN SODIUM 5000 UNITS: 5000 INJECTION INTRAVENOUS; SUBCUTANEOUS at 05:31

## 2021-11-13 RX ADMIN — GENTAMICIN SULFATE: 1 CREAM TOPICAL at 09:53

## 2021-11-13 RX ADMIN — DOCUSATE SODIUM 100 MG: 100 CAPSULE, LIQUID FILLED ORAL at 09:52

## 2021-11-13 RX ADMIN — SPIRONOLACTONE 50 MG: 50 TABLET ORAL at 09:52

## 2021-11-13 RX ADMIN — TORSEMIDE 200 MG: 100 TABLET ORAL at 09:52

## 2021-11-13 RX ADMIN — ONDANSETRON 4 MG: 2 INJECTION INTRAMUSCULAR; INTRAVENOUS at 18:18

## 2021-11-13 ASSESSMENT — PAIN DESCRIPTION - LOCATION
LOCATION: ABDOMEN

## 2021-11-13 ASSESSMENT — PAIN DESCRIPTION - FREQUENCY
FREQUENCY: CONTINUOUS

## 2021-11-13 ASSESSMENT — PAIN DESCRIPTION - PROGRESSION
CLINICAL_PROGRESSION: NOT CHANGED

## 2021-11-13 ASSESSMENT — PAIN DESCRIPTION - DESCRIPTORS
DESCRIPTORS: SHARP
DESCRIPTORS: ACHING
DESCRIPTORS: SHARP

## 2021-11-13 ASSESSMENT — PAIN DESCRIPTION - ORIENTATION
ORIENTATION: MID
ORIENTATION: ANTERIOR;MID
ORIENTATION: ANTERIOR;MID

## 2021-11-13 ASSESSMENT — PAIN SCALES - GENERAL
PAINLEVEL_OUTOF10: 4
PAINLEVEL_OUTOF10: 7
PAINLEVEL_OUTOF10: 6
PAINLEVEL_OUTOF10: 7
PAINLEVEL_OUTOF10: 9
PAINLEVEL_OUTOF10: 7
PAINLEVEL_OUTOF10: 3
PAINLEVEL_OUTOF10: 3
PAINLEVEL_OUTOF10: 7
PAINLEVEL_OUTOF10: 4
PAINLEVEL_OUTOF10: 7

## 2021-11-13 ASSESSMENT — PAIN DESCRIPTION - PAIN TYPE
TYPE: ACUTE PAIN

## 2021-11-13 ASSESSMENT — PAIN DESCRIPTION - ONSET
ONSET: ON-GOING
ONSET: ON-GOING

## 2021-11-13 ASSESSMENT — PAIN DESCRIPTION - DIRECTION: RADIATING_TOWARDS: RIGHT FLANK

## 2021-11-13 ASSESSMENT — PAIN - FUNCTIONAL ASSESSMENT: PAIN_FUNCTIONAL_ASSESSMENT: PREVENTS OR INTERFERES SOME ACTIVE ACTIVITIES AND ADLS

## 2021-11-13 NOTE — PROGRESS NOTES
Surgery Daily Progress Note      CC: abd pain    SUBJECTIVE:  Patient resting comfortably this morning. Endoreses abdominal and back pain. Tolerating reg diet, endorses some nausea and intermittent vomiting. Having gas, no BM. Currently tolerating PD cath dialysis. Remains AF, HDS.     ROS:   A 14 point review of systems was conducted, significant findings as noted above. All other systems negative. OBJECTIVE:    PHYSICAL EXAM:  Vitals:    11/12/21 2206 11/13/21 0000 11/13/21 0313 11/13/21 0530   BP: 138/85 133/87 (!) 141/77    Pulse: 80 81 79    Resp: 18 18 18    Temp: 99 °F (37.2 °C) 98.3 °F (36.8 °C) 98.7 °F (37.1 °C)    TempSrc: Oral Oral Oral    SpO2: 96% 96% 96%    Weight:    194 lb 3.6 oz (88.1 kg)   Height:           General appearance: alert, no acute distress, grooming appropriate  HEENT: Normocephalic, atraumatic; EOMI; moist mucous membranes  Neck: trachea midline, no JVD, no lymphadenopathy  Chest/Lungs: Normal inspiratory effort, symmetric chest rise, no accessory muscle use  Cardiovascular: Regular rate and rhythm; perfusing extremities  Abdomen: soft, minimally tender, non-distended, no guarding/rigidity, PD cath in place at left abdomen without surrounding erythema   Skin: warm and dry, no rashes  Extremities: no edema, no cyanosis  Neuro: no gross sensory or motor neuro deficits    ASSESSMENT & PLAN:   Zbigniew Pimentel is a 45 y.o. female with history of CHF, CKD on PD who presents for abdominal pain. The patient is AF, HDS, clinical exam notable for diffuse abdominal tenderness, no peritonitis. Labs showing lactate 2, no leukocytosis.  CT a/p showing nonspecific mildly dilated loop of small bowel c/f ileus vs obstruction.    - PD fluid analysis cx growing staph aureus   - continue cefepime and vanc  -  nephro following   - will continue to salvage PD cath with IV abx       Richard Henry MD, PGY-1  11/13/21 6:40 AM  Pager: [phone]

## 2021-11-13 NOTE — PROGRESS NOTES
General Surgery   Pre-Operative Note  Resident Note     Patient: Pop Green     Procedure: PD catheter removal    Expected time: Tomorrow afternoon    Consent: In chart. Risks, benefits, and alternatives were discussed at length with the patient. All questions and concerns were answered. They agree to above procedure. Diet: NPO Past Midnight      CXR: obtained 11/10      EKG: No prior ECG     Echocardiogram: EF 55-60% in 2019    IVF: per nephrology     PT/INR: not indicated     Type and Screen: not indicated     Pregnancy test: pending    Antibiotic: continue ancef           Anesthesia to see patient.     Sumeet Farfan MD  General Surgery Resident  11/13/21 3:07 PM  [phone]

## 2021-11-13 NOTE — PROGRESS NOTES
Disconnected from [facility] per protocol. Effluent: clear yellow, no fibrin noted. Total time: 11 hr 56 min   Total UF:  -511 ml. Total Volume:  50756 ml. Dwell time gained:  0 hr 54 min. Pt Tolerated procedure without difficulty.    Report given to: Molly Schneider RN  Last BM: 11/12/21

## 2021-11-13 NOTE — PROGRESS NOTES
Hospitalist Progress Note      PCP: [facility]    Date of Admission: 11/9/2021    Chief Complaint: Abd pain     Hospital Course: Found to have possible ileus and PD cath infection with exit site infection. On IV antibiotics. Subjective:   Feels about the same. Having regular BMs. No cough or sob. No new symptoms. Medications:  Reviewed    Infusion Medications    dextrose      sodium chloride 25 mL (11/12/21 1450)     Scheduled Medications    [Held by provider] insulin lispro (1 Unit Dial)  5 Units SubCUTAneous TID WC    [Held by provider] insulin glargine  10 Units SubCUTAneous Nightly    ibuprofen  400 mg Oral TID WC    rifAMPin  300 mg Oral 2 times per day    ceFAZolin  2,000 mg IntraVENous Daily    docusate sodium  100 mg Oral BID    insulin lispro  0-6 Units SubCUTAneous TID WC    atorvastatin  40 mg Oral Daily    carvedilol  12.5 mg Oral BID WC    spironolactone  50 mg Oral Daily    torsemide  200 mg Oral Daily    sodium chloride flush  5-40 mL IntraVENous 2 times per day    heparin (porcine)  5,000 Units SubCUTAneous 3 times per day    polyethylene glycol  17 g Oral BID    naloxegol  12.5 mg Oral QAM    gentamicin   Topical BID     PRN Meds: glucose, dextrose, glucagon (rDNA), dextrose, sodium chloride flush, sodium chloride, ondansetron **OR** ondansetron, polyethylene glycol, acetaminophen **OR** acetaminophen, oxyCODONE, simethicone      Intake/Output Summary (Last 24 hours) at 11/13/2021 1028  Last data filed at 11/13/2021 0729  Gross per 24 hour   Intake 240 ml   Output -511 ml   Net 751 ml       Exam:    BP (!) 153/81   Pulse 78   Temp 98.6 °F (37 °C) (Oral)   Resp 16   Ht 5' (1.524 m)   Wt 184 lb 15.5 oz (83.9 kg)   SpO2 94%   BMI 36.12 kg/m²     General appearance: No apparent distress, appears stated age and cooperative. HEENT: Pupils equal, round, and reactive to light. Conjunctivae/corneas clear. Neck: Supple, with full range of motion.  No jugular venous distention. Trachea midline. Respiratory:  Normal respiratory effort. Clear to auscultation, bilaterally without Rales/Wheezes/Rhonchi. Cardiovascular: Regular rate and rhythm with normal S1/S2 without murmurs, rubs or gallops. Abdomen: Soft, mild tenderness left mid abdomen, mild distension, PD cath site intact, dressings clean and dry, normal bowel sounds. Musculoskelatal: No clubbing, cyanosis or edema bilaterally. Skin: Skin color, texture, turgor normal.  No rashes or lesions. Neurologic:  Cranial nerves: II-XII intact, grossly non-focal.  Psychiatric: Alert and oriented, thought content appropriate, normal insight    Labs:   Recent Labs     11/11/21 0713 11/12/21  0630 11/13/21  0824   WBC 7.1 7.1 8.3   HGB 9.3* 9.1* 8.4*   HCT 27.2* 26.7* 24.9*    277 297     Recent Labs     11/11/21 0713 11/12/21 0630 11/13/21  0824   * 127* 128*   K 3.2* 3.3* 3.4*   CL 87* 87* 89*   CO2 23 22 26   BUN 69* 59* 51*   CREATININE 7.1* 6.7* 5.4*   CALCIUM 8.0* 7.8* 7.7*   PHOS 4.7 4.3 4.4     No results for input(s): AST, ALT, BILIDIR, BILITOT, ALKPHOS in the last 72 hours. No results for input(s): INR in the last 72 hours. No results for input(s): [place] in the last 72 hours. Studies:  XR CHEST PORTABLE   Final Result      Increased bibasilar airspace disease could be due to atelectasis and/or pneumonia. US ABDOMEN LIMITED   Final Result   1. Complex collection along the drainage catheter of unclear significance. This may relate to a small abscess or hematoma. CT THORACIC RECONSTRUCTION WO POST PROCESS   Final Result      No acute fracture in the thoracic or lumbar spine. CT LUMBAR RECONSTRUCTION WO POST PROCESS   Final Result      No acute fracture in the thoracic or lumbar spine. CT ABDOMEN PELVIS W IV CONTRAST Additional Contrast? None   Final Result      CHEST:      1. Parenchymal bands, likely atelectasis or scar. 2. No acute traumatic abnormality. for the most part normotensive on current regimen, monitor for changes  BP stable 130-150's. On home nifedipine, spironolactone, torsemide and Coreg  - hold nifedipine  - continue torsemide  - continue spironolactone  - continue Coreg     DMII, with hyperglycemia likely worse due to infection, now improved  HA1C 7.3 11/2021. Pt on home lantus 20U nightly, 10U TID but she reports she was not taking for sometime. Uncontrolled glucose is likely 2/2 infection   - continued to decrease insulin, continue sliding scale only for now until PO intake improves    DVT Prophylaxis: heparin subq  Diet: ADULT DIET; Regular; 3 carb choices (45 gm/meal); Low Fat/Low Chol/High Fiber/2 gm Na; Low Phosphorus (Less than 1000 mg);  Less than 60 gm; 1200 ml  Code Status: Full Code    PT/OT Eval Status: ongoing    Dispo - Inpatient    Kelly Eubnaks DO

## 2021-11-13 NOTE — PROGRESS NOTES
-511 ml   Net 751 ml         Physical Exam  Vitals reviewed. Constitutional:       General: She is not in acute distress. Appearance: She is well-developed and well-groomed. HENT:      Head: Normocephalic and atraumatic. Mouth/Throat:      Mouth: Mucous membranes are moist.      Pharynx: Oropharynx is clear. Eyes:      General: No scleral icterus. Extraocular Movements: Extraocular movements intact. Conjunctiva/sclera: Conjunctivae normal.      Pupils: Pupils are equal, round, and reactive to light. Cardiovascular:      Rate and Rhythm: Normal rate and regular rhythm. Pulses: Normal pulses. Heart sounds: Normal heart sounds, S1 normal and S2 normal. No murmur heard. Pulmonary:      Effort: Pulmonary effort is normal. No respiratory distress. Breath sounds: Normal breath sounds and air entry. Abdominal:      General: Abdomen is flat. Bowel sounds are normal.      Palpations: Abdomen is soft. Tenderness: There is no abdominal tenderness. Musculoskeletal:         General: Normal range of motion. Cervical back: Full passive range of motion without pain, normal range of motion and neck supple. Skin:     General: Skin is warm and dry. Neurological:      General: No focal deficit present. Mental Status: She is alert and oriented to person, place, and time. Cranial Nerves: Cranial nerves are intact. Sensory: Sensation is intact. Motor: Motor function is intact. Coordination: Coordination is intact. Gait: Gait is intact. Deep Tendon Reflexes: Reflexes are normal and symmetric. Psychiatric:         Attention and Perception: Attention and perception normal.         Mood and Affect: Mood normal.         Speech: Speech normal.         Behavior: Behavior normal. Behavior is cooperative. Thought Content:  Thought content normal.         Cognition and Memory: Cognition and memory normal.         Judgment: Judgment normal. LABS:    CBC:   Recent Labs     11/11/21  0713 11/12/21  0630 11/13/21  0824   WBC 7.1 7.1 8.3   HGB 9.3* 9.1* 8.4*   HCT 27.2* 26.7* 24.9*    277 297   MCV 90.5 90.6 90.7     Renal:    Recent Labs     11/11/21  0713 11/12/21  0630 11/13/21  0824   * 127* 128*   K 3.2* 3.3* 3.4*   CL 87* 87* 89*   CO2 23 22 26   BUN 69* 59* 51*   CREATININE 7.1* 6.7* 5.4*   GLUCOSE 282* 234* 154*   CALCIUM 8.0* 7.8* 7.7*   MG 1.80 2.00 1.80   PHOS 4.7 4.3 4.4   ANIONGAP 17* 18* 13     Hepatic:   Recent Labs     11/11/21  0713 11/12/21  0630 11/13/21  0824   LABALBU 2.7* 2.3* 2.4*     Troponin: No results for input(s): TROPONINI in the last 72 hours. BNP: No results for input(s): BNP in the last 72 hours. Lipids: No results for input(s): CHOL, HDL in the last 72 hours. Invalid input(s): LDLCALCU, TRIGLYCERIDE  ABGs:  No results for input(s): PHART, TGS7HZW, PO2ART, QAK5OJL, BEART, THGBART, U1HNRBIE, JIW4HPK in the last 72 hours. INR: No results for input(s): INR in the last 72 hours. Lactate: No results for input(s): LACTATE in the last 72 hours. Cultures:  -----------------------------------------------------------------  RAD:   XR CHEST PORTABLE   Final Result      Increased bibasilar airspace disease could be due to atelectasis and/or pneumonia. US ABDOMEN LIMITED   Final Result   1. Complex collection along the drainage catheter of unclear significance. This may relate to a small abscess or hematoma. CT THORACIC RECONSTRUCTION WO POST PROCESS   Final Result      No acute fracture in the thoracic or lumbar spine. CT LUMBAR RECONSTRUCTION WO POST PROCESS   Final Result      No acute fracture in the thoracic or lumbar spine. CT ABDOMEN PELVIS W IV CONTRAST Additional Contrast? None   Final Result      CHEST:      1. Parenchymal bands, likely atelectasis or scar. 2. No acute traumatic abnormality.             ABDOMEN/PELVIS:      1. Nonspecific mildly dilated loop of small bowel in the left abdomen measuring 4 cm. Localized ileus versus obstruction not excluded. 2. Free air and free fluid in the setting of peritoneal dialysis. 3. Fibroid uterus      CT CHEST W CONTRAST   Final Result      CHEST:      1. Parenchymal bands, likely atelectasis or scar. 2. No acute traumatic abnormality. ABDOMEN/PELVIS:      1. Nonspecific mildly dilated loop of small bowel in the left abdomen measuring 4 cm. Localized ileus versus obstruction not excluded. 2. Free air and free fluid in the setting of peritoneal dialysis. 3. Fibroid uterus          Assessment/Plan:     Abdominal pain, 2/2 PD peritonitis  Given the patient's pain and cloudy PD fluid. CT scan did show a nonspecific mild dilated loops of small bowel with some concern for ileus versus obstruction. PD fluid analysis did not show a nucleated cell count of 14,260. Fluid was cloudy in appearance. · Continue cefepime and vancomycin  · Continue topical Gentamycin at PD site  · Follow-up PD fluid culture results  · Follow-up blood culture results  · .     ESRD on home peritoneal dialysis.     Planned for PD catheter removal tomorrow   Will transition to HD temporarily            -----------------------------  Gómez Vargas MD  11/13/2021  9:55 AM

## 2021-11-13 NOTE — FLOWSHEET NOTE
11/12/21 1902   Vitals   /67   Temp 98.2 °F (36.8 °C)   Temp Source Oral   Pulse 84   Resp 18   Weight 186 lb 4.6 oz (84.5 kg)   Cycler   Number of Bags Used 2       CCPD Order   Exchanges: 5   Exchange Volume: 2000 ml   Total Time: 10 hrs   Dextrose: 1.5% with 1000 units heparin each bag   Last Fill: 0 ml   Total Volume: 81705 ml     Orders verified. Supplies taken to pt's room. Report given to Angelica Huber RN. Cycler set up, primed and pre tested. Dressing changed on [facility] Cath site. Pt connected to cycler. CCPD initiated without problem. Initial effluent clear.        Electronically signed by Satish Conway RN on 11/12/2021 at 7:41 PM

## 2021-11-14 ENCOUNTER — ANESTHESIA (OUTPATIENT)
Dept: OPERATING ROOM | Age: 51
DRG: 907 | End: 2021-11-14
Payer: COMMERCIAL

## 2021-11-14 ENCOUNTER — ANESTHESIA EVENT (OUTPATIENT)
Dept: OPERATING ROOM | Age: 51
DRG: 907 | End: 2021-11-14
Payer: COMMERCIAL

## 2021-11-14 VITALS
DIASTOLIC BLOOD PRESSURE: 58 MMHG | OXYGEN SATURATION: 100 % | RESPIRATION RATE: 10 BRPM | SYSTOLIC BLOOD PRESSURE: 95 MMHG

## 2021-11-14 LAB
ALBUMIN SERPL-MCNC: 2.2 G/DL (ref 3.4–5)
ANION GAP SERPL CALCULATED.3IONS-SCNC: 14 MMOL/L (ref 3–16)
BACTERIA: ABNORMAL /HPF
BASOPHILS ABSOLUTE: 0 K/UL (ref 0–0.2)
BASOPHILS RELATIVE PERCENT: 0.5 %
BILIRUBIN URINE: NEGATIVE
BLOOD CULTURE, ROUTINE: NORMAL
BLOOD, URINE: ABNORMAL
BUN BLDV-MCNC: 54 MG/DL (ref 7–20)
CALCIUM SERPL-MCNC: 8 MG/DL (ref 8.3–10.6)
CHLORIDE BLD-SCNC: 90 MMOL/L (ref 99–110)
CLARITY: CLEAR
CO2: 26 MMOL/L (ref 21–32)
COLOR: YELLOW
CREAT SERPL-MCNC: 6 MG/DL (ref 0.6–1.1)
CULTURE, BLOOD 2: NORMAL
EOSINOPHILS ABSOLUTE: 0.3 K/UL (ref 0–0.6)
EOSINOPHILS RELATIVE PERCENT: 3.7 %
EPITHELIAL CELLS, UA: ABNORMAL /HPF (ref 0–5)
GFR AFRICAN AMERICAN: 9
GFR NON-AFRICAN AMERICAN: 7
GLUCOSE BLD-MCNC: 101 MG/DL (ref 70–99)
GLUCOSE BLD-MCNC: 122 MG/DL (ref 70–99)
GLUCOSE BLD-MCNC: 122 MG/DL (ref 70–99)
GLUCOSE BLD-MCNC: 123 MG/DL (ref 70–99)
GLUCOSE BLD-MCNC: 140 MG/DL (ref 70–99)
GLUCOSE BLD-MCNC: 192 MG/DL (ref 70–99)
GLUCOSE URINE: NEGATIVE MG/DL
HBV SURFACE AB TITR SER: <3.5 MIU/ML
HCG QUALITATIVE: NEGATIVE
HCT VFR BLD CALC: 24.9 % (ref 36–48)
HEMOGLOBIN: 8.4 G/DL (ref 12–16)
HEPATITIS B SURFACE ANTIGEN INTERPRETATION: NORMAL
KETONES, URINE: NEGATIVE MG/DL
LEUKOCYTE ESTERASE, URINE: ABNORMAL
LYMPHOCYTES ABSOLUTE: 1.3 K/UL (ref 1–5.1)
LYMPHOCYTES RELATIVE PERCENT: 13.8 %
MAGNESIUM: 1.8 MG/DL (ref 1.8–2.4)
MCH RBC QN AUTO: 30.8 PG (ref 26–34)
MCHC RBC AUTO-ENTMCNC: 33.7 G/DL (ref 31–36)
MCV RBC AUTO: 91.5 FL (ref 80–100)
MICROSCOPIC EXAMINATION: YES
MONOCYTES ABSOLUTE: 0.9 K/UL (ref 0–1.3)
MONOCYTES RELATIVE PERCENT: 9.5 %
NEUTROPHILS ABSOLUTE: 6.7 K/UL (ref 1.7–7.7)
NEUTROPHILS RELATIVE PERCENT: 72.5 %
NITRITE, URINE: NEGATIVE
PDW BLD-RTO: 14.1 % (ref 12.4–15.4)
PERFORMED ON: ABNORMAL
PH UA: 5.5 (ref 5–8)
PHOSPHORUS: 4.8 MG/DL (ref 2.5–4.9)
PLATELET # BLD: 330 K/UL (ref 135–450)
PMV BLD AUTO: 7.6 FL (ref 5–10.5)
POTASSIUM SERPL-SCNC: 3 MMOL/L (ref 3.5–5.1)
PROTEIN UA: ABNORMAL MG/DL
RBC # BLD: 2.72 M/UL (ref 4–5.2)
RBC UA: ABNORMAL /HPF (ref 0–4)
SODIUM BLD-SCNC: 130 MMOL/L (ref 136–145)
SPECIFIC GRAVITY UA: 1.01 (ref 1–1.03)
URINE REFLEX TO CULTURE: ABNORMAL
URINE TYPE: ABNORMAL
UROBILINOGEN, URINE: 0.2 E.U./DL
VANCOMYCIN RANDOM: 19.4 UG/ML
WBC # BLD: 9.2 K/UL (ref 4–11)
WBC UA: ABNORMAL /HPF (ref 0–5)
YEAST: PRESENT /HPF

## 2021-11-14 PROCEDURE — 6360000002 HC RX W HCPCS: Performed by: INTERNAL MEDICINE

## 2021-11-14 PROCEDURE — 87070 CULTURE OTHR SPECIMN AEROBIC: CPT

## 2021-11-14 PROCEDURE — 87206 SMEAR FLUORESCENT/ACID STAI: CPT

## 2021-11-14 PROCEDURE — 36415 COLL VENOUS BLD VENIPUNCTURE: CPT

## 2021-11-14 PROCEDURE — 87075 CULTR BACTERIA EXCEPT BLOOD: CPT

## 2021-11-14 PROCEDURE — 99233 SBSQ HOSP IP/OBS HIGH 50: CPT | Performed by: INTERNAL MEDICINE

## 2021-11-14 PROCEDURE — 2580000003 HC RX 258: Performed by: ANESTHESIOLOGY

## 2021-11-14 PROCEDURE — 7100000001 HC PACU RECOVERY - ADDTL 15 MIN: Performed by: SURGERY

## 2021-11-14 PROCEDURE — 3600000002 HC SURGERY LEVEL 2 BASE: Performed by: SURGERY

## 2021-11-14 PROCEDURE — 83735 ASSAY OF MAGNESIUM: CPT

## 2021-11-14 PROCEDURE — 6370000000 HC RX 637 (ALT 250 FOR IP): Performed by: INTERNAL MEDICINE

## 2021-11-14 PROCEDURE — 7100000000 HC PACU RECOVERY - FIRST 15 MIN: Performed by: SURGERY

## 2021-11-14 PROCEDURE — 0WPG03Z REMOVAL OF INFUSION DEVICE FROM PERITONEAL CAVITY, OPEN APPROACH: ICD-10-PCS | Performed by: SURGERY

## 2021-11-14 PROCEDURE — 84703 CHORIONIC GONADOTROPIN ASSAY: CPT

## 2021-11-14 PROCEDURE — 6370000000 HC RX 637 (ALT 250 FOR IP): Performed by: STUDENT IN AN ORGANIZED HEALTH CARE EDUCATION/TRAINING PROGRAM

## 2021-11-14 PROCEDURE — 87205 SMEAR GRAM STAIN: CPT

## 2021-11-14 PROCEDURE — 2580000003 HC RX 258: Performed by: STUDENT IN AN ORGANIZED HEALTH CARE EDUCATION/TRAINING PROGRAM

## 2021-11-14 PROCEDURE — 85025 COMPLETE CBC W/AUTO DIFF WBC: CPT

## 2021-11-14 PROCEDURE — 6360000002 HC RX W HCPCS: Performed by: ANESTHESIOLOGY

## 2021-11-14 PROCEDURE — 6360000002 HC RX W HCPCS: Performed by: STUDENT IN AN ORGANIZED HEALTH CARE EDUCATION/TRAINING PROGRAM

## 2021-11-14 PROCEDURE — 87015 SPECIMEN INFECT AGNT CONCNTJ: CPT

## 2021-11-14 PROCEDURE — 1200000000 HC SEMI PRIVATE

## 2021-11-14 PROCEDURE — 2709999900 HC NON-CHARGEABLE SUPPLY: Performed by: SURGERY

## 2021-11-14 PROCEDURE — 3700000001 HC ADD 15 MINUTES (ANESTHESIA): Performed by: SURGERY

## 2021-11-14 PROCEDURE — 87116 MYCOBACTERIA CULTURE: CPT

## 2021-11-14 PROCEDURE — 3700000000 HC ANESTHESIA ATTENDED CARE: Performed by: SURGERY

## 2021-11-14 PROCEDURE — 80202 ASSAY OF VANCOMYCIN: CPT

## 2021-11-14 PROCEDURE — 2500000003 HC RX 250 WO HCPCS: Performed by: SURGERY

## 2021-11-14 PROCEDURE — 87186 SC STD MICRODIL/AGAR DIL: CPT

## 2021-11-14 PROCEDURE — 80069 RENAL FUNCTION PANEL: CPT

## 2021-11-14 PROCEDURE — 87077 CULTURE AEROBIC IDENTIFY: CPT

## 2021-11-14 PROCEDURE — 49422 REMOVE TUNNELED IP CATH: CPT | Performed by: SURGERY

## 2021-11-14 PROCEDURE — 2580000003 HC RX 258: Performed by: INTERNAL MEDICINE

## 2021-11-14 PROCEDURE — 3600000012 HC SURGERY LEVEL 2 ADDTL 15MIN: Performed by: SURGERY

## 2021-11-14 PROCEDURE — 81001 URINALYSIS AUTO W/SCOPE: CPT

## 2021-11-14 RX ORDER — MAGNESIUM SULFATE IN WATER 40 MG/ML
2000 INJECTION, SOLUTION INTRAVENOUS ONCE
Status: COMPLETED | OUTPATIENT
Start: 2021-11-14 | End: 2021-11-14

## 2021-11-14 RX ORDER — FENTANYL CITRATE 50 UG/ML
INJECTION, SOLUTION INTRAMUSCULAR; INTRAVENOUS PRN
Status: DISCONTINUED | OUTPATIENT
Start: 2021-11-14 | End: 2021-11-14 | Stop reason: SDUPTHER

## 2021-11-14 RX ORDER — ONDANSETRON 2 MG/ML
4 INJECTION INTRAMUSCULAR; INTRAVENOUS
Status: DISCONTINUED | OUTPATIENT
Start: 2021-11-14 | End: 2021-11-14 | Stop reason: HOSPADM

## 2021-11-14 RX ORDER — SODIUM CHLORIDE, SODIUM LACTATE, POTASSIUM CHLORIDE, CALCIUM CHLORIDE 600; 310; 30; 20 MG/100ML; MG/100ML; MG/100ML; MG/100ML
INJECTION, SOLUTION INTRAVENOUS CONTINUOUS PRN
Status: DISCONTINUED | OUTPATIENT
Start: 2021-11-14 | End: 2021-11-14 | Stop reason: SDUPTHER

## 2021-11-14 RX ORDER — MIDAZOLAM HYDROCHLORIDE 1 MG/ML
INJECTION INTRAMUSCULAR; INTRAVENOUS PRN
Status: DISCONTINUED | OUTPATIENT
Start: 2021-11-14 | End: 2021-11-14 | Stop reason: SDUPTHER

## 2021-11-14 RX ORDER — POTASSIUM CHLORIDE 20 MEQ/1
40 TABLET, EXTENDED RELEASE ORAL ONCE
Status: COMPLETED | OUTPATIENT
Start: 2021-11-14 | End: 2021-11-14

## 2021-11-14 RX ORDER — PROPOFOL 10 MG/ML
INJECTION, EMULSION INTRAVENOUS CONTINUOUS PRN
Status: DISCONTINUED | OUTPATIENT
Start: 2021-11-14 | End: 2021-11-14 | Stop reason: SDUPTHER

## 2021-11-14 RX ORDER — FENTANYL CITRATE 50 UG/ML
25 INJECTION, SOLUTION INTRAMUSCULAR; INTRAVENOUS EVERY 5 MIN PRN
Status: DISCONTINUED | OUTPATIENT
Start: 2021-11-14 | End: 2021-11-14 | Stop reason: HOSPADM

## 2021-11-14 RX ORDER — LIDOCAINE HYDROCHLORIDE 10 MG/ML
INJECTION, SOLUTION EPIDURAL; INFILTRATION; INTRACAUDAL; PERINEURAL PRN
Status: DISCONTINUED | OUTPATIENT
Start: 2021-11-14 | End: 2021-11-14 | Stop reason: ALTCHOICE

## 2021-11-14 RX ADMIN — HEPARIN SODIUM 5000 UNITS: 5000 INJECTION INTRAVENOUS; SUBCUTANEOUS at 14:14

## 2021-11-14 RX ADMIN — RIFAMPIN 300 MG: 300 CAPSULE ORAL at 20:38

## 2021-11-14 RX ADMIN — OXYCODONE 5 MG: 5 TABLET ORAL at 23:36

## 2021-11-14 RX ADMIN — CARVEDILOL 12.5 MG: 12.5 TABLET, FILM COATED ORAL at 17:42

## 2021-11-14 RX ADMIN — INSULIN LISPRO 1 UNITS: 100 INJECTION, SOLUTION INTRAVENOUS; SUBCUTANEOUS at 17:43

## 2021-11-14 RX ADMIN — OXYCODONE 5 MG: 5 TABLET ORAL at 09:22

## 2021-11-14 RX ADMIN — SODIUM CHLORIDE, PRESERVATIVE FREE 10 ML: 5 INJECTION INTRAVENOUS at 20:38

## 2021-11-14 RX ADMIN — ONDANSETRON 4 MG: 2 INJECTION INTRAMUSCULAR; INTRAVENOUS at 20:38

## 2021-11-14 RX ADMIN — GENTAMICIN SULFATE: 1 CREAM TOPICAL at 09:20

## 2021-11-14 RX ADMIN — MAGNESIUM SULFATE HEPTAHYDRATE 2000 MG: 2 INJECTION, SOLUTION INTRAVENOUS at 12:30

## 2021-11-14 RX ADMIN — SODIUM CHLORIDE 25 ML: 9 INJECTION, SOLUTION INTRAVENOUS at 12:27

## 2021-11-14 RX ADMIN — DOCUSATE SODIUM 100 MG: 100 CAPSULE, LIQUID FILLED ORAL at 20:37

## 2021-11-14 RX ADMIN — PROPOFOL 50 MCG/KG/MIN: 10 INJECTION, EMULSION INTRAVENOUS at 09:44

## 2021-11-14 RX ADMIN — HEPARIN SODIUM 5000 UNITS: 5000 INJECTION INTRAVENOUS; SUBCUTANEOUS at 06:28

## 2021-11-14 RX ADMIN — SODIUM CHLORIDE, PRESERVATIVE FREE 10 ML: 5 INJECTION INTRAVENOUS at 09:20

## 2021-11-14 RX ADMIN — SODIUM CHLORIDE 25 ML: 9 INJECTION, SOLUTION INTRAVENOUS at 11:11

## 2021-11-14 RX ADMIN — TORSEMIDE 200 MG: 100 TABLET ORAL at 09:19

## 2021-11-14 RX ADMIN — ACETAMINOPHEN 650 MG: 325 TABLET ORAL at 23:36

## 2021-11-14 RX ADMIN — SODIUM CHLORIDE, SODIUM LACTATE, POTASSIUM CHLORIDE, AND CALCIUM CHLORIDE: .6; .31; .03; .02 INJECTION, SOLUTION INTRAVENOUS at 09:44

## 2021-11-14 RX ADMIN — CARVEDILOL 12.5 MG: 12.5 TABLET, FILM COATED ORAL at 09:19

## 2021-11-14 RX ADMIN — FENTANYL CITRATE 100 MCG: 50 INJECTION, SOLUTION INTRAMUSCULAR; INTRAVENOUS at 09:44

## 2021-11-14 RX ADMIN — HEPARIN SODIUM 5000 UNITS: 5000 INJECTION INTRAVENOUS; SUBCUTANEOUS at 20:37

## 2021-11-14 RX ADMIN — ATORVASTATIN CALCIUM 40 MG: 40 TABLET, FILM COATED ORAL at 09:19

## 2021-11-14 RX ADMIN — GENTAMICIN SULFATE: 1 CREAM TOPICAL at 20:37

## 2021-11-14 RX ADMIN — CEFAZOLIN 2000 MG: 10 INJECTION, POWDER, FOR SOLUTION INTRAVENOUS at 11:12

## 2021-11-14 RX ADMIN — MIDAZOLAM HYDROCHLORIDE 2 MG: 2 INJECTION, SOLUTION INTRAMUSCULAR; INTRAVENOUS at 09:44

## 2021-11-14 RX ADMIN — SPIRONOLACTONE 50 MG: 50 TABLET ORAL at 09:19

## 2021-11-14 RX ADMIN — POTASSIUM CHLORIDE 40 MEQ: 1500 TABLET, EXTENDED RELEASE ORAL at 11:15

## 2021-11-14 RX ADMIN — OXYCODONE 5 MG: 5 TABLET ORAL at 13:10

## 2021-11-14 ASSESSMENT — PULMONARY FUNCTION TESTS
PIF_VALUE: 1
PIF_VALUE: 2
PIF_VALUE: 27
PIF_VALUE: 1
PIF_VALUE: 2
PIF_VALUE: 25
PIF_VALUE: 2
PIF_VALUE: 1
PIF_VALUE: 1
PIF_VALUE: 2
PIF_VALUE: 2
PIF_VALUE: 1
PIF_VALUE: 2
PIF_VALUE: 0
PIF_VALUE: 2
PIF_VALUE: 1
PIF_VALUE: 0
PIF_VALUE: 2
PIF_VALUE: 1
PIF_VALUE: 2
PIF_VALUE: 22
PIF_VALUE: 2
PIF_VALUE: 2
PIF_VALUE: 1
PIF_VALUE: 2
PIF_VALUE: 1
PIF_VALUE: 1
PIF_VALUE: 0
PIF_VALUE: 2

## 2021-11-14 ASSESSMENT — PAIN SCALES - GENERAL
PAINLEVEL_OUTOF10: 7
PAINLEVEL_OUTOF10: 5
PAINLEVEL_OUTOF10: 0
PAINLEVEL_OUTOF10: 0
PAINLEVEL_OUTOF10: 7
PAINLEVEL_OUTOF10: 7
PAINLEVEL_OUTOF10: 5

## 2021-11-14 NOTE — ANESTHESIA POSTPROCEDURE EVALUATION
Department of Anesthesiology  Postprocedure Note    Patient: Ya Tomlinson  MRN: [id number]  YOB: 1970  Date of evaluation: 11/14/2021  Time:  10:32 AM     Procedure Summary     Date: 11/14/21 Room / Location: [address] [place] [address]    Anesthesia Start: 4683 Anesthesia Stop: 4279    Procedure: CATHETER REMOVAL PERITONEAL DIALYSIS (N/A ) Diagnosis: (infected catheter)    Surgeons: Brooke Palafox DO Responsible Provider: Star Ruth DO    Anesthesia Type: MAC ASA Status: 3 - Emergent          Anesthesia Type: MAC    Otto Phase I:      Otto Phase II:      Last vitals: Reviewed and per EMR flowsheets.        Anesthesia Post Evaluation    Patient location during evaluation: PACU  Patient participation: complete - patient participated  Level of consciousness: awake  Pain score: 0  Airway patency: patent  Nausea & Vomiting: no nausea and no vomiting  Complications: no  Cardiovascular status: blood pressure returned to baseline  Respiratory status: acceptable  Hydration status: euvolemic

## 2021-11-14 NOTE — PROGRESS NOTES
Bariatric Surgery  Post-operative Note      Procedure(s) Performed: PD catheter removal    Subjective:   Patient's pain is controlled, denies nausea or vomiting. OOB, ambulating and voiding appropriately. Denies flatus or BM at this time. Objective:  Anesthesia type: MAC      I/O    Intra op    Post op     Fluids  600 mL 0 mL     EBL 10 mL 0 mL     Urine 0 mL 200 mL     Vitals:   Vitals:    11/14/21 1035 11/14/21 1040 11/14/21 1045 11/14/21 1107   BP: 130/65 (!) 137/102 132/64 (!) 146/61   Pulse: 76 77 76 76   Resp: 19 16 12 14   Temp:   97.9 °F (36.6 °C) 97.5 °F (36.4 °C)   TempSrc:    Oral   SpO2: 96% 99% 97% 98%   Weight:       Height:           Physical Exam:  Post-op vital signs:  Stable   General appearance: alert, no acute distress, grooming appropriate  Eyes: No scleral icterus, EOM grossly intact  Neck: trachea midline, no JVD, no lymphadenopathy, neck supple  Chest/Lungs: Normal effort with no accessory muscle use on RA  Cardiovascular: RRR, extremities well perfused  Abdomen: Obese, soft, non-distended, appropriately tender, incision dressing c/d/i with minimal strike through  Skin: warm and dry, no rashes  Extremities: no edema, no cyanosis  Neuro: A&Ox3, no focal deficits, sensation intact    Assessment and Plan  This is a 45y.o. year old female status post PD catheter removal secondary to infected PD catheter. (11/14) POD0. Pain management: Roxicodone pain panel and PRN tylenol   Cardiovasc: hemodynamically stable, will continue to monitor  Respiratory:  IS ordered to bedside, encourage hourly IS and deep breathing, wean oxygen as tolerated  Fluids:  None, Diet: Renal diet  : Patient remains a void check - will follow up in 4 hours. Ambulation: OOB to chair, encourage ambulation  Prophylaxis: SCDs, lovenox  Antibiotics: Ancef, Gentamycin  Wound: Local wound care, patient has three pieces of packing in place, surgical team will advance packing daily until all of it is removed. Varinder Smith DO, MS  PGY1, General Surgery  11/14/21  4:17 PM  [phone]

## 2021-11-14 NOTE — PLAN OF CARE
Problem: Falls - Risk of:  Goal: Will remain free from falls  Description: Will remain free from falls  Outcome: Met This Shift  Note: Free of falls this shift;  Fall precautions in place, chair alarm active, wheels locked, personal items and table in reach, hourly checks, offer trip to bathroom every 2 hours      Problem: Falls - Risk of:  Goal: Absence of physical injury  Description: Absence of physical injury  Outcome: Met This Shift  Note: No harm this shift     Problem: Pain:  Goal: Pain level will decrease  Description: Pain level will decrease  Outcome: Met This Shift  Note: Pain under control this shift, patient satified     Ame Hayden MSN, MA, RN

## 2021-11-14 NOTE — PROGRESS NOTES
Patient arrived from OR to PACU#13 post [address] [place] [address] of Dr. Vivek Ortiz. Patient is placed on cardiac monitor. Report is given per Dr. Susi Perry. Per report, patient was stable during procedure. On arrival, patient is arousable and denies pain.

## 2021-11-14 NOTE — OP NOTE
DATE OF PROCEDURE:  11/14/21     PREOPERATIVE DIAGNOSES:  1.  End-stage renal disease. 2. Infected PD catheter     POSTOPERATIVE DIAGNOSES:  Same as pre-op     PROCEDURES PERFORMED:  1. Removal of tunneled intraperitoneal catheter. SURGEON:  Yair Crabtree DO     ASSISTANT:  Monty Bedoya     ANESTHESIA:  Local with sedation. ESTIMATED BLOOD LOSS:  10 mL. INDICATION FOR PROCEDURE:  The patient is a 45year old who's PD catheter needed removal for infection     DESCRIPTION OF PROCEDURE:  The patient was brought to the operating suite, laid in the supine position with arms outstretched, and after sedation was administered, the patient was prepped and draped in the usual sterile manner. Local anesthetic was injected around the exit site of the catheter, and an elliptical incision was made using a #15 blade to completely excise the old cicatrix. This was carried down to the subcutaneous tissue, and Bovie electrocautery was used to completely circumferentially dissect this free. Cultures taken of purulence in tract. At that point, dissection was done to free up the superficial cuff of the peritoneal dialysis catheter as well as a deep cuff. The PD catheter tip was then sent for culture. Irrigation was performed. Closure of the wounds was done loosely using 3-0 Vicryl with packing in between. The patient tolerated the procedure well and was brought to the postanesthesia care unit in stable condition. All sponge, needle, and instrument counts were correct x2.

## 2021-11-14 NOTE — PLAN OF CARE
Problem: Falls - Risk of:  Goal: Will remain free from falls  Description: Will remain free from falls  Outcome: Met This Shift  Goal: Absence of physical injury  Description: Absence of physical injury  Outcome: Met This Shift     Problem: Pain:  Goal: Pain level will decrease  Description: Pain level will decrease  Outcome: Met This Shift  Goal: Control of acute pain  Description: Control of acute pain  Outcome: Met This Shift     Problem: Skin Integrity:  Goal: Will show no infection signs and symptoms  Description: Will show no infection signs and symptoms  Outcome: Met This Shift  Goal: Absence of new skin breakdown  Description: Absence of new skin breakdown  Outcome: Met This Shift     Problem: OXYGENATION/RESPIRATORY FUNCTION  Goal: Patient will maintain patent airway  Outcome: Met This Shift  Goal: Patient will achieve/maintain normal respiratory rate/effort  Description: Respiratory rate and effort will be within normal limits for the patient  Outcome: Met This Shift     Problem: HEMODYNAMIC STATUS  Goal: Patient has stable vital signs and fluid balance  Outcome: Met This Shift     Problem: FLUID AND ELECTROLYTE IMBALANCE  Goal: Fluid and electrolyte balance are achieved/maintained  Outcome: Met This Shift     Problem: ACTIVITY INTOLERANCE/IMPAIRED MOBILITY  Goal: Mobility/activity is maintained at optimum level for patient  Outcome: Met This Shift detailed exam

## 2021-11-14 NOTE — FLOWSHEET NOTE
PACU Transfer Note    Vitals:    11/14/21 1045   BP: 132/64   Pulse: 76   Resp: 12   Temp: 97.9 °F (36.6 °C)   SpO2: 97%       In: 500 [I.V.:500]  Out: 10     Pain assessment:    Pain Level: 5 (states pain is tolerable)    Report given to Receiving unit RN. Patient is transported back to her room.     11/14/2021 10:54 AM

## 2021-11-14 NOTE — PROGRESS NOTES
Hospitalist Progress Note      PCP: [facility]    Date of Admission: 11/9/2021    Chief Complaint: Abd pain     Hospital Course: Found to have possible ileus and PD cath infection with exit site infection. On IV antibiotics. Subjective:   Pt to OR.     Medications:  Reviewed    Infusion Medications    dextrose      sodium chloride 25 mL (11/14/21 1227)     Scheduled Medications    magnesium sulfate  2,000 mg IntraVENous Once    [Held by provider] insulin lispro (1 Unit Dial)  5 Units SubCUTAneous TID WC    [Held by provider] insulin glargine  10 Units SubCUTAneous Nightly    rifAMPin  300 mg Oral 2 times per day    ceFAZolin  2,000 mg IntraVENous Daily    docusate sodium  100 mg Oral BID    insulin lispro  0-6 Units SubCUTAneous TID WC    atorvastatin  40 mg Oral Daily    carvedilol  12.5 mg Oral BID WC    spironolactone  50 mg Oral Daily    torsemide  200 mg Oral Daily    sodium chloride flush  5-40 mL IntraVENous 2 times per day    heparin (porcine)  5,000 Units SubCUTAneous 3 times per day    polyethylene glycol  17 g Oral BID    naloxegol  12.5 mg Oral QAM    gentamicin   Topical BID     PRN Meds: glucose, dextrose, glucagon (rDNA), dextrose, sodium chloride flush, sodium chloride, ondansetron **OR** ondansetron, polyethylene glycol, acetaminophen **OR** acetaminophen, oxyCODONE, simethicone      Intake/Output Summary (Last 24 hours) at 11/14/2021 1407  Last data filed at 11/14/2021 1312  Gross per 24 hour   Intake 1380 ml   Output 210 ml   Net 1170 ml       Exam:    BP (!) 146/61   Pulse 76   Temp 97.5 °F (36.4 °C) (Oral)   Resp 14   Ht 5' (1.524 m)   Wt 181 lb (82.1 kg)   SpO2 98%   BMI 35.35 kg/m²     Deferred    Labs:   Recent Labs     11/12/21  0630 11/13/21  0824 11/14/21  0645   WBC 7.1 8.3 9.2   HGB 9.1* 8.4* 8.4*   HCT 26.7* 24.9* 24.9*    297 330     Recent Labs     11/12/21  0630 11/13/21  0824 11/14/21  0645   * 128* 130*   K 3.3* 3.4* 3.0*   CL 87* 89* 90*   CO2 22 26 26   BUN 59* 51* 54*   CREATININE 6.7* 5.4* 6.0*   CALCIUM 7.8* 7.7* 8.0*   PHOS 4.3 4.4 4.8     No results for input(s): AST, ALT, BILIDIR, BILITOT, ALKPHOS in the last 72 hours. No results for input(s): INR in the last 72 hours. No results for input(s): Alda Height in the last 72 hours. Studies:  XR CHEST PORTABLE   Final Result      Increased bibasilar airspace disease could be due to atelectasis and/or pneumonia. US ABDOMEN LIMITED   Final Result   1. Complex collection along the drainage catheter of unclear significance. This may relate to a small abscess or hematoma. CT THORACIC RECONSTRUCTION WO POST PROCESS   Final Result      No acute fracture in the thoracic or lumbar spine. CT LUMBAR RECONSTRUCTION WO POST PROCESS   Final Result      No acute fracture in the thoracic or lumbar spine. CT ABDOMEN PELVIS W IV CONTRAST Additional Contrast? None   Final Result      CHEST:      1. Parenchymal bands, likely atelectasis or scar. 2. No acute traumatic abnormality. ABDOMEN/PELVIS:      1. Nonspecific mildly dilated loop of small bowel in the left abdomen measuring 4 cm. Localized ileus versus obstruction not excluded. 2. Free air and free fluid in the setting of peritoneal dialysis. 3. Fibroid uterus      CT CHEST W CONTRAST   Final Result      CHEST:      1. Parenchymal bands, likely atelectasis or scar. 2. No acute traumatic abnormality. ABDOMEN/PELVIS:      1. Nonspecific mildly dilated loop of small bowel in the left abdomen measuring 4 cm. Localized ileus versus obstruction not excluded. 2. Free air and free fluid in the setting of peritoneal dialysis.    3. Fibroid uterus          Assessment/Plan:    Active Hospital Problems    Diagnosis Date Noted    Peritonitis associated with peritoneal dialysis [facility] 08/23/2021     Abdominal pain secondary to peritonitis associated with PD catheter  On admission body fluid revealed with 41666 nucleated cells, cx growing MSSA; no evidence of gram negative bacteria. Treated in the past for PD peritonitis but no prior cx growth  Blood cx grew bacillus species and micrococcus luteus in 8/2021  - ID consulted; changed abx to ancef  - rifapmin added by nephro  - continue topical Gentamycin at PD site  - pain control - avoid constipation  - f/u daily repeat body fluid studies  - Pt in OR for removal of PD cath 11/14, cultures taken     Fever - resolved  Likely 2/2 peritonitis. Tmax 101 and tachycardia 11/10 evening   PNA unlikely as pt doesn't have cough, or SOB, no acute changes on CT chest and pt currently on room air.   - f/u blood cx X2 - ngtd  - abx as above  - incentive spirometry     Isolated dilated small bowel loop  CT abd/pelvis showing nonspecific mildly dilated loop of small bowel c/f ileus vs obstruction. No intervention needed. Pt able to pass gas, having bowel movement. - follow up surgery recs, appreciate involvement  - having BMs     ESRD  On PD. Tolerating well. - monitor electrolytes     Essential hypertension - for the most part normotensive on current regimen, monitor for changes  BP stable 130-150's. On home nifedipine, spironolactone, torsemide and Coreg  - hold nifedipine  - continue torsemide  - continue spironolactone  - continue Coreg     DMII, with hyperglycemia likely worse due to infection, now improved  HA1C 7.3 11/2021. Pt on home lantus 20U nightly, 10U TID but she reports she was not taking for sometime. Uncontrolled glucose is likely 2/2 infection   - continued holding lantus and meal time insulin, continue sliding scale only for now until PO intake improves    DVT Prophylaxis: heparin subq  Diet: ADULT DIET; Regular; Low Potassium (Less than 3000 mg/day); Low Phosphorus (Less than 1000 mg);  Less than 60 gm  Code Status: Full Code    PT/OT Eval Status: ongoing    Dispo - Inpatient    Kelly Eubanks DO

## 2021-11-14 NOTE — ANESTHESIA PRE PROCEDURE
Department of Anesthesiology  Preprocedure Note       Name:  Jaswant Quezada   Age:  45 y.o.  :  1970                                          MRN:  [id number]         Date:  2021      Surgeon: Santana Brooks): Yair Crabtree DO    Procedure: Procedure(s):  CATHETER REMOVAL PERITONEAL DIALYSIS    Medications prior to admission:   Prior to Admission medications    Medication Sig Start Date End Date Taking? Authorizing Provider   insulin glargine (LANTUS;BASAGLAR) 100 UNIT/ML injection pen Inject 20 Units into the skin nightly 21  Yes Binnie Fothergill, MD   insulin lispro, 1 Unit Dial, 100 UNIT/ML SOPN Inject 10 Units into the skin 3 times daily (with meals) 21  Yes Binnie Fothergill, MD   spironolactone (ALDACTONE) 50 MG tablet Take 1 tablet by mouth daily 21  Yes Rodríguez Earl MD   NIFEdipine (PROCARDIA XL) 30 MG extended release tablet Take 1 tablet by mouth 2 times daily 21  Yes Judith Rush MD   carvedilol (COREG) 12.5 MG tablet Take 1 tablet by mouth 2 times daily 21  Yes Judith Rush MD   atorvastatin (LIPITOR) 40 MG tablet Take 1 tablet by mouth daily 21  Yes Judith Rush MD   aspirin 81 MG chewable tablet Take 1 tablet by mouth daily 21  Yes Judith Rush MD   pantoprazole (PROTONIX) 40 MG tablet Take 40 mg by mouth daily as needed   Yes Historical Provider, MD   butalbital-acetaminophen-caffeine (FIORICET, ESGIC) -40 MG per tablet Take 1 tablet by mouth every 4 hours as needed for Headaches 5/27/15  Yes Marcella Lovett MD   ondansetron (ZOFRAN) 4 MG tablet Take 1 tablet by mouth every 12 hours as needed for Nausea or Vomiting 21   Judith Rush MD   gabapentin (NEURONTIN) 100 MG capsule Take 3 capsules by mouth 2 times daily for 90 days.  7/13/21 10/11/21  Judith Rush MD   nitroGLYCERIN (NITROSTAT) 0.4 MG SL tablet Place 1 tablet under the tongue every 5 minutes as needed for Chest pain 21   Judith Rush MD       Current medications: Current Facility-Administered Medications   Medication Dose Route Frequency Provider Last Rate Last Admin    [Held by provider] insulin lispro (1 Unit Dial) 5 Units  5 Units SubCUTAneous TID  Kelly Eubanks DO   5 Units at 11/12/21 1804    [Held by provider] insulin glargine (LANTUS;BASAGLAR) injection pen 10 Units  10 Units SubCUTAneous Nightly Kelly Black, DO        rifAMPin (RIFADIN) capsule 300 mg  300 mg Oral 2 times per day Alli Pradhan MD   300 mg at 11/13/21 2255    ceFAZolin (ANCEF) 2,000 mg in dextrose 5 % 50 mL IVPB  2,000 mg IntraVENous Daily Raquel Sevilla MD   Stopped at 11/13/21 1039    docusate sodium (COLACE) capsule 100 mg  100 mg Oral BID Luther Elise MD   100 mg at 11/13/21 2255    insulin lispro (1 Unit Dial) 0-6 Units  0-6 Units SubCUTAneous TID  Marcial Carr MD   2 Units at 11/13/21 1753    atorvastatin (LIPITOR) tablet 40 mg  40 mg Oral Daily Jaswant Whitman MD   40 mg at 11/13/21 0952    carvedilol (COREG) tablet 12.5 mg  12.5 mg Oral BID  Kelly Eubanks DO   12.5 mg at 11/13/21 1750    spironolactone (ALDACTONE) tablet 50 mg  50 mg Oral Daily Jaswant Whitman MD   50 mg at 11/13/21 0952    torsemide (DEMADEX) tablet 200 mg  200 mg Oral Daily Jaswant Whitman MD   200 mg at 11/13/21 0952    glucose (GLUTOSE) 40 % oral gel 15 g  15 g Oral PRN Jaswant Whitman MD        dextrose 50 % IV solution  12.5 g IntraVENous PRN Jaswant Whitman MD   12.5 g at 11/11/21 1548    glucagon (rDNA) injection 1 mg  1 mg IntraMUSCular PRN Jaswant Whitman MD        dextrose 5 % solution  100 mL/hr IntraVENous PRN Jaswant Whitman MD        sodium chloride flush 0.9 % injection 5-40 mL  5-40 mL IntraVENous 2 times per day Jaswant Whitman MD   10 mL at 11/13/21 2258    sodium chloride flush 0.9 % injection 5-40 mL  5-40 mL IntraVENous PRN Jaswant Whitman MD        0.9 % sodium chloride infusion  25 mL IntraVENous PRN Jose A FREEMAN associated with peritoneal dialysis ([facility] [address] T85.71XA    Free intraperitoneal air K66.8    Positive blood culture R78.81       Past Medical History:        Diagnosis Date    CHF [facility]congestive [facility] failure) ([facility]     Diabetes mellitus ([facility]     Hypertension        Past Surgical History:        Procedure Laterality Date    DIALYSIS CATHETER INSERTION N/A 7/9/2021    LAPAROSCOPIC PERITONEAL DIALYSIS CATHETER INSERTION, OMENTOPLEXY performed by Carlota Silva DO at [address] History:    Social History     Tobacco Use    Smoking status: Never Smoker    Smokeless tobacco: Never Used   Substance Use Topics    Alcohol use: Not Currently                                Counseling given: Not Answered      Vital Signs (Current):   Vitals:    11/13/21 2047 11/13/21 2336 11/14/21 0347 11/14/21 0600   BP: 105/68 117/81 100/62    Pulse: 77 80 69    Resp: 18 18 16    Temp: 98.3 °F (36.8 °C) 99 °F (37.2 °C) 97.7 °F (36.5 °C)    TempSrc: Oral Oral Oral    SpO2: 99% 95% 95%    Weight:    181 lb (82.1 kg)   Height:                                                  BP Readings from Last 3 Encounters:   11/14/21 100/62   08/26/21 139/84   07/13/21 (!) 177/89       NPO Status:                                                                                 BMI:   Wt Readings from Last 3 Encounters:   11/14/21 181 lb (82.1 kg)   08/26/21 189 lb 6 oz (85.9 kg)   07/13/21 188 lb 7.9 oz (85.5 kg)     Body mass index is 35.35 kg/m².     CBC:   Lab Results   Component Value Date    WBC 9.2 11/14/2021    RBC 2.72 11/14/2021    HGB 8.4 11/14/2021    HCT 24.9 11/14/2021    MCV 91.5 11/14/2021    RDW 14.1 11/14/2021     11/14/2021       CMP:   Lab Results   Component Value Date     11/14/2021    K 3.0 11/14/2021    K 6.3 11/09/2021    CL 90 11/14/2021    CO2 26 11/14/2021    BUN 54 11/14/2021    CREATININE 6.0 11/14/2021    GFRAA 9 11/14/2021    AGRATIO 0.7 11/09/2021    LABGLOM 7 11/14/2021    GLUCOSE 101 11/14/2021    PROT 7.9 11/09/2021    CALCIUM 8.0 11/14/2021    BILITOT 0.6 11/09/2021    ALKPHOS 125 11/09/2021    AST 42 11/09/2021    ALT 43 11/09/2021       POC Tests:   Recent Labs     11/14/21  0809   POCGLU 122*       Coags:   Lab Results   Component Value Date    PROTIME 11.6 08/22/2021    INR 1.03 08/22/2021       HCG (If Applicable): No results found for: PREGTESTUR, PREGSERUM, HCG, HCGQUANT     ABGs: No results found for: PHART, PO2ART, IGB6DZW, AER0CVT, BEART, R4HAWMGF     Type & Screen (If Applicable):  No results found for: LABABO, LABRH    Drug/Infectious Status (If Applicable):  No results found for: HIV, HEPCAB    COVID-19 Screening (If Applicable): No results found for: COVID19        Anesthesia Evaluation  Patient summary reviewed and Nursing notes reviewed  Airway: Mallampati: II  TM distance: >3 FB   Neck ROM: full  Mouth opening: > = 3 FB Dental: normal exam         Pulmonary:normal exam  breath sounds clear to auscultation  (+) sleep apnea: on CPAP,                             Cardiovascular:Negative CV ROS  Exercise tolerance: good (>4 METS),   (+) hypertension: mild, CHF: no interval change,       ECG reviewed  Rhythm: regular  Rate: normal  Echocardiogram reviewed         Beta Blocker:  Dose within 24 Hrs         Neuro/Psych:   (+) CVA: no interval change,             GI/Hepatic/Renal: Neg GI/Hepatic/Renal ROS            Endo/Other:    (+) DiabetesType II DM, no interval change, , .                 Abdominal:       Abdomen: soft. Vascular: negative vascular ROS. Other Findings:             Anesthesia Plan      MAC     ASA 3 - emergent       Induction: intravenous. MIPS: Postoperative opioids intended and Prophylactic antiemetics administered. Anesthetic plan and risks discussed with patient. Use of blood products discussed with patient whom consented to blood products. Plan discussed with attending.     Attending anesthesiologist reviewed and agrees with Preprocedure content              Larry Draft, DO   11/14/2021

## 2021-11-14 NOTE — BRIEF OP NOTE
Brief Postoperative Note      Patient: Baltazar Frost  YOB: 1970  MRN: [id number]    Date of Procedure: 11/14/2021    Pre-Op Diagnosis: infected catheter    Post-Op Diagnosis: Same       Procedure(s):  CATHETER REMOVAL PERITONEAL DIALYSIS    Surgeon(s): Xochilt Brooks DO    Assistant:  Resident: Lyssa Mcmillan DO    Anesthesia: Monitor Anesthesia Care    Estimated Blood Loss (mL): Minimal    Complications: None    Specimens:   ID Type Source Tests Collected by Time Destination   1 : PD CATHETER Catheter Tip Catheter Tip CULTURE, TIP Xochilt Brooks DO 11/14/2021 0952    2 : INFECTED TRACT Body Fluid Fluid CULTURE, ANAEROBIC, CULTURE, BODY FLUID, CULTURE WITH SMEAR, ACID FAST Merlyni Beltran [address]  11/14/2021 1002        Implants:  * No implants in log *      Drains: * No LDAs found *    Findings: Grossly purulent drainage from PD catheter site. PD catheter removed in its entirety and send for culture. Surgical wound packed with three separate pieces of 1/4 inch plain packing and dressed with 4x8 guaze and Medipore tape.     Electronically signed by Lyssa Mcmillan DO on 11/14/2021 at 10:25 AM

## 2021-11-14 NOTE — PROGRESS NOTES
Initial assessment completed, VSS, afebrile, pt currently denies having pain. Pt a/o x4, PD catheter in place with adhesive bandage that has some serosanguinous drainage noted. Pt does have some abdominal tenderness. poc discussed with pt, questions/concerns addressed at this time, fall px in place, bed alarm engaged.

## 2021-11-14 NOTE — PROGRESS NOTES
Nephrology Progress Note      Admit Date: 11/9/2021  Diet: Diet NPO    Chief Complaint: Abdominal Pain    Interval history:   Patient still does endorse some abdominal pain. Peritoneal fluid grew MSSA. Purulent drainage from exit site     Medications:     Scheduled Meds:   magnesium sulfate  2,000 mg IntraVENous Once    [Held by provider] insulin lispro (1 Unit Dial)  5 Units SubCUTAneous TID WC    [Held by provider] insulin glargine  10 Units SubCUTAneous Nightly    rifAMPin  300 mg Oral 2 times per day    ceFAZolin  2,000 mg IntraVENous Daily    docusate sodium  100 mg Oral BID    insulin lispro  0-6 Units SubCUTAneous TID WC    atorvastatin  40 mg Oral Daily    carvedilol  12.5 mg Oral BID WC    spironolactone  50 mg Oral Daily    torsemide  200 mg Oral Daily    sodium chloride flush  5-40 mL IntraVENous 2 times per day    heparin (porcine)  5,000 Units SubCUTAneous 3 times per day    polyethylene glycol  17 g Oral BID    naloxegol  12.5 mg Oral QAM    gentamicin   Topical BID     Continuous Infusions:   dextrose      sodium chloride 25 mL (11/12/21 1450)     PRN Meds:fentanNYL, ondansetron, lidocaine PF, glucose, dextrose, glucagon (rDNA), dextrose, sodium chloride flush, sodium chloride, ondansetron **OR** ondansetron, polyethylene glycol, acetaminophen **OR** acetaminophen, oxyCODONE, simethicone    Objective:   Vitals:   T-max:  Patient Vitals for the past 8 hrs:   BP Temp Temp src Pulse Resp SpO2 Weight   11/14/21 0918 130/69 98.1 °F (36.7 °C) Oral 73 14 97 % --   11/14/21 0600 -- -- -- -- -- -- 181 lb (82.1 kg)   11/14/21 0347 100/62 97.7 °F (36.5 °C) Oral 69 16 95 % --       Intake/Output Summary (Last 24 hours) at 11/14/2021 1044  Last data filed at 11/14/2021 1030  Gross per 24 hour   Intake 1140 ml   Output 10 ml   Net 1130 ml         Physical Exam  Vitals reviewed. Constitutional:       General: She is not in acute distress.      Appearance: She is well-developed and results for input(s): BNP in the last 72 hours. Lipids: No results for input(s): CHOL, HDL in the last 72 hours. Invalid input(s): LDLCALCU, TRIGLYCERIDE  ABGs:  No results for input(s): PHART, WNC1QMX, PO2ART, UXF6JVI, BEART, THGBART, Z3CIVAPY, RFQ0JTC in the last 72 hours. INR: No results for input(s): INR in the last 72 hours. Lactate: No results for input(s): LACTATE in the last 72 hours. Cultures:  -----------------------------------------------------------------  RAD:   XR CHEST PORTABLE   Final Result      Increased bibasilar airspace disease could be due to atelectasis and/or pneumonia. US ABDOMEN LIMITED   Final Result   1. Complex collection along the drainage catheter of unclear significance. This may relate to a small abscess or hematoma. CT THORACIC RECONSTRUCTION WO POST PROCESS   Final Result      No acute fracture in the thoracic or lumbar spine. CT LUMBAR RECONSTRUCTION WO POST PROCESS   Final Result      No acute fracture in the thoracic or lumbar spine. CT ABDOMEN PELVIS W IV CONTRAST Additional Contrast? None   Final Result      CHEST:      1. Parenchymal bands, likely atelectasis or scar. 2. No acute traumatic abnormality. ABDOMEN/PELVIS:      1. Nonspecific mildly dilated loop of small bowel in the left abdomen measuring 4 cm. Localized ileus versus obstruction not excluded. 2. Free air and free fluid in the setting of peritoneal dialysis. 3. Fibroid uterus      CT CHEST W CONTRAST   Final Result      CHEST:      1. Parenchymal bands, likely atelectasis or scar. 2. No acute traumatic abnormality. ABDOMEN/PELVIS:      1. Nonspecific mildly dilated loop of small bowel in the left abdomen measuring 4 cm. Localized ileus versus obstruction not excluded. 2. Free air and free fluid in the setting of peritoneal dialysis.    3. Fibroid uterus          Assessment/Plan:     Abdominal pain, 2/2 PD peritonitis  Continue abx     ESRD on home peritoneal dialysis. Had PD catheter removed today   D/w Dr. Hector Stevens.    Had purulent discharge in the PD catheter   Tip sent for culture       Will transition to HD temporarily   IR to place tunneled catheter tomorrow      Hypokalemia- replace potassium     -----------------------------  Geraldine Arora MD  11/14/2021  10:44 AM

## 2021-11-15 ENCOUNTER — APPOINTMENT (OUTPATIENT)
Dept: INTERVENTIONAL RADIOLOGY/VASCULAR | Age: 51
DRG: 907 | End: 2021-11-15
Payer: COMMERCIAL

## 2021-11-15 LAB
ALBUMIN SERPL-MCNC: 2.3 G/DL (ref 3.4–5)
ANION GAP SERPL CALCULATED.3IONS-SCNC: 14 MMOL/L (ref 3–16)
ANION GAP SERPL CALCULATED.3IONS-SCNC: 15 MMOL/L (ref 3–16)
BASOPHILS ABSOLUTE: 0 K/UL (ref 0–0.2)
BASOPHILS RELATIVE PERCENT: 0.4 %
BUN BLDV-MCNC: 54 MG/DL (ref 7–20)
BUN BLDV-MCNC: 54 MG/DL (ref 7–20)
CALCIUM SERPL-MCNC: 8 MG/DL (ref 8.3–10.6)
CALCIUM SERPL-MCNC: 8.3 MG/DL (ref 8.3–10.6)
CHLORIDE BLD-SCNC: 94 MMOL/L (ref 99–110)
CHLORIDE BLD-SCNC: 96 MMOL/L (ref 99–110)
CO2: 23 MMOL/L (ref 21–32)
CO2: 26 MMOL/L (ref 21–32)
CREAT SERPL-MCNC: 5.3 MG/DL (ref 0.6–1.1)
CREAT SERPL-MCNC: 5.4 MG/DL (ref 0.6–1.1)
EOSINOPHILS ABSOLUTE: 0.2 K/UL (ref 0–0.6)
EOSINOPHILS RELATIVE PERCENT: 2.5 %
GFR AFRICAN AMERICAN: 10
GFR AFRICAN AMERICAN: 10
GFR NON-AFRICAN AMERICAN: 8
GFR NON-AFRICAN AMERICAN: 8
GLUCOSE BLD-MCNC: 105 MG/DL (ref 70–99)
GLUCOSE BLD-MCNC: 111 MG/DL (ref 70–99)
GLUCOSE BLD-MCNC: 114 MG/DL (ref 70–99)
GLUCOSE BLD-MCNC: 89 MG/DL (ref 70–99)
HCT VFR BLD CALC: 28.1 % (ref 36–48)
HEMOGLOBIN: 9.3 G/DL (ref 12–16)
INR BLD: 1.11 (ref 0.88–1.12)
LYMPHOCYTES ABSOLUTE: 1.2 K/UL (ref 1–5.1)
LYMPHOCYTES RELATIVE PERCENT: 12.4 %
MAGNESIUM: 2.2 MG/DL (ref 1.8–2.4)
MCH RBC QN AUTO: 30.6 PG (ref 26–34)
MCHC RBC AUTO-ENTMCNC: 33.3 G/DL (ref 31–36)
MCV RBC AUTO: 91.9 FL (ref 80–100)
MONOCYTES ABSOLUTE: 1 K/UL (ref 0–1.3)
MONOCYTES RELATIVE PERCENT: 10.9 %
NEUTROPHILS ABSOLUTE: 6.8 K/UL (ref 1.7–7.7)
NEUTROPHILS RELATIVE PERCENT: 73.8 %
PDW BLD-RTO: 14.3 % (ref 12.4–15.4)
PERFORMED ON: ABNORMAL
PERFORMED ON: NORMAL
PHOSPHORUS: 4.4 MG/DL (ref 2.5–4.9)
PLATELET # BLD: 345 K/UL (ref 135–450)
PMV BLD AUTO: 7.2 FL (ref 5–10.5)
POTASSIUM SERPL-SCNC: 3.5 MMOL/L (ref 3.5–5.1)
POTASSIUM SERPL-SCNC: 3.5 MMOL/L (ref 3.5–5.1)
PROTHROMBIN TIME: 12.6 SEC (ref 9.9–12.7)
RBC # BLD: 3.06 M/UL (ref 4–5.2)
SODIUM BLD-SCNC: 134 MMOL/L (ref 136–145)
SODIUM BLD-SCNC: 134 MMOL/L (ref 136–145)
WBC # BLD: 9.2 K/UL (ref 4–11)

## 2021-11-15 PROCEDURE — 80069 RENAL FUNCTION PANEL: CPT

## 2021-11-15 PROCEDURE — 02HV33Z INSERTION OF INFUSION DEVICE INTO SUPERIOR VENA CAVA, PERCUTANEOUS APPROACH: ICD-10-PCS | Performed by: RADIOLOGY

## 2021-11-15 PROCEDURE — 99232 SBSQ HOSP IP/OBS MODERATE 35: CPT | Performed by: INTERNAL MEDICINE

## 2021-11-15 PROCEDURE — 85025 COMPLETE CBC W/AUTO DIFF WBC: CPT

## 2021-11-15 PROCEDURE — 36415 COLL VENOUS BLD VENIPUNCTURE: CPT

## 2021-11-15 PROCEDURE — 5A1D70Z PERFORMANCE OF URINARY FILTRATION, INTERMITTENT, LESS THAN 6 HOURS PER DAY: ICD-10-PCS | Performed by: INTERNAL MEDICINE

## 2021-11-15 PROCEDURE — 6360000002 HC RX W HCPCS

## 2021-11-15 PROCEDURE — 6370000000 HC RX 637 (ALT 250 FOR IP): Performed by: STUDENT IN AN ORGANIZED HEALTH CARE EDUCATION/TRAINING PROGRAM

## 2021-11-15 PROCEDURE — C1881 DIALYSIS ACCESS SYSTEM: HCPCS

## 2021-11-15 PROCEDURE — 83735 ASSAY OF MAGNESIUM: CPT

## 2021-11-15 PROCEDURE — 99152 MOD SED SAME PHYS/QHP 5/>YRS: CPT

## 2021-11-15 PROCEDURE — C1769 GUIDE WIRE: HCPCS

## 2021-11-15 PROCEDURE — 2580000003 HC RX 258: Performed by: STUDENT IN AN ORGANIZED HEALTH CARE EDUCATION/TRAINING PROGRAM

## 2021-11-15 PROCEDURE — 6360000002 HC RX W HCPCS: Performed by: STUDENT IN AN ORGANIZED HEALTH CARE EDUCATION/TRAINING PROGRAM

## 2021-11-15 PROCEDURE — 6360000002 HC RX W HCPCS: Performed by: INTERNAL MEDICINE

## 2021-11-15 PROCEDURE — 36558 INSERT TUNNELED CV CATH: CPT

## 2021-11-15 PROCEDURE — 6370000000 HC RX 637 (ALT 250 FOR IP): Performed by: INTERNAL MEDICINE

## 2021-11-15 PROCEDURE — 77001 FLUOROGUIDE FOR VEIN DEVICE: CPT

## 2021-11-15 PROCEDURE — 1200000000 HC SEMI PRIVATE

## 2021-11-15 PROCEDURE — 85610 PROTHROMBIN TIME: CPT

## 2021-11-15 PROCEDURE — 90935 HEMODIALYSIS ONE EVALUATION: CPT | Performed by: INTERNAL MEDICINE

## 2021-11-15 PROCEDURE — 0JH63XZ INSERTION OF TUNNELED VASCULAR ACCESS DEVICE INTO CHEST SUBCUTANEOUS TISSUE AND FASCIA, PERCUTANEOUS APPROACH: ICD-10-PCS | Performed by: RADIOLOGY

## 2021-11-15 PROCEDURE — C1894 INTRO/SHEATH, NON-LASER: HCPCS

## 2021-11-15 PROCEDURE — 90935 HEMODIALYSIS ONE EVALUATION: CPT

## 2021-11-15 PROCEDURE — 2580000003 HC RX 258: Performed by: INTERNAL MEDICINE

## 2021-11-15 PROCEDURE — 2500000003 HC RX 250 WO HCPCS

## 2021-11-15 RX ORDER — DIPHENHYDRAMINE HCL 25 MG
25 CAPSULE ORAL EVERY 6 HOURS PRN
COMMUNITY
End: 2022-06-24 | Stop reason: CLARIF

## 2021-11-15 RX ORDER — POTASSIUM CHLORIDE 20 MEQ/1
40 TABLET, EXTENDED RELEASE ORAL ONCE
Status: DISCONTINUED | OUTPATIENT
Start: 2021-11-15 | End: 2021-11-16 | Stop reason: HOSPADM

## 2021-11-15 RX ORDER — HEPARIN SODIUM 1000 [USP'U]/ML
1000 INJECTION, SOLUTION INTRAVENOUS; SUBCUTANEOUS PRN
Status: DISCONTINUED | OUTPATIENT
Start: 2021-11-15 | End: 2021-11-16 | Stop reason: HOSPADM

## 2021-11-15 RX ORDER — DIPHENHYDRAMINE HCL 25 MG
25 TABLET ORAL EVERY 6 HOURS PRN
Status: DISCONTINUED | OUTPATIENT
Start: 2021-11-15 | End: 2021-11-16 | Stop reason: HOSPADM

## 2021-11-15 RX ORDER — BUTALBITAL, ACETAMINOPHEN AND CAFFEINE 50; 325; 40 MG/1; MG/1; MG/1
1 TABLET ORAL EVERY 4 HOURS PRN
Status: DISCONTINUED | OUTPATIENT
Start: 2021-11-15 | End: 2021-11-16 | Stop reason: HOSPADM

## 2021-11-15 RX ADMIN — SODIUM CHLORIDE, PRESERVATIVE FREE 10 ML: 5 INJECTION INTRAVENOUS at 09:40

## 2021-11-15 RX ADMIN — CEFAZOLIN 2000 MG: 10 INJECTION, POWDER, FOR SOLUTION INTRAVENOUS at 09:40

## 2021-11-15 RX ADMIN — CARVEDILOL 12.5 MG: 12.5 TABLET, FILM COATED ORAL at 19:03

## 2021-11-15 RX ADMIN — ONDANSETRON 4 MG: 2 INJECTION INTRAMUSCULAR; INTRAVENOUS at 10:26

## 2021-11-15 RX ADMIN — HEPARIN SODIUM 5000 UNITS: 5000 INJECTION INTRAVENOUS; SUBCUTANEOUS at 20:00

## 2021-11-15 RX ADMIN — BUTALBITAL, ACETAMINOPHEN, AND CAFFEINE 1 TABLET: 50; 325; 40 TABLET ORAL at 23:20

## 2021-11-15 RX ADMIN — ATORVASTATIN CALCIUM 40 MG: 40 TABLET, FILM COATED ORAL at 20:00

## 2021-11-15 RX ADMIN — CEFAZOLIN 2000 MG: 10 INJECTION, POWDER, FOR SOLUTION INTRAVENOUS at 17:40

## 2021-11-15 RX ADMIN — SODIUM CHLORIDE, PRESERVATIVE FREE 10 ML: 5 INJECTION INTRAVENOUS at 20:00

## 2021-11-15 RX ADMIN — DIPHENHYDRAMINE HYDROCHLORIDE 25 MG: 25 TABLET ORAL at 23:20

## 2021-11-15 RX ADMIN — OXYCODONE 5 MG: 5 TABLET ORAL at 17:14

## 2021-11-15 RX ADMIN — SODIUM CHLORIDE 25 ML: 9 INJECTION, SOLUTION INTRAVENOUS at 17:40

## 2021-11-15 RX ADMIN — HEPARIN SODIUM 5000 UNITS: 5000 INJECTION INTRAVENOUS; SUBCUTANEOUS at 05:45

## 2021-11-15 ASSESSMENT — PAIN DESCRIPTION - DESCRIPTORS
DESCRIPTORS: ACHING

## 2021-11-15 ASSESSMENT — PAIN DESCRIPTION - FREQUENCY
FREQUENCY: INTERMITTENT
FREQUENCY: CONTINUOUS
FREQUENCY: INTERMITTENT

## 2021-11-15 ASSESSMENT — PAIN DESCRIPTION - LOCATION
LOCATION: HEAD
LOCATION: HEAD
LOCATION: ABDOMEN

## 2021-11-15 ASSESSMENT — PAIN DESCRIPTION - PAIN TYPE
TYPE: ACUTE PAIN

## 2021-11-15 ASSESSMENT — PAIN SCALES - GENERAL
PAINLEVEL_OUTOF10: 7
PAINLEVEL_OUTOF10: 6
PAINLEVEL_OUTOF10: 4
PAINLEVEL_OUTOF10: 7

## 2021-11-15 ASSESSMENT — PAIN DESCRIPTION - PROGRESSION
CLINICAL_PROGRESSION: NOT CHANGED
CLINICAL_PROGRESSION: NOT CHANGED

## 2021-11-15 ASSESSMENT — PAIN DESCRIPTION - ORIENTATION
ORIENTATION: MID

## 2021-11-15 ASSESSMENT — PAIN DESCRIPTION - ONSET
ONSET: ON-GOING
ONSET: ON-GOING

## 2021-11-15 NOTE — H&P
Patient:  Jenna Simmonds   :   1970      Relevant clinical history, particularly as it involves the pending procedure, was reviewed and discussed. The procedure including risks, benefits, and alternatives was discussed at length with the patient (or designated family member) and all questions were answered. Informed consent to proceed with the procedure was given. Vital signs were monitored and documented by the Radiology nurse. Targeted physical examination  Heart : regular rate and rhythm  Lungs : clear, breathing easily  Condition : stable    [facility] nurses notes reviewed and agreed. Past Medical History:        Diagnosis Date    [facility]F [facility]congestive [facility] failure) ([facility] [address]     Diabetes mellitus ([facility] [address]     Hypertension        Past Surgical History:           Procedure Laterality Date    DIALYSIS CATHETER INSERTION N/A 2021    LAPAROSCOPIC PERITONEAL DIALYSIS CATHETER INSERTION, OMENTOPLEXY performed by Padma Palumbo DO at [address] N/A 2021    CATHETER REMOVAL PERITONEAL DIALYSIS performed by Padma Palumbo DO at [facility] OR       Allergies:  Patient has no known allergies. Medications:   Home Meds  No current facility-administered medications on file prior to encounter.      Current Outpatient Medications on File Prior to Encounter   Medication Sig Dispense Refill    insulin glargine (LANTUS;BASAGLAR) 100 UNIT/ML injection pen Inject 20 Units into the skin nightly 5 pen 3    insulin lispro, 1 Unit Dial, 100 UNIT/ML SOPN Inject 10 Units into the skin 3 times daily (with meals) 5 pen 1    spironolactone (ALDACTONE) 50 MG tablet Take 1 tablet by mouth daily 30 tablet 3    NIFEdipine (PROCARDIA XL) 30 MG extended release tablet Take 1 tablet by mouth 2 times daily 30 tablet 3    carvedilol (COREG) 12.5 MG tablet Take 1 tablet by mouth 2 times daily 60 tablet 3    atorvastatin (LIPITOR) 40 MG tablet Take 1 tablet by mouth daily 30 tablet 3    aspirin 81 MG chewable tablet Take 1 tablet by mouth daily 30 tablet 3    pantoprazole (PROTONIX) 40 MG tablet Take 40 mg by mouth daily as needed      butalbital-acetaminophen-caffeine (FIORICET, ESGIC) -40 MG per tablet Take 1 tablet by mouth every 4 hours as needed for Headaches 30 tablet 0    ondansetron (ZOFRAN) 4 MG tablet Take 1 tablet by mouth every 12 hours as needed for Nausea or Vomiting 14 tablet 0    gabapentin (NEURONTIN) 100 MG capsule Take 3 capsules by mouth 2 times daily for 90 days.  180 capsule 2    nitroGLYCERIN (NITROSTAT) 0.4 MG SL tablet Place 1 tablet under the tongue every 5 minutes as needed for Chest pain 5 tablet 1       Current Meds  potassium chloride (KLOR-CON M) extended release tablet 40 mEq, Once  [Held by provider] insulin glargine (LANTUS;BASAGLAR) injection pen 10 Units, Nightly  rifAMPin (RIFADIN) capsule 300 mg, 2 times per day  ceFAZolin (ANCEF) 2,000 mg in dextrose 5 % 50 mL IVPB, Daily  docusate sodium (COLACE) capsule 100 mg, BID  insulin lispro (1 Unit Dial) 0-6 Units, TID WC  atorvastatin (LIPITOR) tablet 40 mg, Daily  carvedilol (COREG) tablet 12.5 mg, BID WC  spironolactone (ALDACTONE) tablet 50 mg, Daily  torsemide (DEMADEX) tablet 200 mg, Daily  glucose (GLUTOSE) 40 % oral gel 15 g, PRN  dextrose 50 % IV solution, PRN  glucagon (rDNA) injection 1 mg, PRN  dextrose 5 % solution, PRN  sodium chloride flush 0.9 % injection 5-40 mL, 2 times per day  sodium chloride flush 0.9 % injection 5-40 mL, PRN  0.9 % sodium chloride infusion, PRN  heparin (porcine) injection 5,000 Units, 3 times per day  ondansetron (ZOFRAN-ODT) disintegrating tablet 4 mg, Q8H PRN   Or  ondansetron (ZOFRAN) injection 4 mg, Q6H PRN  polyethylene glycol (GLYCOLAX) packet 17 g, Daily PRN  acetaminophen (TYLENOL) tablet 650 mg, Q6H PRN   Or  acetaminophen (TYLENOL) suppository 650 mg, Q6H PRN  oxyCODONE (ROXICODONE) immediate release tablet 5 mg, Q4H PRN  polyethylene glycol (GLYCOLAX) packet 17 g, BID  naloxegol (MOVANTIK) tablet 12.5 mg, QAM  gentamicin (GARAMYCIN) 0.1 % cream, BID  simethicone (MYLICON) chewable tablet 80 mg, Q6H PRN          ASA 2 - Patient with mild systemic disease with no functional limitations    II (soft palate, uvula, fauces visible)    Activity:  2 - Able to move 4 extremities voluntarily on command  Respiration:  2 - Able to breathe deeply and cough freely  Circulation:  2 - BP+/- 20mmHg of normal  Consciousness:  2 - Fully awake  Oxygen Saturation (color):  2 - Able to maintain oxygen saturation >92% on room air    Sedation : Moderate sedation planned    HPI / Treatment plan : Tunneled dialysis catheter placement

## 2021-11-15 NOTE — CARE COORDINATION
CM following. Pt from home with family, on dialysis with [facility] [phone]. No current home therapy needed per PT & OT notes; liaison, Placido Head was following for possible need. CM will continue to follow for DC needs and recs.         Electronically signed by JULIAN Blum, LSW on 11/15/2021 at 3:52 PM

## 2021-11-15 NOTE — PROGRESS NOTES
General appearance: No apparent distress, appears stated age and cooperative. HEENT: Pupils equal, round, and reactive to light. Conjunctivae/corneas clear. Neck: Supple, with full range of motion. No jugular venous distention. Trachea midline. Respiratory:  Normal respiratory effort. Clear to auscultation, bilaterally without Rales/Wheezes/Rhonchi. Cardiovascular: Regular rate and rhythm with normal S1/S2 without murmurs, rubs or gallops. Abdomen: Soft, mild tenderness left mid abdomen, mild distension, dressings clean and dry on previous PD site, normal bowel sounds. Musculoskelatal: No clubbing, cyanosis or edema bilaterally. Skin: Skin color, texture, turgor normal.  No rashes or lesions. Neurologic:  Cranial nerves: II-XII intact, grossly non-focal.  Psychiatric: Alert and oriented, thought content appropriate, normal insight    Labs:   Recent Labs     11/13/21  0824 11/14/21  0645 11/15/21  0640   WBC 8.3 9.2 9.2   HGB 8.4* 8.4* 9.3*   HCT 24.9* 24.9* 28.1*    330 345     Recent Labs     11/13/21  0824 11/14/21  0645 11/15/21  0639   * 130* 134*   K 3.4* 3.0* 3.5   CL 89* 90* 96*   CO2 26 26 23   BUN 51* 54* 54*   CREATININE 5.4* 6.0* 5.3*   CALCIUM 7.7* 8.0* 8.3   PHOS 4.4 4.8 4.4     No results for input(s): AST, ALT, BILIDIR, BILITOT, ALKPHOS in the last 72 hours. No results for input(s): INR in the last 72 hours. No results for input(s): [place] Horn in the last 72 hours. Studies:  XR CHEST PORTABLE   Final Result      Increased bibasilar airspace disease could be due to atelectasis and/or pneumonia. US ABDOMEN LIMITED   Final Result   1. Complex collection along the drainage catheter of unclear significance. This may relate to a small abscess or hematoma. CT THORACIC RECONSTRUCTION WO POST PROCESS   Final Result      No acute fracture in the thoracic or lumbar spine.       CT LUMBAR RECONSTRUCTION WO POST PROCESS   Final Result      No acute fracture in the thoracic or lumbar spine. CT ABDOMEN PELVIS W IV CONTRAST Additional Contrast? None   Final Result      CHEST:      1. Parenchymal bands, likely atelectasis or scar. 2. No acute traumatic abnormality. ABDOMEN/PELVIS:      1. Nonspecific mildly dilated loop of small bowel in the left abdomen measuring 4 cm. Localized ileus versus obstruction not excluded. 2. Free air and free fluid in the setting of peritoneal dialysis. 3. Fibroid uterus      CT CHEST W CONTRAST   Final Result      CHEST:      1. Parenchymal bands, likely atelectasis or scar. 2. No acute traumatic abnormality. ABDOMEN/PELVIS:      1. Nonspecific mildly dilated loop of small bowel in the left abdomen measuring 4 cm. Localized ileus versus obstruction not excluded. 2. Free air and free fluid in the setting of peritoneal dialysis. 3. Fibroid uterus      IR TUNNELED CVC PLACE WO SQ PORT/PUMP > 5 YEARS    (Results Pending)       Assessment/Plan:    Active Hospital Problems    Diagnosis Date Noted    Peritonitis associated with peritoneal dialysis [facility]) Matthias Barroso 08/23/2021     Abdominal pain secondary to peritonitis associated with PD catheter  S/p PD catheter removal.   On admission body fluid revealed with 12784 nucleated cells, cx growing MSSA; no evidence of gram negative bacteria. Treated in the past for PD peritonitis but no prior cx growth  Blood cx grew bacillus species and micrococcus luteus in 8/2021  - ID consulted; changed abx to ancef  - rifapmin added by nephro  - continue topical Gentamycin at PD site  - need to plan abx on discharge now that pt doesn't have PD catheter anymore  - pain control - avoid constipation     Fever - resolved  Likely 2/2 peritonitis. Tmax 101 and tachycardia 11/10 evening   - f/u blood cx X2 - ngtd  - abx as above  - incentive spirometry     Isolated dilated small bowel loop  CT abd/pelvis showing nonspecific mildly dilated loop of small bowel c/f ileus vs obstruction.   No intervention needed. Pt able to pass gas, having bowel movement. - follow up surgery recs, appreciate involvement     ESRD  Was on PD removed 2/2 infection. Plan for HD catheter placement today. - monitor electrolytes  - follow up nephro recs    Essential hypertension - for the most part normotensive on current regimen, monitor for changes  BP stable 130-150's. On home nifedipine, spironolactone, torsemide and Coreg  - hold nifedipine  - continue torsemide  - continue spironolactone  - continue Coreg     DMII, with hyperglycemia likely worse due to infection, now improved  HA1C 7.3 11/2021. Pt on home lantus 20U nightly, 10U TID but she reports she was not taking for sometime. Uncontrolled glucose is likely 2/2 infection   - continued holding lantus and meal time insulin, continue low sliding scale only for now until PO intake improves    DVT Prophylaxis: heparin subq  Diet: Diet NPO  Code Status: Full Code  PT/OT Eval Status: 20,21/24  Dispo - Inpatient.  Awaiting HD placement, improvement in nausea/vomiting    This patient has been staffed and discussed with Dr. Andria Tovar MD, PGY-3  Internal Medicine

## 2021-11-15 NOTE — PROGRESS NOTES
Surgery Daily Progress Note      CC: infected PD cath    SUBJECTIVE:  No issues. Pain is well controlled. AF HDS.    ROS:   A 14 point review of systems was conducted, significant findings as noted above. All other systems negative. OBJECTIVE:    PHYSICAL EXAM:  Vitals:    11/14/21 1741 11/14/21 2038 11/14/21 2336 11/15/21 0417   BP: (!) 129/55 122/75 124/76 127/67   Pulse: 75 72 72 77   Resp:  16 16 16   Temp:  97.5 °F (36.4 °C) 98.6 °F (37 °C) 98.3 °F (36.8 °C)   TempSrc:  Axillary Oral Oral   SpO2:   96%    Weight:       Height:           General appearance: alert, no acute distress, grooming appropriate  Eyes: No scleral icterus, EOM grossly intact  Neck: trachea midline, no JVD, no lymphadenopathy, neck supple  Chest/Lungs: Normal effort with no accessory muscle use on RA  Cardiovascular: RRR, extremities well perfused  Abdomen: Obese, soft, non-distended, appropriately tender, prior PD cath site with packing in place, serosang drainage  Skin: warm and dry, no rashes  Extremities: no edema, no cyanosis  Neuro: A&Ox3, no focal deficits, sensation intact      ASSESSMENT & PLAN:   Pop Green is a 45 y.o. female with infected PD catheter s/p PD cath removal on 11/14.  POD1    - packing advanced this morning  - will continue to advance until completely removed  - continue abx per primary team  - ok to d/c from a surgical standpoint    Aracely Fierro MD, PGY-1  11/15/21 6:29 AM  Pager: [phone]

## 2021-11-15 NOTE — PROGRESS NOTES
ID Follow-up NOTE    CC:   PD related peritonitis, S aureus infection  Antibiotics Ancef    Admit Date: 2021  Hospital Day: 7    Subjective:     S/p PD catheter removal   S/p HD line placement today 11/15    Patient c/o mild abd - surg site pain      Objective:     Patient Vitals for the past 8 hrs:   BP Temp Temp src Pulse Resp SpO2   11/15/21 0833 (!) 143/80 98.1 °F (36.7 °C) Oral 78 16 98 %     I/O last 3 completed shifts: In: 1199.4 [P.O.:480; I.V.:719.4]  Out: 510 [Urine:500; Blood:10]  No intake/output data recorded. EXAM:  GENERAL: No apparent distress. RA  HEENT: Membranes moist, no oral lesion  NECK:  Supple, no lymphadenopathy  LUNGS: Clear b/l, no rales, no dullness  CARDIAC: RRR, no murmur appreciated  ABD:  + BS, soft - L side tender, dressing over prior PD catheter site   EXT:  No rash, no edema, no lesions  NEURO: No focal neurologic findings  PSYCH: Orientation, sensorium, mood normal  LINES: R upper chest tunnel HD line        Data Review:  Lab Results   Component Value Date    WBC 9.2 11/15/2021    HGB 9.3 (L) 11/15/2021    HCT 28.1 (L) 11/15/2021    MCV 91.9 11/15/2021     11/15/2021     Lab Results   Component Value Date    CREATININE 5.4 (HH) 11/15/2021    BUN 54 (H) 11/15/2021     (L) 11/15/2021    K 3.5 11/15/2021    CL 94 (L) 11/15/2021    CO2 26 11/15/2021       Hepatic Function Panel:   Lab Results   Component Value Date    ALKPHOS 125 2021    ALT 43 2021    AST 42 2021    PROT 7.9 2021    BILITOT 0.6 2021    BILIDIR <0.2 2021    IBILI see below 2021    LABALBU 2.3 11/15/2021       11/9 PD fluid - WBC 14,781 - 93% PMN   11/10 PD fluid WBC 12,260 -   11/10 PD fluid  - 92%PMN     Micro:   PD fluid - heavy S aureus, oxacillin sensitive  11/10 PD fluid cult - light S aureus  11/10 BC no growth to date   Nasal MRSA DNA probe neg     Imagin/9 Chest / Abd / Pelvic CT  CHEST:   1.  Parenchymal bands, likely atelectasis or scar. 2. No acute traumatic abnormality. ABDOMEN/PELVIS:   1. Nonspecific mildly dilated loop of small bowel in the left abdomen measuring 4 cm. Localized ileus versus obstruction not excluded. 2. Free air and free fluid in the setting of peritoneal dialysis. 3. Fibroid uterus       Scheduled Meds:   potassium chloride  40 mEq Oral Once    [Held by provider] insulin glargine  10 Units SubCUTAneous Nightly    rifAMPin  300 mg Oral 2 times per day    ceFAZolin  2,000 mg IntraVENous Daily    docusate sodium  100 mg Oral BID    insulin lispro  0-6 Units SubCUTAneous TID WC    atorvastatin  40 mg Oral Daily    carvedilol  12.5 mg Oral BID WC    spironolactone  50 mg Oral Daily    torsemide  200 mg Oral Daily    sodium chloride flush  5-40 mL IntraVENous 2 times per day    heparin (porcine)  5,000 Units SubCUTAneous 3 times per day    polyethylene glycol  17 g Oral BID    naloxegol  12.5 mg Oral QAM    gentamicin   Topical BID       Continuous Infusions:   dextrose      sodium chloride 25 mL (11/14/21 1227)       PRN Meds:  glucose, dextrose, glucagon (rDNA), dextrose, sodium chloride flush, sodium chloride, ondansetron **OR** ondansetron, polyethylene glycol, acetaminophen **OR** acetaminophen, oxyCODONE, simethicone      Assessment:     Hx CRF on PD, DM, HTN, CHF     Abd pain  PD related peritonitis  Cult + S aureus with evidence catheter exit infection  S/p PD catheter removal 11/14       Plan:     Cont ancef, change to 2 gm after HD 3 times a week  Treat for 2 week from catheter removal, through 11/28    Postop care per Surg    Medical Decision Making:   The following items were considered in medical decision making:  Discussion of patient care with other providers  Reviewed clinical lab tests  Reviewed radiology tests  Reviewed other diagnostic tests/interventions  Microbiology cultures and other micro tests reviewed      Discussed with pt, HD RN, Renal - Dr Kartik Cabrera Rashid Fajardo MD

## 2021-11-15 NOTE — PROGRESS NOTES
Patient complained of nausea and was medicated with Zofran. Patient then sent to cath lab for tunneled dialysis line placement.

## 2021-11-15 NOTE — FLOWSHEET NOTE
11/15/21 1151 11/15/21 1550   Treatment   Time On 1215  --    Time Off  --  1546   Treatment Goal 1000  --    Vital Signs   BP (!) 142/74 (!) 161/86   Temp 97.7 °F (36.5 °C) 97.8 °F (36.6 °C)   Pulse 74 73   Resp 18 18   Weight 183 lb 3.2 oz (83.1 kg) 181 lb (82.1 kg)   Weight Method Standing scale Standing scale; Bed scale   Percent Weight Change 1.22 -1.2   Treatment Initiation   Dialyze Hours 3.5  --    Post-Hemodialysis Assessment   Rinseback Volume (ml)  --  400 ml   Total Liters Processed (l/min)  --  69.4 l/min   Dialyzer Clearance  --  Lightly streaked   Duration of Treatment (minutes)  --  3.5 minutes   Hemodialysis Output (ml)  --  1400 ml   NET Removed (ml)  --  1000 ml   Tolerated Treatment  --  Good     Treatment time: 3.5 Hours  Net UF: 1000 ML    Pre weight: 83.1 Kg  Post weight: 82.1 kg  EDW: TBD kg    Access used: LCW TDC   Access function: Good with   ml/min    Medications or blood products given: None    Regular outpatient schedule: TBD    Summary of response to treatment: Tolerated tx well, VSS throughout tx. Copy of dialysis treatment record placed in chart, to be scanned into EMR. Report to Karla Wheeler RN.

## 2021-11-16 VITALS
BODY MASS INDEX: 34.54 KG/M2 | HEIGHT: 60 IN | DIASTOLIC BLOOD PRESSURE: 77 MMHG | TEMPERATURE: 98.8 F | HEART RATE: 87 BPM | WEIGHT: 175.93 LBS | SYSTOLIC BLOOD PRESSURE: 140 MMHG | OXYGEN SATURATION: 94 % | RESPIRATION RATE: 16 BRPM

## 2021-11-16 LAB
GLUCOSE BLD-MCNC: 125 MG/DL (ref 70–99)
GLUCOSE BLD-MCNC: 99 MG/DL (ref 70–99)
PERFORMED ON: ABNORMAL
PERFORMED ON: NORMAL

## 2021-11-16 PROCEDURE — 99231 SBSQ HOSP IP/OBS SF/LOW 25: CPT | Performed by: INTERNAL MEDICINE

## 2021-11-16 PROCEDURE — 6360000002 HC RX W HCPCS: Performed by: STUDENT IN AN ORGANIZED HEALTH CARE EDUCATION/TRAINING PROGRAM

## 2021-11-16 PROCEDURE — 99233 SBSQ HOSP IP/OBS HIGH 50: CPT | Performed by: INTERNAL MEDICINE

## 2021-11-16 PROCEDURE — 6370000000 HC RX 637 (ALT 250 FOR IP): Performed by: STUDENT IN AN ORGANIZED HEALTH CARE EDUCATION/TRAINING PROGRAM

## 2021-11-16 PROCEDURE — 2580000003 HC RX 258: Performed by: STUDENT IN AN ORGANIZED HEALTH CARE EDUCATION/TRAINING PROGRAM

## 2021-11-16 RX ORDER — GENTAMICIN SULFATE 1 MG/G
CREAM TOPICAL
Qty: 15 G | Refills: 0 | Status: SHIPPED | OUTPATIENT
Start: 2021-11-16 | End: 2021-11-16 | Stop reason: HOSPADM

## 2021-11-16 RX ORDER — GABAPENTIN 100 MG/1
300 CAPSULE ORAL DAILY
Qty: 90 CAPSULE | Refills: 2
Start: 2021-11-16 | End: 2022-03-21

## 2021-11-16 RX ORDER — OXYCODONE HYDROCHLORIDE 5 MG/1
5 TABLET ORAL EVERY 8 HOURS PRN
Qty: 9 TABLET | Refills: 0 | Status: SHIPPED | OUTPATIENT
Start: 2021-11-16 | End: 2021-11-19

## 2021-11-16 RX ADMIN — HEPARIN SODIUM 5000 UNITS: 5000 INJECTION INTRAVENOUS; SUBCUTANEOUS at 06:25

## 2021-11-16 RX ADMIN — OXYCODONE 5 MG: 5 TABLET ORAL at 12:40

## 2021-11-16 RX ADMIN — ATORVASTATIN CALCIUM 40 MG: 40 TABLET, FILM COATED ORAL at 08:44

## 2021-11-16 RX ADMIN — SPIRONOLACTONE 50 MG: 50 TABLET ORAL at 08:48

## 2021-11-16 RX ADMIN — CARVEDILOL 12.5 MG: 12.5 TABLET, FILM COATED ORAL at 08:44

## 2021-11-16 RX ADMIN — HEPARIN SODIUM 5000 UNITS: 5000 INJECTION INTRAVENOUS; SUBCUTANEOUS at 12:40

## 2021-11-16 RX ADMIN — GENTAMICIN SULFATE: 1 CREAM TOPICAL at 08:46

## 2021-11-16 RX ADMIN — SODIUM CHLORIDE, PRESERVATIVE FREE 10 ML: 5 INJECTION INTRAVENOUS at 08:45

## 2021-11-16 ASSESSMENT — PAIN SCALES - GENERAL
PAINLEVEL_OUTOF10: 0
PAINLEVEL_OUTOF10: 7
PAINLEVEL_OUTOF10: 0
PAINLEVEL_OUTOF10: 0

## 2021-11-16 ASSESSMENT — PAIN - FUNCTIONAL ASSESSMENT: PAIN_FUNCTIONAL_ASSESSMENT: ACTIVITIES ARE NOT PREVENTED

## 2021-11-16 ASSESSMENT — PAIN DESCRIPTION - ORIENTATION: ORIENTATION: MID;LEFT

## 2021-11-16 ASSESSMENT — PAIN DESCRIPTION - LOCATION: LOCATION: ABDOMEN

## 2021-11-16 ASSESSMENT — PAIN DESCRIPTION - DESCRIPTORS: DESCRIPTORS: ACHING

## 2021-11-16 ASSESSMENT — PAIN DESCRIPTION - PROGRESSION: CLINICAL_PROGRESSION: NOT CHANGED

## 2021-11-16 ASSESSMENT — PAIN DESCRIPTION - ONSET: ONSET: GRADUAL

## 2021-11-16 ASSESSMENT — PAIN DESCRIPTION - FREQUENCY: FREQUENCY: INTERMITTENT

## 2021-11-16 ASSESSMENT — PAIN DESCRIPTION - PAIN TYPE: TYPE: ACUTE PAIN
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
## 2021-11-16 NOTE — PROGRESS NOTES
Patient is A&O x4.  RA, sat 96%. No complaints of  SOB. No c/o abdominal pain at this time. C/o migraine HA, to message MD for fioricet and benadryl for c/o itching. Respirations appear to easy and unlabored. Lungs clear. Respirations easy with no complaints of cough. No complaints of nausea/vomiting/diarrhea. Up with assist to the bathroom/BSC as needed. Right FA PIV intact and flushed but noted to be leaking. New PIV started. Tolerating renal/low sodium diet. Dressing to abd intact with no drainage noted. Plan of care and safety measures reviewed with the patient. Call light in reach and bed alarm in place. Will continue to monitor.   Electronically signed by Carlota Barbosa RN on 11/15/2021 at 11:14 PM

## 2021-11-16 NOTE — PROGRESS NOTES
ID Follow-up NOTE    CC:   PD related peritonitis, S aureus infection  Antibiotics Ancef    Admit Date: 2021  Hospital Day: 8    Subjective:     S/p PD catheter removal   S/p HD line placement today 11/15    Patient c/o mild abd - surg site pain      Objective:     Patient Vitals for the past 8 hrs:   BP Temp Temp src Pulse Resp SpO2 Weight   21 0835 (!) 140/77 98.8 °F (37.1 °C) Oral 87 16 94 % --   21 0611 (!) 157/89 97.9 °F (36.6 °C) Oral 79 18 97 % 175 lb 14.8 oz (79.8 kg)     I/O last 3 completed shifts: In: 640 [P.O.:640]  Out: 1400   No intake/output data recorded. EXAM:  GENERAL: No apparent distress.   RA  HEENT: Membranes moist, no oral lesion  NECK:  Supple, no lymphadenopathy  LUNGS: Clear b/l, no rales, no dullness  CARDIAC: RRR, no murmur appreciated  ABD:  + BS, soft - L side tender, dressing over prior PD catheter site   EXT:  No rash, no edema, no lesions  NEURO: No focal neurologic findings  PSYCH: Orientation, sensorium, mood normal  LINES: R upper chest tunnel HD line        Data Review:  Lab Results   Component Value Date    WBC 9.2 11/15/2021    HGB 9.3 (L) 11/15/2021    HCT 28.1 (L) 11/15/2021    MCV 91.9 11/15/2021     11/15/2021     Lab Results   Component Value Date    CREATININE 5.4 (HH) 11/15/2021    BUN 54 (H) 11/15/2021     (L) 11/15/2021    K 3.5 11/15/2021    CL 94 (L) 11/15/2021    CO2 26 11/15/2021       Hepatic Function Panel:   Lab Results   Component Value Date    ALKPHOS 125 2021    ALT 43 2021    AST 42 2021    PROT 7.9 2021    BILITOT 0.6 2021    BILIDIR <0.2 2021    IBILI see below 2021    LABALBU 2.3 11/15/2021       11/9 PD fluid - WBC 14,781 - 93% PMN   11/10 PD fluid WBC 12,260 -   11/10 PD fluid  - 92%PMN     Micro:   PD fluid - heavy S aureus, oxacillin sensitive  11/10 PD fluid cult - light S aureus  11/10 BC no growth to date   Nasal MRSA DNA probe neg     Imagin/9 Chest / Abd / Pelvic CT  CHEST:   1. Parenchymal bands, likely atelectasis or scar. 2. No acute traumatic abnormality. ABDOMEN/PELVIS:   1. Nonspecific mildly dilated loop of small bowel in the left abdomen measuring 4 cm. Localized ileus versus obstruction not excluded. 2. Free air and free fluid in the setting of peritoneal dialysis. 3. Fibroid uterus       Scheduled Meds:   potassium chloride  40 mEq Oral Once    ceFAZolin  2,000 mg IntraVENous Q MWF    [Held by provider] insulin glargine  10 Units SubCUTAneous Nightly    docusate sodium  100 mg Oral BID    insulin lispro  0-6 Units SubCUTAneous TID WC    atorvastatin  40 mg Oral Daily    carvedilol  12.5 mg Oral BID WC    spironolactone  50 mg Oral Daily    torsemide  200 mg Oral Daily    sodium chloride flush  5-40 mL IntraVENous 2 times per day    heparin (porcine)  5,000 Units SubCUTAneous 3 times per day    polyethylene glycol  17 g Oral BID    naloxegol  12.5 mg Oral QAM    gentamicin   Topical BID       Continuous Infusions:   dextrose      sodium chloride 25 mL (11/15/21 1740)       PRN Meds:  heparin (porcine), diphenhydrAMINE, butalbital-acetaminophen-caffeine, glucose, dextrose, glucagon (rDNA), dextrose, sodium chloride flush, sodium chloride, ondansetron **OR** ondansetron, polyethylene glycol, acetaminophen **OR** acetaminophen, oxyCODONE, simethicone      Assessment:     Hx CRF on PD, DM, HTN, CHF     Abd pain  PD related peritonitis  Cult + S aureus with evidence catheter exit infection  S/p PD catheter removal 11/14       Plan:     Cont ancef -  2 gm after HD 3 times a week  Treat for 2 week from catheter removal, through 11/28    Postop care per Surg    Medical Decision Making:   The following items were considered in medical decision making:  Discussion of patient care with other providers  Reviewed clinical lab tests  Reviewed radiology tests  Reviewed other diagnostic tests/interventions  Microbiology cultures and other micro tests reviewed      Discussed with pt  Will sign off, call with ID issues   Mahesh Pack MD

## 2021-11-16 NOTE — PROGRESS NOTES
Discharge instructions reviewed with patient, all question answered. IV removed. Patient discharged to home.

## 2021-11-16 NOTE — PLAN OF CARE
Problem: Falls - Risk of:  Goal: Will remain free from falls  Description: Will remain free from falls  11/16/2021 0841 by Kanu Garcia RN  Outcome: Ongoing  11/16/2021 0026 by Sarai Larsen RN  Outcome: Ongoing  Goal: Absence of physical injury  Description: Absence of physical injury  Outcome: Ongoing     Problem: Pain:  Goal: Pain level will decrease  Description: Pain level will decrease  11/16/2021 0841 by Kanu Garcia RN  Outcome: Ongoing  11/16/2021 0026 by Sarai Larsen RN  Outcome: Ongoing  Goal: Control of acute pain  Description: Control of acute pain  Outcome: Ongoing  Goal: Control of chronic pain  Description: Control of chronic pain  Outcome: Ongoing     Problem: Skin Integrity:  Goal: Will show no infection signs and symptoms  Description: Will show no infection signs and symptoms  Outcome: Ongoing  Goal: Absence of new skin breakdown  Description: Absence of new skin breakdown  Outcome: Ongoing     Problem: OXYGENATION/RESPIRATORY FUNCTION  Goal: Patient will maintain patent airway  Outcome: Ongoing  Goal: Patient will achieve/maintain normal respiratory rate/effort  Description: Respiratory rate and effort will be within normal limits for the patient  Outcome: Ongoing     Problem: HEMODYNAMIC STATUS  Goal: Patient has stable vital signs and fluid balance  Outcome: Ongoing     Problem: FLUID AND ELECTROLYTE IMBALANCE  Goal: Fluid and electrolyte balance are achieved/maintained  Outcome: Ongoing     Problem: ACTIVITY INTOLERANCE/IMPAIRED MOBILITY  Goal: Mobility/activity is maintained at optimum level for patient  Outcome: Ongoing

## 2021-11-17 NOTE — PROGRESS NOTES
11/15/21  0640   WBC 9.2 9.2   HGB 8.4* 9.3*   HCT 24.9* 28.1*    345   MCV 91.5 91.9     Renal:    Recent Labs     11/14/21  0645 11/15/21  0639 11/15/21  0739   * 134* 134*   K 3.0* 3.5 3.5   CL 90* 96* 94*   CO2 26 23 26   BUN 54* 54* 54*   CREATININE 6.0* 5.3* 5.4*   GLUCOSE 101* 111* 105*   CALCIUM 8.0* 8.3 8.0*   MG 1.80 2.20  --    PHOS 4.8 4.4  --    ANIONGAP 14 15 14     Hepatic:   Recent Labs     11/14/21  0645 11/15/21  0639   LABALBU 2.2* 2.3*     Troponin: No results for input(s): TROPONINI in the last 72 hours. BNP: No results for input(s): BNP in the last 72 hours. Lipids: No results for input(s): CHOL, HDL in the last 72 hours. Invalid input(s): LDLCALCU, TRIGLYCERIDE  ABGs:  No results for input(s): PHART, WAD2QUB, PO2ART, IVH6GFE, BEART, THGBART, I0IBDAYM, DJE1NLG in the last 72 hours. INR:   Recent Labs     11/15/21  0739   INR 1.11     Lactate: No results for input(s): LACTATE in the last 72 hours. Cultures:  -----------------------------------------------------------------  RAD:   IR TUNNELED CVC PLACE WO SQ PORT/PUMP > 5 YEARS   Final Result   IMPRESSION :      Limited ultrasound examination demonstrates a patent right internal jugular vein. Placement of tunnelled right jugular dialysis catheter. Fluoroscopy time : 0.6 minutes   Number of exposures obtained : 2   Moderate sedation was provided and monitored by an independent trained observer. Moderate sedation start time : 1110   Moderate sedation end time : 1125   Blood loss : minimal (less than 5 cc)   Specimens removed : none               XR CHEST PORTABLE   Final Result      Increased bibasilar airspace disease could be due to atelectasis and/or pneumonia. US ABDOMEN LIMITED   Final Result   1. Complex collection along the drainage catheter of unclear significance. This may relate to a small abscess or hematoma.       CT THORACIC RECONSTRUCTION WO POST PROCESS   Final Result      No acute fracture in the thoracic or lumbar spine. CT LUMBAR RECONSTRUCTION WO POST PROCESS   Final Result      No acute fracture in the thoracic or lumbar spine. CT ABDOMEN PELVIS W IV CONTRAST Additional Contrast? None   Final Result      CHEST:      1. Parenchymal bands, likely atelectasis or scar. 2. No acute traumatic abnormality. ABDOMEN/PELVIS:      1. Nonspecific mildly dilated loop of small bowel in the left abdomen measuring 4 cm. Localized ileus versus obstruction not excluded. 2. Free air and free fluid in the setting of peritoneal dialysis. 3. Fibroid uterus      CT CHEST W CONTRAST   Final Result      CHEST:      1. Parenchymal bands, likely atelectasis or scar. 2. No acute traumatic abnormality. ABDOMEN/PELVIS:      1. Nonspecific mildly dilated loop of small bowel in the left abdomen measuring 4 cm. Localized ileus versus obstruction not excluded. 2. Free air and free fluid in the setting of peritoneal dialysis. 3. Fibroid uterus          Assessment/Plan:     Abdominal pain, 2/2 PD peritonitis  Continue abx     ESRD on home peritoneal dialysis.   Had PD catheter removed   HD cath placed       Will transition to HD temporarily   IR  placed tunneled catheter      Hypokalemia- replace potassium     -----------------------------  Milana Kaufman MD  11/16/2021  10:54 PM

## 2021-11-18 LAB
BODY FLUID CULTURE, STERILE: ABNORMAL
CULTURE CATHETER TIP: ABNORMAL
CULTURE CATHETER TIP: ABNORMAL
GRAM STAIN RESULT: ABNORMAL
ORGANISM: ABNORMAL

## 2021-11-19 LAB — ANAEROBIC CULTURE: NORMAL

## 2021-11-30 ENCOUNTER — TELEPHONE (OUTPATIENT)
Dept: BARIATRICS/WEIGHT MGMT | Age: 51
End: 2021-11-30

## 2021-11-30 NOTE — TELEPHONE ENCOUNTER
Pt calling in, PD cath removal 11/14/21, due to infection. Pt is calling back today to schedule an appt to have a new PD cath placement. Does pt need to have a consult to have that placed. . was placed and removed by dr Vic Valderrama.   Please advise

## 2021-11-30 NOTE — TELEPHONE ENCOUNTER
Pt hasn't been seen in the office. Pt's PD cath removed in November while pt was already inpatient. Will await advice from provider.

## 2021-11-30 NOTE — TELEPHONE ENCOUNTER
Doesn't look like I've seen the patient since removal    Let's schedule patient for office visit to evaluate exit site and discuss next steps

## 2021-12-06 ENCOUNTER — HOSPITAL ENCOUNTER (INPATIENT)
Age: 51
LOS: 3 days | Discharge: HOME | DRG: 304 | End: 2021-12-09
Attending: EMERGENCY MEDICINE | Admitting: INTERNAL MEDICINE
Payer: MEDICARE

## 2021-12-06 ENCOUNTER — APPOINTMENT (OUTPATIENT)
Dept: CT IMAGING | Age: 51
DRG: 304 | End: 2021-12-06
Payer: MEDICARE

## 2021-12-06 ENCOUNTER — APPOINTMENT (OUTPATIENT)
Dept: GENERAL RADIOLOGY | Age: 51
DRG: 304 | End: 2021-12-06
Payer: MEDICARE

## 2021-12-06 DIAGNOSIS — Z99.2 ESRD (END STAGE RENAL DISEASE) ON DIALYSIS (HCC): ICD-10-CM

## 2021-12-06 DIAGNOSIS — N18.6 ESRD (END STAGE RENAL DISEASE) ON DIALYSIS (HCC): ICD-10-CM

## 2021-12-06 DIAGNOSIS — R29.90 STROKE-LIKE SYMPTOMS: Primary | ICD-10-CM

## 2021-12-06 PROBLEM — I16.1 HYPERTENSIVE EMERGENCY: Status: ACTIVE | Noted: 2021-12-06

## 2021-12-06 LAB
ANION GAP SERPL CALCULATED.3IONS-SCNC: 10 MMOL/L (ref 3–16)
BACTERIA: ABNORMAL /HPF
BASE EXCESS VENOUS: 5.9 MMOL/L (ref -2–3)
BASOPHILS ABSOLUTE: 0.1 K/UL (ref 0–0.2)
BASOPHILS RELATIVE PERCENT: 1 %
BILIRUBIN URINE: NEGATIVE
BLOOD, URINE: NEGATIVE
BUN BLDV-MCNC: 23 MG/DL (ref 7–20)
CALCIUM SERPL-MCNC: 8.6 MG/DL (ref 8.3–10.6)
CARBOXYHEMOGLOBIN: 1.6 % (ref 0–1.5)
CHLORIDE BLD-SCNC: 100 MMOL/L (ref 99–110)
CLARITY: CLEAR
CO2: 29 MMOL/L (ref 21–32)
COLOR: YELLOW
CREAT SERPL-MCNC: 2.4 MG/DL (ref 0.6–1.1)
EKG ATRIAL RATE: 75 BPM
EKG DIAGNOSIS: NORMAL
EKG P AXIS: 48 DEGREES
EKG P-R INTERVAL: 158 MS
EKG Q-T INTERVAL: 378 MS
EKG QRS DURATION: 88 MS
EKG QTC CALCULATION (BAZETT): 422 MS
EKG R AXIS: -6 DEGREES
EKG T AXIS: 44 DEGREES
EKG VENTRICULAR RATE: 75 BPM
EOSINOPHILS ABSOLUTE: 0.2 K/UL (ref 0–0.6)
EOSINOPHILS RELATIVE PERCENT: 3.5 %
EPITHELIAL CELLS, UA: ABNORMAL /HPF (ref 0–5)
GFR AFRICAN AMERICAN: 26
GFR NON-AFRICAN AMERICAN: 21
GLUCOSE BLD-MCNC: 123 MG/DL (ref 70–99)
GLUCOSE BLD-MCNC: 126 MG/DL (ref 70–99)
GLUCOSE URINE: NEGATIVE MG/DL
HCO3 VENOUS: 31.4 MMOL/L (ref 24–28)
HCT VFR BLD CALC: 28.5 % (ref 36–48)
HEMOGLOBIN, VEN, REDUCED: 45.3 %
HEMOGLOBIN: 9.1 G/DL (ref 12–16)
KETONES, URINE: NEGATIVE MG/DL
LEUKOCYTE ESTERASE, URINE: NEGATIVE
LYMPHOCYTES ABSOLUTE: 1.3 K/UL (ref 1–5.1)
LYMPHOCYTES RELATIVE PERCENT: 23.6 %
MAGNESIUM: 1.7 MG/DL (ref 1.8–2.4)
MCH RBC QN AUTO: 30.8 PG (ref 26–34)
MCHC RBC AUTO-ENTMCNC: 31.9 G/DL (ref 31–36)
MCV RBC AUTO: 96.7 FL (ref 80–100)
METHEMOGLOBIN VENOUS: 0.3 % (ref 0–1.5)
MICROSCOPIC EXAMINATION: YES
MONOCYTES ABSOLUTE: 0.4 K/UL (ref 0–1.3)
MONOCYTES RELATIVE PERCENT: 6.9 %
NEUTROPHILS ABSOLUTE: 3.5 K/UL (ref 1.7–7.7)
NEUTROPHILS RELATIVE PERCENT: 65 %
NITRITE, URINE: NEGATIVE
O2 SAT, VEN: 54 %
PCO2, VEN: 49.7 MMHG (ref 41–51)
PDW BLD-RTO: 17.1 % (ref 12.4–15.4)
PERFORMED ON: ABNORMAL
PH UA: 6.5 (ref 5–8)
PH VENOUS: 7.41 (ref 7.35–7.45)
PLATELET # BLD: 228 K/UL (ref 135–450)
PMV BLD AUTO: 8.2 FL (ref 5–10.5)
PO2, VEN: 30.4 MMHG (ref 25–40)
POTASSIUM REFLEX MAGNESIUM: 3.1 MMOL/L (ref 3.5–5.1)
PRO-BNP: ABNORMAL PG/ML (ref 0–124)
PROTEIN UA: >=300 MG/DL
RBC # BLD: 2.95 M/UL (ref 4–5.2)
RBC UA: ABNORMAL /HPF (ref 0–4)
SODIUM BLD-SCNC: 139 MMOL/L (ref 136–145)
SPECIFIC GRAVITY UA: 1.02 (ref 1–1.03)
TCO2 CALC VENOUS: 33 MMOL/L
TROPONIN: 0.1 NG/ML
URINE REFLEX TO CULTURE: ABNORMAL
URINE TYPE: ABNORMAL
UROBILINOGEN, URINE: 0.2 E.U./DL
WBC # BLD: 5.4 K/UL (ref 4–11)
WBC UA: ABNORMAL /HPF (ref 0–5)

## 2021-12-06 PROCEDURE — 83880 ASSAY OF NATRIURETIC PEPTIDE: CPT

## 2021-12-06 PROCEDURE — 82803 BLOOD GASES ANY COMBINATION: CPT

## 2021-12-06 PROCEDURE — 83735 ASSAY OF MAGNESIUM: CPT

## 2021-12-06 PROCEDURE — 84484 ASSAY OF TROPONIN QUANT: CPT

## 2021-12-06 PROCEDURE — 6360000002 HC RX W HCPCS: Performed by: STUDENT IN AN ORGANIZED HEALTH CARE EDUCATION/TRAINING PROGRAM

## 2021-12-06 PROCEDURE — 70496 CT ANGIOGRAPHY HEAD: CPT

## 2021-12-06 PROCEDURE — 81001 URINALYSIS AUTO W/SCOPE: CPT

## 2021-12-06 PROCEDURE — 6370000000 HC RX 637 (ALT 250 FOR IP): Performed by: STUDENT IN AN ORGANIZED HEALTH CARE EDUCATION/TRAINING PROGRAM

## 2021-12-06 PROCEDURE — 6370000000 HC RX 637 (ALT 250 FOR IP)

## 2021-12-06 PROCEDURE — 99285 EMERGENCY DEPT VISIT HI MDM: CPT

## 2021-12-06 PROCEDURE — 85025 COMPLETE CBC W/AUTO DIFF WBC: CPT

## 2021-12-06 PROCEDURE — 93005 ELECTROCARDIOGRAM TRACING: CPT

## 2021-12-06 PROCEDURE — 6370000000 HC RX 637 (ALT 250 FOR IP): Performed by: INTERNAL MEDICINE

## 2021-12-06 PROCEDURE — 36415 COLL VENOUS BLD VENIPUNCTURE: CPT

## 2021-12-06 PROCEDURE — 6360000004 HC RX CONTRAST MEDICATION

## 2021-12-06 PROCEDURE — 1200000000 HC SEMI PRIVATE

## 2021-12-06 PROCEDURE — 70450 CT HEAD/BRAIN W/O DYE: CPT

## 2021-12-06 PROCEDURE — 83036 HEMOGLOBIN GLYCOSYLATED A1C: CPT

## 2021-12-06 PROCEDURE — 71045 X-RAY EXAM CHEST 1 VIEW: CPT

## 2021-12-06 PROCEDURE — 80048 BASIC METABOLIC PNL TOTAL CA: CPT

## 2021-12-06 RX ORDER — NICOTINE POLACRILEX 4 MG
15 LOZENGE BUCCAL PRN
Status: DISCONTINUED | OUTPATIENT
Start: 2021-12-06 | End: 2021-12-09 | Stop reason: HOSPADM

## 2021-12-06 RX ORDER — DEXTROSE MONOHYDRATE 25 G/50ML
12.5 INJECTION, SOLUTION INTRAVENOUS PRN
Status: DISCONTINUED | OUTPATIENT
Start: 2021-12-06 | End: 2021-12-09 | Stop reason: HOSPADM

## 2021-12-06 RX ORDER — ACETAMINOPHEN 650 MG/1
650 SUPPOSITORY RECTAL EVERY 6 HOURS PRN
Status: DISCONTINUED | OUTPATIENT
Start: 2021-12-06 | End: 2021-12-09 | Stop reason: HOSPADM

## 2021-12-06 RX ORDER — CARVEDILOL 12.5 MG/1
12.5 TABLET ORAL 2 TIMES DAILY
Status: CANCELLED | OUTPATIENT
Start: 2021-12-06

## 2021-12-06 RX ORDER — POTASSIUM CHLORIDE 7.45 MG/ML
10 INJECTION INTRAVENOUS ONCE
Status: COMPLETED | OUTPATIENT
Start: 2021-12-06 | End: 2021-12-06

## 2021-12-06 RX ORDER — DEXTROSE MONOHYDRATE 50 MG/ML
100 INJECTION, SOLUTION INTRAVENOUS PRN
Status: DISCONTINUED | OUTPATIENT
Start: 2021-12-06 | End: 2021-12-09 | Stop reason: HOSPADM

## 2021-12-06 RX ORDER — SODIUM CHLORIDE 9 MG/ML
25 INJECTION, SOLUTION INTRAVENOUS PRN
Status: DISCONTINUED | OUTPATIENT
Start: 2021-12-06 | End: 2021-12-09 | Stop reason: HOSPADM

## 2021-12-06 RX ORDER — ONDANSETRON 4 MG/1
4 TABLET, FILM COATED ORAL EVERY 12 HOURS PRN
Status: DISCONTINUED | OUTPATIENT
Start: 2021-12-06 | End: 2021-12-09 | Stop reason: HOSPADM

## 2021-12-06 RX ORDER — MAGNESIUM SULFATE 1 G/100ML
1000 INJECTION INTRAVENOUS ONCE
Status: COMPLETED | OUTPATIENT
Start: 2021-12-06 | End: 2021-12-06

## 2021-12-06 RX ORDER — LABETALOL HYDROCHLORIDE 5 MG/ML
10 INJECTION, SOLUTION INTRAVENOUS EVERY 4 HOURS PRN
Status: DISCONTINUED | OUTPATIENT
Start: 2021-12-06 | End: 2021-12-09 | Stop reason: HOSPADM

## 2021-12-06 RX ORDER — HEPARIN SODIUM 5000 [USP'U]/ML
5000 INJECTION, SOLUTION INTRAVENOUS; SUBCUTANEOUS EVERY 8 HOURS SCHEDULED
Status: DISCONTINUED | OUTPATIENT
Start: 2021-12-06 | End: 2021-12-09 | Stop reason: HOSPADM

## 2021-12-06 RX ORDER — SPIRONOLACTONE 50 MG/1
50 TABLET, FILM COATED ORAL DAILY
Status: CANCELLED | OUTPATIENT
Start: 2021-12-06

## 2021-12-06 RX ORDER — ACETAMINOPHEN 325 MG/1
650 TABLET ORAL ONCE
Status: COMPLETED | OUTPATIENT
Start: 2021-12-06 | End: 2021-12-06

## 2021-12-06 RX ORDER — INSULIN LISPRO 100 [IU]/ML
10 INJECTION, SOLUTION INTRAVENOUS; SUBCUTANEOUS
Status: DISCONTINUED | OUTPATIENT
Start: 2021-12-07 | End: 2021-12-09 | Stop reason: HOSPADM

## 2021-12-06 RX ORDER — ASPIRIN 81 MG/1
81 TABLET, CHEWABLE ORAL DAILY
Status: DISCONTINUED | OUTPATIENT
Start: 2021-12-07 | End: 2021-12-07

## 2021-12-06 RX ORDER — ATORVASTATIN CALCIUM 40 MG/1
40 TABLET, FILM COATED ORAL DAILY
Status: DISCONTINUED | OUTPATIENT
Start: 2021-12-07 | End: 2021-12-09 | Stop reason: HOSPADM

## 2021-12-06 RX ORDER — SODIUM CHLORIDE 0.9 % (FLUSH) 0.9 %
5-40 SYRINGE (ML) INJECTION EVERY 12 HOURS SCHEDULED
Status: DISCONTINUED | OUTPATIENT
Start: 2021-12-06 | End: 2021-12-09 | Stop reason: HOSPADM

## 2021-12-06 RX ORDER — SPIRONOLACTONE 50 MG/1
50 TABLET, FILM COATED ORAL DAILY
Status: DISCONTINUED | OUTPATIENT
Start: 2021-12-06 | End: 2021-12-09 | Stop reason: HOSPADM

## 2021-12-06 RX ORDER — NITROGLYCERIN 0.4 MG/1
0.4 TABLET SUBLINGUAL EVERY 5 MIN PRN
Status: DISCONTINUED | OUTPATIENT
Start: 2021-12-06 | End: 2021-12-09 | Stop reason: HOSPADM

## 2021-12-06 RX ORDER — PANTOPRAZOLE SODIUM 40 MG/1
40 TABLET, DELAYED RELEASE ORAL
Status: DISCONTINUED | OUTPATIENT
Start: 2021-12-07 | End: 2021-12-09 | Stop reason: HOSPADM

## 2021-12-06 RX ORDER — SODIUM CHLORIDE 0.9 % (FLUSH) 0.9 %
5-40 SYRINGE (ML) INJECTION PRN
Status: DISCONTINUED | OUTPATIENT
Start: 2021-12-06 | End: 2021-12-09 | Stop reason: HOSPADM

## 2021-12-06 RX ORDER — ACETAMINOPHEN 325 MG/1
650 TABLET ORAL EVERY 6 HOURS PRN
Status: DISCONTINUED | OUTPATIENT
Start: 2021-12-06 | End: 2021-12-09 | Stop reason: HOSPADM

## 2021-12-06 RX ORDER — POLYETHYLENE GLYCOL 3350 17 G/17G
17 POWDER, FOR SOLUTION ORAL DAILY PRN
Status: DISCONTINUED | OUTPATIENT
Start: 2021-12-06 | End: 2021-12-09 | Stop reason: HOSPADM

## 2021-12-06 RX ORDER — GABAPENTIN 300 MG/1
300 CAPSULE ORAL DAILY
Status: DISCONTINUED | OUTPATIENT
Start: 2021-12-07 | End: 2021-12-09 | Stop reason: HOSPADM

## 2021-12-06 RX ADMIN — IOPAMIDOL 80 ML: 755 INJECTION, SOLUTION INTRAVENOUS at 15:45

## 2021-12-06 RX ADMIN — SPIRONOLACTONE 50 MG: 50 TABLET ORAL at 23:29

## 2021-12-06 RX ADMIN — POTASSIUM BICARBONATE 40 MEQ: 782 TABLET, EFFERVESCENT ORAL at 19:43

## 2021-12-06 RX ADMIN — ACETAMINOPHEN 650 MG: 325 TABLET ORAL at 17:13

## 2021-12-06 RX ADMIN — HEPARIN SODIUM 5000 UNITS: 5000 INJECTION INTRAVENOUS; SUBCUTANEOUS at 23:29

## 2021-12-06 RX ADMIN — MAGNESIUM SULFATE HEPTAHYDRATE 1000 MG: 1 INJECTION, SOLUTION INTRAVENOUS at 19:45

## 2021-12-06 RX ADMIN — POTASSIUM CHLORIDE 10 MEQ: 7.46 INJECTION, SOLUTION INTRAVENOUS at 19:47

## 2021-12-06 ASSESSMENT — ENCOUNTER SYMPTOMS
CHEST TIGHTNESS: 0
WHEEZING: 0
SORE THROAT: 0
ABDOMINAL PAIN: 0
EYE ITCHING: 0
COUGH: 0
EYE PAIN: 0
EYE DISCHARGE: 0
SHORTNESS OF BREATH: 0
CONSTIPATION: 0
BLOOD IN STOOL: 0

## 2021-12-06 ASSESSMENT — PAIN DESCRIPTION - PAIN TYPE: TYPE: ACUTE PAIN

## 2021-12-06 ASSESSMENT — PAIN SCALES - GENERAL
PAINLEVEL_OUTOF10: 6
PAINLEVEL_OUTOF10: 6

## 2021-12-06 ASSESSMENT — PAIN DESCRIPTION - LOCATION: LOCATION: HEAD

## 2021-12-06 NOTE — H&P
Internal Medicine  PGY 1  History & Physical      CC hypotension    History Obtained From:  patient    HISTORY OF PRESENT ILLNESS:  50-year-old female with PMH of CHF, ESRD on HD, HTN, DM 2 presented from a hemodialysis center with hypotension. Per patient she woke up this morning feeling woozy. She reported she continued with her daily activities thinking the woozy feeling would wear off. She also endorsed a frontal headache 7 on 10 in intensity, dull, nonradiating with no aggravating or relieving factors. Patient went to the dialysis center in the afternoon where her recorded blood pressure and was found to be in 200s. Patient was asked to come to the ED. In the ED, vitals afebrile, blood pressure 200/92, pulse 72, RR 18, SPO2 95% on room air. Labs revealing of magnesium 1.7 troponin 0.1, potassium 3.1, proBNP 26, 896, UA revealing proteinuria >300, Hb 9.1. CT head without contrast nonrevealing of acute intracranial abnormality. CTA head and neck with contrast revealing of stenosis and irregularity of proximal right ICA. Normal filling of right MCA and RAN despite internal carotid occlusion. Likely via patent RAN. No intracranial large vessel occlusion or significant stenosis. No aneurysm. Patient was admitted for the concern of dysarthria 2/2 suspected ischemic stroke and hypertensive emergency. Patient is a full code.     Past Medical History:        Diagnosis Date    CHF (congestive heart failure) ([address]     Diabetes mellitus ([address]     Hypertension    ·     Past Surgical History:        Procedure Laterality Date    DIALYSIS CATHETER INSERTION N/A 7/9/2021    LAPAROSCOPIC PERITONEAL DIALYSIS CATHETER INSERTION, OMENTOPLEXY performed by Erendira Gandhi DO at [address] N/A 11/14/2021    CATHETER REMOVAL PERITONEAL DIALYSIS performed by Erendira Gandhi DO at [address] [facility] IR TUNNELED CATHETER PLACEMENT GREATER THAN 5 YEARS  11/15/2021    IR TUNNELED CATHETER PLACEMENT GREATER THAN 5 YEARS 11/15/2021 [facility] SPECIAL PROCEDURES   ·     Medications Priorto Admission:    · Not in a hospital admission. Allergies:  Patient has no known allergies. Social History:   · TOBACCO:   reports that she has never smoked. She has never used smokeless tobacco.  · ETOH:   reports previous alcohol use. · DRUGS : none  · Patient currently lives with family at home  ·   Family History:   · History reviewed. No pertinent family history. Review of Systems    ROS: A 10 point review of systems was conducted, significant findings as noted in HPI. Physical Exam  Constitutional:       Appearance: She is obese. HENT:      Head: Normocephalic and atraumatic. Nose: Nose normal.      Mouth/Throat:      Mouth: Mucous membranes are dry. Pharynx: Oropharynx is clear. Eyes:      Extraocular Movements: Extraocular movements intact. Conjunctiva/sclera: Conjunctivae normal.      Pupils: Pupils are equal, round, and reactive to light. Cardiovascular:      Rate and Rhythm: Normal rate and regular rhythm. Pulses: Normal pulses. Heart sounds: Normal heart sounds. Pulmonary:      Effort: Pulmonary effort is normal.      Breath sounds: Normal breath sounds. Abdominal:      General: Bowel sounds are normal.      Palpations: Abdomen is soft. Musculoskeletal:         General: Normal range of motion. Cervical back: Normal range of motion and neck supple. Skin:     General: Skin is warm and dry. Neurological:      Mental Status: She is alert and oriented to person, place, and time. Sensory: No sensory deficit. Coordination: Coordination normal.      Gait: Gait abnormal.      Deep Tendon Reflexes: Reflexes normal.      Comments: Dysarthria   Psychiatric:         Mood and Affect: Mood normal.         Behavior: Behavior normal.         Thought Content:  Thought content normal.         Judgment: Judgment normal.       Physical exam:       Vitals:    12/06/21 1716   BP: (!) 202/97   Pulse: 76   Resp: 16   Temp:    SpO2: 100%       DATA:    Labs:  CBC:   Recent Labs     12/06/21  1516   WBC 5.4   HGB 9.1*   HCT 28.5*          BMP:   Recent Labs     12/06/21  1516      K 3.1*      CO2 29   BUN 23*   CREATININE 2.4*   GLUCOSE 123*     LFT's: No results for input(s): AST, ALT, ALB, BILITOT, ALKPHOS in the last 72 hours. Troponin:   Recent Labs     12/06/21  1516   TROPONINI 0.10*     BNP:No results for input(s): BNP in the last 72 hours. ABGs: No results for input(s): PHART, DTI2GJM, PO2ART in the last 72 hours. INR: No results for input(s): INR in the last 72 hours. U/A:  Recent Labs     12/06/21  1528   COLORU Yellow   PHUR 6.5   WBCUA 0-2   RBCUA 0-2   BACTERIA 1+*   CLARITYU Clear   SPECGRAV 1.020   LEUKOCYTESUR Negative   UROBILINOGEN 0.2   BILIRUBINUR Negative   BLOODU Negative   GLUCOSEU Negative       CTA HEAD NECK W CONTRAST   Final Result      There is marked stenosis and irregularity of the proximal right internal carotid artery with complete occlusion in the mid to distal segment of the cervical portion of the right internal carotid artery. Potential etiologies include atherosclerotic    disease, carotid dissection which may be chronic or acute, vasculitis, and potentially fibromuscular dysplasia although the appearance is atypical.      Otherwise no significant vascular pathology is identified in the neck. PROCEDURE: CT ANGIOGRAPHY HEAD WITH/WITHOUT CONTRAST      INDICATION: hypertension, dysarthria, past pointing, ataxia      COMPARISON: none      TECHNIQUE: Axial CT imaging obtained through the head prior to and following administration of IV contrast. Axial images, multiplanar reformatted images, and maximum intensity projection images were reviewed for CT angiographic technique. IV contrast: 80 mL Isovue-370.       FINDINGS:      ANTERIOR CIRCULATION: The intracranial internal carotid arteries, anterior cerebral arteries, and middle cerebral arteries demonstrate no occlusion or stenosis. No evidence for aneurysm or arteriovenous malformation. Flow to the right middle cerebral    artery and anterior cerebral artery is likely via patent anterior communicating artery, given occlusion of right internal carotid artery. POSTERIOR CIRCULATION: The bilateral vertebral arteries, basilar artery and posterior cerebral arteries demonstrate no occlusion or stenosis. No evidence for aneurysm or arteriovenous malformation. There is fetal origin of the left posterior cerebral    artery noted. INTRACRANIAL VENOUS SYSTEM: No evidence for intracranial venous thrombosis. Referred to the recent noncontrast CT head for additional results. .      IMPRESSION:      Fetal origin posterior cerebral artery. Normal filling of right middle and anterior cerebral artery despite internal carotid occlusion. This is likely predominantly via a patent anterior communicating artery. Otherwise no intracranial large vessel occlusion or significant stenosis. No evidence to suggest aneurysm. CT Head WO Contrast   Final Result      No evidence of acute intracranial abnormality. XR CHEST PORTABLE   Final Result      Pulmonary venous hypertension              ASSESSMENT AND PLAN:  65-year-old female with PMH of CHF, HTN, DM 2 and ESRD on HD presented to [facility] ED with chief complaint of hypertension. Patient was admitted for the following concerns:    Dysarthria 2/2 likely ischemic stroke  -CT head without contrast nonrevealing. CT head and neck with contrast revealing of mild stenosis in irregularity of the proximal right ICA with complete occlusion in the mid to distal segment. Normal filling of right MCA and RAN despite internal carotid occlusion. Likely via patent RAN. No intracranial large vessel occlusion or significant stenosis. No aneurysm.   -MRI with without contrast  -Neurology consulted, appreciate recs  - permissive hypertension, -220  -Labetalol as needed for SBP >220  -Aspirin and statin  -Lipid panel and A1c  -NPO  -SLP  -Seizure prophylaxis and fall precautions  -Echo    HTN emergency  -Holding home meds for permissive hypertension  -Labetalol as needed for SBP >220    DM type II  -Lantus 10 units nightly  -Mealtime boluses        Will discuss with the attending physician.     Code Status:Full code  FEN: NPO  PPX: lovenox, protonix   DISPO: Blaine Okeefe MD  12/6/2021,  6:55 PM

## 2021-12-06 NOTE — ED NOTES
Bed: B25-25  Expected date:   Expected time:   Means of arrival:   Comments:  [id number] [facility] [place]  12/06/21 4734

## 2021-12-06 NOTE — ED PROVIDER NOTES
[address] [facility] [place] [address] ENCOUNTER          PHYSICIAN ASSISTANT NOTE       Date of evaluation: 12/6/2021    Chief Complaint     Hypertension (was at diaylsis and they had to stop d/t htn. Pt also states that around 0800 she started to have have issues with walking and talking)      History of Present Illness     Mike Hope is a 45 y.o. female with a history of congestive heart failure, hypertension, type 2 diabetes, chronic kidney disease on hemodialysis who presents from dialysis because she was hypertensive. She states she is not sure how high her blood pressure got, however they did give her a dose of clonidine before sending her to the emergency department. She gets her hemodialysis through a right sided catheter and her schedule is Monday/Wednesday/Friday. She did get through approximately 1 hour of dialysis prior to being sent to the ED. On further questioning, she also states that this morning she woke up at approximately 8 AM and noticed that she was having trouble getting her words out, she stated that it was felt like she had slurred speech. She also was having difficulty thinking clearly and noticed that her gait was abnormal.  She denies any facial weakness, extremity weakness, numbness or tingling, loss of bowel or bladder. She also endorses a frontal headache, she has a history of migraines for which this feels like. She also endorses feeling off balance. She states that she was last to her normal self at approximately 10 PM last night. Her symptoms have not improved or worsened since the onset. She denies any falls or head trauma. Review of Systems     Review of Systems   Constitutional: Negative for chills and fever. HENT: Negative for congestion, ear pain and sore throat. Eyes: Negative for pain, discharge, itching and visual disturbance. Respiratory: Negative for cough, chest tightness, shortness of breath and wheezing.     Cardiovascular: Negative for chest pain, palpitations and leg swelling. Gastrointestinal: Negative for abdominal pain, blood in stool and constipation. Genitourinary: Negative for flank pain, hematuria and pelvic pain. Musculoskeletal: Negative for arthralgias and myalgias. Neurological: Negative for dizziness, syncope, facial asymmetry and numbness. Stuttered speech, difficulty getting words out, gait abnormality   Psychiatric/Behavioral: Negative for agitation and behavioral problems. Past Medical, Surgical, Family, and Social History     She has a past medical history of CHF (congestive heart failure) ([facility] [address] Diabetes mellitus ([facility] [address] and Hypertension. She has a past surgical history that includes Dialysis Catheter Insertion (N/A, 7/9/2021); Dialysis Catheter Removal (N/A, 11/14/2021); and IR TUNNELED CVC PLACE WO SQ PORT/PUMP > 5 YEARS (11/15/2021). Her family history is not on file. She reports that she has never smoked. She has never used smokeless tobacco. She reports previous alcohol use. She reports previous drug use. Medications     Previous Medications    ASPIRIN 81 MG CHEWABLE TABLET    Take 1 tablet by mouth daily    ATORVASTATIN (LIPITOR) 40 MG TABLET    Take 1 tablet by mouth daily    BUTALBITAL-ACETAMINOPHEN-CAFFEINE (FIORICET, ESGIC) -40 MG PER TABLET    Take 1 tablet by mouth every 4 hours as needed for Headaches    CARVEDILOL (COREG) 12.5 MG TABLET    Take 1 tablet by mouth 2 times daily    DIPHENHYDRAMINE (BENADRYL) 25 MG CAPSULE    Take 25 mg by mouth every 6 hours as needed for Itching    GABAPENTIN (NEURONTIN) 100 MG CAPSULE    Take 3 capsules by mouth daily for 90 days.     INSULIN GLARGINE (LANTUS;BASAGLAR) 100 UNIT/ML INJECTION PEN    Inject 10 Units into the skin nightly    INSULIN LISPRO, 1 UNIT DIAL, 100 UNIT/ML SOPN    Inject 10 Units into the skin 3 times daily (with meals)    NITROGLYCERIN (NITROSTAT) 0.4 MG SL TABLET    Place 1 tablet under the tongue every 5 minutes as needed for Chest pain    ONDANSETRON (ZOFRAN) 4 MG TABLET    Take 1 tablet by mouth every 12 hours as needed for Nausea or Vomiting    PANTOPRAZOLE (PROTONIX) 40 MG TABLET    Take 40 mg by mouth daily as needed    SPIRONOLACTONE (ALDACTONE) 50 MG TABLET    Take 1 tablet by mouth daily       Allergies     She has No Known Allergies. Physical Exam     INITIAL VITALS: BP: (!) 190/85, Temp: 97.6 °F (36.4 °C), Pulse: 73, Resp: 18, SpO2: 95 %  Physical Exam  Constitutional:       General: She is not in acute distress. Appearance: Normal appearance. She is normal weight. She is not ill-appearing, toxic-appearing or diaphoretic. HENT:      Head: Normocephalic and atraumatic. Right Ear: Tympanic membrane, ear canal and external ear normal.      Left Ear: Tympanic membrane, ear canal and external ear normal.      Nose: Nose normal. No congestion or rhinorrhea. Mouth/Throat:      Mouth: Mucous membranes are moist.      Pharynx: No oropharyngeal exudate or posterior oropharyngeal erythema. Eyes:      General:         Right eye: No discharge. Left eye: No discharge. Extraocular Movements: Extraocular movements intact. Conjunctiva/sclera: Conjunctivae normal.      Pupils: Pupils are equal, round, and reactive to light. Cardiovascular:      Rate and Rhythm: Normal rate and regular rhythm. Pulses: Normal pulses. Heart sounds: Normal heart sounds. No murmur heard. No gallop. Pulmonary:      Effort: Pulmonary effort is normal. No respiratory distress. Breath sounds: Normal breath sounds. No wheezing, rhonchi or rales. Abdominal:      General: Abdomen is flat. Bowel sounds are normal. There is no distension. Palpations: Abdomen is soft. Tenderness: There is no abdominal tenderness. Musculoskeletal:         General: Normal range of motion. Cervical back: Normal range of motion and neck supple. No rigidity. Right lower leg: No edema. Left lower leg: No edema. Skin:     General: Skin is warm. Capillary Refill: Capillary refill takes less than 2 seconds. Findings: No rash. Comments: Right-sided hemodialysis catheter in the chest with dressing dry and intact. No erythema, warmth or tenderness noted. Neurological:      Mental Status: She is alert. Comments: Alert and oriented x4. Pupils are equal and reactive to light, extraocular movements are intact. She does have equal facial movements and intact sensation bilaterally. I do not appreciate any dysarthria, however she does seem to be stuttering her speech slightly. She does have past-pointing on finger-to-nose testing. Heel-to-shin testing is normal.  Bilateral upper and lower extremity sensation is intact. Strength is 5/5. Psychiatric:         Mood and Affect: Mood normal.         Behavior: Behavior normal.         Diagnostic Results     EKG   Interpreted in conjunction with emergency department physician Eveline Delgado MD  Rhythm: normal sinus with LAE  Rate: 75  Axis: normal  Ectopy: none  Conduction: normal  ST Segments: no acute change  T Waves: no acute change  Q Waves:none  Clinical Impression: no acute changes  Comparison:  8/23/2021    RADIOLOGY:  CTA HEAD NECK W CONTRAST   Final Result      There is marked stenosis and irregularity of the proximal right internal carotid artery with complete occlusion in the mid to distal segment of the cervical portion of the right internal carotid artery. Potential etiologies include atherosclerotic    disease, carotid dissection which may be chronic or acute, vasculitis, and potentially fibromuscular dysplasia although the appearance is atypical.      Otherwise no significant vascular pathology is identified in the neck.             PROCEDURE: CT ANGIOGRAPHY HEAD WITH/WITHOUT CONTRAST      INDICATION: hypertension, dysarthria, past pointing, ataxia      COMPARISON: none      TECHNIQUE: Axial CT imaging obtained through the head prior to and following administration of IV contrast. Axial images, multiplanar reformatted images, and maximum intensity projection images were reviewed for CT angiographic technique. IV contrast: 80 mL Isovue-370. FINDINGS:      ANTERIOR CIRCULATION: The intracranial internal carotid arteries, anterior cerebral arteries, and middle cerebral arteries demonstrate no occlusion or stenosis. No evidence for aneurysm or arteriovenous malformation. Flow to the right middle cerebral    artery and anterior cerebral artery is likely via patent anterior communicating artery, given occlusion of right internal carotid artery. POSTERIOR CIRCULATION: The bilateral vertebral arteries, basilar artery and posterior cerebral arteries demonstrate no occlusion or stenosis. No evidence for aneurysm or arteriovenous malformation. There is fetal origin of the left posterior cerebral    artery noted. INTRACRANIAL VENOUS SYSTEM: No evidence for intracranial venous thrombosis. Referred to the recent noncontrast CT head for additional results. .      IMPRESSION:      Fetal origin posterior cerebral artery. Normal filling of right middle and anterior cerebral artery despite internal carotid occlusion. This is likely predominantly via a patent anterior communicating artery. Otherwise no intracranial large vessel occlusion or significant stenosis. No evidence to suggest aneurysm. CT Head WO Contrast   Final Result      No evidence of acute intracranial abnormality.                      XR CHEST PORTABLE   Final Result      Pulmonary venous hypertension          LABS:   Results for orders placed or performed during the hospital encounter of 12/06/21   CBC Auto Differential   Result Value Ref Range    WBC 5.4 4.0 - 11.0 K/uL    RBC 2.95 (L) 4.00 - 5.20 M/uL    Hemoglobin 9.1 (L) 12.0 - 16.0 g/dL    Hematocrit 28.5 (L) 36.0 - 48.0 %    MCV 96.7 80.0 - 100.0 fL    MCH 30.8 26.0 - 34.0 pg    MCHC 31.9 31.0 - 36.0 g/dL RDW 17.1 (H) 12.4 - 15.4 %    Platelets 215 531 - 063 K/uL    MPV 8.2 5.0 - 10.5 fL    Neutrophils % 65.0 %    Lymphocytes % 23.6 %    Monocytes % 6.9 %    Eosinophils % 3.5 %    Basophils % 1.0 %    Neutrophils Absolute 3.5 1.7 - 7.7 K/uL    Lymphocytes Absolute 1.3 1.0 - 5.1 K/uL    Monocytes Absolute 0.4 0.0 - 1.3 K/uL    Eosinophils Absolute 0.2 0.0 - 0.6 K/uL    Basophils Absolute 0.1 0.0 - 0.2 K/uL   Basic Metabolic Panel w/ Reflex to MG   Result Value Ref Range    Sodium 139 136 - 145 mmol/L    Potassium reflex Magnesium 3.1 (L) 3.5 - 5.1 mmol/L    Chloride 100 99 - 110 mmol/L    CO2 29 21 - 32 mmol/L    Anion Gap 10 3 - 16    Glucose 123 (H) 70 - 99 mg/dL    BUN 23 (H) 7 - 20 mg/dL    CREATININE 2.4 (H) 0.6 - 1.1 mg/dL    GFR Non-African American 21 (A) >60    GFR  26 (A) >60    Calcium 8.6 8.3 - 10.6 mg/dL   Troponin   Result Value Ref Range    Troponin 0.10 (H) <0.01 ng/mL   Brain Natriuretic Peptide   Result Value Ref Range    Pro-BNP 26,896 (H) 0 - 124 pg/mL   Blood Gas, Venous   Result Value Ref Range    pH, Moise 7.409 7.350 - 7.450    pCO2, Moise 49.7 41.0 - 51.0 mmHg    pO2, Moise 30.4 25.0 - 40.0 mmHg    HCO3, Venous 31.4 (H) 24.0 - 28.0 mmol/L    Base Excess, Moise 5.9 (H) -2.0 - 3.0 mmol/L    O2 Sat, Moise 54 Not established %    Carboxyhemoglobin 1.6 (H) 0.0 - 1.5 %    MetHgb, Moise 0.3 0.0 - 1.5 %    TC02 (Calc), Moise 33 mmol/L    Hemoglobin, Moise, Reduced 45.30 %   Urinalysis Reflex to Culture    Specimen: Urine, clean catch   Result Value Ref Range    Color, UA Yellow Straw/Yellow    Clarity, UA Clear Clear    Glucose, Ur Negative Negative mg/dL    Bilirubin Urine Negative Negative    Ketones, Urine Negative Negative mg/dL    Specific Gravity, UA 1.020 1.005 - 1.030    Blood, Urine Negative Negative    pH, UA 6.5 5.0 - 8.0    Protein, UA >=300 (A) Negative mg/dL    Urobilinogen, Urine 0.2 <2.0 E.U./dL    Nitrite, Urine Negative Negative    Leukocyte Esterase, Urine Negative Negative Microscopic Examination YES     Urine Type Voided     Urine Reflex to Culture Not Indicated    Microscopic Urinalysis   Result Value Ref Range    WBC, UA 0-2 0 - 5 /HPF    RBC, UA 0-2 0 - 4 /HPF    Epithelial Cells, UA 6-10 (A) 0 - 5 /HPF    Bacteria, UA 1+ (A) None Seen /HPF   Magnesium   Result Value Ref Range    Magnesium 1.70 (L) 1.80 - 2.40 mg/dL   POCT Glucose   Result Value Ref Range    POC Glucose 126 (H) 70 - 99 mg/dl    Performed on ACCU-CHEK    EKG 12 Lead   Result Value Ref Range    Ventricular Rate 75 BPM    Atrial Rate 75 BPM    P-R Interval 158 ms    QRS Duration 88 ms    Q-T Interval 378 ms    QTc Calculation (Bazett) 422 ms    P Axis 48 degrees    R Axis -6 degrees    T Axis 44 degrees    Diagnosis       EKG performed in ER and to be interpreted by ER physician. Confirmed by MD, ER ([id number]),  Sajan Anguiano ([phone]) on 12/6/2021 3:24:08 PM       ED BEDSIDE ULTRASOUND:  none    RECENT VITALS:  BP: (!) 202/97, Temp: 97.6 °F (36.4 °C), Pulse: 76, Resp: 16, SpO2: 100 %     Procedures     none    ED Course     Nursing Notes, Past Medical Hx,Past Surgical Hx, Social Hx, Allergies, and Family Hx were reviewed. The patient was given the following medications:  Orders Placed This Encounter   Medications    iopamidol (ISOVUE-370) 76 % injection 80 mL    acetaminophen (TYLENOL) tablet 650 mg    magnesium sulfate 1000 mg in dextrose 5% 100 mL IVPB    potassium bicarb-citric acid (EFFER-K) effervescent tablet 40 mEq    potassium chloride 10 mEq/100 mL IVPB (Peripheral Line)       CONSULTS:  IP CONSULT TO HOSPITALIST    [address] [facility] / Ruben Ирина is a 45 y.o. female that presents from dialysis with hypertension. She is unsure of how high the blood pressure got, however she did get a dose of clonidine prior to arrival.  Upon further questioning, she also states that she has had some strokelike symptoms including slurred speech, thinking slowly, and feeling unsteady.   She was evaluated by the attending physician. All care plans were discussed and agreed upon. Clinical Impression     1. Stroke-like symptoms        Disposition     PATIENT REFERRED TO:  No follow-up provider specified.     DISCHARGE MEDICATIONS:  New Prescriptions    No medications on file       DISPOSITION Admitted 12/06/2021 05:37:54 PM        Rosa M Gutierrez, [address]  12/06/21 0129

## 2021-12-06 NOTE — ED PROVIDER NOTES
ED Attending Attestation Note     Date of evaluation: 12/6/2021    This patient was seen by the advance practice provider. I have seen and examined the patient, agree with the workup, evaluation, management and diagnosis. The care plan has been discussed. I have reviewed the ECG and concur with the AMY's interpretation. My assessment reveals an overall well-appearing patient, pleasantly conversational, in no acute distress. She has a history of end-stage renal disease on hemodialysis, and was unable to complete her dialysis session today due to significant hypertension. Upon arrival to the emergency department, the patient also complains of some difficulty speaking and difficulty with coordination and balance today. She noticed the symptoms upon awakening, therefore last known well was when she went to bed around 10:00 last night. On my examination she has some intermittently halting or stuttering speech, without jossie aphasia or dysarthria. No facial droop or focal weakness is otherwise noted. She does not have unilateral ataxia, but was not ambulated, as she feels unsteady when she tries to get up. Concern is for cerebrovascular event as the source of her symptoms. Her CT head is unremarkable. CTA shows an area of stenosis and occlusion of the proximal extracranial right ICA, although with normal intracranial circulation throughout. As her last known well was 10 PM last night, the patient is not a candidate for systemic TPA. As she has currently normal intracranial vasculature, there is not a target for intervention. Given the possibility of a small vessel stroke, potentially embolic from her carotid disease, as the etiology of her symptoms, acute management of her blood pressure has not been initiated in the emergency department.          Heidy Flores MD  12/06/21 1587

## 2021-12-07 ENCOUNTER — APPOINTMENT (OUTPATIENT)
Dept: MRI IMAGING | Age: 51
DRG: 304 | End: 2021-12-07
Payer: MEDICARE

## 2021-12-07 LAB
ANION GAP SERPL CALCULATED.3IONS-SCNC: 10 MMOL/L (ref 3–16)
BASOPHILS ABSOLUTE: 0 K/UL (ref 0–0.2)
BASOPHILS RELATIVE PERCENT: 0.3 %
BUN BLDV-MCNC: 26 MG/DL (ref 7–20)
CALCIUM SERPL-MCNC: 8.6 MG/DL (ref 8.3–10.6)
CHLORIDE BLD-SCNC: 100 MMOL/L (ref 99–110)
CHOLESTEROL, TOTAL: 128 MG/DL (ref 0–199)
CO2: 28 MMOL/L (ref 21–32)
CREAT SERPL-MCNC: 2.8 MG/DL (ref 0.6–1.1)
EOSINOPHILS ABSOLUTE: 0.2 K/UL (ref 0–0.6)
EOSINOPHILS RELATIVE PERCENT: 2.2 %
ESTIMATED AVERAGE GLUCOSE: 131.2 MG/DL
GFR AFRICAN AMERICAN: 21
GFR NON-AFRICAN AMERICAN: 18
GLUCOSE BLD-MCNC: 104 MG/DL (ref 70–99)
GLUCOSE BLD-MCNC: 120 MG/DL (ref 70–99)
GLUCOSE BLD-MCNC: 133 MG/DL (ref 70–99)
GLUCOSE BLD-MCNC: 84 MG/DL (ref 70–99)
GLUCOSE BLD-MCNC: 85 MG/DL (ref 70–99)
GLUCOSE BLD-MCNC: 88 MG/DL (ref 70–99)
HBA1C MFR BLD: 6.2 %
HCT VFR BLD CALC: 24.5 % (ref 36–48)
HDLC SERPL-MCNC: 41 MG/DL (ref 40–60)
HEMOGLOBIN: 8.1 G/DL (ref 12–16)
LDL CHOLESTEROL CALCULATED: 68 MG/DL
LV EF: 58 %
LVEF MODALITY: NORMAL
LYMPHOCYTES ABSOLUTE: 1.2 K/UL (ref 1–5.1)
LYMPHOCYTES RELATIVE PERCENT: 17 %
MCH RBC QN AUTO: 31.7 PG (ref 26–34)
MCHC RBC AUTO-ENTMCNC: 33.1 G/DL (ref 31–36)
MCV RBC AUTO: 95.8 FL (ref 80–100)
MONOCYTES ABSOLUTE: 0.4 K/UL (ref 0–1.3)
MONOCYTES RELATIVE PERCENT: 6 %
NEUTROPHILS ABSOLUTE: 5.2 K/UL (ref 1.7–7.7)
NEUTROPHILS RELATIVE PERCENT: 74.5 %
PDW BLD-RTO: 16.2 % (ref 12.4–15.4)
PERFORMED ON: ABNORMAL
PERFORMED ON: NORMAL
PERFORMED ON: NORMAL
PLATELET # BLD: 199 K/UL (ref 135–450)
PMV BLD AUTO: 8 FL (ref 5–10.5)
POTASSIUM REFLEX MAGNESIUM: 4 MMOL/L (ref 3.5–5.1)
RBC # BLD: 2.56 M/UL (ref 4–5.2)
SODIUM BLD-SCNC: 138 MMOL/L (ref 136–145)
TRIGL SERPL-MCNC: 94 MG/DL (ref 0–150)
VLDLC SERPL CALC-MCNC: 19 MG/DL
WBC # BLD: 7 K/UL (ref 4–11)

## 2021-12-07 PROCEDURE — C8929 TTE W OR WO FOL WCON,DOPPLER: HCPCS

## 2021-12-07 PROCEDURE — 6360000002 HC RX W HCPCS: Performed by: INTERNAL MEDICINE

## 2021-12-07 PROCEDURE — 85025 COMPLETE CBC W/AUTO DIFF WBC: CPT

## 2021-12-07 PROCEDURE — 97530 THERAPEUTIC ACTIVITIES: CPT

## 2021-12-07 PROCEDURE — 6370000000 HC RX 637 (ALT 250 FOR IP): Performed by: STUDENT IN AN ORGANIZED HEALTH CARE EDUCATION/TRAINING PROGRAM

## 2021-12-07 PROCEDURE — 97162 PT EVAL MOD COMPLEX 30 MIN: CPT

## 2021-12-07 PROCEDURE — APPNB45 APP NON BILLABLE 31-45 MINUTES: Performed by: NURSE PRACTITIONER

## 2021-12-07 PROCEDURE — 2580000003 HC RX 258: Performed by: INTERNAL MEDICINE

## 2021-12-07 PROCEDURE — 97116 GAIT TRAINING THERAPY: CPT

## 2021-12-07 PROCEDURE — 6370000000 HC RX 637 (ALT 250 FOR IP): Performed by: NURSE PRACTITIONER

## 2021-12-07 PROCEDURE — 70551 MRI BRAIN STEM W/O DYE: CPT

## 2021-12-07 PROCEDURE — 2060000000 HC ICU INTERMEDIATE R&B

## 2021-12-07 PROCEDURE — 6370000000 HC RX 637 (ALT 250 FOR IP): Performed by: INTERNAL MEDICINE

## 2021-12-07 PROCEDURE — 97535 SELF CARE MNGMENT TRAINING: CPT

## 2021-12-07 PROCEDURE — 6370000000 HC RX 637 (ALT 250 FOR IP): Performed by: SURGERY

## 2021-12-07 PROCEDURE — 80061 LIPID PANEL: CPT

## 2021-12-07 PROCEDURE — 36415 COLL VENOUS BLD VENIPUNCTURE: CPT

## 2021-12-07 PROCEDURE — 83036 HEMOGLOBIN GLYCOSYLATED A1C: CPT

## 2021-12-07 PROCEDURE — 2580000003 HC RX 258: Performed by: STUDENT IN AN ORGANIZED HEALTH CARE EDUCATION/TRAINING PROGRAM

## 2021-12-07 PROCEDURE — 80048 BASIC METABOLIC PNL TOTAL CA: CPT

## 2021-12-07 PROCEDURE — 99233 SBSQ HOSP IP/OBS HIGH 50: CPT | Performed by: INTERNAL MEDICINE

## 2021-12-07 PROCEDURE — 6360000002 HC RX W HCPCS: Performed by: STUDENT IN AN ORGANIZED HEALTH CARE EDUCATION/TRAINING PROGRAM

## 2021-12-07 PROCEDURE — 97166 OT EVAL MOD COMPLEX 45 MIN: CPT

## 2021-12-07 RX ORDER — GUAIFENESIN/DEXTROMETHORPHAN 100-10MG/5
5 SYRUP ORAL ONCE
Status: COMPLETED | OUTPATIENT
Start: 2021-12-07 | End: 2021-12-07

## 2021-12-07 RX ORDER — BUTALBITAL, ACETAMINOPHEN AND CAFFEINE 50; 325; 40 MG/1; MG/1; MG/1
2 TABLET ORAL EVERY 6 HOURS PRN
Status: DISCONTINUED | OUTPATIENT
Start: 2021-12-07 | End: 2021-12-07 | Stop reason: SDUPTHER

## 2021-12-07 RX ORDER — GUAIFENESIN 600 MG/1
600 TABLET, EXTENDED RELEASE ORAL 2 TIMES DAILY
Status: DISCONTINUED | OUTPATIENT
Start: 2021-12-07 | End: 2021-12-09 | Stop reason: HOSPADM

## 2021-12-07 RX ORDER — BUTALBITAL, ACETAMINOPHEN AND CAFFEINE 50; 325; 40 MG/1; MG/1; MG/1
1 TABLET ORAL EVERY 4 HOURS PRN
Status: DISCONTINUED | OUTPATIENT
Start: 2021-12-07 | End: 2021-12-09 | Stop reason: HOSPADM

## 2021-12-07 RX ORDER — CARVEDILOL 12.5 MG/1
12.5 TABLET ORAL 2 TIMES DAILY WITH MEALS
Status: DISCONTINUED | OUTPATIENT
Start: 2021-12-07 | End: 2021-12-09 | Stop reason: HOSPADM

## 2021-12-07 RX ORDER — CLOPIDOGREL BISULFATE 75 MG/1
75 TABLET ORAL DAILY
Status: DISCONTINUED | OUTPATIENT
Start: 2021-12-07 | End: 2021-12-09 | Stop reason: HOSPADM

## 2021-12-07 RX ADMIN — BUTALBITAL, ACETAMINOPHEN, AND CAFFEINE 1 TABLET: 50; 325; 40 TABLET ORAL at 13:13

## 2021-12-07 RX ADMIN — ONDANSETRON HYDROCHLORIDE 4 MG: 4 TABLET, FILM COATED ORAL at 03:14

## 2021-12-07 RX ADMIN — SPIRONOLACTONE 50 MG: 50 TABLET ORAL at 08:30

## 2021-12-07 RX ADMIN — SODIUM CHLORIDE, PRESERVATIVE FREE 10 ML: 5 INJECTION INTRAVENOUS at 21:55

## 2021-12-07 RX ADMIN — ACETAMINOPHEN 650 MG: 325 TABLET ORAL at 09:25

## 2021-12-07 RX ADMIN — HEPARIN SODIUM 5000 UNITS: 5000 INJECTION INTRAVENOUS; SUBCUTANEOUS at 21:55

## 2021-12-07 RX ADMIN — PANTOPRAZOLE SODIUM 40 MG: 40 TABLET, DELAYED RELEASE ORAL at 06:29

## 2021-12-07 RX ADMIN — GABAPENTIN 300 MG: 300 CAPSULE ORAL at 08:29

## 2021-12-07 RX ADMIN — GUAIFENESIN AND DEXTROMETHORPHAN 5 ML: 100; 10 SYRUP ORAL at 03:03

## 2021-12-07 RX ADMIN — ACETAMINOPHEN 650 MG: 325 TABLET ORAL at 17:41

## 2021-12-07 RX ADMIN — HEPARIN SODIUM 5000 UNITS: 5000 INJECTION INTRAVENOUS; SUBCUTANEOUS at 13:16

## 2021-12-07 RX ADMIN — HEPARIN SODIUM 5000 UNITS: 5000 INJECTION INTRAVENOUS; SUBCUTANEOUS at 06:29

## 2021-12-07 RX ADMIN — SODIUM CHLORIDE 25 ML: 9 INJECTION, SOLUTION INTRAVENOUS at 13:05

## 2021-12-07 RX ADMIN — IRON SUCROSE 200 MG: 20 INJECTION, SOLUTION INTRAVENOUS at 13:07

## 2021-12-07 RX ADMIN — SODIUM CHLORIDE, PRESERVATIVE FREE 10 ML: 5 INJECTION INTRAVENOUS at 08:30

## 2021-12-07 RX ADMIN — INSULIN GLARGINE 10 UNITS: 100 INJECTION, SOLUTION SUBCUTANEOUS at 00:11

## 2021-12-07 RX ADMIN — GUAIFENESIN 600 MG: 600 TABLET, EXTENDED RELEASE ORAL at 12:11

## 2021-12-07 RX ADMIN — CLOPIDOGREL BISULFATE 75 MG: 75 TABLET, FILM COATED ORAL at 16:30

## 2021-12-07 RX ADMIN — ATORVASTATIN CALCIUM 40 MG: 40 TABLET, FILM COATED ORAL at 08:30

## 2021-12-07 RX ADMIN — CARVEDILOL 12.5 MG: 12.5 TABLET, FILM COATED ORAL at 12:11

## 2021-12-07 RX ADMIN — ASPIRIN 81 MG: 81 TABLET, CHEWABLE ORAL at 08:30

## 2021-12-07 RX ADMIN — GUAIFENESIN 600 MG: 600 TABLET, EXTENDED RELEASE ORAL at 21:55

## 2021-12-07 RX ADMIN — INSULIN GLARGINE 10 UNITS: 100 INJECTION, SOLUTION SUBCUTANEOUS at 21:55

## 2021-12-07 RX ADMIN — CARVEDILOL 12.5 MG: 12.5 TABLET, FILM COATED ORAL at 16:30

## 2021-12-07 RX ADMIN — ACETAMINOPHEN 650 MG: 325 TABLET ORAL at 03:14

## 2021-12-07 SDOH — ECONOMIC STABILITY: TRANSPORTATION INSECURITY
IN THE PAST 12 MONTHS, HAS LACK OF TRANSPORTATION KEPT YOU FROM MEETINGS, WORK, OR FROM GETTING THINGS NEEDED FOR DAILY LIVING?: NO

## 2021-12-07 SDOH — ECONOMIC STABILITY: INCOME INSECURITY: IN THE LAST 12 MONTHS, WAS THERE A TIME WHEN YOU WERE NOT ABLE TO PAY THE MORTGAGE OR RENT ON TIME?: NO

## 2021-12-07 SDOH — ECONOMIC STABILITY: TRANSPORTATION INSECURITY
IN THE PAST 12 MONTHS, HAS THE LACK OF TRANSPORTATION KEPT YOU FROM MEDICAL APPOINTMENTS OR FROM GETTING MEDICATIONS?: NO

## 2021-12-07 SDOH — ECONOMIC STABILITY: HOUSING INSECURITY
IN THE LAST 12 MONTHS, WAS THERE A TIME WHEN YOU DID NOT HAVE A STEADY PLACE TO SLEEP OR SLEPT IN A SHELTER (INCLUDING NOW)?: NO

## 2021-12-07 ASSESSMENT — PAIN DESCRIPTION - PAIN TYPE
TYPE: ACUTE PAIN

## 2021-12-07 ASSESSMENT — PAIN DESCRIPTION - ONSET
ONSET: ON-GOING

## 2021-12-07 ASSESSMENT — PAIN DESCRIPTION - PROGRESSION
CLINICAL_PROGRESSION: NOT CHANGED

## 2021-12-07 ASSESSMENT — PAIN SCALES - GENERAL
PAINLEVEL_OUTOF10: 2
PAINLEVEL_OUTOF10: 6
PAINLEVEL_OUTOF10: 8
PAINLEVEL_OUTOF10: 2
PAINLEVEL_OUTOF10: 2
PAINLEVEL_OUTOF10: 6
PAINLEVEL_OUTOF10: 7
PAINLEVEL_OUTOF10: 8
PAINLEVEL_OUTOF10: 8

## 2021-12-07 ASSESSMENT — PAIN DESCRIPTION - DESCRIPTORS
DESCRIPTORS: ACHING;HEADACHE
DESCRIPTORS: ACHING

## 2021-12-07 ASSESSMENT — PAIN DESCRIPTION - ORIENTATION
ORIENTATION: LOWER
ORIENTATION: ANTERIOR
ORIENTATION: ANTERIOR;POSTERIOR
ORIENTATION: ANTERIOR

## 2021-12-07 ASSESSMENT — PAIN DESCRIPTION - FREQUENCY
FREQUENCY: CONTINUOUS
FREQUENCY: INTERMITTENT
FREQUENCY: CONTINUOUS
FREQUENCY: CONTINUOUS

## 2021-12-07 ASSESSMENT — PAIN DESCRIPTION - LOCATION
LOCATION: HEAD
LOCATION: BACK
LOCATION: HEAD
LOCATION: HEAD

## 2021-12-07 ASSESSMENT — PAIN DESCRIPTION - DIRECTION
RADIATING_TOWARDS: LEFT EYE
RADIATING_TOWARDS: LEFT EYE

## 2021-12-07 ASSESSMENT — PAIN - FUNCTIONAL ASSESSMENT
PAIN_FUNCTIONAL_ASSESSMENT: PREVENTS OR INTERFERES SOME ACTIVE ACTIVITIES AND ADLS
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
## 2021-12-07 NOTE — PLAN OF CARE
Problem: Daily Care:  Goal: Daily care needs are met  Description: Daily care needs are met  Outcome: Ongoing  Note: Patient will communicate with staff to ensure that daily care needs are being met to patient's satisfaction. Problem: Falls - Risk of:  Goal: Will remain free from falls  Description: Will remain free from falls  Outcome: Ongoing  Note: Patient will remain free from falls. Patient will work with staff to ambulate with walker/gait belt and x1 assist. Patient will use call light to notify staff of needs prior to exiting the bed.

## 2021-12-07 NOTE — PLAN OF CARE
Problem: Safety:  Goal: Free from accidental physical injury  Description: Free from accidental physical injury  Outcome: Ongoing   All fall precautions in place. Bed locked and in lowest position with alarm on. Overbed table and personal belonings within reach. Call light within reach and patient instructed to use call light for assistance. Non-skid socks on. Problem: Pain:  Goal: Patient's pain/discomfort is manageable  Description: Patient's pain/discomfort is manageable  Outcome: Ongoing   Pt endorses headache pain. Pain being managed with prn pain medication per the [facility] with some relief. Pt using rest, repositioning, and distraction as non-pharm measures to decrease pain and promote comfort.

## 2021-12-07 NOTE — CONSULTS
Neurology / Neurocritical Care Consult Note    Reason for Consult: dysarthria   Admission Chief Complaint: slurred speech      HPI:     Lavinia Kenyon is a 45 y.o. female with a history of congestive heart failure, hypertension, type 2 diabetes, chronic kidney disease on hemodialysis who presents from dialysis because she was hypertensive. She reports she woke up on the morning of 12/6 with symptoms. She has never had anything like this before. Nothing makes it better or worse. She is still having dysarthria and ataxia. She denies any double vision or sensory changes. PAST MEDICAL HISTORY:  Past Medical History:   Diagnosis Date    CH[facility]congestive [facility] failure[facility]     Diabetes mellitus ([facility] [address] [facility]     Hypertension        ALLERGIES:  No Known Allergies    SURGICAL HISTORY:  Past Surgical History:   Procedure Laterality Date    DIALYSIS CATHETER INSERTION N/A 7/9/2021    LAPAROSCOPIC PERITONEAL DIALYSIS CATHETER INSERTION, OMENTOPLEXY performed by Shanita Beach DO at [address] N/A 11/14/2021    CATHETER REMOVAL PERITONEAL DIALYSIS performed by Shanita Beach DO at [address] [facility] IR TUNNELED CATHETER PLACEMENT GREATER THAN 5 YEARS  11/15/2021    IR TUNNELED CATHETER PLACEMENT GREATER THAN 5 YEARS 11/15/2021 [facility]       FAMILY HISTORY:  Family history non-contributory  History reviewed. No pertinent family history. SOCIAL HISTORY:  Social History     Tobacco History     Smoking Status  Never Smoker    Smokeless Tobacco Use  Never Used          Alcohol History     Alcohol Use Status  Not Currently          Drug Use     Drug Use Status  Not Currently          Sexual Activity     Sexually Active  Not Currently                HOME MEDICATIONS:  No current facility-administered medications on file prior to encounter.      Current Outpatient Medications on File Prior to Encounter   Medication Sig Dispense Refill    insulin glargine (LANTUS;BASAGLAR) 100 UNIT/ML injection pen Inject 10 Units into the skin nightly 5 pen 3    gabapentin (NEURONTIN) 100 MG capsule Take 3 capsules by mouth daily for 90 days. 90 capsule 2    diphenhydrAMINE (BENADRYL) 25 MG capsule Take 25 mg by mouth every 6 hours as needed for Itching      insulin lispro, 1 Unit Dial, 100 UNIT/ML SOPN Inject 10 Units into the skin 3 times daily (with meals) 5 pen 1    spironolactone (ALDACTONE) 50 MG tablet Take 1 tablet by mouth daily 30 tablet 3    carvedilol (COREG) 12.5 MG tablet Take 1 tablet by mouth 2 times daily 60 tablet 3    atorvastatin (LIPITOR) 40 MG tablet Take 1 tablet by mouth daily 30 tablet 3    ondansetron (ZOFRAN) 4 MG tablet Take 1 tablet by mouth every 12 hours as needed for Nausea or Vomiting 14 tablet 0    nitroGLYCERIN (NITROSTAT) 0.4 MG SL tablet Place 1 tablet under the tongue every 5 minutes as needed for Chest pain 5 tablet 1    aspirin 81 MG chewable tablet Take 1 tablet by mouth daily 30 tablet 3    pantoprazole (PROTONIX) 40 MG tablet Take 40 mg by mouth daily as needed      butalbital-acetaminophen-caffeine (FIORICET, ESGIC) -40 MG per tablet Take 1 tablet by mouth every 4 hours as needed for Headaches 30 tablet 0         ROS:   Constitutional- No weight loss or fevers   Eyes- No diplopia. No photophobia. Ears/nose/throat- No dysphagia. No Dysarthria   Cardiovascular- No palpitations. No chest pain   Respiratory- No dyspnea. No Cough   Gastrointestinal- No Abdominal pain. No Vomiting. Genitourinary- No incontinence. No urinary retention   Musculoskeletal- No myalgia. No arthralgia   Skin- No rash. No easy bruising. Psychiatric- No depression. No anxiety   Endocrine- No diabetes. No thyroid issues. Hematologic- No bleeding difficulty. No fatigue   Neurologic- No weakness.  No Headache. +incoordination      PHYSICAL EXAM:  BP (!) 149/92   Pulse 75   Temp 97.9 °F (36.6 °C) (Oral)   Resp 16   Ht 5' (1.524 m)   Wt 204 lb 2.3 oz (92.6 kg)   SpO2 95% BMI 39.87 kg/m²     General Alert, no distress, well-nourished  Cardiovascular: Warm, appears well perfused   Respiratory: Easy, non-labored respiratory pattern   Abdominal: Abdomen is without distention   Extremities: Upper and lower extremities are atraumatic in appearance without deformity. No swelling or erythema. Neurologic  Mental status:   orientation to person, place, time, situation   Attention intact as able to attend well to the exam     Language fluent in conversation   Comprehension intact; follows simple commands    Cranial nerves:   CN2: Visual fields full w/o extinction on confrontational testing   CN 3,4,6: Pupils equal and reactive to light, extraocular muscles intact  CN5: Facial sensation symmetric   CN7: Face symmetric bilaterally   CN8: Hearing symmetric to spoken voice  CN9: Palate elevated symmetrically  CN11: Traps full strength on shoulder shrug  CN12: Tongue midline with protrusion    Motor Exam:  Strong and symmetric throughout in her upper and lower extremities   Deep tendon reflexes:    R  L    Biceps  1  1   Brachioradialis  1 1   Patellar  1 1   Toes 1 1     Sensory: light touch intact and symmetric in all 4 extremities. Cerebellar/coordination: finger nose finger normal abnormal on left  Heel shin abnormal on left     Gait: Ataxic  Speech: slurred speech       IMAGES:  Images personally reviewed and agree w/ radiology interpretation. Head CT w/o Contrast:  CT Head WO Contrast 12/06/2021    Narrative  CT HEAD WITHOUT CONTRAST    HISTORY: Hypertension and dysarthria    COMPARISON: None    Underexposure controls were utilized during the CT examination to meet ALARA standards for radiation dose reduction. FINDINGS:        The ventricles are normal in size and configuration with no midline shift or mass effect. There is no evidence of intracranial hemorrhage or abnormal extra-axial fluid collection. There are no definite signs to suggest acute infarction.  However, if there is concern of early infarction or other subtle intracranial abnormality, MRI correlation should be considered. Visualized portions of the paranasal sinuses appear clear. Impression  No evidence of acute intracranial abnormality. CTA of Head / Neck w/ Contrast:  CTA HEAD NECK W CONTRAST 12/06/2021    Narrative  PROCEDURE: CT ANGIOGRAPHY NECK WITH/WITHOUT CONTRAST    INDICATION: hypertension, dysarthria, past pointing, ataxia    COMPARISON: none    TECHNIQUE: Limited noncontrast CT imaging performed for the purposes of IV contrast bolus tracking. Axial CT imaging obtained from skull base through the lung apices following administration of IV contrast. Axial images, multiplanar reformatted images,  and maximum intensity projection images were reviewed for CT angiographic technique. Individualized dose optimization technique was used in order to meet ALARA standards for radiation dose reduction. In addition to vendor specific dose reduction  algorithms, the dose reduction techniques vary based on the specific scanner utilized but frequently include automated exposure control, adjustment of the mA and/or kV according to patient size, and use of iterative reconstruction technique. NASCET  criteria used to determine percent stenosis measurements (% stenosis = (1-narrowest diameter/diameter of normal distal segment)x100). IV contrast: 80 mL Isovue-370. FINDINGS:    AORTIC ARCH: Standard three-vessel aortic arch anatomy is present. INNOMINATE ARTERY: Normal.    RIGHT SUBCLAVIAN ARTERY: Normal.    RIGHT VERTEBRAL ARTERY: Normal.    RIGHT CAROTID ARTERY: There is marked narrowing of the proximal right internal carotid artery with irregular hypotrophic appearance of the proximal vessel and occlusion of the mid right internal carotid artery. No definite evidence of significant flow  seen distal to this.     LEFT SUBCLAVIAN ARTERY: Normal.    LEFT VERTEBRAL ARTERY: Normal.    LEFT CAROTID ARTERY: The left common and internal carotid arteries appear patent. NECK SOFT TISSUES: No neck soft tissue mass or lymphadenopathy. VISUALIZED MEDIASTINUM: No mass or lymphadenopathy. VISUALIZED LUNG APICES: Clear. BONES: No destructive osseous process. OTHER: None. Impression  There is marked stenosis and irregularity of the proximal right internal carotid artery with complete occlusion in the mid to distal segment of the cervical portion of the right internal carotid artery. Potential etiologies include atherosclerotic  disease, carotid dissection which may be chronic or acute, vasculitis, and potentially fibromuscular dysplasia although the appearance is atypical.    Otherwise no significant vascular pathology is identified in the neck. PROCEDURE: CT ANGIOGRAPHY HEAD WITH/WITHOUT CONTRAST    INDICATION: hypertension, dysarthria, past pointing, ataxia    COMPARISON: none    TECHNIQUE: Axial CT imaging obtained through the head prior to and following administration of IV contrast. Axial images, multiplanar reformatted images, and maximum intensity projection images were reviewed for CT angiographic technique. IV contrast: 80 mL Isovue-370. FINDINGS:    ANTERIOR CIRCULATION: The intracranial internal carotid arteries, anterior cerebral arteries, and middle cerebral arteries demonstrate no occlusion or stenosis. No evidence for aneurysm or arteriovenous malformation. Flow to the right middle cerebral  artery and anterior cerebral artery is likely via patent anterior communicating artery, given occlusion of right internal carotid artery. POSTERIOR CIRCULATION: The bilateral vertebral arteries, basilar artery and posterior cerebral arteries demonstrate no occlusion or stenosis. No evidence for aneurysm or arteriovenous malformation. There is fetal origin of the left posterior cerebral  artery noted. INTRACRANIAL VENOUS SYSTEM: No evidence for intracranial venous thrombosis.     Referred to the recent noncontrast CT head for additional results. .    IMPRESSION:    Fetal origin posterior cerebral artery. Normal filling of right middle and anterior cerebral artery despite internal carotid occlusion. This is likely predominantly via a patent anterior communicating artery. Otherwise no intracranial large vessel occlusion or significant stenosis. No evidence to suggest aneurysm. MRI Brain w/o Contrast:   MRI BRAIN WO CONTRAST 12/07/2021    Narrative  Exam:MRI BRAIN WO CONTRAST  Date:12/7/2021 2:12 AM    Indication: Dysarthria  Comparison: 12/6/2021  Technique: Multiplanar multisequence MR images obtained of the brain. Contrast:  None    FINDINGS:    No diffusion, intracranial hemorrhage, or extra-axial collection. Age-appropriate ventricular and sulcal size. Mild patchy periventricular and subcortical white matter hyperintense T2/FLAIR signal abnormality, including within the central bina. Major  intracranial vascular flow voids intact. Prior cataract removal. Mild mucosal thickening throughout the paranasal sinuses. Mastoid air cells clear. No suspicious marrow signal. Mild prominence of the adenoidal tonsillar tissue, likely incidental.    Impression  No acute intracranial abnormality. Mild nonspecific white matter disease, which likely represents chronic small vessel ischemic change. MRI Brain w & w/o Contrast:  No results found for this or any previous visit from the past 10 days. LABS:  All results below personally reviewed. Pertinent positives & negatives are addressed in Impression & Recommendations below.    Recent Labs     12/06/21  1516 12/07/21  0625    138   K 3.1* 4.0    100   CO2 29 28   BUN 23* 26*   CREATININE 2.4* 2.8*   GLUCOSE 123* 85   CALCIUM 8.6 8.6   MG 1.70*  --      Recent Labs     12/06/21  1516 12/07/21  0625   WBC 5.4 7.0   RBC 2.95* 2.56*   HGB 9.1* 8.1*   HCT 28.5* 24.5*    199     Lab Results   Component Value Date    INR 1.11 11/15/2021 LABA1C 7.3 11/10/2021    LDLCALC 182 12/10/2019    TRIG 116 12/10/2019    TSH 0.11 12/09/2019     Lab Results   Component Value Date    LACTSEPSIS 1.0 07/10/2021    LACTA 2.0 11/09/2021         CURRENT SCHEDULED MEDICATIONS:   aspirin  81 mg Oral Daily    atorvastatin  40 mg Oral Daily    gabapentin  300 mg Oral Daily    insulin glargine  10 Units SubCUTAneous Nightly    insulin lispro (1 Unit Dial)  10 Units SubCUTAneous TID WC    pantoprazole  40 mg Oral QAM AC    sodium chloride flush  5-40 mL IntraVENous 2 times per day    spironolactone  50 mg Oral Daily    heparin (porcine)  5,000 Units SubCUTAneous 3 times per day     sodium chloride  dextrose      nitroGLYCERIN, 0.4 mg, Q5 Min PRN  ondansetron, 4 mg, Q12H PRN  sodium chloride flush, 5-40 mL, PRN  sodium chloride, 25 mL, PRN  polyethylene glycol, 17 g, Daily PRN  acetaminophen, 650 mg, Q6H PRN   Or  acetaminophen, 650 mg, Q6H PRN  glucose, 15 g, PRN  dextrose, 12.5 g, PRN  glucagon (rDNA), 1 mg, PRN  dextrose, 100 mL/hr, PRN  perflutren lipid microspheres, 1.5 mL, ONCE PRN  labetalol, 10 mg, Q4H PRN         IMPRESSION & RECOMMENDATIONS     IMPRESSION:Scottie Chiang is a 45 y.o. female with a history of congestive heart failure, hypertension, type 2 diabetes, chronic kidney disease on hemodialysis who presented with slurred speech and ataxia concern for a stroke. Even though her MRI was negative for acute stroke, I still believe she had a stroke given her clinical picture. Sometimes small strokes in the brainstem can initially have negative MRI so we will repeat DWI and ADC.     RECOMMENDATIONS:  -Repeat MRI brain with DWI/ADC only   -CTA head and neck with right ICA occlusion with good reconstitution   -Echocardiogram with bubble  -Nursing bedside swallow before PO   -ASA 81mg PO daily  -Atorvastatin 80mg PO QHS  -SCDs for DVT prophylaxis  -PT/OT: eval and treat, PMR consult if rehab appropriate   -ST: eval and treat and dysphagia screen  -Allow BP to autoregulate for first 24 hours after stroke and treat BP >220/110 with labetalol   -Q4H neuro checks  -Q4H vital signs  -Check lipid panel and hemoglobin A1C  -Telemetry         ROGER FARIAS - CNP   Neurology & Neurocritical Care   Neurology Line: [phone]  [employer]: [facility] Neurology & Neuro Critical Care NPs  12/7/2021 9:20 AM

## 2021-12-07 NOTE — PROGRESS NOTES
Stroke Admission    I agree as the admission nurse that I have completed a thorough stroke assessment and completed the admission on the patient. ALL assessment areas listed below have been addressed and completed. Presentation: TIA/Stroke w/up    Handoff assessment completed with, Patient came up with transport.     Current NIHSS 1    [x]   Education Assessment  [x]   Individualized Stroke/TIA Education template added, including patient specific risk factors: Hypertension, Diabetes and Personal history of heart disease  [x]   Individualized Stroke/TIA Care Plan template added  [x]   Bedside swallow screen completed using the [place] Swallow Protocol, and documented PRIOR to any PO meds, food or drink: Pass  [x]   VTE Prophylaxis: SCDs ordered/addressed; SCDs: N/A Heparin           (As a reminder, ASA, Plavix and TPA are not VTE prophylaxis.)  [x]   Stroke education booklet given, and education initiated with patient and/or caregiver      Nurse eSignature: Electronically signed by Tamika Marcum RN on 12/6/21 at 11:54 PM EST

## 2021-12-07 NOTE — PROGRESS NOTES
Progress Note    Admit Date: 12/6/2021  Day: 2  Diet: ADULT DIET; Regular    CC: HTN     Interval history: pt seen and examined at bedside. MARC Reports headache, 4/10 intensity. No change in slurred speech since yesterday. Medications:     Scheduled Meds:   aspirin  81 mg Oral Daily    atorvastatin  40 mg Oral Daily    gabapentin  300 mg Oral Daily    insulin glargine  10 Units SubCUTAneous Nightly    insulin lispro (1 Unit Dial)  10 Units SubCUTAneous TID WC    pantoprazole  40 mg Oral QAM AC    sodium chloride flush  5-40 mL IntraVENous 2 times per day    spironolactone  50 mg Oral Daily    heparin (porcine)  5,000 Units SubCUTAneous 3 times per day     Continuous Infusions:   sodium chloride      dextrose       PRN Meds:nitroGLYCERIN, ondansetron, sodium chloride flush, sodium chloride, polyethylene glycol, acetaminophen **OR** acetaminophen, glucose, dextrose, glucagon (rDNA), dextrose, perflutren lipid microspheres, labetalol    Objective:   Vitals:   T-max:  Patient Vitals for the past 8 hrs:   BP Temp Temp src Pulse Resp SpO2 Weight   12/07/21 0700 (!) 149/92 97.9 °F (36.6 °C) Oral 75 16 95 % --   12/07/21 0615 -- -- -- -- -- -- 204 lb 2.3 oz (92.6 kg)   12/07/21 0404 (!) 143/97 97.9 °F (36.6 °C) Oral 82 16 97 % --       Intake/Output Summary (Last 24 hours) at 12/7/2021 0838  Last data filed at 12/7/2021 0834  Gross per 24 hour   Intake 300 ml   Output --   Net 300 ml         Physical Exam  Physical Exam  Constitutional:       Appearance: She is obese. HENT:      Head: Normocephalic and atraumatic. Nose: Nose normal.      Mouth/Throat:      Mouth: Mucous membranes are dry. Pharynx: Oropharynx is clear. Eyes:      Extraocular Movements: Extraocular movements intact. Conjunctiva/sclera: Conjunctivae normal.      Pupils: Pupils are equal, round, and reactive to light. Cardiovascular:      Rate and Rhythm: Normal rate and regular rhythm. Pulses: Normal pulses. Heart sounds: Normal heart sounds. Pulmonary:      Effort: Pulmonary effort is normal.      Breath sounds: Normal breath sounds. Abdominal:      General: Bowel sounds are normal.      Palpations: Abdomen is soft. Musculoskeletal:         General: Normal range of motion. Cervical back: Normal range of motion and neck supple. Skin:     General: Skin is warm and dry. Neurological:      Mental Status: She is alert and oriented to person, place, and time. Sensory: No sensory deficit. Coordination: Coordination normal.      Gait: Gait abnormal.      Deep Tendon Reflexes: Reflexes normal.      Comments: Dysarthria   Psychiatric:         Mood and Affect: Mood normal.         Behavior: Behavior normal.         Thought Content: Thought content normal.         Judgment: Judgment normal.      LABS:    CBC:   Recent Labs     12/06/21  1516 12/07/21  0625   WBC 5.4 7.0   HGB 9.1* 8.1*   HCT 28.5* 24.5*    199   MCV 96.7 95.8     Renal:    Recent Labs     12/06/21  1516 12/07/21  0625    138   K 3.1* 4.0    100   CO2 29 28   BUN 23* 26*   CREATININE 2.4* 2.8*   GLUCOSE 123* 85   CALCIUM 8.6 8.6   MG 1.70*  --    ANIONGAP 10 10     Hepatic: No results for input(s): AST, ALT, BILITOT, BILIDIR, PROT, LABALBU, ALKPHOS in the last 72 hours. Troponin:   Recent Labs     12/06/21  1516   TROPONINI 0.10*     BNP: No results for input(s): BNP in the last 72 hours. Lipids: No results for input(s): CHOL, HDL in the last 72 hours. Invalid input(s): LDLCALCU, TRIGLYCERIDE  ABGs:  No results for input(s): PHART, XAR9QSQ, PO2ART, FIH9YMW, BEART, THGBART, I8IOSGDH, RGW7CBC in the last 72 hours. INR: No results for input(s): INR in the last 72 hours. Lactate: No results for input(s): LACTATE in the last 72 hours. Cultures:  -----------------------------------------------------------------  RAD:   MRI BRAIN WO CONTRAST   Final Result      No acute intracranial abnormality.       Mild nonspecific white matter disease, which likely represents chronic small vessel ischemic change. CTA HEAD NECK W CONTRAST   Final Result      There is marked stenosis and irregularity of the proximal right internal carotid artery with complete occlusion in the mid to distal segment of the cervical portion of the right internal carotid artery. Potential etiologies include atherosclerotic    disease, carotid dissection which may be chronic or acute, vasculitis, and potentially fibromuscular dysplasia although the appearance is atypical.      Otherwise no significant vascular pathology is identified in the neck. PROCEDURE: CT ANGIOGRAPHY HEAD WITH/WITHOUT CONTRAST      INDICATION: hypertension, dysarthria, past pointing, ataxia      COMPARISON: none      TECHNIQUE: Axial CT imaging obtained through the head prior to and following administration of IV contrast. Axial images, multiplanar reformatted images, and maximum intensity projection images were reviewed for CT angiographic technique. IV contrast: 80 mL Isovue-370. FINDINGS:      ANTERIOR CIRCULATION: The intracranial internal carotid arteries, anterior cerebral arteries, and middle cerebral arteries demonstrate no occlusion or stenosis. No evidence for aneurysm or arteriovenous malformation. Flow to the right middle cerebral    artery and anterior cerebral artery is likely via patent anterior communicating artery, given occlusion of right internal carotid artery. POSTERIOR CIRCULATION: The bilateral vertebral arteries, basilar artery and posterior cerebral arteries demonstrate no occlusion or stenosis. No evidence for aneurysm or arteriovenous malformation. There is fetal origin of the left posterior cerebral    artery noted. INTRACRANIAL VENOUS SYSTEM: No evidence for intracranial venous thrombosis. Referred to the recent noncontrast CT head for additional results. .      IMPRESSION:      Fetal origin posterior cerebral artery. Normal filling of right middle and anterior cerebral artery despite internal carotid occlusion. This is likely predominantly via a patent anterior communicating artery. Otherwise no intracranial large vessel occlusion or significant stenosis. No evidence to suggest aneurysm. CT Head WO Contrast   Final Result      No evidence of acute intracranial abnormality. XR CHEST PORTABLE   Final Result      Pulmonary venous hypertension          Assessment/Plan:   76-year-old female with PMH of CHF, HTN, DM 2 and ESRD on HD presented to [facility] ED with chief complaint of hypertension. Patient was admitted for the following concerns:     Dysarthria 2/2 likely ischemic stroke  -CT head without contrast nonrevealing. CT head and neck with contrast revealing of mild stenosis in irregularity of the proximal right ICA with complete occlusion in the mid to distal segment. Normal filling of right MCA and RAN despite internal carotid occlusion. Likely via patent RAN. No intracranial large vessel occlusion or significant stenosis. No aneurysm.   -MRI without contrast: non-revealing of any intracranial abnormality   -Neurology consulted, appreciate recs  -- Q4H Neurochecks   - permissive hypertension for 24 hours, -220  -Labetalol as needed for SBP >220  -Aspirin and statin  -Lipid panel and A1c: pending   -NPO  -SLP  -Seizure prophylaxis and fall precautions  -Echo pending      HTN emergency  -Holding home meds for permissive hypertension for 24 hours   -Labetalol as needed for SBP >220     DM type II  -Lantus 10 units nightly  -Mealtime boluses    ESRD on HD  - nephrology consulted, appreciate recs           Code Status: full code  FEN: npo  PPX: lovenox, PPI  DISPO: Edgar Diaz MD, PGY-1  12/07/21  8:38 AM    This patient has been staffed and discussed with Marija Walker MD.

## 2021-12-07 NOTE — PROGRESS NOTES
Pt is a/o x4. VSS on room air with exception to elevated BP being treated with medication per the [facility]. No acute changes in neuro status. NIH remains a 0. Tolerating diet and fluids well. Voiding adequately per BRP. Tolerating ambulation well x1 w/ GB and walker. Up to the chair this shift and tolerated well. Pt bathed this shift. All fall precautions in place.
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
## 2021-12-07 NOTE — PROGRESS NOTES
4 Eyes Admission Assessment     I agree as the admission nurse that 2 RN's have performed a thorough Head to Toe Skin Assessment on the patient. ALL assessment sites listed below have been assessed on admission. Areas assessed by both nurses:   [x]   Head, Face, and Ears   [x]   Shoulders, Back, and Chest  [x]   Arms, Elbows, and Hands   [x]   Coccyx, Sacrum, and Ischium  [x]   Legs, Feet, and Heels        Does the Patient have Skin Breakdown?   No         Margarito Prevention initiated:  No   Wound Care Orders initiated:  No      [facility] nurse consulted for Pressure Injury (Stage 3,4, Unstageable, DTI, NWPT, and Complex wounds) or Margarito score 18 or lower:  No      Nurse 1 eSignature: Electronically signed by Esperanza Mcgill RN on 12/7/21 at 7:20 AM EST    **SHARE this note so that the co-signing nurse is able to place an eSignature**    Nurse 2 eSignature: Electronically signed by Amy Heredia RN on 12/7/21 at 7:26 AM EST

## 2021-12-07 NOTE — CONSULTS
Nephrology Consult Note                                                                                                                                                                                                                                                                                                                                                               Office : [phone]     Fax :[phone]              Patient's Name: Keith Rodriguez  8:56 AM  12/7/2021    Reason for Consult:  ESRD on HD, Hypertensive Emergency  Requesting Physician:  Yogesh Orr      Chief Complaint:  HTN     History of Present Ilness:    Keith Rodriguez is a 45 y.o. female with PMH ESRD on HD, HTN, DMT2 who presented to the ED from Dialysis center after arrived for HD session and BP recorded to be in 200s. Patient additionally endorses dizziness and frontal headache. In the ED, vitals afebrile, blood pressure 200/92, pulse 72, RR 18, SPO2 95% on room air. Labs revealing of magnesium 1.7 troponin 0.1, potassium 3.1, proBNP 26, 896, UA revealing proteinuria >300, Hb 9.1. CTA head and neck with contrast revealing of stenosis and irregularity of proximal right ICA. Normal filling of right MCA and RAN despite internal carotid occlusion. Likely via patent RAN. No intracranial large vessel occlusion or significant stenosis. No aneurysm. Patient not TPA candidate as last known normal at 10pm. Patient admitted for suspected ischemic stroke, hypertensive emergency, nephology consulted for management of ESRD on HD. Denies missing any scheduled HD appointments or changes to her diet. Endorses consuming more water then normal but denies being on fluid restriction. Endorses cough and URI symptoms since Thanksgiving. Actively coughing during encounter. Admits to shortness of breath and nausea. Denies chest pain or changes to urination.     Past Medical History:   Diagnosis Date    [facility] [address]     Diabetes mellitus ([facility] [address]     Hypertension        Past Surgical History:   Procedure Laterality Date    DIALYSIS CATHETER INSERTION N/A 7/9/2021    LAPAROSCOPIC PERITONEAL DIALYSIS CATHETER INSERTION, OMENTOPLEXY performed by Jeanne Quezada DO at [address] N/A 11/14/2021    CATHETER REMOVAL PERITONEAL DIALYSIS performed by Jeanne Quezada DO at [facility] IR TUNNELED CATHETER PLACEMENT GREATER THAN 5 YEARS  11/15/2021    IR TUNNELED CATHETER PLACEMENT GREATER THAN 5 YEARS 11/15/2021 [facility] PROCEDURES       History reviewed. No pertinent family history. reports that she has never smoked. She has never used smokeless tobacco. She reports previous alcohol use. She reports previous drug use. Allergies:  Patient has no known allergies.     Current Medications:    aspirin chewable tablet 81 mg, Daily  atorvastatin (LIPITOR) tablet 40 mg, Daily  gabapentin (NEURONTIN) capsule 300 mg, Daily  insulin glargine (LANTUS;BASAGLAR) injection pen 10 Units, Nightly  insulin lispro (1 Unit Dial) 10 Units, TID WC  nitroGLYCERIN (NITROSTAT) SL tablet 0.4 mg, Q5 Min PRN  ondansetron (ZOFRAN) tablet 4 mg, Q12H PRN  pantoprazole (PROTONIX) tablet 40 mg, QAM AC  sodium chloride flush 0.9 % injection 5-40 mL, 2 times per day  sodium chloride flush 0.9 % injection 5-40 mL, PRN  0.9 % sodium chloride infusion, PRN  polyethylene glycol (GLYCOLAX) packet 17 g, Daily PRN  acetaminophen (TYLENOL) tablet 650 mg, Q6H PRN   Or  acetaminophen (TYLENOL) suppository 650 mg, Q6H PRN  glucose (GLUTOSE) 40 % oral gel 15 g, PRN  dextrose 50 % IV solution, PRN  glucagon (rDNA) injection 1 mg, PRN  dextrose 5 % solution, PRN  perflutren lipid microspheres (DEFINITY) injection 1.65 mg, ONCE PRN  labetalol (NORMODYNE;TRANDATE) injection 10 mg, Q4H PRN  spironolactone (ALDACTONE) tablet 50 mg, Daily  heparin (porcine) injection 5,000 Units, 3 times per day        Review of Systems:   14 point ROS obtained but were negative except mentioned in HPI      Physical exam:     Vitals:  BP (!) 149/92   Pulse 75   Temp 97.9 °F (36.6 °C) (Oral)   Resp 16   Ht 5' (1.524 m)   Wt 204 lb 2.3 oz (92.6 kg)   SpO2 95%   BMI 39.87 kg/m²   Constitutional:  OAA X3 NAD  Skin: no rash, turgor wnl  Heent:  eomi, mmm  Neck: no bruits or jvd noted  Cardiovascular:  S1, S2 without m/r/g  Respiratory: expiratory wheezes throughout  Abdomen:  +bs, soft, nt, nd  Ext: - lower extremity edema  Psychiatric: mood and affect appropriate  Musculoskeletal:  Rom, muscular strength intact    Data:   Labs:  CBC:   Recent Labs     12/06/21  1516 12/07/21  0625   WBC 5.4 7.0   HGB 9.1* 8.1*    199     BMP:    Recent Labs     12/06/21  1516 12/07/21  0625    138   K 3.1* 4.0    100   CO2 29 28   BUN 23* 26*   CREATININE 2.4* 2.8*   GLUCOSE 123* 85     Ca/Mg/Phos:   Recent Labs     12/06/21  1516 12/07/21  0625   CALCIUM 8.6 8.6   MG 1.70*  --      Hepatic: No results for input(s): AST, ALT, ALB, BILITOT, ALKPHOS in the last 72 hours. Troponin:   Recent Labs     12/06/21  1516   TROPONINI 0.10*     BNP: No results for input(s): BNP in the last 72 hours. Lipids: No results for input(s): CHOL, TRIG, HDL, LDLCALC, LABVLDL in the last 72 hours. ABGs: No results for input(s): PHART, PO2ART, DLC1MOY in the last 72 hours. INR: No results for input(s): INR in the last 72 hours. UA:  Recent Labs     12/06/21  1528   COLORU Yellow   CLARITYU Clear   GLUCOSEU Negative   BILIRUBINUR Negative   KETUA Negative   SPECGRAV 1.020   BLOODU Negative   PHUR 6.5   PROTEINU >=300*   UROBILINOGEN 0.2   NITRU Negative   LEUKOCYTESUR Negative   LABMICR YES   URINETYPE Voided      Urine Microscopic:   Recent Labs     12/06/21  1528   BACTERIA 1+*   WBCUA 0-2   RBCUA 0-2   EPIU 6-10*     Urine Culture: No results for input(s): LABURIN in the last 72 hours. Urine Chemistry: No results for input(s): Vaishnavi Retort, PROTEINUR, NAUR in the last 72 hours.           IMAGING:  MRI BRAIN WO CONTRAST   Final Result      No acute intracranial abnormality. Mild nonspecific white matter disease, which likely represents chronic small vessel ischemic change. CTA HEAD NECK W CONTRAST   Final Result      There is marked stenosis and irregularity of the proximal right internal carotid artery with complete occlusion in the mid to distal segment of the cervical portion of the right internal carotid artery. Potential etiologies include atherosclerotic    disease, carotid dissection which may be chronic or acute, vasculitis, and potentially fibromuscular dysplasia although the appearance is atypical.      Otherwise no significant vascular pathology is identified in the neck. PROCEDURE: CT ANGIOGRAPHY HEAD WITH/WITHOUT CONTRAST      INDICATION: hypertension, dysarthria, past pointing, ataxia      COMPARISON: none      TECHNIQUE: Axial CT imaging obtained through the head prior to and following administration of IV contrast. Axial images, multiplanar reformatted images, and maximum intensity projection images were reviewed for CT angiographic technique. IV contrast: 80 mL Isovue-370. FINDINGS:      ANTERIOR CIRCULATION: The intracranial internal carotid arteries, anterior cerebral arteries, and middle cerebral arteries demonstrate no occlusion or stenosis. No evidence for aneurysm or arteriovenous malformation. Flow to the right middle cerebral    artery and anterior cerebral artery is likely via patent anterior communicating artery, given occlusion of right internal carotid artery. POSTERIOR CIRCULATION: The bilateral vertebral arteries, basilar artery and posterior cerebral arteries demonstrate no occlusion or stenosis. No evidence for aneurysm or arteriovenous malformation. There is fetal origin of the left posterior cerebral    artery noted. INTRACRANIAL VENOUS SYSTEM: No evidence for intracranial venous thrombosis.       Referred to the recent noncontrast CT head for additional results. .      IMPRESSION:      Fetal origin posterior cerebral artery. Normal filling of right middle and anterior cerebral artery despite internal carotid occlusion. This is likely predominantly via a patent anterior communicating artery. Otherwise no intracranial large vessel occlusion or significant stenosis. No evidence to suggest aneurysm. CT Head WO Contrast   Final Result      No evidence of acute intracranial abnormality. XR CHEST PORTABLE   Final Result      Pulmonary venous hypertension          Assessment/Plan     1. ESRD on HD    2. Hyperaldosteronism -- takes spironolactone at home, questionable compliance    3. Hypertensive Emergency    4.  Anemia -- Iron deficiency per HD labs    5. Acid- base/ Electrolyte imbalance             Plan  - Restart spironolactone and home carvedilol  - HD tomorrow, holding Epo due to neurological complaints  - Venofer for Iron deficient Anemia   -optimize BP management  -Monitor UO  -Monitor BMP   -Monitor lytes replace as needed      Recommend to dose adjust all medications  based on renal functions  Maintain SBP> 90 mmHg   Daily weights   AVOID NSAIDs  Avoid Nephrotoxins  Monitor Intake/Output  Call if significant decrease in urine output     Thank you for allowing us to participate in care of [facility]      This patient will be staffed with Dr. Bharat Love free to contact me   [facility] [address]  Office : [phone]  Fax :[phone]    HD in am   BP meds adjusted   BP better     Lily Torrez MD

## 2021-12-07 NOTE — CARE COORDINATION
Case Management Assessment           Initial Evaluation                Date / Time of Evaluation: 12/7/2021 11:06 AM                 Assessment Completed by: Ricarda Pack RN     Patient is from home and lives with family in an apt with about 5 steps to enter. She normally uses a rollator at baseline and is very independent. She has HD at [facility] and her sister is able to drive her. She also has help from her son and grandson. She is an active  but currently does not have a car. She plans on returning to home at d/c and declined home care but would consider OP Therapy IF needed. Patient Name: Jazmin Garcia     YOB: 1970  Diagnosis: ESRD (end stage renal disease) on dialysis [facility]) [N18.6, Z99.2]  Hypertensive emergency [I16.1]  Stroke-like symptoms [R29.90]     Date / Time: 12/6/2021  2:39 PM    Patient Admission Status: Inpatient    If patient is discharged prior to next notation, then this note serves as note for discharge by case management.      Current PCP: Angel Haywood Patient: No    Chart Reviewed: Yes  Patient/ Family Interviewed: Yes    Initial assessment completed at bedside with: patient    Hospitalization in the last 30 days: Yes    Emergency Contacts:  Extended Emergency Contact Information  Primary Emergency Contact: [address] Phone: [phone]  Relation: Parent  Secondary Emergency Contact: [address]  Phone: [phone]  Relation: Brother/Sister    Advance Directives:   Code Status: Full Code    Healthcare Power of : No  Agent: NA  Contact Number: NA      Financial  Payor: MEDICARE / Plan: MEDICARE PART A AND B / Product Type: *No Product type* /     Pre-cert required for SNF: No    Pharmacy    Nena Yi [phone] - F [phone]  [facility] [place] [address]  Phone: [phone] Fax: [phone] [address] [facility] [address]    Barriers to discharge: BP, dialysis tomorrow    Additional Case Management Notes: NA    The Plan for Transition of Care is related to the following treatment goals of ESRD (end stage renal disease) on dialysis ([facility] [address] [N18.6, Z99.2]  Hypertensive emergency [I16.1]  Stroke-like symptoms [R29.90]    The Patient and/or patient representative Javier Mueller and her family were provided with a choice of provider and agrees with the discharge plan Yes    Freedom of choice list was provided with basic dialogue that supports the patient's individualized plan of care/goals and shares the quality data associated with the providers.  Yes    Care Transition patient: No    Shawn Majano RN  [facility] [employer]  Case Management Department  Ph: [phone]   Fax: [phone]

## 2021-12-08 LAB
ANION GAP SERPL CALCULATED.3IONS-SCNC: 12 MMOL/L (ref 3–16)
BASOPHILS ABSOLUTE: 0 K/UL (ref 0–0.2)
BASOPHILS RELATIVE PERCENT: 0.5 %
BUN BLDV-MCNC: 33 MG/DL (ref 7–20)
CALCIUM SERPL-MCNC: 8.2 MG/DL (ref 8.3–10.6)
CHLORIDE BLD-SCNC: 96 MMOL/L (ref 99–110)
CO2: 25 MMOL/L (ref 21–32)
CREAT SERPL-MCNC: 4.1 MG/DL (ref 0.6–1.1)
EOSINOPHILS ABSOLUTE: 0.1 K/UL (ref 0–0.6)
EOSINOPHILS RELATIVE PERCENT: 1.7 %
ESTIMATED AVERAGE GLUCOSE: 125.5 MG/DL
GFR AFRICAN AMERICAN: 14
GFR NON-AFRICAN AMERICAN: 11
GLUCOSE BLD-MCNC: 122 MG/DL (ref 70–99)
GLUCOSE BLD-MCNC: 133 MG/DL (ref 70–99)
GLUCOSE BLD-MCNC: 47 MG/DL (ref 70–99)
GLUCOSE BLD-MCNC: 50 MG/DL (ref 70–99)
GLUCOSE BLD-MCNC: 59 MG/DL (ref 70–99)
GLUCOSE BLD-MCNC: 63 MG/DL (ref 70–99)
GLUCOSE BLD-MCNC: 80 MG/DL (ref 70–99)
GLUCOSE BLD-MCNC: 93 MG/DL (ref 70–99)
HBA1C MFR BLD: 6 %
HCT VFR BLD CALC: 25 % (ref 36–48)
HEMOGLOBIN: 8.1 G/DL (ref 12–16)
LYMPHOCYTES ABSOLUTE: 1 K/UL (ref 1–5.1)
LYMPHOCYTES RELATIVE PERCENT: 13 %
MCH RBC QN AUTO: 31.2 PG (ref 26–34)
MCHC RBC AUTO-ENTMCNC: 32.4 G/DL (ref 31–36)
MCV RBC AUTO: 96 FL (ref 80–100)
MONOCYTES ABSOLUTE: 0.8 K/UL (ref 0–1.3)
MONOCYTES RELATIVE PERCENT: 9.7 %
NEUTROPHILS ABSOLUTE: 6 K/UL (ref 1.7–7.7)
NEUTROPHILS RELATIVE PERCENT: 75.1 %
PDW BLD-RTO: 16.1 % (ref 12.4–15.4)
PERFORMED ON: ABNORMAL
PERFORMED ON: NORMAL
PERFORMED ON: NORMAL
PLATELET # BLD: 225 K/UL (ref 135–450)
PMV BLD AUTO: 8 FL (ref 5–10.5)
POTASSIUM REFLEX MAGNESIUM: 4.1 MMOL/L (ref 3.5–5.1)
RBC # BLD: 2.6 M/UL (ref 4–5.2)
SODIUM BLD-SCNC: 133 MMOL/L (ref 136–145)
WBC # BLD: 8 K/UL (ref 4–11)

## 2021-12-08 PROCEDURE — 6370000000 HC RX 637 (ALT 250 FOR IP): Performed by: INTERNAL MEDICINE

## 2021-12-08 PROCEDURE — 6360000002 HC RX W HCPCS: Performed by: STUDENT IN AN ORGANIZED HEALTH CARE EDUCATION/TRAINING PROGRAM

## 2021-12-08 PROCEDURE — 99255 IP/OBS CONSLTJ NEW/EST HI 80: CPT | Performed by: INTERNAL MEDICINE

## 2021-12-08 PROCEDURE — 36415 COLL VENOUS BLD VENIPUNCTURE: CPT

## 2021-12-08 PROCEDURE — 2580000003 HC RX 258: Performed by: STUDENT IN AN ORGANIZED HEALTH CARE EDUCATION/TRAINING PROGRAM

## 2021-12-08 PROCEDURE — 90935 HEMODIALYSIS ONE EVALUATION: CPT

## 2021-12-08 PROCEDURE — 6370000000 HC RX 637 (ALT 250 FOR IP): Performed by: SURGERY

## 2021-12-08 PROCEDURE — 80048 BASIC METABOLIC PNL TOTAL CA: CPT

## 2021-12-08 PROCEDURE — 99232 SBSQ HOSP IP/OBS MODERATE 35: CPT | Performed by: NURSE PRACTITIONER

## 2021-12-08 PROCEDURE — 6370000000 HC RX 637 (ALT 250 FOR IP): Performed by: STUDENT IN AN ORGANIZED HEALTH CARE EDUCATION/TRAINING PROGRAM

## 2021-12-08 PROCEDURE — 85025 COMPLETE CBC W/AUTO DIFF WBC: CPT

## 2021-12-08 PROCEDURE — 6370000000 HC RX 637 (ALT 250 FOR IP): Performed by: NURSE PRACTITIONER

## 2021-12-08 PROCEDURE — 92526 ORAL FUNCTION THERAPY: CPT

## 2021-12-08 PROCEDURE — 92610 EVALUATE SWALLOWING FUNCTION: CPT

## 2021-12-08 PROCEDURE — 2060000000 HC ICU INTERMEDIATE R&B

## 2021-12-08 PROCEDURE — 5A1D70Z PERFORMANCE OF URINARY FILTRATION, INTERMITTENT, LESS THAN 6 HOURS PER DAY: ICD-10-PCS | Performed by: INTERNAL MEDICINE

## 2021-12-08 PROCEDURE — 99233 SBSQ HOSP IP/OBS HIGH 50: CPT | Performed by: INTERNAL MEDICINE

## 2021-12-08 RX ORDER — AMLODIPINE BESYLATE 5 MG/1
5 TABLET ORAL DAILY
Status: DISCONTINUED | OUTPATIENT
Start: 2021-12-08 | End: 2021-12-09 | Stop reason: HOSPADM

## 2021-12-08 RX ADMIN — HEPARIN SODIUM 5000 UNITS: 5000 INJECTION INTRAVENOUS; SUBCUTANEOUS at 16:19

## 2021-12-08 RX ADMIN — SODIUM CHLORIDE, PRESERVATIVE FREE 10 ML: 5 INJECTION INTRAVENOUS at 08:48

## 2021-12-08 RX ADMIN — ACETAMINOPHEN 650 MG: 325 TABLET ORAL at 11:40

## 2021-12-08 RX ADMIN — HEPARIN SODIUM 5000 UNITS: 5000 INJECTION INTRAVENOUS; SUBCUTANEOUS at 06:51

## 2021-12-08 RX ADMIN — PANTOPRAZOLE SODIUM 40 MG: 40 TABLET, DELAYED RELEASE ORAL at 06:51

## 2021-12-08 RX ADMIN — ATORVASTATIN CALCIUM 40 MG: 40 TABLET, FILM COATED ORAL at 08:48

## 2021-12-08 RX ADMIN — HYDROMORPHONE HYDROCHLORIDE 0.25 MG: 1 INJECTION, SOLUTION INTRAMUSCULAR; INTRAVENOUS; SUBCUTANEOUS at 16:40

## 2021-12-08 RX ADMIN — AMLODIPINE BESYLATE 5 MG: 5 TABLET ORAL at 11:37

## 2021-12-08 RX ADMIN — CLOPIDOGREL BISULFATE 75 MG: 75 TABLET, FILM COATED ORAL at 08:48

## 2021-12-08 RX ADMIN — ACETAMINOPHEN 650 MG: 325 TABLET ORAL at 03:28

## 2021-12-08 RX ADMIN — BUTALBITAL, ACETAMINOPHEN, AND CAFFEINE 1 TABLET: 50; 325; 40 TABLET ORAL at 11:37

## 2021-12-08 RX ADMIN — SPIRONOLACTONE 50 MG: 50 TABLET ORAL at 08:48

## 2021-12-08 RX ADMIN — CARVEDILOL 12.5 MG: 12.5 TABLET, FILM COATED ORAL at 16:39

## 2021-12-08 RX ADMIN — BUTALBITAL, ACETAMINOPHEN, AND CAFFEINE 1 TABLET: 50; 325; 40 TABLET ORAL at 20:08

## 2021-12-08 RX ADMIN — GUAIFENESIN 600 MG: 600 TABLET, EXTENDED RELEASE ORAL at 20:08

## 2021-12-08 RX ADMIN — CARVEDILOL 12.5 MG: 12.5 TABLET, FILM COATED ORAL at 08:48

## 2021-12-08 RX ADMIN — GABAPENTIN 300 MG: 300 CAPSULE ORAL at 08:48

## 2021-12-08 RX ADMIN — HEPARIN SODIUM 5000 UNITS: 5000 INJECTION INTRAVENOUS; SUBCUTANEOUS at 20:08

## 2021-12-08 RX ADMIN — INSULIN GLARGINE 5 UNITS: 100 INJECTION, SOLUTION SUBCUTANEOUS at 22:49

## 2021-12-08 RX ADMIN — GUAIFENESIN 600 MG: 600 TABLET, EXTENDED RELEASE ORAL at 08:48

## 2021-12-08 ASSESSMENT — PAIN SCALES - GENERAL
PAINLEVEL_OUTOF10: 7
PAINLEVEL_OUTOF10: 0
PAINLEVEL_OUTOF10: 7
PAINLEVEL_OUTOF10: 6
PAINLEVEL_OUTOF10: 4
PAINLEVEL_OUTOF10: 0
PAINLEVEL_OUTOF10: 7
PAINLEVEL_OUTOF10: 10
PAINLEVEL_OUTOF10: 0

## 2021-12-08 ASSESSMENT — PAIN DESCRIPTION - FREQUENCY
FREQUENCY: INTERMITTENT
FREQUENCY: CONTINUOUS

## 2021-12-08 ASSESSMENT — PAIN - FUNCTIONAL ASSESSMENT
PAIN_FUNCTIONAL_ASSESSMENT: ACTIVITIES ARE NOT PREVENTED
PAIN_FUNCTIONAL_ASSESSMENT: PREVENTS OR INTERFERES SOME ACTIVE ACTIVITIES AND ADLS
PAIN_FUNCTIONAL_ASSESSMENT: ACTIVITIES ARE NOT PREVENTED
PAIN_FUNCTIONAL_ASSESSMENT: ACTIVITIES ARE NOT PREVENTED

## 2021-12-08 ASSESSMENT — PAIN DESCRIPTION - PROGRESSION
CLINICAL_PROGRESSION: NOT CHANGED
CLINICAL_PROGRESSION: NOT CHANGED
CLINICAL_PROGRESSION: GRADUALLY WORSENING
CLINICAL_PROGRESSION: NOT CHANGED

## 2021-12-08 ASSESSMENT — PAIN DESCRIPTION - ORIENTATION
ORIENTATION: LOWER
ORIENTATION: ANTERIOR
ORIENTATION: LEFT;RIGHT
ORIENTATION: ANTERIOR;POSTERIOR

## 2021-12-08 ASSESSMENT — PAIN DESCRIPTION - ONSET
ONSET: ON-GOING

## 2021-12-08 ASSESSMENT — PAIN DESCRIPTION - PAIN TYPE
TYPE: ACUTE PAIN

## 2021-12-08 ASSESSMENT — PAIN DESCRIPTION - LOCATION
LOCATION: HEAD
LOCATION: OTHER (COMMENT)
LOCATION: HEAD
LOCATION: HEAD

## 2021-12-08 ASSESSMENT — PAIN DESCRIPTION - DESCRIPTORS
DESCRIPTORS: ACHING;HEADACHE
DESCRIPTORS: ACHING
DESCRIPTORS: ACHING
DESCRIPTORS: ACHING;HEADACHE

## 2021-12-08 NOTE — PROGRESS NOTES
Speech Language Pathology  Facility/Department: [facility] 5T ORTHO/NEURO   CLINICAL BEDSIDE SWALLOW EVALUATION/Treat/speech screen    NAME: Martell Horn  : 1970  MRN: [id number]    ADMISSION DATE: 2021  ADMITTING DIAGNOSIS: has CHF (congestive heart failure), NYHA class I, acute on chronic, combined ([address] Abnormal myocardial perfusion study; Essential hypertension; Type 2 diabetes mellitus with hyperglycemia, with long-term current use of insulin (HCC); JEAN (obstructive sleep apnea); HARRIETT (acute kidney injury) ([address] Uremia; CKD (chronic kidney disease) stage 5, GFR less than 15 ml/min ([facility] Electrolyte imbalance; Anemia; Abdominal pain; Peritonitis associated with peritoneal dialysis ([address] Free intraperitoneal air; Positive blood culture; Hypertensive emergency; ESRD (end stage renal disease) on dialysis ([facility] and Stroke-like symptoms on their problem list.  ONSET DATE: 21    Recent Chest Xray 21     Pulmonary venous hypertension       Repeat MRI brain 21      Findings:         Thin section axial DWI with ADC and thin section axial T2 FLAIR with multiplanar reconstructions.       No acute intracranial hemorrhage, mass effect or extra-axial fluid collection. No diffusion restriction. Scattered foci of T2 FLAIR hyperintensity in the deep and periventricular white matter again seen. Ventricles are stable. Basal cisterns are patent. Date of Eval: 2021  Evaluating Therapist: VENUS Tran    Current Diet level:  Current Diet : NPO  Current Liquid Diet : NPO    Primary Complaint  Patient Complaint: pt denies any problems with swallowing.     Pain:  Pain Assessment  Pain Assessment: denies pain at this time    Reason for Referral  Martell Horn was referred for a bedside swallow evaluation to assess the efficiency of her swallow function, identify signs and symptoms of aspiration and make recommendations regarding safe dietary consistencies, effective compensatory strategies, and safe eating environment. HISTORY OF PRESENT ILLNESS:  Per Neuro NP note:  \" The patient is a 45 y.o. female with significant past medical history of CHF, HTN, DM 2, ESRD on HD. Patient noticed ataxia, slurred speech when she woke up 12/6. It has not resolved. CTA shows irregularity on the right ICA. No stroke on MRI. Patient has taken daily aspirin for a long time. \"    Impression  Speech screen:  Pt alert, oriented, follows commands and answered questions appropriately. Oral structures grossly intact, no asymmetry noted. Speech is intelligible, no word finding noted. Minimal intermittent hesitancy of speech noted during conversation. pt reports speech is much better today. Cognition appears intact. Dysphagia: Pt able to cough and swallow on command. Presented pt with puree, thin liquids via cup / straw, including 3 ounces of uninterrupted swallows, as well as a igor cracker. Pt demonstrated no overt signs of aspiration: no coughing/throat clearing or change in vocal quality. Good labial seal with no anterior loss of liquids. Good swallow movement noted upon palpation of anterior neck. Pt exhibited no difficulty with mastication of cracker, no oral residue. No c/o globus sensation  Recommend cont regular diet/thin liquids. Will monitor need for full sp/lang eval  Dysphagia Diagnosis: Swallow function appears grossly intact  Dysphagia Outcome Severity Scale: Level 7: Normal in all situations     Treatment Plan  Requires SLP Intervention: Yes  Duration/Frequency of Treatment: 1-2 x  D/C Recommendations: To be determined     Recommended Diet and Intervention  Diet Solids Recommendation: Regular  Liquid Consistency Recommendation: Thin  Recommended Form of Meds: Whole with water  Therapeutic Interventions: Diet tolerance monitoring; Oral care;  Patient/Family education    Compensatory Swallowing Strategies  Upright as possible for all oral intake    Treatment/Goals  1- The patient will tolerate recommended diet without observed clinical signs of aspiration    2-The patient/caregiver will demonstrate understanding of compensatory strategies for improved swallowing safety. 12/8: Educated pt to purpose of visit, s/s of aspiration, concern if aspiration occurs, rationale for diet recommendation/strategies to reduce risk for aspiration and instruction to notify staff if any signs emerge. Pt stated comprehension  Cont goal    General  Chart Reviewed: Yes  Behavior/Cognition: Alert; Cooperative; Pleasant mood  Respiratory Status: Room air  Breath Sounds: Clear  Communication Observation:  (occasional hesitancy, pt reports has improved)  Follows Directions: Complex  Dentition: Adequate  Patient Positioning: Upright in bed  Baseline Vocal Quality: Normal  Volitional Cough: Strong  Prior Dysphagia History: none  Consistencies Administered: Reg solid; Dysphagia Pureed (Dysphagia I); Thin - cup; Thin - straw; Thin - teaspoon; Ice Chips    Vision/Hearing  Vision  Vision: Within Functional Limits (pt denies any problems)  Hearing  Hearing: Within functional limits    Oral Motor Deficits  Oral/Motor  Oral Motor: Within functional limits    Oral Phase Dysfunction  Oral Phase  Oral Phase: WFL     Indicators of Pharyngeal Phase Dysfunction   Pharyngeal Phase  Pharyngeal Phase: WFL    Prognosis  Prognosis  Prognosis for safe diet advancement: good  Individuals consulted  Consulted and agree with results and recommendations: Patient; RN    Education  Patient Education: Pt educated to purpose of visit  Patient Education Response: Verbalizes understanding  Safety Devices in place: Yes  Type of devices: Call light within reach       Therapy Time  SLP Individual Minutes  Time In: 2155  Time Out: 8056  Minutes: 24     Plan:  Recommended diet: cont regular diet/thin liquids  Pt therapy goal: to find out what is going on   Pt dc goal: to go home  Taj Nelson M.S./[facility]-SLP #[id number]  Pg.  # [phone]  Needs met prior to leaving room, call light within reach, d/w MACHO Vivas  This document will serve as a dc summary if pt dc prior to next visit  12/8/2021 8:51 AM

## 2021-12-08 NOTE — FLOWSHEET NOTE
Pt tolerated tx. No meds given. Net fld removed:1500    Pre WT: 88.7kg  Post WT: 87 kg  EDW 87kg    R CVC w/good blood. Report given to RN. Copy of tx sheets on chart for scanning into EMR.        12/08/21 1221 12/08/21 1530   Vital Signs   BP (!) 164/87 (!) 171/75   Temp 96.8 °F (36 °C) 98.3 °F (36.8 °C)   Pulse 77 81   Resp 18 16   Weight 195 lb 8.8 oz (88.7 kg) 191 lb 12.8 oz (87 kg)   Weight Method Standing scale Standing scale   Percent Weight Change -7.8 -1.92

## 2021-12-08 NOTE — CARE COORDINATION
Patient is from home with family and plans on returning to home. She has a rollator at home but PT recommends a RW instead. Patient is in dialysis but will check with her to see if she wants the RW at d/c. Also if therapy is recommended she prefers OP therapy.      Electronically signed by Criss Carrera RN on 12/8/2021 at 1:45 PM  [phone]

## 2021-12-08 NOTE — PROGRESS NOTES
Pt is a/o x4. VSS on room air with exception to elevated BP being treated with medication per the [facility]. No acute changes in neuro status. NIH remains a 0. Pt c/o headache being treated with prn fioricet w/ relief. Pt c/o pain to her sides - rated at a 10 our of 10 - resident notified and 1x dose of dilaudid ordered. Tolerating diet and fluids well. Voiding adequately per BRP. Tolerating ambulation well x1 w/ GB and walker. All fall precautions in place.

## 2021-12-08 NOTE — PROGRESS NOTES
Progress Note    Admit Date: 12/6/2021  Day: 3  Diet: ADULT DIET; Regular; Low Sodium (2 gm)    CC: HTN     Interval history: pt seen and examined at bedside. DAJA. Medications:     Scheduled Meds:   carvedilol  12.5 mg Oral BID WC    guaiFENesin  600 mg Oral BID    clopidogrel  75 mg Oral Daily    atorvastatin  40 mg Oral Daily    gabapentin  300 mg Oral Daily    insulin glargine  10 Units SubCUTAneous Nightly    insulin lispro (1 Unit Dial)  10 Units SubCUTAneous TID WC    pantoprazole  40 mg Oral QAM AC    sodium chloride flush  5-40 mL IntraVENous 2 times per day    spironolactone  50 mg Oral Daily    heparin (porcine)  5,000 Units SubCUTAneous 3 times per day     Continuous Infusions:   sodium chloride 25 mL (12/07/21 1305)    dextrose       PRN Meds:butalbital-acetaminophen-caffeine, nitroGLYCERIN, ondansetron, sodium chloride flush, sodium chloride, polyethylene glycol, acetaminophen **OR** acetaminophen, glucose, dextrose, glucagon (rDNA), dextrose, perflutren lipid microspheres, labetalol    Objective:   Vitals:   T-max:  Patient Vitals for the past 8 hrs:   BP Temp Temp src Pulse Resp SpO2 Weight   12/08/21 0715 (!) 179/87 98 °F (36.7 °C) Oral 78 16 96 % --   12/08/21 0600 -- -- -- -- -- -- 212 lb 1.3 oz (96.2 kg)   12/08/21 0158 (!) 163/85 98.3 °F (36.8 °C) Oral 82 16 100 % --       Intake/Output Summary (Last 24 hours) at 12/8/2021 0926  Last data filed at 12/8/2021 7822  Gross per 24 hour   Intake 840 ml   Output 1 ml   Net 839 ml         Physical Exam  Physical Exam  Constitutional:       Appearance: She is obese. HENT:      Head: Normocephalic and atraumatic. Nose: Nose normal.      Mouth/Throat:      Mouth: Mucous membranes are dry. Pharynx: Oropharynx is clear. Eyes:      Extraocular Movements: Extraocular movements intact. Conjunctiva/sclera: Conjunctivae normal.      Pupils: Pupils are equal, round, and reactive to light.    Cardiovascular:      Rate and Rhythm: Normal rate and regular rhythm. Pulses: Normal pulses. Heart sounds: Normal heart sounds. Pulmonary:      Effort: Pulmonary effort is normal.      Breath sounds: Normal breath sounds. Abdominal:      General: Bowel sounds are normal.      Palpations: Abdomen is soft. Musculoskeletal:         General: Normal range of motion. Cervical back: Normal range of motion and neck supple. Skin:     General: Skin is warm and dry. Neurological:      Mental Status: She is alert and oriented to person, place, and time. Sensory: No sensory deficit. Coordination: Coordination normal.      Gait: Gait abnormal.      Deep Tendon Reflexes: Reflexes normal.      Comments: Dysarthria   Psychiatric:         Mood and Affect: Mood normal.         Behavior: Behavior normal.         Thought Content: Thought content normal.         Judgment: Judgment normal.      LABS:    CBC:   Recent Labs     12/06/21 1516 12/07/21 0625 12/08/21 0621   WBC 5.4 7.0 8.0   HGB 9.1* 8.1* 8.1*   HCT 28.5* 24.5* 25.0*    199 225   MCV 96.7 95.8 96.0     Renal:    Recent Labs     12/06/21  1516 12/07/21 0625 12/08/21 0621    138 133*   K 3.1* 4.0 4.1    100 96*   CO2 29 28 25   BUN 23* 26* 33*   CREATININE 2.4* 2.8* 4.1*   GLUCOSE 123* 85 63*   CALCIUM 8.6 8.6 8.2*   MG 1.70*  --   --    ANIONGAP 10 10 12     Hepatic: No results for input(s): AST, ALT, BILITOT, BILIDIR, PROT, LABALBU, ALKPHOS in the last 72 hours. Troponin:   Recent Labs     12/06/21 1516   TROPONINI 0.10*     BNP: No results for input(s): BNP in the last 72 hours. Lipids:   Recent Labs     12/07/21 0625   CHOL 128   HDL 41     ABGs:  No results for input(s): PHART, DBQ8XWV, PO2ART, TFU1UYY, BEART, THGBART, H1EXSRVZ, RSY9XFQ in the last 72 hours. INR: No results for input(s): INR in the last 72 hours.   Lactate: No results for input(s): LACTATE in the last 72 hours.  Cultures:  -----------------------------------------------------------------  RAD:   MRI BRAIN WO CONTRAST   Final Result      No change from prior brain MRI. Specifically, no diffusion restriction to indicate acute ischemia. MRI BRAIN WO CONTRAST   Final Result      No acute intracranial abnormality. Mild nonspecific white matter disease, which likely represents chronic small vessel ischemic change. CTA HEAD NECK W CONTRAST   Final Result      There is marked stenosis and irregularity of the proximal right internal carotid artery with complete occlusion in the mid to distal segment of the cervical portion of the right internal carotid artery. Potential etiologies include atherosclerotic    disease, carotid dissection which may be chronic or acute, vasculitis, and potentially fibromuscular dysplasia although the appearance is atypical.      Otherwise no significant vascular pathology is identified in the neck. PROCEDURE: CT ANGIOGRAPHY HEAD WITH/WITHOUT CONTRAST      INDICATION: hypertension, dysarthria, past pointing, ataxia      COMPARISON: none      TECHNIQUE: Axial CT imaging obtained through the head prior to and following administration of IV contrast. Axial images, multiplanar reformatted images, and maximum intensity projection images were reviewed for CT angiographic technique. IV contrast: 80 mL Isovue-370. FINDINGS:      ANTERIOR CIRCULATION: The intracranial internal carotid arteries, anterior cerebral arteries, and middle cerebral arteries demonstrate no occlusion or stenosis. No evidence for aneurysm or arteriovenous malformation. Flow to the right middle cerebral    artery and anterior cerebral artery is likely via patent anterior communicating artery, given occlusion of right internal carotid artery. POSTERIOR CIRCULATION: The bilateral vertebral arteries, basilar artery and posterior cerebral arteries demonstrate no occlusion or stenosis.  No evidence for aneurysm or arteriovenous malformation. There is fetal origin of the left posterior cerebral    artery noted. INTRACRANIAL VENOUS SYSTEM: No evidence for intracranial venous thrombosis. Referred to the recent noncontrast CT head for additional results. .      IMPRESSION:      Fetal origin posterior cerebral artery. Normal filling of right middle and anterior cerebral artery despite internal carotid occlusion. This is likely predominantly via a patent anterior communicating artery. Otherwise no intracranial large vessel occlusion or significant stenosis. No evidence to suggest aneurysm. CT Head WO Contrast   Final Result      No evidence of acute intracranial abnormality. XR CHEST PORTABLE   Final Result      Pulmonary venous hypertension      VL Extremity Venous Bilateral    (Results Pending)       Assessment/Plan:   42-year-old female with PMH of CHF, HTN, DM 2 and ESRD on HD presented to [facility] ED with chief complaint of hypertension. Patient was admitted for the following concerns:     Dysarthria 2/2 likely ischemic stroke  -CT head without contrast nonrevealing. CT head and neck with contrast revealing of mild stenosis in irregularity of the proximal right ICA with complete occlusion in the mid to distal segment. Normal filling of right MCA and RAN despite internal carotid occlusion. Likely via patent RAN. No intracranial large vessel occlusion or significant stenosis. No aneurysm.   -Repeat MRI without contrast: non-revealing of any intracranial abnormality   -Neurology consulted, appreciate recs  -- Q4H Neurochecks   -Aspirin, plavix and statin  -Lipid panel WNL   --  A1c: 6   -Seizure prophylaxis and fall precautions  -Echo: revealing PFO, EF 55-65%, grade II diastolic dysfunction    - f/u venous doppler     CHF   - Echo: revealing PFO, EF 55-65%, grade II diastolic dysfunction  - aldectone 50 mg daily   - strict Is and Os and daily weights - cardiac diet   - consult cardiology, appreciate recs      HTN emergency  -Holding home meds for permissive hypertension for 24 hours   -Labetalol as needed for SBP >220     DM type II  -Lantus 10 units nightly  -Mealtime boluses    ESRD on HD  - nephrology consulted, appreciate recs   - HD today   - holding Epo given neurological complains       Anemia  -  holding Epo given neurological complains   - venofer once           Code Status: full code  FEN: cardiac diet   PPX: lovenox, PPI  DISPO: Shayy Avitia MD, PGY-1  12/08/21  9:26 AM    This patient has been staffed and discussed with Casey Wheat MD.

## 2021-12-08 NOTE — CONSULTS
Interventional Cardiology Consult  45 y.o. whom I was asked to see for poss pfo closure. The pt came to [facility] because of HTN, room spinning, slurred speech, and diff walking. The room spinning, speech, and walking have improved since sx started on Monday. Her walking has improved, but it has not been normal in months. Has used a cane and had diff walking for that time period. Speech is to the point now where it's mostly better, but she still has a stutter and feels the speech is coming out slowly. She has had some nausea and vomiting as well as a cough. She feels that she cannot get the phlegm out. She has h/o HTN, CHF, and DM and started dialysis several mo ago. Thinks she had a remote MI, possibly. Past Medical History:   Diagnosis Date    [facility]estive [facility]) [facility]     Diabetes mellitus ([facility]     Hypertension      Past Surgical History:   Procedure Laterality Date    DIALYSIS CATHETER INSERTION N/A 7/9/2021    LAPAROSCOPIC PERITONEAL DIALYSIS CATHETER INSERTION, OMENTOPLEXY performed by Fer Lyle DO at [address] N/A 11/14/2021    CATHETER REMOVAL PERITONEAL DIALYSIS performed by Fer Lyle DO at [address] [facility] IR TUNNELED CATHETER PLACEMENT GREATER THAN 5 YEARS  11/15/2021    IR TUNNELED CATHETER PLACEMENT GREATER THAN 5 YEARS 11/15/2021 [facility] SPECIAL PROCEDURES     Social History     Tobacco Use    Smoking status: Never Smoker    Smokeless tobacco: Never Used   Vaping Use    Vaping Use: Never used   Substance Use Topics    Alcohol use: Not Currently    Drug use: Not Currently     No Known Allergies    History reviewed. No pertinent family history. Review of Systems   General: No fevers, chills, fatigue, or night sweats. HEENT: No blurry or decreased vision. Cardiovascular: See HPI. No cramping in legs or buttocks when walking. Respiratory: + cough  Gastrointestinal: No abdominal pain, hematochezia, melana, or history of GI ulcers. Genito-Urinary: No dysuria or hematuria. No urgency or polyuria. Musculoskeletal: No complaints of joint pain, joint swelling or muscular weakness/soreness. Neurological: No dizziness or headaches. No numbness/tingling, speech problems or weakness. No history of a stroke or TIA. Psychological: No anxiety or depression  Hematological and Lymphatic: No abnormal bleeding or bruising, blood clots, jaundice. Endocrine: No malaise/lethargy, palpitations, polydipsia/polyuria, temperature intolerance or unexpected weight changes. Skin: No rashes or non-healing ulcers. PE:  Blood pressure (!) 179/82, pulse 74, temperature 97.9 °F (36.6 °C), temperature source Oral, resp. rate 16, height 5' (1.524 m), weight 212 lb 1.3 oz (96.2 kg), SpO2 94 %. General (appearance): Well devel. No distress  Eyes: Anicteric. EOMI  Neck: supple. No JVD  Ears/Nose/Mouth/Thorat: No cyanosis  CV: RRR. No m/r/g   Respiratory:  Clear b, normal respiratory effort  GI: abd s/nt/nd  Skin: Warm, dry. No rashes  Neuro/Psych: Alert and oriented x 3. Appropriate behavior  Ext:  No c/c.  No sig pitting edema  Pulses:  2+ carotid    Lab Results   Component Value Date    WBC 8.0 12/08/2021    HGB 8.1 (L) 12/08/2021    HCT 25.0 (L) 12/08/2021    MCV 96.0 12/08/2021     12/08/2021     Lab Results   Component Value Date     (L) 12/08/2021    K 4.1 12/08/2021    CL 96 (L) 12/08/2021    CO2 25 12/08/2021    BUN 33 (H) 12/08/2021    CREATININE 4.1 (H) 12/08/2021    GLUCOSE 63 (L) 12/08/2021    CALCIUM 8.2 (L) 12/08/2021    PROT 7.9 11/09/2021    LABALBU 2.3 (L) 11/15/2021    BILITOT 0.6 11/09/2021    ALKPHOS 125 11/09/2021    AST 42 (H) 11/09/2021    ALT 43 (H) 11/09/2021    LABGLOM 11 (A) 12/08/2021    GFRAA 14 (A) 12/08/2021    AGRATIO 0.7 (L) 11/09/2021    GLOB 4.9 07/06/2021     Lab Results   Component Value Date    INR 1.11 11/15/2021    INR 1.03 08/22/2021    INR 1.10 07/07/2021    PROTIME 12.6 11/15/2021    PROTIME 11.6

## 2021-12-08 NOTE — PROGRESS NOTES
Patient is A/O x4, VSS - with BP staying within parameters, and pain is being managed with PRN tylenol. Patient ambulates with walker/gb and x1 assist. Patient is tolerating PO liquids and diet well. NIH remains unchanged at 0. Fall precautions are in place, will continue to monitor and assess patient.

## 2021-12-08 NOTE — PROGRESS NOTES
Neurology / Neurocritical Care Progress Note    Reason for Consult: dysarthria  Admission Chief Complaint: slurred speech     Interval History:     Repeat MRI negative for stroke. Her ataxia is slowly improving. HPI:   Hermila Olguin is a 45 y.o. female with a history of congestive heart failure, hypertension, type 2 diabetes, chronic kidney disease on hemodialysis who presents from dialysis because she was hypertensive. She reports she woke up on the morning of 12/6 with symptoms. She has never had anything like this before. Nothing makes it better or worse. She is still having dysarthria and ataxia. She denies any double vision or sensory changes. PAST MEDICAL HISTORY:  Past Medical History:   Diagnosis Date    [facility]est[facility]     Diabetes mellitus ([facility]     Hypertension        ALLERGIES:  No Known Allergies    SURGICAL HISTORY:  Past Surgical History:   Procedure Laterality Date    DIALYSIS CATHETER INSERTION N/A 7/9/2021    LAPAROSCOPIC PERITONEAL DIALYSIS CATHETER INSERTION, OMENTOPLEXY performed by Sherlyn Emery DO at [address] N/A 11/14/2021    CATHETER REMOVAL PERITONEAL DIALYSIS performed by Sherlyn Emery DO at [address] [facility] IR TUNNELED CATHETER PLACEMENT GREATER THAN 5 YEARS  11/15/2021    IR TUNNELED CATHETER PLACEMENT GREATER THAN 5 YEARS 11/15/2021 [facility]       FAMILY HISTORY:  Family history non-contributory  History reviewed. No pertinent family history. SOCIAL HISTORY:  Social History     Tobacco History     Smoking Status  Never Smoker    Smokeless Tobacco Use  Never Used          Alcohol History     Alcohol Use Status  Not Currently          Drug Use     Drug Use Status  Not Currently          Sexual Activity     Sexually Active  Not Currently                HOME MEDICATIONS:  No current facility-administered medications on file prior to encounter.      Current Outpatient Medications on File Prior to Encounter   Medication Sig Dispense Refill    insulin glargine (LANTUS;BASAGLAR) 100 UNIT/ML injection pen Inject 10 Units into the skin nightly 5 pen 3    gabapentin (NEURONTIN) 100 MG capsule Take 3 capsules by mouth daily for 90 days. 90 capsule 2    diphenhydrAMINE (BENADRYL) 25 MG capsule Take 25 mg by mouth every 6 hours as needed for Itching      insulin lispro, 1 Unit Dial, 100 UNIT/ML SOPN Inject 10 Units into the skin 3 times daily (with meals) 5 pen 1    spironolactone (ALDACTONE) 50 MG tablet Take 1 tablet by mouth daily 30 tablet 3    carvedilol (COREG) 12.5 MG tablet Take 1 tablet by mouth 2 times daily 60 tablet 3    atorvastatin (LIPITOR) 40 MG tablet Take 1 tablet by mouth daily 30 tablet 3    ondansetron (ZOFRAN) 4 MG tablet Take 1 tablet by mouth every 12 hours as needed for Nausea or Vomiting 14 tablet 0    nitroGLYCERIN (NITROSTAT) 0.4 MG SL tablet Place 1 tablet under the tongue every 5 minutes as needed for Chest pain 5 tablet 1    aspirin 81 MG chewable tablet Take 1 tablet by mouth daily 30 tablet 3    pantoprazole (PROTONIX) 40 MG tablet Take 40 mg by mouth daily as needed      butalbital-acetaminophen-caffeine (FIORICET, ESGIC) -40 MG per tablet Take 1 tablet by mouth every 4 hours as needed for Headaches 30 tablet 0         ROS:   Constitutional- No weight loss or fevers   Eyes- No diplopia. No photophobia. Ears/nose/throat- No dysphagia. No Dysarthria   Cardiovascular- No palpitations. No chest pain   Respiratory- No dyspnea. No Cough   Gastrointestinal- No Abdominal pain. No Vomiting. Genitourinary- No incontinence. No urinary retention   Musculoskeletal- No myalgia. No arthralgia   Skin- No rash. No easy bruising. Psychiatric- No depression. No anxiety   Endocrine- No diabetes. No thyroid issues. Hematologic- No bleeding difficulty. No fatigue   Neurologic- No weakness. No Headache.       PHYSICAL EXAM:  BP (!) 179/87   Pulse 78   Temp 98 °F (36.7 °C) (Oral) Resp 16   Ht 5' (1.524 m)   Wt 212 lb 1.3 oz (96.2 kg)   SpO2 96%   BMI 41.42 kg/m²      General Alert, no distress, well-nourished  Cardiovascular: Warm, appears well perfused   Respiratory: Easy, non-labored respiratory pattern   Abdominal: Abdomen is without distention   Extremities: Upper and lower extremities are atraumatic in appearance without deformity. No swelling or erythema.      Neurologic  Mental status:   orientation to person, place, time, situation              Attention intact as able to attend well to the exam                Language fluent in conversation              Comprehension intact; follows simple commands     Cranial nerves:   CN2: Visual fields full w/o extinction on confrontational testing   CN 3,4,6: Pupils equal and reactive to light, extraocular muscles intact  CN5: Facial sensation symmetric   CN7: Face symmetric bilaterally   CN8: Hearing symmetric to spoken voice  CN9: Palate elevated symmetrically  CN11: Traps full strength on shoulder shrug  CN12: Tongue midline with protrusion     Motor Exam:  Strong and symmetric throughout in her upper and lower extremities   Deep tendon reflexes:     R  L    Biceps  1  1   Brachioradialis  1 1   Patellar  1 1   Toes 1 1      Sensory: light touch intact and symmetric in all 4 extremities. Cerebellar/coordination: FNF normal bilaterally   Heel shin abnormal on left but improving     Speech: normal        IMAGES:  Images personally reviewed and agree w/ radiology interpretation. Head CT w/o Contrast:  CT Head WO Contrast 12/06/2021    Narrative  CT HEAD WITHOUT CONTRAST    HISTORY: Hypertension and dysarthria    COMPARISON: None    Underexposure controls were utilized during the CT examination to meet ALARA standards for radiation dose reduction. FINDINGS:        The ventricles are normal in size and configuration with no midline shift or mass effect.     There is no evidence of intracranial hemorrhage or abnormal extra-axial fluid collection. There are no definite signs to suggest acute infarction. However, if there is concern of early infarction or other subtle intracranial abnormality, MRI correlation should be considered. Visualized portions of the paranasal sinuses appear clear. Impression  No evidence of acute intracranial abnormality. CTA of Head / Neck w/ Contrast:  CTA HEAD NECK W CONTRAST 12/06/2021    Narrative  PROCEDURE: CT ANGIOGRAPHY NECK WITH/WITHOUT CONTRAST    INDICATION: hypertension, dysarthria, past pointing, ataxia    COMPARISON: none    TECHNIQUE: Limited noncontrast CT imaging performed for the purposes of IV contrast bolus tracking. Axial CT imaging obtained from skull base through the lung apices following administration of IV contrast. Axial images, multiplanar reformatted images,  and maximum intensity projection images were reviewed for CT angiographic technique. Individualized dose optimization technique was used in order to meet ALARA standards for radiation dose reduction. In addition to vendor specific dose reduction  algorithms, the dose reduction techniques vary based on the specific scanner utilized but frequently include automated exposure control, adjustment of the mA and/or kV according to patient size, and use of iterative reconstruction technique. NASCET  criteria used to determine percent stenosis measurements (% stenosis = (1-narrowest diameter/diameter of normal distal segment)x100). IV contrast: 80 mL Isovue-370. FINDINGS:    AORTIC ARCH: Standard three-vessel aortic arch anatomy is present. INNOMINATE ARTERY: Normal.    RIGHT SUBCLAVIAN ARTERY: Normal.    RIGHT VERTEBRAL ARTERY: Normal.    RIGHT CAROTID ARTERY: There is marked narrowing of the proximal right internal carotid artery with irregular hypotrophic appearance of the proximal vessel and occlusion of the mid right internal carotid artery. No definite evidence of significant flow  seen distal to this.     LEFT SUBCLAVIAN ARTERY: Normal.    LEFT VERTEBRAL ARTERY: Normal.    LEFT CAROTID ARTERY: The left common and internal carotid arteries appear patent. NECK SOFT TISSUES: No neck soft tissue mass or lymphadenopathy. VISUALIZED MEDIASTINUM: No mass or lymphadenopathy. VISUALIZED LUNG APICES: Clear. BONES: No destructive osseous process. OTHER: None. Impression  There is marked stenosis and irregularity of the proximal right internal carotid artery with complete occlusion in the mid to distal segment of the cervical portion of the right internal carotid artery. Potential etiologies include atherosclerotic  disease, carotid dissection which may be chronic or acute, vasculitis, and potentially fibromuscular dysplasia although the appearance is atypical.    Otherwise no significant vascular pathology is identified in the neck. PROCEDURE: CT ANGIOGRAPHY HEAD WITH/WITHOUT CONTRAST    INDICATION: hypertension, dysarthria, past pointing, ataxia    COMPARISON: none    TECHNIQUE: Axial CT imaging obtained through the head prior to and following administration of IV contrast. Axial images, multiplanar reformatted images, and maximum intensity projection images were reviewed for CT angiographic technique. IV contrast: 80 mL Isovue-370. FINDINGS:    ANTERIOR CIRCULATION: The intracranial internal carotid arteries, anterior cerebral arteries, and middle cerebral arteries demonstrate no occlusion or stenosis. No evidence for aneurysm or arteriovenous malformation. Flow to the right middle cerebral  artery and anterior cerebral artery is likely via patent anterior communicating artery, given occlusion of right internal carotid artery. POSTERIOR CIRCULATION: The bilateral vertebral arteries, basilar artery and posterior cerebral arteries demonstrate no occlusion or stenosis. No evidence for aneurysm or arteriovenous malformation.  There is fetal origin of the left posterior cerebral  artery addressed in Impression & Recommendations below.    Recent Labs     12/06/21  1516 12/07/21 0625 12/08/21 0621    138 133*   K 3.1* 4.0 4.1    100 96*   CO2 29 28 25   BUN 23* 26* 33*   CREATININE 2.4* 2.8* 4.1*   GLUCOSE 123* 85 63*   CALCIUM 8.6 8.6 8.2*   MG 1.70*  --   --      Recent Labs     12/06/21  1516 12/07/21 0625 12/08/21 0621   WBC 5.4 7.0 8.0   RBC 2.95* 2.56* 2.60*   HGB 9.1* 8.1* 8.1*   HCT 28.5* 24.5* 25.0*    199 225     Lab Results   Component Value Date    INR 1.11 11/15/2021    LABA1C 6.2 12/06/2021    LDLCALC 68 12/07/2021    TRIG 94 12/07/2021    TSH 0.11 12/09/2019     Lab Results   Component Value Date    LACTSEPSIS 1.0 07/10/2021    LACTA 2.0 11/09/2021           CURRENT SCHEDULED MEDICATIONS:   carvedilol  12.5 mg Oral BID WC    guaiFENesin  600 mg Oral BID    clopidogrel  75 mg Oral Daily    atorvastatin  40 mg Oral Daily    gabapentin  300 mg Oral Daily    insulin glargine  10 Units SubCUTAneous Nightly    insulin lispro (1 Unit Dial)  10 Units SubCUTAneous TID WC    pantoprazole  40 mg Oral QAM AC    sodium chloride flush  5-40 mL IntraVENous 2 times per day    spironolactone  50 mg Oral Daily    heparin (porcine)  5,000 Units SubCUTAneous 3 times per day     sodium chloride, Last Rate: 25 mL (12/07/21 1305)  dextrose      butalbital-acetaminophen-caffeine, 1 tablet, Q4H PRN  nitroGLYCERIN, 0.4 mg, Q5 Min PRN  ondansetron, 4 mg, Q12H PRN  sodium chloride flush, 5-40 mL, PRN  sodium chloride, 25 mL, PRN  polyethylene glycol, 17 g, Daily PRN  acetaminophen, 650 mg, Q6H PRN   Or  acetaminophen, 650 mg, Q6H PRN  glucose, 15 g, PRN  dextrose, 12.5 g, PRN  glucagon (rDNA), 1 mg, PRN  dextrose, 100 mL/hr, PRN  perflutren lipid microspheres, 1.5 mL, ONCE PRN  labetalol, 10 mg, Q4H PRN         IMPRESSION & RECOMMENDATIONS     IMPRESSION:Scottie Chiang is a 45 y.o. female with a history of congestive heart failure, hypertension, type 2 diabetes, chronic kidney disease on hemodialysis who presented with slurred speech and ataxia concern for a stroke. Initial MRI negative but DWI/ADC repeated as she had clinical symptoms of a brainstem stroke. This was also negative. She is having some improvement. Her echo showed PFO, if she has DVTs, ok to start on anti-coagulation. RECOMMENDATIONS:  -Repeat MRI without evidence of stroke  -Echo showed PFO; BLE dopplers pending.  If DVTs found, ok to start anticoagulation and stop plavix  -Continue plavix 75mg PO daily unless need for anticoagulation  -High intensity statin  -Euglycemia and BP control  -Follow-up with Dr. Kristen Cook as an outpatient  -Ok to discharge from neurology perspective after BLE dopplers done and resulted      [address] [facility], [address] [facility]   Neurology & Neurocritical Care   Neurology Line: [phone]  [employer]: [facility] NPs  12/8/2021 9:01 AM

## 2021-12-08 NOTE — PLAN OF CARE
Problem: Safety:  Goal: Free from accidental physical injury  Description: Free from accidental physical injury  Outcome: Ongoing   All fall precautions in place. Bed locked and in lowest position with alarm on. Overbed table and personal belonings within reach. Call light within reach and patient instructed to use call light for assistance. Non-skid socks on. Problem: HEMODYNAMIC STATUS  Goal: Patient has stable vital signs and fluid balance  Outcome: Ongoing   Pt's BP elevated - allowing for permissive HTN to SBP. Pt requiring no interventions for BP at this time. Tolerating fluids well. Voiding adequately per BRP.

## 2021-12-08 NOTE — PROGRESS NOTES
Pt BG checked and was 47. Pt given orange juice and rechecked 15 min later. Pt's BG up to 50. Pt refusing glucose gel and given more orange juice. Pt's BG rechecked 20 min later and increased to 59.  Pt began eating grapes from her dinner tray at this time

## 2021-12-08 NOTE — PROGRESS NOTES
Office : [phone]     Fax :[phone]         Renal Progress Note  Subjective:   Admit Date: 12/6/2021     HPI : Rosalinda Sanders is a 45 y.o. female with PMH ESRD on HD, HTN, DMT2 who presented to the ED from Dialysis center after arrived for HD session and BP recorded to be in 200s. Patient additionally endorses dizziness and frontal headache. In the ED, vitals afebrile, blood pressure 200/92, pulse 72, RR 18, SPO2 95% on room air. Labs revealing of magnesium 1.7 troponin 0.1, potassium 3.1, proBNP 26, 896, UA revealing proteinuria >300, Hb 9.1. CTA head and neck with contrast revealing of stenosis and irregularity of proximal right ICA. Normal filling of right MCA and RAN despite internal carotid occlusion. Likely via patent RAN. No intracranial large vessel occlusion or significant stenosis. No aneurysm. Patient not TPA candidate as last known normal at 10pm. Patient admitted for suspected ischemic stroke, hypertensive emergency, nephology consulted for management of ESRD on HD. Interval History: No acute events overnight. Patient seen and examined at been side. Patient's chart and notes were reviewed. Per neuro, suspicious for brainstem ischemia with plans for repeat head MRI, cardiac echo and plavix. Patient reports continued cough, states feels contents in lungs unable to get out. Patient denies shortness of breath, chest pain, chills, nausea, vomiting She denies urinary frequency, dysuria. Patient is hemodynamically stable and afebrile. Cr 4.1. BP between  182/85 - 143/97 mmHg in the last 24 hours.  Per neuro permissive hypertension with BP > 220/110 to be treated with labetalol. DIET ADULT DIET; Regular; Low Sodium (2 gm)  Medications:   Scheduled Meds:   carvedilol  12.5 mg Oral BID WC    guaiFENesin  600 mg Oral BID    clopidogrel  75 mg Oral Daily    atorvastatin  40 mg Oral Daily    gabapentin  300 mg Oral Daily    insulin glargine  10 Units SubCUTAneous Nightly    insulin lispro (1 Unit Dial)  10 Units SubCUTAneous TID WC    pantoprazole  40 mg Oral QAM AC    sodium chloride flush  5-40 mL IntraVENous 2 times per day    spironolactone  50 mg Oral Daily    heparin (porcine)  5,000 Units SubCUTAneous 3 times per day     Continuous Infusions:   sodium chloride 25 mL (12/07/21 1305)    dextrose         Labs:  CBC:   Recent Labs     12/06/21  1516 12/07/21 0625 12/08/21 0621   WBC 5.4 7.0 8.0   HGB 9.1* 8.1* 8.1*    199 225     BMP:    Recent Labs     12/06/21  1516 12/07/21 0625 12/08/21 0621    138 133*   K 3.1* 4.0 4.1    100 96*   CO2 29 28 25   BUN 23* 26* 33*   CREATININE 2.4* 2.8* 4.1*   GLUCOSE 123* 85 63*     Ca/Mg/Phos:   Recent Labs     12/06/21  1516 12/07/21 0625 12/08/21 0621   CALCIUM 8.6 8.6 8.2*   MG 1.70*  --   --      Hepatic: No results for input(s): AST, ALT, ALB, BILITOT, ALKPHOS in the last 72 hours. Troponin:   Recent Labs     12/06/21  1516   TROPONINI 0.10*     BNP: No results for input(s): BNP in the last 72 hours. Lipids:   Recent Labs     12/07/21 0625   CHOL 128   TRIG 94   HDL 41   LDLCALC 68   LABVLDL 19     ABGs: No results for input(s): PHART, PO2ART, AHL7HXU in the last 72 hours. INR: No results for input(s): INR in the last 72 hours.   UA:  Recent Labs     12/06/21  1528   COLORU Yellow   CLARITYU Clear   GLUCOSEU Negative   BILIRUBINUR Negative   KETUA Negative   SPECGRAV 1.020   BLOODU Negative   PHUR 6.5   PROTEINU >=300*   UROBILINOGEN 0.2   NITRU Negative   LEUKOCYTESUR Negative   LABMICR YES   URINETYPE Voided      Urine Microscopic:   Recent Labs     12/06/21  1528   BACTERIA 1+*   WBCUA 0-2   RBCUA 0-2   EPIU 6-10*     Urine Culture: No results for input(s): LABURIN in the last 72 hours. Urine Chemistry: No results for input(s): [place], PROTEINUR, NAUR in the last 72 hours. Objective:   Vitals: BP (!) 179/87   Pulse 78   Temp 98 °F (36.7 °C) (Oral)   Resp 16   Ht 5' (1.524 m)   Wt 212 lb 1.3 oz (96.2 kg)   SpO2 96%   BMI 41.42 kg/m²    Wt Readings from Last 3 Encounters:   12/08/21 212 lb 1.3 oz (96.2 kg)   11/16/21 175 lb 14.8 oz (79.8 kg)   08/26/21 189 lb 6 oz (85.9 kg)      24HR INTAKE/OUTPUT:      Intake/Output Summary (Last 24 hours) at 12/8/2021 5596  Last data filed at 12/7/2021 2210  Gross per 24 hour   Intake 480 ml   Output 1 ml   Net 479 ml     Constitutional:  Alert, awake, no apparent distress  NECK: supple, no JVD  Cardiovascular:  S1, S2 without m/r/g  Respiratory:  CTA B without w/r/r  Abdomen: +bs, soft, nt  Ext: - LE edema    Assessment and Plan:       IMAGING:  MRI BRAIN WO CONTRAST   Final Result      No change from prior brain MRI. Specifically, no diffusion restriction to indicate acute ischemia. MRI BRAIN WO CONTRAST   Final Result      No acute intracranial abnormality. Mild nonspecific white matter disease, which likely represents chronic small vessel ischemic change. CTA HEAD NECK W CONTRAST   Final Result      There is marked stenosis and irregularity of the proximal right internal carotid artery with complete occlusion in the mid to distal segment of the cervical portion of the right internal carotid artery. Potential etiologies include atherosclerotic    disease, carotid dissection which may be chronic or acute, vasculitis, and potentially fibromuscular dysplasia although the appearance is atypical.      Otherwise no significant vascular pathology is identified in the neck.             PROCEDURE: CT ANGIOGRAPHY HEAD WITH/WITHOUT CONTRAST      INDICATION: hypertension, dysarthria, past pointing, ataxia COMPARISON: none      TECHNIQUE: Axial CT imaging obtained through the head prior to and following administration of IV contrast. Axial images, multiplanar reformatted images, and maximum intensity projection images were reviewed for CT angiographic technique. IV contrast: 80 mL Isovue-370. FINDINGS:      ANTERIOR CIRCULATION: The intracranial internal carotid arteries, anterior cerebral arteries, and middle cerebral arteries demonstrate no occlusion or stenosis. No evidence for aneurysm or arteriovenous malformation. Flow to the right middle cerebral    artery and anterior cerebral artery is likely via patent anterior communicating artery, given occlusion of right internal carotid artery. POSTERIOR CIRCULATION: The bilateral vertebral arteries, basilar artery and posterior cerebral arteries demonstrate no occlusion or stenosis. No evidence for aneurysm or arteriovenous malformation. There is fetal origin of the left posterior cerebral    artery noted. INTRACRANIAL VENOUS SYSTEM: No evidence for intracranial venous thrombosis. Referred to the recent noncontrast CT head for additional results. .      IMPRESSION:      Fetal origin posterior cerebral artery. Normal filling of right middle and anterior cerebral artery despite internal carotid occlusion. This is likely predominantly via a patent anterior communicating artery. Otherwise no intracranial large vessel occlusion or significant stenosis. No evidence to suggest aneurysm. CT Head WO Contrast   Final Result      No evidence of acute intracranial abnormality. XR CHEST PORTABLE   Final Result      Pulmonary venous hypertension      VL Extremity Venous Bilateral    (Results Pending)       Assessment/Plan     1. ESRD on HD     2. Hyperaldosteronism -- takes spironolactone at home, questionable compliance     3. Hypertensive Emergency     4.  Anemia -- Iron deficiency per HD labs, Venofer given yesterday, HGB stable     5. Acid- base/ Electrolyte imbalance      Plan  - Restarted spironolactone and home carvedilol  - HD this morning - will hold Epo due to neuro issue  - Optimize BP management  - Monitor UO  - Monitor BMP   - Monitor lytes replace as needed    Recommend to dose adjust all medications  based on renal functions  Maintain SBP> 90 mmHg   Daily weights   AVOID NSAIDs  Avoid Nephrotoxins  Monitor Intake/Output  Call if significant decrease in urine output       Donavan Kelley MS4    This patient will be staffed with Dr. Sherry Santana  [facility] 18 07       HD today   UF as dian   BP control better   meds adjusted     Sabina Nguyen MD

## 2021-12-09 ENCOUNTER — APPOINTMENT (OUTPATIENT)
Dept: VASCULAR LAB | Age: 51
DRG: 304 | End: 2021-12-09
Payer: MEDICARE

## 2021-12-09 VITALS
HEIGHT: 60 IN | RESPIRATION RATE: 16 BRPM | HEART RATE: 79 BPM | OXYGEN SATURATION: 90 % | SYSTOLIC BLOOD PRESSURE: 142 MMHG | TEMPERATURE: 98.1 F | DIASTOLIC BLOOD PRESSURE: 65 MMHG | WEIGHT: 191.8 LBS | BODY MASS INDEX: 37.66 KG/M2

## 2021-12-09 LAB
ANION GAP SERPL CALCULATED.3IONS-SCNC: 10 MMOL/L (ref 3–16)
BASOPHILS ABSOLUTE: 0 K/UL (ref 0–0.2)
BASOPHILS RELATIVE PERCENT: 0.5 %
BUN BLDV-MCNC: 18 MG/DL (ref 7–20)
CALCIUM SERPL-MCNC: 8.1 MG/DL (ref 8.3–10.6)
CHLORIDE BLD-SCNC: 100 MMOL/L (ref 99–110)
CO2: 25 MMOL/L (ref 21–32)
CREAT SERPL-MCNC: 3.4 MG/DL (ref 0.6–1.1)
EOSINOPHILS ABSOLUTE: 0.1 K/UL (ref 0–0.6)
EOSINOPHILS RELATIVE PERCENT: 1.5 %
GFR AFRICAN AMERICAN: 17
GFR NON-AFRICAN AMERICAN: 14
GLUCOSE BLD-MCNC: 109 MG/DL (ref 70–99)
GLUCOSE BLD-MCNC: 109 MG/DL (ref 70–99)
GLUCOSE BLD-MCNC: 79 MG/DL (ref 70–99)
GLUCOSE BLD-MCNC: 97 MG/DL (ref 70–99)
HCT VFR BLD CALC: 24.9 % (ref 36–48)
HEMOGLOBIN: 8.1 G/DL (ref 12–16)
LYMPHOCYTES ABSOLUTE: 1.4 K/UL (ref 1–5.1)
LYMPHOCYTES RELATIVE PERCENT: 20.2 %
MCH RBC QN AUTO: 31.1 PG (ref 26–34)
MCHC RBC AUTO-ENTMCNC: 32.4 G/DL (ref 31–36)
MCV RBC AUTO: 95.8 FL (ref 80–100)
MONOCYTES ABSOLUTE: 0.9 K/UL (ref 0–1.3)
MONOCYTES RELATIVE PERCENT: 12.6 %
NEUTROPHILS ABSOLUTE: 4.5 K/UL (ref 1.7–7.7)
NEUTROPHILS RELATIVE PERCENT: 65.2 %
PDW BLD-RTO: 16.1 % (ref 12.4–15.4)
PERFORMED ON: ABNORMAL
PERFORMED ON: ABNORMAL
PERFORMED ON: NORMAL
PLATELET # BLD: 230 K/UL (ref 135–450)
PMV BLD AUTO: 8.2 FL (ref 5–10.5)
POTASSIUM REFLEX MAGNESIUM: 4.3 MMOL/L (ref 3.5–5.1)
RBC # BLD: 2.6 M/UL (ref 4–5.2)
SODIUM BLD-SCNC: 135 MMOL/L (ref 136–145)
WBC # BLD: 6.9 K/UL (ref 4–11)

## 2021-12-09 PROCEDURE — 6370000000 HC RX 637 (ALT 250 FOR IP): Performed by: SURGERY

## 2021-12-09 PROCEDURE — 6370000000 HC RX 637 (ALT 250 FOR IP): Performed by: INTERNAL MEDICINE

## 2021-12-09 PROCEDURE — 97535 SELF CARE MNGMENT TRAINING: CPT

## 2021-12-09 PROCEDURE — 97530 THERAPEUTIC ACTIVITIES: CPT

## 2021-12-09 PROCEDURE — 36415 COLL VENOUS BLD VENIPUNCTURE: CPT

## 2021-12-09 PROCEDURE — 99233 SBSQ HOSP IP/OBS HIGH 50: CPT | Performed by: INTERNAL MEDICINE

## 2021-12-09 PROCEDURE — 6370000000 HC RX 637 (ALT 250 FOR IP): Performed by: STUDENT IN AN ORGANIZED HEALTH CARE EDUCATION/TRAINING PROGRAM

## 2021-12-09 PROCEDURE — 97116 GAIT TRAINING THERAPY: CPT

## 2021-12-09 PROCEDURE — 92526 ORAL FUNCTION THERAPY: CPT

## 2021-12-09 PROCEDURE — 80048 BASIC METABOLIC PNL TOTAL CA: CPT

## 2021-12-09 PROCEDURE — 93970 EXTREMITY STUDY: CPT

## 2021-12-09 PROCEDURE — 6360000002 HC RX W HCPCS: Performed by: STUDENT IN AN ORGANIZED HEALTH CARE EDUCATION/TRAINING PROGRAM

## 2021-12-09 PROCEDURE — 6370000000 HC RX 637 (ALT 250 FOR IP): Performed by: NURSE PRACTITIONER

## 2021-12-09 PROCEDURE — 85025 COMPLETE CBC W/AUTO DIFF WBC: CPT

## 2021-12-09 RX ORDER — FLUTICASONE PROPIONATE 50 MCG
1 SPRAY, SUSPENSION (ML) NASAL DAILY
Qty: 1 EACH | Refills: 0 | Status: SHIPPED | OUTPATIENT
Start: 2021-12-09 | End: 2022-06-24 | Stop reason: CLARIF

## 2021-12-09 RX ORDER — AMLODIPINE BESYLATE 5 MG/1
5 TABLET ORAL DAILY
Qty: 30 TABLET | Refills: 3 | Status: SHIPPED | OUTPATIENT
Start: 2021-12-10 | End: 2022-06-24 | Stop reason: CLARIF

## 2021-12-09 RX ORDER — CLOPIDOGREL BISULFATE 75 MG/1
75 TABLET ORAL DAILY
Qty: 30 TABLET | Refills: 3 | Status: SHIPPED | OUTPATIENT
Start: 2021-12-10

## 2021-12-09 RX ORDER — INSULIN LISPRO 100 [IU]/ML
5 INJECTION, SOLUTION INTRAVENOUS; SUBCUTANEOUS
Qty: 5 PEN | Refills: 1 | Status: SHIPPED | OUTPATIENT
Start: 2021-12-09 | End: 2022-06-24 | Stop reason: CLARIF

## 2021-12-09 RX ADMIN — ATORVASTATIN CALCIUM 40 MG: 40 TABLET, FILM COATED ORAL at 08:38

## 2021-12-09 RX ADMIN — GABAPENTIN 300 MG: 300 CAPSULE ORAL at 08:38

## 2021-12-09 RX ADMIN — HEPARIN SODIUM 5000 UNITS: 5000 INJECTION INTRAVENOUS; SUBCUTANEOUS at 06:21

## 2021-12-09 RX ADMIN — AMLODIPINE BESYLATE 5 MG: 5 TABLET ORAL at 08:38

## 2021-12-09 RX ADMIN — CLOPIDOGREL BISULFATE 75 MG: 75 TABLET, FILM COATED ORAL at 08:38

## 2021-12-09 RX ADMIN — ONDANSETRON HYDROCHLORIDE 4 MG: 4 TABLET, FILM COATED ORAL at 07:42

## 2021-12-09 RX ADMIN — PANTOPRAZOLE SODIUM 40 MG: 40 TABLET, DELAYED RELEASE ORAL at 06:21

## 2021-12-09 RX ADMIN — GUAIFENESIN 600 MG: 600 TABLET, EXTENDED RELEASE ORAL at 08:38

## 2021-12-09 RX ADMIN — CARVEDILOL 12.5 MG: 12.5 TABLET, FILM COATED ORAL at 08:38

## 2021-12-09 RX ADMIN — BUTALBITAL, ACETAMINOPHEN, AND CAFFEINE 1 TABLET: 50; 325; 40 TABLET ORAL at 04:05

## 2021-12-09 RX ADMIN — ACETAMINOPHEN 650 MG: 325 TABLET ORAL at 11:19

## 2021-12-09 RX ADMIN — HEPARIN SODIUM 5000 UNITS: 5000 INJECTION INTRAVENOUS; SUBCUTANEOUS at 14:55

## 2021-12-09 RX ADMIN — SPIRONOLACTONE 50 MG: 50 TABLET ORAL at 08:38

## 2021-12-09 ASSESSMENT — PAIN DESCRIPTION - DESCRIPTORS: DESCRIPTORS: HEADACHE

## 2021-12-09 ASSESSMENT — PAIN SCALES - GENERAL
PAINLEVEL_OUTOF10: 3
PAINLEVEL_OUTOF10: 0
PAINLEVEL_OUTOF10: 8
PAINLEVEL_OUTOF10: 0

## 2021-12-09 ASSESSMENT — PAIN DESCRIPTION - FREQUENCY: FREQUENCY: CONTINUOUS

## 2021-12-09 ASSESSMENT — PAIN DESCRIPTION - PROGRESSION: CLINICAL_PROGRESSION: GRADUALLY WORSENING

## 2021-12-09 ASSESSMENT — PAIN DESCRIPTION - LOCATION: LOCATION: HEAD

## 2021-12-09 ASSESSMENT — PAIN DESCRIPTION - PAIN TYPE: TYPE: ACUTE PAIN

## 2021-12-09 ASSESSMENT — PAIN DESCRIPTION - ONSET: ONSET: ON-GOING

## 2021-12-09 ASSESSMENT — PAIN DESCRIPTION - ORIENTATION: ORIENTATION: ANTERIOR

## 2021-12-09 ASSESSMENT — PAIN - FUNCTIONAL ASSESSMENT: PAIN_FUNCTIONAL_ASSESSMENT: PREVENTS OR INTERFERES SOME ACTIVE ACTIVITIES AND ADLS

## 2021-12-09 NOTE — PROGRESS NOTES
Patient is AAOX4. VSS. Patient has periods of confusion where she thinks she is at home. Patient is up x1 CG with gaitbelt. Patient is voiding adequately. Patients blood sugar dropped today to high 40s. Contacted medical residents about holding Lantus for the night. Residents decided on giving half dose of Lantus for the night and to monitor. Patient is resting in bed with all fall precautions in place. Will continue to monitor.

## 2021-12-09 NOTE — PROGRESS NOTES
regular rhythm. Pulses: Normal pulses. Heart sounds: Normal heart sounds. Pulmonary:      Effort: Pulmonary effort is normal.      Breath sounds: Normal breath sounds. Abdominal:      General: Bowel sounds are normal.      Palpations: Abdomen is soft. Musculoskeletal:         General: Normal range of motion. Cervical back: Normal range of motion and neck supple. Skin:     General: Skin is warm and dry. Neurological:      Mental Status: She is alert and oriented to person, place, and time. Sensory: No sensory deficit. Coordination: Coordination normal.      Gait: Gait abnormal.      Deep Tendon Reflexes: Reflexes normal.      Comments: Dysarthria   Psychiatric:         Mood and Affect: Mood normal.         Behavior: Behavior normal.         Thought Content: Thought content normal.         Judgment: Judgment normal.      LABS:    CBC:   Recent Labs     12/07/21  0625 12/08/21 0621 12/09/21  0708   WBC 7.0 8.0 6.9   HGB 8.1* 8.1* 8.1*   HCT 24.5* 25.0* 24.9*    225 230   MCV 95.8 96.0 95.8     Renal:    Recent Labs     12/06/21  1516 12/06/21  1516 12/07/21  0625 12/08/21  0621 12/09/21  0708      < > 138 133* 135*   K 3.1*   < > 4.0 4.1 4.3      < > 100 96* 100   CO2 29   < > 28 25 25   BUN 23*   < > 26* 33* 18   CREATININE 2.4*   < > 2.8* 4.1* 3.4*   GLUCOSE 123*   < > 85 63* 79   CALCIUM 8.6   < > 8.6 8.2* 8.1*   MG 1.70*  --   --   --   --    ANIONGAP 10   < > 10 12 10    < > = values in this interval not displayed. Hepatic: No results for input(s): AST, ALT, BILITOT, BILIDIR, PROT, LABALBU, ALKPHOS in the last 72 hours. Troponin:   Recent Labs     12/06/21  1516   TROPONINI 0.10*     BNP: No results for input(s): BNP in the last 72 hours. Lipids:   Recent Labs     12/07/21 0625   CHOL 128   HDL 41     ABGs:  No results for input(s): PHART, PJT1ZZR, PO2ART, IZS8DGB, BEART, THGBART, R7YSXZVU, GRB3MLV in the last 72 hours.     INR: No results for input(s): INR in the last 72 hours. Lactate: No results for input(s): LACTATE in the last 72 hours. Cultures:  -----------------------------------------------------------------  RAD:   MRI BRAIN WO CONTRAST   Final Result      No change from prior brain MRI. Specifically, no diffusion restriction to indicate acute ischemia. MRI BRAIN WO CONTRAST   Final Result      No acute intracranial abnormality. Mild nonspecific white matter disease, which likely represents chronic small vessel ischemic change. CTA HEAD NECK W CONTRAST   Final Result      There is marked stenosis and irregularity of the proximal right internal carotid artery with complete occlusion in the mid to distal segment of the cervical portion of the right internal carotid artery. Potential etiologies include atherosclerotic    disease, carotid dissection which may be chronic or acute, vasculitis, and potentially fibromuscular dysplasia although the appearance is atypical.      Otherwise no significant vascular pathology is identified in the neck. PROCEDURE: CT ANGIOGRAPHY HEAD WITH/WITHOUT CONTRAST      INDICATION: hypertension, dysarthria, past pointing, ataxia      COMPARISON: none      TECHNIQUE: Axial CT imaging obtained through the head prior to and following administration of IV contrast. Axial images, multiplanar reformatted images, and maximum intensity projection images were reviewed for CT angiographic technique. IV contrast: 80 mL Isovue-370. FINDINGS:      ANTERIOR CIRCULATION: The intracranial internal carotid arteries, anterior cerebral arteries, and middle cerebral arteries demonstrate no occlusion or stenosis. No evidence for aneurysm or arteriovenous malformation. Flow to the right middle cerebral    artery and anterior cerebral artery is likely via patent anterior communicating artery, given occlusion of right internal carotid artery.       POSTERIOR CIRCULATION: The bilateral vertebral arteries, basilar artery and posterior cerebral arteries demonstrate no occlusion or stenosis. No evidence for aneurysm or arteriovenous malformation. There is fetal origin of the left posterior cerebral    artery noted. INTRACRANIAL VENOUS SYSTEM: No evidence for intracranial venous thrombosis. Referred to the recent noncontrast CT head for additional results. .      IMPRESSION:      Fetal origin posterior cerebral artery. Normal filling of right middle and anterior cerebral artery despite internal carotid occlusion. This is likely predominantly via a patent anterior communicating artery. Otherwise no intracranial large vessel occlusion or significant stenosis. No evidence to suggest aneurysm. CT Head WO Contrast   Final Result      No evidence of acute intracranial abnormality. XR CHEST PORTABLE   Final Result      Pulmonary venous hypertension      VL Extremity Venous Bilateral    (Results Pending)       Assessment/Plan:   58-year-old female with PMH of CHF, HTN, DM 2 and ESRD on HD presented to [facility] ED with chief complaint of hypertension. Patient was admitted for the following concerns:     Dysarthria 2/2 likely ischemic stroke  -CT head without contrast nonrevealing. CT head and neck with contrast revealing of mild stenosis in irregularity of the proximal right ICA with complete occlusion in the mid to distal segment. Normal filling of right MCA and RAN despite internal carotid occlusion. Likely via patent RAN. No intracranial large vessel occlusion or significant stenosis. No aneurysm.   -Repeat MRI without contrast: non-revealing of any intracranial abnormality   -Neurology consulted, appreciate recs  -- Q4H Neurochecks   -Aspirin, plavix and statin  -Lipid panel WNL   --  A1c: 6   -Seizure prophylaxis and fall precautions  -Echo: revealing PFO, EF 55-65%, grade II diastolic dysfunction    - f/u venous doppler pending    CHF   - Echo: revealing PFO, EF 55-65%, grade II diastolic dysfunction  - aldectone 50 mg daily   - strict Is and Os and daily weights   - cardiac diet   - consult cardiology, appreciate recs      HTN emergency  -Holding home meds for permissive hypertension for 24 hours   -Labetalol as needed for SBP >220     DM type II  -Lantus 10 units nightly  -Mealtime boluses    ESRD on HD  - nephrology consulted, appreciate recs   - HD today   - holding Epo given neurological complains       Anemia  -  holding Epo given neurological complains   - venofer once           Code Status: full code  FEN: cardiac diet   PPX: lovenox, PPI  DISPO: Tatyana Rivera MD, PGY-1  12/09/21  8:32 AM    This patient has been staffed and discussed with Sancho Garcia MD.

## 2021-12-09 NOTE — PLAN OF CARE
Problem: Safety:  Goal: Free from accidental physical injury  Description: Free from accidental physical injury  12/9/2021 0745 by Krishna Simmons RN  Outcome: Ongoing     Problem: Daily Care:  Goal: Daily care needs are met  Description: Daily care needs are met  Outcome: Ongoing

## 2021-12-09 NOTE — PLAN OF CARE
Problem: Pain:  Goal: Patient's pain/discomfort is manageable  Description: Patient's pain/discomfort is manageable  Outcome: Ongoing   Patient complains of pain, patient medicated per mar, will continue to monitor. Problem: Falls - Risk of:  Goal: Will remain free from falls  Description: Will remain free from falls  Outcome: Ongoing   Patient is at risk for falls. Patient is in bed with bed alarm on, fall risk ID, Nonskid socks, Bed in lowest position. Call light and bedside table are within reach. Patient calls out approprietly. Will continue to monitor.

## 2021-12-09 NOTE — PROGRESS NOTES
Occupational Therapy  Facility/Department: [facility] 5T ORTHO/NEURO  Daily Treatment Note  NAME: Trini Ch  : 1970  MRN: [id number]    Date of Service: 2021    Discharge Recommendations:  Trini Ch scored a 21/24 on the AM-PAC ADL Inpatient form. Current research shows that an AM-PAC score of 18 or greater is typically associated with a discharge to the patient's home setting. Based on the patient's AM-PAC score, and their current ADL deficits, it is recommended that the patient have 2-3 sessions per week of Occupational Therapy at d/c to increase the patient's independence. At this time, this patient demonstrates the endurance and safety to discharge home with prn assist and OPOT and a follow up treatment frequency of 2-3x/wk. Please see assessment section for further patient specific details. If patient discharges prior to next session this note will serve as a discharge summary. Please see below for the latest assessment towards goals. OT Equipment Recommendations  Other: discussed shower chair for home - pt declines 2/2 sponge bathes2/2 multiple lines    Assessment   Performance deficits / Impairments: Decreased functional mobility ; Decreased strength; Decreased safe awareness; Decreased high-level IADLs  Assessment: Pt SBA for functional mobility and Supervision for transfers. Pt tolerated therapy session well, pt with deficits in safety awareness and educated on techniques to implement at home. Pt would benefit from OPOT at d/c in symptoms unresolved. Continue with POC. Treatment Diagnosis: impaired LE ADLs/ decreased functional endurance and safety awareness 2/2 R/o CVA  Prognosis: Good; Fair  OT Education: OT Role; Plan of Care; IADL Safety;  Energy Conservation  Patient Education: verb understanding - reinforce as needed  REQUIRES OT FOLLOW UP: Yes  Activity Tolerance  Activity Tolerance: Patient Tolerated treatment well  Safety Devices  Safety Devices in place: Yes  Type of devices: Call light within reach; Chair alarm in place; Nurse notified; Left in chair         Patient Diagnosis(es): The primary encounter diagnosis was Stroke-like symptoms. A diagnosis of ESRD (end stage renal disease) on dialysis [facility]) was also pertinent to this visit. has a past medical history of CHF (congestive heart failure) ([address] Diabetes mellitus ([address] and Hypertension. has a past surgical history that includes Dialysis Catheter Insertion (N/A, 7/9/2021); Dialysis Catheter Removal (N/A, 11/14/2021); and IR TUNNELED CVC PLACE WO SQ PORT/PUMP > 5 YEARS (11/15/2021). Restrictions  Position Activity Restriction  Other position/activity restrictions: Up with assist  Subjective   General  Chart Reviewed: Yes  Additional Pertinent Hx: Admit 12/6 from Dialysis center with Hypertension/ slurred speech    CT head -unremarkable, CTA + area of stenosis and occlusion of the proximal extracranial right ICA w/ normal intracranial circulation                                                         PMHX:  congestive heart failure, hypertension, type 2 diabetes, chronic kidney disease on hemodialysis  Response to previous treatment: Patient with no complaints from previous session  Family / Caregiver Present: No  Diagnosis: r/o CVA  Subjective  Subjective: Pt side lying sleeping in bed upon entry. Pt awakened with vcs/tactile cues. Pt agreeable to therapy session. General Comment  Comments: Pt supine to sit Independent. Pt sit EOB Independent. Pt donned  socks, setup. Pt sit to stand Supervision and pt ambulated with rw at SBA ~18 ft to bathroom toilet. Pt toilet transfer Supervision and toileted SBA (pt stepping out of brief in stance) - vcs for safety to doff seated. Pt in stance at sink Supervision ~5 min for oral care/wash face/wash hands. Pt ambulated ~18 ft to recliner with rw at [address]. Pt stand to sit Supervision. Pt donned new brief and Supervision in stance to pull up.  Pt ambulated in hallway ~375 ft with rw at SBA. Pt with seated rest break. Pt unable to list three safety techniques for home. Pt educated on safety techniques and some energy conservation techniques. Pt verbalized understanding. Pt back in room, stand to sit Supervision in recliner. Call light in reach and chair alarm on. Vital Signs  Patient Currently in Pain: Denies (nausea)   Orientation  Orientation  Overall Orientation Status: Within Functional Limits  Objective    ADL  Grooming: Supervision  LE Dressing: Supervision (brief/socks)  Toileting: Stand by assistance        Balance  Sitting Balance: Independent  Standing Balance: Supervision (Supervision/SBA)  Standing Balance  Time: ~15 min total  Activity: to/from bathroom, toilet transfer/toileting, in stance to groom, LB clothing mgmt, ambulation in hallway  Functional Mobility  Functional - Mobility Device: Rolling Walker  Activity: To/from bathroom;  Other  Assist Level: Stand by assistance  Toilet Transfers  Toilet - Technique: Ambulating  Equipment Used: Standard toilet  Toilet Transfer: Supervision  Toilet Transfers Comments: use of grab bar  Bed mobility  Supine to Sit: Independent  Transfers  Sit to stand: Supervision  Stand to sit: Supervision                       Cognition  Overall Cognitive Status: [facility]  Cognition Comment: pt a bit impulsive with deficits in safety awareness                                         Plan   Plan  Times per week: 5-7x  Times per day: Daily  Current Treatment Recommendations: Functional Mobility Training, Endurance Training, Safety Education & Training, Self-Care / ADL, Equipment Evaluation, Education, & procurement, Patient/Caregiver Education & Training  G-Code     OutComes Score                                                  AM-PAC Score        AM-PAC Inpatient Daily Activity Raw Score: 21 (12/09/21 0823)  AM-PAC Inpatient ADL T-Scale Score : 44.27 (12/09/21 0823)  ADL Inpatient CMS 0-100% Score: 32.79 (12/09/21 4624)  ADL Inpatient CMS G-Code Modifier : Mehran Hyatt (12/09/21 7161)    Goals  Short term goals  Time Frame for Short term goals: at d/c  Short term goal 1: Stance x 10 mins with SBA for ADLs/ IADLs - goal met assist continue for time 12/9  Short term goal 2: LE Dressing with Mod independence - goal not met Supervision 12/9  Short term goal 3: Verb 3  to use at home - progressing 12/9  Patient Goals   Patient goals : Be independent       Therapy Time   Individual Concurrent Group Co-treatment   Time In [place] [address]         Time Out 0808         Minutes 30         Timed Code Treatment Minutes: Sujatha Hale 1, [address] [facility]

## 2021-12-09 NOTE — CARE COORDINATION
Case Management Assessment            Discharge Note                    Date / Time of Note: 12/9/2021 9:51 AM                  Discharge Note Completed by: Ja Dunne RN     Patient will d/c to home today. She denied the need for therapy but will contact her PCP if she changes her mind. She did agree to get a rolling walker which has been ordered and Sherry from [place] will deliver this. Patient Name: Dayana Aguirre   YOB: 1970  Diagnosis: ESRD (end stage renal disease) on dialysis [facility]) [N18.6, Z99.2]  Hypertensive emergency [I16.1]  Stroke-like symptoms [R29.90]   Date / Time: 12/6/2021  2:39 PM    Current PCP: Angel Haywood patient: No    Hospitalization in the last 30 days: No    Advance Directives:  Code Status: Full Code  [place] DNR form completed and on chart: Not Indicated    Financial:  Payor: MEDICARE / Plan: MEDICARE PART A AND B / Product Type: *No Product type* /      Pharmacy:    Horace SMYTH [phone] - F [phone]  [facility]  [address] [address]  Phone: [phone] Fax: [phone]    [address] [place] [address] [phone] - f [phone]  [address] [place] [address]  Phone: [phone] Fax: [address] [place] [address] [phone] [facility] [phone] [address] [facility] [address] [facility] [facility]  Phone: [phone] Fax: [phone]      Assistance purchasing medications?: Potential Assistance Purchasing Medications: No  Assistance provided by Case Management: None at this time    Does patient want to participate in local refill/ meds to beds program?: No    Meds To Beds General Rules:  1. Can ONLY be done Monday- Friday between 8:30am-5pm  2. Prescription(s) must be in pharmacy by 3pm to be filled same day  3. Copy of patient's insurance/ prescription drug card and patient face sheet must be sent along with the prescription(s)  4. Cost of Rx cannot be added to hospital bill. If financial assistance is needed, please contact unit  or ;  or  CANNOT provide pharmacy voucher for patients co-pays  5. Patients can then  the prescription on their way out of the hospital at discharge, or pharmacy can deliver to the bedside if staff is available. (payment due at time of pick-up or delivery - cash, check, or card accepted)     Able to afford home medications/ co-pay costs: Yes    ADLS:  Current PT AM-PAC Score: 18 /24  Current OT AM-PAC Score: 21 /24      DISCHARGE Disposition: Home- No Services Needed    LOC at discharge: Not Applicable  DANIEL Completed: Not Indicated    Notification completed in HENS/PAS?:  Not Applicable    IMM Completed:   Yes, Case management has presented and reviewed IMM letter #2 to the patient and/or family/ POA. Patient and/or family/POA verbalized understanding of their medicare rights and appeal process if needed. Patient and/or family/POA has signed, initialed and placed today's date (12/09/2021) and time (2779) on IMM letter #2 on the the appropriate lines. Patient and/or family/POA, copy of letter offered and they are aware that this original copy of IMM letter #2 is available prior to discharge from the paper chart on the unit. Electronic documentation has been entered into epic for IMM letter #2 and original paper copy has been added to the paper chart at the nurses station.      Transportation:  Transportation PLAN for discharge: family   Mode of Transport: Private Car  Durable Medical Equipment:  DME Provider: [facility]  Equipment obtained during hospitalization: walker      The Plan for Transition of Care is related to the following treatment goals of ESRD (end stage renal disease) on dialysis ([facility] [N18.6, Z99.2]  Hypertensive emergency [I16.1]  Stroke-like symptoms [R29.90]    The Patient and/or patient representative Javier Mueller and her family were provided with a choice of provider and agrees with the discharge plan Not Indicated    Freedom of choice list was provided with basic dialogue that supports the patient's individualized plan of care/goals and shares the quality data associated with the providers.  Not Indicated    Care Transitions patient: Macrina Majano, MACHO  [facility] [employer]  Case Management Department  Ph: [phone]  Fax: [phone]

## 2021-12-09 NOTE — PROGRESS NOTES
Physical Therapy  Daily Treatment Note    Discharge Recommendations: Baltazar Frost scored a 18/24 on the AM-PAC short mobility form. Current research shows that an AM-PAC score of 18 or greater is typically associated with a discharge to the patient's home setting. Based on the patient's AM-PAC score and their current functional mobility deficits, it is recommended that the patient have 2-3 sessions per week of Physical Therapy at d/c to increase the patient's independence. At this time, this patient demonstrates the endurance and safety to discharge home with home vs OP PT services and a follow up treatment frequency of 2-3x/wk. Please see assessment section for further patient specific details. Assessment:  Pt with improved gait tolerance today. Gait steady with wheeled walker. Did well on stairs with railing. Plan is for home at D/C. Would benefit from initial 24 hour assist and continued PT at D/C to maximize strength and independence. Pt was already issued a wheeled walker. Equipment Needs: Pt was already issue a wheeled walker    Chart Reviewed: Yes     Other position/activity restrictions: Up with assist   Additional Pertinent Hx: Pt to ED 12/6 with HTN while at dialysis. CXR: pulm venous HTN. Head CT (-). CTA head/neck (-) aneurysm. Head MRI (-). PMH:  CHF, DM, HTN      Diagnosis: ESRD   Treatment Diagnosis: Decreased gait associated with rule out CVA. Subjective: Pt in bed. Recently returned from test.  \"I only have 3 steps at home. \" \"I don't really feel like it right now. I sat up all morning. \" Agreeable to working with PT after encouragement.      Pain: No c/o voiced    Objective:    Bed mobility  Supine to sit: SBA, HOB up partially  Sit to Supine: SBA, HOB up partially    Transfers  Sit to stand: SBA from bed  Stand to sit: SBA onto bed    Ambulation  Assistance Level: CGA  Assistive device: Wheeled walker  Distance: ~120 ft in cardona  Quality of gait: Exaggerated weight shifting; decreased pace    Stairs  Up/down 2 steps x 2 (4 total) with unilateral railing CGA. Step-to-step pattern. Patient Education  Calling for assist with needs. Expressed understanding. Safety Devices  Pt left with needs in reach. In bed with bed alarm on. RN aware. AM-PAC score  AM-PAC Inpatient Mobility Raw Score : 18  AM-PAC Inpatient T-Scale Score : 43.63  Mobility Inpatient CMS 0-100% Score: 46.58  Mobility Inpatient CMS G-Code Modifier : CK    Goals: (as determined and assessed by primary PT)  Time Frame for Short term goals: Discharge  Short term goal 1: supine <> sit independent   Short term goal 2: sit <> stand supervision   Short term goal 3: ambulate 150ft with rolling walker SBA  Short term goal 4: ambulate up/down 5 steps with rail CGA     Plan:  Times per week: 5-7;    Current Treatment Recommendations: Transfer Training, Gait Training, Functional Mobility Training, Strengthening, Neuromuscular Re-education    Therapy Time    Individual  Concurrent  Group  Co-treatment    Time In  1124            Time Out  1135            Minutes  11              Timed Code Treatment Minutes: 11  Total Treatment Minutes: 11    Will continue per plan of care if not D/Joseph home today. If patient is discharged prior to next treatment, this note will serve as the discharge summary.     Stefano Anderson #[id number]

## 2021-12-09 NOTE — PROGRESS NOTES
Speech Language Pathology  Facility/Department: [facility] 5T ORTHO/NEURO  Dysphagia Daily Treatment Note & Discharge    NAME: Naida Treviño  : 1970  MRN: [id number]    Patient Diagnosis(es):   Patient Active Problem List    Diagnosis Date Noted    ESRD (end stage renal disease) on dialysis ([facility]     Stroke-like symptoms     Hypertensive emergency 2021    Peritonitis associated with peritoneal dialysis ([facility] 2021    Free intraperitoneal air 2021    Positive blood culture 2021    Abdominal pain 2021    Uremia     CKD (chronic kidney disease) stage 5, GFR less than 15 ml/min ([facility])     Electrolyte imbalance     Anemia     HARRIETT (acute kidney injury) ([facility] 2021    Essential hypertension 01/10/2020    Type 2 diabetes mellitus with hyperglycemia, with long-term current use of insulin ([facility] 01/10/2020    JEAN (obstructive sleep apnea) 01/10/2020    Abnormal myocardial perfusion study 2019    CHF (congestive heart failure), NYHA class I, acute on chronic, combined ([facility] 2019     Allergies: No Known Allergies       Chart reviewed. Medical Diagnosis:    Treatment Diagnosis:  Oral-pharyngeal Dysphagia (R13.12)    BSE Impression (21):  Dysphagia: Pt able to cough and swallow on command. Presented pt with puree, thin liquids via cup / straw, including 3 ounces of uninterrupted swallows, as well as a igor cracker. Pt demonstrated no overt signs of aspiration: no coughing/throat clearing or change in vocal quality. Good labial seal with no anterior loss of liquids. Good swallow movement noted upon palpation of anterior neck. Pt exhibited no difficulty with mastication of cracker, no oral residue. No c/o globus sensation. Recommend cont regular diet/thin liquids.  Will monitor need for full sp/lang eval    MBS results: N/A    Pain: none reported    Current Diet: Regular diet with thin liquids    Treatment:  Pt seen bedside to address the following goals:  1- The patient will tolerate recommended diet without observed clinical signs of aspiration  12/9: GOAL MET. Patient presents with grossly functional oropharyngeal swallow with no signs or symptoms of penetration or aspiration with thin liquid via straw x6 or hard solids x3. Cranial nerve exam and laryngeal function exams are unremarkable. Mastication of hard solid is timely with complete oral clearance. Pt endorses constant globus sensation and points to the level of her sternal notch (in absence of PO). Rx outpatient GI/ENT consult upon discharge. 2-The patient/caregiver will demonstrate understanding of compensatory strategies for improved swallowing safety. 12/8: Educated pt to purpose of visit, s/s of aspiration, concern if aspiration occurs, rationale for diet recommendation/strategies to reduce risk for aspiration and instruction to notify staff if any signs emerge. Pt stated comprehension  Cont goal  12/9: GOAL MET. Education provided re: role of SLP, dysphagia, basic swallow anatomy & physiology, s/s aspiration, risks of aspiration, recommended diet/liquid consistencies, and swallow strategies to reduce risk of aspiration. Pt verbalizes understanding. Patient/Family/Caregiver Education:  See above      Plan: No acute SLP needs. Diet Recommendations: Regular diet with thin liquid  Discharge Plan: Home  ENT/GI consult re: patient c/o of globus sensation  Treatment: 10 minutes  Needs within reach. Santana Amador MA, CCC-SLP  Speech-Language Pathologist, Rehab Services  [employer] [address] Certified Clinician  FEES Certified Clinician  Pager: [phone]  Office: [phone]    This document will serve as a discharge summary if pt discharge before next treatment session.

## 2021-12-10 NOTE — PROGRESS NOTES
Office : [phone]     Fax :[phone]         Renal Progress Note  Subjective:   Admit Date: 12/6/2021     HPI : Pop Green is a 45 y.o. female with PMH ESRD on HD, HTN, DMT2 who presented to the ED from Dialysis center after arrived for HD session and BP recorded to be in 200s. Patient additionally endorses dizziness and frontal headache. In the ED, vitals afebrile, blood pressure 200/92, pulse 72, RR 18, SPO2 95% on room air. Labs revealing of magnesium 1.7 troponin 0.1, potassium 3.1, proBNP 26, 896, UA revealing proteinuria >300, Hb 9.1. CTA head and neck with contrast revealing of stenosis and irregularity of proximal right ICA. Normal filling of right MCA and RAN despite internal carotid occlusion. Likely via patent RAN. No intracranial large vessel occlusion or significant stenosis. No aneurysm. Patient not TPA candidate as last known normal at 10pm. Patient admitted for suspected ischemic stroke, hypertensive emergency, nephology consulted for management of ESRD on HD. Interval History: No acute events overnight.    BP much better   No Diarrhea       DIET No diet orders on file  Medications:   Scheduled Meds:    Continuous Infusions:      Labs:  CBC:   Recent Labs     12/07/21  0625 12/08/21  0621 12/09/21  0708   WBC 7.0 8.0 6.9   HGB 8.1* 8.1* 8.1*    225 230     BMP:    Recent Labs     12/07/21  0625 12/08/21  0621 12/09/21  0708    133* 135*   K 4.0 4.1 4.3    96* 100   CO2 28 25 25   BUN 26* 33* 18   CREATININE 2.8* 4.1* 3.4*   GLUCOSE 85 63* 79     Ca/Mg/Phos:   Recent Labs     12/07/21  0625 12/08/21  0621 12/09/21  3939 CALCIUM 8.6 8.2* 8.1*     Hepatic: No results for input(s): AST, ALT, ALB, BILITOT, ALKPHOS in the last 72 hours. Troponin:   No results for input(s): TROPONINI in the last 72 hours. BNP: No results for input(s): BNP in the last 72 hours. Lipids:   Recent Labs     12/07/21  0625   CHOL 128   TRIG 94   HDL 41   LDLCALC 68   LABVLDL 19     ABGs: No results for input(s): PHART, PO2ART, MOV9ESM in the last 72 hours. INR: No results for input(s): INR in the last 72 hours. UA:  No results for input(s): Windell Carrier, GLUCOSEU, BILIRUBINUR, KETUA, SPECGRAV, BLOODU, PHUR, PROTEINU, UROBILINOGEN, NITRU, LEUKOCYTESUR, Reagan Pippa in the last 72 hours. Urine Microscopic:   No results for input(s): LABCAST, BACTERIA, COMU, HYALCAST, WBCUA, RBCUA, EPIU in the last 72 hours. Urine Culture: No results for input(s): LABURIN in the last 72 hours. Urine Chemistry: No results for input(s): Aby Vasquez, PROTEINUR, NAUR in the last 72 hours. Objective:   Vitals: BP (!) 142/65   Pulse 79   Temp 98.1 °F (36.7 °C) (Oral)   Resp 16   Ht 5' (1.524 m)   Wt 191 lb 12.8 oz (87 kg)   SpO2 90%   BMI 37.46 kg/m²    Wt Readings from Last 3 Encounters:   12/08/21 191 lb 12.8 oz (87 kg)   11/16/21 175 lb 14.8 oz (79.8 kg)   08/26/21 189 lb 6 oz (85.9 kg)      24HR INTAKE/OUTPUT:      Intake/Output Summary (Last 24 hours) at 12/9/2021 2359  Last data filed at 12/9/2021 1316  Gross per 24 hour   Intake 240 ml   Output 200 ml   Net 40 ml     Constitutional:  Alert, awake, no apparent distress  NECK: supple, no JVD  Cardiovascular:  S1, S2 without m/r/g  Respiratory:  CTA B without w/r/r  Abdomen: +bs, soft, nt  Ext: - LE edema    Assessment and Plan:       IMAGING:  VL Extremity Venous Bilateral   Final Result      MRI BRAIN WO CONTRAST   Final Result      No change from prior brain MRI. Specifically, no diffusion restriction to indicate acute ischemia.             MRI BRAIN WO CONTRAST   Final Result      No acute intracranial abnormality. Mild nonspecific white matter disease, which likely represents chronic small vessel ischemic change. CTA HEAD NECK W CONTRAST   Final Result      There is marked stenosis and irregularity of the proximal right internal carotid artery with complete occlusion in the mid to distal segment of the cervical portion of the right internal carotid artery. Potential etiologies include atherosclerotic    disease, carotid dissection which may be chronic or acute, vasculitis, and potentially fibromuscular dysplasia although the appearance is atypical.      Otherwise no significant vascular pathology is identified in the neck. PROCEDURE: CT ANGIOGRAPHY HEAD WITH/WITHOUT CONTRAST      INDICATION: hypertension, dysarthria, past pointing, ataxia      COMPARISON: none      TECHNIQUE: Axial CT imaging obtained through the head prior to and following administration of IV contrast. Axial images, multiplanar reformatted images, and maximum intensity projection images were reviewed for CT angiographic technique. IV contrast: 80 mL Isovue-370. FINDINGS:      ANTERIOR CIRCULATION: The intracranial internal carotid arteries, anterior cerebral arteries, and middle cerebral arteries demonstrate no occlusion or stenosis. No evidence for aneurysm or arteriovenous malformation. Flow to the right middle cerebral    artery and anterior cerebral artery is likely via patent anterior communicating artery, given occlusion of right internal carotid artery. POSTERIOR CIRCULATION: The bilateral vertebral arteries, basilar artery and posterior cerebral arteries demonstrate no occlusion or stenosis. No evidence for aneurysm or arteriovenous malformation. There is fetal origin of the left posterior cerebral    artery noted. INTRACRANIAL VENOUS SYSTEM: No evidence for intracranial venous thrombosis. Referred to the recent noncontrast CT head for additional results. .      IMPRESSION: Fetal origin posterior cerebral artery. Normal filling of right middle and anterior cerebral artery despite internal carotid occlusion. This is likely predominantly via a patent anterior communicating artery. Otherwise no intracranial large vessel occlusion or significant stenosis. No evidence to suggest aneurysm. CT Head WO Contrast   Final Result      No evidence of acute intracranial abnormality. XR CHEST PORTABLE   Final Result      Pulmonary venous hypertension          Assessment/Plan     1. ESRD on HD     2. Hyperaldosteronism -- takes spironolactone at home, questionable compliance     3. Hypertensive Emergency     4.  Anemia -- Iron deficiency per HD labs, Venofer given yesterday, HGB stable     5. Acid- base/ Electrolyte imbalance      Plan  - Restarted spironolactone and home carvedilol  - HD as scheduled   - Optimize BP management  - Monitor UO  - Monitor BMP   - Monitor lytes replace as needed    Recommend to dose adjust all medications  based on renal functions  Maintain SBP> 90 mmHg   Daily weights   AVOID NSAIDs  Avoid Nephrotoxins  Monitor Intake/Output  Call if significant decrease in urine output       Polly Garcia MD MS4    This patient will be staffed with Dr. Bairon Boss  [facility] [address] [place], [place] [id number] 18 07       HD today   UF as dian   BP control better   meds adjusted     Polly Garcia MD

## 2021-12-10 NOTE — PROGRESS NOTES
Physician Progress Note      PATIENT:               Laurel Mercer  CSN #:                  [id number]  :                       1970  ADMIT DATE:       2021 2:39 PM  [address] [place] DATE:        2021 5:50 PM  RESPONDING  PROVIDER #:        Alayna Vanessa MD          QUERY TEXT:    Patient admitted  with TIA 2/2 Hypertensive emergency. Noted to have   CHF. Per  ECHO EF 55-60% with grade II diastolic dysfunction. Rceiving   Coreg and Aldactone. Please document the likely type and acuity of   CHF:[[Chronic diastolic CHF[de-identified] This patient has chronic diastolic CHF    Risk factors: 45 y.o. female with HTN, ESRD, PFO  Clinical Indicators: Per Nephrology: lower extremity edema  Treatment: Coreg, Aldacctone  Options provided:  -- Respond - Create new note now  -- Disagree - Not applicable / Not valid  -- Disagree - Clinically unable to determine / Unknown  -- Refer to Clinical Documentation Reviewer    PROVIDER RESPONSE TEXT:    Chronic diastolic grade II CHF. Per  ECHO EF 55-60% with grade II diastolic   dysfunction. Rceiving Coreg and Aldactone.     Query created by: Nba Zheng on 2021 8:23 PM      Electronically signed by:  Alayna Vanessa MD 12/10/2021 10:55 AM

## 2021-12-10 NOTE — DISCHARGE SUMMARY
INTERNAL MEDICINE DEPARTMENT AT [address] [place] [address]  DISCHARGE SUMMARY    Patient ID: Mike Hope                                             Discharge Date: 12/10/2021   Patient's PCP: Marylee Shines                                          Discharge Physician: Casi Szymanski MD MD  Admit Date: 12/6/2021   Admitting Physician: Aranza Ramirez MD    PROBLEMS DURING HOSPITALIZATION:  Present on Admission:   Hypertensive emergency   ESRD (end stage renal disease) on dialysis ([address]   Stroke-like symptoms      DISCHARGE DIAGNOSES:    Dysarthria 2/2 likely ischemic stroke  Diastolic CHF   HTN emergency  DM type II  Anemia  ESRD on HD    HPI:  \" 25-year-old female with PMH of CHF, ESRD on HD, HTN, DM 2 presented from a hemodialysis center with hypotension. Per patient she woke up this morning feeling woozy. She reported she continued with her daily activities thinking the woozy feeling would wear off. She also endorsed a frontal headache 7 on 10 in intensity, dull, nonradiating with no aggravating or relieving factors. Patient went to the dialysis center in the afternoon where her recorded blood pressure and was found to be in 200s. Patient was asked to come to the ED. In the ED, vitals afebrile, blood pressure 200/92, pulse 72, RR 18, SPO2 95% on room air. Labs revealing of magnesium 1.7 troponin 0.1, potassium 3.1, proBNP 26, 896, UA revealing proteinuria >300, Hb 9.1. CT head without contrast nonrevealing of acute intracranial abnormality. CTA head and neck with contrast revealing of stenosis and irregularity of proximal right ICA. Normal filling of right MCA and RAN despite internal carotid occlusion. Likely via patent RAN. No intracranial large vessel occlusion or significant stenosis. No aneurysm. Patient was admitted for the concern of dysarthria 2/2 suspected ischemic stroke and hypertensive emergency. Patient is a full code. \"     The following issues were addressed during hospitalization:  Dysarthria 2/2 likely ischemic stroke  -CT head without contrast nonrevealing. CT head and neck with contrast revealing of mild stenosis in irregularity of the proximal right ICA with complete occlusion in the mid to distal segment. Normal filling of right MCA and RAN despite internal carotid occlusion. Likely via patent RAN. No intracranial large vessel occlusion or significant stenosis. No aneurysm. -Repeat MRI without contrast: non-revealing of any intracranial abnormality   -Neurology consulted, appreciate recs  -- Q4H Neurochecks   -Aspirin, plavix and statin  -Lipid panel WNL   --  A1c: 6   -Seizure prophylaxis and fall precautions  -Echo: revealing PFO, EF 55-65%, grade II diastolic dysfunction    - f/u venous doppler pending     CHF   - Echo: revealing PFO, EF 55-65%, grade II diastolic dysfunction  - aldectone 50 mg daily   - strict Is and Os and daily weights   - cardiac diet   - consult cardiology, appreciate recs      HTN emergency  -Holding home meds for permissive hypertension for 24 hours   -Labetalol as needed for SBP >220     DM type II  -Lantus 10 units nightly  -Mealtime boluses     ESRD on HD  - nephrology consulted, appreciate recs   - HD today   - holding Epo given neurological complains         Anemia  -  holding Epo given neurological complains   - venofer once     Physical Exam:  BP (!) 142/65   Pulse 79   Temp 98.1 °F (36.7 °C) (Oral)   Resp 16   Ht 5' (1.524 m)   Wt 191 lb 12.8 oz (87 kg)   SpO2 90%   BMI 37.46 kg/m²     Constitutional:       Appearance: She is obese. HENT:      Head: Normocephalic and atraumatic.      Nose: Nose normal.      Mouth/Throat:      Mouth: Mucous membranes are dry.      Pharynx: Oropharynx is clear. Eyes:      Extraocular Movements: Extraocular movements intact.      Conjunctiva/sclera: Conjunctivae normal.      Pupils: Pupils are equal, round, and reactive to light.    Cardiovascular:      Rate and Rhythm: Normal rate and regular rhythm.      Pulses: Normal pulses.      Heart sounds: Normal heart sounds. Pulmonary:      Effort: Pulmonary effort is normal.      Breath sounds: Normal breath sounds. Abdominal:      General: Bowel sounds are normal.      Palpations: Abdomen is soft. Musculoskeletal:         General: Normal range of motion.      Cervical back: Normal range of motion and neck supple. Skin:     General: Skin is warm and dry. Neurological:      Mental Status: She is alert and oriented to person, place, and time.      Sensory: No sensory deficit.      Coordination: Coordination normal.      Gait: Gait abnormal.      Deep Tendon Reflexes: Reflexes normal.      Comments: Dysarthria   Psychiatric:         Mood and Affect: Mood normal.         BehaviorRosette Ficks.         Thought Content:  Thought content normal.         Judgment: Judgment normal.     Consults: neurology  Disposition: home  Discharged Condition: Stable  Follow Up: Primary Care Physician in two weeks    DISCHARGE MEDICATION:     Medication List      START taking these medications    amLODIPine 5 MG tablet  Commonly known as: NORVASC  Take 1 tablet by mouth daily     clopidogrel 75 MG tablet  Commonly known as: PLAVIX  Take 1 tablet by mouth daily     fluticasone 50 MCG/ACT nasal spray  Commonly known as: FLONASE  1 spray by Each Nostril route daily        CHANGE how you take these medications    insulin lispro (1 Unit Dial) 100 UNIT/ML Sopn  Inject 5 Units into the skin 3 times daily (with meals)  What changed: how much to take        CONTINUE taking these medications    atorvastatin 40 MG tablet  Commonly known as: LIPITOR  Take 1 tablet by mouth daily     butalbital-acetaminophen-caffeine -40 MG per tablet  Commonly known as: FIORICET, ESGIC  Take 1 tablet by mouth every 4 hours as needed for Headaches     carvedilol 12.5 MG tablet  Commonly known as: Coreg  Take 1 tablet by mouth 2 times daily     diphenhydrAMINE 25 MG capsule  Commonly known as: BENADRYL     gabapentin 100 MG capsule  Commonly known as: NEURONTIN  Take 3 capsules by mouth daily for 90 days.      insulin glargine 100 UNIT/ML injection pen  Commonly known as: LANTUS;BASAGLAR  Inject 10 Units into the skin nightly     nitroGLYCERIN 0.4 MG SL tablet  Commonly known as: Nitrostat  Place 1 tablet under the tongue every 5 minutes as needed for Chest pain     ondansetron 4 MG tablet  Commonly known as: ZOFRAN  Take 1 tablet by mouth every 12 hours as needed for Nausea or Vomiting     pantoprazole 40 MG tablet  Commonly known as: PROTONIX     spironolactone 50 MG tablet  Commonly known as: Aldactone  Take 1 tablet by mouth daily        STOP taking these medications    aspirin 81 MG chewable tablet           Where to Get Your Medications      These medications were sent to [facility] [phone] - F [phone]  [address] [place] [address] [address] [place] [address]    Phone: [phone]   · amLODIPine 5 MG tablet  · clopidogrel 75 MG tablet  · fluticasone 50 MCG/ACT nasal spray  · insulin lispro (1 Unit Dial) 100 UNIT/ML Sopn       Activity: activity as tolerated  Diet: cardiac diet  Wound Care: none needed    Time Spent on discharge is more than 30 minutes    Signed:  Edelmira Palencia MD,  MD, PGY-1  12/10/2021

## 2021-12-28 LAB
AFB CULTURE (MYCOBACTERIA): NORMAL
AFB SMEAR: NORMAL

## 2022-01-07 ENCOUNTER — CLINICAL DOCUMENTATION (OUTPATIENT)
Dept: NEPHROLOGY | Age: 52
End: 2022-01-07

## 2022-01-07 RX ORDER — LOSARTAN POTASSIUM 100 MG/1
100 TABLET ORAL NIGHTLY
Qty: 90 TABLET | Refills: 1 | Status: SHIPPED | OUTPATIENT
Start: 2022-01-07 | End: 2022-06-30 | Stop reason: SDUPTHER

## 2022-01-13 ENCOUNTER — INITIAL CONSULT (OUTPATIENT)
Dept: BARIATRICS/WEIGHT MGMT | Age: 52
End: 2022-01-13
Payer: COMMERCIAL

## 2022-01-13 VITALS
BODY MASS INDEX: 38.28 KG/M2 | DIASTOLIC BLOOD PRESSURE: 77 MMHG | SYSTOLIC BLOOD PRESSURE: 148 MMHG | HEART RATE: 93 BPM | WEIGHT: 195 LBS | HEIGHT: 60 IN

## 2022-01-13 DIAGNOSIS — E66.01 SEVERE OBESITY (BMI 35.0-39.9) WITH COMORBIDITY (HCC): ICD-10-CM

## 2022-01-13 DIAGNOSIS — Z99.2 ESRD (END STAGE RENAL DISEASE) ON DIALYSIS (HCC): Primary | ICD-10-CM

## 2022-01-13 DIAGNOSIS — N18.6 ESRD (END STAGE RENAL DISEASE) ON DIALYSIS (HCC): Primary | ICD-10-CM

## 2022-01-13 DIAGNOSIS — Z79.4 TYPE 2 DIABETES MELLITUS WITH HYPERGLYCEMIA, WITH LONG-TERM CURRENT USE OF INSULIN (HCC): ICD-10-CM

## 2022-01-13 DIAGNOSIS — E11.65 TYPE 2 DIABETES MELLITUS WITH HYPERGLYCEMIA, WITH LONG-TERM CURRENT USE OF INSULIN (HCC): ICD-10-CM

## 2022-01-13 DIAGNOSIS — I10 ESSENTIAL HYPERTENSION: ICD-10-CM

## 2022-01-13 PROCEDURE — G8484 FLU IMMUNIZE NO ADMIN: HCPCS | Performed by: SURGERY

## 2022-01-13 PROCEDURE — 1036F TOBACCO NON-USER: CPT | Performed by: SURGERY

## 2022-01-13 PROCEDURE — 99214 OFFICE O/P EST MOD 30 MIN: CPT | Performed by: SURGERY

## 2022-01-13 PROCEDURE — G8427 DOCREV CUR MEDS BY ELIG CLIN: HCPCS | Performed by: SURGERY

## 2022-01-13 PROCEDURE — 3046F HEMOGLOBIN A1C LEVEL >9.0%: CPT | Performed by: SURGERY

## 2022-01-13 PROCEDURE — G8417 CALC BMI ABV UP PARAM F/U: HCPCS | Performed by: SURGERY

## 2022-01-13 PROCEDURE — 2022F DILAT RTA XM EVC RTNOPTHY: CPT | Performed by: SURGERY

## 2022-01-13 PROCEDURE — 3017F COLORECTAL CA SCREEN DOC REV: CPT | Performed by: SURGERY

## 2022-01-13 NOTE — PROGRESS NOTES
[facility]   General & Laparoscopic Surgery  [employer]       HPI:  Governor Fuller is a very pleasant 45 y.o. female who is referred by Dr. Elisa Hernandez nephrologist for consultation with regards PD catheter placement    Patient has ESRD on HD would like to try PD catheter again after last one removed for infection          Past Medical History:   Diagnosis Date    [facility]     Diabetes mellitus [facility]     Hypertension      Past Surgical History:   Procedure Laterality Date    DIALYSIS CATHETER INSERTION N/A 7/9/2021    LAPAROSCOPIC PERITONEAL DIALYSIS CATHETER INSERTION, OMENTOPLEXY performed by Marianela Nur DO at [address] 11/14/2021    CATHETER REMOVAL PERITONEAL DIALYSIS performed by Marianela Nur DO at [facility] IR TUNNELED CATHETER PLACEMENT GREATER THAN 5 YEARS  11/15/2021    IR TUNNELED CATHETER PLACEMENT GREATER THAN 5 YEARS 11/15/2021 [address]     History reviewed. No pertinent family history. Social History     Tobacco Use    Smoking status: Never Smoker    Smokeless tobacco: Never Used   Substance Use Topics    Alcohol use: Not Currently     I counseled the patient on the importance of not smoking and risks of ETOH. No Known Allergies  Vitals:    01/13/22 0804   BP: (!) 148/77   Pulse: 93   Weight: 195 lb (88.5 kg)   Height: 5' (1.524 m)       Body mass index is 38.08 kg/m².       Current Outpatient Medications:     losartan (COZAAR) 100 MG tablet, Take 1 tablet by mouth nightly, Disp: 90 tablet, Rfl: 1    amLODIPine (NORVASC) 5 MG tablet, Take 1 tablet by mouth daily, Disp: 30 tablet, Rfl: 3    clopidogrel (PLAVIX) 75 MG tablet, Take 1 tablet by mouth daily, Disp: 30 tablet, Rfl: 3    fluticasone (FLONASE) 50 MCG/ACT nasal spray, 1 spray by Each Nostril route daily, Disp: 1 each, Rfl: 0    insulin lispro, 1 Unit Dial, 100 UNIT/ML SOPN, Inject 5 Units into the skin 3 times daily (with meals), Disp: normal heart sounds, intact distal pulses and normal pulses. Pulmonary/Chest: Effort normal and breath sounds normal. No stridor. No respiratory distress. Patient  has no wheezes. Patient has no rales. Patient exhibits no tenderness and no crepitus. Abdominal: Soft. Normal appearance and bowel sounds are normal. Patient exhibits no distension, no abdominal bruit, no ascites and no mass. There is no hepatosplenomegaly. There is no tenderness. There is no rigidity, no rebound, no guarding and no CVA tenderness. No hernia. Hernia confirmed negative in the ventral area. Musculoskeletal: Normal range of motion. Patient exhibits no edema or tenderness. Neurological: Patient is alert and oriented to person, place, and time. Patient has normal strength. Coordination and gait normal. GCS eye subscore is 4. GCS verbal subscore is 5. GCS motor subscore is 6. Skin: Skin is warm and dry. No abrasion and no rash noted. Patient  is not diaphoretic. No cyanosis or erythema. Psychiatric: Patient has a normal mood and affect. speech is normal and behavior is normal. Cognition and memory are normal.       A/P:  Ed Beckham is a very pleasant 45 y.o. female with ESRD requiring dialysis    Patient prefers Lap PD cath placement    Risks and benefits explained and informed consent obtained. Risks include but not limited to; The possibilities of reaction to medication, pain, infection, bleeding, heart attack, death, injury to internal organs, seroma, chronic pain, nerve injury, open wound , scarring or need for further surgery. 1- Pre-op work up ordered. 2- Laparoscopic PD catheter placement  3- F/U with PCP for pre-op physical and Medical Clearance.    4- Will need to be off blood thinners for surgery for at least one week, , please discuss with your PCP or cardiologist.      Bandar Gores

## 2022-02-02 RX ORDER — ERGOCALCIFEROL (VITAMIN D2) 1250 MCG
50000 CAPSULE ORAL WEEKLY
COMMUNITY
End: 2022-07-12 | Stop reason: SDUPTHER

## 2022-02-02 RX ORDER — ACETAMINOPHEN 500 MG
1000 TABLET ORAL EVERY 4 HOURS PRN
COMMUNITY
End: 2022-06-24 | Stop reason: CLARIF

## 2022-02-02 RX ORDER — CALCIUM ACETATE 667 MG/1
1 CAPSULE ORAL
COMMUNITY
End: 2022-08-11

## 2022-02-02 RX ORDER — PSEUDOEPHEDRINE HCL 30 MG
TABLET ORAL
COMMUNITY
Start: 2021-09-01 | End: 2022-06-24 | Stop reason: CLARIF

## 2022-02-02 NOTE — PROGRESS NOTES
[address] [facility] patients having surgery or anesthesia are required to be Covid tested OR to have been vaccinated at least 14 days prior to your procedure. It is very important to return our call to [phone] and notify the staff of your last vaccination date otherwise you will be required to complete Covid PCR test within the 5-6 days prior to surgery & quarantine. The results will need to be faxed to PreAdmission Testing at [phone]. PRIOR TO PROCEDURE DATE:        1. PLEASE FOLLOW ANY  GUIDELINES/ INSTRUCTIONS PRIOR TO YOUR PROCEDURE AS ADVISED BY YOUR SURGEON. 2. Arrange for someone to drive you home and be with you for the first 24 hours after discharge for your safety after your procedure for which you received sedation. Ensure it is someone we can share information with regarding your discharge. 3. You must contact your surgeon for instructions IF:   You are taking any blood thinners, aspirin, anti-inflammatory or vitamin E.   There is a change in your physical condition such as a cold, fever, rash, cuts, sores or any other infection, especially near your surgical site. 4. Do not drink alcohol the day before or day of your procedure. 5. A Pre-op History and Physical for surgery MUST be completed by your Physician or Urgent Care within 30 days of your procedure date. Please bring a copy with you on the day of your procedure and along with any other testing performed. THE DAY OF YOUR PROCEDURE:  1. Follow instructions for ARRIVAL TIME as DIRECTED BY YOUR SURGEON. 2. Enter the MAIN entrance from [email or web address] and follow the signs to the [facility] (offered free of charge 6am-5pm). 3. Enter the Main Entrance of the hospital (do not enter from the lower level of the parking garage). Upon entrance, check in with the  at the main desk on your left. If no one is available at the desk, proceed into the [facility] Waiting Room and go through the door directly into the [facility]. There is a Check-in desk ACROSS from Room 5 (marked with a sign hanging from the ceiling). The phone number for the surgery center is [phone]. 4. Please call [phone] option #2 option #2 if you have not been preregistered yet. On the day of your procedure bring your insurance card and photo ID. You will be registered at your bedside once brought back to your room. 5. DO NOT EAT ANYTHING eight hours prior to your arrival for surgery. May have 8 ounces of water 4 hours prior to your arrival for surgery. NOTE: ALL Gastric, Bariatric and Bowel surgery patients MUST follow their surgeon's instructions. 6. MEDICATIONS    Take the following medications with a SMALL sip of water: coreg   Bariatric patient's call surgeon if on diabetic medications as some need to be stopped 1 week preop   Use your usual dose of inhalers the morning of surgery. BRING your rescue inhaler with you to hospital.    Anesthesia does NOT want you to take insulin the morning of surgery. They will control your blood sugar while you are at the hospital. Please contact your ordering physician for instructions regarding your insulin the night before your procedure. If you have an insulin pump, please keep it set on basal rate. 7. Do not swallow water when brushing teeth. No gum, candy, mints or ice chips. Refrain from smoking or at least decrease the amount. 8. Dress in loose, comfortable clothing appropriate for redressing after your procedure. Do not wear jewelry (including body piercings), make-up (especially NO eye make-up), fingernail polish (NO toenail polish if foot/leg surgery), lotion, powders or metal hairclips. 9. Dentures, glasses, or contacts will need to be removed before your procedure.  Bring cases for your glasses, contacts, dentures, or hearing aids to protect them while you are in surgery. 10. If you use a CPAP, please bring it with you on the day of your procedure. 11. We recommend that valuable personal  belongings such as cash, cell phones, e-tablets or jewelry, be left at home during your stay. The hospital will not be responsible for valuables that are not secured in the hospital safe. However, if your insurance requires a co-pay, you may want to bring a method of payment, i.e. Check or credit card, if you wish to pay your co-pay the day of surgery. 12. If you are to stay overnight, you may bring a bag with personal items. Please have any large items you may need brought in by your family after your arrival to your hospital room. 15. If you have a Living Will or Durable Power of , please bring a copy on the day of your procedure. 15. With your permission, one family member may accompany you while you are being prepared for surgery. Once you are ready, additional family members may join you. HOW WE KEEP YOU SAFE and WORK TO PREVENT SURGICAL SITE INFECTIONS:  1. Health care workers should always check your ID bracelet to verify your name and birth date. You will be asked many times to state your name, date of birth, and allergies. 2. Health care workers should always clean their hands with soap or alcohol gel before providing care to you. It is okay to ask anyone if they cleaned their hands before they touch you. 3. You will be actively involved in verifying the type of procedure you are having and ensuring the correct surgical site. This will be confirmed multiple times prior to your procedure. Do NOT gely your surgery site UNLESS instructed to by your surgeon. 4. Do not shave or wax for 72 hours prior to procedure near your operative site. Shaving with a razor can irritate your skin and make it easier to develop an infection.  On the day of your procedure, any hair that needs to be removed near the surgical site will be clipped by a healthcare worker using a special clippers designed to avoid skin irritation. 5. When you are in the operating room, your surgical site will be cleansed with a special soap, and in most cases, you will be given an antibiotic before the surgery begins. What to expect AFTER YOUR PROCEDURE:  1. Immediately following your procedure, your will be taken to the PACU for the first phase of your recovery. Your nurse will help you recover from any potential side effects of anesthesia, such as extreme drowsiness, changes in your vital signs or breathing patterns. Nausea, headache, muscle aches, or sore throat may also occur after anesthesia. Your nurse will help you manage these potential side effects. 2. For comfort and safety, arrange to have someone at home with you for the first 24 hours after discharge. 3. You and your family will be given written instructions about your diet, activity, dressing care, medications, and return visits. 4. Once at home, should issues with nausea, pain, or bleeding occur, or should you notice any signs of infection, you should call your surgeon. 5. Always clean your hands before and after caring for your wound. Do not let your family touch your surgery site without cleaning their hands. 6. Narcotic pain medications can cause significant constipation. You may want to add a stool softener to your postoperative medication schedule or speak to your surgeon on how best to manage this SIDE EFFECT. SPECIAL INSTRUCTIONS patient acknowledges understanding of pre op instructions    Thank you for allowing us to care for you. We strive to exceed your expectations in the delivery of care and service provided to you and your family. If you need to contact the [place] [address] staff for any reason, please call us at [phone]    Instructions reviewed with patient during preadmission testing phone interview.   Devan Flores RN.2/2/2022 .8:52 AM      ADDITIONAL EDUCATIONAL INFORMATION REVIEWED PER PHONE WITH YOU AND/OR YOUR FAMILY:  No Hibiclens® Bathing Instructions   Yes Antibacterial Soap

## 2022-02-07 ENCOUNTER — TELEPHONE (OUTPATIENT)
Dept: BARIATRICS/WEIGHT MGMT | Age: 52
End: 2022-02-07

## 2022-02-07 NOTE — PROGRESS NOTES
Called PCP office for EKG tracing, pt had pre op appt earlier today but was not able to do the, pt is returning this afternoon to have done and will be faxed as soon as completed.  Jayy Nation

## 2022-02-08 ENCOUNTER — ANESTHESIA EVENT (OUTPATIENT)
Dept: OPERATING ROOM | Age: 52
End: 2022-02-08
Payer: COMMERCIAL

## 2022-02-08 ENCOUNTER — ANESTHESIA (OUTPATIENT)
Dept: OPERATING ROOM | Age: 52
End: 2022-02-08
Payer: COMMERCIAL

## 2022-02-08 ENCOUNTER — HOSPITAL ENCOUNTER (OUTPATIENT)
Age: 52
Setting detail: OUTPATIENT SURGERY
Discharge: HOME | End: 2022-02-08
Attending: SURGERY | Admitting: SURGERY
Payer: COMMERCIAL

## 2022-02-08 ENCOUNTER — TELEPHONE (OUTPATIENT)
Dept: BARIATRICS/WEIGHT MGMT | Age: 52
End: 2022-02-08

## 2022-02-08 VITALS
BODY MASS INDEX: 37.42 KG/M2 | WEIGHT: 198.21 LBS | OXYGEN SATURATION: 93 % | DIASTOLIC BLOOD PRESSURE: 77 MMHG | RESPIRATION RATE: 18 BRPM | HEIGHT: 61 IN | HEART RATE: 64 BPM | SYSTOLIC BLOOD PRESSURE: 145 MMHG | TEMPERATURE: 97.3 F

## 2022-02-08 VITALS — DIASTOLIC BLOOD PRESSURE: 100 MMHG | TEMPERATURE: 96.6 F | OXYGEN SATURATION: 88 % | SYSTOLIC BLOOD PRESSURE: 127 MMHG

## 2022-02-08 DIAGNOSIS — Z99.2 ESRD (END STAGE RENAL DISEASE) ON DIALYSIS (HCC): Primary | ICD-10-CM

## 2022-02-08 DIAGNOSIS — N18.6 ESRD (END STAGE RENAL DISEASE) ON DIALYSIS (HCC): Primary | ICD-10-CM

## 2022-02-08 LAB
ANION GAP SERPL CALCULATED.3IONS-SCNC: 4 MMOL/L (ref 3–16)
ANION GAP SERPL CALCULATED.3IONS-SCNC: 7 MMOL/L (ref 3–16)
APTT: 39.7 SEC (ref 26.2–38.6)
BUN BLDV-MCNC: 36 MG/DL (ref 7–20)
BUN BLDV-MCNC: 37 MG/DL (ref 7–20)
CALCIUM SERPL-MCNC: 8.8 MG/DL (ref 8.3–10.6)
CALCIUM SERPL-MCNC: 9 MG/DL (ref 8.3–10.6)
CHLORIDE BLD-SCNC: 101 MMOL/L (ref 99–110)
CHLORIDE BLD-SCNC: 103 MMOL/L (ref 99–110)
CO2: 27 MMOL/L (ref 21–32)
CO2: 28 MMOL/L (ref 21–32)
CREAT SERPL-MCNC: 2.5 MG/DL (ref 0.6–1.1)
CREAT SERPL-MCNC: 2.6 MG/DL (ref 0.6–1.1)
GFR AFRICAN AMERICAN: 23
GFR AFRICAN AMERICAN: 24
GFR NON-AFRICAN AMERICAN: 19
GFR NON-AFRICAN AMERICAN: 20
GLUCOSE BLD-MCNC: 227 MG/DL (ref 70–99)
GLUCOSE BLD-MCNC: 236 MG/DL (ref 70–99)
GLUCOSE BLD-MCNC: 240 MG/DL (ref 70–99)
GLUCOSE BLD-MCNC: 258 MG/DL (ref 70–99)
INR BLD: 1.02 (ref 0.88–1.12)
PERFORMED ON: ABNORMAL
PERFORMED ON: ABNORMAL
POTASSIUM SERPL-SCNC: 4.6 MMOL/L (ref 3.5–5.1)
POTASSIUM SERPL-SCNC: 9.3 MMOL/L (ref 3.5–5.1)
PROTHROMBIN TIME: 11.5 SEC (ref 9.9–12.7)
SODIUM BLD-SCNC: 133 MMOL/L (ref 136–145)
SODIUM BLD-SCNC: 137 MMOL/L (ref 136–145)

## 2022-02-08 PROCEDURE — 6370000000 HC RX 637 (ALT 250 FOR IP): Performed by: SURGERY

## 2022-02-08 PROCEDURE — 85610 PROTHROMBIN TIME: CPT

## 2022-02-08 PROCEDURE — 7100000011 HC PHASE II RECOVERY - ADDTL 15 MIN: Performed by: SURGERY

## 2022-02-08 PROCEDURE — 2709999900 HC NON-CHARGEABLE SUPPLY: Performed by: SURGERY

## 2022-02-08 PROCEDURE — 49324 LAP INSERT TUNNEL IP CATH: CPT | Performed by: SURGERY

## 2022-02-08 PROCEDURE — 3600000014 HC SURGERY LEVEL 4 ADDTL 15MIN: Performed by: SURGERY

## 2022-02-08 PROCEDURE — 2500000003 HC RX 250 WO HCPCS: Performed by: SURGERY

## 2022-02-08 PROCEDURE — 6360000002 HC RX W HCPCS: Performed by: SURGERY

## 2022-02-08 PROCEDURE — 3700000001 HC ADD 15 MINUTES (ANESTHESIA): Performed by: SURGERY

## 2022-02-08 PROCEDURE — 7100000001 HC PACU RECOVERY - ADDTL 15 MIN: Performed by: SURGERY

## 2022-02-08 PROCEDURE — 6360000002 HC RX W HCPCS: Performed by: ANESTHESIOLOGY

## 2022-02-08 PROCEDURE — 3600000004 HC SURGERY LEVEL 4 BASE: Performed by: SURGERY

## 2022-02-08 PROCEDURE — L0450 TLSO FLEX TRUNK/THOR PRE OTS: HCPCS | Performed by: SURGERY

## 2022-02-08 PROCEDURE — 2580000003 HC RX 258: Performed by: SURGERY

## 2022-02-08 PROCEDURE — 85730 THROMBOPLASTIN TIME PARTIAL: CPT

## 2022-02-08 PROCEDURE — 7100000010 HC PHASE II RECOVERY - FIRST 15 MIN: Performed by: SURGERY

## 2022-02-08 PROCEDURE — 3700000000 HC ANESTHESIA ATTENDED CARE: Performed by: SURGERY

## 2022-02-08 PROCEDURE — 2580000003 HC RX 258: Performed by: FAMILY MEDICINE

## 2022-02-08 PROCEDURE — 2500000003 HC RX 250 WO HCPCS: Performed by: NURSE ANESTHETIST, CERTIFIED REGISTERED

## 2022-02-08 PROCEDURE — 80048 BASIC METABOLIC PNL TOTAL CA: CPT

## 2022-02-08 PROCEDURE — 7100000000 HC PACU RECOVERY - FIRST 15 MIN: Performed by: SURGERY

## 2022-02-08 PROCEDURE — 6360000002 HC RX W HCPCS: Performed by: NURSE ANESTHETIST, CERTIFIED REGISTERED

## 2022-02-08 RX ORDER — SODIUM CHLORIDE 0.9 % (FLUSH) 0.9 %
5-40 SYRINGE (ML) INJECTION PRN
Status: DISCONTINUED | OUTPATIENT
Start: 2022-02-08 | End: 2022-02-08 | Stop reason: HOSPADM

## 2022-02-08 RX ORDER — HEPARIN SODIUM 5000 [USP'U]/ML
5000 INJECTION, SOLUTION INTRAVENOUS; SUBCUTANEOUS ONCE
Status: COMPLETED | OUTPATIENT
Start: 2022-02-08 | End: 2022-02-08

## 2022-02-08 RX ORDER — SODIUM CHLORIDE, SODIUM LACTATE, POTASSIUM CHLORIDE, CALCIUM CHLORIDE 600; 310; 30; 20 MG/100ML; MG/100ML; MG/100ML; MG/100ML
INJECTION, SOLUTION INTRAVENOUS CONTINUOUS
Status: DISCONTINUED | OUTPATIENT
Start: 2022-02-08 | End: 2022-02-08

## 2022-02-08 RX ORDER — OXYCODONE HYDROCHLORIDE AND ACETAMINOPHEN 5; 325 MG/1; MG/1
1 TABLET ORAL
Status: DISCONTINUED | OUTPATIENT
Start: 2022-02-08 | End: 2022-02-08 | Stop reason: HOSPADM

## 2022-02-08 RX ORDER — LIDOCAINE HYDROCHLORIDE 10 MG/ML
INJECTION, SOLUTION EPIDURAL; INFILTRATION; INTRACAUDAL; PERINEURAL PRN
Status: DISCONTINUED | OUTPATIENT
Start: 2022-02-08 | End: 2022-02-08 | Stop reason: SDUPTHER

## 2022-02-08 RX ORDER — SUCCINYLCHOLINE CHLORIDE 20 MG/ML
INJECTION INTRAMUSCULAR; INTRAVENOUS PRN
Status: DISCONTINUED | OUTPATIENT
Start: 2022-02-08 | End: 2022-02-08 | Stop reason: SDUPTHER

## 2022-02-08 RX ORDER — SODIUM CHLORIDE 9 MG/ML
INJECTION, SOLUTION INTRAVENOUS CONTINUOUS
Status: DISCONTINUED | OUTPATIENT
Start: 2022-02-08 | End: 2022-02-08 | Stop reason: HOSPADM

## 2022-02-08 RX ORDER — HEPARIN SODIUM (PORCINE) LOCK FLUSH IV SOLN 100 UNIT/ML 100 UNIT/ML
SOLUTION INTRAVENOUS PRN
Status: DISCONTINUED | OUTPATIENT
Start: 2022-02-08 | End: 2022-02-08 | Stop reason: ALTCHOICE

## 2022-02-08 RX ORDER — HYDRALAZINE HYDROCHLORIDE 20 MG/ML
5 INJECTION INTRAMUSCULAR; INTRAVENOUS EVERY 10 MIN PRN
Status: DISCONTINUED | OUTPATIENT
Start: 2022-02-08 | End: 2022-02-08 | Stop reason: HOSPADM

## 2022-02-08 RX ORDER — FENTANYL CITRATE 50 UG/ML
INJECTION, SOLUTION INTRAMUSCULAR; INTRAVENOUS PRN
Status: DISCONTINUED | OUTPATIENT
Start: 2022-02-08 | End: 2022-02-08 | Stop reason: SDUPTHER

## 2022-02-08 RX ORDER — MAGNESIUM CARB/ALUMINUM HYDROX 105-160MG
TABLET,CHEWABLE ORAL PRN
Status: DISCONTINUED | OUTPATIENT
Start: 2022-02-08 | End: 2022-02-08 | Stop reason: ALTCHOICE

## 2022-02-08 RX ORDER — NEOSTIGMINE METHYLSULFATE 5 MG/5 ML
SYRINGE (ML) INTRAVENOUS PRN
Status: DISCONTINUED | OUTPATIENT
Start: 2022-02-08 | End: 2022-02-08 | Stop reason: SDUPTHER

## 2022-02-08 RX ORDER — LABETALOL HYDROCHLORIDE 5 MG/ML
5 INJECTION, SOLUTION INTRAVENOUS EVERY 10 MIN PRN
Status: DISCONTINUED | OUTPATIENT
Start: 2022-02-08 | End: 2022-02-08 | Stop reason: HOSPADM

## 2022-02-08 RX ORDER — OXYCODONE HYDROCHLORIDE 5 MG/1
5 TABLET ORAL EVERY 6 HOURS PRN
Qty: 20 TABLET | Refills: 0 | Status: SHIPPED | OUTPATIENT
Start: 2022-02-08 | End: 2022-02-10

## 2022-02-08 RX ORDER — VIT B COMP NO.3/FOLIC/C/BIOTIN 1 MG-60 MG
1 TABLET ORAL DAILY
Status: ON HOLD | COMMUNITY
Start: 2021-09-01 | End: 2022-06-29 | Stop reason: SDUPTHER

## 2022-02-08 RX ORDER — SODIUM CHLORIDE 0.9 % (FLUSH) 0.9 %
5-40 SYRINGE (ML) INJECTION EVERY 12 HOURS SCHEDULED
Status: DISCONTINUED | OUTPATIENT
Start: 2022-02-08 | End: 2022-02-08 | Stop reason: HOSPADM

## 2022-02-08 RX ORDER — ROCURONIUM BROMIDE 10 MG/ML
INJECTION, SOLUTION INTRAVENOUS PRN
Status: DISCONTINUED | OUTPATIENT
Start: 2022-02-08 | End: 2022-02-08 | Stop reason: SDUPTHER

## 2022-02-08 RX ORDER — MIDAZOLAM HYDROCHLORIDE 1 MG/ML
INJECTION INTRAMUSCULAR; INTRAVENOUS PRN
Status: DISCONTINUED | OUTPATIENT
Start: 2022-02-08 | End: 2022-02-08 | Stop reason: SDUPTHER

## 2022-02-08 RX ORDER — FENTANYL CITRATE 50 UG/ML
50 INJECTION, SOLUTION INTRAMUSCULAR; INTRAVENOUS EVERY 5 MIN PRN
Status: DISCONTINUED | OUTPATIENT
Start: 2022-02-08 | End: 2022-02-08 | Stop reason: HOSPADM

## 2022-02-08 RX ORDER — SODIUM CHLORIDE 9 MG/ML
25 INJECTION, SOLUTION INTRAVENOUS PRN
Status: DISCONTINUED | OUTPATIENT
Start: 2022-02-08 | End: 2022-02-08 | Stop reason: HOSPADM

## 2022-02-08 RX ORDER — DOCUSATE SODIUM 100 MG/1
100 CAPSULE, LIQUID FILLED ORAL 2 TIMES DAILY
Qty: 30 CAPSULE | Refills: 0 | Status: SHIPPED | OUTPATIENT
Start: 2022-02-08 | End: 2022-02-22

## 2022-02-08 RX ORDER — ONDANSETRON 2 MG/ML
4 INJECTION INTRAMUSCULAR; INTRAVENOUS
Status: COMPLETED | OUTPATIENT
Start: 2022-02-08 | End: 2022-02-08

## 2022-02-08 RX ORDER — BUPIVACAINE HYDROCHLORIDE 5 MG/ML
INJECTION, SOLUTION EPIDURAL; INTRACAUDAL PRN
Status: DISCONTINUED | OUTPATIENT
Start: 2022-02-08 | End: 2022-02-08 | Stop reason: ALTCHOICE

## 2022-02-08 RX ORDER — MEPERIDINE HYDROCHLORIDE 25 MG/ML
12.5 INJECTION INTRAMUSCULAR; INTRAVENOUS; SUBCUTANEOUS EVERY 5 MIN PRN
Status: DISCONTINUED | OUTPATIENT
Start: 2022-02-08 | End: 2022-02-08 | Stop reason: HOSPADM

## 2022-02-08 RX ORDER — PROMETHAZINE HYDROCHLORIDE 25 MG/ML
6.25 INJECTION, SOLUTION INTRAMUSCULAR; INTRAVENOUS
Status: DISCONTINUED | OUTPATIENT
Start: 2022-02-08 | End: 2022-02-08 | Stop reason: HOSPADM

## 2022-02-08 RX ORDER — PROPOFOL 10 MG/ML
INJECTION, EMULSION INTRAVENOUS PRN
Status: DISCONTINUED | OUTPATIENT
Start: 2022-02-08 | End: 2022-02-08 | Stop reason: SDUPTHER

## 2022-02-08 RX ORDER — GLYCOPYRROLATE 0.2 MG/ML
INJECTION INTRAMUSCULAR; INTRAVENOUS PRN
Status: DISCONTINUED | OUTPATIENT
Start: 2022-02-08 | End: 2022-02-08 | Stop reason: SDUPTHER

## 2022-02-08 RX ORDER — FENTANYL CITRATE 50 UG/ML
25 INJECTION, SOLUTION INTRAMUSCULAR; INTRAVENOUS EVERY 5 MIN PRN
Status: DISCONTINUED | OUTPATIENT
Start: 2022-02-08 | End: 2022-02-08 | Stop reason: HOSPADM

## 2022-02-08 RX ADMIN — SUCCINYLCHOLINE CHLORIDE 100 MG: 20 INJECTION, SOLUTION INTRAMUSCULAR; INTRAVENOUS; PARENTERAL at 07:23

## 2022-02-08 RX ADMIN — FENTANYL CITRATE 50 MCG: 50 INJECTION, SOLUTION INTRAMUSCULAR; INTRAVENOUS at 07:51

## 2022-02-08 RX ADMIN — LIDOCAINE HYDROCHLORIDE 50 MG: 10 INJECTION, SOLUTION EPIDURAL; INFILTRATION; INTRACAUDAL; PERINEURAL at 07:23

## 2022-02-08 RX ADMIN — FAMOTIDINE 20 MG: 10 INJECTION, SOLUTION INTRAVENOUS at 08:04

## 2022-02-08 RX ADMIN — MIDAZOLAM HYDROCHLORIDE 2 MG: 2 INJECTION, SOLUTION INTRAMUSCULAR; INTRAVENOUS at 07:16

## 2022-02-08 RX ADMIN — SUGAMMADEX 200 MG: 100 INJECTION, SOLUTION INTRAVENOUS at 08:36

## 2022-02-08 RX ADMIN — GLYCOPYRROLATE 0.6 MG: 0.2 INJECTION INTRAMUSCULAR; INTRAVENOUS at 08:19

## 2022-02-08 RX ADMIN — Medication 4 MG: at 08:19

## 2022-02-08 RX ADMIN — FENTANYL CITRATE 50 MCG: 50 INJECTION, SOLUTION INTRAMUSCULAR; INTRAVENOUS at 07:22

## 2022-02-08 RX ADMIN — HEPARIN SODIUM 5000 UNITS: 5000 INJECTION INTRAVENOUS; SUBCUTANEOUS at 06:53

## 2022-02-08 RX ADMIN — SODIUM CHLORIDE: 9 INJECTION, SOLUTION INTRAVENOUS at 06:49

## 2022-02-08 RX ADMIN — ROCURONIUM BROMIDE 40 MG: 10 INJECTION INTRAVENOUS at 07:41

## 2022-02-08 RX ADMIN — ROCURONIUM BROMIDE 10 MG: 10 INJECTION INTRAVENOUS at 07:23

## 2022-02-08 RX ADMIN — ONDANSETRON 4 MG: 2 INJECTION INTRAMUSCULAR; INTRAVENOUS at 08:04

## 2022-02-08 RX ADMIN — PROPOFOL 100 MG: 10 INJECTION, EMULSION INTRAVENOUS at 07:23

## 2022-02-08 RX ADMIN — VANCOMYCIN HYDROCHLORIDE 1000 MG: 10 INJECTION, POWDER, LYOPHILIZED, FOR SOLUTION INTRAVENOUS at 06:54

## 2022-02-08 ASSESSMENT — PULMONARY FUNCTION TESTS
PIF_VALUE: 4
PIF_VALUE: 22
PIF_VALUE: 18
PIF_VALUE: 35
PIF_VALUE: 33
PIF_VALUE: 27
PIF_VALUE: 25
PIF_VALUE: 30
PIF_VALUE: 21
PIF_VALUE: 21
PIF_VALUE: 35
PIF_VALUE: 35
PIF_VALUE: 33
PIF_VALUE: 29
PIF_VALUE: 9
PIF_VALUE: 30
PIF_VALUE: 1
PIF_VALUE: 29
PIF_VALUE: 34
PIF_VALUE: 1
PIF_VALUE: 32
PIF_VALUE: 30
PIF_VALUE: 1
PIF_VALUE: 1
PIF_VALUE: 18
PIF_VALUE: 1
PIF_VALUE: 29
PIF_VALUE: 30
PIF_VALUE: 29
PIF_VALUE: 33
PIF_VALUE: 31
PIF_VALUE: 34
PIF_VALUE: 1
PIF_VALUE: 29
PIF_VALUE: 8
PIF_VALUE: 35
PIF_VALUE: 35
PIF_VALUE: 29
PIF_VALUE: 31
PIF_VALUE: 29
PIF_VALUE: 20
PIF_VALUE: 30
PIF_VALUE: 33
PIF_VALUE: 32
PIF_VALUE: 35
PIF_VALUE: 31
PIF_VALUE: 1
PIF_VALUE: 29
PIF_VALUE: 34
PIF_VALUE: 30
PIF_VALUE: 32
PIF_VALUE: 30
PIF_VALUE: 9
PIF_VALUE: 31
PIF_VALUE: 1
PIF_VALUE: 30
PIF_VALUE: 19
PIF_VALUE: 35
PIF_VALUE: 33
PIF_VALUE: 29
PIF_VALUE: 30
PIF_VALUE: 5
PIF_VALUE: 30
PIF_VALUE: 29
PIF_VALUE: 14
PIF_VALUE: 38
PIF_VALUE: 33
PIF_VALUE: 1
PIF_VALUE: 40
PIF_VALUE: 29

## 2022-02-08 ASSESSMENT — PAIN DESCRIPTION - PAIN TYPE: TYPE: SURGICAL PAIN

## 2022-02-08 ASSESSMENT — PAIN SCALES - GENERAL
PAINLEVEL_OUTOF10: 0

## 2022-02-08 ASSESSMENT — PAIN - FUNCTIONAL ASSESSMENT: PAIN_FUNCTIONAL_ASSESSMENT: 0-10

## 2022-02-08 ASSESSMENT — PAIN DESCRIPTION - LOCATION: LOCATION: ABDOMEN

## 2022-02-08 ASSESSMENT — PAIN DESCRIPTION - FREQUENCY: FREQUENCY: OTHER (COMMENT)

## 2022-02-08 NOTE — OP NOTE
DATE OF PROCEDURE:  2/8/21     PREOPERATIVE DIAGNOSIS: ESRD requiring dialysis     POSTOPERATIVE DIAGNOSIS: Same as pre-op     PROCEDURES PERFORMED:  1. Laparoscopic peritoneal dialysis catheter placement. SURGEON:  Kishan Grewal DO     ASSISTANT: Ewa     ANESTHESIA:  General endotracheal.     COMPLICATIONS:  None. CONDITION:  Stable. EBL: 10 cc     INDICATIONS FOR PROCEDURE:  The patient is a 45year old female consented for PD catheter placement     DESCRIPTION OF PROCEDURE:  The patient was brought to the operating suite, laid in supine position with arms outstretched, and after induction of general endotracheal anesthesia; tube was confirmed to be in place with end-tidal CO2, and secured. Leung catheter was placed with clear return of urine. The patient was prepped and draped in usual sterile manner with Ioban placed on top of the skin. A small incision was made at the left midclavicular line using the Veress needle. Abdomen was entered and pneumoperitoneum established with 12 mm of CO2. A 5-mm 30-degree camera housed in a 5-mm Optiview trocar was used to enter the abdomen under direct visualization in the left upper quadrant. Once inside the abdomen, an additional 5-mm trocar was placed on the right side. Attention was paid to the omentum which was seen to be long and draping down into the pelvis. This was grasped with a laparoscopic grasper and brought up to the upper left abdomen above just above umbilicus. A suture passer device with #0 Vicryl suture was then used to create an omentopexy and picture was taken after this was accomplished. Hemostasis was maintained. After this, a 57 cm dual-cuff coil-tipped catheter was then  introduced into the left rectus muscle under direct visualization. Deep cuff was positioned in the rectus muscle. At that point, 2 liters of fluid was instilled into the abdomen and removed without difficulty.   One last look laparoscopically was performed after catheter was clipped, clamped, and heparin locked. The catheter was seen to be appropriately going down in the pelvis and omentopexy was seen to be hemostatic. Pneumoperitoneum was evacuated. The patient tolerated the procedure well and was brought to the postanesthesia care unit in stable condition. All sponge, needle, and instrument counts were correct x2. I personally spoke to the patient's family at the end of the procedure.

## 2022-02-08 NOTE — PROGRESS NOTES
PACU Transfer to [facility]  Pt's Current Allergies: Patient has no known allergies. Pt meets criteria to transfer to next phase of care per DAVON SCORE and ASPAN standards    Recent Labs     02/08/22  0647 02/08/22  0917   POCGLU 227* 240*       Vitals:    02/08/22 1030   BP: 134/80   Pulse: 68   Resp: 18   Temp: 97.6 °F (36.4 °C)   SpO2: 97%   Pt encouraged to use IS at home and also to wear her home O2. Pt states she \"can't promise to do that. \"      Intake/Output Summary (Last 24 hours) at 2/8/2022 1035  Last data filed at 2/8/2022 1030  Gross per 24 hour   Intake 675 ml   Output 150 ml   Net 525 ml         Pain assessment:  none  Pain Level: 0    Patient was assessed for unknown alterations to skin integrity. There were not unknown alterations observed. Abdominal binder in place    Patient transferred to care of Ed Krishnamurthy RN.    Family updated and directed to Ed Krishnamurthy    2/8/2022 10:35 AM

## 2022-02-08 NOTE — PROGRESS NOTES
Pt's oxygen saturations widely variable. From 81%-97%  IS taught and pt used.  Got up to 750 ml with moderate effort

## 2022-02-08 NOTE — ANESTHESIA PRE PROCEDURE
Department of Anesthesiology  Preprocedure Note       Name:  Bernardino Gray   Age:  45 y.o.  :  1970                                          MRN:  [id number]         Date:  2022      Surgeon: Howie Burciaga): Doris Correa DO    Procedure: Procedure(s):  LAPAROSCOPIC PERITONEAL DIALYSIS CATHETER PLACEMENT    Medications prior to admission:   Prior to Admission medications    Medication Sig Start Date End Date Taking? Authorizing Provider   B complex-vitamin C-folic acid (NEPHRO-BHANU) 1 MG tablet Take 1 tablet by mouth daily 21  Yes Historical Provider, MD   acetaminophen (TYLENOL) 500 MG tablet Take 1,000 mg by mouth every 4 hours as needed   Yes Historical Provider, MD   calcium acetate (PHOSLO) 667 MG CAPS capsule Take 667 mg by mouth 3 times daily 21  Yes Historical Provider, MD   ergocalciferol (ERGOCALCIFEROL) 1.25 MG (64391 UT) capsule Take 50,000 Units by mouth once a week 21  Yes Historical Provider, MD   losartan (COZAAR) 100 MG tablet Take 1 tablet by mouth nightly 22  Yes Teresita Wright MD   amLODIPine (NORVASC) 5 MG tablet Take 1 tablet by mouth daily  Patient taking differently: Take 10 mg by mouth at bedtime  12/10/21  Yes Yonatan Lopes MD   insulin lispro, 1 Unit Dial, 100 UNIT/ML SOPN Inject 5 Units into the skin 3 times daily (with meals) 21  Yes Yonatan Lopes MD   insulin glargine (LANTUS;BASAGLAR) 100 UNIT/ML injection pen Inject 10 Units into the skin nightly 21  Yes Keri Scott MD   gabapentin (NEURONTIN) 100 MG capsule Take 3 capsules by mouth daily for 90 days. Patient taking differently: Take 300 mg by mouth 3 times daily.   21 Yes Keri Scott MD   spironolactone (ALDACTONE) 50 MG tablet Take 1 tablet by mouth daily  Patient taking differently: Take 50 mg by mouth at bedtime  21  Yes Teresita Wright MD   carvedilol (COREG) 12.5 MG tablet Take 1 tablet by mouth 2 times daily 21  Yes Keri Scott MD   atorvastatin (LIPITOR) 40 MG tablet Take 1 tablet by mouth daily  Patient taking differently: Take 40 mg by mouth at bedtime  7/13/21  Yes Rossi Hernandez MD   nitroGLYCERIN (NITROSTAT) 0.4 MG SL tablet Place 1 tablet under the tongue every 5 minutes as needed for Chest pain 7/13/21  Yes Rossi Hernandez MD   butalbital-acetaminophen-caffeine (FIORICET, ESGIC) -40 MG per tablet Take 1 tablet by mouth every 4 hours as needed for Headaches 5/27/15  Yes Radha Solorzano MD   docusate ([address]) 100 MG CAPS  9/1/21   Historical Provider, MD   clopidogrel (PLAVIX) 75 MG tablet Take 1 tablet by mouth daily 12/10/21   Radha Solorzano MD   fluticasone (FLONASE) 50 MCG/ACT nasal spray 1 spray by Each Nostril route daily 12/9/21   Radha Solorzano MD   diphenhydrAMINE (BENADRYL) 25 MG capsule Take 25 mg by mouth every 6 hours as needed for Itching    Historical Provider, MD   ondansetron (ZOFRAN) 4 MG tablet Take 1 tablet by mouth every 12 hours as needed for Nausea or Vomiting 7/13/21   Rossi Hernandez MD       Current medications:    Current Facility-Administered Medications   Medication Dose Route Frequency Provider Last Rate Last Admin    vancomycin (VANCOCIN) 1,000 mg in dextrose 5 % 250 mL IVPB  1,000 mg IntraVENous Once Juvencio Hand, DO        heparin (porcine) injection 5,000 Units  5,000 Units SubCUTAneous Once Juvencio Hand, DO        sodium chloride flush 0.9 % injection 5-40 mL  5-40 mL IntraVENous 2 times per day Alejandro Diallo MD        sodium chloride flush 0.9 % injection 5-40 mL  5-40 mL IntraVENous PRN Alejandro Diallo MD        0.9 % sodium chloride infusion  25 mL IntraVENous PRN Alejandro Diallo MD        0.9 % sodium chloride infusion   IntraVENous Continuous Alejandro Diallo  mL/hr at 02/08/22 0649 New Bag at 02/08/22 0649       Allergies:  No Known Allergies    Problem List:    Patient Active Problem List   Diagnosis Code    CHF (congestive heart failure), NYHA class I, acute on chronic, combined ([facility] I50.43  Abnormal myocardial perfusion study R94.39    Essential hypertension I10    Type 2 diabetes mellitus with hyperglycemia, with long-term current use of insulin ([facility]) E11.65, Z79.4    JEAN (obstructive sleep apnea) G47.33    HARRIETT (acute kidney injury) ([facility] N17.9    Uremia N19    CKD (chronic kidney disease) stage 5, GFR less than 15 ml/min ([facility]) N18.5    Electrolyte imbalance E87.8    Anemia D64.9    Abdominal pain R10.9    Peritonitis associated with peritoneal dialysis ([facility] T85.71XA    Free intraperitoneal air K66.8    Positive blood culture R78.81    Hypertensive emergency I16.1    ESRD (end stage renal disease) on dialysis ([facility]) N18.6, Z99.2    Stroke-like symptoms R29.90       Past Medical History:        Diagnosis Date    Arthritis     CHF (congestive heart failure) ([facility]     Diabetes mellitus ([facility]     End stage renal disease ([facility]     Hemodialysis patient ([facility]     MWF    History of blood transfusion     Hyperlipidemia     Hypertension     Migraine     JEAN (obstructive sleep apnea)     currently not on cpap       Past Surgical History:        Procedure Laterality Date    CATARACT REMOVAL Bilateral      SECTION      DIALYSIS CATHETER INSERTION N/A 2021    LAPAROSCOPIC PERITONEAL DIALYSIS CATHETER INSERTION, OMENTOPLEXY performed by Cecy Lamas DO at [address] N/A 2021    CATHETER REMOVAL PERITONEAL DIALYSIS performed by Cecy Lamas DO at [address]  Bilateral 1988    muscle surgery     IR TUNNELED CATHETER PLACEMENT GREATER THAN 5 YEARS  11/15/2021    IR TUNNELED CATHETER PLACEMENT GREATER THAN 5 YEARS 11/15/2021 [facility]ED[facility]    TOE AMPUTATION Left     left great toe partial amputation       Social History:    Social History     Tobacco Use    Smoking status: Never Smoker    Smokeless tobacco: Never Used   Substance Use Topics    Alcohol use: Not Currently                                Counseling given: Not Answered      Vital Signs (Current):   Vitals:    02/01/22 1554 02/08/22 0615   BP:  (!) 140/85   Pulse:  53   Resp:  13   Temp:  97.9 °F (36.6 °C)   TempSrc:  Temporal   SpO2:  98%   Weight: 196 lb 3.4 oz (89 kg) 198 lb 3.3 oz (89.9 kg)   Height: 5' 1\" (1.549 m) 5' 1\" (1.549 m)                                              BP Readings from Last 3 Encounters:   02/08/22 (!) 140/85   01/13/22 (!) 148/77   12/09/21 (!) 142/65       NPO Status: Time of last liquid consumption: 2200                        Time of last solid consumption: 2100                        Date of last liquid consumption: 02/07/22                        Date of last solid food consumption: 02/07/22    BMI:   Wt Readings from Last 3 Encounters:   02/08/22 198 lb 3.3 oz (89.9 kg)   01/13/22 195 lb (88.5 kg)   12/08/21 191 lb 12.8 oz (87 kg)     Body mass index is 37.45 kg/m². CBC:   Lab Results   Component Value Date    WBC 6.9 12/09/2021    RBC 2.60 12/09/2021    HGB 8.1 12/09/2021    HCT 24.9 12/09/2021    MCV 95.8 12/09/2021    RDW 16.1 12/09/2021     12/09/2021       CMP:   Lab Results   Component Value Date     12/09/2021    K 4.3 12/09/2021     12/09/2021    CO2 25 12/09/2021    BUN 18 12/09/2021    CREATININE 3.4 12/09/2021    GFRAA 17 12/09/2021    AGRATIO 0.7 11/09/2021    LABGLOM 14 12/09/2021    GLUCOSE 79 12/09/2021    PROT 7.9 11/09/2021    CALCIUM 8.1 12/09/2021    BILITOT 0.6 11/09/2021    ALKPHOS 125 11/09/2021    AST 42 11/09/2021    ALT 43 11/09/2021       POC Tests: No results for input(s): POCGLU, POCNA, POCK, POCCL, POCBUN, POCHEMO, POCHCT in the last 72 hours.     Coags:   Lab Results   Component Value Date    PROTIME 12.6 11/15/2021    INR 1.11 11/15/2021       HCG (If Applicable): No results found for: PREGTESTUR, PREGSERUM, HCG, HCGQUANT     ABGs: No results found for: PHART, PO2ART, VWG5CPE, HTR1HBD, BEART, G5EBZIRD     Type & Screen (If Applicable):  No results found for: LABABO, [address]    Drug/Infectious Status (If Applicable):  No results found for: HIV, HEPCAB    COVID-19 Screening (If Applicable): No results found for: COVID19        Anesthesia Evaluation  Patient summary reviewed and Nursing notes reviewed no history of anesthetic complications:   Airway: Mallampati: III  TM distance: >3 FB   Neck ROM: full  Mouth opening: > = 3 FB Dental:      Comment: Very poor dentition chipped, cracked, multiple missing    Pulmonary:normal exam    (+) sleep apnea:                             Cardiovascular:    (+) hypertension:, CHF:,                   Neuro/Psych:   (+) headaches:,             GI/Hepatic/Renal:   (+) renal disease: ESRD and dialysis, morbid obesity          Endo/Other:    (+) DiabetesType II DM, , .                 Abdominal:   (+) obese,           Vascular: negative vascular ROS. Other Findings:             Anesthesia Plan      general     ASA 4       Induction: intravenous. MIPS: Postoperative opioids intended. Anesthetic plan and risks discussed with patient. Plan discussed with CRNA.     Attending anesthesiologist reviewed and agrees with Preprocedure content              Evan Harrell MD   2/8/2022

## 2022-02-08 NOTE — PROGRESS NOTES
Pt's O2 sats variable from 91%-95 %. Asked patient if she has had any trouble breathing lately or now. States, \"No. I'm fine. But I have an oxygen machine at home, but I don't use it. \"  She asked what her oxygen sats were. I told her at that time they were 92% and she was on 4L.   She states, \"That's normal.\"    Waiting for lab to come to draw blood work

## 2022-02-08 NOTE — PROGRESS NOTES
LAB NOTIFIED OR PATIENT CRITICAL POTASSIUM LEVEL. CRNA, ANESTHESIOLOGIST, AND DR. ZAZUETA ALL MADE AWARE PRIOR TO INCISION.

## 2022-02-08 NOTE — PROGRESS NOTES
Current Allergies: Patient has no known allergies. Recent Labs     02/08/22  0647   POCGLU 227*       Admitted to PACU bed 3 from OR. Arrived on a stretcher . Attached to PACU monitoring system. Alarms and parameters set. Report received from anesthesia personnel. OR staff did not report skin issues that were observed while in OR  No problems reported intraoperatively. Pt arrived with oxygen per nasal cannula with oxygen at 6 liters. Pt with minimal respirations although is awake and answering questions. We are telling the patient to take deep breaths and she is stating, \"I am!\" However, her respirations are shallow. Encouragement to take deep breaths not working, so I replaced the nasal cannula with non rebreather @ 15L. Pt complaining, \"Take this mask off!\". Explained to patient that right now it's important to keep her safe. Pt states, \"I'm fine. I want my phone and then I will breathe. \"  Explained to patient that bargaining with breathing not going to help her right now. Tried to reassure her that we are doing the very best to help her recover from surgery. Athrombic wraps in place.

## 2022-02-08 NOTE — H&P
Full history and physical exam performed within the last 24 hours. No changes in the interval since H&P completed.     ROGER Jackson - RAYA 2/8/2022

## 2022-02-08 NOTE — PROGRESS NOTES
Pt more awake and aware of surroundings. Switched non rebreather for nasal cannula @ 4L  Pt still asking for phone.   Wants to go home

## 2022-02-24 ENCOUNTER — OFFICE VISIT (OUTPATIENT)
Dept: BARIATRICS/WEIGHT MGMT | Age: 52
End: 2022-02-24

## 2022-02-24 VITALS
HEART RATE: 101 BPM | DIASTOLIC BLOOD PRESSURE: 73 MMHG | HEIGHT: 61 IN | BODY MASS INDEX: 37.57 KG/M2 | WEIGHT: 199 LBS | SYSTOLIC BLOOD PRESSURE: 116 MMHG

## 2022-02-24 DIAGNOSIS — N18.6 ESRD (END STAGE RENAL DISEASE) ON DIALYSIS (HCC): Primary | ICD-10-CM

## 2022-02-24 DIAGNOSIS — Z99.2 ESRD (END STAGE RENAL DISEASE) ON DIALYSIS (HCC): Primary | ICD-10-CM

## 2022-02-24 PROCEDURE — 99024 POSTOP FOLLOW-UP VISIT: CPT | Performed by: SURGERY

## 2022-02-24 NOTE — PROGRESS NOTES
Patient doing well  No N/V/F/C  Tolerating diet  Having regular BM's     Incisions C/D/I     S/P PD catheter placement     F/U PRN     Cecy Lamas

## 2022-03-28 ENCOUNTER — HOSPITAL ENCOUNTER (OUTPATIENT)
Dept: ULTRASOUND IMAGING | Age: 52
Discharge: HOME | End: 2022-03-28
Payer: COMMERCIAL

## 2022-03-28 DIAGNOSIS — L02.211 ABDOMINAL WALL ABSCESS: ICD-10-CM

## 2022-03-28 PROCEDURE — 76705 ECHO EXAM OF ABDOMEN: CPT

## 2022-03-28 PROCEDURE — 2709999900 US ASP ABSCESS/HEMATOMA/BULLA/CYST

## 2022-03-31 ENCOUNTER — OFFICE VISIT (OUTPATIENT)
Dept: BARIATRICS/WEIGHT MGMT | Age: 52
End: 2022-03-31

## 2022-03-31 VITALS — BODY MASS INDEX: 54.19 KG/M2 | WEIGHT: 287 LBS | HEIGHT: 61 IN

## 2022-03-31 DIAGNOSIS — Z99.2 ESRD (END STAGE RENAL DISEASE) ON DIALYSIS (HCC): Primary | ICD-10-CM

## 2022-03-31 DIAGNOSIS — N18.6 ESRD (END STAGE RENAL DISEASE) ON DIALYSIS (HCC): Primary | ICD-10-CM

## 2022-03-31 PROCEDURE — 99024 POSTOP FOLLOW-UP VISIT: CPT | Performed by: SURGERY

## 2022-03-31 RX ORDER — CLINDAMYCIN HYDROCHLORIDE 300 MG/1
CAPSULE ORAL
COMMUNITY
Start: 2022-03-21 | End: 2022-06-24 | Stop reason: CLARIF

## 2022-04-03 NOTE — PROGRESS NOTES
Patient doing well overall  No N/V/F/C  Tolerating diet  Having regular BM's    Using PD catheter     Incisions C/D/I    Exit site with erythema but no drainage     7 weeks s/p PD catheter placement      Tunnel infection present but improving    Continue Abx per Dr. Adrián Gonzáles    IF needs removal will call     Particia Moh

## 2022-05-09 ENCOUNTER — HOSPITAL ENCOUNTER (OUTPATIENT)
Dept: INTERVENTIONAL RADIOLOGY/VASCULAR | Age: 52
Discharge: HOME | End: 2022-05-09
Payer: COMMERCIAL

## 2022-05-09 VITALS — BODY MASS INDEX: 54.23 KG/M2 | HEIGHT: 61 IN

## 2022-05-09 DIAGNOSIS — N18.6 ESRD (END STAGE RENAL DISEASE) (HCC): ICD-10-CM

## 2022-05-09 LAB
HCT VFR BLD CALC: 28.7 % (ref 36–48)
HEMOGLOBIN: 9.7 G/DL (ref 12–16)
INR BLD: 1.61 (ref 0.88–1.12)
MCH RBC QN AUTO: 31.2 PG (ref 26–34)
MCHC RBC AUTO-ENTMCNC: 33.7 G/DL (ref 31–36)
MCV RBC AUTO: 92.7 FL (ref 80–100)
PDW BLD-RTO: 16.5 % (ref 12.4–15.4)
PLATELET # BLD: 181 K/UL (ref 135–450)
PMV BLD AUTO: 9.1 FL (ref 5–10.5)
PROTHROMBIN TIME: 18.5 SEC (ref 9.9–12.7)
RBC # BLD: 3.09 M/UL (ref 4–5.2)
WBC # BLD: 4.8 K/UL (ref 4–11)

## 2022-05-09 PROCEDURE — 36589 REMOVAL TUNNELED CV CATH: CPT

## 2022-05-09 PROCEDURE — 85610 PROTHROMBIN TIME: CPT

## 2022-05-09 PROCEDURE — 2500000003 HC RX 250 WO HCPCS

## 2022-05-09 PROCEDURE — 85027 COMPLETE CBC AUTOMATED: CPT

## 2022-05-10 LAB — INR BLD: 1.4 (ref 0.88–1.12)

## 2022-06-24 ENCOUNTER — HOSPITAL ENCOUNTER (INPATIENT)
Age: 52
LOS: 5 days | Discharge: HOME | DRG: 907 | End: 2022-06-29
Attending: STUDENT IN AN ORGANIZED HEALTH CARE EDUCATION/TRAINING PROGRAM | Admitting: INTERNAL MEDICINE
Payer: COMMERCIAL

## 2022-06-24 ENCOUNTER — APPOINTMENT (OUTPATIENT)
Dept: GENERAL RADIOLOGY | Age: 52
DRG: 907 | End: 2022-06-24
Payer: COMMERCIAL

## 2022-06-24 DIAGNOSIS — T85.71XA INFECTION ASSOCIATED WITH PERITONEAL DIALYSIS CATHETER, INITIAL ENCOUNTER (HCC): Primary | ICD-10-CM

## 2022-06-24 DIAGNOSIS — K65.9 PERITONITIS (HCC): ICD-10-CM

## 2022-06-24 DIAGNOSIS — N18.6 ESRD (END STAGE RENAL DISEASE) (HCC): ICD-10-CM

## 2022-06-24 LAB
A/G RATIO: 0.8 (ref 1.1–2.2)
ALBUMIN SERPL-MCNC: 3.3 G/DL (ref 3.4–5)
ALP BLD-CCNC: 116 U/L (ref 40–129)
ALT SERPL-CCNC: 14 U/L (ref 10–40)
ANION GAP SERPL CALCULATED.3IONS-SCNC: 16 MMOL/L (ref 3–16)
APPEARANCE FLUID: CLEAR
AST SERPL-CCNC: 11 U/L (ref 15–37)
BASOPHILS ABSOLUTE: 0.1 K/UL (ref 0–0.2)
BASOPHILS RELATIVE PERCENT: 1.4 %
BILIRUB SERPL-MCNC: <0.2 MG/DL (ref 0–1)
BUN BLDV-MCNC: 61 MG/DL (ref 7–20)
CALCIUM SERPL-MCNC: 8.5 MG/DL (ref 8.3–10.6)
CELL COUNT FLUID TYPE: NORMAL
CHLORIDE BLD-SCNC: 92 MMOL/L (ref 99–110)
CLOT EVALUATION: NORMAL
CO2: 24 MMOL/L (ref 21–32)
COLOR FLUID: COLORLESS
CREAT SERPL-MCNC: 9.2 MG/DL (ref 0.6–1.1)
EKG ATRIAL RATE: 72 BPM
EKG DIAGNOSIS: NORMAL
EKG P AXIS: 46 DEGREES
EKG P-R INTERVAL: 160 MS
EKG Q-T INTERVAL: 412 MS
EKG QRS DURATION: 86 MS
EKG QTC CALCULATION (BAZETT): 451 MS
EKG R AXIS: 8 DEGREES
EKG T AXIS: 50 DEGREES
EKG VENTRICULAR RATE: 72 BPM
EOSINOPHILS ABSOLUTE: 0.1 K/UL (ref 0–0.6)
EOSINOPHILS RELATIVE PERCENT: 1.8 %
FLUID DIFF COMMENT: NORMAL
GFR AFRICAN AMERICAN: 5
GFR NON-AFRICAN AMERICAN: 4
GLUCOSE BLD-MCNC: 123 MG/DL (ref 70–99)
GLUCOSE BLD-MCNC: 186 MG/DL (ref 70–99)
HCT VFR BLD CALC: 32.8 % (ref 36–48)
HEMOGLOBIN: 10.9 G/DL (ref 12–16)
INR BLD: 1.12 (ref 0.87–1.14)
LACTIC ACID: 1.8 MMOL/L (ref 0.4–2)
LYMPHOCYTES ABSOLUTE: 1.6 K/UL (ref 1–5.1)
LYMPHOCYTES RELATIVE PERCENT: 24.2 %
MCH RBC QN AUTO: 31.1 PG (ref 26–34)
MCHC RBC AUTO-ENTMCNC: 33.2 G/DL (ref 31–36)
MCV RBC AUTO: 93.7 FL (ref 80–100)
MONOCYTES ABSOLUTE: 0.6 K/UL (ref 0–1.3)
MONOCYTES RELATIVE PERCENT: 9.2 %
NEUTROPHILS ABSOLUTE: 4.1 K/UL (ref 1.7–7.7)
NEUTROPHILS RELATIVE PERCENT: 63.4 %
NUCLEATED CELLS FLUID: 1 /CUMM
PDW BLD-RTO: 14.4 % (ref 12.4–15.4)
PERFORMED ON: ABNORMAL
PLATELET # BLD: 223 K/UL (ref 135–450)
PMV BLD AUTO: 8.4 FL (ref 5–10.5)
POTASSIUM REFLEX MAGNESIUM: 3.8 MMOL/L (ref 3.5–5.1)
PROTHROMBIN TIME: 14.3 SEC (ref 11.7–14.5)
RBC # BLD: 3.51 M/UL (ref 4–5.2)
RBC FLUID: 0 /CUMM
SODIUM BLD-SCNC: 132 MMOL/L (ref 136–145)
TOTAL PROTEIN: 7.4 G/DL (ref 6.4–8.2)
WBC # BLD: 6.5 K/UL (ref 4–11)

## 2022-06-24 PROCEDURE — 2500000003 HC RX 250 WO HCPCS: Performed by: INTERNAL MEDICINE

## 2022-06-24 PROCEDURE — 83036 HEMOGLOBIN GLYCOSYLATED A1C: CPT

## 2022-06-24 PROCEDURE — 6360000002 HC RX W HCPCS: Performed by: INTERNAL MEDICINE

## 2022-06-24 PROCEDURE — 85610 PROTHROMBIN TIME: CPT

## 2022-06-24 PROCEDURE — 87070 CULTURE OTHR SPECIMN AEROBIC: CPT

## 2022-06-24 PROCEDURE — 2580000003 HC RX 258: Performed by: STUDENT IN AN ORGANIZED HEALTH CARE EDUCATION/TRAINING PROGRAM

## 2022-06-24 PROCEDURE — 83605 ASSAY OF LACTIC ACID: CPT

## 2022-06-24 PROCEDURE — 02HV33Z INSERTION OF INFUSION DEVICE INTO SUPERIOR VENA CAVA, PERCUTANEOUS APPROACH: ICD-10-PCS | Performed by: RADIOLOGY

## 2022-06-24 PROCEDURE — 80053 COMPREHEN METABOLIC PANEL: CPT

## 2022-06-24 PROCEDURE — 90945 DIALYSIS ONE EVALUATION: CPT

## 2022-06-24 PROCEDURE — 1200000000 HC SEMI PRIVATE

## 2022-06-24 PROCEDURE — 89050 BODY FLUID CELL COUNT: CPT

## 2022-06-24 PROCEDURE — 36415 COLL VENOUS BLD VENIPUNCTURE: CPT

## 2022-06-24 PROCEDURE — 6370000000 HC RX 637 (ALT 250 FOR IP): Performed by: INTERNAL MEDICINE

## 2022-06-24 PROCEDURE — 87205 SMEAR GRAM STAIN: CPT

## 2022-06-24 PROCEDURE — 0JH63XZ INSERTION OF TUNNELED VASCULAR ACCESS DEVICE INTO CHEST SUBCUTANEOUS TISSUE AND FASCIA, PERCUTANEOUS APPROACH: ICD-10-PCS | Performed by: RADIOLOGY

## 2022-06-24 PROCEDURE — 6360000002 HC RX W HCPCS: Performed by: STUDENT IN AN ORGANIZED HEALTH CARE EDUCATION/TRAINING PROGRAM

## 2022-06-24 PROCEDURE — 5A1D70Z PERFORMANCE OF URINARY FILTRATION, INTERMITTENT, LESS THAN 6 HOURS PER DAY: ICD-10-PCS | Performed by: RADIOLOGY

## 2022-06-24 PROCEDURE — 87040 BLOOD CULTURE FOR BACTERIA: CPT

## 2022-06-24 PROCEDURE — 93005 ELECTROCARDIOGRAM TRACING: CPT | Performed by: STUDENT IN AN ORGANIZED HEALTH CARE EDUCATION/TRAINING PROGRAM

## 2022-06-24 PROCEDURE — 2580000003 HC RX 258: Performed by: INTERNAL MEDICINE

## 2022-06-24 PROCEDURE — 71045 X-RAY EXAM CHEST 1 VIEW: CPT

## 2022-06-24 PROCEDURE — 85025 COMPLETE CBC W/AUTO DIFF WBC: CPT

## 2022-06-24 PROCEDURE — 99285 EMERGENCY DEPT VISIT HI MDM: CPT

## 2022-06-24 RX ORDER — CHLORZOXAZONE 500 MG/1
500 TABLET ORAL 4 TIMES DAILY PRN
COMMUNITY

## 2022-06-24 RX ORDER — CALCIUM ACETATE 667 MG/1
667 CAPSULE ORAL 3 TIMES DAILY
Status: DISCONTINUED | OUTPATIENT
Start: 2022-06-24 | End: 2022-06-29 | Stop reason: HOSPADM

## 2022-06-24 RX ORDER — GABAPENTIN 100 MG/1
100 CAPSULE ORAL 3 TIMES DAILY
Status: DISCONTINUED | OUTPATIENT
Start: 2022-06-24 | End: 2022-06-25

## 2022-06-24 RX ORDER — ONDANSETRON 4 MG/1
4 TABLET, ORALLY DISINTEGRATING ORAL EVERY 8 HOURS PRN
Status: DISCONTINUED | OUTPATIENT
Start: 2022-06-24 | End: 2022-06-29 | Stop reason: HOSPADM

## 2022-06-24 RX ORDER — DEXTROSE MONOHYDRATE 50 MG/ML
100 INJECTION, SOLUTION INTRAVENOUS PRN
Status: DISCONTINUED | OUTPATIENT
Start: 2022-06-24 | End: 2022-06-29 | Stop reason: HOSPADM

## 2022-06-24 RX ORDER — OXYCODONE HYDROCHLORIDE 5 MG/1
2.5 TABLET ORAL EVERY 4 HOURS PRN
Status: DISCONTINUED | OUTPATIENT
Start: 2022-06-24 | End: 2022-06-29 | Stop reason: HOSPADM

## 2022-06-24 RX ORDER — AMLODIPINE BESYLATE 10 MG/1
10 TABLET ORAL DAILY
COMMUNITY

## 2022-06-24 RX ORDER — ATORVASTATIN CALCIUM 40 MG/1
40 TABLET, FILM COATED ORAL NIGHTLY
Status: DISCONTINUED | OUTPATIENT
Start: 2022-06-24 | End: 2022-06-24

## 2022-06-24 RX ORDER — BUTALBITAL, ACETAMINOPHEN AND CAFFEINE 50; 325; 40 MG/1; MG/1; MG/1
1 TABLET ORAL EVERY 4 HOURS PRN
Status: DISCONTINUED | OUTPATIENT
Start: 2022-06-24 | End: 2022-06-29 | Stop reason: HOSPADM

## 2022-06-24 RX ORDER — SODIUM CHLORIDE 0.9 % (FLUSH) 0.9 %
5-40 SYRINGE (ML) INJECTION EVERY 12 HOURS SCHEDULED
Status: DISCONTINUED | OUTPATIENT
Start: 2022-06-24 | End: 2022-06-29 | Stop reason: HOSPADM

## 2022-06-24 RX ORDER — TIZANIDINE 4 MG/1
2 TABLET ORAL 3 TIMES DAILY PRN
Status: DISCONTINUED | OUTPATIENT
Start: 2022-06-24 | End: 2022-06-29 | Stop reason: HOSPADM

## 2022-06-24 RX ORDER — GABAPENTIN 100 MG/1
300 CAPSULE ORAL 2 TIMES DAILY
COMMUNITY

## 2022-06-24 RX ORDER — LOSARTAN POTASSIUM 50 MG/1
100 TABLET ORAL NIGHTLY
Status: DISCONTINUED | OUTPATIENT
Start: 2022-06-24 | End: 2022-06-29 | Stop reason: HOSPADM

## 2022-06-24 RX ORDER — ROSUVASTATIN CALCIUM 10 MG/1
10 TABLET, COATED ORAL NIGHTLY
COMMUNITY

## 2022-06-24 RX ORDER — CARVEDILOL 12.5 MG/1
12.5 TABLET ORAL 2 TIMES DAILY
Status: DISCONTINUED | OUTPATIENT
Start: 2022-06-24 | End: 2022-06-29 | Stop reason: HOSPADM

## 2022-06-24 RX ORDER — ACETAMINOPHEN 325 MG/1
650 TABLET ORAL EVERY 6 HOURS PRN
Status: DISCONTINUED | OUTPATIENT
Start: 2022-06-24 | End: 2022-06-29 | Stop reason: HOSPADM

## 2022-06-24 RX ORDER — SODIUM CHLORIDE 9 MG/ML
INJECTION, SOLUTION INTRAVENOUS PRN
Status: DISCONTINUED | OUTPATIENT
Start: 2022-06-24 | End: 2022-06-29 | Stop reason: HOSPADM

## 2022-06-24 RX ORDER — CHOLECALCIFEROL (VITAMIN D3) 10 MCG
1 TABLET ORAL DAILY
Status: DISCONTINUED | OUTPATIENT
Start: 2022-06-24 | End: 2022-06-29 | Stop reason: HOSPADM

## 2022-06-24 RX ORDER — CHLORZOXAZONE 500 MG/1
500 TABLET ORAL 4 TIMES DAILY PRN
Status: DISCONTINUED | OUTPATIENT
Start: 2022-06-24 | End: 2022-06-24 | Stop reason: SDUPTHER

## 2022-06-24 RX ORDER — SODIUM CHLORIDE 0.9 % (FLUSH) 0.9 %
5-40 SYRINGE (ML) INJECTION PRN
Status: DISCONTINUED | OUTPATIENT
Start: 2022-06-24 | End: 2022-06-29 | Stop reason: HOSPADM

## 2022-06-24 RX ORDER — ONDANSETRON 2 MG/ML
4 INJECTION INTRAMUSCULAR; INTRAVENOUS EVERY 6 HOURS PRN
Status: DISCONTINUED | OUTPATIENT
Start: 2022-06-24 | End: 2022-06-29 | Stop reason: HOSPADM

## 2022-06-24 RX ORDER — AMLODIPINE BESYLATE 10 MG/1
10 TABLET ORAL NIGHTLY
Status: DISCONTINUED | OUTPATIENT
Start: 2022-06-24 | End: 2022-06-29 | Stop reason: HOSPADM

## 2022-06-24 RX ORDER — ACETAMINOPHEN 650 MG/1
650 SUPPOSITORY RECTAL EVERY 6 HOURS PRN
Status: DISCONTINUED | OUTPATIENT
Start: 2022-06-24 | End: 2022-06-29 | Stop reason: HOSPADM

## 2022-06-24 RX ORDER — GENTAMICIN SULFATE 1 MG/G
OINTMENT TOPICAL DAILY
Status: DISCONTINUED | OUTPATIENT
Start: 2022-06-25 | End: 2022-06-29 | Stop reason: HOSPADM

## 2022-06-24 RX ORDER — BUTALBITAL, ACETAMINOPHEN AND CAFFEINE 50; 325; 40 MG/1; MG/1; MG/1
1 TABLET ORAL EVERY 4 HOURS PRN
COMMUNITY
End: 2022-06-30 | Stop reason: SDUPTHER

## 2022-06-24 RX ORDER — HEPARIN SODIUM 5000 [USP'U]/ML
5000 INJECTION, SOLUTION INTRAVENOUS; SUBCUTANEOUS EVERY 8 HOURS SCHEDULED
Status: DISCONTINUED | OUTPATIENT
Start: 2022-06-24 | End: 2022-06-29 | Stop reason: HOSPADM

## 2022-06-24 RX ORDER — GENTAMICIN SULFATE 1 MG/G
OINTMENT TOPICAL PRN
COMMUNITY

## 2022-06-24 RX ORDER — POLYETHYLENE GLYCOL 3350 17 G/17G
17 POWDER, FOR SOLUTION ORAL DAILY PRN
Status: DISCONTINUED | OUTPATIENT
Start: 2022-06-24 | End: 2022-06-29 | Stop reason: HOSPADM

## 2022-06-24 RX ORDER — INSULIN LISPRO 100 [IU]/ML
0-12 INJECTION, SOLUTION INTRAVENOUS; SUBCUTANEOUS
Status: DISCONTINUED | OUTPATIENT
Start: 2022-06-24 | End: 2022-06-29 | Stop reason: HOSPADM

## 2022-06-24 RX ORDER — INSULIN LISPRO 100 [IU]/ML
0-6 INJECTION, SOLUTION INTRAVENOUS; SUBCUTANEOUS NIGHTLY
Status: DISCONTINUED | OUTPATIENT
Start: 2022-06-24 | End: 2022-06-29 | Stop reason: HOSPADM

## 2022-06-24 RX ORDER — ROSUVASTATIN CALCIUM 10 MG/1
10 TABLET, COATED ORAL NIGHTLY
Status: DISCONTINUED | OUTPATIENT
Start: 2022-06-24 | End: 2022-06-29 | Stop reason: HOSPADM

## 2022-06-24 RX ADMIN — CALCIUM ACETATE 667 MG: 667 CAPSULE ORAL at 21:20

## 2022-06-24 RX ADMIN — LOSARTAN POTASSIUM 100 MG: 50 TABLET, FILM COATED ORAL at 21:20

## 2022-06-24 RX ADMIN — GABAPENTIN 100 MG: 100 CAPSULE ORAL at 21:20

## 2022-06-24 RX ADMIN — CEFEPIME 2000 MG: 2 INJECTION, POWDER, FOR SOLUTION INTRAMUSCULAR; INTRAVENOUS at 15:16

## 2022-06-24 RX ADMIN — AMLODIPINE BESYLATE 10 MG: 10 TABLET ORAL at 21:20

## 2022-06-24 RX ADMIN — SODIUM CHLORIDE, PRESERVATIVE FREE 10 ML: 5 INJECTION INTRAVENOUS at 21:20

## 2022-06-24 RX ADMIN — BUTALBITAL, ACETAMINOPHEN, AND CAFFEINE 1 TABLET: 50; 325; 40 TABLET ORAL at 21:23

## 2022-06-24 RX ADMIN — INSULIN LISPRO 1 UNITS: 100 INJECTION, SOLUTION INTRAVENOUS; SUBCUTANEOUS at 21:29

## 2022-06-24 RX ADMIN — HEPARIN SODIUM 5000 UNITS: 5000 INJECTION INTRAVENOUS; SUBCUTANEOUS at 21:23

## 2022-06-24 RX ADMIN — VANCOMYCIN HYDROCHLORIDE 1500 MG: 10 INJECTION, POWDER, LYOPHILIZED, FOR SOLUTION INTRAVENOUS at 15:53

## 2022-06-24 RX ADMIN — CARVEDILOL 12.5 MG: 12.5 TABLET, FILM COATED ORAL at 21:20

## 2022-06-24 RX ADMIN — ROSUVASTATIN 10 MG: 10 TABLET, FILM COATED ORAL at 21:20

## 2022-06-24 RX ADMIN — ONDANSETRON 4 MG: 2 INJECTION INTRAMUSCULAR; INTRAVENOUS at 18:13

## 2022-06-24 RX ADMIN — CEFEPIME 1000 MG: 1 INJECTION, POWDER, FOR SOLUTION INTRAMUSCULAR; INTRAVENOUS at 18:17

## 2022-06-24 RX ADMIN — NEPHROCAP 1 MG: 1 CAP ORAL at 18:13

## 2022-06-24 ASSESSMENT — PAIN DESCRIPTION - ORIENTATION: ORIENTATION: MID

## 2022-06-24 ASSESSMENT — ENCOUNTER SYMPTOMS
CHEST TIGHTNESS: 0
TROUBLE SWALLOWING: 0
BACK PAIN: 1
VOMITING: 0
NAUSEA: 1
COUGH: 0
SINUS PAIN: 0
SORE THROAT: 0
CONSTIPATION: 0
BLOOD IN STOOL: 0
ABDOMINAL PAIN: 1
DIARRHEA: 0
STRIDOR: 0
SHORTNESS OF BREATH: 0
ABDOMINAL DISTENTION: 0
SINUS PRESSURE: 0
WHEEZING: 0
RHINORRHEA: 0

## 2022-06-24 ASSESSMENT — PAIN DESCRIPTION - LOCATION
LOCATION: HEAD
LOCATION: GENERALIZED

## 2022-06-24 ASSESSMENT — PAIN - FUNCTIONAL ASSESSMENT: PAIN_FUNCTIONAL_ASSESSMENT: 0-10

## 2022-06-24 ASSESSMENT — PAIN SCALES - GENERAL
PAINLEVEL_OUTOF10: 7
PAINLEVEL_OUTOF10: 6

## 2022-06-24 ASSESSMENT — PAIN DESCRIPTION - PAIN TYPE: TYPE: CHRONIC PAIN

## 2022-06-24 ASSESSMENT — PAIN DESCRIPTION - DESCRIPTORS
DESCRIPTORS: ACHING
DESCRIPTORS: ACHING

## 2022-06-24 ASSESSMENT — LIFESTYLE VARIABLES: HOW OFTEN DO YOU HAVE A DRINK CONTAINING ALCOHOL: NEVER

## 2022-06-24 ASSESSMENT — PAIN DESCRIPTION - FREQUENCY: FREQUENCY: CONTINUOUS

## 2022-06-24 NOTE — FLOWSHEET NOTE
CCPD Order   Exchanges: 5   Exchange Volume: 2000 ml   Total Time: 10 hrs   Dextrose: 2.5%   Last Fill: 0 ml   Total Volume: 82975 ml     Orders verified. Supplies taken to pt's room. Report received. Cycler set up, primed and pre tested. Dressing changed on [facility] Cath site. Pt connected to cycler. CCPD initiated without problem. Initial effluent clear. Lab ( cell count and Cx )collected and sent     If problems should arise please call the [phone] number on top of PD cycler machine.        If problems persist please call [phone]      06/24/22 1830   Vitals   BP (!) 150/89   Temp 98 °F (36.7 °C)   Heart Rate 75   Resp 18   SpO2 95 %   Weight 195 lb 6.4 oz (88.6 kg)   Cycler   Verification of Prescription CCPD   Total Volume Programmed 59890 mL   Therapy Time (Hours:Minutes) 10:00   Fill Volume 2000 mL   Last Fill Volume 0 mL   Dextrose Setting Same (Nonextraneal)   I Drain Alarm 0 mL   Number of Cycles 5   Bag Volume 5000 mL   Number of Bags Used 2   Dianeal Solution Other (Comment)  (Delflex 2.5% 5 liters/bag )   I Drain (mL) 40 mL

## 2022-06-24 NOTE — PLAN OF CARE
Problem: Safety - Adult  Goal: Free from fall injury  Outcome: Progressing     Problem: Chronic Conditions and Co-morbidities  Goal: Patient's chronic conditions and co-morbidity symptoms are monitored and maintained or improved  Outcome: Progressing

## 2022-06-24 NOTE — ED TRIAGE NOTES
Recurring infections with PD catheter. Denies presently infected. States that Dr Jd Lambert told her to come to ED to have it changed to A HD catheter.   Emptied this am

## 2022-06-24 NOTE — ED NOTES
Clinical Pharmacy Progress Note  Medication History     Admit Date: 6/24/2022    List of of current medications patient is taking is complete. Home Medication list in EPIC updated to reflect changes noted below. Source of information: patient interview, pharmacy fills, [facility]    [facility]'s home pharmacy: [facility]      Changes made to medication list:   Medications removed:    APAP prn   Atorvastatin 40mg daily   Lantus pen 10 units QHS   Humalog pen 5 units TID AC   Clindamycin    Diphenhydramine prn   Flonase  Medications added:    Rosuvastatin 10mg QHS   Chlorzoxazone prn   Diclofenac gel prn   Gentamicin oint   Medication doses adjusted:    Gabapentin -- per pt, she takes 300mg BID      Please call with any questions.   Ewa LopezD., BCPS   6/24/2022 4:12 PM  Wireless: [phone]

## 2022-06-24 NOTE — CONSULTS
Pharmacy was consulted to complete a medrec for this pt. Medication list was updated while pt was in the ED please see list below:    Jessica RodríguezD  PGY-1 Pharmacy Resident  [id number]  6/24/2022 6:59 PM      Clinical Pharmacy Progress Note  Medication History     Admit Date: 6/24/2022    List of of current medications patient is taking is complete. Home Medication list in EPIC updated to reflect changes noted below. Source of information: patient interview, pharmacy fills, [facility]    Patient's home pharmacy: [facility]      Changes made to medication list:   Medications removed:    APAP prn   Atorvastatin 40mg daily   Lantus pen 10 units QHS   Humalog pen 5 units TID AC   Clindamycin    Diphenhydramine prn   Flonase  Medications added:    Rosuvastatin 10mg QHS   Chlorzoxazone prn   Diclofenac gel prn   Gentamicin oint   Medication doses adjusted:    Gabapentin -- per pt, she takes 300mg BID      Please call with any questions.   Luann Trimble PharmD., [facility]   6/24/2022 6:58 PM  Wireless: [phone]

## 2022-06-24 NOTE — PROGRESS NOTES
Clinical Pharmacy Progress Note    Vancomycin - Management by Pharmacy    Consult Date(s): 06/24  Consulting Provider(s): Jun    Assessment / Plan    Vancomycin - Vancomycin   Concurrent Antimicrobials: Cefepime   Day of Vanc Therapy: 1   Current Dosing Method: Intermittent Dosing by Levels   Therapeutic Goal: ~ 15 mg/L   Current Dose / Frequency:Intermittent   Plan / Rationale:   o Pt has ESRD on PD.   o Was given a LD of 1.5 g (~17 mg/kg) today in the ED.  o Will give not give any more vancomycin today and will check a level tomorrow AM.  Darline Walsh Will continue to monitor clinical condition and make adjustments to regimen as appropriate. Thank you for consulting Pharmacy! Whit Mathis, PharmD  PGY-1 Pharmacy Resident  [id number]  6/24/2022 6:50 PM        Subjective/Objective: Ms. Rickey Mckeon is a 47 y.o. female with a PMHx significant for ESRD on PD, CHF, DM, HTN. She presented to the ED with abd pain and concern for peritonitis. She was admitted in late May 2022 for peritonitis at [address] [place] [address], there she grew serratia and was treated with oral levofloxacin and diflucan. She finished abx on 6/13 and since stopping abx she has had abdominal pain. She is admitted for peritonitis. Pharmacy has been consulted to dose vancomycin for an intraabdominal infection. Ht Readings from Last 1 Encounters:   06/24/22 5' 1\" (1.549 m)     Wt Readings from Last 1 Encounters:   06/24/22 195 lb 6.4 oz (88.6 kg)       Current & Prior Antimicrobial Regimen(s):   Cefepime (06/24 - Current)   Vancomycin (06/24 - Current)    Level(s) / Doses:    Date Dose Level Notes   06/24 1500 mg x Once --    06/25  Random = ? Note: Serum levels collected for AUC-based dosing may be high if collected in close proximity to the dose administered. This is not necessarily indicative of toxicity.     Cultures & Sensitivities:    Date Site Micro Susceptibility / Result   06/24 Blood x 2 Sent    06/24 C Diff Sent      Labs / Ancillary

## 2022-06-24 NOTE — H&P
Hospital Medicine History & Physical      PCP: [address] [place] [address]    Date of Admission: 6/24/2022    Date of Service: Pt seen/examined on 6/24/2022 and Admitted to Inpatient with expected LOS greater than two midnights due to medical therapy. Chief Complaint:  Abdominal pain      History Of Present Illness: The patient is a 47 y.o. female with with medical history of ESRD on peritoneal dialysis, diastolic CHF, DM, HTN who presents to [facility] with worsening abdominal pain and concern for peritonitis. Patient was hospitalized at [facility] from 5/21 through 5/29 with peritonitis. Per discharge summaries cultures grew Serratia and patient was treated with Levaquin and Diflucan orally. Patient states that while she was on antibiotics which finished on June 13 she was feeling better. However after finishing antibiotics her pain came back. It is about 5/6 out of 10, generalized. Associated with nausea and decreased oral intake. She denies any vomiting or diarrhea, moving her bowels regularly. No documented fevers or chills at home. Patient saw her nephrologist today who obtained peritoneal fluid sample in his office and it was concerning for infection. He directed the patient to ER for admission. Upon previous cultures review-patient had Staphylococcus in peritoneal fluid back in 2021.      Past Medical History:        Diagnosis Date    Acute respiratory failure with hypoxia ([facility] [address] 02/24/2021    [facility]    Anemia     Arthritis     CHF (congestive heart failure) ([address] [address]     states has home oxygen but does not use    Diabetes mellitus (HCC)     End stage renal disease ([address]     Hemodialysis patient ([address]     MWF    History of blood transfusion     Hyperlipidemia     Hypertension     Migraine     On home O2     but does not use it    JEAN (obstructive sleep apnea)     currently not on cpap       Past Surgical History:        Procedure Laterality Date    CATARACT REMOVAL Bilateral   SECTION      DIALYSIS CATHETER INSERTION N/A 2021    LAPAROSCOPIC PERITONEAL DIALYSIS CATHETER INSERTION, OMENTOPLEXY performed by Lizzie Kay DO at [address] [facility] 22 N/A 2022    LAPAROSCOPIC PERITONEAL DIALYSIS CATHETER PLACEMENT performed by Lizzie Kay DO at [address] N/A 2021    CATHETER REMOVAL PERITONEAL DIALYSIS performed by Lizzie Kay DO at [address] Bilateral 1988    muscle surgery     IR TUNNELED CATHETER PLACEMENT GREATER THAN 5 YEARS  11/15/2021    IR TUNNELED CATHETER PLACEMENT GREATER THAN 5 YEARS 11/15/2021 [facility] PROCEDURES    TOE AMPUTATION Left     left great toe partial amputation    US ASP ABSCESS/HEMATOMA/BULLA/CYST  3/28/2022    US ASP ABSCESS/HEMATOMA/BULLA/CYST 3/28/2022 [facility] ULTRASOUND       Medications Prior to Admission:    Prior to Admission medications    Medication Sig Start Date End Date Taking? Authorizing Provider   gabapentin (NEURONTIN) 100 MG capsule Take 3 capsules by mouth 2 times daily for 90 days.  3/21/22 4/20/22  Noel Avalos MD   spironolactone (ALDACTONE) 50 MG tablet TAKE 1 TABLET BY MOUTH DAILY 22   Noel Avalos MD   B complex-vitamin C-folic acid (NEPHRO-BHANU) 1 MG tablet Take 1 tablet by mouth daily 21   Historical Provider, MD   acetaminophen (TYLENOL) 500 MG tablet Take 1,000 mg by mouth every 4 hours as needed    Historical Provider, MD   calcium acetate (PHOSLO) 667 MG CAPS capsule Take 667 mg by mouth 3 times daily 21   Historical Provider, MD   ergocalciferol (ERGOCALCIFEROL) 1.25 MG (18135 UT) capsule Take 50,000 Units by mouth once a week 21   Historical Provider, MD   losartan (COZAAR) 100 MG tablet Take 1 tablet by mouth nightly 22   Noel Avlaos MD   amLODIPine (NORVASC) 5 MG tablet Take 1 tablet by mouth daily  Patient taking differently: Take 10 mg by mouth at bedtime  12/10/21   Ivis Watkins MD   clopidogrel (PLAVIX) 75 MG tablet Take 1 tablet by mouth daily 12/10/21   Navid Knight MD   insulin lispro, 1 Unit Dial, 100 UNIT/ML SOPN Inject 5 Units into the skin 3 times daily (with meals)  Patient not taking: Reported on 6/24/2022 12/9/21   Navid Knight MD   insulin glargine (LANTUS;BASAGLAR) 100 UNIT/ML injection pen Inject 10 Units into the skin nightly  Patient not taking: Reported on 6/24/2022 11/16/21   Forrest Green MD   carvedilol (COREG) 12.5 MG tablet Take 1 tablet by mouth 2 times daily 7/13/21   Forrest Green MD   atorvastatin (LIPITOR) 40 MG tablet Take 1 tablet by mouth daily  Patient taking differently: Take 40 mg by mouth at bedtime  7/13/21   Forrest Green MD   nitroGLYCERIN (NITROSTAT) 0.4 MG SL tablet Place 1 tablet under the tongue every 5 minutes as needed for Chest pain 7/13/21   Forrest Green MD   butalbital-acetaminophen-caffeine (FIORICET, ESGIC) -40 MG per tablet Take 1 tablet by mouth every 4 hours as needed for Headaches 5/27/15   Navid Knight MD       Allergies:  Patient has no known allergies. Social History:  The patient currently lives lives at home    TOBACCO:   reports that she has never smoked. She has never used smokeless tobacco.  ETOH:   reports previous alcohol use. Family History:  Reviewed in detail and negative for DM, Early CAD, Cancer, CVA. Positive as follows:    History reviewed. No pertinent family history. REVIEW OF SYSTEMS:   Positive and negative as noted in the HPI. All other systems reviewed and negative. PHYSICAL EXAM:    /74   Pulse 71   Temp 97.9 °F (36.6 °C) (Oral)   Resp 20   Ht 5' 1\" (1.549 m)   Wt 196 lb (88.9 kg)   SpO2 95%   BMI 37.03 kg/m²     General appearance: Mild distress appears stated age and cooperative. HEENT Normal cephalic, atraumatic without obvious deformity. Pupils equal, round, and reactive to light. Extra ocular muscles intact. Conjunctivae/corneas clear. Neck: Supple, No jugular venous distention/bruits. Trachea midline without thyromegaly or adenopathy with full range of motion. Lungs: Clear to auscultation, bilaterally without Rales/Wheezes/Rhonchi with good respiratory effort. Heart: Regular rate and rhythm with Normal S1/S2 without murmurs, rubs or gallops, point of maximum impulse non-displaced  Abdomen: Soft, generally tender, non-distended without rigidity or guarding and positive bowel sounds all four quadrants. PD catheter present in the left upper quadrant. Extremities: No clubbing, cyanosis, or edema bilaterally. Skin: Skin color, texture, turgor normal.    Neurologic: Alert and oriented X 3,  grossly non-focal.  Mental status: Alert, oriented, thought content appropriate. Capillary refill is brisk  Peripheral pulses 2+    CXR:  I have reviewed the CXR with the following interpretation: X-ray pending  EKG:  I have reviewed the EKG with the following interpretation: Sinus without acute ST-T wave changes    CBC   Recent Labs     06/24/22  1441   WBC 6.5   HGB 10.9*   HCT 32.8*         RENAL  No results for input(s): NA, K, CL, CO2, PHOS, BUN, CREATININE, CA in the last 72 hours. LFT'S  No results for input(s): AST, ALT, ALB, BILIDIR, BILITOT, ALKPHOS in the last 72 hours. COAG  No results for input(s): INR in the last 72 hours. CARDIAC ENZYMES  No results for input(s): CKTOTAL, CKMB, CKMBINDEX, TROPONINI in the last 72 hours.     U/A:    Lab Results   Component Value Date    COLORU Yellow 12/06/2021    WBCUA 0-2 12/06/2021    RBCUA 0-2 12/06/2021    MUCUS 1+ 07/11/2021    BACTERIA 1+ 12/06/2021    CLARITYU Clear 12/06/2021    SPECGRAV 1.020 12/06/2021    LEUKOCYTESUR Negative 12/06/2021    BLOODU Negative 12/06/2021    GLUCOSEU Negative 12/06/2021    AMORPHOUS 2+ 07/11/2021       ABG  No results found for: RZO8QOF, BEART, R5WHGGNX, PHART, Edilberto Barks, [place]        Active Hospital Problems    Diagnosis Date Noted    Peritonitis ([facility] [K65.9] 06/24/2022     Priority: Medium ASSESSMENT/PLAN:    Peritonitis associated with peritoneal dialysis: We will follow results of cultures obtain by nephrologist  Start cefepime and vancomycin IV  Had previous staphylococcal infections, Serratia  Most recently treated with Levaquin and Diflucan as outpatient. ESRD on peritoneal dialysis:  Patient will need hemodialysis line, prefer to use tunneled line to avoid urgent temporary catheter placement  Likely will be done Monday  PD as per nephrology through the weekend    DM type II:  Patient is on Lantus but does not use it  Will use sliding scale    HTN:  Continue home medications    DVT Prophylaxis: heparin  Diet: No diet orders on file  Code Status: Prior  PT/OT Eval Status: pending    Ann Rosales MD    Thank you [address] for the opportunity to be involved in this patient's care. If you have any questions or concerns please feel free to contact me at [phone].

## 2022-06-24 NOTE — CONSULTS
Clinical Pharmacy Progress Note    Admit date: 6/24/2022   Patient presents to the ED per request from her Nephrologist with concern for PD cath infection. Complains of abdominal pain as well as chronic n/v.  Pharmacy is consulted to dose Vancomycin x1 dose in ED per Dr. Karin Marmolejo. Ht = 61 in  Wt = 88.9 kg      Assessment/Plan:  · Will order Vancomycin 1500mg IV x1 for administration in ED per ER pharmacy consult. · If Vancomycin is to continue on admission and pharmacy is to manage dosing, please re-consult with admission orders.       Thanks--  Yennifer LopezD., [facility]   6/24/2022 2:59 PM  Wireless: [phone]

## 2022-06-24 NOTE — PROGRESS NOTES
Pt had episode of diarrhea states Elida Andinoy gets this with antibiotics\" will send stool sample and continue to monitor.

## 2022-06-24 NOTE — PROGRESS NOTES
4 Eyes Admission Assessment     I agree as the admission nurse that 2 RN's have performed a thorough Head to Toe Skin Assessment on the patient. ALL assessment sites listed below have been assessed on admission. Areas assessed by both nurses:   [x]   Head, Face, and Ears   [x]   Shoulders, Back, and Chest  [x]   Arms, Elbows, and Hands   [x]   Coccyx, Sacrum, and Ischium  [x]   Legs, Feet, and Heels        Does the Patient have Skin Breakdown?   No         Margarito Prevention initiated:  No   Wound Care Orders initiated:  No      [facility] nurse consulted for Pressure Injury (Stage 3,4, Unstageable, DTI, NWPT, and Complex wounds) or Margarito score 18 or lower:  No      Nurse 1 eSignature: Electronically signed by Quincy Pérez RN on 6/24/22 at 5:50 PM EDT      Nurse 2 eSignature: Electronically signed by Darline Bhagat RN on 6/24/22 at 5:52 PM EDT

## 2022-06-25 LAB
ALBUMIN SERPL-MCNC: 2.4 G/DL (ref 3.4–5)
ANION GAP SERPL CALCULATED.3IONS-SCNC: 16 MMOL/L (ref 3–16)
APPEARANCE FLUID: CLEAR
BASOPHILS ABSOLUTE: 0 K/UL (ref 0–0.2)
BASOPHILS RELATIVE PERCENT: 0.2 %
BUN BLDV-MCNC: 57 MG/DL (ref 7–20)
CALCIUM SERPL-MCNC: 8.1 MG/DL (ref 8.3–10.6)
CELL COUNT FLUID TYPE: NORMAL
CHLORIDE BLD-SCNC: 94 MMOL/L (ref 99–110)
CLOT EVALUATION: NORMAL
CO2: 22 MMOL/L (ref 21–32)
COLOR FLUID: COLORLESS
CREAT SERPL-MCNC: 8.4 MG/DL (ref 0.6–1.1)
EOSINOPHILS ABSOLUTE: 0.1 K/UL (ref 0–0.6)
EOSINOPHILS RELATIVE PERCENT: 2.9 %
ESTIMATED AVERAGE GLUCOSE: 157.1 MG/DL
GFR AFRICAN AMERICAN: 6
GFR NON-AFRICAN AMERICAN: 5
GLUCOSE BLD-MCNC: 146 MG/DL (ref 70–99)
GLUCOSE BLD-MCNC: 176 MG/DL (ref 70–99)
GLUCOSE BLD-MCNC: 189 MG/DL (ref 70–99)
GLUCOSE BLD-MCNC: 203 MG/DL (ref 70–99)
GLUCOSE BLD-MCNC: 223 MG/DL (ref 70–99)
HBA1C MFR BLD: 7.1 %
HCT VFR BLD CALC: 31.5 % (ref 36–48)
HEMOGLOBIN: 10.2 G/DL (ref 12–16)
LYMPHOCYTES ABSOLUTE: 1 K/UL (ref 1–5.1)
LYMPHOCYTES RELATIVE PERCENT: 19.2 %
MCH RBC QN AUTO: 30.6 PG (ref 26–34)
MCHC RBC AUTO-ENTMCNC: 32.2 G/DL (ref 31–36)
MCV RBC AUTO: 94.9 FL (ref 80–100)
MONOCYTES ABSOLUTE: 0.6 K/UL (ref 0–1.3)
MONOCYTES RELATIVE PERCENT: 12 %
NEUTROPHILS ABSOLUTE: 3.4 K/UL (ref 1.7–7.7)
NEUTROPHILS RELATIVE PERCENT: 65.7 %
NUCLEATED CELLS FLUID: 16 /CUMM
PDW BLD-RTO: 14.3 % (ref 12.4–15.4)
PERFORMED ON: ABNORMAL
PHOSPHORUS: 5.8 MG/DL (ref 2.5–4.9)
PLATELET # BLD: 204 K/UL (ref 135–450)
PMV BLD AUTO: 8.1 FL (ref 5–10.5)
POTASSIUM SERPL-SCNC: 3.6 MMOL/L (ref 3.5–5.1)
RBC # BLD: 3.32 M/UL (ref 4–5.2)
RBC FLUID: 14 /CUMM
SODIUM BLD-SCNC: 132 MMOL/L (ref 136–145)
VANCOMYCIN RANDOM: 18.8 UG/ML
WBC # BLD: 5.1 K/UL (ref 4–11)

## 2022-06-25 PROCEDURE — 99223 1ST HOSP IP/OBS HIGH 75: CPT | Performed by: INTERNAL MEDICINE

## 2022-06-25 PROCEDURE — 80202 ASSAY OF VANCOMYCIN: CPT

## 2022-06-25 PROCEDURE — 2580000003 HC RX 258: Performed by: INTERNAL MEDICINE

## 2022-06-25 PROCEDURE — 6370000000 HC RX 637 (ALT 250 FOR IP): Performed by: INTERNAL MEDICINE

## 2022-06-25 PROCEDURE — 89050 BODY FLUID CELL COUNT: CPT

## 2022-06-25 PROCEDURE — 2500000003 HC RX 250 WO HCPCS: Performed by: INTERNAL MEDICINE

## 2022-06-25 PROCEDURE — 80069 RENAL FUNCTION PANEL: CPT

## 2022-06-25 PROCEDURE — 1200000000 HC SEMI PRIVATE

## 2022-06-25 PROCEDURE — 36415 COLL VENOUS BLD VENIPUNCTURE: CPT

## 2022-06-25 PROCEDURE — 90945 DIALYSIS ONE EVALUATION: CPT | Performed by: INTERNAL MEDICINE

## 2022-06-25 PROCEDURE — 6360000002 HC RX W HCPCS: Performed by: INTERNAL MEDICINE

## 2022-06-25 PROCEDURE — 85025 COMPLETE CBC W/AUTO DIFF WBC: CPT

## 2022-06-25 RX ORDER — GABAPENTIN 300 MG/1
300 CAPSULE ORAL 2 TIMES DAILY
Status: DISCONTINUED | OUTPATIENT
Start: 2022-06-25 | End: 2022-06-29 | Stop reason: HOSPADM

## 2022-06-25 RX ADMIN — CALCIUM ACETATE 667 MG: 667 CAPSULE ORAL at 16:50

## 2022-06-25 RX ADMIN — TIZANIDINE 2 MG: 4 TABLET ORAL at 09:49

## 2022-06-25 RX ADMIN — HEPARIN SODIUM 5000 UNITS: 5000 INJECTION INTRAVENOUS; SUBCUTANEOUS at 16:50

## 2022-06-25 RX ADMIN — BUTALBITAL, ACETAMINOPHEN, AND CAFFEINE 1 TABLET: 50; 325; 40 TABLET ORAL at 21:24

## 2022-06-25 RX ADMIN — ROSUVASTATIN 10 MG: 10 TABLET, FILM COATED ORAL at 21:24

## 2022-06-25 RX ADMIN — INSULIN LISPRO 2 UNITS: 100 INJECTION, SOLUTION INTRAVENOUS; SUBCUTANEOUS at 16:53

## 2022-06-25 RX ADMIN — CALCIUM ACETATE 667 MG: 667 CAPSULE ORAL at 21:22

## 2022-06-25 RX ADMIN — NEPHROCAP 1 MG: 1 CAP ORAL at 09:43

## 2022-06-25 RX ADMIN — TIZANIDINE 2 MG: 4 TABLET ORAL at 21:37

## 2022-06-25 RX ADMIN — CALCIUM ACETATE 667 MG: 667 CAPSULE ORAL at 09:43

## 2022-06-25 RX ADMIN — CEFEPIME 1000 MG: 1 INJECTION, POWDER, FOR SOLUTION INTRAMUSCULAR; INTRAVENOUS at 18:44

## 2022-06-25 RX ADMIN — SODIUM CHLORIDE, PRESERVATIVE FREE 10 ML: 5 INJECTION INTRAVENOUS at 21:22

## 2022-06-25 RX ADMIN — GABAPENTIN 100 MG: 100 CAPSULE ORAL at 09:43

## 2022-06-25 RX ADMIN — HEPARIN SODIUM 5000 UNITS: 5000 INJECTION INTRAVENOUS; SUBCUTANEOUS at 21:36

## 2022-06-25 RX ADMIN — INSULIN LISPRO 1 UNITS: 100 INJECTION, SOLUTION INTRAVENOUS; SUBCUTANEOUS at 21:47

## 2022-06-25 RX ADMIN — INSULIN LISPRO 4 UNITS: 100 INJECTION, SOLUTION INTRAVENOUS; SUBCUTANEOUS at 12:31

## 2022-06-25 RX ADMIN — SODIUM CHLORIDE, PRESERVATIVE FREE 10 ML: 5 INJECTION INTRAVENOUS at 09:43

## 2022-06-25 RX ADMIN — GABAPENTIN 300 MG: 300 CAPSULE ORAL at 21:49

## 2022-06-25 RX ADMIN — GENTAMICIN SULFATE: 1 OINTMENT TOPICAL at 18:11

## 2022-06-25 RX ADMIN — INSULIN LISPRO 4 UNITS: 100 INJECTION, SOLUTION INTRAVENOUS; SUBCUTANEOUS at 09:52

## 2022-06-25 RX ADMIN — BUTALBITAL, ACETAMINOPHEN, AND CAFFEINE 1 TABLET: 50; 325; 40 TABLET ORAL at 09:50

## 2022-06-25 RX ADMIN — HEPARIN SODIUM 5000 UNITS: 5000 INJECTION INTRAVENOUS; SUBCUTANEOUS at 04:43

## 2022-06-25 RX ADMIN — SODIUM CHLORIDE: 9 INJECTION, SOLUTION INTRAVENOUS at 18:44

## 2022-06-25 RX ADMIN — GABAPENTIN 100 MG: 100 CAPSULE ORAL at 16:50

## 2022-06-25 RX ADMIN — VANCOMYCIN HYDROCHLORIDE 500 MG: 1 INJECTION, POWDER, LYOPHILIZED, FOR SOLUTION INTRAVENOUS at 11:59

## 2022-06-25 RX ADMIN — BUTALBITAL, ACETAMINOPHEN, AND CAFFEINE 1 TABLET: 50; 325; 40 TABLET ORAL at 04:43

## 2022-06-25 ASSESSMENT — PAIN DESCRIPTION - ORIENTATION
ORIENTATION: RIGHT
ORIENTATION: MID

## 2022-06-25 ASSESSMENT — PAIN SCALES - GENERAL
PAINLEVEL_OUTOF10: 6
PAINLEVEL_OUTOF10: 5
PAINLEVEL_OUTOF10: 6

## 2022-06-25 ASSESSMENT — PAIN DESCRIPTION - LOCATION
LOCATION: HEAD

## 2022-06-25 ASSESSMENT — PAIN DESCRIPTION - DESCRIPTORS
DESCRIPTORS: ACHING

## 2022-06-25 NOTE — PROGRESS NOTES
Clinical Pharmacy Progress Note    Vancomycin - Management by Pharmacy    Consult Date(s): 06/24  Consulting Provider(s): Jun    Assessment / Plan:  1)  PD peritonitis - Vancomycin   Concurrent Antimicrobials: Cefepime   Day of Vanc Therapy: 2   Current Dosing Method: Intermittent Dosing by Levels due to ESRD on PD  o Therapeutic Goal: Trough ~ 15 mg/L  o Random level this AM = 18.8 mcg/mL after receiving loading dose last evening. Tolerating PD overnight.  o Will give 500mg IV x1 today, and will check random level in AM tomorrow.  Will continue to monitor clinical condition and make adjustments to regimen as appropriate. Please call with questions--  Thanks--  Deneen Mcconnell, PharmD, BCPS, BCGP  [id number] (\[facility] [place] [facility]\"")   6/25/2022 9:07 AM      Interval update: Tolerated CCPD overnight, although only 3 of 5 cycles ran as of 05:30 due to pt laying on tubing. Subjective/Objective:  Naida Treviño is a 47 y.o. female with a PMHx significant for ESRD on PD, CHF, DM, and HTN. She presented to the ED with abd pain and concern for peritonitis. She was admitted in late May 2022 for peritonitis at [facility] - there she grew serratia and was treated with oral levofloxacin and diflucan. She finished abx on 6/13, and since stopping abx she has had abdominal pain. She is admitted for peritonitis.     Pharmacy is consulted to dose vancomycin    Ht Readings from Last 1 Encounters:   06/24/22 5' 1\" (1.549 m)     Wt Readings from Last 1 Encounters:   06/24/22 195 lb 6.4 oz (88.6 kg)       Current & Prior Antimicrobial Regimen(s):   Cefepime 1000mg EI q24h (06/24 - Current)   Vancomycin - Pharmacy to dose   Intermittent dosing (06/24 - Current)    Date Vancomycin Level Vancomycin Dose   6/24  1500mg   6/25 25 mcg/mL             Cultures & Sensitivities:    Date Site Micro Susceptibility / Result   06/24 Blood x 2 Sent    06/24 C Diff Sent      Labs / Ancillary Data:    CrCl is not estimated 2/2 ESRD on PD    Recent Labs     06/24/22  1441 06/24/22  1525 06/25/22  0737   CREATININE  --  9.2* 8.4*   BUN  --  61* 57*   WBC 6.5  --  5.1       Additional Lab Values / Findings of Note:    Procalcitonin: No results for input(s): PROCAL in the last 72 hours.

## 2022-06-25 NOTE — FLOWSHEET NOTE
Disconnected from CCPD per protocol. Effluent: Clear  Total time: 16 hr 03 min   Total UF:  2596 ml. Total Volume:  9942 ml. Dwell time gained:  00 hr 09 min. Pt Tolerated procedure: No c/o, denies any abd pain.   Report given to: Lavinia Bowie RN       06/25/22 1100   Vitals   /67   Temp 97.9 °F (36.6 °C)   Heart Rate 68   Resp 18   Weight 189 lb 9.5 oz (86 kg)   Cycler   Ultrafiltration (UF) (mL) 2596 mL   Average Dwell Time (Hours:Minutes) 0090   Lost Dwell Time (Hours:Minutes) 0009

## 2022-06-25 NOTE — CONSULTS
Comprehensive Nutrition Assessment    Type and Reason for Visit:  Initial,Positive Nutrition Screen    Nutrition Recommendations/Plan:   1. Continue renal diet  2. Add Nepro QD     Malnutrition Assessment:  Malnutrition Status:  Insufficient data (06/25/22 1313)    Context:  Acute Illness       Nutrition Assessment:    Positive screen for wt loss. Patient with medical history of ESRD on peritoneal dialysis, diastolic CHF, DM, HTN who presents to [facility] with worsening abdominal pain and concern for peritonitis. Pt not available for interview. Per MD note pt to have HD cath placed Monday. No wt loss noted in chart. Tolerating renal diet. Will monitor meal intake and need for det edu. Nutrition Related Findings:    +BM 6/24 Wound Type: None       Current Nutrition Intake & Therapies:    Average Meal Intake: %     ADULT DIET; Regular; 4 carb choices (60 gm/meal); Low Potassium (Less than 3000 mg/day); Low Phosphorus (Less than 1000 mg)    Anthropometric Measures:  Height: 5' 1\" (154.9 cm)  Ideal Body Weight (IBW): 105 lbs (48 kg)       Current Body Weight: 189 lb (85.7 kg),   IBW.     Current BMI (kg/m2): 35.7  BMI Categories: Obese Class 2 (BMI 35.0 -39.9)    Estimated Daily Nutrient Needs:  Energy Requirements Based On: Kcal/kg (18-22)  Weight Used for Energy Requirements: Current  Energy (kcal/day): 2842-3511  Weight Used for Protein Requirements: Ideal (48 kg)  Protein (g/day): 72-86 gm (1.5-1.8)  Method Used for Fluid Requirements: 1 ml/kcal  Fluid (ml/day):  1800 ml    Nutrition Diagnosis:   · Increased nutrient needs related to catabolic illness as evidenced by  (ESRD with dialysis)      Nutrition Interventions:   Food and/or Nutrient Delivery: Continue Current Diet,Start Oral Nutrition Supplement     Coordination of Nutrition Care: Continue to monitor while inpatient     Goals:     Goals: PO intake 50% or greater     Nutrition Monitoring and Evaluation:      Food/Nutrient Intake Outcomes: Food and Nutrient Intake       Quinten Kc RD, LD  Contact: [phone]

## 2022-06-25 NOTE — FLOWSHEET NOTE
06/25/22 1800   Vitals   /70   Temp 97.7 °F (36.5 °C)   Temp Source Oral   Heart Rate 93   Resp 16   Weight 203 lb 4.2 oz (92.2 kg)   Cycler   Verification of Prescription CCPD   Total Volume Programmed 98358 mL   Therapy Time (Hours:Minutes) 10:00   Fill Volume 2000 mL     CCPD Order              Exchanges: 5              Exchange Volume: 2000 ml              Total Time: 10 hrs              Dextrose: 2.5%              Last Fill: 0 ml              Total Volume: 10725 ml     Orders verified. . Cycler set up, primed and pre tested. Dressing changed on [facility] Cath site. Pt connected to cycler. CCPD initiated without problem. Initial effluent 3319ml clear.    Lab ( cell count and Cx )collected and sent

## 2022-06-25 NOTE — PROGRESS NOTES
Hospitalist Progress Note      PCP: [address] [place] [address]    Date of Admission: 2022    Chief Complaint on Admission: abdominal pain and cloudy peritoneal fluid    Pt Seen/Examined and Chart Reviewed. Admitting dx peritonitis    SUBJECTIVE/OBJECTIVE:   47 y.o. female with ESRD on peritoneal dialysis, diastolic CHF, DM, HTN admitted with worsening abdominal pain and concern for peritonitis. Patient was hospitalized at [facility] from  through  with peritonitis. patient had cloudy peritoneal fluid and brought sample to her nephrology office. It is in process now. She was started on IV antibiotics in ER. Then Fluid was sampled here and appears better. Patient denies any abdominal pain, moving her bowels okay, had BM this am.     Allergies  Patient has no known allergies. Medications      Scheduled Meds:   vancomycin  500 mg IntraVENous Once    amLODIPine  10 mg Oral Nightly    b complex-C-folic acid  1 mg Oral Daily    calcium acetate  667 mg Oral TID    carvedilol  12.5 mg Oral BID    losartan  100 mg Oral Nightly    cefepime  1,000 mg IntraVENous Q24H    insulin lispro  0-12 Units SubCUTAneous TID WC    insulin lispro  0-6 Units SubCUTAneous Nightly    sodium chloride flush  5-40 mL IntraVENous 2 times per day    heparin (porcine)  5,000 Units SubCUTAneous 3 times per day    vancomycin (VANCOCIN) intermittent dosing (placeholder)   Other RX Placeholder    gabapentin  100 mg Oral TID    rosuvastatin  10 mg Oral Nightly    gentamicin   Topical Daily       Infusions:   sodium chloride      dextrose         PRN Meds:  butalbital-acetaminophen-caffeine, sodium chloride flush, sodium chloride, ondansetron **OR** ondansetron, polyethylene glycol, acetaminophen **OR** acetaminophen, oxyCODONE, diclofenac sodium, tiZANidine, glucose, dextrose bolus **OR** dextrose bolus, glucagon (rDNA), dextrose    Vitals    TEMPERATURE:  Current - Temp: 97.9 °F (36.6 °C);  Max - Temp  Av °F (36.7 °C)  Min: 97.9 °F (36.6 °C)  Max: 98.2 °F (36.8 °C)  RESPIRATIONS RANGE: Resp  Av.8  Min: 16  Max: 20  PULSE RANGE: Pulse  Av.8  Min: 66  Max: 83  BLOOD PRESSURE RANGE:  Systolic (31OGL), OXK:583 , Min:106 , ECI:086   ; Diastolic (03WNU), QAT:57, Min:67, Max:89    PULSE OXIMETRY RANGE: SpO2  Av.3 %  Min: 93 %  Max: 99 %  24HR INTAKE/OUTPUT:      Intake/Output Summary (Last 24 hours) at 2022 1116  Last data filed at 2022 1100  Gross per 24 hour   Intake 240 ml   Output 2636 ml   Net -2396 ml       Exam:      General appearance: No apparent distress, appears stated age and cooperative. Lungs: Clear to ascultation, bilaterally without Rales/Wheezes/Rhonchi with good respiratory effort. Heart: Regular rate and rhythm with Normal S1/S2 without  murmurs, rubs or gallops, point of maximum impulse non-displaced  Abdomen: Soft, non-tender or non-distended without rigidity or guarding and positive bowel sounds all four quadrants. Extremities: No clubbing, cyanosis, or edema bilaterally. Skin: Skin color, texture, turgor normal.    Neurologic: Alert and oriented X 3,   grossly non-focal.  Mental status: Alert, oriented, thought content appropriate. Data    Recent Labs     22  1441 22  0737   WBC 6.5 5.1   HGB 10.9* 10.2*   HCT 32.8* 31.5*    204      Recent Labs     22  1525 22  0737   * 132*   K 3.8 3.6   CL 92* 94*   CO2    PHOS  --  5.8*   BUN 61* 57*   CREATININE 9.2* 8.4*     Recent Labs     22  1525   AST 11*   ALT 14   BILITOT <0.2   ALKPHOS 116     Recent Labs     22  1506   INR 1.12     No results for input(s): CKTOTAL, CKMB, CKMBINDEX, TROPONINI in the last 72 hours.     Consults:     IP CONSULT TO NEPHROLOGY  IP CONSULT TO HOSPITALIST  IP CONSULT TO PHARMACY  PHARMACY TO DOSE VANCOMYCIN  IP CONSULT TO PHARMACY  IP CONSULT TO [address] [place] [address] Problems    Diagnosis Date Noted    Peritonitis ([facility] [address] [K65.9]

## 2022-06-25 NOTE — PROGRESS NOTES
HD RN arrived @ 0500 to disconnect PD. Upon entering room, the machine alarming, pt was laying on tubing. Pt had only completed 3 of 5 cycles. Primary RN informed that this HD RN will not be able to disconnect until around 1000 - when done in [place] w/hemo pts.

## 2022-06-26 ENCOUNTER — APPOINTMENT (OUTPATIENT)
Dept: GENERAL RADIOLOGY | Age: 52
DRG: 907 | End: 2022-06-26
Payer: COMMERCIAL

## 2022-06-26 LAB
ALBUMIN SERPL-MCNC: 2.5 G/DL (ref 3.4–5)
ANION GAP SERPL CALCULATED.3IONS-SCNC: 13 MMOL/L (ref 3–16)
BASOPHILS ABSOLUTE: 0 K/UL (ref 0–0.2)
BASOPHILS RELATIVE PERCENT: 0.3 %
BUN BLDV-MCNC: 52 MG/DL (ref 7–20)
CALCIUM SERPL-MCNC: 8.2 MG/DL (ref 8.3–10.6)
CHLORIDE BLD-SCNC: 92 MMOL/L (ref 99–110)
CO2: 25 MMOL/L (ref 21–32)
CREAT SERPL-MCNC: 7.8 MG/DL (ref 0.6–1.1)
EOSINOPHILS ABSOLUTE: 0.2 K/UL (ref 0–0.6)
EOSINOPHILS RELATIVE PERCENT: 3.4 %
GFR AFRICAN AMERICAN: 7
GFR NON-AFRICAN AMERICAN: 5
GLUCOSE BLD-MCNC: 144 MG/DL (ref 70–99)
GLUCOSE BLD-MCNC: 158 MG/DL (ref 70–99)
GLUCOSE BLD-MCNC: 190 MG/DL (ref 70–99)
GLUCOSE BLD-MCNC: 200 MG/DL (ref 70–99)
GLUCOSE BLD-MCNC: 228 MG/DL (ref 70–99)
HCT VFR BLD CALC: 31.5 % (ref 36–48)
HEMOGLOBIN: 10.2 G/DL (ref 12–16)
LYMPHOCYTES ABSOLUTE: 1.3 K/UL (ref 1–5.1)
LYMPHOCYTES RELATIVE PERCENT: 24.3 %
MCH RBC QN AUTO: 30.5 PG (ref 26–34)
MCHC RBC AUTO-ENTMCNC: 32.5 G/DL (ref 31–36)
MCV RBC AUTO: 93.9 FL (ref 80–100)
MONOCYTES ABSOLUTE: 0.8 K/UL (ref 0–1.3)
MONOCYTES RELATIVE PERCENT: 14 %
NEUTROPHILS ABSOLUTE: 3.1 K/UL (ref 1.7–7.7)
NEUTROPHILS RELATIVE PERCENT: 58 %
PDW BLD-RTO: 13.9 % (ref 12.4–15.4)
PERFORMED ON: ABNORMAL
PHOSPHORUS: 5.2 MG/DL (ref 2.5–4.9)
PLATELET # BLD: 221 K/UL (ref 135–450)
PMV BLD AUTO: 8 FL (ref 5–10.5)
POTASSIUM SERPL-SCNC: 3.3 MMOL/L (ref 3.5–5.1)
RBC # BLD: 3.35 M/UL (ref 4–5.2)
SODIUM BLD-SCNC: 130 MMOL/L (ref 136–145)
VANCOMYCIN RANDOM: 23.1 UG/ML
WBC # BLD: 5.4 K/UL (ref 4–11)

## 2022-06-26 PROCEDURE — 6370000000 HC RX 637 (ALT 250 FOR IP): Performed by: INTERNAL MEDICINE

## 2022-06-26 PROCEDURE — 85025 COMPLETE CBC W/AUTO DIFF WBC: CPT

## 2022-06-26 PROCEDURE — 2500000003 HC RX 250 WO HCPCS: Performed by: INTERNAL MEDICINE

## 2022-06-26 PROCEDURE — 99223 1ST HOSP IP/OBS HIGH 75: CPT | Performed by: SURGERY

## 2022-06-26 PROCEDURE — 80202 ASSAY OF VANCOMYCIN: CPT

## 2022-06-26 PROCEDURE — 6360000002 HC RX W HCPCS: Performed by: INTERNAL MEDICINE

## 2022-06-26 PROCEDURE — 1200000000 HC SEMI PRIVATE

## 2022-06-26 PROCEDURE — 2580000003 HC RX 258: Performed by: INTERNAL MEDICINE

## 2022-06-26 PROCEDURE — 72040 X-RAY EXAM NECK SPINE 2-3 VW: CPT

## 2022-06-26 PROCEDURE — 36415 COLL VENOUS BLD VENIPUNCTURE: CPT

## 2022-06-26 PROCEDURE — 80069 RENAL FUNCTION PANEL: CPT

## 2022-06-26 PROCEDURE — 99233 SBSQ HOSP IP/OBS HIGH 50: CPT | Performed by: INTERNAL MEDICINE

## 2022-06-26 RX ORDER — FLUTICASONE PROPIONATE 50 MCG
1 SPRAY, SUSPENSION (ML) NASAL DAILY
Status: COMPLETED | OUTPATIENT
Start: 2022-06-26 | End: 2022-06-27

## 2022-06-26 RX ADMIN — CALCIUM ACETATE 667 MG: 667 CAPSULE ORAL at 14:12

## 2022-06-26 RX ADMIN — ROSUVASTATIN 10 MG: 10 TABLET, FILM COATED ORAL at 20:15

## 2022-06-26 RX ADMIN — CEFEPIME 1000 MG: 1 INJECTION, POWDER, FOR SOLUTION INTRAMUSCULAR; INTRAVENOUS at 18:48

## 2022-06-26 RX ADMIN — GABAPENTIN 300 MG: 300 CAPSULE ORAL at 09:39

## 2022-06-26 RX ADMIN — FLUTICASONE PROPIONATE 1 SPRAY: 50 SPRAY, METERED NASAL at 00:42

## 2022-06-26 RX ADMIN — HEPARIN SODIUM 5000 UNITS: 5000 INJECTION INTRAVENOUS; SUBCUTANEOUS at 05:14

## 2022-06-26 RX ADMIN — GABAPENTIN 300 MG: 300 CAPSULE ORAL at 20:15

## 2022-06-26 RX ADMIN — HEPARIN SODIUM 5000 UNITS: 5000 INJECTION INTRAVENOUS; SUBCUTANEOUS at 21:47

## 2022-06-26 RX ADMIN — INSULIN LISPRO 1 UNITS: 100 INJECTION, SOLUTION INTRAVENOUS; SUBCUTANEOUS at 21:47

## 2022-06-26 RX ADMIN — CALCIUM ACETATE 667 MG: 667 CAPSULE ORAL at 09:39

## 2022-06-26 RX ADMIN — INSULIN LISPRO 4 UNITS: 100 INJECTION, SOLUTION INTRAVENOUS; SUBCUTANEOUS at 14:00

## 2022-06-26 RX ADMIN — CALCIUM ACETATE 667 MG: 667 CAPSULE ORAL at 20:15

## 2022-06-26 RX ADMIN — SODIUM CHLORIDE, PRESERVATIVE FREE 7 ML: 5 INJECTION INTRAVENOUS at 08:05

## 2022-06-26 RX ADMIN — ONDANSETRON 4 MG: 2 INJECTION INTRAMUSCULAR; INTRAVENOUS at 08:05

## 2022-06-26 RX ADMIN — HEPARIN SODIUM 5000 UNITS: 5000 INJECTION INTRAVENOUS; SUBCUTANEOUS at 14:12

## 2022-06-26 RX ADMIN — OXYCODONE 2.5 MG: 5 TABLET ORAL at 08:05

## 2022-06-26 RX ADMIN — NEPHROCAP 1 MG: 1 CAP ORAL at 09:39

## 2022-06-26 RX ADMIN — BUTALBITAL, ACETAMINOPHEN, AND CAFFEINE 1 TABLET: 50; 325; 40 TABLET ORAL at 20:15

## 2022-06-26 RX ADMIN — ONDANSETRON 4 MG: 2 INJECTION INTRAMUSCULAR; INTRAVENOUS at 22:02

## 2022-06-26 RX ADMIN — BUTALBITAL, ACETAMINOPHEN, AND CAFFEINE 1 TABLET: 50; 325; 40 TABLET ORAL at 14:12

## 2022-06-26 RX ADMIN — INSULIN LISPRO 2 UNITS: 100 INJECTION, SOLUTION INTRAVENOUS; SUBCUTANEOUS at 18:40

## 2022-06-26 RX ADMIN — ONDANSETRON 4 MG: 2 INJECTION INTRAMUSCULAR; INTRAVENOUS at 14:12

## 2022-06-26 RX ADMIN — TIZANIDINE 2 MG: 4 TABLET ORAL at 22:02

## 2022-06-26 RX ADMIN — OXYCODONE 2.5 MG: 5 TABLET ORAL at 23:49

## 2022-06-26 ASSESSMENT — PAIN SCALES - GENERAL
PAINLEVEL_OUTOF10: 9
PAINLEVEL_OUTOF10: 6
PAINLEVEL_OUTOF10: 0
PAINLEVEL_OUTOF10: 9

## 2022-06-26 ASSESSMENT — PAIN DESCRIPTION - LOCATION
LOCATION: HEAD

## 2022-06-26 ASSESSMENT — PAIN DESCRIPTION - DESCRIPTORS
DESCRIPTORS: ACHING
DESCRIPTORS: THROBBING

## 2022-06-26 ASSESSMENT — PAIN DESCRIPTION - FREQUENCY: FREQUENCY: CONTINUOUS

## 2022-06-26 ASSESSMENT — PAIN DESCRIPTION - ORIENTATION: ORIENTATION: MID

## 2022-06-26 ASSESSMENT — PAIN DESCRIPTION - ONSET: ONSET: ON-GOING

## 2022-06-26 ASSESSMENT — PAIN DESCRIPTION - PAIN TYPE: TYPE: ACUTE PAIN

## 2022-06-26 NOTE — PROGRESS NOTES
Disconnected from [facility] per protocol. Effluent: Clear  Total time: 17 hr  28min   Total UF:  1090 ml. Total Volume:  9912 ml. Dwell time gained:   hr  24min.   Pt Tolerated procedure: Good  Report given to: April MACHO  Last BM: 6/25/22

## 2022-06-26 NOTE — PROGRESS NOTES
Hospitalist Progress Note      PCP: [address] [place] [address]    Date of Admission: 6/24/2022    Chief Complaint on Admission: abdominal pain and cloudy peritoneal fluid    Pt Seen/Examined and Chart Reviewed. Admitting dx peritonitis    SUBJECTIVE/OBJECTIVE:   47 y.o. female with ESRD on peritoneal dialysis, diastolic CHF, DM, HTN admitted with worsening abdominal pain and concern for peritonitis. Patient was hospitalized at [facility] from 5/21 through 5/29 with peritonitis. patient had cloudy peritoneal fluid and brought sample to her nephrology office. It is in process now. She was started on IV antibiotics in ER. Then Fluid was sampled here and appears better. No acute events overnight. Per patient- large amounts of fluid is being drained here compared to what she does at home. No abd pain. She got a call from her neurology office advising to get neck xrays and also questioning if she followed up on carotid artery stenosis seen back in 2021. Allergies  Patient has no known allergies.     Medications      Scheduled Meds:   fluticasone  1 spray Each Nostril Daily    gabapentin  300 mg Oral BID    [Held by provider] amLODIPine  10 mg Oral Nightly    b complex-C-folic acid  1 mg Oral Daily    calcium acetate  667 mg Oral TID    [Held by provider] carvedilol  12.5 mg Oral BID    [Held by provider] losartan  100 mg Oral Nightly    cefepime  1,000 mg IntraVENous Q24H    insulin lispro  0-12 Units SubCUTAneous TID WC    insulin lispro  0-6 Units SubCUTAneous Nightly    sodium chloride flush  5-40 mL IntraVENous 2 times per day    heparin (porcine)  5,000 Units SubCUTAneous 3 times per day    vancomycin (VANCOCIN) intermittent dosing (placeholder)   Other RX Placeholder    rosuvastatin  10 mg Oral Nightly    gentamicin   Topical Daily       Infusions:   sodium chloride 25 mL/hr at 06/25/22 1844    dextrose         PRN Meds:  butalbital-acetaminophen-caffeine, sodium chloride flush, sodium chloride, ondansetron **OR** ondansetron, polyethylene glycol, acetaminophen **OR** acetaminophen, oxyCODONE, diclofenac sodium, tiZANidine, glucose, dextrose bolus **OR** dextrose bolus, glucagon (rDNA), dextrose    Vitals    TEMPERATURE:  Current - Temp: 98.4 °F (36.9 °C); Max - Temp  Av °F (36.7 °C)  Min: 97.6 °F (36.4 °C)  Max: 98.4 °F (36.9 °C)  RESPIRATIONS RANGE: Resp  Av  Min: 15  Max: 18  PULSE RANGE: Pulse  Av.4  Min: 61  Max: 93  BLOOD PRESSURE RANGE:  Systolic (46YYM), OOW:771 , Min:94 , FSB:536   ; Diastolic (07POW), MJO:55, Min:61, Max:74    PULSE OXIMETRY RANGE: SpO2  Av.7 %  Min: 94 %  Max: 98 %  24HR INTAKE/OUTPUT:      Intake/Output Summary (Last 24 hours) at 2022 0959  Last data filed at 2022 1845  Gross per 24 hour   Intake 720 ml   Output 5915 ml   Net -5195 ml       Exam:      General appearance: No apparent distress, appears stated age and cooperative. Lungs: Clear to ascultation, bilaterally without Rales/Wheezes/Rhonchi with good respiratory effort. Heart: Regular rate and rhythm with Normal S1/S2 without  murmurs, rubs or gallops, point of maximum impulse non-displaced  Abdomen: Soft, non-tender or non-distended without rigidity or guarding and positive bowel sounds all four quadrants. Extremities: No clubbing, cyanosis, or edema bilaterally. Skin: Skin color, texture, turgor normal.    Neurologic: Alert and oriented X 3,   grossly non-focal.  Mental status: Alert, oriented, thought content appropriate.         Data    Recent Labs     22  1441 22  0737 22  0759   WBC 6.5 5.1 5.4   HGB 10.9* 10.2* 10.2*   HCT 32.8* 31.5* 31.5*    204 221      Recent Labs     22  1525 22  0737 22  0759   * 132* 130*   K 3.8 3.6 3.3*   CL 92* 94* 92*   CO2 24 22 25   PHOS  --  5.8* 5.2*   BUN 61* 57* 52*   CREATININE 9.2* 8.4* 7.8*     Recent Labs     22  1525   AST 11*   ALT 14   BILITOT <0.2   ALKPHOS 116     Recent

## 2022-06-26 NOTE — PROGRESS NOTES
Patient had good night, slept well. BP still on the lower end even though all evening BP medications held.

## 2022-06-26 NOTE — CONSULTS
General Surgery   Resident Consult Note    Reason for Consult: PD catheter removal    History of Present Illness:   Baltazar Frost is a 47 y.o. female with history of CHF, DM, JEAN, and ESRD on PD who presented to [place] [address] ED on 22 for worsening abdominal pain, concerning for PD catheter peritonitis. She has had multiple infections of her peritoneal dialysis catheter, prompting its removal four months after initially having one placed in 2021. She has since had a second one placed that has functioned without issue since 2022. However, over the past few weeks she has been having abdominal pain. She was also hospitalized at an outside facility for PD peritonitis, where cultures grew back Serratia; she initially had improvement of her pain with antibiotics, but her pain has since worsened, prompting her admission. Today, she reports that her pain has resolved but following her conversations with her nephrologist Dr Mery Hernandez, she would prefer removal of her catheter and initiation of HD. She denies any symptoms at this time including fevers, chills, abdominal pain, nausea, vomiting, or constipation.     Past Medical History:        Diagnosis Date    Acute respiratory failure with hypoxia ([address] 2021    [facility]    Anemia     Arthritis     CHF (congestive heart failure) ([address]     states has home oxygen but does not use    Diabetes mellitus ([address]     End stage renal disease ([address]     Hemodialysis patient ([facility] [address]     MWF    History of blood transfusion     Hyperlipidemia     Hypertension     Migraine     On home O2     but does not use it    JEAN (obstructive sleep apnea)     currently not on cpap       Past Surgical History:        Procedure Laterality Date    CATARACT REMOVAL Bilateral      SECTION      DIALYSIS CATHETER INSERTION N/A 2021    LAPAROSCOPIC PERITONEAL DIALYSIS CATHETER INSERTION, OMENTOPLEXY performed by Xochilt Brooks DO at [facility] 11 2/8/2022    LAPAROSCOPIC PERITONEAL DIALYSIS CATHETER PLACEMENT performed by Yessi Tristan DO at [address] N/A 11/14/2021    CATHETER REMOVAL PERITONEAL DIALYSIS performed by Yessi Tristan DO at [address] Bilateral 1988    muscle surgery     IR TUNNELED CATHETER PLACEMENT GREATER THAN 5 YEARS  11/15/2021    IR TUNNELED CATHETER PLACEMENT GREATER THAN 5 YEARS 11/15/2021 [facility] SPECIAL PROCEDURES    TOE AMPUTATION Left     left great toe partial amputation    US ASP ABSCESS/HEMATOMA/BULLA/CYST  3/28/2022    US ASP ABSCESS/HEMATOMA/BULLA/CYST 3/28/2022 [facility] ULTRASOUND       Allergies:  Patient has no known allergies. Medications:   Home Meds  No current facility-administered medications on file prior to encounter. Current Outpatient Medications on File Prior to Encounter   Medication Sig Dispense Refill    amLODIPine (NORVASC) 10 MG tablet Take 10 mg by mouth daily      rosuvastatin (CRESTOR) 10 MG tablet Take 10 mg by mouth at bedtime      butalbital-acetaminophen-caffeine (FIORICET, ESGIC) -40 MG per tablet Take 1 tablet by mouth every 4 hours as needed for Headaches      gabapentin (NEURONTIN) 100 MG capsule Take 300 mg by mouth in the morning and at bedtime.       chlorzoxazone (PARAFON FORTE) 500 MG tablet Take 500 mg by mouth 4 times daily as needed for Muscle spasms      diclofenac sodium (VOLTAREN) 1 % GEL Apply 2 g topically 4 times daily as needed for Pain      gentamicin (GARAMYCIN) 0.1 % ointment Apply topically as needed (apply topically to PD cath site)      spironolactone (ALDACTONE) 50 MG tablet TAKE 1 TABLET BY MOUTH DAILY 30 tablet 0    B complex-vitamin C-folic acid (NEPHRO-BHANU) 1 MG tablet Take 1 tablet by mouth daily      calcium acetate (PHOSLO) 667 MG CAPS capsule Take 1 capsule by mouth 3 times daily (with meals)       ergocalciferol (ERGOCALCIFEROL) 1.25 MG (00913 UT) capsule Take 50,000 Units by mouth once a week      losartan (COZAAR) 100 MG tablet Take 1 tablet by mouth nightly 90 tablet 1    clopidogrel (PLAVIX) 75 MG tablet Take 1 tablet by mouth daily 30 tablet 3    carvedilol (COREG) 12.5 MG tablet Take 1 tablet by mouth 2 times daily 60 tablet 3    nitroGLYCERIN (NITROSTAT) 0.4 MG SL tablet Place 1 tablet under the tongue every 5 minutes as needed for Chest pain 5 tablet 1       Current Meds  fluticasone (FLONASE) 50 MCG/ACT nasal spray 1 spray, Daily  gabapentin (NEURONTIN) capsule 300 mg, BID  [Held by provider] amLODIPine (NORVASC) tablet 10 mg, Nightly  b complex-C-folic acid (NEPHROCAPS) capsule 1 mg, Daily  butalbital-acetaminophen-caffeine (FIORICET, ESGIC) per tablet 1 tablet, Q4H PRN  calcium acetate (PHOSLO) capsule 667 mg, TID  [Held by provider] carvedilol (COREG) tablet 12.5 mg, BID  [Held by provider] losartan (COZAAR) tablet 100 mg, Nightly  cefepime (MAXIPIME) 1000 mg IVPB minibag, Q24H  insulin lispro (1 Unit Dial) 0-12 Units, TID WC  insulin lispro (1 Unit Dial) 0-6 Units, Nightly  sodium chloride flush 0.9 % injection 5-40 mL, 2 times per day  sodium chloride flush 0.9 % injection 5-40 mL, PRN  0.9 % sodium chloride infusion, PRN  ondansetron (ZOFRAN-ODT) disintegrating tablet 4 mg, Q8H PRN   Or  ondansetron (ZOFRAN) injection 4 mg, Q6H PRN  polyethylene glycol (GLYCOLAX) packet 17 g, Daily PRN  acetaminophen (TYLENOL) tablet 650 mg, Q6H PRN   Or  acetaminophen (TYLENOL) suppository 650 mg, Q6H PRN  heparin (porcine) injection 5,000 Units, 3 times per day  oxyCODONE (ROXICODONE) immediate release tablet 2.5 mg, Q4H PRN  vancomycin (VANCOCIN) intermittent dosing (placeholder), RX Placeholder  diclofenac sodium (VOLTAREN) 1 % gel 2 g, 4x Daily PRN  rosuvastatin (CRESTOR) tablet 10 mg, Nightly  gentamicin (GARAMYCIN) 0.1 % ointment, Daily  tiZANidine (ZANAFLEX) tablet 2 mg, TID PRN  glucose chewable tablet 16 g, PRN  dextrose bolus 10% 125 mL, PRN   Or  dextrose bolus 10% 250 mL, PRN  glucagon (rDNA) injection 1 mg, PRN  dextrose 5 % solution, PRN        Family History:   History reviewed. No pertinent family history. Social History:   TOBACCO:   reports that she has never smoked. She has never used smokeless tobacco.  ETOH:   reports previous alcohol use. DRUGS:   reports previous drug use. Review of Systems:     Constitutional: Negative. HENT: Negative. Eyes: Negative. Respiratory: Negative. Cardiovascular: Negative. Gastrointestinal: Negative. Genitourinary: Negative. Musculoskeletal: Negative. Skin: Negative. Endocrine: Negative. Allergic/Immunologic: Negative. Neurological: Negative. Hematological: Negative. Psychiatric/Behavioral: Negative.     Physical exam:    Vitals:    06/25/22 2333 06/26/22 0324 06/26/22 0805 06/26/22 1143   BP: 104/70 94/64 121/79 103/76   Pulse: 68 70  79   Resp: 16 16  16   Temp: 98.2 °F (36.8 °C) 98.4 °F (36.9 °C)  98 °F (36.7 °C)   TempSrc: Oral Oral  Oral   SpO2: 95% 94%  98%   Weight:       Height:           General appearance: Alert, no acute distress, grooming appropriate  HEENT: Normocephalic, atraumatic; EOMI; moist mucous membranes  Neck: Trachea midline, no JVD  Chest/Lungs: Normal inspiratory effort, symmetric chest rise, no accessory muscle use  Cardiovascular: Regular rate and rhythm  Abdomen: Soft, obese, non-tender to palpation throughout, no guarding/rigidity; left-sided PD catheter without surrounding erythema or purulence  Skin: Warm and dry, no rashes  Extremities: No edema, no cyanosis  Neuro: A&Ox3, no gross sensory or motor neuro deficits    Labs:    CBC:   Recent Labs     06/24/22  1441 06/25/22  0737 06/26/22  0759   WBC 6.5 5.1 5.4   HGB 10.9* 10.2* 10.2*   HCT 32.8* 31.5* 31.5*   MCV 93.7 94.9 93.9    204 221     BMP:   Recent Labs     06/24/22  1525 06/25/22  0737 06/26/22  0759   * 132* 130*   K 3.8 3.6 3.3*   CL 92* 94* 92*   CO2 24 22 25   PHOS  --  5.8* 5.2*   BUN 61* 57* 52*   CREATININE 9.2* 8.4* 7.8*     Liver Profile:   Lab Results   Component Value Date    AST 11 06/24/2022    ALT 14 06/24/2022    BILIDIR <0.2 08/22/2021    BILITOT <0.2 06/24/2022    ALKPHOS 116 06/24/2022     Lab Results   Component Value Date    CHOL 128 12/07/2021    HDL 41 12/07/2021    TRIG 94 12/07/2021     PT/INR:   Recent Labs     06/24/22  1506   PROTIME 14.3   INR 1.12         Imaging:   XR CHEST PORTABLE   Final Result   Impression:       Hazy right lung opacities which could represent atypical pneumonia in appropriate clinical setting or mild volume overload edema. IR TUNNELED CVC PLACE WO SQ PORT/PUMP > 5 YEARS    (Results Pending)   VL DUP CAROTID BILATERAL    (Results Pending)   XR CERVICAL SPINE (2-3 VIEWS)    (Results Pending)         Assessment/Plan:  Mike Hope is a 47 y.o. female with history of CHF, DM, JEAN, and ESRD on PD for whom our service has been consulted for removal of her peritoneal dialysis catheter.     - Plan for PD catheter removal on Tuesday 6/28  - Consent to be obtained, placed in chart  - Patient, plan discussed with surgical team, to be discussed with surgical staff     Galindo Benjamin MD PGY-2  06/26/22  12:12 PM  [phone]

## 2022-06-26 NOTE — PROGRESS NOTES
Clinical Pharmacy Progress Note    Vancomycin - Management by Pharmacy    Consult Date(s): 06/24  Consulting Provider(s): Jun    Assessment / Plan:  1)  PD peritonitis - Vancomycin   Concurrent Antimicrobials: Cefepime   Day of Vanc Therapy: 3   Current Dosing Method: Intermittent Dosing by Levels due to ESRD on PD  o Therapeutic Goal: Trough ~ 15 mg/L  o Random level this AM = 23.1 mcg/mL  Will not give any Vancomycin today. o Will check random level in AM tomorrow.  Will continue to monitor clinical condition and make adjustments to regimen as appropriate. Please call with questions--  Thanks--  Cari Trammell, PharmD, BCPS, BCGP  [id number] ([facility])   6/26/2022 12:57 PM      Interval update:  PD cath to be removed Monday by surgery. HD line to be placed Monday for transition to HD. Subjective/Objective:  John Hughes is a 47 y.o. female with a PMHx significant for ESRD on PD, CHF, DM, and HTN. She presented to the ED with abd pain and concern for peritonitis. She was admitted in late May 2022 for peritonitis at [facility] - there she grew serratia and was treated with oral levofloxacin and diflucan. She finished abx on 6/13, and since stopping abx she has had abdominal pain. She is admitted for peritonitis.     Pharmacy is consulted to dose vancomycin    Ht Readings from Last 1 Encounters:   06/24/22 5' 1\" (1.549 m)     Wt Readings from Last 1 Encounters:   06/25/22 203 lb 4.2 oz (92.2 kg)       Current & Prior Antimicrobial Regimen(s):   Cefepime 1000mg EI q24h (06/24 - Current)   Vancomycin - Pharmacy to dose   Intermittent dosing (06/24 - Current)    Date Vancomycin Level Vancomycin Dose   6/24  1500mg   6/25 18.8 mcg/mL 500mg   6/26 23.1 mcg/mL --       Cultures & Sensitivities:    Date Site Micro Susceptibility / Result   06/24 Blood x 2 No growth to date    6/24 PD fluid No growth to date      Labs / Ancillary Data:    CrCl is not estimated 2/2 ESRD on PD    Recent Labs     06/24/22  1441 06/24/22  1525 06/25/22  0737 06/26/22  0759   CREATININE  --  9.2* 8.4* 7.8*   BUN  --  61* 57* 52*   WBC 6.5  --  5.1 5.4

## 2022-06-26 NOTE — PROGRESS NOTES
Dr. Rene Bars regarding /73 and has 3 bp meds due this evening. Coreg required hold this am. DR. Logan Robb held the med and also ordered patients home gabapentin dose.

## 2022-06-26 NOTE — PROGRESS NOTES
Pt c/o nausea, while eating lunch zofran given as ordered. Pt appears to be tolerating lunch well. Visiting with family.

## 2022-06-26 NOTE — PROGRESS NOTES
PRE-OP NOTE  Department of Surgery      Chief Complaint or Reason for Surgery: PD peritonitis    Procedure: PD catheter removal  Expected time: 22, to follow    Plan  1. Diet: NPO at midnight  2. IVF: per nephrology  3. Antibiotics: scheduled Vancomycin  4. Labs to be drawn: Type and Screen, INR  5. Anesthesia: to see patient  6. Consent: To be obtained, placed in chart  7. Pulmonary: CXR:   8. Cardiac: EK/24  9. Pregnancy test: pending    Reji Browne.  Kylee Lind MD, PGY-2  22  4:11 PM

## 2022-06-26 NOTE — CONSULTS
Nephrology Consult Note                                                                                                                                                                                                                                                                                                                                                               Office : [phone]     Fax :[phone]              Patient's Name: Chang Salmeron    Reason for Consult:  ESRD   Requesting Physician:  Hoda Gray      Chief Complaint:  Abd pain     History of Present Ilness: The patient is a 47 y.o. female with with medical history of ESRD on peritoneal dialysis, diastolic CHF, DM, HTN who presents to [facility] with worsening abdominal pain and concern for peritonitis. Patient was hospitalized at [facility] from  through  with peritonitis. Per discharge summaries cultures grew Serratia and patient was treated with Levaquin and Diflucan orally.  pt seen on PD   multiple alarms     Past Medical History:   Diagnosis Date    Acute respiratory failure with hypoxia ([facility] [address] 2021    [facility]    Anemia     Arthritis     CHF (congestive heart failure) ([facility] [address]     states has home oxygen but does not use    Diabetes mellitus ([facility] [address]     End stage renal disease ([facility] [address] [facility]     Hemodialysis patient ([facility] [address]     MWF    History of blood transfusion     Hyperlipidemia     Hypertension     Migraine     On home O2     but does not use it    JEAN (obstructive sleep apnea)     currently not on cpap       Past Surgical History:   Procedure Laterality Date    CATARACT REMOVAL Bilateral      SECTION      DIALYSIS CATHETER INSERTION N/A 2021    LAPAROSCOPIC PERITONEAL DIALYSIS CATHETER INSERTION, OMENTOPLEXY performed by Yessi Tristan DO at [facility] 22 N/A 2022    LAPAROSCOPIC PERITONEAL DIALYSIS CATHETER PLACEMENT performed by Yessi Tristan DO at [facility] [address] CATHETER REMOVAL N/A 11/14/2021    CATHETER REMOVAL PERITONEAL DIALYSIS performed by Padma Palumbo DO at [facility] Bilateral 1988    muscle surgery     IR TUNNELED CATHETER PLACEMENT GREATER THAN 5 YEARS  11/15/2021    IR TUNNELED CATHETER PLACEMENT GREATER THAN 5 YEARS 11/15/2021 [facility] SPECIAL PROCEDURES    TOE AMPUTATION Left     left great toe partial amputation    US ASP ABSCESS/HEMATOMA/BULLA/CYST  3/28/2022    US ASP ABSCESS/HEMATOMA/BULLA/CYST 3/28/2022 [facility] ULTRASOUND       History reviewed. No pertinent family history. reports that she has never smoked. She has never used smokeless tobacco. She reports previous alcohol use. She reports previous drug use. Allergies:  Patient has no known allergies.     Current Medications:    fluticasone (FLONASE) 50 MCG/ACT nasal spray 1 spray, Daily  gabapentin (NEURONTIN) capsule 300 mg, BID  [Held by provider] amLODIPine (NORVASC) tablet 10 mg, Nightly  b complex-C-folic acid (NEPHROCAPS) capsule 1 mg, Daily  butalbital-acetaminophen-caffeine (FIORICET, ESGIC) per tablet 1 tablet, Q4H PRN  calcium acetate (PHOSLO) capsule 667 mg, TID  [Held by provider] carvedilol (COREG) tablet 12.5 mg, BID  [Held by provider] losartan (COZAAR) tablet 100 mg, Nightly  cefepime (MAXIPIME) 1000 mg IVPB minibag, Q24H  insulin lispro (1 Unit Dial) 0-12 Units, TID WC  insulin lispro (1 Unit Dial) 0-6 Units, Nightly  sodium chloride flush 0.9 % injection 5-40 mL, 2 times per day  sodium chloride flush 0.9 % injection 5-40 mL, PRN  0.9 % sodium chloride infusion, PRN  ondansetron (ZOFRAN-ODT) disintegrating tablet 4 mg, Q8H PRN   Or  ondansetron (ZOFRAN) injection 4 mg, Q6H PRN  polyethylene glycol (GLYCOLAX) packet 17 g, Daily PRN  acetaminophen (TYLENOL) tablet 650 mg, Q6H PRN   Or  acetaminophen (TYLENOL) suppository 650 mg, Q6H PRN  heparin (porcine) injection 5,000 Units, 3 times per day  oxyCODONE (ROXICODONE) immediate release tablet 2.5 mg, Q4H PRN  vancomycin (VANCOCIN) intermittent dosing (placeholder), RX Placeholder  diclofenac sodium (VOLTAREN) 1 % gel 2 g, 4x Daily PRN  rosuvastatin (CRESTOR) tablet 10 mg, Nightly  gentamicin (GARAMYCIN) 0.1 % ointment, Daily  tiZANidine (ZANAFLEX) tablet 2 mg, TID PRN  glucose chewable tablet 16 g, PRN  dextrose bolus 10% 125 mL, PRN   Or  dextrose bolus 10% 250 mL, PRN  glucagon (rDNA) injection 1 mg, PRN  dextrose 5 % solution, PRN        Review of Systems:   14 point ROS obtained but were negative except mentioned in HPI      Physical exam:     Vitals:  /70   Pulse 68   Temp 98.2 °F (36.8 °C) (Oral)   Resp 16   Ht 5' 1\" (1.549 m)   Wt 203 lb 4.2 oz (92.2 kg)   SpO2 95%   BMI 38.41 kg/m²   Constitutional:  OAA X3 NAD  Skin: no rash, turgor wnl  Heent:  eomi, mmm  Neck: no bruits or jvd noted  Cardiovascular:  S1, S2 without m/r/g  Respiratory: CTA B without w/r/r  Abdomen:  +bs, soft, nt, nd  Ext: + lower extremity edema  Psychiatric: mood and affect appropriate  Musculoskeletal:  Rom, muscular strength intact    Data:   Labs:  CBC:   Recent Labs     06/24/22  1441 06/25/22  0737   WBC 6.5 5.1   HGB 10.9* 10.2*    204     BMP:    Recent Labs     06/24/22  1525 06/25/22  0737   * 132*   K 3.8 3.6   CL 92* 94*   CO2 24 22   BUN 61* 57*   CREATININE 9.2* 8.4*   GLUCOSE 123* 189*     Ca/Mg/Phos:   Recent Labs     06/24/22  1525 06/25/22  0737   CALCIUM 8.5 8.1*   PHOS  --  5.8*     Hepatic:   Recent Labs     06/24/22  1525   AST 11*   ALT 14   BILITOT <0.2   ALKPHOS 116     Troponin: No results for input(s): TROPONINI in the last 72 hours. BNP: No results for input(s): BNP in the last 72 hours. Lipids: No results for input(s): CHOL, TRIG, HDL, LDLCALC, LABVLDL in the last 72 hours. ABGs: No results for input(s): PHART, PO2ART, [id number] in the last 72 hours.   INR:   Recent Labs     06/24/22  1506   INR 1.12     UA:No results for input(s): Nay Rosario, GLUCOSEU, [address] [place] [address], BLOODU, Jan Amezquita, UROBILINOGEN, NITRU, LEUKOCYTESUR, Reagan Pippa in the last 72 hours. Urine Microscopic: No results for input(s): LABCAST, BACTERIA, COMU, HYALCAST, WBCUA, RBCUA, EPIU in the last 72 hours. Urine Culture: No results for input(s): LABURIN in the last 72 hours. Urine Chemistry: No results for input(s): Aby Vasquez, PROTEINUR, NAUR in the last 72 hours. IMAGING:  XR CHEST PORTABLE   Final Result   Impression:       Hazy right lung opacities which could represent atypical pneumonia in appropriate clinical setting or mild volume overload edema. IR TUNNELED CVC PLACE WO SQ PORT/PUMP > 5 YEARS    (Results Pending)       Assessment/Plan   1. ESRD     2. HTN    3. Anemia    4. Acid- base/ Electrolyte imbalance     5.  Abd pain     Plan   - pt has rec PD peritonitis   - change to HD   - Cont CCPD for now   - Sx consult   - Anemia Management   - MBD management                 Thank you for allowing us to participate in care of Maximiliano Diaz MD  Feel free to contact me   [facility] [address]  Office : [phone]  Fax :[phone]

## 2022-06-26 NOTE — CARE COORDINATION
Case Management Assessment           Initial Evaluation                Date / Time of Evaluation: 6/26/2022 11:01 AM                 Assessment Completed by: Ofelia Roger RN     Met with Ms. Greg Ferrara at the bedside to complete initial assessment. She was washing up and conversing with a visitor. She is alert and oriented x 4, pleasant, and easy to engage in conversation. She lives at home with her son and grandson. She is independent with all self care and functional mobility. Active . She has been performing her own peritoneal dialysis. She keeps getting infections. She stated Dr. Carolyn Fonseca intends to remove the PD catheter and place a vas-cath so she can change dialysis modality to HD. Surgery is scheduled for tomorrow. Plan to dialyze x 2 and return home. Anticipate she will be set up for OP HD @ [facility]. She will be able to transport herself to appointments. Family will transport home. Patient Name: Trini Ch     YOB: 1970  Diagnosis: Peritonitis ([address] [K65.9]  Infection associated with peritoneal dialysis catheter, initial encounter [facility]) Kaila Wilkes     Date / Time: 6/24/2022  1:46 PM    Patient Admission Status: Inpatient    If patient is discharged prior to next notation, then this note serves as note for discharge by case management.      Current PCP: RAY MENESES    Chart Reviewed: Yes  Patient/ Family Interviewed: Yes    Initial assessment completed at bedside with: Met with Ms. Greg Ferrara at the bedside    Hospitalization in the last 30 days: No    Emergency Contacts:  Extended Emergency Contact Information  Primary Emergency Contact: [address] Phone: [phone]  Relation: Parent  Secondary Emergency Contact: [address]  Phone: [phone]  Relation: Brother/Sister    Advance Directives:   Code Status: Full Code    Financial  Payor: UMR / Plan: UMR  / Product Type: *No Product type* /     Pre-cert required for SNF: Yes    Pharmacy    [address] [facility] [address], [address]  [address] [facility] [address] [facility]  Phone: [phone] Fax: [phone]      Potential assistance Purchasing Medications:  no  Does Patient want to participate in local refill/ meds to beds program?:  yes    Meds To Beds General Rules:  1. Can ONLY be done Monday- Friday between 8:30am-5pm  2. Prescription(s) must be in pharmacy by 3pm to be filled same day  3. Copy of patient's insurance/ prescription drug card and patient face sheet must be sent along with the prescription(s)  4. Cost of Rx cannot be added to hospital bill. If financial assistance is needed, please contact unit  or ;  or  CANNOT provide pharmacy voucher for patients co-pays  5. Patients can then  the prescription on their way out of the hospital at discharge, or pharmacy can deliver to the bedside if staff is available. (payment due at time of pick-up or delivery - cash, check, or card accepted)     Able to afford home medications/ co-pay costs: Yes    ADLS Independent with self care and functional mobility. Drives. No AD's needed  Support Systems:  Family, friends    HOUSING  Home Environment: Lives at home with her son and grandson in a first floor apartment    Plans to RETURN to current housing: Yes    Kym Ramires  Currently ACTIVE with [address]: No    Dialysis  Active with HD/PD prior to admission: Yes Peritoneal dialysis. Modality changing to HD  Nephrologist: Jimmie Catherine:   [facility]  Address: [address] [place] [address] [address] [place] [address]  Phone: [phone]    DISCHARGE PLAN:  Disposition: Pt will return home and start HD.  She will be able to transport herself    Transportation PLAN for discharge: family     The Patient and/or patient representative Maryse Suero and her family were provided with a choice of provider and agrees with the discharge plan Yes    Freedom of choice list was provided with basic dialogue that supports the patient's individualized plan of care/goals and shares the quality data associated with the providers.  Yes    Haydee Bauer RN  [facility]  Case Management Department  [phone]

## 2022-06-27 ENCOUNTER — APPOINTMENT (OUTPATIENT)
Dept: INTERVENTIONAL RADIOLOGY/VASCULAR | Age: 52
DRG: 907 | End: 2022-06-27
Payer: COMMERCIAL

## 2022-06-27 LAB
ABO/RH: NORMAL
ALBUMIN SERPL-MCNC: 2.8 G/DL (ref 3.4–5)
ANION GAP SERPL CALCULATED.3IONS-SCNC: 11 MMOL/L (ref 3–16)
ANTIBODY SCREEN: NORMAL
BASOPHILS ABSOLUTE: 0 K/UL (ref 0–0.2)
BASOPHILS RELATIVE PERCENT: 0.4 %
BODY FLUID CULTURE, STERILE: NORMAL
BUN BLDV-MCNC: 63 MG/DL (ref 7–20)
CALCIUM SERPL-MCNC: 8.3 MG/DL (ref 8.3–10.6)
CHLORIDE BLD-SCNC: 98 MMOL/L (ref 99–110)
CO2: 27 MMOL/L (ref 21–32)
CREAT SERPL-MCNC: 8.4 MG/DL (ref 0.6–1.1)
EOSINOPHILS ABSOLUTE: 0.2 K/UL (ref 0–0.6)
EOSINOPHILS RELATIVE PERCENT: 2.9 %
GFR AFRICAN AMERICAN: 6
GFR NON-AFRICAN AMERICAN: 5
GLUCOSE BLD-MCNC: 123 MG/DL (ref 70–99)
GLUCOSE BLD-MCNC: 132 MG/DL (ref 70–99)
GLUCOSE BLD-MCNC: 143 MG/DL (ref 70–99)
GLUCOSE BLD-MCNC: 156 MG/DL (ref 70–99)
GRAM STAIN RESULT: NORMAL
HCT VFR BLD CALC: 28.4 % (ref 36–48)
HEMOGLOBIN: 9.5 G/DL (ref 12–16)
HEPATITIS B SURFACE ANTIGEN INTERPRETATION: NORMAL
INR BLD: 1.08 (ref 0.87–1.14)
LYMPHOCYTES ABSOLUTE: 2.1 K/UL (ref 1–5.1)
LYMPHOCYTES RELATIVE PERCENT: 31.7 %
MCH RBC QN AUTO: 31.4 PG (ref 26–34)
MCHC RBC AUTO-ENTMCNC: 33.5 G/DL (ref 31–36)
MCV RBC AUTO: 93.8 FL (ref 80–100)
MONOCYTES ABSOLUTE: 0.7 K/UL (ref 0–1.3)
MONOCYTES RELATIVE PERCENT: 11.2 %
NEUTROPHILS ABSOLUTE: 3.6 K/UL (ref 1.7–7.7)
NEUTROPHILS RELATIVE PERCENT: 53.8 %
PDW BLD-RTO: 14.5 % (ref 12.4–15.4)
PERFORMED ON: ABNORMAL
PHOSPHORUS: 6.2 MG/DL (ref 2.5–4.9)
PLATELET # BLD: 217 K/UL (ref 135–450)
PMV BLD AUTO: 8.2 FL (ref 5–10.5)
POTASSIUM SERPL-SCNC: 3.8 MMOL/L (ref 3.5–5.1)
PROTHROMBIN TIME: 13.9 SEC (ref 11.7–14.5)
RBC # BLD: 3.03 M/UL (ref 4–5.2)
SODIUM BLD-SCNC: 136 MMOL/L (ref 136–145)
VANCOMYCIN RANDOM: 21.1 UG/ML
WBC # BLD: 6.7 K/UL (ref 4–11)

## 2022-06-27 PROCEDURE — 6370000000 HC RX 637 (ALT 250 FOR IP): Performed by: INTERNAL MEDICINE

## 2022-06-27 PROCEDURE — 80069 RENAL FUNCTION PANEL: CPT

## 2022-06-27 PROCEDURE — 86900 BLOOD TYPING SEROLOGIC ABO: CPT

## 2022-06-27 PROCEDURE — 85025 COMPLETE CBC W/AUTO DIFF WBC: CPT

## 2022-06-27 PROCEDURE — 86901 BLOOD TYPING SEROLOGIC RH(D): CPT

## 2022-06-27 PROCEDURE — 80202 ASSAY OF VANCOMYCIN: CPT

## 2022-06-27 PROCEDURE — 86850 RBC ANTIBODY SCREEN: CPT

## 2022-06-27 PROCEDURE — 2500000003 HC RX 250 WO HCPCS: Performed by: INTERNAL MEDICINE

## 2022-06-27 PROCEDURE — 85610 PROTHROMBIN TIME: CPT

## 2022-06-27 PROCEDURE — 99231 SBSQ HOSP IP/OBS SF/LOW 25: CPT | Performed by: SURGERY

## 2022-06-27 PROCEDURE — 1200000000 HC SEMI PRIVATE

## 2022-06-27 PROCEDURE — 99233 SBSQ HOSP IP/OBS HIGH 50: CPT | Performed by: INTERNAL MEDICINE

## 2022-06-27 PROCEDURE — 36415 COLL VENOUS BLD VENIPUNCTURE: CPT

## 2022-06-27 PROCEDURE — 87340 HEPATITIS B SURFACE AG IA: CPT

## 2022-06-27 PROCEDURE — 6360000002 HC RX W HCPCS: Performed by: INTERNAL MEDICINE

## 2022-06-27 PROCEDURE — 2580000003 HC RX 258: Performed by: INTERNAL MEDICINE

## 2022-06-27 RX ADMIN — HEPARIN SODIUM 5000 UNITS: 5000 INJECTION INTRAVENOUS; SUBCUTANEOUS at 20:43

## 2022-06-27 RX ADMIN — CALCIUM ACETATE 667 MG: 667 CAPSULE ORAL at 08:29

## 2022-06-27 RX ADMIN — GABAPENTIN 300 MG: 300 CAPSULE ORAL at 08:28

## 2022-06-27 RX ADMIN — CEFEPIME 1000 MG: 1 INJECTION, POWDER, FOR SOLUTION INTRAMUSCULAR; INTRAVENOUS at 18:09

## 2022-06-27 RX ADMIN — GABAPENTIN 300 MG: 300 CAPSULE ORAL at 20:30

## 2022-06-27 RX ADMIN — CALCIUM ACETATE 667 MG: 667 CAPSULE ORAL at 20:30

## 2022-06-27 RX ADMIN — CALCIUM ACETATE 667 MG: 667 CAPSULE ORAL at 13:35

## 2022-06-27 RX ADMIN — ONDANSETRON 4 MG: 2 INJECTION INTRAMUSCULAR; INTRAVENOUS at 08:29

## 2022-06-27 RX ADMIN — CARVEDILOL 12.5 MG: 12.5 TABLET, FILM COATED ORAL at 20:30

## 2022-06-27 RX ADMIN — BUTALBITAL, ACETAMINOPHEN, AND CAFFEINE 1 TABLET: 50; 325; 40 TABLET ORAL at 20:30

## 2022-06-27 RX ADMIN — NEPHROCAP 1 MG: 1 CAP ORAL at 08:29

## 2022-06-27 RX ADMIN — BUTALBITAL, ACETAMINOPHEN, AND CAFFEINE 1 TABLET: 50; 325; 40 TABLET ORAL at 13:35

## 2022-06-27 RX ADMIN — SODIUM CHLORIDE, PRESERVATIVE FREE 10 ML: 5 INJECTION INTRAVENOUS at 08:29

## 2022-06-27 RX ADMIN — GENTAMICIN SULFATE: 1 OINTMENT TOPICAL at 08:35

## 2022-06-27 RX ADMIN — FLUTICASONE PROPIONATE 1 SPRAY: 50 SPRAY, METERED NASAL at 08:35

## 2022-06-27 RX ADMIN — ROSUVASTATIN 10 MG: 10 TABLET, FILM COATED ORAL at 20:30

## 2022-06-27 RX ADMIN — SODIUM CHLORIDE, PRESERVATIVE FREE 10 ML: 5 INJECTION INTRAVENOUS at 20:32

## 2022-06-27 RX ADMIN — INSULIN LISPRO 2 UNITS: 100 INJECTION, SOLUTION INTRAVENOUS; SUBCUTANEOUS at 18:12

## 2022-06-27 ASSESSMENT — PAIN SCALES - GENERAL
PAINLEVEL_OUTOF10: 0
PAINLEVEL_OUTOF10: 5
PAINLEVEL_OUTOF10: 0
PAINLEVEL_OUTOF10: 8

## 2022-06-27 ASSESSMENT — PAIN DESCRIPTION - LOCATION
LOCATION: HEAD
LOCATION: HEAD

## 2022-06-27 ASSESSMENT — PAIN DESCRIPTION - DESCRIPTORS
DESCRIPTORS: ACHING
DESCRIPTORS: ACHING

## 2022-06-27 ASSESSMENT — PAIN - FUNCTIONAL ASSESSMENT: PAIN_FUNCTIONAL_ASSESSMENT: ACTIVITIES ARE NOT PREVENTED

## 2022-06-27 ASSESSMENT — PAIN DESCRIPTION - ORIENTATION: ORIENTATION: ANTERIOR;POSTERIOR

## 2022-06-27 NOTE — PROGRESS NOTES
Patient returned from cath lab. Procedure not done d/t reported use of plavix 4 days ago. Patient in room, call light within reach. Needs addressed.

## 2022-06-27 NOTE — PROGRESS NOTES
Nephrology Note                                                                                                                                                                                                                                                                                                                                                               Office : [phone]     Fax :[phone]              Patient's Name: Uri Lopez    Reason for Consult:  ESRD   Requesting Physician:  Salty Novoa      [facility] placement in am   PD cath removal per sx       Past Medical History:   Diagnosis Date    Arthritis     Diabetes mellitus ([facility]     Diastolic CHF ([facility]     End stage renal disease ([facility]     Hemodialysis patient ([facility]     MWF    History of blood transfusion     Hyperlipidemia     Hypertension     On home O2     but does not use it    JEAN (obstructive sleep apnea)     currently not on cpap       Past Surgical History:   Procedure Laterality Date    CATARACT REMOVAL Bilateral      SECTION      DIALYSIS CATHETER INSERTION N/A 2021    LAPAROSCOPIC PERITONEAL DIALYSIS CATHETER INSERTION, OMENTOPLEXY performed by Gabriela Stout DO at [address] [address] N/A 2022    LAPAROSCOPIC PERITONEAL DIALYSIS CATHETER PLACEMENT performed by Gabriela Stout DO at [address] 2021    CATHETER REMOVAL PERITONEAL DIALYSIS performed by Gabri[facility] Stout DO at [facility] Bilateral 1988    muscle surgery     IR TUNNELED CATHETER PLACEMENT GREATER THAN 5 YEARS  11/15/2021    IR TUNNELED CATHETER PLACEMENT GREATER THAN 5 YEARS 11/15/2021 [facility] SPECIAL PROCEDURES    TOE AMPUTATION Left     left great toe partial amputation    US ASP ABSCESS/HEMATOMA/BULLA/CYST  3/28/2022    US ASP ABSCESS/HEMATOMA/BULLA/CYST 3/28/2022 [facility] ULTRASOUND       History reviewed. No pertinent family history. reports that she has never smoked.  She has never used smokeless tobacco. She reports previous alcohol use. She reports previous drug use. Allergies:  Patient has no known allergies.     Current Medications:    gabapentin (NEURONTIN) capsule 300 mg, BID  [Held by provider] amLODIPine (NORVASC) tablet 10 mg, Nightly  b complex-C-folic acid (NEPHROCAPS) capsule 1 mg, Daily  butalbital-acetaminophen-caffeine (FIORICET, ESGIC) per tablet 1 tablet, Q4H PRN  calcium acetate (PHOSLO) capsule 667 mg, TID  [Held by provider] carvedilol (COREG) tablet 12.5 mg, BID  [Held by provider] losartan (COZAAR) tablet 100 mg, Nightly  cefepime (MAXIPIME) 1000 mg IVPB minibag, Q24H  insulin lispro (1 Unit Dial) 0-12 Units, TID WC  insulin lispro (1 Unit Dial) 0-6 Units, Nightly  sodium chloride flush 0.9 % injection 5-40 mL, 2 times per day  sodium chloride flush 0.9 % injection 5-40 mL, PRN  0.9 % sodium chloride infusion, PRN  ondansetron (ZOFRAN-ODT) disintegrating tablet 4 mg, Q8H PRN   Or  ondansetron (ZOFRAN) injection 4 mg, Q6H PRN  polyethylene glycol (GLYCOLAX) packet 17 g, Daily PRN  acetaminophen (TYLENOL) tablet 650 mg, Q6H PRN   Or  acetaminophen (TYLENOL) suppository 650 mg, Q6H PRN  [Held by provider] heparin (porcine) injection 5,000 Units, 3 times per day  oxyCODONE (ROXICODONE) immediate release tablet 2.5 mg, Q4H PRN  vancomycin (VANCOCIN) intermittent dosing (placeholder), RX Placeholder  diclofenac sodium (VOLTAREN) 1 % gel 2 g, 4x Daily PRN  rosuvastatin (CRESTOR) tablet 10 mg, Nightly  gentamicin (GARAMYCIN) 0.1 % ointment, Daily  tiZANidine (ZANAFLEX) tablet 2 mg, TID PRN  glucose chewable tablet 16 g, PRN  dextrose bolus 10% 125 mL, PRN   Or  dextrose bolus 10% 250 mL, PRN  glucagon (rDNA) injection 1 mg, PRN  dextrose 5 % solution, PRN          Physical exam:     Vitals:  /76   Pulse 75   Temp 97.9 °F (36.6 °C) (Oral)   Resp 16   Ht 5' 1\" (1.549 m)   Wt 203 lb 4.2 oz (92.2 kg)   SpO2 95%   BMI 38.41 kg/m²   Constitutional:  OAA X3 NAD  Skin: no rash, turgor wnl  Heent:  eomi, mmm  Neck: no bruits or jvd noted  Cardiovascular:  S1, S2 without m/r/g  Respiratory: CTA B without w/r/r  Abdomen:  +bs, soft, nt, nd  Ext: + lower extremity edema  Psychiatric: mood and affect appropriate  Musculoskeletal:  Rom, muscular strength intact    Data:   Labs:  CBC:   Recent Labs     06/25/22 0737 06/26/22 0759 06/27/22 0521   WBC 5.1 5.4 6.7   HGB 10.2* 10.2* 9.5*    221 217     BMP:    Recent Labs     06/25/22  0737 06/26/22  0759 06/27/22 0521   * 130* 136   K 3.6 3.3* 3.8   CL 94* 92* 98*   CO2 22 25 27   BUN 57* 52* 63*   CREATININE 8.4* 7.8* 8.4*   GLUCOSE 189* 190* 143*     Ca/Mg/Phos:   Recent Labs     06/25/22 0737 06/26/22 0759 06/27/22  0521   CALCIUM 8.1* 8.2* 8.3   PHOS 5.8* 5.2* 6.2*     Hepatic:   No results for input(s): AST, ALT, ALB, BILITOT, ALKPHOS in the last 72 hours. Troponin: No results for input(s): TROPONINI in the last 72 hours. BNP: No results for input(s): BNP in the last 72 hours. Lipids: No results for input(s): CHOL, TRIG, HDL, LDLCALC, LABVLDL in the last 72 hours. ABGs: No results for input(s): PHART, PO2ART, KTF4OCZ in the last 72 hours. INR:   Recent Labs     06/27/22 0522   INR 1.08     UA:No results for input(s): Merrily Bruce, GLUCOSEU, BILIRUBINUR, KETUA, SPECGRAV, BLOODU, PHUR, PROTEINU, UROBILINOGEN, NITRU, LEUKOCYTESUR, Vanessa Oh in the last 72 hours. Urine Microscopic: No results for input(s): LABCAST, BACTERIA, COMU, HYALCAST, WBCUA, RBCUA, EPIU in the last 72 hours. Urine Culture: No results for input(s): LABURIN in the last 72 hours. Urine Chemistry: No results for input(s): Gabriel Ng, PROTEINUR, NAUR in the last 72 hours. IMAGING:  XR CERVICAL SPINE (2-3 VIEWS)   Final Result   1. Moderate cervical spondylosis as detailed above.              XR CHEST PORTABLE   Final Result   Impression:       Hazy right lung opacities which could represent atypical pneumonia in

## 2022-06-27 NOTE — PROGRESS NOTES
Clinical Pharmacy Progress Note    Vancomycin - Management by Pharmacy    Consult Date(s): 06/24  Consulting Provider(s): Jun    Assessment / Plan:  1)  PD peritonitis - Vancomycin   Concurrent Antimicrobials: Cefepime   Day of Vanc Therapy: 4   Current Dosing Method: Intermittent Dosing by Levels due to ESRD on PD  o Therapeutic Goal: Trough ~ 15 mg/L  o Random level this AM = 21.1 mcg/mL  Will not give any Vancomycin today. o Will check random level in AM tomorrow.  Will continue to monitor clinical condition and make adjustments to regimen as appropriate. Please call with any questions. Ida Brambila, PharmD, BCPS  Wireless: [id number]  Main pharmacy: [id number]  6/27/2022 7:41 AM      Interval update:  Plan for HD catheter placement today for transition to HD. Awaiting HD plan. Subjective/Objective:  Hermila Olguin is a 47 y.o. female with a PMHx significant for ESRD on PD, CHF, DM, and HTN. She presented to the ED with abd pain and concern for peritonitis. She was admitted in late May 2022 for peritonitis at [facility] - there she grew serratia and was treated with oral levofloxacin and diflucan. She finished abx on 6/13, and since stopping abx she has had abdominal pain. She is admitted for peritonitis.     Pharmacy is consulted to dose vancomycin    Ht Readings from Last 1 Encounters:   06/24/22 5' 1\" (1.549 m)     Wt Readings from Last 1 Encounters:   06/25/22 203 lb 4.2 oz (92.2 kg)       Current & Prior Antimicrobial Regimen(s):   Cefepime 1000mg EI q24h (06/24 - Current)   Vancomycin - Pharmacy to dose   Intermittent dosing (06/24 - Current)    Date Vancomycin Level Vancomycin Dose   6/24  1500mg   6/25 18.8 mcg/mL 500mg   6/26 23.1 mcg/mL --   6/27 21.1 mcg/mL --       Cultures & Sensitivities:    Date Site Micro Susceptibility / Result   06/24 Blood x 2 No growth to date    6/24 PD fluid No growth to date      Labs / Ancillary Data:    CrCl is not estimated 2/2 ESRD on PD    Recent Labs 06/25/22  0737 06/26/22  0759 06/27/22  0521   CREATININE 8.4* 7.8* 8.4*   BUN 57* 52* 63*   WBC 5.1 5.4 6.7

## 2022-06-27 NOTE — PROGRESS NOTES
Surgery Daily Progress Note      CC: PD peritonitis    SUBJECTIVE:  No complaints this AM. Abdominal pain resolved. NPO.    ROS:   A 14 point review of systems was conducted, significant findings as noted above. All other systems negative.     OBJECTIVE:    PHYSICAL EXAM:  Vitals:    06/26/22 1849 06/26/22 1957 06/26/22 2349 06/27/22 0434   BP: (!) 135/92 130/83 119/73 124/77   Pulse: 87 89 90 70   Resp: 16 16 16 16   Temp:  98.1 °F (36.7 °C) 97.9 °F (36.6 °C) 97.9 °F (36.6 °C)   TempSrc:  Oral Oral Oral   SpO2: 100% 94% 97% 95%   Weight:       Height:           General appearance: Alert, no acute distress, grooming appropriate  HEENT: Normocephalic, atraumatic; EOMI; moist mucous membranes  Neck: Trachea midline, no JVD  Chest/Lungs: Normal inspiratory effort, symmetric chest rise, no accessory muscle use  Cardiovascular: Regular rate and rhythm  Abdomen: Soft, obese, non-tender to palpation throughout, no guarding/rigidity; left-sided PD catheter without surrounding erythema or purulence  Skin: Warm and dry, no rashes  Extremities: No edema, no cyanosis  Neuro: A&Ox3, no gross sensory or motor neuro deficits      ASSESSMENT & PLAN:   This is a 47 y.o. female with a diagnosis of PD peritonitis getting tunneled line today and will need PD catheter removal 6/28.    - NPO for dialysis catherter today  - Pre op and consent for   - ABX per nephrology  - Continued care per primary team    Makeda Grant DO  06/27/22  6:06 AM  [phone]

## 2022-06-27 NOTE — PROGRESS NOTES
Pt has been NPO after midnight for tunneled HD line placement today in IR. VSS, pt resting well overnight. Call light within reach, will continue to monitor.

## 2022-06-27 NOTE — PROGRESS NOTES
Hospitalist Progress Note      PCP: [address] [place] [address]    Date of Admission: 6/24/2022    Chief Complaint on Admission: abdominal pain and cloudy peritoneal fluid    Pt Seen/Examined and Chart Reviewed. Admitting dx peritonitis    SUBJECTIVE/OBJECTIVE:   47 y.o. female with ESRD on peritoneal dialysis, diastolic CHF, DM, HTN admitted with worsening abdominal pain and concern for peritonitis. Patient was hospitalized at [facility] from 5/21 through 5/29 with peritonitis. patient had cloudy peritoneal fluid and brought sample to her nephrology office. It is in process now. She was started on IV antibiotics in ER. Then Fluid was sampled here and appears better. Unable to get HD catheter placed or PD catheter taken out until tomorrow due to plavix. Denies abdominal pain. Allergies  Patient has no known allergies.     Medications      Scheduled Meds:   gabapentin  300 mg Oral BID    [Held by provider] amLODIPine  10 mg Oral Nightly    b complex-C-folic acid  1 mg Oral Daily    calcium acetate  667 mg Oral TID    [Held by provider] carvedilol  12.5 mg Oral BID    [Held by provider] losartan  100 mg Oral Nightly    cefepime  1,000 mg IntraVENous Q24H    insulin lispro  0-12 Units SubCUTAneous TID WC    insulin lispro  0-6 Units SubCUTAneous Nightly    sodium chloride flush  5-40 mL IntraVENous 2 times per day    [Held by provider] heparin (porcine)  5,000 Units SubCUTAneous 3 times per day    vancomycin (VANCOCIN) intermittent dosing (placeholder)   Other RX Placeholder    rosuvastatin  10 mg Oral Nightly    gentamicin   Topical Daily       Infusions:   sodium chloride 25 mL/hr at 06/25/22 1844    dextrose         PRN Meds:  butalbital-acetaminophen-caffeine, sodium chloride flush, sodium chloride, ondansetron **OR** ondansetron, polyethylene glycol, acetaminophen **OR** acetaminophen, oxyCODONE, diclofenac sodium, tiZANidine, glucose, dextrose bolus **OR** dextrose bolus, glucagon (rDNA), dextrose    Vitals    TEMPERATURE:  Current - Temp: 98.2 °F (36.8 °C); Max - Temp  Av °F (36.7 °C)  Min: 97.9 °F (36.6 °C)  Max: 98.2 °F (36.8 °C)  RESPIRATIONS RANGE: Resp  Avg: 15.8  Min: 15  Max: 16  PULSE RANGE: Pulse  Av.4  Min: 70  Max: 90  BLOOD PRESSURE RANGE:  Systolic (19FCV), KAITLYN:861 , Min:116 , VQL:055   ; Diastolic (34MCV), PHB:45, Min:73, Max:92    PULSE OXIMETRY RANGE: SpO2  Av %  Min: 94 %  Max: 100 %  24HR INTAKE/OUTPUT:      Intake/Output Summary (Last 24 hours) at 2022 1255  Last data filed at 2022 2349  Gross per 24 hour   Intake 60 ml   Output 0 ml   Net 60 ml       Exam:      General appearance: No apparent distress, appears stated age and cooperative. Lungs: Clear to ascultation, bilaterally without Rales/Wheezes/Rhonchi with good respiratory effort. Heart: Regular rate and rhythm with Normal S1/S2 without  murmurs, rubs or gallops, point of maximum impulse non-displaced  Abdomen: Soft, non-tender or non-distended without rigidity or guarding and positive bowel sounds all four quadrants. Extremities: No clubbing, cyanosis, or edema bilaterally. Skin: Skin color, texture, turgor normal.    Neurologic: Alert and oriented X 3,   grossly non-focal.  Mental status: Alert, oriented, thought content appropriate. Data    Recent Labs     22  0737 22  0759 22  0521   WBC 5.1 5.4 6.7   HGB 10.2* 10.2* 9.5*   HCT 31.5* 31.5* 28.4*    221 217      Recent Labs     22  0737 22  0759 22  0521   * 130* 136   K 3.6 3.3* 3.8   CL 94* 92* 98*   CO2    PHOS 5.8* 5.2* 6.2*   BUN 57* 52* 63*   CREATININE 8.4* 7.8* 8.4*     Recent Labs     22  1525   AST 11*   ALT 14   BILITOT <0.2   ALKPHOS 116     Recent Labs     22  1506 22  0522   INR 1.12 1.08     No results for input(s): CKTOTAL, CKMB, CKMBINDEX, TROPONINI in the last 72 hours.     Consults:     IP CONSULT TO NEPHROLOGY  IP CONSULT TO HOSPITALIST  IP CONSULT TO PHARMACY  PHARMACY TO DOSE VANCOMYCIN  IP CONSULT TO PHARMACY  IP CONSULT TO NEPHROLOGY  IP CONSULT TO GENERAL SURGERY  IP CONSULT TO INTERVENTIONAL RADIOLOGY  IP CONSULT  Nicole Villa Problems    Diagnosis Date Noted    Peritonitis ([facility] [K65.9] 06/24/2022     Priority: Medium    Infection due to peritoneal dialysis catheter [facility]) Bandar Jordan 08/23/2021         ASSESSMENT AND PLAN      Peritonitis associated with peritoneal dialysis: We will follow results of cultures obtain by nephrologist- should be back on Monday   cont cefepime and vancomycin IV  Had previous staphylococcal infections, Serratia  Most recently treated with Levaquin and Diflucan as outpatient. Per nephrology- PD catheter will be removed Monday, surgery consulted  Culture taken in ER- NGTD     ESRD on peritoneal dialysis:  HD line placement by IR Tuesday likely prior to PD catheter removal  PD as per nephrology      DM type II:  Patient is on Lantus but does not use it  Will use sliding scale     HTN:  BP in the normal range off medications    Neck pain:  Patient had an order from her neurologist for neck Xrays- will obtained here  Will refer to Dl on dc    H/o carotid artery stenosis, right:  She was admitted with stroke like symptoms 12/2021. Seen by neurology at that time and no surgical intervention was recommended  Started on plavix  Will obtain carotid duplex    DVT Prophylaxis: heparin  Diet: Diet NPO  ADULT DIET; Regular; 4 carb choices (60 gm/meal); Low Potassium (Less than 3000 mg/day);  Low Phosphorus (Less than 1000 mg)  ADULT ORAL NUTRITION SUPPLEMENT; Lunch; Renal Oral Supplement  Code Status: Full Code    PT/OT Eval Status:at baseline    Dispo -inpt    Karolina Martinez MD

## 2022-06-27 NOTE — PROGRESS NOTES
PRE-OP NOTE  Department of Surgery      Chief Complaint or Reason for Surgery: PD peritonitis    Procedure: PD catheter removal  Expected time: , add-on    Plan  1. Diet: NPO at midnight  2. IVF: Per nephrology  3. Antibiotics: Currently on scheduled cefepime and Vancomycin  4. Labs to be drawn: Morning labs ordered, will follow up tomorrow  5. Anesthesia: to see patient  6. Consent: Will be obtained and placed in the patient's chart. 7. Pulmonary: CXR: Obtained  - reviewed appears to have evidence of volume overload. 8. Cardiac: EK/24 - Reviewed - NSR  9. Pregnancy test: Ordered for tomorrow morning.      Preeti Gann DO  22  9:07 AM

## 2022-06-27 NOTE — CARE COORDINATION
CM cont to follow for  D/C planning;    Patient  Changing from  PD to HD :     Nephro and  Surgery folloiwng :     NPO after  MN :  Procedure: PD catheter removal  Expected time: 6/28, add-on     CM will cont to follow  ,  Pt  Will need  Out pt  HD  arrangements  Confirmed  prior to discharge    Patient will  Have  HD out pt  With  [facility]      [phone]    CM spoke with  Kathleen Sanchez:  She  Confirmed  That pt  Will have  HD there  And  She will follow up tomorrow  With  CM  To confirm  Chair time and schedule. Electronically signed by Ajith Villegas RN on 6/27/2022 at 4:19 PM          Ajith Villegas  RN Case Manager  [facility]  [address] [place] [address].   [facility] [id number] [phone]  Fax [phone]

## 2022-06-28 ENCOUNTER — APPOINTMENT (OUTPATIENT)
Dept: INTERVENTIONAL RADIOLOGY/VASCULAR | Age: 52
DRG: 907 | End: 2022-06-28
Payer: COMMERCIAL

## 2022-06-28 ENCOUNTER — ANESTHESIA (OUTPATIENT)
Dept: OPERATING ROOM | Age: 52
DRG: 907 | End: 2022-06-28
Payer: COMMERCIAL

## 2022-06-28 ENCOUNTER — ANESTHESIA EVENT (OUTPATIENT)
Dept: OPERATING ROOM | Age: 52
DRG: 907 | End: 2022-06-28
Payer: COMMERCIAL

## 2022-06-28 LAB
ALBUMIN SERPL-MCNC: 2.7 G/DL (ref 3.4–5)
ANION GAP SERPL CALCULATED.3IONS-SCNC: 12 MMOL/L (ref 3–16)
BASOPHILS ABSOLUTE: 0.1 K/UL (ref 0–0.2)
BASOPHILS ABSOLUTE: 0.1 K/UL (ref 0–0.2)
BASOPHILS RELATIVE PERCENT: 0.8 %
BASOPHILS RELATIVE PERCENT: 1.6 %
BLOOD CULTURE, ROUTINE: NORMAL
BUN BLDV-MCNC: 69 MG/DL (ref 7–20)
CALCIUM SERPL-MCNC: 8.4 MG/DL (ref 8.3–10.6)
CHLORIDE BLD-SCNC: 98 MMOL/L (ref 99–110)
CO2: 26 MMOL/L (ref 21–32)
CREAT SERPL-MCNC: 8.5 MG/DL (ref 0.6–1.1)
CULTURE, BLOOD 2: NORMAL
EOSINOPHILS ABSOLUTE: 0.2 K/UL (ref 0–0.6)
EOSINOPHILS ABSOLUTE: 0.2 K/UL (ref 0–0.6)
EOSINOPHILS RELATIVE PERCENT: 2.8 %
EOSINOPHILS RELATIVE PERCENT: 3.1 %
GFR AFRICAN AMERICAN: 6
GFR NON-AFRICAN AMERICAN: 5
GLUCOSE BLD-MCNC: 114 MG/DL (ref 70–99)
GLUCOSE BLD-MCNC: 139 MG/DL (ref 70–99)
GLUCOSE BLD-MCNC: 145 MG/DL (ref 70–99)
GLUCOSE BLD-MCNC: 163 MG/DL (ref 70–99)
GLUCOSE BLD-MCNC: 98 MG/DL (ref 70–99)
GONADOTROPIN, CHORIONIC (HCG) QUANT: 5.9 MIU/ML
HCG QUALITATIVE: POSITIVE
HCT VFR BLD CALC: 27.6 % (ref 36–48)
HCT VFR BLD CALC: 27.6 % (ref 36–48)
HEMOGLOBIN: 9.3 G/DL (ref 12–16)
HEMOGLOBIN: 9.4 G/DL (ref 12–16)
LYMPHOCYTES ABSOLUTE: 1.9 K/UL (ref 1–5.1)
LYMPHOCYTES ABSOLUTE: 2 K/UL (ref 1–5.1)
LYMPHOCYTES RELATIVE PERCENT: 29.1 %
LYMPHOCYTES RELATIVE PERCENT: 30.9 %
MCH RBC QN AUTO: 31.5 PG (ref 26–34)
MCH RBC QN AUTO: 31.7 PG (ref 26–34)
MCHC RBC AUTO-ENTMCNC: 33.5 G/DL (ref 31–36)
MCHC RBC AUTO-ENTMCNC: 33.9 G/DL (ref 31–36)
MCV RBC AUTO: 93.6 FL (ref 80–100)
MCV RBC AUTO: 93.9 FL (ref 80–100)
MONOCYTES ABSOLUTE: 0.6 K/UL (ref 0–1.3)
MONOCYTES ABSOLUTE: 0.6 K/UL (ref 0–1.3)
MONOCYTES RELATIVE PERCENT: 9 %
MONOCYTES RELATIVE PERCENT: 9.2 %
NEUTROPHILS ABSOLUTE: 3.6 K/UL (ref 1.7–7.7)
NEUTROPHILS ABSOLUTE: 3.7 K/UL (ref 1.7–7.7)
NEUTROPHILS RELATIVE PERCENT: 55.7 %
NEUTROPHILS RELATIVE PERCENT: 57.8 %
PDW BLD-RTO: 14.2 % (ref 12.4–15.4)
PDW BLD-RTO: 14.4 % (ref 12.4–15.4)
PERFORMED ON: ABNORMAL
PERFORMED ON: NORMAL
PHOSPHORUS: 5.8 MG/DL (ref 2.5–4.9)
PLATELET # BLD: 217 K/UL (ref 135–450)
PLATELET # BLD: 222 K/UL (ref 135–450)
PMV BLD AUTO: 8 FL (ref 5–10.5)
PMV BLD AUTO: 8.2 FL (ref 5–10.5)
POTASSIUM SERPL-SCNC: 3.8 MMOL/L (ref 3.5–5.1)
RBC # BLD: 2.94 M/UL (ref 4–5.2)
RBC # BLD: 2.95 M/UL (ref 4–5.2)
SODIUM BLD-SCNC: 136 MMOL/L (ref 136–145)
VANCOMYCIN RANDOM: 17.6 UG/ML
WBC # BLD: 6.4 K/UL (ref 4–11)
WBC # BLD: 6.5 K/UL (ref 4–11)

## 2022-06-28 PROCEDURE — 2500000003 HC RX 250 WO HCPCS: Performed by: STUDENT IN AN ORGANIZED HEALTH CARE EDUCATION/TRAINING PROGRAM

## 2022-06-28 PROCEDURE — 87070 CULTURE OTHR SPECIMN AEROBIC: CPT

## 2022-06-28 PROCEDURE — 3700000000 HC ANESTHESIA ATTENDED CARE: Performed by: SURGERY

## 2022-06-28 PROCEDURE — 90935 HEMODIALYSIS ONE EVALUATION: CPT

## 2022-06-28 PROCEDURE — 2500000003 HC RX 250 WO HCPCS: Performed by: INTERNAL MEDICINE

## 2022-06-28 PROCEDURE — 6360000002 HC RX W HCPCS: Performed by: STUDENT IN AN ORGANIZED HEALTH CARE EDUCATION/TRAINING PROGRAM

## 2022-06-28 PROCEDURE — 3600000002 HC SURGERY LEVEL 2 BASE: Performed by: SURGERY

## 2022-06-28 PROCEDURE — 2709999900 HC NON-CHARGEABLE SUPPLY: Performed by: SURGERY

## 2022-06-28 PROCEDURE — 6370000000 HC RX 637 (ALT 250 FOR IP): Performed by: INTERNAL MEDICINE

## 2022-06-28 PROCEDURE — 6360000002 HC RX W HCPCS: Performed by: NURSE ANESTHETIST, CERTIFIED REGISTERED

## 2022-06-28 PROCEDURE — 80069 RENAL FUNCTION PANEL: CPT

## 2022-06-28 PROCEDURE — C1881 DIALYSIS ACCESS SYSTEM: HCPCS

## 2022-06-28 PROCEDURE — 85025 COMPLETE CBC W/AUTO DIFF WBC: CPT

## 2022-06-28 PROCEDURE — 2500000003 HC RX 250 WO HCPCS

## 2022-06-28 PROCEDURE — 2580000003 HC RX 258: Performed by: INTERNAL MEDICINE

## 2022-06-28 PROCEDURE — 7100000001 HC PACU RECOVERY - ADDTL 15 MIN: Performed by: SURGERY

## 2022-06-28 PROCEDURE — 1200000000 HC SEMI PRIVATE

## 2022-06-28 PROCEDURE — 87102 FUNGUS ISOLATION CULTURE: CPT

## 2022-06-28 PROCEDURE — 77001 FLUOROGUIDE FOR VEIN DEVICE: CPT

## 2022-06-28 PROCEDURE — 87075 CULTR BACTERIA EXCEPT BLOOD: CPT

## 2022-06-28 PROCEDURE — 6370000000 HC RX 637 (ALT 250 FOR IP): Performed by: STUDENT IN AN ORGANIZED HEALTH CARE EDUCATION/TRAINING PROGRAM

## 2022-06-28 PROCEDURE — 6360000002 HC RX W HCPCS

## 2022-06-28 PROCEDURE — 2580000003 HC RX 258

## 2022-06-28 PROCEDURE — 80202 ASSAY OF VANCOMYCIN: CPT

## 2022-06-28 PROCEDURE — 99152 MOD SED SAME PHYS/QHP 5/>YRS: CPT

## 2022-06-28 PROCEDURE — 36558 INSERT TUNNELED CV CATH: CPT

## 2022-06-28 PROCEDURE — 3700000001 HC ADD 15 MINUTES (ANESTHESIA): Performed by: SURGERY

## 2022-06-28 PROCEDURE — C1894 INTRO/SHEATH, NON-LASER: HCPCS

## 2022-06-28 PROCEDURE — 84703 CHORIONIC GONADOTROPIN ASSAY: CPT

## 2022-06-28 PROCEDURE — 3600000012 HC SURGERY LEVEL 2 ADDTL 15MIN: Performed by: SURGERY

## 2022-06-28 PROCEDURE — 49422 REMOVE TUNNELED IP CATH: CPT | Performed by: SURGERY

## 2022-06-28 PROCEDURE — 99233 SBSQ HOSP IP/OBS HIGH 50: CPT | Performed by: INTERNAL MEDICINE

## 2022-06-28 PROCEDURE — 7100000000 HC PACU RECOVERY - FIRST 15 MIN: Performed by: SURGERY

## 2022-06-28 PROCEDURE — 87205 SMEAR GRAM STAIN: CPT

## 2022-06-28 PROCEDURE — 84702 CHORIONIC GONADOTROPIN TEST: CPT

## 2022-06-28 PROCEDURE — 0WPG03Z REMOVAL OF INFUSION DEVICE FROM PERITONEAL CAVITY, OPEN APPROACH: ICD-10-PCS | Performed by: SURGERY

## 2022-06-28 PROCEDURE — 2500000003 HC RX 250 WO HCPCS: Performed by: SURGERY

## 2022-06-28 PROCEDURE — 6360000002 HC RX W HCPCS: Performed by: INTERNAL MEDICINE

## 2022-06-28 PROCEDURE — 2580000003 HC RX 258: Performed by: STUDENT IN AN ORGANIZED HEALTH CARE EDUCATION/TRAINING PROGRAM

## 2022-06-28 PROCEDURE — 36415 COLL VENOUS BLD VENIPUNCTURE: CPT

## 2022-06-28 RX ORDER — HYDRALAZINE HYDROCHLORIDE 20 MG/ML
10 INJECTION INTRAMUSCULAR; INTRAVENOUS
Status: DISCONTINUED | OUTPATIENT
Start: 2022-06-28 | End: 2022-06-28 | Stop reason: HOSPADM

## 2022-06-28 RX ORDER — LABETALOL HYDROCHLORIDE 5 MG/ML
10 INJECTION, SOLUTION INTRAVENOUS
Status: DISCONTINUED | OUTPATIENT
Start: 2022-06-28 | End: 2022-06-28 | Stop reason: HOSPADM

## 2022-06-28 RX ORDER — SODIUM CHLORIDE 9 MG/ML
INJECTION, SOLUTION INTRAVENOUS PRN
Status: DISCONTINUED | OUTPATIENT
Start: 2022-06-28 | End: 2022-06-28 | Stop reason: HOSPADM

## 2022-06-28 RX ORDER — ONDANSETRON 2 MG/ML
4 INJECTION INTRAMUSCULAR; INTRAVENOUS
Status: DISCONTINUED | OUTPATIENT
Start: 2022-06-28 | End: 2022-06-28 | Stop reason: HOSPADM

## 2022-06-28 RX ORDER — SODIUM CHLORIDE 0.9 % (FLUSH) 0.9 %
5-40 SYRINGE (ML) INJECTION PRN
Status: DISCONTINUED | OUTPATIENT
Start: 2022-06-28 | End: 2022-06-28 | Stop reason: HOSPADM

## 2022-06-28 RX ORDER — PROCHLORPERAZINE EDISYLATE 5 MG/ML
5 INJECTION INTRAMUSCULAR; INTRAVENOUS
Status: DISCONTINUED | OUTPATIENT
Start: 2022-06-28 | End: 2022-06-28 | Stop reason: HOSPADM

## 2022-06-28 RX ORDER — SODIUM CHLORIDE 0.9 % (FLUSH) 0.9 %
5-40 SYRINGE (ML) INJECTION EVERY 12 HOURS SCHEDULED
Status: DISCONTINUED | OUTPATIENT
Start: 2022-06-28 | End: 2022-06-28 | Stop reason: HOSPADM

## 2022-06-28 RX ORDER — LIDOCAINE HYDROCHLORIDE 20 MG/ML
INJECTION, SOLUTION INTRAVENOUS PRN
Status: DISCONTINUED | OUTPATIENT
Start: 2022-06-28 | End: 2022-06-28 | Stop reason: SDUPTHER

## 2022-06-28 RX ORDER — LIDOCAINE HYDROCHLORIDE 10 MG/ML
INJECTION, SOLUTION EPIDURAL; INFILTRATION; INTRACAUDAL; PERINEURAL PRN
Status: DISCONTINUED | OUTPATIENT
Start: 2022-06-28 | End: 2022-06-28 | Stop reason: HOSPADM

## 2022-06-28 RX ORDER — PROPOFOL 10 MG/ML
INJECTION, EMULSION INTRAVENOUS PRN
Status: DISCONTINUED | OUTPATIENT
Start: 2022-06-28 | End: 2022-06-28 | Stop reason: SDUPTHER

## 2022-06-28 RX ADMIN — GABAPENTIN 300 MG: 300 CAPSULE ORAL at 09:29

## 2022-06-28 RX ADMIN — HEPARIN SODIUM 5000 UNITS: 5000 INJECTION INTRAVENOUS; SUBCUTANEOUS at 06:06

## 2022-06-28 RX ADMIN — GENTAMICIN SULFATE: 1 OINTMENT TOPICAL at 09:29

## 2022-06-28 RX ADMIN — CEFEPIME 1000 MG: 1 INJECTION, POWDER, FOR SOLUTION INTRAMUSCULAR; INTRAVENOUS at 18:28

## 2022-06-28 RX ADMIN — GABAPENTIN 300 MG: 300 CAPSULE ORAL at 20:38

## 2022-06-28 RX ADMIN — NEPHROCAP 1 MG: 1 CAP ORAL at 09:29

## 2022-06-28 RX ADMIN — ACETAMINOPHEN 650 MG: 325 TABLET ORAL at 20:38

## 2022-06-28 RX ADMIN — CALCIUM ACETATE 667 MG: 667 CAPSULE ORAL at 09:29

## 2022-06-28 RX ADMIN — OXYCODONE 2.5 MG: 5 TABLET ORAL at 20:39

## 2022-06-28 RX ADMIN — TIZANIDINE 2 MG: 4 TABLET ORAL at 22:35

## 2022-06-28 RX ADMIN — CALCIUM ACETATE 667 MG: 667 CAPSULE ORAL at 20:38

## 2022-06-28 RX ADMIN — SODIUM CHLORIDE 25 ML: 9 INJECTION, SOLUTION INTRAVENOUS at 14:09

## 2022-06-28 RX ADMIN — BUTALBITAL, ACETAMINOPHEN, AND CAFFEINE 1 TABLET: 50; 325; 40 TABLET ORAL at 14:09

## 2022-06-28 RX ADMIN — PROPOFOL 75 MCG/KG/MIN: 10 INJECTION, EMULSION INTRAVENOUS at 16:54

## 2022-06-28 RX ADMIN — HEPARIN SODIUM 5000 UNITS: 5000 INJECTION INTRAVENOUS; SUBCUTANEOUS at 20:36

## 2022-06-28 RX ADMIN — CARVEDILOL 12.5 MG: 12.5 TABLET, FILM COATED ORAL at 20:38

## 2022-06-28 RX ADMIN — SODIUM CHLORIDE, PRESERVATIVE FREE 10 ML: 5 INJECTION INTRAVENOUS at 20:44

## 2022-06-28 RX ADMIN — VANCOMYCIN HYDROCHLORIDE 1000 MG: 500 INJECTION, POWDER, LYOPHILIZED, FOR SOLUTION INTRAVENOUS at 14:09

## 2022-06-28 RX ADMIN — CARVEDILOL 12.5 MG: 12.5 TABLET, FILM COATED ORAL at 09:29

## 2022-06-28 RX ADMIN — LIDOCAINE HYDROCHLORIDE 30 MG: 20 INJECTION, SOLUTION INTRAVENOUS at 16:50

## 2022-06-28 RX ADMIN — CALCIUM ACETATE 667 MG: 667 CAPSULE ORAL at 14:09

## 2022-06-28 RX ADMIN — HEPARIN SODIUM 5000 UNITS: 5000 INJECTION INTRAVENOUS; SUBCUTANEOUS at 14:09

## 2022-06-28 RX ADMIN — ROSUVASTATIN 10 MG: 10 TABLET, FILM COATED ORAL at 20:38

## 2022-06-28 RX ADMIN — PROPOFOL 50 MG: 10 INJECTION, EMULSION INTRAVENOUS at 16:50

## 2022-06-28 ASSESSMENT — PAIN DESCRIPTION - LOCATION
LOCATION: NECK
LOCATION: ABDOMEN
LOCATION: HEAD
LOCATION: NECK;ABDOMEN

## 2022-06-28 ASSESSMENT — PAIN DESCRIPTION - DESCRIPTORS
DESCRIPTORS: ACHING
DESCRIPTORS: ACHING

## 2022-06-28 ASSESSMENT — PAIN DESCRIPTION - ORIENTATION
ORIENTATION: RIGHT
ORIENTATION: ANTERIOR

## 2022-06-28 ASSESSMENT — PAIN SCALES - GENERAL
PAINLEVEL_OUTOF10: 3
PAINLEVEL_OUTOF10: 9
PAINLEVEL_OUTOF10: 6
PAINLEVEL_OUTOF10: 0
PAINLEVEL_OUTOF10: 5
PAINLEVEL_OUTOF10: 3

## 2022-06-28 ASSESSMENT — PAIN SCALES - WONG BAKER: WONGBAKER_NUMERICALRESPONSE: 2

## 2022-06-28 ASSESSMENT — PAIN - FUNCTIONAL ASSESSMENT: PAIN_FUNCTIONAL_ASSESSMENT: ACTIVITIES ARE NOT PREVENTED

## 2022-06-28 NOTE — PROGRESS NOTES
Patient returned to floor from surgery. Patient c/o hunger. RN released orders, patient notified to wait a few minutes to call back. Vitals assessed and stable. Patient stable. Call light within reach.

## 2022-06-28 NOTE — ANESTHESIA PRE PROCEDURE
Department of Anesthesiology  Preprocedure Note       Name:  Darren Cramer   Age:  47 y.o.  :  1970                                          MRN:  [id number]         Date:  2022      Surgeon: Edgardo Ramirez): Freida Mehta DO    Procedure: Procedure(s):  PERITONEAL DIALYSIS CATHETER REMOVAL    Medications prior to admission:   Prior to Admission medications    Medication Sig Start Date End Date Taking? Authorizing Provider   amLODIPine (NORVASC) 10 MG tablet Take 10 mg by mouth daily   Yes Historical Provider, MD   rosuvastatin (CRESTOR) 10 MG tablet Take 10 mg by mouth at bedtime   Yes Historical Provider, MD   butalbital-acetaminophen-caffeine (FIORICET, ESGIC) -40 MG per tablet Take 1 tablet by mouth every 4 hours as needed for Headaches   Yes Historical Provider, MD   gabapentin (NEURONTIN) 100 MG capsule Take 300 mg by mouth in the morning and at bedtime.    Yes Historical Provider, MD   chlorzoxazone (PARAFON FORTE) 500 MG tablet Take 500 mg by mouth 4 times daily as needed for Muscle spasms   Yes Historical Provider, MD   diclofenac sodium (VOLTAREN) 1 % GEL Apply 2 g topically 4 times daily as needed for Pain   Yes Historical Provider, MD   gentamicin (GARAMYCIN) 0.1 % ointment Apply topically as needed (apply topically to PD cath site)   Yes Historical Provider, MD   spironolactone (ALDACTONE) 50 MG tablet TAKE 1 TABLET BY MOUTH DAILY 22   Edilbreto Salazar MD   B complex-vitamin C-folic acid (NEPHRO-BHANU) 1 MG tablet Take 1 tablet by mouth daily 21   Historical Provider, MD   calcium acetate (PHOSLO) 667 MG CAPS capsule Take 1 capsule by mouth 3 times daily (with meals)     Historical Provider, MD   ergocalciferol (ERGOCALCIFEROL) 1.25 MG (20848 UT) capsule Take 50,000 Units by mouth once a week    Historical Provider, MD   losartan (COZAAR) 100 MG tablet Take 1 tablet by mouth nightly 22   Edilberto Salazar MD   clopidogrel (PLAVIX) 75 MG tablet Take 1 tablet by mouth daily 12/10/21 Zen Haley MD   carvedilol (COREG) 12.5 MG tablet Take 1 tablet by mouth 2 times daily 7/13/21   Nathalia Giang MD   nitroGLYCERIN (NITROSTAT) 0.4 MG SL tablet Place 1 tablet under the tongue every 5 minutes as needed for Chest pain 7/13/21   Nathalia Giang MD       Current medications:    Current Facility-Administered Medications   Medication Dose Route Frequency Provider Last Rate Last Admin    gabapentin (NEURONTIN) capsule 300 mg  300 mg Oral BID Ted Romo MD   300 mg at 06/28/22 0929    [Held by provider] amLODIPine (NORVASC) tablet 10 mg  10 mg Oral Nightly Grace Jean-Baptiste MD   10 mg at 06/24/22 2120    b complex-C-folic acid (NEPHROCAPS) capsule 1 mg  1 mg Oral Daily Grace Jean-Baptiste MD   1 mg at 06/28/22 7910    butalbital-acetaminophen-caffeine (FIORICET, ESGIC) per tablet 1 tablet  1 tablet Oral Q4H PRN Grace Jean-Baptiste MD   1 tablet at 06/28/22 1409    calcium acetate (PHOSLO) capsule 667 mg  667 mg Oral TID Grace Jean-Baptiste MD   667 mg at 06/28/22 1409    carvedilol (COREG) tablet 12.5 mg  12.5 mg Oral BID Grace Jean-Baptiste MD   12.5 mg at 06/28/22 0929    [Held by provider] losartan (COZAAR) tablet 100 mg  100 mg Oral Nightly Grace Jean-Baptiste MD   100 mg at 06/24/22 2120    cefepime (MAXIPIME) 1000 mg IVPB minibag  1,000 mg IntraVENous Q24H Grace Jean-Baptiste MD   Stopped at 06/27/22 2252    insulin lispro (1 Unit Dial) 0-12 Units  0-12 Units SubCUTAneous TID WC Grace Jean-Baptiste MD   2 Units at 06/27/22 1812    insulin lispro (1 Unit Dial) 0-6 Units  0-6 Units SubCUTAneous Nightly Grace Jean-Baptiste MD   1 Units at 06/26/22 2147    sodium chloride flush 0.9 % injection 5-40 mL  5-40 mL IntraVENous 2 times per day Grace Jean-Baptiste MD   10 mL at 06/27/22 2032    sodium chloride flush 0.9 % injection 5-40 mL  5-40 mL IntraVENous PRN Grace Jean-Baptiste MD        0.9 % sodium chloride infusion   IntraVENous PRN Grace Jean-Baptiste  mL/hr at 06/28/22 1522 NoRateChange at 06/28/22 1522    ondansetron (ZOFRAN-ODT) disintegrating tablet 4 mg  4 mg Oral Q8H PRN Fab Moss MD        Or    ondansetron [facility]) injection 4 mg  4 mg IntraVENous Q6H PRN Fab Moss MD   4 mg at 06/27/22 0829    polyethylene glycol (GLYCOLAX) packet 17 g  17 g Oral Daily PRN Fab Moss MD        acetaminophen (TYLENOL) tablet 650 mg  650 mg Oral Q6H PRN Fab Moss MD        Or    acetaminophen (TYLENOL) suppository 650 mg  650 mg Rectal Q6H PRN Fab Moss MD        heparin (porcine) injection 5,000 Units  5,000 Units SubCUTAneous 3 times per day Fab Moss MD   5,000 Units at 06/28/22 1409    oxyCODONE (ROXICODONE) immediate release tablet 2.5 mg  2.5 mg Oral Q4H PRN Fab Moss MD   2.5 mg at 06/26/22 2349    vancomycin (VANCOCIN) intermittent dosing (placeholder)   Other RX Placeholder Fab Moss MD        diclofenac sodium (VOLTAREN) 1 % gel 2 g  2 g Topical 4x Daily PRN Fab Moss MD        rosuvastatin (CRESTOR) tablet 10 mg  10 mg Oral Nightly Fab Moss MD   10 mg at 06/27/22 2030    gentamicin (GARAMYCIN) 0.1 % ointment   Topical Daily Fab Moss MD   Given at 06/28/22 6722    tiZANidine (ZANAFLEX) tablet 2 mg  2 mg Oral TID PRN Fab Moss MD   2 mg at 06/26/22 2202    glucose chewable tablet 16 g  4 tablet Oral PRN Fab Moss MD        dextrose bolus 10% 125 mL  125 mL IntraVENous PRN Fab Moss MD        Or    dextrose bolus 10% 250 mL  250 mL IntraVENous PRN Fab Moss MD        glucagon (rDNA) injection 1 mg  1 mg IntraMUSCular PRN Fab Moss MD        dextrose 5 % solution  100 mL/hr IntraVENous PRN Fab Moss MD           Allergies:  No Known Allergies    Problem List:    Patient Active Problem List   Diagnosis Code    CHF (congestive heart failure), NYHA class I, acute on chronic, combined ([facility]) I50.43    Abnormal myocardial perfusion study R94.39    Essential hypertension I10    Type 2 diabetes mellitus with hyperglycemia, with long-term current use of insulin ([facility]) E11.65, Z79.4    JEAN (obstructive sleep apnea) G47.33    HARRIETT (acute kidney injury) ([address] N17.9    Uremia N19    CKD (chronic kidney disease) stage 5, GFR less than 15 ml/min ([facility]) N18.5    Electrolyte imbalance E87.8    Anemia D64.9    Abdominal pain R10.9    Infection due to peritoneal dialysis catheter ([address] T85.71XA    Free intraperitoneal air K66.8    Positive blood culture R78.81    Hypertensive emergency I16.1    ESRD (end stage renal disease) on dialysis ([facility]) N18.6, Z99.2    Stroke-like symptoms R29.90    Peritonitis ([address] K65.9    ESRD (end stage renal disease) ([address] N18.6       Past Medical History:        Diagnosis Date    Arthritis     Diabetes mellitus ([address]     Diastolic CHF ([address]     End stage renal disease ([address]     Hemodialysis patient ([address]     MWF    History of blood transfusion     Hyperlipidemia     Hypertension     On home O2     but does not use it    JEAN (obstructive sleep apnea)     currently not on cpap       Past Surgical History:        Procedure Laterality Date    CATARACT REMOVAL Bilateral      SECTION      DIALYSIS CATHETER INSERTION N/A 2021    LAPAROSCOPIC PERITONEAL DIALYSIS CATHETER INSERTION, OMENTOPLEXY performed by Sean Velasco DO at [address] N/A 2022    LAPAROSCOPIC PERITONEAL DIALYSIS CATHETER PLACEMENT performed by Sean Velasco DO at [address] N/A 2021    CATHETER REMOVAL PERITONEAL DIALYSIS performed by Sean Velasco DO at [address]  Bilateral 1988    muscle surgery     IR TUNNELED CATHETER PLACEMENT GREATER THAN 5 YEARS  11/15/2021    IR TUNNELED CATHETER PLACEMENT GREATER THAN 5 YEARS 11/15/2021 [facility]    TOE AMPUTATION Left     left great toe partial amputation    US ASP ABSCESS/HEMATOMA/BULLA/CYST  3/28/2022    US ASP ABSCESS/HEMATOMA/BULLA/CYST 3/28/2022 [facility] ULTRASOUND       Social History:    Social History     Tobacco Use    Smoking status: Never Smoker    Smokeless tobacco: Never Used   Substance Use Topics    Alcohol use: Not Currently                                Counseling given: Not Answered      Vital Signs (Current):   Vitals:    06/28/22 0926 06/28/22 1128 06/28/22 1345 06/28/22 1409   BP: 126/73 138/77 110/80 (!) 148/85   Pulse: 73 73 68 70   Resp: 16 14 12 15   Temp: 97.9 °F (36.6 °C) 97.7 °F (36.5 °C) 97 °F (36.1 °C) 97.9 °F (36.6 °C)   TempSrc: Oral   Oral   SpO2: 94%   97%   Weight:  200 lb 9.9 oz (91 kg) 194 lb 0.1 oz (88 kg)    Height:                                                  BP Readings from Last 3 Encounters:   06/28/22 (!) 148/85   02/24/22 116/73   02/08/22 (!) 145/77       NPO Status:                                                                                 BMI:   Wt Readings from Last 3 Encounters:   06/28/22 194 lb 0.1 oz (88 kg)   03/31/22 287 lb (130.2 kg)   02/24/22 199 lb (90.3 kg)     Body mass index is 36.66 kg/m².     CBC:   Lab Results   Component Value Date    WBC 6.4 06/28/2022    RBC 2.95 06/28/2022    HGB 9.4 06/28/2022    HCT 27.6 06/28/2022    MCV 93.6 06/28/2022    RDW 14.2 06/28/2022     06/28/2022       CMP:   Lab Results   Component Value Date     06/28/2022    K 3.8 06/28/2022    K 3.8 06/24/2022    CL 98 06/28/2022    CO2 26 06/28/2022    BUN 69 06/28/2022    CREATININE 8.5 06/28/2022    GFRAA 6 06/28/2022    AGRATIO 0.8 06/24/2022    LABGLOM 5 06/28/2022    GLUCOSE 163 06/28/2022    PROT 7.4 06/24/2022    CALCIUM 8.4 06/28/2022    BILITOT <0.2 06/24/2022    ALKPHOS 116 06/24/2022    AST 11 06/24/2022    ALT 14 06/24/2022       POC Tests:   Recent Labs     06/28/22  1107   POCGLU 145*       Coags:   Lab Results   Component Value Date    PROTIME 13.9 06/27/2022    INR 1.08 06/27/2022    APTT 39.7 02/08/2022       HCG (If Applicable): No results found for: PREGTESTUR, PREGSERUM, HCG, HCGQUANT     ABGs: No results found for: PHART, PO2ART, ERP8OMV, LFZ4NGD, BEART, J4DFKPTA     Type & Screen (If Applicable):  No results found for: LABABO, LABRH    Drug/Infectious Status (If Applicable):  No results found for: HIV, HEPCAB    COVID-19 Screening (If Applicable): No results found for: COVID19        Anesthesia Evaluation  Patient summary reviewed and Nursing notes reviewed no history of anesthetic complications:   Airway: Mallampati: II  TM distance: >3 FB   Neck ROM: limited  Mouth opening: > = 3 FB   Dental:      Comment: Poor dentition throughout with many missing and cracked teeth    Pulmonary: breath sounds clear to auscultation  (+) sleep apnea: on CPAP,                             Cardiovascular:    (+) hypertension:,         Rhythm: regular  Rate: normal           Beta Blocker:  Dose within 24 Hrs      ROS comment: Normal left ventricle size. There is mild concentric left ventricular hypertrophy. Overall left ventricular systolic function appears normal with an ejection fraction of 55-60%. No regional wall motion abnormalities are noted. Diastolic filling parameters suggest grade II diastolic dysfunction. Moderate tricuspid regurgitation. Neuro/Psych:               GI/Hepatic/Renal:   (+) renal disease: ESRD,          ROS comment: +peritonitis . Endo/Other:    (+) Diabetes, . Abdominal:   (+) obese,           Vascular: Other Findings:           Anesthesia Plan      MAC     ASA 4 - emergent     (Pt with positive urine pregnancy but patient insists she has not had intercourse in 6+ months; will proceed due to emergent basis of this case and avoid any known teratogens. )  Induction: intravenous. MIPS: Prophylactic antiemetics administered. Anesthetic plan and risks discussed with patient. Plan discussed with CRNA.                     Migue Kang DO   6/28/2022

## 2022-06-28 NOTE — H&P
Patient:  Sarah Oakley   :   1970      Relevant clinical history, particularly as it involves the pending procedure, was reviewed and discussed. The procedure including risks and benefits was discussed at length with the patient (or designated family member) and all questions were answered. Informed consent to proceed with the procedure was given. Vital signs were monitored and documented by the Radiology nurse. Targeted physical examination  Heart : regular rate and rhythm  Lungs : clear, breathing easily  Condition : stable    [facility] nurses notes reviewed and agreed. Past Medical History:        Diagnosis Date    Arthritis     Diabetes mellitus ([facility]     Diastolic CHF ([facility]     End stage renal disease ([facility]     Hemodialysis patient ([facility]     MWF    History of blood transfusion     Hyperlipidemia     Hypertension     On home O2     but does not use it    JEAN (obstructive sleep apnea)     currently not on cpap       Past Surgical History:           Procedure Laterality Date    CATARACT REMOVAL Bilateral      SECTION      DIALYSIS CATHETER INSERTION N/A 2021    LAPAROSCOPIC PERITONEAL DIALYSIS CATHETER INSERTION, OMENTOPLEXY performed by Yoel Barth DO at [facility] 22 N/A 2022    LAPAROSCOPIC PERITONEAL DIALYSIS CATHETER PLACEMENT performed by Yoel Barth DO at [address] N/A 2021    CATHETER REMOVAL PERITONEAL DIALYSIS performed by Yoel Barth DO at [address] Bilateral 1988    muscle surgery     IR TUNNELED CATHETER PLACEMENT GREATER THAN 5 YEARS  11/15/2021    IR TUNNELED CATHETER PLACEMENT GREATER THAN 5 YEARS 11/15/2021 [facility] SPECIAL PROCEDURES    TOE AMPUTATION Left     left great toe partial amputation    US ASP ABSCESS/HEMATOMA/BULLA/CYST  3/28/2022    US ASP ABSCESS/HEMATOMA/BULLA/CYST 3/28/2022 [facility] ULTRASOUND       Allergies:  Patient has no known allergies.     Medications:   Home Meds  No current facility-administered medications on file prior to encounter. Current Outpatient Medications on File Prior to Encounter   Medication Sig Dispense Refill    amLODIPine (NORVASC) 10 MG tablet Take 10 mg by mouth daily      rosuvastatin (CRESTOR) 10 MG tablet Take 10 mg by mouth at bedtime      butalbital-acetaminophen-caffeine (FIORICET, ESGIC) -40 MG per tablet Take 1 tablet by mouth every 4 hours as needed for Headaches      gabapentin (NEURONTIN) 100 MG capsule Take 300 mg by mouth in the morning and at bedtime.       chlorzoxazone (PARAFON FORTE) 500 MG tablet Take 500 mg by mouth 4 times daily as needed for Muscle spasms      diclofenac sodium (VOLTAREN) 1 % GEL Apply 2 g topically 4 times daily as needed for Pain      gentamicin (GARAMYCIN) 0.1 % ointment Apply topically as needed (apply topically to PD cath site)      spironolactone (ALDACTONE) 50 MG tablet TAKE 1 TABLET BY MOUTH DAILY 30 tablet 0    B complex-vitamin C-folic acid (NEPHRO-BHANU) 1 MG tablet Take 1 tablet by mouth daily      calcium acetate (PHOSLO) 667 MG CAPS capsule Take 1 capsule by mouth 3 times daily (with meals)       ergocalciferol (ERGOCALCIFEROL) 1.25 MG (88473 UT) capsule Take 50,000 Units by mouth once a week      losartan (COZAAR) 100 MG tablet Take 1 tablet by mouth nightly 90 tablet 1    clopidogrel (PLAVIX) 75 MG tablet Take 1 tablet by mouth daily 30 tablet 3    carvedilol (COREG) 12.5 MG tablet Take 1 tablet by mouth 2 times daily 60 tablet 3    nitroGLYCERIN (NITROSTAT) 0.4 MG SL tablet Place 1 tablet under the tongue every 5 minutes as needed for Chest pain 5 tablet 1       Current Meds  gabapentin (NEURONTIN) capsule 300 mg, BID  [Held by provider] amLODIPine (NORVASC) tablet 10 mg, Nightly  b complex-C-folic acid (NEPHROCAPS) capsule 1 mg, Daily  butalbital-acetaminophen-caffeine (FIORICET, ESGIC) per tablet 1 tablet, Q4H PRN  calcium acetate (PHOSLO) capsule 667 mg, TID  carvedilol (COREG) tablet 12.5 mg, BID  [Held by provider] losartan (COZAAR) tablet 100 mg, Nightly  cefepime (MAXIPIME) 1000 mg IVPB minibag, Q24H  insulin lispro (1 Unit Dial) 0-12 Units, TID WC  insulin lispro (1 Unit Dial) 0-6 Units, Nightly  sodium chloride flush 0.9 % injection 5-40 mL, 2 times per day  sodium chloride flush 0.9 % injection 5-40 mL, PRN  0.9 % sodium chloride infusion, PRN  ondansetron (ZOFRAN-ODT) disintegrating tablet 4 mg, Q8H PRN   Or  ondansetron (ZOFRAN) injection 4 mg, Q6H PRN  polyethylene glycol (GLYCOLAX) packet 17 g, Daily PRN  acetaminophen (TYLENOL) tablet 650 mg, Q6H PRN   Or  acetaminophen (TYLENOL) suppository 650 mg, Q6H PRN  heparin (porcine) injection 5,000 Units, 3 times per day  oxyCODONE (ROXICODONE) immediate release tablet 2.5 mg, Q4H PRN  vancomycin (VANCOCIN) intermittent dosing (placeholder), RX Placeholder  diclofenac sodium (VOLTAREN) 1 % gel 2 g, 4x Daily PRN  rosuvastatin (CRESTOR) tablet 10 mg, Nightly  gentamicin (GARAMYCIN) 0.1 % ointment, Daily  tiZANidine (ZANAFLEX) tablet 2 mg, TID PRN  glucose chewable tablet 16 g, PRN  dextrose bolus 10% 125 mL, PRN   Or  dextrose bolus 10% 250 mL, PRN  glucagon (rDNA) injection 1 mg, PRN  dextrose 5 % solution, PRN          ASA 2 - Patient with mild systemic disease with no functional limitations    II (soft palate, uvula, fauces visible)    Activity:  2 - Able to move 4 extremities voluntarily on command  Respiration:  2 - Able to breathe deeply and cough freely  Circulation:  2 - BP+/- 20mmHg of normal  Consciousness:  2 - Fully awake  Oxygen Saturation (color):  2 - Able to maintain oxygen saturation >92% on room air    Sedation : Moderate sedation planned

## 2022-06-28 NOTE — ANESTHESIA POSTPROCEDURE EVALUATION
Department of Anesthesiology  Postprocedure Note    Patient: Jeramie Rendon  MRN: [id number]  YOB: 1970  Date of evaluation: 6/28/2022      Procedure Summary     Date: 06/28/22 Room / Location: [address] [place] [address]    Anesthesia Start: 9202 Anesthesia Stop: 6445    Procedure: PERITONEAL DIALYSIS CATHETER REMOVAL (N/A ) Diagnosis:       Peritonitis ([address]      (PD Peritonitis ([address] [K65.9])    Surgeons: Reddy Aldridge DO Responsible Provider: Tierra Amanda DO    Anesthesia Type: MAC ASA Status: 4 - Emergent          Anesthesia Type: No value filed.     Otto Phase I:      Otto Phase II:        Anesthesia Post Evaluation    Patient location during evaluation: PACU  Patient participation: complete - patient participated  Level of consciousness: awake and alert  Airway patency: patent  Nausea & Vomiting: no nausea and no vomiting  Cardiovascular status: blood pressure returned to baseline  Respiratory status: acceptable  Hydration status: euvolemic

## 2022-06-28 NOTE — PROGRESS NOTES
Clinical Pharmacy Progress Note    Vancomycin - Management by Pharmacy    Consult Date(s): 06/24  Consulting Provider(s): Jun    Assessment / Plan:  1)  PD peritonitis - Vancomycin   Concurrent Antimicrobials: Cefepime   Day of Vanc Therapy: 5   Current Dosing Method: Intermittent Dosing by Levels due to ESRD on PD --> now transitioning to HD  o Therapeutic Goal: Trough ~ 15 mg/L  o Random level this AM = 17.6. Planning for first HD session today. o Will give 1000mg IV x1 after HD today. o Will check random level in AM tomorrow and will f/u on further HD plans.  Will continue to monitor clinical condition and make adjustments to regimen as appropriate. Please call with questions--  Thanks--  Kandice Webber, PharmD, BCPS, [facility]  [id number] ([facility])   6/28/2022 10:12 AM      Interval update:  HD catheter placed this AM; planning for HD today. Will go for PD catheter removal with general surgery later this afternoon. Subjective/Objective:  Jazmin Garcia is a 47 y.o. female with a PMHx significant for ESRD on PD, CHF, DM, and HTN. She presented to the ED with abd pain and concern for peritonitis. She was admitted in late May 2022 for peritonitis at [facility] - there she grew serratia and was treated with oral levofloxacin and diflucan. She finished abx on 6/13, and since stopping abx she has had abdominal pain. She is admitted for peritonitis.     Pharmacy is consulted to dose vancomycin    Ht Readings from Last 1 Encounters:   06/24/22 5' 1\" (1.549 m)     Wt Readings from Last 1 Encounters:   06/25/22 203 lb 4.2 oz (92.2 kg)       Current & Prior Antimicrobial Regimen(s):   Cefepime 1000mg EI q24h (06/24 - Current)   Vancomycin - Pharmacy to dose   Intermittent dosing (06/24 - Current)    Date Vancomycin Level Vancomycin Dose   6/24  1500mg   6/25 18.8 mcg/mL 500mg   6/26 23.1 mcg/mL --   6/27 21.1 mcg/mL --   6/28 17.6 mcg/mL                  Cultures & Sensitivities:    Date Site Micro Susceptibility / Result   06/24 Blood x 2 No growth to date    6/24 PD fluid No growth to date      Labs / Ancillary Data:    CrCl is not estimated 2/2 ESRD    Recent Labs     06/26/22  0759 06/26/22  0759 06/27/22  0521 06/28/22  0440 06/28/22  0441   CREATININE 7.8*  --  8.4*  --  8.5*   BUN 52*  --  63*  --  69*   WBC 5.4   < > 6.7 6.5 6.4    < > = values in this interval not displayed.

## 2022-06-28 NOTE — PROGRESS NOTES
Bariatrics   POC    Hcg qualitative test positive  Hcg quantitative 5.6,   Discussed with patient slightly positive pregnancy test. Patient denies any risk of being pregnant at this time. Comfortable with proceeding with PD catheter removal.    Dr Cecille Araujo aware and notified.     Morgan Delgado MD   12:09pm

## 2022-06-28 NOTE — FLOWSHEET NOTE
PACU Transfer Note    Vitals:    06/28/22 1810   BP: (!) 149/75   Pulse: 70   Resp: 15   Temp: 97.5 °F (36.4 °C)   SpO2: 92%   BP is within 20% of baseline value. In: 800 [I.V.:400]  Out: 2400     Pain assessment:  none  Pain level 0    Report given to Receiving unit RN, Rogelio Parra. Patient is well known to her. Transferred in bed back to room.     6/28/2022 6:14 PM

## 2022-06-28 NOTE — PROGRESS NOTES
Patient left floor to go get HD cath placed at this time. Night shift attempted to call PICC team due to current peripheral line leaking. Charge nurse notified.

## 2022-06-28 NOTE — BRIEF OP NOTE
Brief Postoperative Note      Patient: Pop Green  YOB: 1970  MRN: [id number]    Date of Procedure: 6/28/2022    Pre-Op Diagnosis: PD Peritonitis ([address] [K65.9]    Post-Op Diagnosis: Same       Procedure(s):  PERITONEAL DIALYSIS CATHETER REMOVAL    Surgeon(s): Ann Aesncio DO    Assistant:  Resident: Alfred Joseph DO    Anesthesia: Monitor Anesthesia Care    Estimated Blood Loss (mL): Minimal    Complications: None    Specimens:   ID Type Source Tests Collected by Time Destination   1 : PERITONEAL DIALYSIS CATHETER TIP Catheter Tip Catheter Tip CULTURE, FUNGUS, CULTURE, TIP, CULTURE, ANAEROBIC AND AEROBIC Ann Asencio DO 6/28/2022 1719        Implants:  * No implants in log *      Drains: * No LDAs found *    Findings:   PD catheter removed intact. Tip sent for culture.     Electronically signed by Alfred Joseph DO on 6/28/2022 at 5:39 PM

## 2022-06-28 NOTE — PROGRESS NOTES
Attempted to perform vascular ultrasound studies, however, patient was in dialysis and then going to surgery afterwards. Patient's nurse stated ok to put on schedule for tomorrow.

## 2022-06-28 NOTE — PROGRESS NOTES
RN spoke with OR team, notified them that patient has lost second PICC placed peripheral IV today and requested for them to try or to have anesthesia place one. OR team agreed.  Report given to OR team.

## 2022-06-28 NOTE — PROGRESS NOTES
used smokeless tobacco. She reports previous alcohol use. She reports previous drug use. Allergies:  Patient has no known allergies.     Current Medications:    gabapentin (NEURONTIN) capsule 300 mg, BID  [Held by provider] amLODIPine (NORVASC) tablet 10 mg, Nightly  b complex-C-folic acid (NEPHROCAPS) capsule 1 mg, Daily  butalbital-acetaminophen-caffeine (FIORICET, ESGIC) per tablet 1 tablet, Q4H PRN  calcium acetate (PHOSLO) capsule 667 mg, TID  carvedilol (COREG) tablet 12.5 mg, BID  [Held by provider] losartan (COZAAR) tablet 100 mg, Nightly  cefepime (MAXIPIME) 1000 mg IVPB minibag, Q24H  insulin lispro (1 Unit Dial) 0-12 Units, TID WC  insulin lispro (1 Unit Dial) 0-6 Units, Nightly  sodium chloride flush 0.9 % injection 5-40 mL, 2 times per day  sodium chloride flush 0.9 % injection 5-40 mL, PRN  0.9 % sodium chloride infusion, PRN  ondansetron (ZOFRAN-ODT) disintegrating tablet 4 mg, Q8H PRN   Or  ondansetron (ZOFRAN) injection 4 mg, Q6H PRN  polyethylene glycol (GLYCOLAX) packet 17 g, Daily PRN  acetaminophen (TYLENOL) tablet 650 mg, Q6H PRN   Or  acetaminophen (TYLENOL) suppository 650 mg, Q6H PRN  heparin (porcine) injection 5,000 Units, 3 times per day  oxyCODONE (ROXICODONE) immediate release tablet 2.5 mg, Q4H PRN  vancomycin (VANCOCIN) intermittent dosing (placeholder), RX Placeholder  diclofenac sodium (VOLTAREN) 1 % gel 2 g, 4x Daily PRN  rosuvastatin (CRESTOR) tablet 10 mg, Nightly  gentamicin (GARAMYCIN) 0.1 % ointment, Daily  tiZANidine (ZANAFLEX) tablet 2 mg, TID PRN  glucose chewable tablet 16 g, PRN  dextrose bolus 10% 125 mL, PRN   Or  dextrose bolus 10% 250 mL, PRN  glucagon (rDNA) injection 1 mg, PRN  dextrose 5 % solution, PRN          Physical exam:     Vitals:  /73   Pulse 73   Temp 97.9 °F (36.6 °C) (Oral)   Resp 16   Ht 5' 1\" (1.549 m)   Wt 203 lb 4.2 oz (92.2 kg)   SpO2 94%   BMI 38.41 kg/m²   Constitutional:  OAA X3 NAD  Skin: no rash, turgor wnl  Heent:  eomi, mmm  Neck: no bruits or jvd noted  Cardiovascular:  S1, S2 without m/r/g  Respiratory: CTA B without w/r/r  Abdomen:  +bs, soft, nt, nd  Ext: + lower extremity edema  Psychiatric: mood and affect appropriate  Musculoskeletal:  Rom, muscular strength intact    Data:   Labs:  CBC:   Recent Labs     06/27/22  0521 06/28/22  0440 06/28/22 0441   WBC 6.7 6.5 6.4   HGB 9.5* 9.3* 9.4*    222 217     BMP:    Recent Labs     06/26/22  0759 06/27/22  0521 06/28/22 0441   * 136 136   K 3.3* 3.8 3.8   CL 92* 98* 98*   CO2 25 27 26   BUN 52* 63* 69*   CREATININE 7.8* 8.4* 8.5*   GLUCOSE 190* 143* 163*     Ca/Mg/Phos:   Recent Labs     06/26/22 0759 06/27/22 0521 06/28/22 0441   CALCIUM 8.2* 8.3 8.4   PHOS 5.2* 6.2* 5.8*     Hepatic:   No results for input(s): AST, ALT, ALB, BILITOT, ALKPHOS in the last 72 hours. Troponin: No results for input(s): TROPONINI in the last 72 hours. BNP: No results for input(s): BNP in the last 72 hours. Lipids: No results for input(s): CHOL, TRIG, HDL, LDLCALC, LABVLDL in the last 72 hours. ABGs: No results for input(s): PHART, PO2ART, ZJO2MHN in the last 72 hours. INR:   Recent Labs     06/27/22 0522   INR 1.08     UA:No results for input(s): Therisa Lever, GLUCOSEU, BILIRUBINUR, KETUA, SPECGRAV, BLOODU, PHUR, PROTEINU, UROBILINOGEN, NITRU, LEUKOCYTESUR, Frankey Ordoñez in the last 72 hours. Urine Microscopic: No results for input(s): LABCAST, BACTERIA, COMU, HYALCAST, WBCUA, RBCUA, EPIU in the last 72 hours. Urine Culture: No results for input(s): LABURIN in the last 72 hours. Urine Chemistry: No results for input(s): FERNANDO Morales NAUR in the last 72 hours. IMAGING:  XR CERVICAL SPINE (2-3 VIEWS)   Final Result   1. Moderate cervical spondylosis as detailed above.              XR CHEST PORTABLE   Final Result   Impression:       Hazy right lung opacities which could represent atypical pneumonia in appropriate clinical setting or mild volume overload edema. IR TUNNELED CVC PLACE WO SQ PORT/PUMP > 5 YEARS    (Results Pending)   VL DUP CAROTID BILATERAL    (Results Pending)   VL PRE OP VEIN MAPPING    (Results Pending)       Assessment/Plan   1. ESRD     2. HTN    3. Anemia    4. Acid- base/ Electrolyte imbalance     5.  Abd pain     Plan   - pt has rec PD peritonitis - cell count nl now   - change to HD   - Cont CCPD for now   - Sx consult for PD cath removal   - Anemia Management   - MBD management                 Thank you for allowing us to participate in care of Yesi Alfredo MD  Feel free to contact me   [facility] [address]  Office : [phone]  Fax :[phone]

## 2022-06-28 NOTE — PROGRESS NOTES
Hospitalist Progress Note      PCP: [address] [place] [address]    Date of Admission: 6/24/2022    Chief Complaint on Admission: abdominal pain and cloudy peritoneal fluid    Pt Seen/Examined and Chart Reviewed. Admitting dx peritonitis    SUBJECTIVE/OBJECTIVE:   47 y.o. female with ESRD on peritoneal dialysis, diastolic CHF, DM, HTN admitted with worsening abdominal pain and concern for peritonitis. Patient was hospitalized at [facility] from 5/21 through 5/29 with peritonitis. patient had cloudy peritoneal fluid and brought sample to her nephrology office. It is in process now. She was started on IV antibiotics in ER. Then Fluid was sampled here and appears better. S/p HD catheter placement this morning- site looks good, no bleeding    Lost peripheral IV. Allergies  Patient has no known allergies.     Medications      Scheduled Meds:   gabapentin  300 mg Oral BID    [Held by provider] amLODIPine  10 mg Oral Nightly    b complex-C-folic acid  1 mg Oral Daily    calcium acetate  667 mg Oral TID    carvedilol  12.5 mg Oral BID    [Held by provider] losartan  100 mg Oral Nightly    cefepime  1,000 mg IntraVENous Q24H    insulin lispro  0-12 Units SubCUTAneous TID WC    insulin lispro  0-6 Units SubCUTAneous Nightly    sodium chloride flush  5-40 mL IntraVENous 2 times per day    heparin (porcine)  5,000 Units SubCUTAneous 3 times per day    vancomycin (VANCOCIN) intermittent dosing (placeholder)   Other RX Placeholder    rosuvastatin  10 mg Oral Nightly    gentamicin   Topical Daily       Infusions:   sodium chloride 25 mL/hr at 06/25/22 1844    dextrose         PRN Meds:  butalbital-acetaminophen-caffeine, sodium chloride flush, sodium chloride, ondansetron **OR** ondansetron, polyethylene glycol, acetaminophen **OR** acetaminophen, oxyCODONE, diclofenac sodium, tiZANidine, glucose, dextrose bolus **OR** dextrose bolus, glucagon (rDNA), dextrose    Vitals    TEMPERATURE:  Current - Temp: 97.9 °F (36.6 °C); Max - Temp  Av.1 °F (36.7 °C)  Min: 97.9 °F (36.6 °C)  Max: 99 °F (37.2 °C)  RESPIRATIONS RANGE: Resp  Avg: 15.5  Min: 14  Max: 16  PULSE RANGE: Pulse  Av.3  Min: 70  Max: 80  BLOOD PRESSURE RANGE:  Systolic (81OHN), KQO:152 , Min:109 , IKR:281   ; Diastolic (03JXY), ODT:42, Min:73, Max:95    PULSE OXIMETRY RANGE: SpO2  Av.2 %  Min: 92 %  Max: 99 %  24HR INTAKE/OUTPUT:    No intake or output data in the 24 hours ending 22 9432    Exam:      General appearance: No apparent distress, appears stated age and cooperative. Lungs: Clear to ascultation, bilaterally without Rales/Wheezes/Rhonchi with good respiratory effort. Heart: Regular rate and rhythm with Normal S1/S2 without  murmurs, rubs or gallops, point of maximum impulse non-displaced  Abdomen: Soft, non-tender or non-distended without rigidity or guarding and positive bowel sounds all four quadrants. Extremities: No clubbing, cyanosis, or edema bilaterally. Skin: Skin color, texture, turgor normal.    Neurologic: Alert and oriented X 3,   grossly non-focal.  Mental status: Alert, oriented, thought content appropriate. Data    Recent Labs     22  0521 22  0440 22  0441   WBC 6.7 6.5 6.4   HGB 9.5* 9.3* 9.4*   HCT 28.4* 27.6* 27.6*    222 217      Recent Labs     22  0759 22  0521 22  0441   * 136 136   K 3.3* 3.8 3.8   CL 92* 98* 98*   CO2 25 27 26   PHOS 5.2* 6.2* 5.8*   BUN 52* 63* 69*   CREATININE 7.8* 8.4* 8.5*     No results for input(s): AST, ALT, ALB, BILIDIR, BILITOT, ALKPHOS in the last 72 hours. Recent Labs     22  0522   INR 1.08     No results for input(s): CKTOTAL, CKMB, CKMBINDEX, TROPONINI in the last 72 hours.     Consults:     IP CONSULT TO HOSPITALIST  IP CONSULT TO PHARMACY  PHARMACY TO DOSE VANCOMYCIN  IP CONSULT TO PHARMACY  IP CONSULT TO NEPHROLOGY  IP CONSULT TO GENERAL SURGERY  IP CONSULT TO INTERVENTIONAL RADIOLOGY  IP CONSULT TO GENERAL SURGERY    Active Hospital Problems    Diagnosis Date Noted    ESRD (end stage renal disease) ([facility] [N18.6]      Priority: Medium    Peritonitis ([facility] [K65.9] 06/24/2022     Priority: Medium    Infection due to peritoneal dialysis catheter [facility]) Kelly Fountain 08/23/2021         ASSESSMENT AND PLAN      Peritonitis associated with peritoneal dialysis: We will follow results of cultures obtain by nephrologist- should be back on Monday   cont cefepime and vancomycin IV- may rebekah to give with HD if no IV access  Had previous staphylococcal infections, Serratia  Most recently treated with Levaquin and Diflucan as outpatient. Per nephrology- PD catheter will be removed, scheduled for today  Culture taken in ER- NGTD     ESRD on peritoneal dialysis:  S/p HD line placement today       DM type II:  Patient is on Lantus but does not use it  Will use sliding scale     HTN:  BP in the normal range off medications    Neck pain:  Patient had an order from her neurologist for neck Xrays- will obtained here  Will refer to Dl on dc    H/o carotid artery stenosis, right:  She was admitted with stroke like symptoms 12/2021.    Seen by neurology at that time and no surgical intervention was recommended  Started on plavix  Will obtain carotid duplex    DVT Prophylaxis: heparin  Diet: Diet NPO  Code Status: Full Code    PT/OT Eval Status:at baseline    Dispo -inpt    Claudette Finch, MD

## 2022-06-28 NOTE — PROGRESS NOTES
Patients IV began leaking. Dr. Alvaro brewer PICC nurse placing a peripheral. Nursing communication order placed.

## 2022-06-28 NOTE — PROGRESS NOTES
Surgery Daily Progress Note      CC: PD peritonitis    SUBJECTIVE:  Has been NPO since midnight for PD cath removal today. Did not get tunneled line yesterday secondary to recent plavix use; planned for placement today instead. No new complaints this AM.    ROS:   A 14 point review of systems was conducted, significant findings as noted above. All other systems negative.     OBJECTIVE:    PHYSICAL EXAM:  Vitals:    06/27/22 1807 06/27/22 2026 06/27/22 2310 06/28/22 0403   BP: (!) 143/83 (!) 155/95 119/76 128/74   Pulse: 70 80 75 73   Resp: 15 16 16 14   Temp: 98.1 °F (36.7 °C) 97.9 °F (36.6 °C) 99 °F (37.2 °C) 98 °F (36.7 °C)   TempSrc: Oral Oral Axillary Oral   SpO2: 96% 99%  92%   Weight:       Height:         General appearance: Alert, no acute distress, grooming appropriate  Chest/Lungs: Normal inspiratory effort, symmetric chest rise, no accessory muscle use  Cardiovascular: Regular rate and rhythm  Abdomen: Soft, obese, non-tender to palpation throughout, no guarding/rigidity; left-sided PD catheter without surrounding erythema or purulence  Skin: Warm and dry, no rashes  Extremities: No edema, no cyanosis  Neuro: A&Ox3, no gross sensory or motor neuro deficits      ASSESSMENT & PLAN:   This is a 47 y.o. female with a diagnosis of PD peritonitis getting tunneled line today and will need PD catheter removal 6/28.    - Continue NPO  - OR today for PD cath removal   - Tunneled line per IR planned also for today  - ABX per nephrology  - Continued care per primary team    Roland Yadav MD, MPH  PGY-3 General Surgery  06/28/22  5:54 AM

## 2022-06-28 NOTE — FLOWSHEET NOTE
06/28/22 1128 06/28/22 1345   Vital Signs   /77 110/80   Temp 97.7 °F (36.5 °C) 97 °F (36.1 °C)   Heart Rate 73 68   Resp 14 12   Weight 200 lb 9.9 oz (91 kg) 194 lb 0.1 oz (88 kg)   Weight Method Stated Bed scale     Treatment time: 3hr   Net UF: 2L    Pre weight: 91kg  Post weight: 88kg  EDW: TBD    Access used: TR HDC  Access function: Good    Medications or blood products given: none    Regular outpatient schedule: TBD    Summary of response to treatment: Good    Copy of dialysis treatment record placed in chart, to be scanned into EMR.

## 2022-06-28 NOTE — FLOWSHEET NOTE
Pt from OR to PACU bay #17 post PERITONEAL DIALYSIS CATHETER REMOVAL of Dr. America Shipman. Pt is fully awake and oriented. She is anxious to get back to her room for a sandwich. Placed on PACU monitoring equipment. Report received from CLOVIS and Dr. Brayden Jackson. Resp reg and easy and vss. Denies pain. Accu check 114.

## 2022-06-29 VITALS
HEIGHT: 61 IN | DIASTOLIC BLOOD PRESSURE: 82 MMHG | BODY MASS INDEX: 37.7 KG/M2 | WEIGHT: 199.7 LBS | SYSTOLIC BLOOD PRESSURE: 129 MMHG | HEART RATE: 72 BPM | OXYGEN SATURATION: 95 % | TEMPERATURE: 97.4 F | RESPIRATION RATE: 18 BRPM

## 2022-06-29 LAB
ALBUMIN SERPL-MCNC: 2.6 G/DL (ref 3.4–5)
ALP BLD-CCNC: 81 U/L (ref 40–129)
ALT SERPL-CCNC: 13 U/L (ref 10–40)
AST SERPL-CCNC: 16 U/L (ref 15–37)
BILIRUB SERPL-MCNC: <0.2 MG/DL (ref 0–1)
BILIRUBIN DIRECT: <0.2 MG/DL (ref 0–0.3)
BILIRUBIN, INDIRECT: ABNORMAL MG/DL (ref 0–1)
GLUCOSE BLD-MCNC: 159 MG/DL (ref 70–99)
GLUCOSE BLD-MCNC: 201 MG/DL (ref 70–99)
PERFORMED ON: ABNORMAL
PERFORMED ON: ABNORMAL
TOTAL PROTEIN: 6.1 G/DL (ref 6.4–8.2)

## 2022-06-29 PROCEDURE — 36415 COLL VENOUS BLD VENIPUNCTURE: CPT

## 2022-06-29 PROCEDURE — 6370000000 HC RX 637 (ALT 250 FOR IP): Performed by: STUDENT IN AN ORGANIZED HEALTH CARE EDUCATION/TRAINING PROGRAM

## 2022-06-29 PROCEDURE — 2580000003 HC RX 258: Performed by: STUDENT IN AN ORGANIZED HEALTH CARE EDUCATION/TRAINING PROGRAM

## 2022-06-29 PROCEDURE — 2500000003 HC RX 250 WO HCPCS: Performed by: STUDENT IN AN ORGANIZED HEALTH CARE EDUCATION/TRAINING PROGRAM

## 2022-06-29 PROCEDURE — 80076 HEPATIC FUNCTION PANEL: CPT

## 2022-06-29 PROCEDURE — 99233 SBSQ HOSP IP/OBS HIGH 50: CPT | Performed by: INTERNAL MEDICINE

## 2022-06-29 PROCEDURE — 6360000002 HC RX W HCPCS: Performed by: STUDENT IN AN ORGANIZED HEALTH CARE EDUCATION/TRAINING PROGRAM

## 2022-06-29 RX ORDER — OXYCODONE HYDROCHLORIDE 5 MG/1
2.5 TABLET ORAL EVERY 8 HOURS PRN
Qty: 8 TABLET | Refills: 0 | Status: SHIPPED | OUTPATIENT
Start: 2022-06-29 | End: 2022-07-07

## 2022-06-29 RX ORDER — VIT B COMP NO.3/FOLIC/C/BIOTIN 1 MG-60 MG
1 TABLET ORAL DAILY
Qty: 30 TABLET | Refills: 3 | Status: SHIPPED | OUTPATIENT
Start: 2022-06-29

## 2022-06-29 RX ADMIN — SODIUM CHLORIDE, PRESERVATIVE FREE 10 ML: 5 INJECTION INTRAVENOUS at 11:01

## 2022-06-29 RX ADMIN — HEPARIN SODIUM 5000 UNITS: 5000 INJECTION INTRAVENOUS; SUBCUTANEOUS at 04:37

## 2022-06-29 RX ADMIN — CALCIUM ACETATE 667 MG: 667 CAPSULE ORAL at 08:13

## 2022-06-29 RX ADMIN — ACETAMINOPHEN 650 MG: 325 TABLET ORAL at 04:35

## 2022-06-29 RX ADMIN — OXYCODONE 2.5 MG: 5 TABLET ORAL at 04:34

## 2022-06-29 RX ADMIN — NEPHROCAP 1 MG: 1 CAP ORAL at 08:13

## 2022-06-29 RX ADMIN — GENTAMICIN SULFATE: 1 OINTMENT TOPICAL at 12:59

## 2022-06-29 RX ADMIN — TIZANIDINE 2 MG: 4 TABLET ORAL at 04:35

## 2022-06-29 RX ADMIN — GABAPENTIN 300 MG: 300 CAPSULE ORAL at 08:13

## 2022-06-29 RX ADMIN — CARVEDILOL 12.5 MG: 12.5 TABLET, FILM COATED ORAL at 08:13

## 2022-06-29 ASSESSMENT — PAIN SCALES - WONG BAKER
WONGBAKER_NUMERICALRESPONSE: 2

## 2022-06-29 ASSESSMENT — PAIN SCALES - GENERAL: PAINLEVEL_OUTOF10: 0

## 2022-06-29 NOTE — PROGRESS NOTES
General Surgery  Post-operative Note    POST-OP DIAGNOSIS: PD peritonitis     PROCEDURE(S): Removal of tunneled intraperitoneal catheter     SUBJECTIVE:   Patient reports no abdominal pain. Is tolerating a diet with no issues. Voided x1 since surgery. OBJECTIVE:  Physical Exam:  Vitals:   Vitals:    06/28/22 1800 06/28/22 1810 06/28/22 1831 06/28/22 2028   BP: (!) 161/76 (!) 149/75 (!) 170/85 (!) 147/78   Pulse: 66 70 74 79   Resp: 23 15 16 16   Temp:  97.5 °F (36.4 °C) 97.9 °F (36.6 °C) 98.2 °F (36.8 °C)   TempSrc:   Oral Oral   SpO2: 93% 92% 100% 95%   Weight:       Height:         General Appearance: Alert, no acute distress  Neuro: A&Ox3, no focal deficits, sensation intact  Chest/Lungs: CTAB, no crackles/rales, wheezes/rhonchi, normal effort  Cardiovascular: RRR, no murmurs/gallops/rubs  Abdomen: Soft, appropriately tender, obese, incision is well-approximated with intact skin glue  Extremities: no edema, no cyanosis    ASSESSMENT/PLAN:  This is a 47y.o. year old female status post removal of tunneled intraperitoneal catheter secondary to PD peritonitis.  POD0.    - Patient is recovering well from surgery   - OK to continue general diet   - Further care per primary team and Nephrology    Gopi Mascorro MD, MPH  PGY-3 General Surgery  06/29/22  12:58 AM

## 2022-06-29 NOTE — PROGRESS NOTES
Patient had okay night, required pain medication multiples times for abd and neck pain. Sites looks good.

## 2022-06-29 NOTE — FLOWSHEET NOTE
06/29/22 1133   Encounter Summary   Encounter Overview/Reason  Initial Encounter   Service Provided For: Patient   Referral/Consult From: Nurse  Yessenia Holt RN)   Last Encounter  06/29/22  (6/29, mary ann )   Complexity of Encounter Moderate   Begin Time 1110   End Time  1120   Total Time Calculated 10 min   Encounter    Type Initial Screen/Assessment   Assessment/Intervention/Outcome   Assessment Calm   Intervention Discussed illness injury and its impact   Outcome Expressed Gratitude; Refused/Declined   Staff Adeel Zamarripa, [address]

## 2022-06-29 NOTE — DISCHARGE INSTR - COC
Continuity of Care Form    Patient Name: Jazmin Garcia   :  1970  MRN:  [id number]    Admit date:  2022  Discharge date:  ***    Code Status Order: Full Code   Advance Directives:      Admitting Physician:  Cinthya Segura MD  PCP: Kami Moore    Discharging Nurse: [facility] Unit/Room#: 1315/8854-12  Discharging Unit Phone Number: ***    Emergency Contact:   Extended Emergency Contact Information  Primary Emergency Contact: [address] Phone: [phone]  Relation: Parent  Secondary Emergency Contact: [address] Phone: [phone]  Relation: Brother/Sister    Past Surgical History:  Past Surgical History:   Procedure Laterality Date    CATARACT REMOVAL Bilateral      SECTION      DIALYSIS CATHETER INSERTION N/A 2021    LAPAROSCOPIC PERITONEAL DIALYSIS CATHETER INSERTION, OMENTOPLEXY performed by Amanda Ingram DO at [facility] 2022    LAPAROSCOPIC PERITONEAL DIALYSIS CATHETER PLACEMENT performed by Amanda Ingram DO at [address] [place] [address] 2021    CATHETER REMOVAL PERITONEAL DIALYSIS performed by Amanda Ingram DO at [address] [place] [address] 2022    PERITONEAL DIALYSIS CATHETER REMOVAL performed by Amanda Ingram DO at [facility] Bilateral 1988    muscle surgery     IR TUNNELED CATHETER PLACEMENT GREATER THAN 5 YEARS  11/15/2021    IR TUNNELED CATHETER PLACEMENT GREATER THAN 5 YEARS 11/15/2021 [facility] SPECIAL PROCEDURES    TOE AMPUTATION Left     left great toe partial amputation    US ASP ABSCESS/HEMATOMA/BULLA/CYST  3/28/2022    US ASP ABSCESS/HEMATOMA/BULLA/CYST 3/28/2022 Priya Chatterjee ULTRASOUND       Immunization History:   Immunization History   Administered Date(s) Administered    COVID-19, MODERNA BLUE border, Primary or Immunocompromised, (age 12y+), IM, 100 mcg/0.5mL 2020, 2021    Influenza, Quadv, IM, PF (6 mo and older Fluzone, Flulaval, Fluarix, and 3 yrs and older Afluria) 12/10/2019       Active Problems:  Patient Active Problem List   Diagnosis Code    CHF (congestive heart failure), NYHA class I, acute on chronic, combined ([facility]) I50.43    Abnormal myocardial perfusion study R94.39    Essential hypertension I10    Type 2 diabetes mellitus with hyperglycemia, with long-term current use of insulin ([facility]) E11.65, Z79.4    JEAN (obstructive sleep apnea) G47.33    HARRIETT (acute kidney injury) ([facility] N17.9    Uremia N19    CKD (chronic kidney disease) stage 5, GFR less than 15 ml/min ([facility]) N18.5    Electrolyte imbalance E87.8    Anemia D64.9    Abdominal pain R10.9    Infection due to peritoneal dialysis catheter ([facility] T85.71XA    Free intraperitoneal air K66.8    Positive blood culture R78.81    Hypertensive emergency I16.1    ESRD (end stage renal disease) on dialysis ([facility] N18.6, Z99.2    Stroke-like symptoms R29.90    Peritonitis ([facility] K65.9    ESRD (end stage renal disease) ([facility] N18.6       Isolation/Infection:   Isolation            No Isolation          Patient Infection Status       Infection Onset Added Last Indicated Last Indicated By Review Planned Expiration Resolved Resolved By    None active    Resolved    C-diff Rule Out 06/24/22 06/24/22 06/24/22 Clostridium difficile toxin/antigen (Ordered)   06/28/22 Jose D Fung RN    From previous admission    C-diff Rule Out 08/22/21 08/22/21 08/23/21 Clostridium difficile toxin/antigen (Ordered)   08/23/21 Rule-Out Test Resulted            Nurse Assessment:  Last Vital Signs: BP (!) 134/92   Pulse 72   Temp 97.2 °F (36.2 °C) (Oral)   Resp 18   Ht 5' 1\" (1.549 m)   Wt 199 lb 11.2 oz (90.6 kg)   SpO2 97%   BMI 37.73 kg/m²     Last documented pain score (0-10 scale): Pain Level: 0  Last Weight:   Wt Readings from Last 1 Encounters:   06/29/22 199 lb 11.2 oz (90.6 kg)     Mental Status:  {IP PT MENTAL STATUS:87883}    IV Access:  {[facility] IV ACCESS:298941509}    Nursing Mobility/ADLs:  Walking   {P DME VUOP:603581808}  Transfer  {CHP DME OTHX:956387547}  Bathing  {CHP DME XKUA:869750803}  Dressing  {CHP DME PTON:679235421}  Toileting  {CHP DME BRUE:638437269}  Feeding  {CHP DME DMDZ:016176193}  Med Admin  {CHP DME SFLO:949350536}  Med Delivery   { DANIEL MED Delivery:409080932}    Wound Care Documentation and Therapy:  Incision 22 Abdomen Left; Lower;Quadrant (Active)   Dressing Status Clean;Dry; Intact 22 1141   Dressing/Treatment Surgical glue; Open to air 22 1141   Closure Surgical glue 22 1141   Margins Approximated 22 1141   Drainage Amount None 22 1141   Odor None 22 1141   Number of days: 0        Elimination:  Continence: Bowel: {YES / BX:77099}  Bladder: {YES / ZA:72821}  Urinary Catheter: {Urinary Catheter:541373061}   Colostomy/Ileostomy/Ileal Conduit: {YES / NX:72253}       Date of Last BM: ***    Intake/Output Summary (Last 24 hours) at 2022 1145  Last data filed at 2022  Gross per 24 hour   Intake 1340 ml   Output 2400 ml   Net -1060 ml     I/O last 3 completed shifts:   In: 1340 [P.O.:540; I.V.:400]  Out: 2400     Safety Concerns:     [address] Safety Concerns:084720337}    Impairments/Disabilities:      [address] Impairments/Disabilities:218161943}    Nutrition Therapy:  Current Nutrition Therapy:   [address] Diet List:048299674}    Routes of Feeding: {CHP DME Other Feedings:943449412}  Liquids: {Slp liquid thickness:06969}  Daily Fluid Restriction: {CHP DME Yes amt example:138094217}  Last Modified Barium Swallow with Video (Video Swallowing Test): {Done Not Done [facility]:277750469}    Treatments at the Time of Hospital Discharge:   Respiratory Treatments: ***  Oxygen Therapy:  {Therapy; copd oxygen:82872}  Ventilator:    { CC Vent UMXY:627078409}    Rehab Therapies: {THERAPEUTIC INTERVENTION:0196639638}  Weight Bearing Status/Restrictions: [address]ASSAN Weight Bearin}  Other Medical Equipment (for information only, NOT a DME order): {EQUIPMENT:750130496}  Other Treatments: ***    Patient's personal belongings (please select all that are sent with patient):  {CHP DME Belongings:226819899}    RN SIGNATURE:  {Esignature:272425159}    CASE MANAGEMENT/SOCIAL WORK SECTION    Inpatient Status Date: 06/24/2022    Readmission Risk Assessment Score:  Readmission Risk              Risk of Unplanned Readmission:  29           Discharging to [facility]    [address], [place], [address]  Phone: [phone]      Patient  Will  start  Out pt  Hemodialysis  Tomorrow:  June 30, 2022    Schedule  TTS  8:30  AM to 1200   ( Tues, Thurs, Sat.)    3 1/2 hr. Run . Dialysis Facility (if applicable)   Name:  Address:  Dialysis Schedule:  Phone:  Fax:    / signature: Electronically signed by Cora Cummings RN on 6/29/22 at 11:46 AM EDT    PHYSICIAN SECTION    Prognosis: {Prognosis:8925473083}    Condition at Discharge: [address] [place] [address] Patient Condition:457570047}    Rehab Potential (if transferring to Rehab): {Prognosis:9229329854}    Recommended Labs or Other Treatments After Discharge: ***    Physician Certification: I certify the above information and transfer of Keith Rodriguez  is necessary for the continuing treatment of the diagnosis listed and that she requires {Admit to Appropriate Level of Care:30361} for {GREATER/LESS:818184263} 30 days.      Update Admission H&P: {CHP DME Changes in ZWLT:932537323}    PHYSICIAN SIGNATURE:  {Esignature:795911817}

## 2022-06-29 NOTE — CARE COORDINATION
Case Management Assessment            Discharge Note                    Date / Time of Note: 6/29/2022 11:41 AM                  Discharge Note Completed by: Adelina White RN    Patient Name: Toño Seaman   YOB: 1970  Diagnosis: Peritonitis ([facility] [K65.9]  Infection associated with peritoneal dialysis catheter, initial encounter [facility]) Hanna Cote   Date / Time: 6/24/2022  1:46 PM    Current PCP: Angel Haywood patient: No    Hospitalization in the last 30 days: no  Advance Directives:  Code Status: Full Code  [address] DNR form completed and on chart: No    Financial:  Payor: UMR / Plan: UMR  / Product Type: *No Product type* /      Pharmacy:    [address] [place] [address] [phone] - F [phone]  [address] [facility] [address] [facility]  Phone: [phone] Fax: [phone]      Assistance purchasing medications?:    Assistance provided by Case Management: None at this time    Does patient want to participate in local refill/ meds to beds program?: Yes    Meds To Beds General Rules:  1. Can ONLY be done Monday- Friday between 8:30am-5pm  2. Prescription(s) must be in pharmacy by 3pm to be filled same day  3. Copy of patient's insurance/ prescription drug card and patient face sheet must be sent along with the prescription(s)  4. Cost of Rx cannot be added to hospital bill. If financial assistance is needed, please contact unit  or ;  or  CANNOT provide pharmacy voucher for patients co-pays  5.  Patients can then  the prescription on their way out of the hospital at discharge, or pharmacy can deliver to the bedside if staff is available. (payment due at time of pick-up or delivery - cash, check, or card accepted)     Able to afford home medications/ co-pay costs: Yes    ADLS:  Current PT AM-PAC Score:   /24  Current OT AM-PAC Score:   /24      DISCHARGE Disposition: Home- No Services Needed    LOC at discharge: Not Applicable  DANIEL Completed: No    Notification completed in HENS/PAS?:  Not Applicable    IMM Completed:   Not Indicated    Transportation:  Transportation PLAN for discharge: family   Mode of Transport: Private Car  Reason for medical transport: Not Applicable  Name of Transport Company: Not Applicable  Time of Transport: when family availalbe    Transport form completed: No    Home Care:  [address] ordered at discharge: No  [address]: Not Applicable  Orders faxed: No    Durable Medical Equipment:  DME Provider: NA  Equipment obtained during hospitalization: NA    Home Oxygen and Respiratory Equipment:  Oxygen needed at discharge?: No  [address]: Not Applicable  Portable tank available for discharge?: Not Indicated    Dialysis:  Dialysis patient: Yes    Dialysis Center:    [facility], [place], [address]  Phone: [phone]        Hospice Services:  Location: Not Applicable  Agency: Not Applicable    Consents signed: No    Referrals made at [facility] for outpatient continued care:  Not Applicable    Additional CM Notes:     CM confirmed d/c home today:       Patient  Changed Dialysis modality from  PD to HD  -  Patient will  Have  HD out pt  With  [address] [place] [address] called  Ana Nelson  To confirm  Schedule and  Chair  Time  . [phone]      Patient  Will start  Tomorrow:  June 30, 2022  Schedule  TTS  8:30  AM to 1200  3 1/2  Run .       New Rx:  None noted     Patient  To follow up out pt  As  Instructed:       Schedule an appointment with Dr. Marlo Snellen, MD as soon as possible for a visit in 1 week(s)   [facility]   [phone]       Schedule an appointment with Dr. Lauryn Umaña MD as soon as possible for a visit in 1 week(s)   [facility])   [phone]     Schedule an appointment with Dr. Benjamin Martínez as soon as possible for a visit in 1 week(s)   [facility] [phone]              The Plan for Transition of Care is related to the following treatment goals of Peritonitis ([facility] [K65.9]  Infection associated with peritoneal dialysis catheter, initial encounter [facility] Shabana Nearing    The Patient and/or patient representative Javier Medina and her family were provided with a choice of provider and agrees with the discharge plan Yes    Freedom of choice list was provided with basic dialogue that supports the patient's individualized plan of care/goals and shares the quality data associated with the providers.  Yes    Care Transitions patient: No    Cora Cummings RN  [facility] [email or web address]  Case Management Department  Ph: [phone]

## 2022-06-29 NOTE — PROGRESS NOTES
Nephrology Note                                                                                                                                                                                                                                                                                                                                                               Office : [phone]     Fax :[phone]              Patient's Name: Uri Lopez    Reason for Consult:  ESRD   Requesting Physician:  Salty Novoa      [facility] placed  HD HD yesterday   PD cath removed per sx       Past Medical History:   Diagnosis Date    Arthritis     Diabetes mellitus ([address]     Diastolic CHF ([facility]     End stage renal disease ([facility]     Hemodialysis patient ([facility]     MWF    History of blood transfusion     Hyperlipidemia     Hypertension     On home O2     but does not use it    JEAN (obstructive sleep apnea)     currently not on cpap       Past Surgical History:   Procedure Laterality Date    CATARACT REMOVAL Bilateral      SECTION      DIALYSIS CATHETER INSERTION N/A 2021    LAPAROSCOPIC PERITONEAL DIALYSIS CATHETER INSERTION, OMENTOPLEXY performed by Gabriela Stout DO at [facility] 22 N/A 2022    LAPAROSCOPIC PERITONEAL DIALYSIS CATHETER PLACEMENT performed by Gabriela Stout DO at [address] N/A 2021    CATHETER REMOVAL PERITONEAL DIALYSIS performed by Gabriela Stout DO at [address] N/A 2022    PERITONEAL DIALYSIS CATHETER REMOVAL performed by Gabriela Stout DO at [address]  Bilateral 1988    muscle surgery     IR TUNNELED CATHETER PLACEMENT GREATER THAN 5 YEARS  11/15/2021    IR TUNNELED CATHETER PLACEMENT GREATER THAN 5 YEARS 11/15/2021 [facility] SPECIAL PROCEDURES    TOE AMPUTATION Left     left great toe partial amputation    US ASP ABSCESS/HEMATOMA/BULLA/CYST  3/28/2022    US ASP ABSCESS/HEMATOMA/BULLA/CYST 3/28/2022 [facility] ULTRASOUND       History reviewed. No pertinent family history. reports that she has never smoked. She has never used smokeless tobacco. She reports previous alcohol use. She reports previous drug use. Allergies:  Patient has no known allergies.     Current Medications:    gabapentin (NEURONTIN) capsule 300 mg, BID  [Held by provider] amLODIPine (NORVASC) tablet 10 mg, Nightly  b complex-C-folic acid (NEPHROCAPS) capsule 1 mg, Daily  butalbital-acetaminophen-caffeine (FIORICET, ESGIC) per tablet 1 tablet, Q4H PRN  calcium acetate (PHOSLO) capsule 667 mg, TID  carvedilol (COREG) tablet 12.5 mg, BID  [Held by provider] losartan (COZAAR) tablet 100 mg, Nightly  cefepime (MAXIPIME) 1000 mg IVPB minibag, Q24H  insulin lispro (1 Unit Dial) 0-12 Units, TID WC  insulin lispro (1 Unit Dial) 0-6 Units, Nightly  sodium chloride flush 0.9 % injection 5-40 mL, 2 times per day  sodium chloride flush 0.9 % injection 5-40 mL, PRN  0.9 % sodium chloride infusion, PRN  ondansetron (ZOFRAN-ODT) disintegrating tablet 4 mg, Q8H PRN   Or  ondansetron (ZOFRAN) injection 4 mg, Q6H PRN  polyethylene glycol (GLYCOLAX) packet 17 g, Daily PRN  acetaminophen (TYLENOL) tablet 650 mg, Q6H PRN   Or  acetaminophen (TYLENOL) suppository 650 mg, Q6H PRN  heparin (porcine) injection 5,000 Units, 3 times per day  oxyCODONE (ROXICODONE) immediate release tablet 2.5 mg, Q4H PRN  vancomycin (VANCOCIN) intermittent dosing (placeholder), RX Placeholder  diclofenac sodium (VOLTAREN) 1 % gel 2 g, 4x Daily PRN  rosuvastatin (CRESTOR) tablet 10 mg, Nightly  gentamicin (GARAMYCIN) 0.1 % ointment, Daily  tiZANidine (ZANAFLEX) tablet 2 mg, TID PRN  glucose chewable tablet 16 g, PRN  dextrose bolus 10% 125 mL, PRN   Or  dextrose bolus 10% 250 mL, PRN  glucagon (rDNA) injection 1 mg, PRN  dextrose 5 % solution, PRN          Physical exam:     Vitals:  BP (!) 134/92   Pulse 72   Temp 97.2 °F (36.2 °C) (Oral)   Resp 18   Ht 5' 1\" (1.549 m) Wt 199 lb 11.2 oz (90.6 kg)   SpO2 97%   BMI 37.73 kg/m²   Constitutional:  OAA X3 NAD  Skin: no rash, turgor wnl  Heent:  eomi, mmm  Neck: no bruits or jvd noted  Cardiovascular:  S1, S2 without m/r/g  Respiratory: CTA B without w/r/r  Abdomen:  +bs, soft, nt, nd  Ext: + lower extremity edema  Psychiatric: mood and affect appropriate  Musculoskeletal:  Rom, muscular strength intact    Data:   Labs:  CBC:   Recent Labs     06/27/22  0521 06/28/22 0440 06/28/22 0441   WBC 6.7 6.5 6.4   HGB 9.5* 9.3* 9.4*    222 217     BMP:    Recent Labs     06/27/22 0521 06/28/22 0441    136   K 3.8 3.8   CL 98* 98*   CO2 27 26   BUN 63* 69*   CREATININE 8.4* 8.5*   GLUCOSE 143* 163*     Ca/Mg/Phos:   Recent Labs     06/27/22 0521 06/28/22 0441   CALCIUM 8.3 8.4   PHOS 6.2* 5.8*     Hepatic:   Recent Labs     06/29/22  0713   AST 16   ALT 13   BILITOT <0.2   ALKPHOS 81     Troponin: No results for input(s): TROPONINI in the last 72 hours. BNP: No results for input(s): BNP in the last 72 hours. Lipids: No results for input(s): CHOL, TRIG, HDL, LDLCALC, LABVLDL in the last 72 hours. ABGs: No results for input(s): PHART, PO2ART, XMI5AME in the last 72 hours. INR:   Recent Labs     06/27/22 0522   INR 1.08     UA:No results for input(s): Theone Ground, GLUCOSEU, BILIRUBINUR, KETUA, SPECGRAV, BLOODU, PHUR, PROTEINU, UROBILINOGEN, NITRU, LEUKOCYTESUR, Jeff Revels in the last 72 hours. Urine Microscopic: No results for input(s): LABCAST, BACTERIA, COMU, HYALCAST, WBCUA, RBCUA, EPIU in the last 72 hours. Urine Culture: No results for input(s): LABURIN in the last 72 hours. Urine Chemistry: No results for input(s): Suann Pummel, PROTEINUR, NAUR in the last 72 hours. IMAGING:  XR CERVICAL SPINE (2-3 VIEWS)   Final Result   1. Moderate cervical spondylosis as detailed above.              XR CHEST PORTABLE   Final Result   Impression:       Hazy right lung opacities which could represent atypical pneumonia in appropriate clinical setting or mild volume overload edema. IR TUNNELED CVC PLACE WO SQ PORT/PUMP > 5 YEARS    (Results Pending)   VL DUP CAROTID BILATERAL    (Results Pending)   VL PRE OP VEIN MAPPING    (Results Pending)       Assessment/Plan   1. ESRD     2. HTN    3. Anemia    4. Acid- base/ Electrolyte imbalance     5.  Abd pain     Plan   - pt has rec PD peritonitis - cell count nl now   - changed to HD   - Sx -  PD cath removed  - Anemia Management   - MBD management                 Thank you for allowing us to participate in care of Latrice Gutierrez MD  Feel free to contact me   [facility] [address]  Office : [phone]  Fax :[phone]

## 2022-06-29 NOTE — PROGRESS NOTES
Clinical Pharmacy Progress Note    Vancomycin - Management by Pharmacy    Consult Date(s): 06/24  Consulting Provider(s): Jun    Assessment / Plan:  1)  PD peritonitis - Vancomycin   Concurrent Antimicrobials: Cefepime   Day of Vanc Therapy: 6   Current Dosing Method: Intermittent Dosing by Levels due to ESRD on PD --> now transitioning to HD  o Therapeutic Goal: Trough ~ 15 mg/L  o Pt received 1000mg x1 after HD yesterday. No HD planned today. Will not give any Vancomycin today. o Will check random level in AM tomorrow if still here.  Will continue to monitor clinical condition and make adjustments to regimen as appropriate. Please call with questions--  Thanks--  Gerardo Reyna, PharmD, BCPS, BCGP  [id number] ([facility])   6/29/2022 12:35 PM      Interval update:  PD catheter removed yesterday. Had first HD session yesterday. Discussing possible discharge home today. Subjective/Objective:  Leia De Oliveira is a 47 y.o. female with a PMHx significant for ESRD on PD, CHF, DM, and HTN. She presented to the ED with abd pain and concern for peritonitis. She was admitted in late May 2022 for peritonitis at [facility] - there she grew serratia and was treated with oral levofloxacin and diflucan. She finished abx on 6/13, and since stopping abx she has had abdominal pain. She is admitted for peritonitis.     Pharmacy is consulted to dose vancomycin    Ht Readings from Last 1 Encounters:   06/24/22 5' 1\" (1.549 m)     Wt Readings from Last 1 Encounters:   06/29/22 199 lb 11.2 oz (90.6 kg)       Current & Prior Antimicrobial Regimen(s):   Cefepime 1000mg EI q24h (06/24 - Current)   Vancomycin - Pharmacy to dose   Intermittent dosing (06/24 - Current)    Date Vancomycin Level Vancomycin Dose   6/24  1500mg   6/25 18.8 mcg/mL 500mg   6/26 23.1 mcg/mL --   6/27 21.1 mcg/mL --   6/28 17.6 mcg/mL 1000mg   6/29  --   6/30 ordered        Cultures & Sensitivities:    Date Site Micro Susceptibility / Result   06/24 Blood x 2 No growth to date    6/24 PD fluid No growth to date      Labs / Ancillary Data:    CrCl is not estimated 2/2 ESRD    Recent Labs     06/27/22  0521 06/28/22  0440 06/28/22  0441   CREATININE 8.4*  --  8.5*   BUN 63*  --  69*   WBC 6.7 6.5 6.4

## 2022-06-29 NOTE — DISCHARGE SUMMARY
Hospital Medicine Discharge Summary      Patient ID: Trini Ch      Patient's PCP: Trish Tarango Date: 6/24/2022     Discharge Date:   6/29/2022    Admitting Physician: Gail Gilliam MD    Discharge Physician: Gail Gilliam MD     Discharge Diagnoses: Active Hospital Problems    Diagnosis Date Noted    ESRD (end stage renal disease) ([facility] [N18.6]      Priority: Medium    Peritonitis ([facility] [K65.9] 06/24/2022     Priority: Medium    Infection due to peritoneal dialysis catheter [facility]) Kaila Wilkes 08/23/2021         The patient was seen and examined on day of discharge and this discharge summary is in conjunction with any daily progress note from day of discharge. Hospital Course: The patient is a 47 y.o. female with with medical history of ESRD on peritoneal dialysis, diastolic CHF, CVA, DM, HTN who presented to [facility] with worsening abdominal pain and concern for peritonitis. nephrology was consulted and patient was started on cefepime and vancomycin. PD fluid was collected and sent for culture. Patient had recurrent peritonitis bouts, most recently in May 2022. desicion was made to transition her to hemodialysis. PD catheter was removed and tunneled HD cathter placed on 6/28. Patient had HD session. PD fluid cultures were negative- antibiotics were stopped. Incidentally patient had mildly elevated serum hCG level for which she will follow up with GYN as outpatient on SD.      Consults:     IP CONSULT TO HOSPITALIST  IP CONSULT TO PHARMACY  PHARMACY TO DOSE VANCOMYCIN  IP CONSULT TO PHARMACY  IP CONSULT TO NEPHROLOGY  IP CONSULT TO GENERAL SURGERY  IP CONSULT TO INTERVENTIONAL RADIOLOGY  IP CONSULT TO GENERAL SURGERY  IP CONSULT TO OB GYN  IP CONSULT TO SPIRITUAL SERVICES    Disposition: Home     Discharged Condition: Stable    Code Status: Full Code    Activity: activity as tolerated    Diet: renal diet    Follow Up: GYN in 1 week for elevated hCG level, nephrology for ongoing hemodialysis. Will need vein mapping for fistula as outpatient (script provided). Referred to [facility] for eval for chronic neck pain. Exam:     General appearance: No apparent distress, appears stated age and cooperative. Lungs: Clear to ascultation, bilaterally without Rales/Wheezes/Rhonchi with good respiratory effort. Heart: Regular rate and rhythm with Normal S1/S2 without  murmurs, rubs or gallops, point of maximum impulse non-displaced  Abdomen: Soft, non-tender or non-distended without rigidity or guarding and positive bowel sounds all four quadrants. Extremities: No clubbing, cyanosis, or edema bilaterally. Full range of motion without deformity and normal gait intact. Skin: Skin color, texture, turgor normal.  No rashes or lesions. Neurologic: Alert and oriented X 3,  neurovascularly intact with sensory/motor intact upper extremities/lower extremities, bilaterally. Cranial nerves:II-XII intact, grossly non-focal.  Mental status: Alert, oriented, thought content appropriate      Labs: For convenience and continuity at follow-up the following most recent labs are provided:    CBC:   Lab Results   Component Value Date    WBC 6.4 06/28/2022    HGB 9.4 06/28/2022    HCT 27.6 06/28/2022     06/28/2022       RENAL:   Lab Results   Component Value Date     06/28/2022    K 3.8 06/28/2022    K 3.8 06/24/2022    CL 98 06/28/2022    CO2 26 06/28/2022    BUN 69 06/28/2022    CREATININE 8.5 06/28/2022           Discharge Medications:   Current Discharge Medication List           Details   amLODIPine (NORVASC) 10 MG tablet Take 10 mg by mouth daily      rosuvastatin (CRESTOR) 10 MG tablet Take 10 mg by mouth at bedtime      butalbital-acetaminophen-caffeine (FIORICET, ESGIC) -40 MG per tablet Take 1 tablet by mouth every 4 hours as needed for Headaches      gabapentin (NEURONTIN) 100 MG capsule Take 300 mg by mouth in the morning and at bedtime.       chlorzoxazone (PARAFON FORTE) 500 MG tablet Take 500 mg by mouth 4 times daily as needed for Muscle spasms      diclofenac sodium (VOLTAREN) 1 % GEL Apply 2 g topically 4 times daily as needed for Pain      gentamicin (GARAMYCIN) 0.1 % ointment Apply topically as needed (apply topically to PD cath site)      spironolactone (ALDACTONE) 50 MG tablet TAKE 1 TABLET BY MOUTH DAILY  Qty: 30 tablet, Refills: 0      B complex-vitamin C-folic acid (NEPHRO-BHANU) 1 MG tablet Take 1 tablet by mouth daily      calcium acetate (PHOSLO) 667 MG CAPS capsule Take 1 capsule by mouth 3 times daily (with meals)       ergocalciferol (ERGOCALCIFEROL) 1.25 MG (37300 UT) capsule Take 50,000 Units by mouth once a week      losartan (COZAAR) 100 MG tablet Take 1 tablet by mouth nightly  Qty: 90 tablet, Refills: 1      clopidogrel (PLAVIX) 75 MG tablet Take 1 tablet by mouth daily  Qty: 30 tablet, Refills: 3      carvedilol (COREG) 12.5 MG tablet Take 1 tablet by mouth 2 times daily  Qty: 60 tablet, Refills: 3      nitroGLYCERIN (NITROSTAT) 0.4 MG SL tablet Place 1 tablet under the tongue every 5 minutes as needed for Chest pain  Qty: 5 tablet, Refills: 1                Time Spent on discharge is more than 30 minutes in the examination, evaluation, counseling and review of medications and discharge plan. Signed:  Yennifer Buck MD   6/29/2022      Thank you Susannah Dunlap for the opportunity to be involved in this patient's care. If you have any questions or concerns please feel free to contact me at [phone].

## 2022-06-29 NOTE — PROGRESS NOTES
General Surgery   Daily Progress Note  Patient: Jenna Simmonds      CC: PD peritonitis    SUBJECTIVE:   Patient rested well overnight. Afebrile, HDS. Pain is controlled. Tolerating diet without N/V. Denies abdominal pain. Patient is voiding. Denies flatus or BM.    ROS:   A 14 point review of systems was conducted, significant findings as noted above. All other systems negative. OBJECTIVE:    PHYSICAL EXAM:    Vitals:    06/28/22 2028 06/28/22 2109 06/28/22 2255 06/29/22 0430   BP: (!) 147/78  (!) 146/85 (!) 140/94   Pulse: 79  78 75   Resp: 16 16 16 16   Temp: 98.2 °F (36.8 °C)  97.9 °F (36.6 °C) 97.9 °F (36.6 °C)   TempSrc: Oral  Oral Oral   SpO2: 95%  97% 98%   Weight:    199 lb 11.2 oz (90.6 kg)   Height:           General Appearance: Alert, no acute distress  Neuro: A&Ox3, no focal deficits, sensation intact  Chest/Lungs: CTAB, no crackles/rales, wheezes/rhonchi, normal effort  Cardiovascular: RRR, no murmurs/gallops/rubs  Abdomen: Soft, appropriately tender, obese, incision is well-approximated with intact skin glue  Extremities: no edema, no cyanosis    LABS:   Recent Labs     06/28/22  0440 06/28/22  0441   WBC 6.5 6.4   HGB 9.3* 9.4*   HCT 27.6* 27.6*   MCV 93.9 93.6    217        Recent Labs     06/27/22  0521 06/28/22  0441    136   K 3.8 3.8   CL 98* 98*   CO2 27 26   PHOS 6.2* 5.8*   BUN 63* 69*   CREATININE 8.4* 8.5*      No results for input(s): AST, ALT, ALB, BILIDIR, BILITOT, ALKPHOS in the last 72 hours. No results for input(s): LIPASE, AMYLASE in the last 72 hours. Recent Labs     06/27/22  0522   INR 1.08      No results for input(s): CKTOTAL, CKMB, CKMBINDEX, TROPONINI in the last 72 hours.       ASSESSMENT & PLAN:   This is a 47y.o. year old female status post removal of tunneled intraperitoneal catheter secondary to PD peritonitis POD1.     - Patient is recovering well from surgery   - Continue oral pain meds  - Okay for dispo per surgery standpoint  - Dialysis per nephro  - Call with questions or changes in exam        Raji Hilario DO, [address]  PGY1, General Surgery  06/29/22  5:43 AM   Jessica

## 2022-07-03 LAB
ANAEROBIC CULTURE: NORMAL
GRAM STAIN RESULT: NORMAL
WOUND/ABSCESS: NORMAL

## 2022-07-07 ENCOUNTER — APPOINTMENT (OUTPATIENT)
Dept: GENERAL RADIOLOGY | Age: 52
End: 2022-07-07
Payer: COMMERCIAL

## 2022-07-07 ENCOUNTER — HOSPITAL ENCOUNTER (EMERGENCY)
Age: 52
Discharge: HOME | End: 2022-07-07
Attending: EMERGENCY MEDICINE
Payer: COMMERCIAL

## 2022-07-07 ENCOUNTER — APPOINTMENT (OUTPATIENT)
Dept: INTERVENTIONAL RADIOLOGY/VASCULAR | Age: 52
End: 2022-07-07
Payer: COMMERCIAL

## 2022-07-07 VITALS
OXYGEN SATURATION: 98 % | WEIGHT: 199.7 LBS | RESPIRATION RATE: 16 BRPM | DIASTOLIC BLOOD PRESSURE: 79 MMHG | TEMPERATURE: 99.3 F | HEIGHT: 61 IN | SYSTOLIC BLOOD PRESSURE: 146 MMHG | HEART RATE: 81 BPM | BODY MASS INDEX: 37.7 KG/M2

## 2022-07-07 DIAGNOSIS — T82.42XA DISPLACEMENT OF VASCULAR DIALYSIS CATHETER, INITIAL ENCOUNTER (HCC): Primary | ICD-10-CM

## 2022-07-07 PROCEDURE — C1881 DIALYSIS ACCESS SYSTEM: HCPCS

## 2022-07-07 PROCEDURE — 77001 FLUOROGUIDE FOR VEIN DEVICE: CPT

## 2022-07-07 PROCEDURE — 94761 N-INVAS EAR/PLS OXIMETRY MLT: CPT

## 2022-07-07 PROCEDURE — C1894 INTRO/SHEATH, NON-LASER: HCPCS

## 2022-07-07 PROCEDURE — 71045 X-RAY EXAM CHEST 1 VIEW: CPT

## 2022-07-07 PROCEDURE — 99283 EMERGENCY DEPT VISIT LOW MDM: CPT

## 2022-07-07 PROCEDURE — 99152 MOD SED SAME PHYS/QHP 5/>YRS: CPT

## 2022-07-07 PROCEDURE — 6360000002 HC RX W HCPCS

## 2022-07-07 PROCEDURE — 2580000003 HC RX 258

## 2022-07-07 PROCEDURE — 36558 INSERT TUNNELED CV CATH: CPT

## 2022-07-07 ASSESSMENT — ENCOUNTER SYMPTOMS
EYES NEGATIVE: 1
RESPIRATORY NEGATIVE: 1
GASTROINTESTINAL NEGATIVE: 1

## 2022-07-07 ASSESSMENT — PAIN DESCRIPTION - PAIN TYPE: TYPE: ACUTE PAIN

## 2022-07-07 ASSESSMENT — PAIN SCALES - GENERAL: PAINLEVEL_OUTOF10: 5

## 2022-07-07 ASSESSMENT — PAIN - FUNCTIONAL ASSESSMENT: PAIN_FUNCTIONAL_ASSESSMENT: 0-10

## 2022-07-07 ASSESSMENT — PAIN DESCRIPTION - LOCATION: LOCATION: CHEST

## 2022-07-07 ASSESSMENT — PAIN DESCRIPTION - DESCRIPTORS: DESCRIPTORS: ACHING

## 2022-07-07 NOTE — PROCEDURES
IR Brief Postoperative Note    Scottie Chiang  YOB: 1970  [id number]    Pre-operative Diagnosis: esrd    Post-operative Diagnosis: Same    Procedure: right IJ dialysis cath, ok for use    Anesthesia: moderate    Surgeons/Assistants: abbie    Estimated Blood Loss: Minimal    Complications: none    Specimens: were not obtained    See full procedure dictation to follow      Marija Bianchi MD MD  7/7/2022

## 2022-07-07 NOTE — H&P
Patient:  Zbigniew Pimentel   :   1970      Relevant clinical history, particularly as it involves the pending procedure, was reviewed and discussed. The procedure including risks and benefits was discussed at length with the patient (or designated family member) and all questions were answered. Informed consent to proceed with the procedure was given. Vital signs were monitored and documented by the Radiology nurse. Targeted physical examination  Heart : regular rate and rhythm  Lungs : clear, breathing easily  Condition : stable    [facility] nurses notes reviewed and agreed.     Past Medical History:        Diagnosis Date    Arthritis     Diabetes mellitus ([facility]     Diastolic CHF ([facility]     End stage renal disease ([facility]     Hemodialysis patient ([facility]     MWF    History of blood transfusion     Hyperlipidemia     Hypertension     On home O2     but does not use it    JEAN (obstructive sleep apnea)     currently not on cpap       Past Surgical History:           Procedure Laterality Date    CATARACT REMOVAL Bilateral      SECTION      DIALYSIS CATHETER INSERTION N/A 2021    LAPAROSCOPIC PERITONEAL DIALYSIS CATHETER INSERTION, OMENTOPLEXY performed by Camila Staples DO at [address] [facility] N/A 2022    LAPAROSCOPIC PERITONEAL DIALYSIS CATHETER PLACEMENT performed by Camila Staples DO at [address] N/A 2021    CATHETER REMOVAL PERITONEAL DIALYSIS performed by Camila Staples DO at [address] N/A 2022    PERITONEAL DIALYSIS CATHETER REMOVAL performed by Camila Staples DO at [address]  Bilateral 1988    muscle surgery     IR TUNNELED CATHETER PLACEMENT GREATER THAN 5 YEARS  11/15/2021    IR TUNNELED CATHETER PLACEMENT GREATER THAN 5 YEARS 11/15/2021 [facility] SPECIAL PROCEDURES    IR TUNNELED CATHETER PLACEMENT GREATER THAN 5 YEARS  2022    IR TUNNELED CATHETER PLACEMENT GREATER THAN 5 YEARS 6/28/2022 [facility] SPECIAL PROCEDURES    TOE AMPUTATION Left     left great toe partial amputation    US ASP ABSCESS/HEMATOMA/BULLA/CYST  3/28/2022    US ASP ABSCESS/HEMATOMA/BULLA/CYST 3/28/2022 [facility] ULTRASOUND       Allergies:  Patient has no known allergies. Medications:   Home Meds  No current facility-administered medications on file prior to encounter. Current Outpatient Medications on File Prior to Encounter   Medication Sig Dispense Refill    losartan (COZAAR) 100 MG tablet Take 1 tablet by mouth nightly 90 tablet 1    torsemide (DEMADEX) 100 MG tablet Take 1 tablet by mouth 2 times daily 60 tablet 2    butalbital-acetaminophen-caffeine (FIORICET, ESGIC) -40 MG per tablet Take 1 tablet by mouth every 6 hours as needed for Headaches or Migraine 180 tablet 5    oxyCODONE (ROXICODONE) 5 MG immediate release tablet Take 0.5 tablets by mouth every 8 hours as needed for Pain for up to 8 days. 8 tablet 0    B complex-vitamin C-folic acid (NEPHRO-BHANU) 1 MG tablet Take 1 tablet by mouth daily 30 tablet 3    amLODIPine (NORVASC) 10 MG tablet Take 10 mg by mouth daily      rosuvastatin (CRESTOR) 10 MG tablet Take 10 mg by mouth at bedtime      gabapentin (NEURONTIN) 100 MG capsule Take 300 mg by mouth in the morning and at bedtime.       chlorzoxazone (PARAFON FORTE) 500 MG tablet Take 500 mg by mouth 4 times daily as needed for Muscle spasms      diclofenac sodium (VOLTAREN) 1 % GEL Apply 2 g topically 4 times daily as needed for Pain      gentamicin (GARAMYCIN) 0.1 % ointment Apply topically as needed (apply topically to PD cath site)      spironolactone (ALDACTONE) 50 MG tablet TAKE 1 TABLET BY MOUTH DAILY 30 tablet 0    calcium acetate (PHOSLO) 667 MG CAPS capsule Take 1 capsule by mouth 3 times daily (with meals)       ergocalciferol (ERGOCALCIFEROL) 1.25 MG (69533 UT) capsule Take 50,000 Units by mouth once a week      clopidogrel (PLAVIX) 75 MG tablet Take 1 tablet by mouth daily 30 tablet 3    carvedilol (COREG) 12.5 MG tablet Take 1 tablet by mouth 2 times daily 60 tablet 3    nitroGLYCERIN (NITROSTAT) 0.4 MG SL tablet Place 1 tablet under the tongue every 5 minutes as needed for Chest pain 5 tablet 1       Current Meds  No current facility-administered medications for this encounter.         ASA 2 - Patient with mild systemic disease with no functional limitations    II (soft palate, uvula, fauces visible)    Activity:  2 - Able to move 4 extremities voluntarily on command  Respiration:  2 - Able to breathe deeply and cough freely  Circulation:  2 - BP+/- 20mmHg of normal  Consciousness:  2 - Fully awake  Oxygen Saturation (color):  2 - Able to maintain oxygen saturation >92% on room air    Sedation : Moderate sedation planned

## 2022-07-07 NOTE — ED PROVIDER NOTES
[address] [facility] ENCOUNTER          ATTENDING PHYSICIAN NOTE       Date of evaluation: 2022    Chief Complaint     Vascular Access Problem (lost vasc cath)      History of Present Illness     Oj Moser is a 47 y.o. female who presents to the emergency department for dislodgment of her hemodialysis catheter. Patient states that approximately an hour prior to presentation she woke up and noted blood all over her bedding. She looked on her chest and noted that her dialysis catheter had come out. She states that the bleeding stopped on its own and she attempted to contact her nephrologist but did not get a call back so came to the emergency department. Patient states that she does have some lightheadedness but that has been going on for several days and is not any different. She denies any shortness of breath or chest pain. Review of Systems     Review of Systems   Constitutional: Negative. HENT: Negative. Eyes: Negative. Respiratory: Negative. Cardiovascular: Negative. Gastrointestinal: Negative. Genitourinary: Negative. Musculoskeletal: Negative. Neurological: Positive for light-headedness. All other systems reviewed and are negative. Past Medical, Surgical, Family, and Social History     She has a past medical history of Arthritis, Diabetes mellitus ([address] Diastolic CHF ([address] End stage renal disease ([address] Hemodialysis patient ([address] History of blood transfusion, Hyperlipidemia, Hypertension, On home O2, and JEAN (obstructive sleep apnea). She has a past surgical history that includes Dialysis Catheter Insertion (N/A, 2021); Dialysis Catheter Removal (N/A, 2021); IR TUNNELED CVC PLACE WO SQ PORT/PUMP > 5 YEARS (11/15/2021); eye surgery (Bilateral, ); Cataract removal (Bilateral);  section; Toe amputation (Left); Dialysis Catheter Insertion (N/A, 2022); US ASP ABSCESS/HEMATOMA/BULLA/CYST (3/28/2022);  Dialysis Catheter Removal (N/A, 6/28/2022); and IR TUNNELED CVC PLACE WO SQ PORT/PUMP > 5 YEARS (6/28/2022). Her family history is not on file. She reports that she has never smoked. She has never used smokeless tobacco. She reports previous alcohol use. She reports previous drug use. Medications     Previous Medications    AMLODIPINE (NORVASC) 10 MG TABLET    Take 10 mg by mouth daily    B COMPLEX-VITAMIN C-FOLIC ACID (NEPHRO-BHANU) 1 MG TABLET    Take 1 tablet by mouth daily    BUTALBITAL-ACETAMINOPHEN-CAFFEINE (FIORICET, ESGIC) -40 MG PER TABLET    Take 1 tablet by mouth every 6 hours as needed for Headaches or Migraine    CALCIUM ACETATE (PHOSLO) 667 MG CAPS CAPSULE    Take 1 capsule by mouth 3 times daily (with meals)     CARVEDILOL (COREG) 12.5 MG TABLET    Take 1 tablet by mouth 2 times daily    CHLORZOXAZONE (PARAFON FORTE) 500 MG TABLET    Take 500 mg by mouth 4 times daily as needed for Muscle spasms    CLOPIDOGREL (PLAVIX) 75 MG TABLET    Take 1 tablet by mouth daily    DICLOFENAC SODIUM (VOLTAREN) 1 % GEL    Apply 2 g topically 4 times daily as needed for Pain    ERGOCALCIFEROL (ERGOCALCIFEROL) 1.25 MG (15582 UT) CAPSULE    Take 50,000 Units by mouth once a week    GABAPENTIN (NEURONTIN) 100 MG CAPSULE    Take 300 mg by mouth in the morning and at bedtime. GENTAMICIN (GARAMYCIN) 0.1 % OINTMENT    Apply topically as needed (apply topically to PD cath site)    LOSARTAN (COZAAR) 100 MG TABLET    Take 1 tablet by mouth nightly    NITROGLYCERIN (NITROSTAT) 0.4 MG SL TABLET    Place 1 tablet under the tongue every 5 minutes as needed for Chest pain    OXYCODONE (ROXICODONE) 5 MG IMMEDIATE RELEASE TABLET    Take 0.5 tablets by mouth every 8 hours as needed for Pain for up to 8 days.     ROSUVASTATIN (CRESTOR) 10 MG TABLET    Take 10 mg by mouth at bedtime    SPIRONOLACTONE (ALDACTONE) 50 MG TABLET    TAKE 1 TABLET BY MOUTH DAILY    TORSEMIDE (DEMADEX) 100 MG TABLET    Take 1 tablet by mouth 2 times daily Allergies     She has No Known Allergies. Physical Exam     INITIAL VITALS: BP: 126/69, Temp: 99.3 °F (37.4 °C), Heart Rate: 99, Resp: 14, SpO2: 98 %   Physical Exam  Vitals and nursing note reviewed. Constitutional:       General: She is not in acute distress. Cardiovascular:      Rate and Rhythm: Normal rate and regular rhythm. Heart sounds: Normal heart sounds. Pulmonary:      Effort: Pulmonary effort is normal.      Breath sounds: Normal breath sounds. No wheezing, rhonchi or rales. Comments: Puncture site present in the right anterior chest consistent with her tunneled hemodialysis catheter with no active bleeding present. The catheter itself is still sutured into the patient's chest wall. Neurological:      Mental Status: She is alert. Diagnostic Results     RADIOLOGY:  XR CHEST PORTABLE   Final Result   Cardiomegaly. No acute pulmonary disease. LABS:   No results found for this visit on 07/07/22. ED BEDSIDE ULTRASOUND:  No results found. RECENT VITALS:  BP: 124/70,Temp: 99.3 °F (37.4 °C), Heart Rate: 99, Resp: 14, SpO2: 100 %     Procedures     N/A    ED Course     Nursing Notes, Past Medical Hx, Past Surgical Hx, Social Hx,Allergies, and Family Hx were reviewed. patient was given the following medications:  No orders of the defined types were placed in this encounter. CONSULTS:  None    MEDICAL DECISIONMAKING / ASSESSMENT / PLAN     Lois Tirado is a 47 y.o. female with history of end-stage renal disease on hemodialysis presents after dislodgment of her dialysis catheter. Patient has a hole present in her chest wall where the catheter was in place but no active bleeding. She states she has had lightheadedness but this is been going on for several days and is not acute today. On arrival, patient is hemodynamically stable. She is clear breath sounds. Chest x-ray shows no evidence of pneumothorax and no retained portion of her catheter.   I did speak with interventional radiology and they will attempt to get a catheter placed on her today. At this time, care of the patient be turned over to the oncoming provider who complete the patient's work-up and disposition pending replacement of her tunneled hemodialysis catheter. Clinical Impression     1. Displacement of vascular dialysis catheter, initial encounter ([facility]        Disposition     PATIENT REFERRED TO:  No follow-up provider specified.     DISCHARGE MEDICATIONS:  New Prescriptions    No medications on file       DISPOSITION          Milo Powell MD  07/07/22 0120

## 2022-07-07 NOTE — ED PROVIDER NOTES
[address] [facility] [address] ENCOUNTER          ATTENDING PHYSICIAN NOTE       Date of evaluation: 7/7/2022    ADDENDUM:      Care of this patient was assumed from Dr. Roxana Brambila. The patient was seen for Vascular Access Problem (lost vasc cath)  . The patient's initial evaluation and plan have been discussed with the prior provider who initially evaluated the patient. Nursing Notes, Past Medical Hx, Past Surgical Hx, Social Hx, Allergies, and Family Hx were all reviewed. Diagnostic Results       RADIOLOGY:  XR CHEST PORTABLE   Final Result   Cardiomegaly. No acute pulmonary disease. IR TUNNELED CVC PLACE WO SQ PORT/PUMP > 5 YEARS    (Results Pending)       LABS:   No results found for this visit on 07/07/22. RECENT VITALS:  BP: 124/70, Temp: 99.3 °F (37.4 °C), Heart Rate: 95, Resp: 16, SpO2: 97 %     Procedures         ED Course          The patient was given the following medications:  No orders of the defined types were placed in this encounter. CONSULTS:  None    MEDICAL DECISION MAKING / ASSESSMENT / PLAN     Keith Rodriguez is a 47 y.o. female presenting with need for dialysis access. IR has replaced her dialysis access this morning, procedure went well. The patient is stable for discharge to obtain outpatient dialysis. Clinical Impression     1. [facility] vascular dialysis catheter[facility]        Disposition     PATIENT REFERRED TO:  No follow-up provider specified.     DISCHARGE MEDICATIONS:  New Prescriptions    No medications on file       DISPOSITION Decision To Discharge 07/07/2022 07:57:15 AM          Gabby Dumont MD  07/07/22 8911

## 2022-07-07 NOTE — ED NOTES
Report received from IR that dialysis cath was replaced and is ready to use.  Pt being transported back to [address] [place] [address] [address]Shadia, MACHO  07/07/22 8250

## 2022-07-12 DIAGNOSIS — N18.6 ESRD (END STAGE RENAL DISEASE) (HCC): ICD-10-CM

## 2022-07-12 DIAGNOSIS — N25.81 SECONDARY HYPERPARATHYROIDISM (HCC): ICD-10-CM

## 2022-07-12 DIAGNOSIS — E55.9 VITAMIN D DEFICIENCY: Primary | ICD-10-CM

## 2022-07-12 RX ORDER — ERGOCALCIFEROL (VITAMIN D2) 1250 MCG
50000 CAPSULE ORAL WEEKLY
Qty: 12 CAPSULE | Refills: 1 | Status: SHIPPED | OUTPATIENT
Start: 2022-07-12

## 2022-08-01 LAB
FUNGUS (MYCOLOGY) CULTURE: NORMAL
FUNGUS STAIN: NORMAL

## 2022-08-26 ENCOUNTER — HOSPITAL ENCOUNTER (OUTPATIENT)
Dept: INTERVENTIONAL RADIOLOGY/VASCULAR | Age: 52
Discharge: HOME | End: 2022-08-26
Attending: RADIOLOGY | Admitting: RADIOLOGY
Payer: COMMERCIAL

## 2022-08-26 VITALS
BODY MASS INDEX: 36.82 KG/M2 | HEART RATE: 65 BPM | OXYGEN SATURATION: 100 % | WEIGHT: 195 LBS | SYSTOLIC BLOOD PRESSURE: 138 MMHG | HEIGHT: 61 IN | TEMPERATURE: 98.2 F | RESPIRATION RATE: 16 BRPM | DIASTOLIC BLOOD PRESSURE: 61 MMHG

## 2022-08-26 PROCEDURE — 77001 FLUOROGUIDE FOR VEIN DEVICE: CPT

## 2022-08-26 PROCEDURE — C1769 GUIDE WIRE: HCPCS

## 2022-08-26 PROCEDURE — 6360000002 HC RX W HCPCS

## 2022-08-26 PROCEDURE — 36581 REPLACE TUNNELED CV CATH: CPT

## 2022-08-26 PROCEDURE — 2500000003 HC RX 250 WO HCPCS

## 2022-08-26 PROCEDURE — C1881 DIALYSIS ACCESS SYSTEM: HCPCS

## 2022-08-26 NOTE — DISCHARGE INSTRUCTIONS
[facility]  Cardiovascular Special Procedures  CENTRAL LINE PLACEMENT     Patient Information:   Your line may be placed in the chest or peripherally in the arm. The following instructions are for both. Maintain dressing after the procedure. Leave dressing on for 7 days. If the dressing becomes moist, it should be changed. If there is any leakage at the incision site, notify your doctor. Keep site clean and dry for 7 days and observe for any signs of infection. Dressing should be changed with a sterile process. Bruising and mild discomfort are expected. You may apply an ice bag to the incision area to minimize bruising, discomfort, and swelling. Contact your physician who placed the Line for any bleeding or extreme pain. Other symptoms to report are as follows:     Fever. Skin warm to touch. Numbness or weakness. Swelling of neck or arm. Redness or Tenderness along the portal site and/or the catheter tract. Drainage from the portal site, or if dressing is damp or saturated. Strenuous activities and exercise should be approached gradually. You may shower as long as you keep the incision dry while line is in place. The incision should not be immersed in water until it is completely healed. You may contact [address] [place], [place] [address]. for any questions or problems that may occur at [phone] during the hours of 9am-4pm Monday-Friday, or the hospital  after hours at [phone], to have the interventional radiologist on call paged. [facility]  Cardiovascular Special Procedures  General Discharge Instructions      ____ You may be drowsy or lightheaded after receiving sedation.  DO NOT operate a vehicle (automobile, bicycle, motorcycle, machinery, or power tools), make any important decisions or sign any important/legal documents, or drink alcoholic beverages for the next 24 hours  ____ We strongly suggest that a responsible adult be with you for the next 24 hours for your protection and safety  ____ If the intravenous catheter site is painful, apply warm wet compresses on the site until the soreness is relieved and elevate the arm above the heart. Call your physician if no improvement in 2 to 3 days    DIETARY INSTRUCTIONS:    ____ Drink extra fluids over the next 24 hours (If not contraindicated by illness or by                   physician order)  ____ Start with clear liquids and progress to normal diet as you feel like eating. If you experience nausea or repeated episodes of vomiting, which persist beyond 12-24 hours, notify your doctor        ____ Resume your previous diet    ACTIVITY INSTRUCTIONS:    ____ See other instructions  ____ No special instructions  ____ Rest for 24 hours    ____ Up as tolerated  ____ Increase activity as tolerated    Wound/Dressing Instructions:  ____ See other instructions  ____ May shower, tomorrow, but keep dressing dry  ____     MEDICATION INSTRUCTIONS:    ____ See Medication Reconciliation Sheet      SPECIAL INSTRUCTIONS:  __________________________________________________________________________________________________________________________________________________________________________________________________________________________________________                                                                                                                                                                                                                                                                                                  Please make sure that you follow-up with your doctors office for your results. Call: _________________________________     FOLLOW-UP APPOINTMENT    Follow up with MD as directed    Belongings returned to patient and/or family:     The Discharge Instructions have been explained to me. I understand and can verbalize these instructions.

## 2022-08-26 NOTE — PROCEDURES
IR Brief Postoperative Note    Scottie Chiang  YOB: 1970  [id number]    Pre-operative Diagnosis: esrd    Post-operative Diagnosis: Same    Procedure: dialysis cath exchange, ok for use    Anesthesia: local    Surgeons/Assistants: abbie    Estimated Blood Loss: Minimal    Complications: none    Specimens: were not obtained    See full procedure dictation to follow      Ángel Welsh MD MD  8/26/2022

## 2022-08-26 NOTE — H&P
Patient:  Jony Morrow   :   1970      Relevant patient history reviewed and discussed. The procedure including risks and benefits was discussed at length with the patient (or designated family member) and all questions were answered. Informed consent to proceed with the procedure was given. Condition : stable    [facility] nurses notes reviewed and agreed. Medications reviewed.   Allergies: No Known Allergies

## 2022-09-21 ENCOUNTER — HOSPITAL ENCOUNTER (EMERGENCY)
Dept: INTERVENTIONAL RADIOLOGY/VASCULAR | Age: 52
Discharge: HOME | End: 2022-09-21
Payer: COMMERCIAL

## 2022-09-21 ENCOUNTER — APPOINTMENT (OUTPATIENT)
Dept: INTERVENTIONAL RADIOLOGY/VASCULAR | Age: 52
End: 2022-09-21
Payer: COMMERCIAL

## 2022-09-21 ENCOUNTER — HOSPITAL ENCOUNTER (OUTPATIENT)
Age: 52
Setting detail: OBSERVATION
Discharge: HOME | End: 2022-09-22
Attending: EMERGENCY MEDICINE | Admitting: INTERNAL MEDICINE
Payer: COMMERCIAL

## 2022-09-21 DIAGNOSIS — N18.6 ESRD (END STAGE RENAL DISEASE) (HCC): ICD-10-CM

## 2022-09-21 DIAGNOSIS — E87.5 HYPERKALEMIA: Primary | ICD-10-CM

## 2022-09-21 PROBLEM — Z99.2 ESRD NEEDING DIALYSIS (HCC): Status: ACTIVE | Noted: 2022-09-21

## 2022-09-21 LAB
ALBUMIN SERPL-MCNC: 3.7 G/DL (ref 3.4–5)
ALBUMIN SERPL-MCNC: 4 G/DL (ref 3.4–5)
ANION GAP SERPL CALCULATED.3IONS-SCNC: 17 MMOL/L (ref 3–16)
ANION GAP SERPL CALCULATED.3IONS-SCNC: 18 MMOL/L (ref 3–16)
BUN BLDV-MCNC: 87 MG/DL (ref 7–20)
BUN BLDV-MCNC: 89 MG/DL (ref 7–20)
CALCIUM SERPL-MCNC: 8.8 MG/DL (ref 8.3–10.6)
CALCIUM SERPL-MCNC: 8.8 MG/DL (ref 8.3–10.6)
CHLORIDE BLD-SCNC: 95 MMOL/L (ref 99–110)
CHLORIDE BLD-SCNC: 99 MMOL/L (ref 99–110)
CO2: 17 MMOL/L (ref 21–32)
CO2: 19 MMOL/L (ref 21–32)
CREAT SERPL-MCNC: 7.8 MG/DL (ref 0.6–1.1)
CREAT SERPL-MCNC: 7.8 MG/DL (ref 0.6–1.1)
GFR AFRICAN AMERICAN: 7
GFR AFRICAN AMERICAN: 7
GFR NON-AFRICAN AMERICAN: 5
GFR NON-AFRICAN AMERICAN: 5
GLUCOSE BLD-MCNC: 103 MG/DL (ref 70–99)
GLUCOSE BLD-MCNC: 106 MG/DL (ref 70–99)
GLUCOSE BLD-MCNC: 118 MG/DL (ref 70–99)
PERFORMED ON: ABNORMAL
PHOSPHORUS: 6.6 MG/DL (ref 2.5–4.9)
PHOSPHORUS: 7.5 MG/DL (ref 2.5–4.9)
POTASSIUM SERPL-SCNC: 10 MMOL/L (ref 3.5–5.1)
POTASSIUM SERPL-SCNC: 6.6 MMOL/L (ref 3.5–5.1)
POTASSIUM SERPL-SCNC: 7 MMOL/L (ref 3.5–5.1)
SODIUM BLD-SCNC: 130 MMOL/L (ref 136–145)
SODIUM BLD-SCNC: 135 MMOL/L (ref 136–145)

## 2022-09-21 PROCEDURE — 99152 MOD SED SAME PHYS/QHP 5/>YRS: CPT

## 2022-09-21 PROCEDURE — 6360000002 HC RX W HCPCS: Performed by: INTERNAL MEDICINE

## 2022-09-21 PROCEDURE — C1769 GUIDE WIRE: HCPCS

## 2022-09-21 PROCEDURE — G0378 HOSPITAL OBSERVATION PER HR: HCPCS

## 2022-09-21 PROCEDURE — 84132 ASSAY OF SERUM POTASSIUM: CPT

## 2022-09-21 PROCEDURE — 6370000000 HC RX 637 (ALT 250 FOR IP): Performed by: INTERNAL MEDICINE

## 2022-09-21 PROCEDURE — 90935 HEMODIALYSIS ONE EVALUATION: CPT

## 2022-09-21 PROCEDURE — 2500000003 HC RX 250 WO HCPCS

## 2022-09-21 PROCEDURE — 36589 REMOVAL TUNNELED CV CATH: CPT

## 2022-09-21 PROCEDURE — 99285 EMERGENCY DEPT VISIT HI MDM: CPT

## 2022-09-21 PROCEDURE — 6360000002 HC RX W HCPCS

## 2022-09-21 PROCEDURE — C1881 DIALYSIS ACCESS SYSTEM: HCPCS

## 2022-09-21 PROCEDURE — 36415 COLL VENOUS BLD VENIPUNCTURE: CPT

## 2022-09-21 PROCEDURE — 99223 1ST HOSP IP/OBS HIGH 75: CPT | Performed by: INTERNAL MEDICINE

## 2022-09-21 PROCEDURE — 77001 FLUOROGUIDE FOR VEIN DEVICE: CPT

## 2022-09-21 PROCEDURE — 80069 RENAL FUNCTION PANEL: CPT

## 2022-09-21 PROCEDURE — 36558 INSERT TUNNELED CV CATH: CPT

## 2022-09-21 PROCEDURE — 2580000003 HC RX 258: Performed by: INTERNAL MEDICINE

## 2022-09-21 PROCEDURE — 90935 HEMODIALYSIS ONE EVALUATION: CPT | Performed by: INTERNAL MEDICINE

## 2022-09-21 PROCEDURE — 2580000003 HC RX 258

## 2022-09-21 PROCEDURE — C1894 INTRO/SHEATH, NON-LASER: HCPCS

## 2022-09-21 PROCEDURE — 96372 THER/PROPH/DIAG INJ SC/IM: CPT

## 2022-09-21 RX ORDER — SODIUM CHLORIDE 0.9 % (FLUSH) 0.9 %
5-40 SYRINGE (ML) INJECTION PRN
Status: DISCONTINUED | OUTPATIENT
Start: 2022-09-21 | End: 2022-09-22 | Stop reason: HOSPADM

## 2022-09-21 RX ORDER — SODIUM CHLORIDE 9 MG/ML
INJECTION, SOLUTION INTRAVENOUS PRN
Status: DISCONTINUED | OUTPATIENT
Start: 2022-09-21 | End: 2022-09-22 | Stop reason: HOSPADM

## 2022-09-21 RX ORDER — SEVELAMER CARBONATE 800 MG/1
800 TABLET, FILM COATED ORAL
Status: DISCONTINUED | OUTPATIENT
Start: 2022-09-22 | End: 2022-09-22 | Stop reason: HOSPADM

## 2022-09-21 RX ORDER — ONDANSETRON 2 MG/ML
4 INJECTION INTRAMUSCULAR; INTRAVENOUS EVERY 6 HOURS PRN
Status: DISCONTINUED | OUTPATIENT
Start: 2022-09-21 | End: 2022-09-22 | Stop reason: HOSPADM

## 2022-09-21 RX ORDER — GABAPENTIN 300 MG/1
300 CAPSULE ORAL 2 TIMES DAILY
Status: DISCONTINUED | OUTPATIENT
Start: 2022-09-21 | End: 2022-09-22 | Stop reason: HOSPADM

## 2022-09-21 RX ORDER — SODIUM CHLORIDE 0.9 % (FLUSH) 0.9 %
5-40 SYRINGE (ML) INJECTION EVERY 12 HOURS SCHEDULED
Status: DISCONTINUED | OUTPATIENT
Start: 2022-09-21 | End: 2022-09-22 | Stop reason: HOSPADM

## 2022-09-21 RX ORDER — HEPARIN SODIUM 5000 [USP'U]/ML
5000 INJECTION, SOLUTION INTRAVENOUS; SUBCUTANEOUS EVERY 8 HOURS SCHEDULED
Status: DISCONTINUED | OUTPATIENT
Start: 2022-09-21 | End: 2022-09-22 | Stop reason: HOSPADM

## 2022-09-21 RX ORDER — TORSEMIDE 100 MG/1
100 TABLET ORAL 2 TIMES DAILY
Status: DISCONTINUED | OUTPATIENT
Start: 2022-09-21 | End: 2022-09-22 | Stop reason: HOSPADM

## 2022-09-21 RX ORDER — CARVEDILOL 12.5 MG/1
12.5 TABLET ORAL 2 TIMES DAILY WITH MEALS
Status: DISCONTINUED | OUTPATIENT
Start: 2022-09-21 | End: 2022-09-22 | Stop reason: HOSPADM

## 2022-09-21 RX ORDER — AMLODIPINE BESYLATE 10 MG/1
10 TABLET ORAL DAILY
Status: DISCONTINUED | OUTPATIENT
Start: 2022-09-22 | End: 2022-09-22 | Stop reason: HOSPADM

## 2022-09-21 RX ORDER — OXYCODONE HYDROCHLORIDE 5 MG/1
5 TABLET ORAL EVERY 6 HOURS PRN
Status: DISCONTINUED | OUTPATIENT
Start: 2022-09-21 | End: 2022-09-22 | Stop reason: HOSPADM

## 2022-09-21 RX ORDER — CALCIUM GLUCONATE 94 MG/ML
1000 INJECTION, SOLUTION INTRAVENOUS
Status: DISCONTINUED | OUTPATIENT
Start: 2022-09-21 | End: 2022-09-21 | Stop reason: SDUPTHER

## 2022-09-21 RX ORDER — INSULIN LISPRO 100 [IU]/ML
0-4 INJECTION, SOLUTION INTRAVENOUS; SUBCUTANEOUS NIGHTLY
Status: DISCONTINUED | OUTPATIENT
Start: 2022-09-21 | End: 2022-09-22 | Stop reason: HOSPADM

## 2022-09-21 RX ORDER — ROSUVASTATIN CALCIUM 10 MG/1
10 TABLET, COATED ORAL NIGHTLY
Status: DISCONTINUED | OUTPATIENT
Start: 2022-09-21 | End: 2022-09-22 | Stop reason: HOSPADM

## 2022-09-21 RX ORDER — ACETAMINOPHEN 650 MG/1
650 SUPPOSITORY RECTAL EVERY 6 HOURS PRN
Status: DISCONTINUED | OUTPATIENT
Start: 2022-09-21 | End: 2022-09-22 | Stop reason: HOSPADM

## 2022-09-21 RX ORDER — ONDANSETRON 4 MG/1
4 TABLET, ORALLY DISINTEGRATING ORAL EVERY 8 HOURS PRN
Status: DISCONTINUED | OUTPATIENT
Start: 2022-09-21 | End: 2022-09-22 | Stop reason: HOSPADM

## 2022-09-21 RX ORDER — LOSARTAN POTASSIUM 50 MG/1
100 TABLET ORAL NIGHTLY
Status: DISCONTINUED | OUTPATIENT
Start: 2022-09-21 | End: 2022-09-22 | Stop reason: HOSPADM

## 2022-09-21 RX ORDER — CALCIUM GLUCONATE 94 MG/ML
2000 INJECTION, SOLUTION INTRAVENOUS ONCE
Status: DISCONTINUED | OUTPATIENT
Start: 2022-09-21 | End: 2022-09-21 | Stop reason: SDUPTHER

## 2022-09-21 RX ORDER — CALCIUM GLUCONATE 94 MG/ML
2000 INJECTION, SOLUTION INTRAVENOUS ONCE
Status: DISCONTINUED | OUTPATIENT
Start: 2022-09-21 | End: 2022-09-22 | Stop reason: HOSPADM

## 2022-09-21 RX ORDER — ACETAMINOPHEN 325 MG/1
650 TABLET ORAL EVERY 6 HOURS PRN
Status: DISCONTINUED | OUTPATIENT
Start: 2022-09-21 | End: 2022-09-22 | Stop reason: HOSPADM

## 2022-09-21 RX ORDER — INSULIN LISPRO 100 [IU]/ML
0-4 INJECTION, SOLUTION INTRAVENOUS; SUBCUTANEOUS
Status: DISCONTINUED | OUTPATIENT
Start: 2022-09-21 | End: 2022-09-22 | Stop reason: HOSPADM

## 2022-09-21 RX ORDER — ASPIRIN 81 MG/1
81 TABLET ORAL DAILY
COMMUNITY

## 2022-09-21 RX ORDER — DEXTROSE MONOHYDRATE 100 MG/ML
INJECTION, SOLUTION INTRAVENOUS CONTINUOUS PRN
Status: DISCONTINUED | OUTPATIENT
Start: 2022-09-21 | End: 2022-09-22 | Stop reason: HOSPADM

## 2022-09-21 RX ADMIN — LOSARTAN POTASSIUM 100 MG: 50 TABLET, FILM COATED ORAL at 21:50

## 2022-09-21 RX ADMIN — HEPARIN SODIUM 5000 UNITS: 5000 INJECTION INTRAVENOUS; SUBCUTANEOUS at 21:54

## 2022-09-21 RX ADMIN — GABAPENTIN 300 MG: 300 CAPSULE ORAL at 21:51

## 2022-09-21 RX ADMIN — ROSUVASTATIN 10 MG: 10 TABLET, FILM COATED ORAL at 21:50

## 2022-09-21 RX ADMIN — TORSEMIDE 100 MG: 100 TABLET ORAL at 21:50

## 2022-09-21 RX ADMIN — SODIUM CHLORIDE, PRESERVATIVE FREE 10 ML: 5 INJECTION INTRAVENOUS at 21:00

## 2022-09-21 RX ADMIN — CARVEDILOL 12.5 MG: 12.5 TABLET, FILM COATED ORAL at 21:49

## 2022-09-21 RX ADMIN — OXYCODONE 5 MG: 5 TABLET ORAL at 21:50

## 2022-09-21 ASSESSMENT — PAIN DESCRIPTION - DESCRIPTORS: DESCRIPTORS: ACHING;THROBBING

## 2022-09-21 ASSESSMENT — PAIN SCALES - GENERAL
PAINLEVEL_OUTOF10: 8
PAINLEVEL_OUTOF10: 9
PAINLEVEL_OUTOF10: 0

## 2022-09-21 ASSESSMENT — PAIN DESCRIPTION - LOCATION
LOCATION: CHEST;KNEE
LOCATION: INCISION

## 2022-09-21 ASSESSMENT — PAIN DESCRIPTION - ORIENTATION: ORIENTATION: RIGHT

## 2022-09-21 ASSESSMENT — PAIN - FUNCTIONAL ASSESSMENT: PAIN_FUNCTIONAL_ASSESSMENT: NONE - DENIES PAIN

## 2022-09-21 NOTE — H&P
Hospital Medicine History & Physical      PCP: [address] [place] [address]    Date of Admission: 2022    Date of Service: Pt seen/examined on 22 and Placed in Observation.     Chief Complaint:  HD catheter malfunction      History Of Present Illness:     47 y.o. female Sarah Oakley  history of ESRD on hemodialysis  and Friday, type 2 diabetes, hypertension, hyperlipidemia, JEAN does not use CPAP  Went for hemodialysis today were noted that her catheter was partially out, sent to the ED for further evaluation, IR was consulted from the ED who exchanged the catheter  She was noted to have hyperkalemia potassium of 7.0 with some peaked T waves on the EKG  Her last hemodialysis was on Friday, she missed it on Monday as she had to go for relatives event  She was given calcium gluconate, sodium zirconium  Nephrology consulted planning on dialysis while inpatient        Past Medical History:          Diagnosis Date    Arthritis     Diabetes mellitus ([address]     Diastolic CHF [facility]     End stage renal disease ([facility]     Hemodialysis patient ([facility] [address]     MWF    History of blood transfusion     Hyperlipidemia     Hypertension     On home O2     but does not use it    JEAN (obstructive sleep apnea)     currently not on cpap       Past Surgical History:          Procedure Laterality Date    CATARACT REMOVAL Bilateral      SECTION      DIALYSIS CATHETER INSERTION N/A 2021    LAPAROSCOPIC PERITONEAL DIALYSIS CATHETER INSERTION, OMENTOPLEXY performed by Yoel Barth DO at [facility] 2022    LAPAROSCOPIC PERITONEAL DIALYSIS CATHETER PLACEMENT performed by Yoel Barth DO at [address] [place] [address] 2021    [facility] performed by Yoel Barth DO at [address] [place] [address] 2022    [address] performed by Yoel Barth DO at [facility] Bilateral 1988    muscle surgery IR TUNNELED CATHETER PLACEMENT GREATER THAN 5 YEARS  11/15/2021    IR TUNNELED CATHETER PLACEMENT GREATER THAN 5 YEARS 11/15/2021 [facility] SPECIAL PROCEDURES    IR TUNNELED CATHETER PLACEMENT GREATER THAN 5 YEARS  6/28/2022    IR TUNNELED CATHETER PLACEMENT GREATER THAN 5 YEARS 6/28/2022 [facility] SPECIAL PROCEDURES    IR TUNNELED CATHETER PLACEMENT GREATER THAN 5 YEARS  7/7/2022    IR TUNNELED CATHETER PLACEMENT GREATER THAN 5 YEARS 7/7/2022 [facility] SPECIAL PROCEDURES    IR TUNNELED CATHETER PLACEMENT GREATER THAN 5 YEARS  9/21/2022    IR TUNNELED CATHETER PLACEMENT GREATER THAN 5 YEARS 9/21/2022 [facility] SPECIAL PROCEDURES    TOE AMPUTATION Left     left great toe partial amputation    US ASP ABSCESS/HEMATOMA/BULLA/CYST  3/28/2022    US ASP ABSCESS/HEMATOMA/BULLA/CYST 3/28/2022 [facility] ULTRASOUND       Medications Prior to Admission:      Prior to Admission medications    Medication Sig Start Date End Date Taking? Authorizing Provider   spironolactone (ALDACTONE) 50 MG tablet Take 1 tablet by mouth daily 9/20/22   ROGER Bah CNP   calcium acetate (PHOSLO) 667 MG CAPS capsule TAKE ONE GELCAP BY MOUTH WITH EACH MEAL (3 TIMES A DAY). 8/15/22   Brooklynn Garcia MD   sevelamer (RENVELA) 800 MG tablet Take 1 tablet by mouth in the morning and 1 tablet at noon and 1 tablet in the evening. Take with meals.  8/11/22   ROGER Bah CNP   ergocalciferol (ERGOCALCIFEROL) 1.25 MG (10406 UT) capsule Take 1 capsule by mouth once a week 7/12/22   ROGER Bah CNP   carvedilol (COREG) 12.5 MG tablet TAKE 1 TABLETS BY MOUTH TWICE A DAY WITH MEALS 7/10/22   Brooklynn Garcia MD   losartan (COZAAR) 100 MG tablet Take 1 tablet by mouth nightly 6/30/22   ROGER Bah CNP   torsemide [facility]) 100 MG tablet Take 1 tablet by mouth 2 times daily 6/30/22   ROGER Bah CNP   butalbital-acetaminophen-caffeine (FIORICET, ESGIC) -05 MG per tablet Take 1 tablet by mouth every 6 hours as needed for Headaches or Migraine 6/30/22 Alli Pradhan MD   B complex-vitamin C-folic acid (NEPHRO-BHANU) 1 MG tablet Take 1 tablet by mouth daily 6/29/22   Gail Gilliam MD   amLODIPine (NORVASC) 10 MG tablet Take 10 mg by mouth daily    Historical Provider, MD   rosuvastatin (CRESTOR) 10 MG tablet Take 10 mg by mouth at bedtime    Historical Provider, MD   gabapentin (NEURONTIN) 100 MG capsule Take 300 mg by mouth in the morning and at bedtime. Historical Provider, MD   chlorzoxazone (PARAFON FORTE) 500 MG tablet Take 500 mg by mouth 4 times daily as needed for Muscle spasms    Historical Provider, MD   diclofenac sodium (VOLTAREN) 1 % GEL Apply 2 g topically 4 times daily as needed for Pain    Historical Provider, MD   gentamicin (GARAMYCIN) 0.1 % ointment Apply topically as needed (apply topically to PD cath site)    Historical Provider, MD   clopidogrel (PLAVIX) 75 MG tablet Take 1 tablet by mouth daily 12/10/21   Bryanna Novak MD   nitroGLYCERIN (NITROSTAT) 0.4 MG SL tablet Place 1 tablet under the tongue every 5 minutes as needed for Chest pain 7/13/21   Alisha Ramos MD       Allergies:  Patient has no known allergies. Social History:      The patient currently lives at home    TOBACCO:   reports that she has never smoked. She has never used smokeless tobacco.  ETOH:   reports that she does not currently use alcohol. Family History:      reviwed    History reviewed. No pertinent family history. REVIEW OF SYSTEMS:   Pertinent positives as noted in the HPI. All other systems reviewed and negative. PHYSICAL EXAM PERFORMED:    /74   Pulse 66   Temp 98.5 °F (36.9 °C) (Oral)   Resp 16   Ht 5' 1\" (1.549 m)   Wt 207 lb (93.9 kg)   SpO2 99%   BMI 39.11 kg/m²     General appearance:  No apparent distress, appears stated age and cooperative. HEENT:  Normal cephalic, atraumatic without obvious deformity. Pupils equal, round, and reactive to light. Extra ocular muscles intact. Conjunctivae/corneas clear.   Neck: Supple, with full range of motion. No jugular venous distention. Trachea midline. Respiratory:  Normal respiratory effort. Clear to auscultation, bilaterally without Rales/Wheezes/Rhonchi. Cardiovascular:  Regular rate and rhythm with normal S1/S2 without murmurs, rubs or gallops. Abdomen: Soft, non-tender, non-distended with normal bowel sounds. Musculoskeletal:  No clubbing, cyanosis or edema bilaterally. Full range of motion without deformity. Skin: Skin color, texture, turgor normal.  No rashes or lesions. Neurologic:  Neurovascularly intact without any focal sensory/motor deficits. Cranial nerves: II-XII intact, grossly non-focal.  Psychiatric:  Alert and oriented, thought content appropriate, normal insight  Capillary Refill: Brisk,< 3 seconds   Peripheral Pulses: +2 palpable, equal bilaterally       Labs:     No results for input(s): WBC, HGB, HCT, PLT in the last 72 hours. Recent Labs     09/21/22  0902 09/21/22  1042   * 135*   K 10.0* 7.0*   CL 95* 99   CO2 17* 19*   BUN 87* 89*   CREATININE 7.8* 7.8*   CALCIUM 8.8 8.8   PHOS 7.5* 6.6*     No results for input(s): AST, ALT, BILIDIR, BILITOT, ALKPHOS in the last 72 hours. No results for input(s): INR in the last 72 hours. No results for input(s): [facility] in the last 72 hours. Urinalysis:      Lab Results   Component Value Date/Time    NITRU Negative 12/06/2021 03:28 PM    WBCUA 0-2 12/06/2021 03:28 PM    BACTERIA 1+ 12/06/2021 03:28 PM    RBCUA 0-2 12/06/2021 03:28 PM    BLOODU Negative 12/06/2021 03:28 PM    SPECGRAV 1.020 12/06/2021 03:28 PM    GLUCOSEU Negative 12/06/2021 03:28 PM       Radiology:     CXR: I have reviewed the CXR with the following interpretation: n/a  EKG:  I have reviewed the EKG with the following interpretation: reviewed, see HPI    IR TUNNELED CVC PLACE WO SQ PORT/PUMP > 5 YEARS   Final Result   IMPRESSION :      Removal of right jugular tunneled dialysis catheter.       Limited ultrasound examination demonstrates a patent right internal jugular vein. Placement of tunnelled right jugular dialysis catheter. Fluoroscopy time : 0.5 minutes   Number of exposures obtained : 2   Moderate sedation was provided and monitored by an independent trained observer. Moderate sedation start time : 1120   Moderate sedation end time : 1137   Blood loss : minimal (less than 5 cc)   Specimens removed : none                   ASSESSMENT/PLAN:    Active Hospital Problems    Diagnosis Date Noted    ESRD needing dialysis ([facility] [N18.6, Z99.2] 09/21/2022     Priority: Medium     ESRD needing hemodialysis, potassium 7.0, with some EKG changes, given calcium gluconate, sodium zirconium, will place on low potassium diet, nephrology consulted and plan on inpatient dialysis    Hyperkalemia monitor on telemetry  Type 2 diabetes, low-dose sliding scale insulin, hypoglycemia, point-of-care glucose checks    Hypertension, continue home medications    Admit to observation    DVT Prophylaxis: Heparin  Diet: Carb controlled, low K  Code Status: Full Code  PT/OT Eval Status: n/a    Dispo - admit obs       Anton Harkins MD    Thank you [facility] for the opportunity to be involved in this patient's care. If you have any questions or concerns please feel free to contact me at [phone].

## 2022-09-21 NOTE — ED NOTES
Repeat labs drawn from pt's IV. Pt ambulates too and from restroom. Urine sample at bedside in case of need. Lights reduced to promote comfort. Call light in reach.      Deann Redding RN  09/21/22 1742

## 2022-09-21 NOTE — ED PROVIDER NOTES
[id number]          ATTENDING PHYSICIAN NOTE       Date of evaluation: 9/21/2022    Chief Complaint     Other (Pt presents with dialysis port that needs replaced. States her doctor told her to come to ED to have her port replaced that keeps dislodging.)      History of Present Illness     Martell Horn is a 47 y.o. female who presents to the emergency department with concern for misplacement of her tunneled dialysis catheter. She reports has had issues with this before in a similar way in the continues to slip out. She says her last dialysis was Friday, approximately 5 days ago and that the catheter worked well, but noted over the weekend that it seemed to be slipping, and estimates that its moved about an inch out and that the cough is beginning to show. She went to dialysis this morning and they said they were not able to use it due to the migration, and she reports that she spoke to Dr. Anthony Brewster her nephrologist who told her to come to the emergency department to have it replaced. She says she does have an appointment tomorrow but was told to come in today. She denies any chest pain, shortness of breath, or edema. Denies any bleeding from the area. Review of Systems     Review of Systems  Pertinent positives and negatives are listed in the HPI; otherwise all systems are reviewed and were negative. Past Medical, Surgical, Family, and Social History     She has a past medical history of Arthritis, Diabetes mellitus ([address] Diastolic CHF ([address] End stage renal disease ([address] Hemodialysis patient ([address] History of blood transfusion, Hyperlipidemia, Hypertension, On home O2, and JEAN (obstructive sleep apnea). She has a past surgical history that includes Dialysis Catheter Insertion (N/A, 7/9/2021); Dialysis Catheter Removal (N/A, 11/14/2021); IR TUNNELED CVC PLACE WO SQ PORT/PUMP > 5 YEARS (11/15/2021); eye surgery (Bilateral, 1988);  Cataract removal (Bilateral);  section; Toe amputation (Left); Dialysis Catheter Insertion (N/A, 2022); US ASP ABSCESS/HEMATOMA/BULLA/CYST (3/28/2022); Dialysis Catheter Removal (N/A, 2022); IR TUNNELED CVC PLACE WO SQ PORT/PUMP > 5 YEARS (2022); IR TUNNELED CVC PLACE WO SQ PORT/PUMP > 5 YEARS (2022); and IR TUNNELED CVC PLACE WO SQ PORT/PUMP > 5 YEARS (2022). Her family history is not on file. She reports that she has never smoked. She has never used smokeless tobacco. She reports that she does not currently use alcohol. She reports that she does not currently use drugs. Medications     Previous Medications    AMLODIPINE (NORVASC) 10 MG TABLET    Take 10 mg by mouth daily    B COMPLEX-VITAMIN C-FOLIC ACID (NEPHRO-BHANU) 1 MG TABLET    Take 1 tablet by mouth daily    BUTALBITAL-ACETAMINOPHEN-CAFFEINE (FIORICET, ESGIC) -40 MG PER TABLET    Take 1 tablet by mouth every 6 hours as needed for Headaches or Migraine    CALCIUM ACETATE (PHOSLO) 667 MG CAPS CAPSULE    TAKE ONE GELCAP BY MOUTH WITH EACH MEAL (3 TIMES A DAY). CARVEDILOL (COREG) 12.5 MG TABLET    TAKE 1 TABLETS BY MOUTH TWICE A DAY WITH MEALS    CHLORZOXAZONE (PARAFON FORTE) 500 MG TABLET    Take 500 mg by mouth 4 times daily as needed for Muscle spasms    CLOPIDOGREL (PLAVIX) 75 MG TABLET    Take 1 tablet by mouth daily    DICLOFENAC SODIUM (VOLTAREN) 1 % GEL    Apply 2 g topically 4 times daily as needed for Pain    ERGOCALCIFEROL (ERGOCALCIFEROL) 1.25 MG (58949 UT) CAPSULE    Take 1 capsule by mouth once a week    GABAPENTIN (NEURONTIN) 100 MG CAPSULE    Take 300 mg by mouth in the morning and at bedtime.     GENTAMICIN (GARAMYCIN) 0.1 % OINTMENT    Apply topically as needed (apply topically to PD cath site)    LOSARTAN (COZAAR) 100 MG TABLET    Take 1 tablet by mouth nightly    NITROGLYCERIN (NITROSTAT) 0.4 MG SL TABLET    Place 1 tablet under the tongue every 5 minutes as needed for Chest pain    ROSUVASTATIN (CRESTOR) 10 MG TABLET    Take 10 mg by mouth at bedtime    SEVELAMER (RENVELA) 800 MG TABLET    Take 1 tablet by mouth in the morning and 1 tablet at noon and 1 tablet in the evening. Take with meals. SPIRONOLACTONE (ALDACTONE) 50 MG TABLET    Take 1 tablet by mouth daily    TORSEMIDE (DEMADEX) 100 MG TABLET    Take 1 tablet by mouth 2 times daily       Allergies     She has No Known Allergies. Physical Exam     INITIAL VITALS: BP: (!) 148/91, Temp: 98.5 °F (36.9 °C), Heart Rate: 70, Resp: 18, SpO2: 93 %   Physical Exam  Constitutional:       Appearance: Normal appearance. HENT:      Head: Normocephalic and atraumatic. Eyes:      Pupils: Pupils are equal, round, and reactive to light. Cardiovascular:      Rate and Rhythm: Normal rate. Comments: Right upper chest dialysis catheter in skin, no surrounding erythema/ hematoma/ discharge from site. Pulmonary:      Effort: Pulmonary effort is normal. No respiratory distress. Breath sounds: No wheezing or rales. Abdominal:      Palpations: Abdomen is soft. Tenderness: There is no abdominal tenderness. Musculoskeletal:      Cervical back: Normal range of motion. Right lower leg: No edema. Left lower leg: No edema. Neurological:      General: No focal deficit present. Mental Status: She is alert and oriented to person, place, and time. Diagnostic Results     EKG   sinus rhythm, normal rate, no QRS widening. Mild peaking of T waves noted in the precordial leads consistent with hyperkalemia. RADIOLOGY:  IR TUNNELED CVC PLACE WO SQ PORT/PUMP > 5 YEARS   Final Result   IMPRESSION :      Removal of right jugular tunneled dialysis catheter. Limited ultrasound examination demonstrates a patent right internal jugular vein. Placement of tunnelled right jugular dialysis catheter. Fluoroscopy time : 0.5 minutes   Number of exposures obtained : 2   Moderate sedation was provided and monitored by an independent trained observer.    Moderate sedation start time : 1120   Moderate sedation end time : 1137   Blood loss : minimal (less than 5 cc)   Specimens removed : none                   LABS:   Results for orders placed or performed during the hospital encounter of 09/21/22   Renal Function Panel   Result Value Ref Range    Sodium 130 (L) 136 - 145 mmol/L    Potassium 10.0 (HH) 3.5 - 5.1 mmol/L    Chloride 95 (L) 99 - 110 mmol/L    CO2 17 (L) 21 - 32 mmol/L    Anion Gap 18 (H) 3 - 16    Glucose 118 (H) 70 - 99 mg/dL    BUN 87 (HH) 7 - 20 mg/dL    Creatinine 7.8 (HH) 0.6 - 1.1 mg/dL    GFR Non- 5 (A) >60    GFR  7 (A) >60    Calcium 8.8 8.3 - 10.6 mg/dL    Phosphorus 7.5 (H) 2.5 - 4.9 mg/dL    Albumin 3.7 3.4 - 5.0 g/dL   Renal Function Panel   Result Value Ref Range    Sodium 135 (L) 136 - 145 mmol/L    Potassium 7.0 (HH) 3.5 - 5.1 mmol/L    Chloride 99 99 - 110 mmol/L    CO2 19 (L) 21 - 32 mmol/L    Anion Gap 17 (H) 3 - 16    Glucose 106 (H) 70 - 99 mg/dL    BUN 89 (HH) 7 - 20 mg/dL    Creatinine 7.8 (HH) 0.6 - 1.1 mg/dL    GFR Non- 5 (A) >60    GFR  7 (A) >60    Calcium 8.8 8.3 - 10.6 mg/dL    Phosphorus 6.6 (H) 2.5 - 4.9 mg/dL    Albumin 4.0 3.4 - 5.0 g/dL       ED BEDSIDE ULTRASOUND:  No results found. RECENT VITALS:  BP: 122/74,Temp: 98.5 °F (36.9 °C), Heart Rate: 66, Resp: 16, SpO2: 99 %     Procedures         ED Course     Nursing Notes, Past Medical Hx, Past Surgical Hx, Social Hx,Allergies, and Family Hx were reviewed.          patient was given the following medications:  Orders Placed This Encounter   Medications    DISCONTD: calcium gluconate 10 % injection 2,000 mg    sodium zirconium cyclosilicate (LOKELMA) oral suspension 5 g    DISCONTD: calcium gluconate 10 % injection 1,000 mg    calcium gluconate 10 % injection 2,000 mg    glucose chewable tablet 16 g    OR Linked Order Group     dextrose bolus 10% 125 mL     dextrose bolus 10% 250 mL    glucagon (rDNA) injection 1 mg dextrose 10 % infusion    insulin lispro (1 Unit Dial) 0-4 Units    insulin lispro (1 Unit Dial) 0-4 Units       CONSULTS:  IP CONSULT TO INTERVENTIONAL RADIOLOGY  IP CONSULT TO NEPHROLOGY  IP CONSULT TO HOSPITALIST    MEDICAL DECISIONMAKING / ASSESSMENT / Adron Manjeet is a 47 y.o. female who presents with displaced dialysis catheter and missing dialysis for the last 2 sessions. Patient is hemodynamically stable and asymptomatic. We were able to contact interventional radiology and were able to have the dialysis catheter replaced. Did obtain basic labs, and unfortunately demonstrated a relatively elevated potassium, clouded somewhat by hemolysis, but at 7.0 with some peaking of the T waves on her EKG. This is concerning given that she has missed 2 sessions of dialysis thus far. Spoke to Dr. Lolis Mcdaniel who would recommend some calcium gluconate, Lokelma, and will arrange dialysis. Spoke to hospitalist for admission. Patient remains essentially asymptomatic and hemodynamically stable. Critical Care:  Due to the immediate potential for life-threatening deterioration due to cardiac failure, critical electrolyte derangement , I spent 34 minutes providing critical care. This time excludes time spent performing procedures but includes time spent on direct patient care, history retrieval, review of the chart, and discussions with patient, family, and consultant(s). Clinical Impression     1. Hyperkalemia        Disposition     PATIENT REFERRED TO:  No follow-up provider specified.     DISCHARGE MEDICATIONS:  New Prescriptions    No medications on file       DISPOSITION Admitted 09/21/2022 03:44:39 PM         Josie Szymanski MD  09/21/22 5549

## 2022-09-21 NOTE — ED NOTES
Pt sleeping in bed at this time. Pt placed on 2L O2 via NC due to pt's oxygen dropping while sleeping. Pt's oxygen up to 98%. Lights reduced to promote comfort. Call light in reach.      Eleuterio Rae RN  09/21/22 1096

## 2022-09-22 ENCOUNTER — HOSPITAL ENCOUNTER (OUTPATIENT)
Dept: INTERVENTIONAL RADIOLOGY/VASCULAR | Age: 52
Discharge: HOME | End: 2022-09-22

## 2022-09-22 VITALS
TEMPERATURE: 98.4 F | SYSTOLIC BLOOD PRESSURE: 111 MMHG | OXYGEN SATURATION: 90 % | BODY MASS INDEX: 38.09 KG/M2 | DIASTOLIC BLOOD PRESSURE: 67 MMHG | WEIGHT: 201.72 LBS | HEART RATE: 68 BPM | HEIGHT: 61 IN | RESPIRATION RATE: 12 BRPM

## 2022-09-22 LAB
ALBUMIN SERPL-MCNC: 3.8 G/DL (ref 3.4–5)
ANION GAP SERPL CALCULATED.3IONS-SCNC: 13 MMOL/L (ref 3–16)
BASOPHILS ABSOLUTE: 0 K/UL (ref 0–0.2)
BASOPHILS RELATIVE PERCENT: 0.6 %
BUN BLDV-MCNC: 48 MG/DL (ref 7–20)
CALCIUM SERPL-MCNC: 8.7 MG/DL (ref 8.3–10.6)
CHLORIDE BLD-SCNC: 101 MMOL/L (ref 99–110)
CO2: 23 MMOL/L (ref 21–32)
CREAT SERPL-MCNC: 5.2 MG/DL (ref 0.6–1.1)
EOSINOPHILS ABSOLUTE: 0.2 K/UL (ref 0–0.6)
EOSINOPHILS RELATIVE PERCENT: 4.9 %
GFR AFRICAN AMERICAN: 10
GFR NON-AFRICAN AMERICAN: 9
GLUCOSE BLD-MCNC: 121 MG/DL (ref 70–99)
GLUCOSE BLD-MCNC: 87 MG/DL (ref 70–99)
GLUCOSE BLD-MCNC: 93 MG/DL (ref 70–99)
HBV SURFACE AB TITR SER: <3.5 MIU/ML
HCT VFR BLD CALC: 38.2 % (ref 36–48)
HEMOGLOBIN: 12.5 G/DL (ref 12–16)
HEPATITIS B SURFACE ANTIGEN INTERPRETATION: NORMAL
LYMPHOCYTES ABSOLUTE: 1.1 K/UL (ref 1–5.1)
LYMPHOCYTES RELATIVE PERCENT: 28.9 %
MCH RBC QN AUTO: 32.3 PG (ref 26–34)
MCHC RBC AUTO-ENTMCNC: 32.8 G/DL (ref 31–36)
MCV RBC AUTO: 98.3 FL (ref 80–100)
MONOCYTES ABSOLUTE: 0.7 K/UL (ref 0–1.3)
MONOCYTES RELATIVE PERCENT: 17.7 %
NEUTROPHILS ABSOLUTE: 1.9 K/UL (ref 1.7–7.7)
NEUTROPHILS RELATIVE PERCENT: 47.9 %
PDW BLD-RTO: 17.4 % (ref 12.4–15.4)
PERFORMED ON: ABNORMAL
PERFORMED ON: NORMAL
PHOSPHORUS: 6 MG/DL (ref 2.5–4.9)
PLATELET # BLD: 174 K/UL (ref 135–450)
PMV BLD AUTO: 9.6 FL (ref 5–10.5)
POTASSIUM SERPL-SCNC: 4.7 MMOL/L (ref 3.5–5.1)
RBC # BLD: 3.88 M/UL (ref 4–5.2)
SODIUM BLD-SCNC: 137 MMOL/L (ref 136–145)
WBC # BLD: 3.9 K/UL (ref 4–11)

## 2022-09-22 PROCEDURE — 85025 COMPLETE CBC W/AUTO DIFF WBC: CPT

## 2022-09-22 PROCEDURE — 99233 SBSQ HOSP IP/OBS HIGH 50: CPT | Performed by: INTERNAL MEDICINE

## 2022-09-22 PROCEDURE — 86706 HEP B SURFACE ANTIBODY: CPT

## 2022-09-22 PROCEDURE — 87340 HEPATITIS B SURFACE AG IA: CPT

## 2022-09-22 PROCEDURE — G0378 HOSPITAL OBSERVATION PER HR: HCPCS

## 2022-09-22 PROCEDURE — 36415 COLL VENOUS BLD VENIPUNCTURE: CPT

## 2022-09-22 PROCEDURE — 6370000000 HC RX 637 (ALT 250 FOR IP): Performed by: INTERNAL MEDICINE

## 2022-09-22 PROCEDURE — 6360000002 HC RX W HCPCS: Performed by: INTERNAL MEDICINE

## 2022-09-22 PROCEDURE — 96372 THER/PROPH/DIAG INJ SC/IM: CPT

## 2022-09-22 PROCEDURE — 2580000003 HC RX 258: Performed by: INTERNAL MEDICINE

## 2022-09-22 PROCEDURE — 80069 RENAL FUNCTION PANEL: CPT

## 2022-09-22 RX ORDER — BUTALBITAL, ACETAMINOPHEN AND CAFFEINE 50; 325; 40 MG/1; MG/1; MG/1
1 TABLET ORAL ONCE
Status: COMPLETED | OUTPATIENT
Start: 2022-09-22 | End: 2022-09-22

## 2022-09-22 RX ADMIN — ONDANSETRON 4 MG: 4 TABLET, ORALLY DISINTEGRATING ORAL at 06:51

## 2022-09-22 RX ADMIN — OXYCODONE 5 MG: 5 TABLET ORAL at 05:54

## 2022-09-22 RX ADMIN — TORSEMIDE 100 MG: 100 TABLET ORAL at 08:41

## 2022-09-22 RX ADMIN — GABAPENTIN 300 MG: 300 CAPSULE ORAL at 08:41

## 2022-09-22 RX ADMIN — SODIUM ZIRCONIUM CYCLOSILICATE 5 G: 5 POWDER, FOR SUSPENSION ORAL at 01:43

## 2022-09-22 RX ADMIN — SEVELAMER CARBONATE 800 MG: 800 TABLET, FILM COATED ORAL at 08:41

## 2022-09-22 RX ADMIN — SODIUM ZIRCONIUM CYCLOSILICATE 5 G: 5 POWDER, FOR SUSPENSION ORAL at 09:07

## 2022-09-22 RX ADMIN — AMLODIPINE BESYLATE 10 MG: 10 TABLET ORAL at 08:41

## 2022-09-22 RX ADMIN — CARVEDILOL 12.5 MG: 12.5 TABLET, FILM COATED ORAL at 08:41

## 2022-09-22 RX ADMIN — SODIUM CHLORIDE, PRESERVATIVE FREE 10 ML: 5 INJECTION INTRAVENOUS at 08:41

## 2022-09-22 RX ADMIN — HEPARIN SODIUM 5000 UNITS: 5000 INJECTION INTRAVENOUS; SUBCUTANEOUS at 05:54

## 2022-09-22 RX ADMIN — BUTALBITAL, ACETAMINOPHEN, AND CAFFEINE 1 TABLET: 50; 325; 40 TABLET ORAL at 09:18

## 2022-09-22 ASSESSMENT — PAIN DESCRIPTION - LOCATION
LOCATION: LEG;HEAD
LOCATION: CHEST;KNEE;HEAD
LOCATION: HEAD

## 2022-09-22 ASSESSMENT — PAIN SCALES - GENERAL
PAINLEVEL_OUTOF10: 2
PAINLEVEL_OUTOF10: 2
PAINLEVEL_OUTOF10: 7
PAINLEVEL_OUTOF10: 9

## 2022-09-22 ASSESSMENT — PAIN DESCRIPTION - ORIENTATION
ORIENTATION: RIGHT
ORIENTATION: RIGHT
ORIENTATION: MID

## 2022-09-22 ASSESSMENT — PAIN DESCRIPTION - FREQUENCY: FREQUENCY: CONTINUOUS

## 2022-09-22 ASSESSMENT — PAIN DESCRIPTION - DESCRIPTORS
DESCRIPTORS: THROBBING
DESCRIPTORS: DISCOMFORT;ACHING
DESCRIPTORS: ACHING;THROBBING

## 2022-09-22 ASSESSMENT — PAIN DESCRIPTION - PAIN TYPE: TYPE: ACUTE PAIN

## 2022-09-22 ASSESSMENT — PAIN DESCRIPTION - ONSET: ONSET: ON-GOING

## 2022-09-22 NOTE — FLOWSHEET NOTE
Treatment time: 3 hours  Net UF: 2000 ml     Pre weight: 93.8 kg   Post weight: 91.8 kg     Access used: RI TD  Access function: Good with  ml/min     Medications or blood products given: N/A     Regular outpatient schedule: MWF     Summary of response to treatment:    09/21/22 1600 09/21/22 1912   Vital Signs   BP (!) 148/69 (!) 181/96   Temp 98.3 °F (36.8 °C) 98.3 °F (36.8 °C)   Heart Rate 67 66   Resp 16 16   Weight 206 lb 12.7 oz (93.8 kg) 202 lb 6.1 oz (91.8 kg)   Weight Method Actual;Standing scale Standing scale   Percent Weight Change -0.1 -2.13   Pain Assessment   Pain Assessment None - Denies Pain None - Denies Pain   Post-Hemodialysis Assessment   Post-Treatment Procedures  --  Blood returned;Catheter capped, clamped and heparinized x 2 ports   Machine Disinfection Process  --  Acid/Vinegar Clean;Bleach; Exterior Machine Disinfection   Rinseback Volume (ml)  --  400 ml   Blood Volume Processed (Liters)  --  57.4 l/min   Dialyzer Clearance  --  Moderately streaked   Duration of Treatment (minutes)  --  180 minutes   Heparin Amount Administered During Treatment (mL)  --  0 mL   Hemodialysis Intake (ml)  --  400 ml   Hemodialysis Output (ml)  --  2400 ml   NET Removed (ml)  --  2000   Tolerated Treatment  --  Good   Copy of dialysis treatment record placed in chart, to be scanned into EMR. Report provided to Mary Jane Madrigal RN at bedside.

## 2022-09-22 NOTE — PLAN OF CARE
Patient voiced pain upon admission to cath site and right knee. New order obtained for PRN oxycodone. Pain med given with positive effect. Patient remains free from falls and injury at this time.   Problem: Pain  Goal: Verbalizes/displays adequate comfort level or baseline comfort level  Outcome: Progressing     Problem: Safety - Adult  Goal: Free from fall injury  Outcome: Progressing

## 2022-09-22 NOTE — PROGRESS NOTES
Office : [phone]     Fax :[phone]         Renal Progress Note  Subjective:   Admit Date: 9/21/2022     HPI: 47 y.o. female Oj Moser  history of ESRD on hemodialysis Monday Wednesday and Friday, type 2 diabetes, hypertension, hyperlipidemia, JEAN does not use CPAP  Went for hemodialysis today were noted that her catheter was partially out, sent to the ED for further evaluation, IR was consulted from the ED who exchanged the catheter  She was noted to have hyperkalemia potassium of 7.0 with some peaked T waves on the EKG  Her last hemodialysis was on Friday, she missed it on Monday as she had to go for relatives event     Interval History: Patient was seen and examined this morning at bedside. She was resting comfortably in her bed. No acute events overnight. She has no new complaints at this time. Catheter replaced by IR yesterday  Tolerated dialysis well yesterday  Potassium within normal limits this morning      DIET ADULT DIET; Regular; 4 carb choices (60 gm/meal); Low Potassium (Less than 3000 mg/day);  Low Phosphorus (Less than 1000 mg)  Medications:   Scheduled Meds:   sodium zirconium cyclosilicate  5 g Oral TID    calcium gluconate  2,000 mg IntraVENous Once    amLODIPine  10 mg Oral Daily    carvedilol  12.5 mg Oral BID WC    gabapentin  300 mg Oral BID    losartan  100 mg Oral Nightly    rosuvastatin  10 mg Oral Nightly    sevelamer  800 mg Oral TID WC    torsemide  100 mg Oral BID    sodium chloride flush  5-40 mL IntraVENous 2 times per day    heparin (porcine)  5,000 Units SubCUTAneous 3 times per day    insulin lispro  0-4 Units SubCUTAneous TID WC    insulin lispro  0-4 Units SubCUTAneous Nightly     Continuous Infusions:   sodium chloride      dextrose         Labs:  CBC:   Recent Labs     09/22/22  0638   WBC 3.9*   HGB 12.5        BMP:    Recent Labs     09/21/22  0902 09/21/22  1042 09/21/22  1516 09/22/22  0638   * 135*  --  137   K 10.0* 7.0* 6.6* 4.7   CL 95* 99  --  101   CO2 17* 19*  --  23   BUN 87* 89*  --  48*   CREATININE 7.8* 7.8*  --  5.2*   GLUCOSE 118* 106*  --  87     Ca/Mg/Phos:   Recent Labs     09/21/22  0902 09/21/22  1042 09/22/22  0638   CALCIUM 8.8 8.8 8.7   PHOS 7.5* 6.6* 6.0*     Hepatic: No results for input(s): AST, ALT, ALB, BILITOT, ALKPHOS in the last 72 hours. Troponin: No results for input(s): TROPONINI in the last 72 hours. BNP: No results for input(s): BNP in the last 72 hours. Lipids: No results for input(s): CHOL, TRIG, HDL, LDLCALC, LABVLDL in the last 72 hours. ABGs: No results for input(s): PHART, PO2ART, [id number] in the last 72 hours. INR: No results for input(s): INR in the last 72 hours. UA:No results for input(s): Delwin Minion, GLUCOSEU, BILIRUBINUR, Frankey Raker, BLOODU, PHUR, PROTEINU, UROBILINOGEN, NITRU, LEUKOCYTESUR, LABMICR, URINETYPE in the last 72 hours. Urine Microscopic: No results for input(s): LABCAST, BACTERIA, COMU, HYALCAST, WBCUA, RBCUA, EPIU in the last 72 hours. Urine Culture: No results for input(s): LABURIN in the last 72 hours. Urine Chemistry: No results for input(s): Volney Ly, PROTEINUR, NAUR in the last 72 hours.     Objective:   Vitals: /67   Pulse 68   Temp 98.4 °F (36.9 °C) (Oral)   Resp 12   Ht 5' 1\" (1.549 m)   Wt 201 lb 11.5 oz (91.5 kg)   SpO2 90%   BMI 38.11 kg/m²    Wt Readings from Last 3 Encounters:   09/22/22 201 lb 11.5 oz (91.5 kg)   08/26/22 195 lb (88.5 kg)   07/07/22 199 lb 11.2 oz (90.6 kg)      24HR INTAKE/OUTPUT:    Intake/Output Summary (Last 24 hours) at 9/22/2022 1020  Last data filed at 9/22/2022 0927  Gross per 24 hour   Intake 520 ml   Output 2400 ml   Net -1880 ml     Constitutional:  Alert, awake, no apparent distress  NECK: supple, no JVD  Cardiovascular:  S1, S2 without m/r/g  Respiratory:  CTA B without w/r/r  Abdomen: +bs, soft, nt  Ext: no LE edema    Assessment and Plan:       IMAGING:  IR TUNNELED CVC PLACE WO SQ PORT/PUMP > 5 YEARS   Final Result   IMPRESSION :      Removal of right jugular tunneled dialysis catheter. Limited ultrasound examination demonstrates a patent right internal jugular vein. Placement of tunnelled right jugular dialysis catheter. Fluoroscopy time : 0.5 minutes   Number of exposures obtained : 2   Moderate sedation was provided and monitored by an independent trained observer. Moderate sedation start time : 1120   Moderate sedation end time : 1137   Blood loss : minimal (less than 5 cc)   Specimens removed : none                   Assessment/Plan     ESRD     2. HTN     3. Anemia     4. Acid- base/ Electrolyte imbalance      5. Hyperkalemia      Plan   -HD tomorrow  - UF as dian   - need good compliance with HD and diet   - BP management   - Anemia management   - MBD management     Patient has been staffed with Dr. Ash Cooper MD.       Chet Dodd DO   [facility] [address] [place] [address] 18 07      I have seen the patient independently from the resident . I discussed the care with the resident. I personally reviewed the HPI, PH, FH, SH, ROS and medications. I repeated pertinent portions of the examination and reviewed the relevant imaging and laboratory data.  I agree with the findings, assessment and plan as documented, with the following addendum:      Rodríguez Earl MD

## 2022-09-22 NOTE — CARE COORDINATION
Case Management Assessment            Discharge Note                    Date / Time of Note: 9/22/2022 9:12 AM                  Discharge Note Completed by: Aris Johnson RN    Patient Name: Jony Morrow   YOB: 1970  Diagnosis: Hyperkalemia [E87.5]  ESRD needing dialysis [facility]) [N18.6, Z99.2]   Date / Time: 9/21/2022  8:02 AM    Current PCP: Angel Haywood patient: No    Hospitalization in the last 30 days: No    Advance Directives:  Code Status: Full Code  [place] DNR form completed and on chart: No    Financial:  Payor: UMR / Plan: UMR  / Product Type: *No Product type* /      Pharmacy:    [address] [place] [address] [phone] - f [phone]  [address] [facility]. [address] [facility]  Phone: [phone] Fax: [phone]    [facility] [id number] [facility], [address] [facility] [phone] - f [phone]  . [address] [address]  Phone: [phone] Fax: [phone]    [address] [facility] [phone] - f [phone]  [facility] [address]  Phone: [phone] Fax: [phone]      Assistance purchasing medications?: Potential Assistance Purchasing Medications: No  Assistance provided by Case Management: None at this time    Does patient want to participate in local refill/ meds to beds program?:      Meds To Beds General Rules:  1. Can ONLY be done Monday- Friday between 8:30am-5pm  2. Prescription(s) must be in pharmacy by 3pm to be filled same day  3. Copy of patient's insurance/ prescription drug card and patient face sheet must be sent along with the prescription(s)  4. Cost of Rx cannot be added to hospital bill. If financial assistance is needed, please contact unit  or ;  or  CANNOT provide pharmacy voucher for patients co-pays  5.  Patients can then  the prescription on their way out of the hospital at discharge, or pharmacy can deliver to the bedside if staff is available. (payment due at time of pick-up or delivery - cash, check, or card accepted)     Able to afford home medications/ co-pay costs: Yes    ADLS:  Current PT AM-PAC Score:   /24  Current OT AM-PAC Score:   /24      DISCHARGE Disposition: Home- No Services Needed    LOC at discharge: Not Applicable  DANIEL Completed: No    Notification completed in HENS/PAS?:  Not Applicable    IMM Completed:   Not Indicated    Transportation:  Transportation PLAN for discharge: family   Mode of Transport: Private Car  Reason for medical transport: Not Applicable  Name of [address] [place] [address],[address]: Not Applicable  Time of Transport: when family available    Transport form completed: Not Indicated    Home Care:  [address] ordered at discharge: No  [address]: Not Applicable  Orders faxed: No    Durable Medical Equipment:  DME Provider: [facility]  Equipment obtained during hospitalization: NA    Home Oxygen and Respiratory Equipment:  Oxygen needed at discharge?: Not [address]: Not Applicable  Portable tank available for discharge?: Not Indicated    Dialysis:  Dialysis patient: Yes    Dialysis Center:        Hospice Services:  Location: Not Applicable  Agency: Not Applicable    Consents signed: Not Indicated    Referrals made at [facility] for outpatient continued care:  Not Applicable    Additional CM Notes:     CM confirmed  w/  patient  she  plans to d/c home   Patient to follow up outpt  as instructed  :     Resume  HD  :  MWF  :  [facility] w/  Dr Lauro Jacobson:    Schedule:  -1130  AM .      No other CM  needs at this time  . Patient  drove herself here  and  her  car in  the  ER parking lot and  plans to drive herself  home  later today.         The Plan for Transition of Care is related to the following treatment goals of Hyperkalemia [E87.5]  ESRD needing dialysis ([facility] [address] [N18.6, Z99.2]    The Patient and/or patient representative Hermelinda Delfina and her family were provided with a choice of provider and agrees with the discharge plan Yes    Freedom of choice list was provided with basic dialogue that supports the patient's individualized plan of care/goals and shares the quality data associated with the providers.  Yes    Care Transitions patient: No    Ottoniel Uribe RN  [facility] [employer]  Case Management Department  Ph: [phone]

## 2022-09-22 NOTE — PROGRESS NOTES
Discharge instructions gone over with patient. Medications and side effects explained. Aware of upcoming f/u appointments. Verbalized understanding of instructions with no further questions. IV removed with no complications. Tele removed. Vas cath dressing changed utilizing sterile technique with no complications. Left floor via wheelchair with RN and all personal belongings.

## 2022-09-22 NOTE — CONSULTS
Nephrology Consult Note                                                                                                                                                                                                                                                                                                                                                               Office : [phone]     Fax :[phone]              Patient's Name: Sarah Oakley  9:46 AM  2022    Reason for Consult:  ESRD   Requesting Physician:  Rachid Lu      Chief Complaint:  Hyperkalemia      History of Present Ilness:    47 y.o. female Sarah Oakley  history of ESRD on hemodialysis  and Friday, type 2 diabetes, hypertension, hyperlipidemia, JEAN does not use CPAP  Went for hemodialysis today were noted that her catheter was partially out, sent to the ED for further evaluation, IR was consulted from the ED who exchanged the catheter  She was noted to have hyperkalemia potassium of 7.0 with some peaked T waves on the EKG  Her last hemodialysis was on Friday, she missed it on Monday as she had to go for relatives event    Pt seen on HD dian well     Past Medical History:   Diagnosis Date    Arthritis     Diabetes mellitus ([address]     Diastolic CHF ([address]     End stage renal disease ([address]     Hemodialysis patient ([address]     MWF    History of blood transfusion     Hyperlipidemia     Hypertension     On home O2     but does not use it    JEAN (obstructive sleep apnea)     currently not on cpap       Past Surgical History:   Procedure Laterality Date    CATARACT REMOVAL Bilateral      SECTION      DIALYSIS CATHETER INSERTION N/A 2021    LAPAROSCOPIC PERITONEAL DIALYSIS CATHETER INSERTION, OMENTOPLEXY performed by Yoel Barth DO at [address] [facility] 2022    LAPAROSCOPIC PERITONEAL DIALYSIS CATHETER PLACEMENT performed by Yoel Barth DO at [address] [place] [address] 11/14/2021    CATHETER REMOVAL PERITONEAL DIALYSIS performed by Alvaro Romero DO at [address] N/A 6/28/2022    PERITONEAL DIALYSIS CATHETER REMOVAL performed by Alvaro Romero DO at [address] [facility] Bilateral 1988    muscle surgery     IR TUNNELED CATHETER PLACEMENT GREATER THAN 5 YEARS  11/15/2021    IR TUNNELED CATHETER PLACEMENT GREATER THAN 5 YEARS 11/15/2021 [facility]    IR TUNNELED CATHETER PLACEMENT GREATER THAN 5 YEARS  6/28/2022    IR TUNNELED CATHETER PLACEMENT GREATER THAN 5 YEARS 6/28/2022 [facility]ED[facility]    IR TUNNELED CATHETER PLACEMENT GREATER THAN 5 YEARS  7/7/2022    IR TUNNELED CATHETER PLACEMENT GREATER THAN 5 YEARS 7/7/2022 [facility] PROC[facility]    IR TUNNELED CATHETER PLACEMENT GREATER THAN 5 YEARS  9/21/2022    IR TUNNELED CATHETER PLACEMENT GREATER THAN 5 YEARS 9/21/2022 [facility] PROCEDURES    TOE AMPUTATION Left     left great toe partial amputation    US ASP ABSCESS/HEMATOMA/BULLA/CYST  3/28/2022    US ASP ABSCESS/HEMATOMA/BULLA/CYST 3/28/2022 [facility] ULTRASOUND       History reviewed. No pertinent family history. reports that she has never smoked. She has never used smokeless tobacco. She reports that she does not currently use alcohol. She reports that she does not currently use drugs. Allergies:  Patient has no known allergies.     Current Medications:    sodium zirconium cyclosilicate (LOKELMA) oral suspension 5 g, TID  calcium gluconate 10 % injection 2,000 mg, Once  amLODIPine (NORVASC) tablet 10 mg, Daily  carvedilol (COREG) tablet 12.5 mg, BID WC  gabapentin (NEURONTIN) capsule 300 mg, BID  losartan (COZAAR) tablet 100 mg, Nightly  rosuvastatin (CRESTOR) tablet 10 mg, Nightly  sevelamer (RENVELA) tablet 800 mg, TID WC  torsemide (DEMADEX) tablet 100 mg, BID  sodium chloride flush 0.9 % injection 5-40 mL, 2 times per day  sodium chloride flush 0.9 % injection 5-40 mL, PRN  0.9 % sodium chloride infusion, PRN  ondansetron HDL, LDLCALC, LABVLDL in the last 72 hours. ABGs: No results for input(s): PHART, PO2ART, [id number] in the last 72 hours. INR: No results for input(s): INR in the last 72 hours. UA:No results for input(s): Tee Sous, GLUCOSEU, BILIRUBINUR, Charl Gosselin, BLOODU, PHUR, PROTEINU, UROBILINOGEN, NITRU, LEUKOCYTESUR, LABMICR, URINETYPE in the last 72 hours. Urine Microscopic: No results for input(s): LABCAST, BACTERIA, COMU, HYALCAST, WBCUA, RBCUA, EPIU in the last 72 hours. Urine Culture: No results for input(s): LABURIN in the last 72 hours. Urine Chemistry: No results for input(s): Jennie Sadi, PROTEINUR, NAUR in the last 72 hours. IMAGING:  IR TUNNELED CVC PLACE WO SQ PORT/PUMP > 5 YEARS   Final Result   IMPRESSION :      Removal of right jugular tunneled dialysis catheter. Limited ultrasound examination demonstrates a patent right internal jugular vein. Placement of tunnelled right jugular dialysis catheter. Fluoroscopy time : 0.5 minutes   Number of exposures obtained : 2   Moderate sedation was provided and monitored by an independent trained observer. Moderate sedation start time : 1120   Moderate sedation end time : 1137   Blood loss : minimal (less than 5 cc)   Specimens removed : none                   Assessment/Plan   ESRD    2. HTN    3. Anemia    4. Acid- base/ Electrolyte imbalance     5.  Hyperkalemia     Plan   - HD today   - UF as dian   - Low K bath   - need good compliance with HD and diet   - labs in am   - BP management   - Anemia management   - MBD management                 Thank you for allowing us to participate in care of Eusebia Navarro MD  Feel free to contact me   [facility] [address]  Office : [phone]  Fax :[phone]

## 2022-10-05 LAB
EKG ATRIAL RATE: 64 BPM
EKG DIAGNOSIS: NORMAL
EKG P AXIS: 56 DEGREES
EKG P-R INTERVAL: 194 MS
EKG Q-T INTERVAL: 372 MS
EKG QRS DURATION: 74 MS
EKG QTC CALCULATION (BAZETT): 383 MS
EKG R AXIS: 28 DEGREES
EKG T AXIS: 41 DEGREES
EKG VENTRICULAR RATE: 64 BPM

## 2022-10-11 NOTE — DISCHARGE SUMMARY
Hospital Medicine Discharge Summary    Patient ID: Martell Horn      Patient's PCP: Tereso Barahona    Admit Date: 9/21/2022     Discharge Date: 9/22/2022    Admitting Physician: Eusebia Sanchez MD     Discharge Physician: Eusebia Sanchez MD     Discharge Diagnoses: Active Hospital Problems    Diagnosis Date Noted    [facility] [N18.6, Z99.2] 09/21/2022     Priority: Medium       The patient was seen and examined on day of discharge and this discharge summary is in conjunction with any daily progress note from day of discharge. Hospital Course:   47 y.o. female Martell Horn  history of ESRD on hemodialysis Monday Wednesday and Friday, type 2 diabetes, hypertension, hyperlipidemia, JEAN does not use CPAP  Went for hemodialysis today were noted that her catheter was partially out, sent to the ED for further evaluation, IR was consulted from the ED who exchanged the catheter  She was noted to have hyperkalemia potassium of 7.0 with some peaked T waves on the EKG  Her last hemodialysis was on Friday, she missed it on Monday as she had to go for relatives event  She was given calcium gluconate, sodium zirconium  Nephrology consulted planning on dialysis while inpatient    Patient underwent HD while inpatient, hyperkalemia resolved and patient was discharged in stable condition      Physical Exam Performed:     /67   Pulse 68   Temp 98.4 °F (36.9 °C) (Oral)   Resp 12   Ht 5' 1\" (1.549 m)   Wt 201 lb 11.5 oz (91.5 kg)   SpO2 90%   BMI 38.11 kg/m²       General appearance:  No apparent distress, appears stated age and cooperative. HEENT:  Normal cephalic, atraumatic without obvious deformity. Pupils equal, round, and reactive to light. Extra ocular muscles intact. Conjunctivae/corneas clear. Neck: Supple, with full range of motion. No jugular venous distention. Trachea midline. Respiratory:  Normal respiratory effort.  Clear to auscultation, bilaterally without Rales/Wheezes/Rhonchi. Cardiovascular:  Regular rate and rhythm with normal S1/S2 without murmurs, rubs or gallops. Abdomen: Soft, non-tender, non-distended with normal bowel sounds. Musculoskeletal:  No clubbing, cyanosis or edema bilaterally. Full range of motion without deformity. Skin: Skin color, texture, turgor normal.  No rashes or lesions. Neurologic:  Neurovascularly intact without any focal sensory/motor deficits. Cranial nerves: II-XII intact, grossly non-focal.  Psychiatric:  Alert and oriented, thought content appropriate, normal insight  Capillary Refill: Brisk,< 3 seconds   Peripheral Pulses: +2 palpable, equal bilaterally       Labs: For convenience and continuity at follow-up the following most recent labs are provided:      CBC:    Lab Results   Component Value Date/Time    WBC 3.9 09/22/2022 06:38 AM    HGB 12.5 09/22/2022 06:38 AM    HCT 38.2 09/22/2022 06:38 AM     09/22/2022 06:38 AM       Renal:    Lab Results   Component Value Date/Time     09/22/2022 06:38 AM    K 4.7 09/22/2022 06:38 AM    K 3.8 06/24/2022 03:25 PM     09/22/2022 06:38 AM    CO2 23 09/22/2022 06:38 AM    BUN 48 09/22/2022 06:38 AM    CREATININE 5.2 09/22/2022 06:38 AM    CALCIUM 8.7 09/22/2022 06:38 AM    PHOS 6.0 09/22/2022 06:38 AM         Significant Diagnostic Studies    Radiology:   IR TUNNELED CVC PLACE WO SQ PORT/PUMP > 5 YEARS   Final Result   IMPRESSION :      Removal of right jugular tunneled dialysis catheter. Limited ultrasound examination demonstrates a patent right internal jugular vein. Placement of tunnelled right jugular dialysis catheter. Fluoroscopy time : 0.5 minutes   Number of exposures obtained : 2   Moderate sedation was provided and monitored by an independent trained observer.    Moderate sedation start time : 1120   Moderate sedation end time : 1137   Blood loss : minimal (less than 5 cc)   Specimens removed : none                      Consults:     IP CONSULT TO INTERVENTIONAL RADIOLOGY  IP CONSULT TO NEPHROLOGY  IP CONSULT TO HOSPITALIST    Disposition:  Home     Condition at Discharge: Stable    Discharge Instructions/Follow-up:    Follow up with PCP, nephrology    Code Status:  Full Code    Activity: activity as tolerated    Diet: renal diet      Discharge Medications:     Discharge Medication List as of 9/22/2022 10:39 AM             Details   aspirin 81 MG EC tablet Take 81 mg by mouth dailyHistorical Med      spironolactone (ALDACTONE) 50 MG tablet Take 1 tablet by mouth daily, Disp-90 tablet, R-0Normal      calcium acetate (PHOSLO) 667 MG CAPS capsule TAKE ONE GELCAP BY MOUTH WITH EACH MEAL (3 TIMES A DAY). , Disp-90 capsule, R-0Normal      sevelamer (RENVELA) 800 MG tablet Take 1 tablet by mouth in the morning and 1 tablet at noon and 1 tablet in the evening. Take with meals. , Disp-270 tablet, R-1Normal      ergocalciferol (ERGOCALCIFEROL) 1.25 MG (12568 UT) capsule Take 1 capsule by mouth once a week, Disp-12 capsule, R-1Normal      carvedilol (COREG) 12.5 MG tablet TAKE 1 TABLETS BY MOUTH TWICE A DAY WITH MEALS, Disp-60 tablet, R-0Normal      losartan (COZAAR) 100 MG tablet Take 1 tablet by mouth nightly, Disp-90 tablet, R-1Normal      torsemide (DEMADEX) 100 MG tablet Take 1 tablet by mouth 2 times daily, Disp-60 tablet, R-2Normal      butalbital-acetaminophen-caffeine (FIORICET, ESGIC) -40 MG per tablet Take 1 tablet by mouth every 6 hours as needed for Headaches or Migraine, Disp-180 tablet, R-5Normal      B complex-vitamin C-folic acid (NEPHRO-BHANU) 1 MG tablet Take 1 tablet by mouth daily, Disp-30 tablet, R-3Print      amLODIPine (NORVASC) 10 MG tablet Take 10 mg by mouth dailyHistorical Med      rosuvastatin (CRESTOR) 10 MG tablet Take 10 mg by mouth at bedtimeHistorical Med      gabapentin (NEURONTIN) 100 MG capsule Take 300 mg by mouth in the morning and at bedtime. Historical Med      chlorzoxazone (PARAFON FORTE) 500 MG tablet Take 500 mg by mouth 4 times daily as needed for Muscle spasmsHistorical Med      diclofenac sodium (VOLTAREN) 1 % GEL Apply 2 g topically 4 times daily as needed for Pain, Topical, 4 TIMES DAILY PRN, Historical Med      gentamicin (GARAMYCIN) 0.1 % ointment Apply topically as needed (apply topically to PD cath site), Topical, PRN, Historical Med      clopidogrel (PLAVIX) 75 MG tablet Take 1 tablet by mouth daily, Disp-30 tablet, R-3Normal      nitroGLYCERIN (NITROSTAT) 0.4 MG SL tablet Place 1 tablet under the tongue every 5 minutes as needed for Chest pain, Disp-5 tablet, R-1Normal             Time Spent on discharge is more than 30 minutes in the examination, evaluation, counseling and review of medications and discharge plan. Signed:    Chiquita Stack MD   10/11/2022      Thank you Ramiro Escudero for the opportunity to be involved in this patient's care. If you have any questions or concerns please feel free to contact me at [phone].

## 2023-05-26 RX ORDER — PROMETHAZINE HYDROCHLORIDE 12.5 MG/1
12.5 TABLET ORAL 3 TIMES DAILY PRN
Qty: 12 TABLET | Refills: 0 | Status: SHIPPED | OUTPATIENT
Start: 2023-05-26 | End: 2023-06-02

## 2023-07-03 RX ORDER — LOPERAMIDE HYDROCHLORIDE 2 MG/1
2 CAPSULE ORAL 4 TIMES DAILY PRN
Qty: 20 CAPSULE | Refills: 0 | Status: SHIPPED | OUTPATIENT
Start: 2023-07-03 | End: 2023-07-13

## 2023-07-07 DIAGNOSIS — N25.81 SECONDARY HYPERPARATHYROIDISM (HCC): ICD-10-CM

## 2023-07-07 DIAGNOSIS — E55.9 VITAMIN D DEFICIENCY: ICD-10-CM

## 2023-07-07 DIAGNOSIS — N18.6 ESRD (END STAGE RENAL DISEASE) (HCC): ICD-10-CM

## 2023-07-07 RX ORDER — ERGOCALCIFEROL 1.25 MG/1
50000 CAPSULE ORAL WEEKLY
Qty: 24 CAPSULE | Refills: 1 | Status: SHIPPED | OUTPATIENT
Start: 2023-07-07

## 2023-08-30 ENCOUNTER — OFFICE VISIT (OUTPATIENT)
Dept: PAIN MANAGEMENT | Age: 53
End: 2023-08-30
Payer: COMMERCIAL

## 2023-08-30 VITALS
SYSTOLIC BLOOD PRESSURE: 105 MMHG | DIASTOLIC BLOOD PRESSURE: 62 MMHG | HEIGHT: 61 IN | BODY MASS INDEX: 40.22 KG/M2 | OXYGEN SATURATION: 94 % | WEIGHT: 213 LBS | HEART RATE: 71 BPM

## 2023-08-30 DIAGNOSIS — M47.817 LUMBOSACRAL SPONDYLOSIS WITHOUT MYELOPATHY: ICD-10-CM

## 2023-08-30 DIAGNOSIS — G89.4 CHRONIC PAIN SYNDROME: Primary | ICD-10-CM

## 2023-08-30 DIAGNOSIS — G43.909 EPISODIC MIGRAINE: ICD-10-CM

## 2023-08-30 DIAGNOSIS — N18.6 ESRD (END STAGE RENAL DISEASE) ON DIALYSIS (HCC): ICD-10-CM

## 2023-08-30 DIAGNOSIS — I50.43 CHF (CONGESTIVE HEART FAILURE), NYHA CLASS I, ACUTE ON CHRONIC, COMBINED (HCC): ICD-10-CM

## 2023-08-30 DIAGNOSIS — Z99.2 ESRD (END STAGE RENAL DISEASE) ON DIALYSIS (HCC): ICD-10-CM

## 2023-08-30 DIAGNOSIS — M47.812 CERVICAL SPONDYLOSIS: ICD-10-CM

## 2023-08-30 DIAGNOSIS — Z51.81 ENCOUNTER FOR THERAPEUTIC DRUG MONITORING: ICD-10-CM

## 2023-08-30 PROCEDURE — G8417 CALC BMI ABV UP PARAM F/U: HCPCS | Performed by: NURSE PRACTITIONER

## 2023-08-30 PROCEDURE — G8427 DOCREV CUR MEDS BY ELIG CLIN: HCPCS | Performed by: NURSE PRACTITIONER

## 2023-08-30 PROCEDURE — 1036F TOBACCO NON-USER: CPT | Performed by: NURSE PRACTITIONER

## 2023-08-30 PROCEDURE — 3017F COLORECTAL CA SCREEN DOC REV: CPT | Performed by: NURSE PRACTITIONER

## 2023-08-30 PROCEDURE — 3078F DIAST BP <80 MM HG: CPT | Performed by: NURSE PRACTITIONER

## 2023-08-30 PROCEDURE — 99204 OFFICE O/P NEW MOD 45 MIN: CPT | Performed by: NURSE PRACTITIONER

## 2023-08-30 PROCEDURE — 3074F SYST BP LT 130 MM HG: CPT | Performed by: NURSE PRACTITIONER

## 2023-08-30 RX ORDER — CYCLOBENZAPRINE HCL 5 MG
5 TABLET ORAL NIGHTLY
Qty: 30 TABLET | Refills: 1 | COMMUNITY
Start: 2023-07-20 | End: 2023-09-18

## 2023-08-30 RX ORDER — DOCUSATE SODIUM 100 MG/1
CAPSULE, LIQUID FILLED ORAL
COMMUNITY
Start: 2023-08-29

## 2023-08-30 RX ORDER — PANTOPRAZOLE SODIUM 20 MG/1
TABLET, DELAYED RELEASE ORAL
COMMUNITY
Start: 2023-07-28

## 2023-08-30 RX ORDER — PROMETHAZINE HYDROCHLORIDE 12.5 MG/1
TABLET ORAL
COMMUNITY
Start: 2023-07-18 | End: 2023-09-01 | Stop reason: SDUPTHER

## 2023-08-30 RX ORDER — OXYCODONE HYDROCHLORIDE 5 MG/1
5 TABLET ORAL EVERY 6 HOURS PRN
Qty: 112 TABLET | Refills: 0 | Status: SHIPPED | OUTPATIENT
Start: 2023-08-30 | End: 2023-09-27

## 2023-08-30 RX ORDER — OXYCODONE HYDROCHLORIDE AND ACETAMINOPHEN 5; 325 MG/1; MG/1
1 TABLET ORAL EVERY 4 HOURS PRN
COMMUNITY
End: 2023-08-30

## 2023-08-30 NOTE — PROGRESS NOTES
HISTORY OF PRESENT ILLNESS:  Ms. Bindu Kingsley is a 46 y.o. female presents for consultation at the request of Dr. Emma Mosher. Her presenting problems are right knee, neck and low back pain. She hasalso been evaluated by nephrology, PCP, orthopedics, no history of chronic pain management. Onset of pain began about 10 years ago. She rates the pain 7/10 and describes it as aching, stiffness. Pain is greater in her right knee and neck than her low back pain. Pain is made worse by: movement, walking, standing. Activities that have been limited by pain that she otherwise tolerated well are working, walking, home exercises. Alternative therapies she has previously attempted are knee injections, . Current treatment regimen has helped relieve about 20% of the pain. Relieving factors of pain include OTC medications, rest. Shedenies side effects from the current pain regimen. Patient reports mood is well and happy and sleep patterns are Poor. Patient deniesneurological bowel or bladder. Patient denies misusing/abusing her narcotic pain medications or using any illegal drugs. she admits to morning stiffness, fatigue, and headaches. Patient with history of ESRD on dialysis and needs new kidney. She does fall at times and uses a cane PRN. She quit working about 2 years ago r/t health issues. She has failed botox for migraines in the back. ROS:  Review of Systems   Constitutional:  Negative for fatigue and unexpected weight change. HENT:  Negative for congestion, dental problem and voice change. Eyes:  Negative for visual disturbance. Respiratory:  Negative for shortness of breath and wheezing. Cardiovascular:  Negative for chest pain, palpitations and leg swelling. Gastrointestinal:  Negative for abdominal pain. Musculoskeletal:  Positive for arthralgias, back pain, gait problem, myalgias, neck pain and neck stiffness. Skin:  Negative for rash. Neurological:  Positive for headaches. Negative for numbness.

## 2023-09-01 RX ORDER — PROMETHAZINE HYDROCHLORIDE 12.5 MG/1
12.5 TABLET ORAL EVERY 8 HOURS PRN
Qty: 15 TABLET | Refills: 0 | Status: SHIPPED | OUTPATIENT
Start: 2023-09-01

## 2023-09-05 RX ORDER — GABAPENTIN 100 MG/1
300 CAPSULE ORAL 2 TIMES DAILY
Qty: 90 CAPSULE | Refills: 1 | Status: SHIPPED | OUTPATIENT
Start: 2023-09-05 | End: 2023-11-04

## 2023-09-09 ENCOUNTER — HOSPITAL ENCOUNTER (OUTPATIENT)
Dept: MRI IMAGING | Age: 53
Discharge: HOME | End: 2023-09-09
Payer: COMMERCIAL

## 2023-09-09 ENCOUNTER — HOSPITAL ENCOUNTER (OUTPATIENT)
Dept: GENERAL RADIOLOGY | Age: 53
Discharge: HOME | End: 2023-09-09
Payer: COMMERCIAL

## 2023-09-09 DIAGNOSIS — M47.817 LUMBOSACRAL SPONDYLOSIS WITHOUT MYELOPATHY: ICD-10-CM

## 2023-09-09 DIAGNOSIS — M47.812 CERVICAL SPONDYLOSIS: ICD-10-CM

## 2023-09-09 PROCEDURE — 72100 X-RAY EXAM L-S SPINE 2/3 VWS: CPT

## 2023-09-09 PROCEDURE — 72148 MRI LUMBAR SPINE W/O DYE: CPT

## 2023-09-09 PROCEDURE — 72040 X-RAY EXAM NECK SPINE 2-3 VW: CPT

## 2023-09-13 PROBLEM — M47.817 LUMBOSACRAL SPONDYLOSIS WITHOUT MYELOPATHY: Status: ACTIVE | Noted: 2023-09-13

## 2023-09-13 PROBLEM — G43.909 EPISODIC MIGRAINE: Status: ACTIVE | Noted: 2023-09-13

## 2023-09-13 PROBLEM — M47.812 CERVICAL SPONDYLOSIS: Status: ACTIVE | Noted: 2023-09-13

## 2023-09-13 PROBLEM — G89.4 CHRONIC PAIN SYNDROME: Status: ACTIVE | Noted: 2023-09-13

## 2023-09-13 PROBLEM — Z51.81 ENCOUNTER FOR THERAPEUTIC DRUG MONITORING: Status: ACTIVE | Noted: 2023-09-13

## 2023-09-13 ASSESSMENT — ENCOUNTER SYMPTOMS
BACK PAIN: 1
SHORTNESS OF BREATH: 0
VOICE CHANGE: 0
ABDOMINAL PAIN: 0
WHEEZING: 0

## 2023-09-15 RX ORDER — PROMETHAZINE HYDROCHLORIDE 12.5 MG/1
12.5 TABLET ORAL EVERY 8 HOURS PRN
Qty: 15 TABLET | Refills: 0 | Status: SHIPPED | OUTPATIENT
Start: 2023-09-15

## 2023-09-27 ENCOUNTER — OFFICE VISIT (OUTPATIENT)
Dept: PAIN MANAGEMENT | Age: 53
End: 2023-09-27
Payer: COMMERCIAL

## 2023-09-27 VITALS
HEART RATE: 71 BPM | OXYGEN SATURATION: 99 % | SYSTOLIC BLOOD PRESSURE: 118 MMHG | BODY MASS INDEX: 39.72 KG/M2 | WEIGHT: 210.2 LBS | DIASTOLIC BLOOD PRESSURE: 77 MMHG

## 2023-09-27 DIAGNOSIS — M47.817 LUMBOSACRAL SPONDYLOSIS WITHOUT MYELOPATHY: ICD-10-CM

## 2023-09-27 DIAGNOSIS — G43.909 EPISODIC MIGRAINE: ICD-10-CM

## 2023-09-27 DIAGNOSIS — G89.4 CHRONIC PAIN SYNDROME: ICD-10-CM

## 2023-09-27 DIAGNOSIS — M47.812 CERVICAL SPONDYLOSIS: ICD-10-CM

## 2023-09-27 DIAGNOSIS — N18.6 ESRD (END STAGE RENAL DISEASE) ON DIALYSIS (HCC): ICD-10-CM

## 2023-09-27 DIAGNOSIS — Z51.81 ENCOUNTER FOR THERAPEUTIC DRUG MONITORING: ICD-10-CM

## 2023-09-27 DIAGNOSIS — Z99.2 ESRD (END STAGE RENAL DISEASE) ON DIALYSIS (HCC): ICD-10-CM

## 2023-09-27 DIAGNOSIS — I50.43 CHF (CONGESTIVE HEART FAILURE), NYHA CLASS I, ACUTE ON CHRONIC, COMBINED (HCC): Primary | ICD-10-CM

## 2023-09-27 PROCEDURE — G8427 DOCREV CUR MEDS BY ELIG CLIN: HCPCS | Performed by: NURSE PRACTITIONER

## 2023-09-27 PROCEDURE — 3017F COLORECTAL CA SCREEN DOC REV: CPT | Performed by: NURSE PRACTITIONER

## 2023-09-27 PROCEDURE — 99214 OFFICE O/P EST MOD 30 MIN: CPT | Performed by: NURSE PRACTITIONER

## 2023-09-27 PROCEDURE — G8417 CALC BMI ABV UP PARAM F/U: HCPCS | Performed by: NURSE PRACTITIONER

## 2023-09-27 PROCEDURE — 3078F DIAST BP <80 MM HG: CPT | Performed by: NURSE PRACTITIONER

## 2023-09-27 PROCEDURE — 3074F SYST BP LT 130 MM HG: CPT | Performed by: NURSE PRACTITIONER

## 2023-09-27 PROCEDURE — 1036F TOBACCO NON-USER: CPT | Performed by: NURSE PRACTITIONER

## 2023-09-27 RX ORDER — DIHYDROERGOTAMINE MESYLATE 4 MG/ML
0.72 SPRAY, METERED NASAL DAILY
COMMUNITY
Start: 2023-09-06

## 2023-09-27 RX ORDER — OXYCODONE HYDROCHLORIDE 5 MG/1
5 TABLET ORAL EVERY 6 HOURS PRN
Qty: 112 TABLET | Refills: 0 | Status: SHIPPED | OUTPATIENT
Start: 2023-09-27 | End: 2023-10-25

## 2023-10-02 RX ORDER — NALOXONE HYDROCHLORIDE 4 MG/.1ML
1 SPRAY NASAL PRN
Qty: 1 EACH | Refills: 0 | Status: SHIPPED | OUTPATIENT
Start: 2023-10-02

## 2023-10-06 RX ORDER — MIDODRINE HYDROCHLORIDE 10 MG/1
10 TABLET ORAL
Qty: 30 TABLET | Refills: 2 | Status: SHIPPED | OUTPATIENT
Start: 2023-10-06

## 2023-10-20 RX ORDER — MIDODRINE HYDROCHLORIDE 10 MG/1
10 TABLET ORAL
Qty: 30 TABLET | Refills: 2 | Status: SHIPPED | OUTPATIENT
Start: 2023-10-20

## 2023-10-31 DIAGNOSIS — N18.6 ESRD (END STAGE RENAL DISEASE) (HCC): ICD-10-CM

## 2023-10-31 RX ORDER — SPIRONOLACTONE 25 MG/1
25 TABLET ORAL DAILY
Qty: 90 TABLET | Refills: 1 | Status: SHIPPED | OUTPATIENT
Start: 2023-10-31

## 2023-11-01 RX ORDER — PANTOPRAZOLE SODIUM 40 MG/1
40 TABLET, DELAYED RELEASE ORAL
Qty: 90 TABLET | Refills: 1 | Status: SHIPPED | OUTPATIENT
Start: 2023-11-01

## 2023-11-07 ENCOUNTER — HOSPITAL ENCOUNTER (OUTPATIENT)
Dept: INTERVENTIONAL RADIOLOGY/VASCULAR | Age: 53
Discharge: HOME | End: 2023-11-07
Payer: COMMERCIAL

## 2023-11-07 VITALS
TEMPERATURE: 97 F | OXYGEN SATURATION: 92 % | WEIGHT: 207 LBS | DIASTOLIC BLOOD PRESSURE: 72 MMHG | RESPIRATION RATE: 12 BRPM | HEIGHT: 61 IN | BODY MASS INDEX: 39.08 KG/M2 | HEART RATE: 59 BPM | SYSTOLIC BLOOD PRESSURE: 106 MMHG

## 2023-11-07 DIAGNOSIS — N18.6 ESRD (END STAGE RENAL DISEASE) (HCC): ICD-10-CM

## 2023-11-07 LAB
DEPRECATED RDW RBC AUTO: 17.9 % (ref 12.4–15.4)
HCT VFR BLD AUTO: 36 % (ref 36–48)
HGB BLD-MCNC: 11.9 G/DL (ref 12–16)
INR PPP: 1.08 (ref 0.84–1.16)
MCH RBC QN AUTO: 33.5 PG (ref 26–34)
MCHC RBC AUTO-ENTMCNC: 32.9 G/DL (ref 31–36)
MCV RBC AUTO: 101.6 FL (ref 80–100)
PLATELET # BLD AUTO: 241 K/UL (ref 135–450)
PMV BLD AUTO: 8.9 FL (ref 5–10.5)
PROTHROMBIN TIME: 14.1 SEC (ref 11.5–14.8)
RBC # BLD AUTO: 3.54 M/UL (ref 4–5.2)
WBC # BLD AUTO: 5.3 K/UL (ref 4–11)

## 2023-11-07 PROCEDURE — 99152 MOD SED SAME PHYS/QHP 5/>YRS: CPT

## 2023-11-07 PROCEDURE — 99153 MOD SED SAME PHYS/QHP EA: CPT

## 2023-11-07 PROCEDURE — 77001 FLUOROGUIDE FOR VEIN DEVICE: CPT

## 2023-11-07 PROCEDURE — 2709999900 HC NON-CHARGEABLE SUPPLY

## 2023-11-07 PROCEDURE — C1887 CATHETER, GUIDING: HCPCS

## 2023-11-07 PROCEDURE — 85027 COMPLETE CBC AUTOMATED: CPT

## 2023-11-07 PROCEDURE — 36415 COLL VENOUS BLD VENIPUNCTURE: CPT

## 2023-11-07 PROCEDURE — C1881 DIALYSIS ACCESS SYSTEM: HCPCS

## 2023-11-07 PROCEDURE — 36581 REPLACE TUNNELED CV CATH: CPT

## 2023-11-07 PROCEDURE — 6360000002 HC RX W HCPCS

## 2023-11-07 PROCEDURE — 85610 PROTHROMBIN TIME: CPT

## 2023-11-07 PROCEDURE — C1769 GUIDE WIRE: HCPCS

## 2023-11-07 PROCEDURE — 2500000003 HC RX 250 WO HCPCS

## 2023-11-07 RX ORDER — OXYCODONE HYDROCHLORIDE 5 MG/1
5 CAPSULE ORAL EVERY 6 HOURS PRN
COMMUNITY
End: 2023-11-08

## 2023-11-07 NOTE — DISCHARGE INSTRUCTIONS
[facility]  Cardiovascular Special Procedures  CENTRAL LINE PLACEMENT     Patient Information:   Your line may be placed in the chest or peripherally in the arm. The following instructions are for both. Maintain dressing after the procedure. If the dressing becomes moist, it should be changed using sterile technique If there is any leakage at the incision site, notify your doctor. Keep site clean and dry for  and observe for any signs of infection. Dressing should be changed with a sterile process. Bruising and mild discomfort are expected. You may apply an ice bag to the incision area to minimize bruising, discomfort, and swelling. Contact your physician who placed the Line for any bleeding or extreme pain. Other symptoms to report are as follows:     Fever. Skin warm to touch. Numbness or weakness. Swelling of neck or arm. Redness or Tenderness along the portal site and/or the catheter tract. Drainage from the portal site, or if dressing is damp or saturated. Strenuous activities and exercise should be approached gradually. You may shower as long as you keep the incision dry while line is in place. The incision should not be immersed in water until it is completely healed. You may contact [address] [place] [address] for any questions or problems that may occur at [phone] during the hours of 9am-4pm Monday-Friday, or the hospital  after hours at [phone], to have the interventional radiologist on call paged. [facility]  Cardiovascular Special Procedures  General Discharge Instructions      ____ You may be drowsy or lightheaded after receiving sedation.  DO NOT operate a vehicle (automobile, bicycle, motorcycle, machinery, or power tools), make any important decisions or sign any important/legal documents, or drink alcoholic beverages for the next 24 hours  ____ We strongly suggest that a responsible

## 2023-11-07 NOTE — PROGRESS NOTES
Cath Lab Pre Procedure Flowsheet    Plan of Care:     Hemodynamics and cardiac rhythm will remain stable. Comfort level will be maintained. Respiratory function will remain adequate. Pt/family will verbalize understanding of the procedure. Procedure will be tolerated without complications. Patient will recover from procedure without complications. ID armband on patient and identification verified. Informed consent obtained. Non invasive blood pressure cuff applied, monitoring initiated. EKG pads and pulse oximeter applied, monitoring initiated. Instructions given. Patient and / or family verbalize understanding. H&P will be documented by physician in [address] [place] [address]. Pre-procedure:    NPO Status: Pt has been NPO since midnight. .    Contrast / IV Dye Allergy:     Pregnancy Test: N/A. Prep Sites:     Mehdi's Test:    Pulses:     Anticoagulants: None. Antiplatelets: None.      Chief Complaint:      Diabetic: no    Pre EKG Rhythm: nsr    Pre SBP:139/66    IV access:right a/c    Pre-procedure blood work collected by:     NIH Scale:

## 2023-11-08 ENCOUNTER — OFFICE VISIT (OUTPATIENT)
Dept: PAIN MANAGEMENT | Age: 53
End: 2023-11-08
Payer: COMMERCIAL

## 2023-11-08 VITALS
HEART RATE: 67 BPM | OXYGEN SATURATION: 98 % | WEIGHT: 207.2 LBS | SYSTOLIC BLOOD PRESSURE: 129 MMHG | DIASTOLIC BLOOD PRESSURE: 76 MMHG | BODY MASS INDEX: 39.15 KG/M2

## 2023-11-08 DIAGNOSIS — G89.4 CHRONIC PAIN SYNDROME: ICD-10-CM

## 2023-11-08 DIAGNOSIS — G43.909 EPISODIC MIGRAINE: ICD-10-CM

## 2023-11-08 DIAGNOSIS — Z99.2 ESRD (END STAGE RENAL DISEASE) ON DIALYSIS (HCC): ICD-10-CM

## 2023-11-08 DIAGNOSIS — N18.6 ESRD (END STAGE RENAL DISEASE) ON DIALYSIS (HCC): ICD-10-CM

## 2023-11-08 DIAGNOSIS — I50.43 CHF (CONGESTIVE HEART FAILURE), NYHA CLASS I, ACUTE ON CHRONIC, COMBINED (HCC): Primary | ICD-10-CM

## 2023-11-08 DIAGNOSIS — M47.817 LUMBOSACRAL SPONDYLOSIS WITHOUT MYELOPATHY: ICD-10-CM

## 2023-11-08 DIAGNOSIS — Z51.81 ENCOUNTER FOR THERAPEUTIC DRUG MONITORING: ICD-10-CM

## 2023-11-08 DIAGNOSIS — M47.812 CERVICAL SPONDYLOSIS: ICD-10-CM

## 2023-11-08 PROCEDURE — 99213 OFFICE O/P EST LOW 20 MIN: CPT | Performed by: NURSE PRACTITIONER

## 2023-11-08 PROCEDURE — 3078F DIAST BP <80 MM HG: CPT | Performed by: NURSE PRACTITIONER

## 2023-11-08 PROCEDURE — 3074F SYST BP LT 130 MM HG: CPT | Performed by: NURSE PRACTITIONER

## 2023-11-08 PROCEDURE — 3017F COLORECTAL CA SCREEN DOC REV: CPT | Performed by: NURSE PRACTITIONER

## 2023-11-08 PROCEDURE — G8417 CALC BMI ABV UP PARAM F/U: HCPCS | Performed by: NURSE PRACTITIONER

## 2023-11-08 PROCEDURE — 1036F TOBACCO NON-USER: CPT | Performed by: NURSE PRACTITIONER

## 2023-11-08 PROCEDURE — G8484 FLU IMMUNIZE NO ADMIN: HCPCS | Performed by: NURSE PRACTITIONER

## 2023-11-08 PROCEDURE — G8427 DOCREV CUR MEDS BY ELIG CLIN: HCPCS | Performed by: NURSE PRACTITIONER

## 2023-11-08 RX ORDER — OXYCODONE HYDROCHLORIDE 5 MG/1
5 TABLET ORAL EVERY 6 HOURS PRN
Qty: 112 TABLET | Refills: 0 | Status: SHIPPED | OUTPATIENT
Start: 2023-11-08 | End: 2023-12-06

## 2023-11-08 NOTE — PROGRESS NOTES
physical functioning. she is showing progression towards this treatment goal with the current regimen. She was advised against drinking alcohol with the narcotic pain medicines, advised against driving or handling machinery while adjusting the dose of medicines or if having cognitive  issues related to the current medications. Risk of overdose and death, if medicines not taken as prescribed, were also discussed. If the patient develops new symptoms or if the symptoms worsen, the patient should call the office. While transcribing every attempt was made to maintain the accuracy of the note in terms of it's contents,there may have been some errors made inadvertently. Thank you for allowing me to participate in the care of this patient.     DANNY Braden    Cc: Trent Villegas MD

## 2023-11-10 ENCOUNTER — TELEPHONE (OUTPATIENT)
Dept: VASCULAR SURGERY | Age: 53
End: 2023-11-10

## 2023-11-10 NOTE — TELEPHONE ENCOUNTER
Allie James with [facility] called wanting to get Mr. Jere Pandey set up with vein mapping and consult with Dr. Ramandeep Palafox. I LVM for Mr. Jere Pandey to call the office to schedule him.

## 2023-11-21 ENCOUNTER — TELEPHONE (OUTPATIENT)
Dept: PAIN MANAGEMENT | Age: 53
End: 2023-11-21

## 2023-12-01 RX ORDER — LOPERAMIDE HYDROCHLORIDE 2 MG/1
2 CAPSULE ORAL 4 TIMES DAILY PRN
Qty: 40 CAPSULE | Refills: 0 | Status: SHIPPED | OUTPATIENT
Start: 2023-12-01 | End: 2023-12-11

## 2023-12-05 ENCOUNTER — OFFICE VISIT (OUTPATIENT)
Dept: PAIN MANAGEMENT | Age: 53
End: 2023-12-05
Payer: COMMERCIAL

## 2023-12-05 VITALS
OXYGEN SATURATION: 98 % | BODY MASS INDEX: 39.87 KG/M2 | HEART RATE: 66 BPM | WEIGHT: 211 LBS | DIASTOLIC BLOOD PRESSURE: 82 MMHG | SYSTOLIC BLOOD PRESSURE: 141 MMHG

## 2023-12-05 DIAGNOSIS — G89.4 CHRONIC PAIN SYNDROME: ICD-10-CM

## 2023-12-05 DIAGNOSIS — M47.817 LUMBOSACRAL SPONDYLOSIS WITHOUT MYELOPATHY: ICD-10-CM

## 2023-12-05 DIAGNOSIS — Z51.81 ENCOUNTER FOR THERAPEUTIC DRUG MONITORING: ICD-10-CM

## 2023-12-05 DIAGNOSIS — G43.909 EPISODIC MIGRAINE: ICD-10-CM

## 2023-12-05 DIAGNOSIS — N18.6 ESRD (END STAGE RENAL DISEASE) ON DIALYSIS (HCC): ICD-10-CM

## 2023-12-05 DIAGNOSIS — M47.812 CERVICAL SPONDYLOSIS: ICD-10-CM

## 2023-12-05 DIAGNOSIS — I50.43 CHF (CONGESTIVE HEART FAILURE), NYHA CLASS I, ACUTE ON CHRONIC, COMBINED (HCC): Primary | ICD-10-CM

## 2023-12-05 DIAGNOSIS — Z99.2 ESRD (END STAGE RENAL DISEASE) ON DIALYSIS (HCC): ICD-10-CM

## 2023-12-05 PROCEDURE — G8484 FLU IMMUNIZE NO ADMIN: HCPCS | Performed by: NURSE PRACTITIONER

## 2023-12-05 PROCEDURE — 99213 OFFICE O/P EST LOW 20 MIN: CPT | Performed by: NURSE PRACTITIONER

## 2023-12-05 PROCEDURE — 1036F TOBACCO NON-USER: CPT | Performed by: NURSE PRACTITIONER

## 2023-12-05 PROCEDURE — G8427 DOCREV CUR MEDS BY ELIG CLIN: HCPCS | Performed by: NURSE PRACTITIONER

## 2023-12-05 PROCEDURE — 3017F COLORECTAL CA SCREEN DOC REV: CPT | Performed by: NURSE PRACTITIONER

## 2023-12-05 PROCEDURE — G8417 CALC BMI ABV UP PARAM F/U: HCPCS | Performed by: NURSE PRACTITIONER

## 2023-12-05 PROCEDURE — 3077F SYST BP >= 140 MM HG: CPT | Performed by: NURSE PRACTITIONER

## 2023-12-05 PROCEDURE — 3079F DIAST BP 80-89 MM HG: CPT | Performed by: NURSE PRACTITIONER

## 2023-12-05 RX ORDER — OXYCODONE HYDROCHLORIDE 5 MG/1
5 TABLET ORAL EVERY 6 HOURS PRN
Qty: 112 TABLET | Refills: 0 | Status: SHIPPED | OUTPATIENT
Start: 2023-12-05 | End: 2024-01-02

## 2023-12-05 NOTE — PROGRESS NOTES
Scottie Mariia  1970  [id number]      HISTORY OF PRESENT ILLNESS: Ms. Mary Enriquez is a 46 y.o. female returns for a follow up visit for pain management  She has a diagnosis of   1. CHF (congestive heart failure), NYHA class I, acute on chronic, combined ([address])    2. Encounter for therapeutic drug monitoring    3. Chronic pain syndrome    4. Lumbosacral spondylosis without myelopathy    5. Cervical spondylosis    6. Episodic migraine    7. ESRD (end stage renal disease) on dialysis ([address])    . New Medications since Last Office visit have been reviewed with patient. As per Information Obtained from the PADT (Patient Assessment and Documentation Tool)    She complains of pain in the head, right knee. She rates the pain 7/10 and describes it as sharp, aching. Current treatment regimen has helped relieve about 80% of the pain since beginning treatment plan. She denies any side effects from the current pain regimen. Patient reports that since implementation of their treatment plan; their physical functioning is better, family/social relationships are better, mood is better sleep patterns are unchanged, and that the overall functioning is better. Patient denies/admits that any of the above have changed since last office visit. Patient denies misusing/abusing her narcotic pain medications or using any illegal drugs. Upon obtaining medical history from MsToni Kendy Pierre: Patients list of allergies were reviewed     MEDICATIONS: Ms. Mary Enriquez list of medications were reviewed. Her current medications are   Outpatient Medications Prior to Visit   Medication Sig Dispense Refill    loperamide (RA ANTI-DIARRHEAL) 2 MG capsule Take 1 capsule by mouth 4 times daily as needed for Diarrhea 40 capsule 0    gabapentin (NEURONTIN) 100 MG capsule Take 3 capsules by mouth in the morning and at bedtime for 60 days. 90 capsule 1    sevelamer (RENVELA) 800 MG tablet TAKE 1 TABLET BY MOUTH EVERY MORNING, AT NOON, AND IN THE EVENING.  TAKE

## 2023-12-29 ENCOUNTER — TELEPHONE (OUTPATIENT)
Dept: PAIN MANAGEMENT | Age: 53
End: 2023-12-29

## 2023-12-29 DIAGNOSIS — M47.817 LUMBOSACRAL SPONDYLOSIS WITHOUT MYELOPATHY: ICD-10-CM

## 2023-12-29 DIAGNOSIS — G89.4 CHRONIC PAIN SYNDROME: ICD-10-CM

## 2023-12-29 DIAGNOSIS — G43.909 EPISODIC MIGRAINE: ICD-10-CM

## 2023-12-29 DIAGNOSIS — N18.6 ESRD NEEDING DIALYSIS (HCC): Primary | ICD-10-CM

## 2023-12-29 DIAGNOSIS — Z51.81 ENCOUNTER FOR THERAPEUTIC DRUG MONITORING: ICD-10-CM

## 2023-12-29 DIAGNOSIS — Z99.2 ESRD (END STAGE RENAL DISEASE) ON DIALYSIS (HCC): ICD-10-CM

## 2023-12-29 DIAGNOSIS — N18.6 ESRD (END STAGE RENAL DISEASE) ON DIALYSIS (HCC): ICD-10-CM

## 2023-12-29 DIAGNOSIS — Z99.2 ESRD NEEDING DIALYSIS (HCC): Primary | ICD-10-CM

## 2023-12-29 DIAGNOSIS — M47.812 CERVICAL SPONDYLOSIS: ICD-10-CM

## 2023-12-29 DIAGNOSIS — I50.43 CHF (CONGESTIVE HEART FAILURE), NYHA CLASS I, ACUTE ON CHRONIC, COMBINED (HCC): ICD-10-CM

## 2023-12-29 RX ORDER — LOPERAMIDE HYDROCHLORIDE 2 MG/1
2 CAPSULE ORAL 4 TIMES DAILY PRN
Qty: 40 CAPSULE | Refills: 0 | Status: SHIPPED | OUTPATIENT
Start: 2023-12-29 | End: 2024-01-08

## 2023-12-29 RX ORDER — OXYCODONE HYDROCHLORIDE 5 MG/1
5 TABLET ORAL EVERY 6 HOURS PRN
Qty: 28 TABLET | Refills: 0 | Status: SHIPPED | OUTPATIENT
Start: 2024-01-02 | End: 2024-01-09 | Stop reason: SDUPTHER

## 2024-01-02 ENCOUNTER — PROCEDURE VISIT (OUTPATIENT)
Dept: VASCULAR SURGERY | Age: 54
End: 2024-01-02
Payer: COMMERCIAL

## 2024-01-02 DIAGNOSIS — Z99.2 ESRD NEEDING DIALYSIS (HCC): ICD-10-CM

## 2024-01-02 DIAGNOSIS — N18.6 ESRD NEEDING DIALYSIS (HCC): ICD-10-CM

## 2024-01-02 PROCEDURE — 93970 EXTREMITY STUDY: CPT | Performed by: SURGERY

## 2024-01-05 PROBLEM — R06.83 SNORING: Status: ACTIVE | Noted: 2022-06-19

## 2024-01-05 PROBLEM — M17.11 PRIMARY LOCALIZED OSTEOARTHROSIS OF THE KNEE, RIGHT: Status: ACTIVE | Noted: 2021-08-05

## 2024-01-05 PROBLEM — Z89.412 HISTORY OF AMPUTATION OF LEFT GREAT TOE (HCC): Status: ACTIVE | Noted: 2023-10-04

## 2024-01-05 PROBLEM — E61.1 IRON DEFICIENCY: Status: ACTIVE | Noted: 2022-08-01

## 2024-01-05 PROBLEM — E11.621 DIABETIC FOOT ULCER (HCC): Status: ACTIVE | Noted: 2022-08-01

## 2024-01-05 PROBLEM — I65.29 CAROTID ARTERY OCCLUSION: Status: ACTIVE | Noted: 2022-09-09

## 2024-01-05 PROBLEM — E11.42 DIABETIC POLYNEUROPATHY ASSOCIATED WITH TYPE 2 DIABETES MELLITUS (HCC): Chronic | Status: ACTIVE | Noted: 2023-10-04

## 2024-01-05 PROBLEM — N18.6 END-STAGE RENAL DISEASE ON HEMODIALYSIS (HCC): Status: ACTIVE | Noted: 2022-05-21

## 2024-01-05 PROBLEM — E78.2 MIXED HYPERLIPIDEMIA: Status: ACTIVE | Noted: 2020-12-22

## 2024-01-05 PROBLEM — E78.5 HYPERLIPIDEMIA: Status: ACTIVE | Noted: 2022-08-01

## 2024-01-05 PROBLEM — E56.9 VITAMIN DEFICIENCY: Status: ACTIVE | Noted: 2023-10-04

## 2024-01-05 PROBLEM — K65.2 SBP (SPONTANEOUS BACTERIAL PERITONITIS) (HCC): Status: ACTIVE | Noted: 2022-05-21

## 2024-01-05 PROBLEM — L97.509 DIABETIC FOOT ULCER (HCC): Status: ACTIVE | Noted: 2022-08-01

## 2024-01-05 PROBLEM — I65.21 CAROTID STENOSIS, RIGHT: Status: ACTIVE | Noted: 2022-08-03

## 2024-01-05 PROBLEM — G44.86 CERVICOGENIC HEADACHE: Status: ACTIVE | Noted: 2022-06-19

## 2024-01-05 PROBLEM — M10.9 GOUTY ARTHRITIS OF TOE OF LEFT FOOT: Status: ACTIVE | Noted: 2020-04-15

## 2024-01-05 PROBLEM — E66.01 MORBID (SEVERE) OBESITY DUE TO EXCESS CALORIES (HCC): Status: ACTIVE | Noted: 2023-10-04

## 2024-01-05 PROBLEM — S98.122A: Status: ACTIVE | Noted: 2022-08-03

## 2024-01-05 PROBLEM — E11.40 DIABETIC NEUROPATHY (HCC): Status: ACTIVE | Noted: 2022-08-01

## 2024-01-05 PROBLEM — M25.562 ACUTE PAIN OF LEFT KNEE: Status: ACTIVE | Noted: 2021-01-30

## 2024-01-05 PROBLEM — M54.2 CERVICALGIA: Status: ACTIVE | Noted: 2022-06-19

## 2024-01-05 PROBLEM — E88.09 HYPOALBUMINEMIA: Status: ACTIVE | Noted: 2017-01-05

## 2024-01-05 PROBLEM — E11.51 TYPE 2 DIABETES MELLITUS WITH DIABETIC PERIPHERAL ANGIOPATHY WITHOUT GANGRENE (HCC): Chronic | Status: ACTIVE | Noted: 2023-10-04
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
## 2024-01-05 PROBLEM — Z99.2 END-STAGE RENAL DISEASE ON HEMODIALYSIS (HCC): Status: ACTIVE | Noted: 2022-05-21

## 2024-01-05 PROBLEM — J96.11 CHRONIC RESPIRATORY FAILURE WITH HYPOXIA (HCC): Status: ACTIVE | Noted: 2021-02-24

## 2024-01-05 PROBLEM — R19.7 ACUTE DIARRHEA: Status: ACTIVE | Noted: 2018-11-09

## 2024-01-05 PROBLEM — G47.9 SLEEP DIFFICULTIES: Status: ACTIVE | Noted: 2022-06-19

## 2024-01-05 PROBLEM — K21.9 GERD WITHOUT ESOPHAGITIS: Status: ACTIVE | Noted: 2022-11-22

## 2024-01-05 PROBLEM — R55 SYNCOPE: Status: ACTIVE | Noted: 2022-08-01
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
## 2024-01-05 RX ORDER — ROSUVASTATIN CALCIUM 10 MG/1
10 TABLET, COATED ORAL DAILY
COMMUNITY
Start: 2023-11-27 | End: 2024-01-29

## 2024-01-09 ENCOUNTER — OFFICE VISIT (OUTPATIENT)
Dept: PAIN MANAGEMENT | Age: 54
End: 2024-01-09
Payer: COMMERCIAL

## 2024-01-09 ENCOUNTER — OFFICE VISIT (OUTPATIENT)
Dept: VASCULAR SURGERY | Age: 54
End: 2024-01-09

## 2024-01-09 VITALS
HEART RATE: 71 BPM | OXYGEN SATURATION: 92 % | SYSTOLIC BLOOD PRESSURE: 108 MMHG | DIASTOLIC BLOOD PRESSURE: 69 MMHG | BODY MASS INDEX: 40.25 KG/M2 | WEIGHT: 213 LBS

## 2024-01-09 VITALS
BODY MASS INDEX: 40.25 KG/M2 | SYSTOLIC BLOOD PRESSURE: 114 MMHG | DIASTOLIC BLOOD PRESSURE: 62 MMHG | HEART RATE: 70 BPM | WEIGHT: 213 LBS

## 2024-01-09 DIAGNOSIS — Z51.81 ENCOUNTER FOR THERAPEUTIC DRUG MONITORING: ICD-10-CM

## 2024-01-09 DIAGNOSIS — N18.6 ESRD (END STAGE RENAL DISEASE) (HCC): Primary | ICD-10-CM

## 2024-01-09 DIAGNOSIS — Z99.2 ESRD (END STAGE RENAL DISEASE) ON DIALYSIS (HCC): ICD-10-CM

## 2024-01-09 DIAGNOSIS — I50.43 CHF (CONGESTIVE HEART FAILURE), NYHA CLASS I, ACUTE ON CHRONIC, COMBINED (HCC): Primary | ICD-10-CM

## 2024-01-09 DIAGNOSIS — G43.909 EPISODIC MIGRAINE: ICD-10-CM

## 2024-01-09 DIAGNOSIS — M47.817 LUMBOSACRAL SPONDYLOSIS WITHOUT MYELOPATHY: ICD-10-CM

## 2024-01-09 DIAGNOSIS — G89.4 CHRONIC PAIN SYNDROME: ICD-10-CM

## 2024-01-09 DIAGNOSIS — M47.812 CERVICAL SPONDYLOSIS: ICD-10-CM

## 2024-01-09 DIAGNOSIS — N18.6 ESRD (END STAGE RENAL DISEASE) ON DIALYSIS (HCC): ICD-10-CM

## 2024-01-09 PROCEDURE — 1036F TOBACCO NON-USER: CPT | Performed by: NURSE PRACTITIONER

## 2024-01-09 PROCEDURE — 3017F COLORECTAL CA SCREEN DOC REV: CPT | Performed by: NURSE PRACTITIONER

## 2024-01-09 PROCEDURE — G8427 DOCREV CUR MEDS BY ELIG CLIN: HCPCS | Performed by: NURSE PRACTITIONER

## 2024-01-09 PROCEDURE — 3074F SYST BP LT 130 MM HG: CPT | Performed by: NURSE PRACTITIONER

## 2024-01-09 PROCEDURE — G8484 FLU IMMUNIZE NO ADMIN: HCPCS | Performed by: NURSE PRACTITIONER

## 2024-01-09 PROCEDURE — 99213 OFFICE O/P EST LOW 20 MIN: CPT | Performed by: NURSE PRACTITIONER

## 2024-01-09 PROCEDURE — G8417 CALC BMI ABV UP PARAM F/U: HCPCS | Performed by: NURSE PRACTITIONER

## 2024-01-09 PROCEDURE — 3078F DIAST BP <80 MM HG: CPT | Performed by: NURSE PRACTITIONER

## 2024-01-09 RX ORDER — OXYCODONE HYDROCHLORIDE 5 MG/1
5 TABLET ORAL EVERY 6 HOURS PRN
Qty: 112 TABLET | Refills: 0 | Status: SHIPPED | OUTPATIENT
Start: 2024-01-09 | End: 2024-02-06

## 2024-01-09 NOTE — PROGRESS NOTES
Scottie Chiang  1970  [id number]      HISTORY OF PRESENT ILLNESS: Ms. Chiang is a 53 y.o. female returns for a follow up visit for pain management  She has a diagnosis of   1. CHF (congestive heart failure), NYHA class I, acute on chronic, combined (H[facility])    2. Encounter for therapeutic drug monitoring    3. Chronic pain syndrome    4. Lumbosacral spondylosis without myelopathy    5. Cervical spondylosis    6. Episodic migraine    7. ESRD (end stage renal disease) on dialysis ([facility])    .      New Medications since Last Office visit have been reviewed with patient.     As per Information Obtained from the PADT (Patient Assessment and Documentation Tool)    She complains of pain in the right knee, forehead, back of the neck She rates the pain 4/10 and describes it as aching. Current treatment regimen has helped relieve about 70% of the pain since beginning treatment plan.  She denies any side effects from the current pain regimen. Patient reports that since implementation of their treatment plan; their physical functioning is unchanged, family/social relationships are unchanged, mood is better sleep patterns are worse, and that the overall functioning is unchanged.  Patient denies/admits that any of the above have changed since last office visit. Patient denies misusing/abusing her narcotic pain medications or using any illegal drugs.      Upon obtaining medical history from Ms. Chiang    ALLERGIES: Patients list of allergies were reviewed     MEDICATIONS: Ms. Key list of medications were reviewed.Her current medications are   Outpatient Medications Prior to Visit   Medication Sig Dispense Refill    rosuvastatin (CRESTOR) 10 MG tablet Take 1 tablet by mouth daily      oxyCODONE (ROXICODONE) 5 MG immediate release tablet Take 1 tablet by mouth every 6 hours as needed for Pain for up to 7 days. Max Daily Amount: 20 mg 28 tablet 0    gabapentin (NEURONTIN) 100 MG capsule Take 3 capsules by mouth in the morning and at bedtime

## 2024-01-17 DIAGNOSIS — E83.39 HYPERPHOSPHATEMIA: ICD-10-CM

## 2024-01-17 DIAGNOSIS — N18.6 ESRD (END STAGE RENAL DISEASE) (HCC): ICD-10-CM

## 2024-01-17 RX ORDER — BUTALBITAL, ACETAMINOPHEN AND CAFFEINE 50; 325; 40 MG/1; MG/1; MG/1
1 TABLET ORAL EVERY 6 HOURS PRN
Qty: 180 TABLET | Refills: 3 | Status: SHIPPED | OUTPATIENT
Start: 2024-01-17 | End: 2024-01-19 | Stop reason: SDUPTHER

## 2024-01-17 RX ORDER — GABAPENTIN 100 MG/1
300 CAPSULE ORAL 2 TIMES DAILY
Qty: 90 CAPSULE | Refills: 1 | Status: SHIPPED | OUTPATIENT
Start: 2024-01-17 | End: 2024-01-31 | Stop reason: SDUPTHER

## 2024-01-17 RX ORDER — SEVELAMER CARBONATE 800 MG/1
2 TABLET, FILM COATED ORAL
Qty: 180 TABLET | Refills: 3 | Status: SHIPPED | OUTPATIENT
Start: 2024-01-17

## 2024-01-22 ENCOUNTER — TELEPHONE (OUTPATIENT)
Dept: PAIN MANAGEMENT | Age: 54
End: 2024-01-22

## 2024-01-22 NOTE — TELEPHONE ENCOUNTER
Neurologist called in prescription for firocet and was unaware its status changed to a narcotic.Has not picked up prescription.Would [facility] be able to send a prescription for that or can she pick this one up.

## 2024-01-24 DIAGNOSIS — I10 HYPERTENSION, UNSPECIFIED TYPE: ICD-10-CM

## 2024-01-24 DIAGNOSIS — N18.6 ESRD (END STAGE RENAL DISEASE) (HCC): ICD-10-CM

## 2024-01-24 RX ORDER — CHLORZOXAZONE 500 MG/1
500 TABLET ORAL 3 TIMES DAILY PRN
Qty: 30 TABLET | Refills: 0 | Status: SHIPPED | OUTPATIENT
Start: 2024-01-24

## 2024-01-24 RX ORDER — BUTALBITAL, ACETAMINOPHEN AND CAFFEINE 50; 325; 40 MG/1; MG/1; MG/1
1 TABLET ORAL EVERY 6 HOURS PRN
Qty: 120 TABLET | Refills: 0 | Status: SHIPPED | OUTPATIENT
Start: 2024-01-24 | End: 2024-01-24 | Stop reason: SDUPTHER

## 2024-01-24 RX ORDER — BUTALBITAL, ACETAMINOPHEN AND CAFFEINE 50; 325; 40 MG/1; MG/1; MG/1
1 TABLET ORAL EVERY 6 HOURS PRN
Qty: 120 TABLET | Refills: 0 | Status: SHIPPED | OUTPATIENT
Start: 2024-01-24 | End: 2024-02-23

## 2024-01-24 RX ORDER — TORSEMIDE 100 MG/1
100 TABLET ORAL 2 TIMES DAILY
Qty: 60 TABLET | Refills: 2 | Status: SHIPPED | OUTPATIENT
Start: 2024-01-24

## 2024-01-24 NOTE — TELEPHONE ENCOUNTER
Pt called again about the firocet. She was informed it is a schedule 2 drug and you stated her neurologist of PCP needs to write the prescription, but would it be violating her pain contract if she gets this prescription?

## 2024-01-24 NOTE — TELEPHONE ENCOUNTER
Per [facility], it is ok for another provider to prescribe the Firocet.    I called patient, she voiced understanding.

## 2024-01-29 DIAGNOSIS — N18.6 ESRD (END STAGE RENAL DISEASE) (HCC): ICD-10-CM

## 2024-01-29 RX ORDER — SPIRONOLACTONE 25 MG/1
25 TABLET ORAL DAILY
Qty: 90 TABLET | Refills: 1 | Status: SHIPPED | OUTPATIENT
Start: 2024-01-29

## 2024-01-29 NOTE — PROGRESS NOTES
[facility] PRE-SURGICAL TESTING INSTRUCTIONS                      PRIOR TO PROCEDURE DATE:    1. PLEASE FOLLOW ANY INSTRUCTIONS GIVEN TO YOU PER YOUR SURGEON.      2. Arrange for someone to drive you home and be with you for the first 24 hours after discharge for your safety after your procedure for which you received sedation. Ensure it is someone we can share information with regarding your discharge.     NOTE: At this time ONLY 2 ADULTS may accompany you   One person ENCOURAGED to stay at hospital entire time if outpatient surgery      3. You must contact your surgeon for instructions IF:  You are taking any blood thinners, aspirin, anti-inflammatory or vitamins.  There is a change in your physical condition such as a cold, fever, rash, cuts, sores, or any other infection, especially near your surgical site.    4. Do not drink alcohol the day before or day of your procedure.  Do not use any recreational marijuana at least 24 hours or street drugs (heroin, cocaine) at minimum 5 days prior to your procedure.     5. A Pre-Surgical History and Physical MUST be completed WITHIN 30 DAYS OR LESS prior to your procedure.by your Physician or an Urgent Care        THE DAY OF YOUR PROCEDURE:  1.  Follow instructions for ARRIVAL TIME as DIRECTED BY YOUR SURGEON.     2. Enter the MAIN entrance from [facility] and follow the signs to the free Parking Garage or  Parking (offered free of charge 7 am-5pm).      3. Enter the Main Entrance of the hospital (do not enter from the lower level of the parking garage). Upon entrance, check in with the  at the surgical information desk on your LEFT.   Bring your insurance card and photo ID to register      4. DO NOT EAT ANYTHING 8 hours prior to arrival for surgery.  You may have up to 8 ounces of water 4 hours prior to your arrival for surgery.   NOTE: ALL Gastric, Bariatric & Bowel surgery patients - you MUST follow your surgeon's instructions regarding

## 2024-01-30 ENCOUNTER — PREP FOR PROCEDURE (OUTPATIENT)
Dept: VASCULAR SURGERY | Age: 54
End: 2024-01-30

## 2024-01-30 RX ORDER — SODIUM CHLORIDE 9 MG/ML
INJECTION, SOLUTION INTRAVENOUS PRN
Status: CANCELLED | OUTPATIENT
Start: 2024-02-02

## 2024-01-30 RX ORDER — SODIUM CHLORIDE 0.9 % (FLUSH) 0.9 %
5-40 SYRINGE (ML) INJECTION EVERY 12 HOURS SCHEDULED
Status: CANCELLED | OUTPATIENT
Start: 2024-02-02

## 2024-01-30 RX ORDER — SODIUM CHLORIDE 0.9 % (FLUSH) 0.9 %
5-40 SYRINGE (ML) INJECTION PRN
Status: CANCELLED | OUTPATIENT
Start: 2024-02-02

## 2024-01-31 RX ORDER — GABAPENTIN 300 MG/1
300 CAPSULE ORAL 2 TIMES DAILY
Qty: 90 CAPSULE | Refills: 1 | Status: SHIPPED | OUTPATIENT
Start: 2024-01-31 | End: 2024-04-30

## 2024-01-31 RX ORDER — PROMETHAZINE HYDROCHLORIDE 12.5 MG/1
12.5 TABLET ORAL EVERY 8 HOURS PRN
Qty: 15 TABLET | Refills: 0 | Status: SHIPPED | OUTPATIENT
Start: 2024-01-31

## 2024-02-02 ENCOUNTER — ANESTHESIA (OUTPATIENT)
Dept: OPERATING ROOM | Age: 54
End: 2024-02-02
Payer: COMMERCIAL

## 2024-02-02 ENCOUNTER — ANESTHESIA EVENT (OUTPATIENT)
Dept: OPERATING ROOM | Age: 54
End: 2024-02-02
Payer: COMMERCIAL

## 2024-02-02 ENCOUNTER — HOSPITAL ENCOUNTER (OUTPATIENT)
Age: 54
Setting detail: OUTPATIENT SURGERY
Discharge: HOME | End: 2024-02-02
Attending: SURGERY | Admitting: SURGERY
Payer: COMMERCIAL

## 2024-02-02 VITALS
HEIGHT: 61 IN | SYSTOLIC BLOOD PRESSURE: 149 MMHG | HEART RATE: 79 BPM | BODY MASS INDEX: 39.31 KG/M2 | TEMPERATURE: 97.5 F | OXYGEN SATURATION: 95 % | DIASTOLIC BLOOD PRESSURE: 79 MMHG | RESPIRATION RATE: 14 BRPM | WEIGHT: 208.2 LBS

## 2024-02-02 LAB
ANION GAP SERPL CALCULATED.3IONS-SCNC: 15 MMOL/L (ref 3–16)
APTT BLD: 34.7 SEC (ref 22.7–35.9)
BUN SERPL-MCNC: 45 MG/DL (ref 7–20)
CALCIUM SERPL-MCNC: 10.2 MG/DL (ref 8.3–10.6)
CHLORIDE SERPL-SCNC: 95 MMOL/L (ref 99–110)
CO2 SERPL-SCNC: 27 MMOL/L (ref 21–32)
CREAT SERPL-MCNC: 8.3 MG/DL (ref 0.6–1.1)
DEPRECATED RDW RBC AUTO: 17.2 % (ref 12.4–15.4)
EKG ATRIAL RATE: 67 BPM
EKG DIAGNOSIS: NORMAL
EKG P AXIS: 43 DEGREES
EKG P-R INTERVAL: 186 MS
EKG Q-T INTERVAL: 396 MS
EKG QRS DURATION: 84 MS
EKG QTC CALCULATION (BAZETT): 418 MS
EKG R AXIS: -6 DEGREES
EKG T AXIS: 28 DEGREES
EKG VENTRICULAR RATE: 67 BPM
GFR SERPLBLD CREATININE-BSD FMLA CKD-EPI: 5 ML/MIN/{1.73_M2}
GLUCOSE BLD-MCNC: 121 MG/DL (ref 70–99)
GLUCOSE SERPL-MCNC: 163 MG/DL (ref 70–99)
HCT VFR BLD AUTO: 36.6 % (ref 36–48)
HGB BLD-MCNC: 12 G/DL (ref 12–16)
INR PPP: 1.06 (ref 0.84–1.16)
MCH RBC QN AUTO: 33.1 PG (ref 26–34)
MCHC RBC AUTO-ENTMCNC: 32.8 G/DL (ref 31–36)
MCV RBC AUTO: 100.9 FL (ref 80–100)
PERFORMED ON: ABNORMAL
PLATELET # BLD AUTO: 183 K/UL (ref 135–450)
PMV BLD AUTO: 10 FL (ref 5–10.5)
POTASSIUM SERPL-SCNC: 5 MMOL/L (ref 3.5–5.1)
PROTHROMBIN TIME: 13.8 SEC (ref 11.5–14.8)
RBC # BLD AUTO: 3.63 M/UL (ref 4–5.2)
SODIUM SERPL-SCNC: 137 MMOL/L (ref 136–145)
WBC # BLD AUTO: 5.3 K/UL (ref 4–11)

## 2024-02-02 PROCEDURE — 85730 THROMBOPLASTIN TIME PARTIAL: CPT

## 2024-02-02 PROCEDURE — 7100000011 HC PHASE II RECOVERY - ADDTL 15 MIN: Performed by: SURGERY

## 2024-02-02 PROCEDURE — 2580000003 HC RX 258: Performed by: ANESTHESIOLOGY

## 2024-02-02 PROCEDURE — 6360000002 HC RX W HCPCS

## 2024-02-02 PROCEDURE — 3600000014 HC SURGERY LEVEL 4 ADDTL 15MIN: Performed by: SURGERY

## 2024-02-02 PROCEDURE — 93010 ELECTROCARDIOGRAM REPORT: CPT | Performed by: INTERNAL MEDICINE

## 2024-02-02 PROCEDURE — 2580000003 HC RX 258: Performed by: SURGERY

## 2024-02-02 PROCEDURE — 85027 COMPLETE CBC AUTOMATED: CPT

## 2024-02-02 PROCEDURE — 85610 PROTHROMBIN TIME: CPT

## 2024-02-02 PROCEDURE — 2500000003 HC RX 250 WO HCPCS

## 2024-02-02 PROCEDURE — 6370000000 HC RX 637 (ALT 250 FOR IP): Performed by: ANESTHESIOLOGY

## 2024-02-02 PROCEDURE — 3600000004 HC SURGERY LEVEL 4 BASE: Performed by: SURGERY

## 2024-02-02 PROCEDURE — C1713 ANCHOR/SCREW BN/BN,TIS/BN: HCPCS | Performed by: SURGERY

## 2024-02-02 PROCEDURE — 3700000001 HC ADD 15 MINUTES (ANESTHESIA): Performed by: SURGERY

## 2024-02-02 PROCEDURE — 6370000000 HC RX 637 (ALT 250 FOR IP): Performed by: SURGERY

## 2024-02-02 PROCEDURE — 7100000010 HC PHASE II RECOVERY - FIRST 15 MIN: Performed by: SURGERY

## 2024-02-02 PROCEDURE — 93005 ELECTROCARDIOGRAM TRACING: CPT | Performed by: SURGERY

## 2024-02-02 PROCEDURE — 80048 BASIC METABOLIC PNL TOTAL CA: CPT

## 2024-02-02 PROCEDURE — A4217 STERILE WATER/SALINE, 500 ML: HCPCS | Performed by: SURGERY

## 2024-02-02 PROCEDURE — 6360000002 HC RX W HCPCS: Performed by: SURGERY

## 2024-02-02 PROCEDURE — 7100000001 HC PACU RECOVERY - ADDTL 15 MIN: Performed by: SURGERY

## 2024-02-02 PROCEDURE — 3700000000 HC ANESTHESIA ATTENDED CARE: Performed by: SURGERY

## 2024-02-02 PROCEDURE — 7100000000 HC PACU RECOVERY - FIRST 15 MIN: Performed by: SURGERY

## 2024-02-02 PROCEDURE — 2709999900 HC NON-CHARGEABLE SUPPLY: Performed by: SURGERY

## 2024-02-02 RX ORDER — GLYCOPYRROLATE 0.2 MG/ML
INJECTION INTRAMUSCULAR; INTRAVENOUS PRN
Status: DISCONTINUED | OUTPATIENT
Start: 2024-02-02 | End: 2024-02-02 | Stop reason: SDUPTHER

## 2024-02-02 RX ORDER — ACETAMINOPHEN 500 MG
500 TABLET ORAL ONCE
Status: COMPLETED | OUTPATIENT
Start: 2024-02-02 | End: 2024-02-02

## 2024-02-02 RX ORDER — FENTANYL CITRATE 50 UG/ML
INJECTION, SOLUTION INTRAMUSCULAR; INTRAVENOUS PRN
Status: DISCONTINUED | OUTPATIENT
Start: 2024-02-02 | End: 2024-02-02 | Stop reason: SDUPTHER

## 2024-02-02 RX ORDER — SODIUM CHLORIDE 0.9 % (FLUSH) 0.9 %
5-40 SYRINGE (ML) INJECTION EVERY 12 HOURS SCHEDULED
Status: DISCONTINUED | OUTPATIENT
Start: 2024-02-02 | End: 2024-02-02 | Stop reason: HOSPADM

## 2024-02-02 RX ORDER — PROCHLORPERAZINE EDISYLATE 5 MG/ML
5 INJECTION INTRAMUSCULAR; INTRAVENOUS
Status: DISCONTINUED | OUTPATIENT
Start: 2024-02-02 | End: 2024-02-02 | Stop reason: HOSPADM

## 2024-02-02 RX ORDER — TOPIRAMATE 25 MG/1
25 CAPSULE, COATED PELLETS ORAL DAILY
COMMUNITY

## 2024-02-02 RX ORDER — SODIUM CHLORIDE 0.9 % (FLUSH) 0.9 %
5-40 SYRINGE (ML) INJECTION PRN
Status: DISCONTINUED | OUTPATIENT
Start: 2024-02-02 | End: 2024-02-02 | Stop reason: HOSPADM

## 2024-02-02 RX ORDER — LABETALOL HYDROCHLORIDE 5 MG/ML
10 INJECTION, SOLUTION INTRAVENOUS
Status: DISCONTINUED | OUTPATIENT
Start: 2024-02-02 | End: 2024-02-02 | Stop reason: HOSPADM

## 2024-02-02 RX ORDER — PAPAVERINE HYDROCHLORIDE 30 MG/ML
INJECTION INTRAMUSCULAR; INTRAVENOUS PRN
Status: DISCONTINUED | OUTPATIENT
Start: 2024-02-02 | End: 2024-02-02 | Stop reason: HOSPADM

## 2024-02-02 RX ORDER — SODIUM CHLORIDE 9 MG/ML
INJECTION, SOLUTION INTRAVENOUS PRN
Status: DISCONTINUED | OUTPATIENT
Start: 2024-02-02 | End: 2024-02-02 | Stop reason: HOSPADM

## 2024-02-02 RX ORDER — FAMOTIDINE 10 MG/ML
INJECTION, SOLUTION INTRAVENOUS PRN
Status: DISCONTINUED | OUTPATIENT
Start: 2024-02-02 | End: 2024-02-02 | Stop reason: SDUPTHER

## 2024-02-02 RX ORDER — LIDOCAINE HYDROCHLORIDE 20 MG/ML
INJECTION, SOLUTION INTRAVENOUS PRN
Status: DISCONTINUED | OUTPATIENT
Start: 2024-02-02 | End: 2024-02-02 | Stop reason: SDUPTHER

## 2024-02-02 RX ORDER — PROPOFOL 10 MG/ML
INJECTION, EMULSION INTRAVENOUS PRN
Status: DISCONTINUED | OUTPATIENT
Start: 2024-02-02 | End: 2024-02-02 | Stop reason: SDUPTHER

## 2024-02-02 RX ORDER — ONDANSETRON 2 MG/ML
INJECTION INTRAMUSCULAR; INTRAVENOUS PRN
Status: DISCONTINUED | OUTPATIENT
Start: 2024-02-02 | End: 2024-02-02 | Stop reason: SDUPTHER

## 2024-02-02 RX ORDER — ACETAMINOPHEN 325 MG/1
650 TABLET ORAL
Status: DISCONTINUED | OUTPATIENT
Start: 2024-02-02 | End: 2024-02-02 | Stop reason: HOSPADM

## 2024-02-02 RX ORDER — SODIUM CHLORIDE 9 MG/ML
INJECTION, SOLUTION INTRAVENOUS CONTINUOUS
Status: DISCONTINUED | OUTPATIENT
Start: 2024-02-02 | End: 2024-02-02 | Stop reason: HOSPADM

## 2024-02-02 RX ORDER — IPRATROPIUM BROMIDE AND ALBUTEROL SULFATE 2.5; .5 MG/3ML; MG/3ML
1 SOLUTION RESPIRATORY (INHALATION)
Status: DISCONTINUED | OUTPATIENT
Start: 2024-02-02 | End: 2024-02-02 | Stop reason: HOSPADM

## 2024-02-02 RX ORDER — ROCURONIUM BROMIDE 10 MG/ML
INJECTION, SOLUTION INTRAVENOUS PRN
Status: DISCONTINUED | OUTPATIENT
Start: 2024-02-02 | End: 2024-02-02 | Stop reason: SDUPTHER

## 2024-02-02 RX ORDER — FENTANYL CITRATE 50 UG/ML
25 INJECTION, SOLUTION INTRAMUSCULAR; INTRAVENOUS EVERY 5 MIN PRN
Status: DISCONTINUED | OUTPATIENT
Start: 2024-02-02 | End: 2024-02-02 | Stop reason: HOSPADM

## 2024-02-02 RX ORDER — HEPARIN SODIUM 1000 [USP'U]/ML
INJECTION, SOLUTION INTRAVENOUS; SUBCUTANEOUS PRN
Status: DISCONTINUED | OUTPATIENT
Start: 2024-02-02 | End: 2024-02-02 | Stop reason: SDUPTHER

## 2024-02-02 RX ORDER — DEXAMETHASONE SODIUM PHOSPHATE 4 MG/ML
INJECTION, SOLUTION INTRA-ARTICULAR; INTRALESIONAL; INTRAMUSCULAR; INTRAVENOUS; SOFT TISSUE PRN
Status: DISCONTINUED | OUTPATIENT
Start: 2024-02-02 | End: 2024-02-02 | Stop reason: SDUPTHER

## 2024-02-02 RX ORDER — HYDROMORPHONE HYDROCHLORIDE 1 MG/ML
0.5 INJECTION, SOLUTION INTRAMUSCULAR; INTRAVENOUS; SUBCUTANEOUS EVERY 5 MIN PRN
Status: DISCONTINUED | OUTPATIENT
Start: 2024-02-02 | End: 2024-02-02 | Stop reason: HOSPADM

## 2024-02-02 RX ORDER — ONDANSETRON 2 MG/ML
4 INJECTION INTRAMUSCULAR; INTRAVENOUS
Status: DISCONTINUED | OUTPATIENT
Start: 2024-02-02 | End: 2024-02-02 | Stop reason: HOSPADM

## 2024-02-02 RX ADMIN — SODIUM CHLORIDE: 9 INJECTION, SOLUTION INTRAVENOUS at 09:26

## 2024-02-02 RX ADMIN — PHENYLEPHRINE HYDROCHLORIDE 100 MCG: 10 INJECTION, SOLUTION INTRAMUSCULAR; INTRAVENOUS; SUBCUTANEOUS at 13:43

## 2024-02-02 RX ADMIN — FENTANYL CITRATE 50 MCG: 50 INJECTION, SOLUTION INTRAMUSCULAR; INTRAVENOUS at 13:26

## 2024-02-02 RX ADMIN — FAMOTIDINE 20 MG: 10 INJECTION, SOLUTION INTRAVENOUS at 13:30

## 2024-02-02 RX ADMIN — FENTANYL CITRATE 50 MCG: 50 INJECTION, SOLUTION INTRAMUSCULAR; INTRAVENOUS at 14:40

## 2024-02-02 RX ADMIN — PHENYLEPHRINE HYDROCHLORIDE 100 MCG: 10 INJECTION, SOLUTION INTRAMUSCULAR; INTRAVENOUS; SUBCUTANEOUS at 13:28

## 2024-02-02 RX ADMIN — DEXAMETHASONE SODIUM PHOSPHATE 4 MG: 4 INJECTION, SOLUTION INTRAMUSCULAR; INTRAVENOUS at 13:22

## 2024-02-02 RX ADMIN — PROPOFOL 40 MG: 10 INJECTION, EMULSION INTRAVENOUS at 14:40

## 2024-02-02 RX ADMIN — ROCURONIUM BROMIDE 50 MG: 10 INJECTION, SOLUTION INTRAVENOUS at 13:12

## 2024-02-02 RX ADMIN — PROPOFOL 160 MG: 10 INJECTION, EMULSION INTRAVENOUS at 13:11

## 2024-02-02 RX ADMIN — GLYCOPYRROLATE 0.4 MG: 0.2 INJECTION INTRAMUSCULAR; INTRAVENOUS at 14:00

## 2024-02-02 RX ADMIN — ONDANSETRON 4 MG: 2 INJECTION INTRAMUSCULAR; INTRAVENOUS at 14:48

## 2024-02-02 RX ADMIN — SODIUM CHLORIDE 2000 MG: 900 INJECTION INTRAVENOUS at 13:15

## 2024-02-02 RX ADMIN — ACETAMINOPHEN 500 MG: 500 TABLET ORAL at 09:02

## 2024-02-02 RX ADMIN — SUGAMMADEX 200 MG: 100 INJECTION, SOLUTION INTRAVENOUS at 14:48

## 2024-02-02 RX ADMIN — PROPOFOL 20 MG: 10 INJECTION, EMULSION INTRAVENOUS at 14:43

## 2024-02-02 RX ADMIN — LIDOCAINE HYDROCHLORIDE 100 MG: 20 INJECTION, SOLUTION INTRAVENOUS at 13:11

## 2024-02-02 RX ADMIN — SODIUM CHLORIDE: 9 INJECTION, SOLUTION INTRAVENOUS at 13:01

## 2024-02-02 RX ADMIN — HEPARIN SODIUM 5000 UNITS: 1000 INJECTION INTRAVENOUS; SUBCUTANEOUS at 13:38

## 2024-02-02 RX ADMIN — PHENYLEPHRINE HYDROCHLORIDE 60 MCG: 10 INJECTION, SOLUTION INTRAMUSCULAR; INTRAVENOUS; SUBCUTANEOUS at 14:30

## 2024-02-02 ASSESSMENT — PAIN - FUNCTIONAL ASSESSMENT
PAIN_FUNCTIONAL_ASSESSMENT: 0-10
PAIN_FUNCTIONAL_ASSESSMENT: 0-10
PAIN_FUNCTIONAL_ASSESSMENT: NONE - DENIES PAIN
PAIN_FUNCTIONAL_ASSESSMENT: ACTIVITIES ARE NOT PREVENTED

## 2024-02-02 ASSESSMENT — PAIN SCALES - GENERAL: PAINLEVEL_OUTOF10: 2

## 2024-02-02 ASSESSMENT — PAIN DESCRIPTION - DESCRIPTORS: DESCRIPTORS: DISCOMFORT

## 2024-02-02 ASSESSMENT — ENCOUNTER SYMPTOMS: SHORTNESS OF BREATH: 1

## 2024-02-02 NOTE — PROGRESS NOTES
Unable to feel the thrill on the fistula but hear a bruit, dr. Dorman aware , ok with the creation.  Juice offered

## 2024-02-02 NOTE — PROGRESS NOTES
Tylenol ordered per patient request from Dr. Dozier.  Call placed to PICC nurse for IV placement.

## 2024-02-02 NOTE — ANESTHESIA PRE PROCEDURE
Department of Anesthesiology  Preprocedure Note       Name:  Scottie Chiang   Age:  53 y.o.  :  1970                                          MRN:  [id number]         Date:  2024      Surgeon: Surgeon(s):  Valeria Kumar MD    Procedure: Procedure(s):  RIGHT ARTERIOVENOUS FISTULA CREATION    Medications prior to admission:   Prior to Admission medications    Medication Sig Start Date End Date Taking? Authorizing Provider   topiramate (TOPAMAX SPRINKLE) 25 MG capsule Take 1 capsule by mouth daily   Yes Provider, MD Corey   gabapentin (NEURONTIN) 300 MG capsule Take 1 capsule by mouth in the morning and at bedtime for 90 days. 24  Charu Nuñez PA-C   promethazine (PHENERGAN) 12.5 MG tablet Take 1 tablet by mouth every 8 hours as needed for Nausea 24   Charu Nuñez PA-C   spironolactone (ALDACTONE) 25 MG tablet Take 1 tablet by mouth daily 24   Charu Nuñez PA-C   chlorzoxazone (PARAFON FORTE) 500 MG tablet Take 1 tablet by mouth 3 times daily as needed for Muscle spasms 24   Charu Nuñez PA-C   torsemide (DEMADEX) 100 MG tablet Take 1 tablet by mouth 2 times daily 24   Charu Nuñez PA-C   butalbital-acetaminophen-caffeine (FIORICET, ESGIC) -40 MG per tablet Take 1 tablet by mouth every 6 hours as needed for Headaches or Migraine 24  Bhavin Nguyen MD   sevelamer (RENVELA) 800 MG tablet Take 2 tablets by mouth 3 times daily (with meals) 24   Charu Nuñez PA-C   oxyCODONE (ROXICODONE) 5 MG immediate release tablet Take 1 tablet by mouth every 6 hours as needed for Pain for up to 28 days. Max Daily Amount: 20 mg 24  Faby Lance APRN - CNP   docusate sodium (COLACE) 100 MG capsule Take 1 capsule by mouth daily 23   Bhavin Nguyen MD   pantoprazole (PROTONIX) 40 MG tablet Take 1 tablet by mouth every morning (before breakfast) 23   Charu Nuñez PA-C   midodrine (PROAMATINE) 10 MG tablet Take 1 tablet by

## 2024-02-02 NOTE — PROGRESS NOTES
PACU Transfer to [facility]    Vitals:    02/02/24 1545   BP: 115/88   Pulse: 81   Resp: 14   Temp: 97.5 °F (36.4 °C)   SpO2: 98%         Intake/Output Summary (Last 24 hours) at 2/2/2024 1548  Last data filed at 2/2/2024 1547  Gross per 24 hour   Intake 595 ml   Output --   Net 595 ml       Pain assessment:    Pain Level: 2    Patient transferred to care of [facility] RN.    2/2/2024 3:48 PM

## 2024-02-02 NOTE — PROGRESS NOTES
Ambulatory Surgery/Procedure Discharge Note    Vitals:    02/02/24 1552   BP: (!) 149/79   Pulse: 79   Resp: 14   Temp: 97.5 °F (36.4 °C)   SpO2: 95%       In: 715 [P.O.:240; I.V.:475]  Out: -     Restroom use offered before discharge.  Yes    Pain assessment:  level of pain (1-10, 10 severe),   Pain Level: 2    Pt and S.O./family states \"ready to go home\". Pt alert and oriented x4. IV removed. Denies N/V. Dressing C, D & I.   Pt tolerating po intake. Discharge instructions given to pt and mother with pt permission. Pt and mother verbalized understanding of all instructions. Left with all belongings, prescriptions, and discharge instructions.       Patient discharged to home/self care. Patient discharged via wheel chair by transporter to waiting family/S.O.       2/2/2024 4:11 PM

## 2024-02-02 NOTE — H&P
Resident History and Physical   Vascular Surgery    Chief Complaint: ESRD    History of Present Illness:    Scottie Chiang is a 53 y.o. female with history as delineated below who presents for right arteriovenous fistula creation. The planned surgical procedure and site were confirmed by the patient and me. The patient reports feeling as though they are at their baseline level of health with no new symptoms.     Past Medical History:        Diagnosis Date    Arthritis     Diabetes mellitus (HCC)     Diastolic CHF (HCC)     End stage renal disease (HCC)     Hemodialysis patient (HCC)     MWF    History of blood transfusion     Hyperlipidemia     Hypertension     On home O2     but does not use it    JEAN (obstructive sleep apnea)     currently not on cpap     Past Surgical History:        Procedure Laterality Date    CATARACT REMOVAL Bilateral      SECTION      DIALYSIS CATHETER INSERTION N/A 2021    LAPAROSCOPIC PERITONEAL DIALYSIS CATHETER INSERTION, OMENTOPLEXY performed by Henri Elizalde DO at [facility] OR    DIALYSIS CATHETER INSERTION N/A 2022    LAPAROSCOPIC PERITONEAL DIALYSIS CATHETER PLACEMENT performed by Henri Elizalde DO at [facility] OR    DIALYSIS CATHETER REMOVAL N/A 2021    CATHETER REMOVAL PERITONEAL DIALYSIS performed by Henri Elizalde DO at [facility] OR    DIALYSIS CATHETER REMOVAL N/A 2022    PERITONEAL DIALYSIS CATHETER REMOVAL performed by Henri Elizalde DO at [facility] OR    EYE SURGERY Bilateral 1988    muscle surgery     IR TUNNELED CATHETER PLACEMENT GREATER THAN 5 YEARS  11/15/2021    IR TUNNELED CATHETER PLACEMENT GREATER THAN 5 YEARS 11/15/2021 [facility] SPECIAL PROCEDURES    IR TUNNELED CATHETER PLACEMENT GREATER THAN 5 YEARS  2022    IR TUNNELED CATHETER PLACEMENT GREATER THAN 5 YEARS 2022 [facility] SPECIAL PROCEDURES    IR TUNNELED CATHETER PLACEMENT GREATER THAN 5 YEARS  2022    IR TUNNELED CATHETER PLACEMENT GREATER THAN 5 YEARS 2022 [facility] SPECIAL PROCEDURES    IR TUNNELED CATHETER

## 2024-02-02 NOTE — PROGRESS NOTES
Received critical value of 8.3 on patients creatinine, Dr. Kumar already aware of patients elevated creatinine, will continue to monitor.r

## 2024-02-02 NOTE — ANESTHESIA POSTPROCEDURE EVALUATION
Department of Anesthesiology  Postprocedure Note    Patient: Scottie Chiang  MRN: [id number]  YOB: 1970  Date of evaluation: 2/2/2024    Procedure Summary       Date: 02/02/24 Room / Location: [place] [address] / [facility]    Anesthesia Start: 1301 Anesthesia Stop: 1454    Procedure: RIGHT ARTERIOVENOUS FISTULA CREATION (Right: Arm Lower) Diagnosis:       End stage renal disease (HCC)      (End stage renal disease (HCC) [N18.6])    Surgeons: Valeria Kumar MD Responsible Provider: Gage Dozier MD    Anesthesia Type: general ASA Status: 4            Anesthesia Type: No value filed.    Otto Phase I: Otto Score: 10    Otto Phase II: Otto Score: 10    Anesthesia Post Evaluation    Patient location during evaluation: PACU  Patient participation: complete - patient participated  Level of consciousness: awake and alert  Pain score: 2  Airway patency: patent  Nausea & Vomiting: no nausea and no vomiting  Cardiovascular status: blood pressure returned to baseline  Respiratory status: acceptable  Hydration status: euvolemic  Multimodal analgesia pain management approach  Pain management: adequate    No notable events documented.

## 2024-02-02 NOTE — PROGRESS NOTES
pt arrived from OR, s/p   RIGHT ARTERIOVENOUS FISTULA CREATION - Right  General, report received from CRNA, OR RN and DR. Dorman

## 2024-02-02 NOTE — BRIEF OP NOTE
Brief Postoperative Note      Patient: Scottie Chiang  YOB: 1970  MRN: [id number]    Date of Procedure: 2/2/2024    Pre-Op Diagnosis Codes:     * End stage renal disease (HCC) [N18.6]    Post-Op Diagnosis: Same       Procedure(s):  RIGHT ARTERIOVENOUS FISTULA CREATION    Surgeon(s):  Valeria Kumar MD    Assistant:  Resident: Donis Dorman MD    Anesthesia: General    Estimated Blood Loss (mL): less than 50     Complications: None    Findings:   Creation of right brachiocephalic fistula with no complications. Further details to follow in full operative report.     Plan:  Discharge from same day      Electronically signed by Donis Dorman MD on 2/2/2024 at 2:48 PM

## 2024-02-02 NOTE — DISCHARGE INSTRUCTIONS
Discharge Instructions:    Diet:   You may resume your regular diet.    Wound Care:   Skin glue was used to cover your incision(s). Please note that this glue is tinted purple; therefore, a slight purple hue around your incisions is normal. The skin glue will fall off on its own in about 10 days. You may shower, but do not scrub the incision sites directly or soak (tub, pool, etc.).    Activity:   No heavy lifting greater than a milk jug (~8lbs) until your follow-up appointment at which time you will receive further information regarding activity. No excessive over-head movements. Please ensure that you continue moving your arm and hand. You should perform hand squeezing exercises with a stress ball or something similar.     Pain management:   Unless informed of any restrictions by your primary care physician, please use your preferred over-the-counter pain reliever (Tylenol, Ibuprofen, etc.) as your primary pain medication. If you have pain that persists despite over-the-counter pain medications, you may use your home dose of Roxicodone.    Bowel Regimen:   Opioid/Narcotic pain relievers have a common side effect of constipation; therefore, we recommend that you purchase an over-the-counter stool softener, such as Docusate (Colace) to take while you require the Roxicodone to prevent this common side effect.    This medication is  intended to help prevent you from experiencing this very common side effect and also help to regulate your bowels after surgery.  Accordingly, if you do not experience constipation, then you do not need to take this medication.     Bowel Regimen Instructions:  Take one Colace pill two times each day while you are taking the narcotic pain reliever. If your stools become too loose and/or frequent, decrease the Colace to one pill one time each day. If your stools are still loose after this modification, stop taking this medication all together. This medicine was prescribed with the intention

## 2024-02-04 NOTE — OP NOTE
Operative Note      Patient: Scottie Chiagn  YOB: 1970  MRN: [id number]    Date of Procedure: 2/2/2024    Pre-Op Diagnosis Codes:     * End stage renal disease (HCC) [N18.6]    Post-Op Diagnosis: Same       Procedure(s):  RIGHT ARTERIOVENOUS FISTULA CREATION    Surgeon(s):  Valeria Kumar MD    Assistant:   Resident: Donis Dorman MD    Anesthesia: General    Estimated Blood Loss (mL): less than 50     Complications: None    Indication:   Scottie Chiang is a 53 year old female with ESRD for whom durable hemodialysis access is required.     Findings:   Creation of right brachiocephalic fistula with no complications.     Detailed Description of Procedure:   The patient was taken to the operating room and placed in supine position with her right arm outstretched on an arm board. A state of general anesthesia was induced by Anesthesiology, following which she was prepped and draped in standard sterile fashion. A pre-operative timeout was conducted in accordance with institutional policy-- all present were in agreement.     An ultrasound was used to visualize the patients vascular anatomy. Although pre-operative vein mapping revealed questionable vein sizes, intra-operative ultrasound reveals her cephalic to be of adequate size; accordingly, the decision was made to continue with creation of a right brachiocephalic arteriovenous fistula. A number 15 blade scalpel was used to create a transverse skin incision just distal to the antecubital fossa. The incision was carried down through the subcutaneous tissue with electrocautery. The cephalic vein was identified. It was circumferentially dissected from surrounding tissue with Metzenbaum scissors and encircled with vessel loops. The brachial artery was then located. The overlying fascia was incised in a cruciate manner, following which the brachial artery was carefully dissected free from the surrounding tissue and structures and encircles with vessel loops. The

## 2024-02-06 ENCOUNTER — OFFICE VISIT (OUTPATIENT)
Dept: PAIN MANAGEMENT | Age: 54
End: 2024-02-06
Payer: COMMERCIAL

## 2024-02-06 VITALS
WEIGHT: 209.4 LBS | DIASTOLIC BLOOD PRESSURE: 56 MMHG | HEART RATE: 67 BPM | OXYGEN SATURATION: 97 % | SYSTOLIC BLOOD PRESSURE: 108 MMHG | BODY MASS INDEX: 39.59 KG/M2

## 2024-02-06 DIAGNOSIS — N18.6 ESRD (END STAGE RENAL DISEASE) ON DIALYSIS (HCC): ICD-10-CM

## 2024-02-06 DIAGNOSIS — I50.43 CHF (CONGESTIVE HEART FAILURE), NYHA CLASS I, ACUTE ON CHRONIC, COMBINED (HCC): ICD-10-CM

## 2024-02-06 DIAGNOSIS — Z99.2 ESRD (END STAGE RENAL DISEASE) ON DIALYSIS (HCC): ICD-10-CM

## 2024-02-06 DIAGNOSIS — Z51.81 ENCOUNTER FOR THERAPEUTIC DRUG MONITORING: ICD-10-CM

## 2024-02-06 DIAGNOSIS — M47.812 CERVICAL SPONDYLOSIS: ICD-10-CM

## 2024-02-06 DIAGNOSIS — M47.817 LUMBOSACRAL SPONDYLOSIS WITHOUT MYELOPATHY: Primary | ICD-10-CM

## 2024-02-06 DIAGNOSIS — G89.4 CHRONIC PAIN SYNDROME: ICD-10-CM

## 2024-02-06 DIAGNOSIS — G43.909 EPISODIC MIGRAINE: ICD-10-CM

## 2024-02-06 PROCEDURE — G8484 FLU IMMUNIZE NO ADMIN: HCPCS | Performed by: NURSE PRACTITIONER

## 2024-02-06 PROCEDURE — 3078F DIAST BP <80 MM HG: CPT | Performed by: NURSE PRACTITIONER

## 2024-02-06 PROCEDURE — 3074F SYST BP LT 130 MM HG: CPT | Performed by: NURSE PRACTITIONER

## 2024-02-06 PROCEDURE — 1036F TOBACCO NON-USER: CPT | Performed by: NURSE PRACTITIONER

## 2024-02-06 PROCEDURE — G8427 DOCREV CUR MEDS BY ELIG CLIN: HCPCS | Performed by: NURSE PRACTITIONER

## 2024-02-06 PROCEDURE — 3017F COLORECTAL CA SCREEN DOC REV: CPT | Performed by: NURSE PRACTITIONER

## 2024-02-06 PROCEDURE — 99213 OFFICE O/P EST LOW 20 MIN: CPT | Performed by: NURSE PRACTITIONER

## 2024-02-06 PROCEDURE — G8417 CALC BMI ABV UP PARAM F/U: HCPCS | Performed by: NURSE PRACTITIONER

## 2024-02-06 RX ORDER — SCOLOPAMINE TRANSDERMAL SYSTEM 1 MG/1
1 PATCH, EXTENDED RELEASE TRANSDERMAL
Qty: 10 PATCH | Refills: 0 | Status: SHIPPED | OUTPATIENT
Start: 2024-02-06 | End: 2024-03-07

## 2024-02-06 RX ORDER — OXYCODONE HYDROCHLORIDE 5 MG/1
5 TABLET ORAL EVERY 6 HOURS PRN
Qty: 112 TABLET | Refills: 0 | Status: SHIPPED | OUTPATIENT
Start: 2024-02-13 | End: 2024-03-12

## 2024-02-06 NOTE — PROGRESS NOTES
Scottie Chiang  1970  [id number]      HISTORY OF PRESENT ILLNESS: Ms. Chiang is a 53 y.o. female returns for a follow up visit for pain management  She has a diagnosis of   1. Lumbosacral spondylosis without myelopathy    2. Encounter for therapeutic drug monitoring    3. Chronic pain syndrome    4. Cervical spondylosis    5. Episodic migraine    6. CHF (congestive heart failure), NYHA class I, acute on chronic, combined (formerly [facility])    7. ESRD (end stage renal disease) on dialysis (formerly [facility])    .      New Medications since Last Office visit have been reviewed with patient.     As per Information Obtained from the PADT (Patient Assessment and Documentation Tool)    She complains of pain in the head, neck, right arm. She rates the pain 8/10 and describes it as aching, burning. Current treatment regimen has helped relieve about 60% of the pain since beginning treatment plan.  She denies any side effects from the current pain regimen. Patient reports that since implementation of their treatment plan; their physical functioning is unchanged, family/social relationships are better, mood is better sleep patterns are unchanged, and that the overall functioning is unchanged.  Patient denies/admits that any of the above have changed since last office visit. Patient denies misusing/abusing her narcotic pain medications or using any illegal drugs.      Upon obtaining medical history from Ms. Chiang    ALLERGIES: Patients list of allergies were reviewed     MEDICATIONS: Ms. Key list of medications were reviewed.Her current medications are   Outpatient Medications Prior to Visit   Medication Sig Dispense Refill    topiramate (TOPAMAX SPRINKLE) 25 MG capsule Take 1 capsule by mouth daily      gabapentin (NEURONTIN) 300 MG capsule Take 1 capsule by mouth in the morning and at bedtime for 90 days. 90 capsule 1    promethazine (PHENERGAN) 12.5 MG tablet Take 1 tablet by mouth every 8 hours as needed for Nausea 15 tablet 0    spironolactone

## 2024-02-15 ENCOUNTER — OFFICE VISIT (OUTPATIENT)
Dept: VASCULAR SURGERY | Age: 54
End: 2024-02-15

## 2024-02-15 VITALS
WEIGHT: 207 LBS | HEIGHT: 61 IN | TEMPERATURE: 97.3 F | DIASTOLIC BLOOD PRESSURE: 72 MMHG | SYSTOLIC BLOOD PRESSURE: 122 MMHG | OXYGEN SATURATION: 95 % | HEART RATE: 69 BPM | BODY MASS INDEX: 39.08 KG/M2

## 2024-02-15 DIAGNOSIS — Z99.2 END-STAGE RENAL DISEASE ON HEMODIALYSIS (HCC): Primary | ICD-10-CM

## 2024-02-15 DIAGNOSIS — N18.6 END-STAGE RENAL DISEASE ON HEMODIALYSIS (HCC): Primary | ICD-10-CM

## 2024-02-15 PROCEDURE — 99024 POSTOP FOLLOW-UP VISIT: CPT | Performed by: SURGERY

## 2024-02-15 NOTE — PROGRESS NOTES
2/15/2024     Scottie Chiang (:  1970) is a 53 y.o. female,Established patient, here for evaluation of the following chief complaint(s):  Post-Op Check ( 1st post-op s/p right AVF/)        ASSESSMENT/PLAN:  1. End-stage renal disease on hemodialysis (HCC)  Doing well.  Advised cool pads to areas of tenderness.    Offered reassurance.    Return to office in 4-6 weeks, or sooner if she has any concerns or problems.     No follow-ups on file.       SUBJECTIVE/OBJECTIVE:  POV#1:  Right brachiocephalic AVF 2024  Complains of general marcia-incisional soreness. States she has no numbness or pain of hand.  She says she had this with left arm AVG.  No fevers, no drainage.     On exam:  AAO x 3.  NAD.  Appears at baseline  Right arm and hand warm and well-perfused with palpable radial pulse.  +Thrill and bruit in upper arm over cephalic vein, though somewhat deep.    Physical Exam  Vitals:    02/15/24 1056   BP: 122/72   Site: Left Upper Arm   Position: Sitting   Pulse: 69   Temp: 97.3 °F (36.3 °C)   TempSrc: Infrared   SpO2: 95%   Weight: 93.9 kg (207 lb)   Height: 1.549 m (5' 0.98\")       An electronic signature was used to authenticate this note.    --Valeria Kumar MD

## 2024-02-23 ENCOUNTER — APPOINTMENT (OUTPATIENT)
Dept: CT IMAGING | Age: 54
DRG: 100 | End: 2024-02-23
Payer: COMMERCIAL

## 2024-02-23 ENCOUNTER — APPOINTMENT (OUTPATIENT)
Dept: GENERAL RADIOLOGY | Age: 54
DRG: 100 | End: 2024-02-23
Payer: COMMERCIAL

## 2024-02-23 ENCOUNTER — HOSPITAL ENCOUNTER (INPATIENT)
Age: 54
LOS: 3 days | Discharge: HOME | DRG: 100 | End: 2024-02-26
Attending: EMERGENCY MEDICINE | Admitting: FAMILY MEDICINE
Payer: COMMERCIAL

## 2024-02-23 ENCOUNTER — APPOINTMENT (OUTPATIENT)
Dept: VASCULAR LAB | Age: 54
DRG: 100 | End: 2024-02-23
Payer: COMMERCIAL

## 2024-02-23 ENCOUNTER — APPOINTMENT (OUTPATIENT)
Dept: MRI IMAGING | Age: 54
DRG: 100 | End: 2024-02-23
Payer: COMMERCIAL

## 2024-02-23 DIAGNOSIS — R41.82 ALTERED MENTAL STATUS, UNSPECIFIED ALTERED MENTAL STATUS TYPE: Primary | ICD-10-CM

## 2024-02-23 DIAGNOSIS — R40.4 TRANSIENT ALTERATION OF AWARENESS: ICD-10-CM

## 2024-02-23 PROBLEM — S92.902A CLOSED FRACTURE OF BONE OF LEFT FOOT: Status: ACTIVE | Noted: 2024-02-23

## 2024-02-23 PROBLEM — G93.40 ENCEPHALOPATHY ACUTE: Status: ACTIVE | Noted: 2024-02-23

## 2024-02-23 LAB
ALBUMIN SERPL-MCNC: 4.1 G/DL (ref 3.4–5)
ALBUMIN SERPL-MCNC: 4.2 G/DL (ref 3.4–5)
ALBUMIN/GLOB SERPL: 1.1 {RATIO} (ref 1.1–2.2)
ALBUMIN/GLOB SERPL: 1.1 {RATIO} (ref 1.1–2.2)
ALP SERPL-CCNC: 157 U/L (ref 40–129)
ALP SERPL-CCNC: 161 U/L (ref 40–129)
ALT SERPL-CCNC: 21 U/L (ref 10–40)
ALT SERPL-CCNC: 25 U/L (ref 10–40)
AMMONIA PLAS-SCNC: 16 UMOL/L (ref 11–51)
ANION GAP SERPL CALCULATED.3IONS-SCNC: 19 MMOL/L (ref 3–16)
ANION GAP SERPL CALCULATED.3IONS-SCNC: 21 MMOL/L (ref 3–16)
AST SERPL-CCNC: 39 U/L (ref 15–37)
AST SERPL-CCNC: 65 U/L (ref 15–37)
BASOPHILS # BLD: 0 K/UL (ref 0–0.2)
BASOPHILS NFR BLD: 0.6 %
BILIRUB SERPL-MCNC: 0.4 MG/DL (ref 0–1)
BILIRUB SERPL-MCNC: 0.4 MG/DL (ref 0–1)
BUN SERPL-MCNC: 24 MG/DL (ref 7–20)
BUN SERPL-MCNC: 25 MG/DL (ref 7–20)
CALCIUM SERPL-MCNC: 10.1 MG/DL (ref 8.3–10.6)
CALCIUM SERPL-MCNC: 10.3 MG/DL (ref 8.3–10.6)
CHLORIDE SERPL-SCNC: 98 MMOL/L (ref 99–110)
CHLORIDE SERPL-SCNC: 99 MMOL/L (ref 99–110)
CK SERPL-CCNC: 55 U/L (ref 26–192)
CO2 SERPL-SCNC: 22 MMOL/L (ref 21–32)
CO2 SERPL-SCNC: 22 MMOL/L (ref 21–32)
CREAT SERPL-MCNC: 6.7 MG/DL (ref 0.6–1.1)
CREAT SERPL-MCNC: 7.6 MG/DL (ref 0.6–1.1)
DEPRECATED RDW RBC AUTO: 17.8 % (ref 12.4–15.4)
EKG ATRIAL RATE: 88 BPM
EKG ATRIAL RATE: 94 BPM
EKG DIAGNOSIS: NORMAL
EKG DIAGNOSIS: NORMAL
EKG P AXIS: 50 DEGREES
EKG P AXIS: 57 DEGREES
EKG P-R INTERVAL: 170 MS
EKG P-R INTERVAL: 190 MS
EKG Q-T INTERVAL: 356 MS
EKG Q-T INTERVAL: 364 MS
EKG QRS DURATION: 82 MS
EKG QRS DURATION: 84 MS
EKG QTC CALCULATION (BAZETT): 440 MS
EKG QTC CALCULATION (BAZETT): 445 MS
EKG R AXIS: -1 DEGREES
EKG R AXIS: 2 DEGREES
EKG T AXIS: 30 DEGREES
EKG T AXIS: 51 DEGREES
EKG VENTRICULAR RATE: 88 BPM
EKG VENTRICULAR RATE: 94 BPM
EOSINOPHIL # BLD: 0.1 K/UL (ref 0–0.6)
EOSINOPHIL NFR BLD: 1.3 %
FLUAV RNA RESP QL NAA+PROBE: NOT DETECTED
FLUBV RNA RESP QL NAA+PROBE: NOT DETECTED
GFR SERPLBLD CREATININE-BSD FMLA CKD-EPI: 6 ML/MIN/{1.73_M2}
GFR SERPLBLD CREATININE-BSD FMLA CKD-EPI: 7 ML/MIN/{1.73_M2}
GLUCOSE BLD-MCNC: 122 MG/DL (ref 70–99)
GLUCOSE SERPL-MCNC: 113 MG/DL (ref 70–99)
GLUCOSE SERPL-MCNC: 128 MG/DL (ref 70–99)
HCG SERPL QL: NEGATIVE
HCT VFR BLD AUTO: 40.3 % (ref 36–48)
HGB BLD-MCNC: 12.9 G/DL (ref 12–16)
LACTATE BLDV-SCNC: 1.8 MMOL/L (ref 0.4–1.9)
LACTATE BLDV-SCNC: 2.2 MMOL/L (ref 0.4–1.9)
LIPASE SERPL-CCNC: 21 U/L (ref 13–60)
LYMPHOCYTES # BLD: 1.4 K/UL (ref 1–5.1)
LYMPHOCYTES NFR BLD: 21.7 %
MCH RBC QN AUTO: 32.8 PG (ref 26–34)
MCHC RBC AUTO-ENTMCNC: 31.9 G/DL (ref 31–36)
MCV RBC AUTO: 102.6 FL (ref 80–100)
MONOCYTES # BLD: 0.6 K/UL (ref 0–1.3)
MONOCYTES NFR BLD: 9.4 %
NEUTROPHILS # BLD: 4.4 K/UL (ref 1.7–7.7)
NEUTROPHILS NFR BLD: 67 %
NT-PROBNP SERPL-MCNC: 5064 PG/ML (ref 0–124)
PERFORMED ON: ABNORMAL
PLATELET # BLD AUTO: 140 K/UL (ref 135–450)
PMV BLD AUTO: 9.8 FL (ref 5–10.5)
POTASSIUM SERPL-SCNC: 4.5 MMOL/L (ref 3.5–5.1)
POTASSIUM SERPL-SCNC: 6.8 MMOL/L (ref 3.5–5.1)
PROT SERPL-MCNC: 7.7 G/DL (ref 6.4–8.2)
PROT SERPL-MCNC: 8.1 G/DL (ref 6.4–8.2)
RBC # BLD AUTO: 3.92 M/UL (ref 4–5.2)
SARS-COV-2 RNA RESP QL NAA+PROBE: NOT DETECTED
SODIUM SERPL-SCNC: 139 MMOL/L (ref 136–145)
SODIUM SERPL-SCNC: 142 MMOL/L (ref 136–145)
TROPONIN, HIGH SENSITIVITY: 158 NG/L (ref 0–14)
TROPONIN, HIGH SENSITIVITY: 178 NG/L (ref 0–14)
TROPONIN, HIGH SENSITIVITY: 181 NG/L (ref 0–14)
WBC # BLD AUTO: 6.6 K/UL (ref 4–11)

## 2024-02-23 PROCEDURE — 73630 X-RAY EXAM OF FOOT: CPT

## 2024-02-23 PROCEDURE — 6360000002 HC RX W HCPCS: Performed by: FAMILY MEDICINE

## 2024-02-23 PROCEDURE — 83880 ASSAY OF NATRIURETIC PEPTIDE: CPT

## 2024-02-23 PROCEDURE — 5A1D70Z PERFORMANCE OF URINARY FILTRATION, INTERMITTENT, LESS THAN 6 HOURS PER DAY: ICD-10-PCS | Performed by: INTERNAL MEDICINE

## 2024-02-23 PROCEDURE — 36415 COLL VENOUS BLD VENIPUNCTURE: CPT

## 2024-02-23 PROCEDURE — 6370000000 HC RX 637 (ALT 250 FOR IP): Performed by: FAMILY MEDICINE

## 2024-02-23 PROCEDURE — 95819 EEG AWAKE AND ASLEEP: CPT

## 2024-02-23 PROCEDURE — 2580000003 HC RX 258: Performed by: FAMILY MEDICINE

## 2024-02-23 PROCEDURE — 70551 MRI BRAIN STEM W/O DYE: CPT

## 2024-02-23 PROCEDURE — 73060 X-RAY EXAM OF HUMERUS: CPT

## 2024-02-23 PROCEDURE — 71250 CT THORAX DX C-: CPT

## 2024-02-23 PROCEDURE — 6360000002 HC RX W HCPCS: Performed by: INTERNAL MEDICINE

## 2024-02-23 PROCEDURE — 1200000000 HC SEMI PRIVATE

## 2024-02-23 PROCEDURE — 83690 ASSAY OF LIPASE: CPT

## 2024-02-23 PROCEDURE — 70450 CT HEAD/BRAIN W/O DYE: CPT

## 2024-02-23 PROCEDURE — 6370000000 HC RX 637 (ALT 250 FOR IP): Performed by: INTERNAL MEDICINE

## 2024-02-23 PROCEDURE — 84703 CHORIONIC GONADOTROPIN ASSAY: CPT

## 2024-02-23 PROCEDURE — 99223 1ST HOSP IP/OBS HIGH 75: CPT | Performed by: FAMILY MEDICINE

## 2024-02-23 PROCEDURE — 73130 X-RAY EXAM OF HAND: CPT

## 2024-02-23 PROCEDURE — 94799 UNLISTED PULMONARY SVC/PX: CPT

## 2024-02-23 PROCEDURE — 73070 X-RAY EXAM OF ELBOW: CPT

## 2024-02-23 PROCEDURE — 73020 X-RAY EXAM OF SHOULDER: CPT

## 2024-02-23 PROCEDURE — 87040 BLOOD CULTURE FOR BACTERIA: CPT

## 2024-02-23 PROCEDURE — 93970 EXTREMITY STUDY: CPT

## 2024-02-23 PROCEDURE — 85025 COMPLETE CBC W/AUTO DIFF WBC: CPT

## 2024-02-23 PROCEDURE — 84484 ASSAY OF TROPONIN QUANT: CPT

## 2024-02-23 PROCEDURE — 80053 COMPREHEN METABOLIC PANEL: CPT

## 2024-02-23 PROCEDURE — 73090 X-RAY EXAM OF FOREARM: CPT

## 2024-02-23 PROCEDURE — 99223 1ST HOSP IP/OBS HIGH 75: CPT | Performed by: PSYCHIATRY & NEUROLOGY

## 2024-02-23 PROCEDURE — 94150 VITAL CAPACITY TEST: CPT

## 2024-02-23 PROCEDURE — 71045 X-RAY EXAM CHEST 1 VIEW: CPT

## 2024-02-23 PROCEDURE — 6370000000 HC RX 637 (ALT 250 FOR IP): Performed by: NURSE PRACTITIONER

## 2024-02-23 PROCEDURE — 93005 ELECTROCARDIOGRAM TRACING: CPT | Performed by: FAMILY MEDICINE

## 2024-02-23 PROCEDURE — 83605 ASSAY OF LACTIC ACID: CPT

## 2024-02-23 PROCEDURE — 99285 EMERGENCY DEPT VISIT HI MDM: CPT

## 2024-02-23 PROCEDURE — 99221 1ST HOSP IP/OBS SF/LOW 40: CPT | Performed by: PHYSICIAN ASSISTANT

## 2024-02-23 PROCEDURE — 73030 X-RAY EXAM OF SHOULDER: CPT

## 2024-02-23 PROCEDURE — 93010 ELECTROCARDIOGRAM REPORT: CPT | Performed by: INTERNAL MEDICINE

## 2024-02-23 PROCEDURE — 74176 CT ABD & PELVIS W/O CONTRAST: CPT

## 2024-02-23 PROCEDURE — 93005 ELECTROCARDIOGRAM TRACING: CPT

## 2024-02-23 PROCEDURE — 90935 HEMODIALYSIS ONE EVALUATION: CPT

## 2024-02-23 PROCEDURE — 82550 ASSAY OF CK (CPK): CPT

## 2024-02-23 PROCEDURE — 82140 ASSAY OF AMMONIA: CPT

## 2024-02-23 PROCEDURE — 87636 SARSCOV2 & INF A&B AMP PRB: CPT

## 2024-02-23 RX ORDER — ONDANSETRON 2 MG/ML
4 INJECTION INTRAMUSCULAR; INTRAVENOUS EVERY 6 HOURS PRN
Status: DISCONTINUED | OUTPATIENT
Start: 2024-02-23 | End: 2024-02-26 | Stop reason: HOSPADM

## 2024-02-23 RX ORDER — SODIUM CHLORIDE 0.9 % (FLUSH) 0.9 %
5-40 SYRINGE (ML) INJECTION PRN
Status: DISCONTINUED | OUTPATIENT
Start: 2024-02-23 | End: 2024-02-26 | Stop reason: HOSPADM

## 2024-02-23 RX ORDER — SODIUM CHLORIDE 9 MG/ML
INJECTION, SOLUTION INTRAVENOUS PRN
Status: DISCONTINUED | OUTPATIENT
Start: 2024-02-23 | End: 2024-02-26 | Stop reason: HOSPADM

## 2024-02-23 RX ORDER — BISACODYL 10 MG
10 SUPPOSITORY, RECTAL RECTAL DAILY PRN
Status: DISCONTINUED | OUTPATIENT
Start: 2024-02-23 | End: 2024-02-26 | Stop reason: HOSPADM

## 2024-02-23 RX ORDER — SEVELAMER CARBONATE 800 MG/1
1600 TABLET, FILM COATED ORAL
Status: DISCONTINUED | OUTPATIENT
Start: 2024-02-23 | End: 2024-02-26 | Stop reason: HOSPADM

## 2024-02-23 RX ORDER — PANTOPRAZOLE SODIUM 40 MG/1
40 TABLET, DELAYED RELEASE ORAL
Status: DISCONTINUED | OUTPATIENT
Start: 2024-02-23 | End: 2024-02-26 | Stop reason: HOSPADM

## 2024-02-23 RX ORDER — SODIUM CHLORIDE 0.9 % (FLUSH) 0.9 %
5-40 SYRINGE (ML) INJECTION EVERY 12 HOURS SCHEDULED
Status: DISCONTINUED | OUTPATIENT
Start: 2024-02-23 | End: 2024-02-26 | Stop reason: HOSPADM

## 2024-02-23 RX ORDER — HEPARIN SODIUM 5000 [USP'U]/ML
5000 INJECTION, SOLUTION INTRAVENOUS; SUBCUTANEOUS EVERY 8 HOURS SCHEDULED
Status: DISCONTINUED | OUTPATIENT
Start: 2024-02-23 | End: 2024-02-26 | Stop reason: HOSPADM

## 2024-02-23 RX ORDER — 0.9 % SODIUM CHLORIDE 0.9 %
500 INTRAVENOUS SOLUTION INTRAVENOUS ONCE
Status: COMPLETED | OUTPATIENT
Start: 2024-02-23 | End: 2024-02-23

## 2024-02-23 RX ORDER — POLYETHYLENE GLYCOL 3350 17 G/17G
17 POWDER, FOR SOLUTION ORAL DAILY PRN
Status: DISCONTINUED | OUTPATIENT
Start: 2024-02-23 | End: 2024-02-26 | Stop reason: HOSPADM

## 2024-02-23 RX ORDER — LORAZEPAM 0.5 MG/1
0.5 TABLET ORAL
Status: COMPLETED | OUTPATIENT
Start: 2024-02-23 | End: 2024-02-23

## 2024-02-23 RX ORDER — OXYCODONE HYDROCHLORIDE AND ACETAMINOPHEN 5; 325 MG/1; MG/1
1 TABLET ORAL EVERY 4 HOURS PRN
Status: DISCONTINUED | OUTPATIENT
Start: 2024-02-23 | End: 2024-02-26 | Stop reason: HOSPADM

## 2024-02-23 RX ORDER — ACETAMINOPHEN 325 MG/1
650 TABLET ORAL EVERY 6 HOURS PRN
Status: DISCONTINUED | OUTPATIENT
Start: 2024-02-23 | End: 2024-02-26 | Stop reason: HOSPADM

## 2024-02-23 RX ORDER — ACETAMINOPHEN 650 MG/1
650 SUPPOSITORY RECTAL EVERY 6 HOURS PRN
Status: DISCONTINUED | OUTPATIENT
Start: 2024-02-23 | End: 2024-02-26 | Stop reason: HOSPADM

## 2024-02-23 RX ORDER — CARVEDILOL 12.5 MG/1
12.5 TABLET ORAL 2 TIMES DAILY WITH MEALS
Status: DISCONTINUED | OUTPATIENT
Start: 2024-02-23 | End: 2024-02-26 | Stop reason: HOSPADM

## 2024-02-23 RX ORDER — LIDOCAINE HYDROCHLORIDE 10 MG/ML
5 INJECTION, SOLUTION EPIDURAL; INFILTRATION; INTRACAUDAL; PERINEURAL ONCE
Status: DISCONTINUED | OUTPATIENT
Start: 2024-02-23 | End: 2024-02-26 | Stop reason: HOSPADM

## 2024-02-23 RX ORDER — SODIUM CHLORIDE 9 MG/ML
25 INJECTION, SOLUTION INTRAVENOUS PRN
Status: DISCONTINUED | OUTPATIENT
Start: 2024-02-23 | End: 2024-02-26 | Stop reason: HOSPADM

## 2024-02-23 RX ORDER — HYDRALAZINE HYDROCHLORIDE 20 MG/ML
5 INJECTION INTRAMUSCULAR; INTRAVENOUS EVERY 6 HOURS PRN
Status: DISCONTINUED | OUTPATIENT
Start: 2024-02-23 | End: 2024-02-26 | Stop reason: HOSPADM

## 2024-02-23 RX ADMIN — OXYCODONE AND ACETAMINOPHEN 1 TABLET: 5; 325 TABLET ORAL at 12:58

## 2024-02-23 RX ADMIN — OXYCODONE AND ACETAMINOPHEN 1 TABLET: 5; 325 TABLET ORAL at 19:04

## 2024-02-23 RX ADMIN — ONDANSETRON 4 MG: 2 INJECTION INTRAMUSCULAR; INTRAVENOUS at 20:47

## 2024-02-23 RX ADMIN — SODIUM CHLORIDE, PRESERVATIVE FREE 10 ML: 5 INJECTION INTRAVENOUS at 10:44

## 2024-02-23 RX ADMIN — HEPARIN SODIUM 5000 UNITS: 5000 INJECTION INTRAVENOUS; SUBCUTANEOUS at 10:45

## 2024-02-23 RX ADMIN — SEVELAMER CARBONATE 1600 MG: 800 TABLET, FILM COATED ORAL at 19:04

## 2024-02-23 RX ADMIN — SODIUM CHLORIDE 500 ML: 9 INJECTION, SOLUTION INTRAVENOUS at 10:51

## 2024-02-23 RX ADMIN — SODIUM CHLORIDE, PRESERVATIVE FREE 10 ML: 5 INJECTION INTRAVENOUS at 20:36

## 2024-02-23 RX ADMIN — CARVEDILOL 12.5 MG: 12.5 TABLET, FILM COATED ORAL at 10:45

## 2024-02-23 RX ADMIN — PANTOPRAZOLE SODIUM 40 MG: 40 TABLET, DELAYED RELEASE ORAL at 10:45

## 2024-02-23 RX ADMIN — BISACODYL 10 MG: 10 SUPPOSITORY RECTAL at 22:57

## 2024-02-23 RX ADMIN — LORAZEPAM 0.5 MG: 0.5 TABLET ORAL at 23:22

## 2024-02-23 RX ADMIN — CARVEDILOL 12.5 MG: 12.5 TABLET, FILM COATED ORAL at 19:04

## 2024-02-23 RX ADMIN — SEVELAMER CARBONATE 1600 MG: 800 TABLET, FILM COATED ORAL at 12:59

## 2024-02-23 ASSESSMENT — PAIN SCALES - PAIN ASSESSMENT IN ADVANCED DEMENTIA (PAINAD)
BREATHING: 0
FACIALEXPRESSION: 2
CONSOLABILITY: 1
BREATHING: 0
NEGVOCALIZATION: 1
BODYLANGUAGE: 1
BODYLANGUAGE: 1
TOTALSCORE: 5
CONSOLABILITY: 1
NEGVOCALIZATION: 1
FACIALEXPRESSION: 2
TOTALSCORE: 5

## 2024-02-23 ASSESSMENT — PAIN DESCRIPTION - LOCATION: LOCATION: FOOT;GENERALIZED

## 2024-02-23 ASSESSMENT — PAIN SCALES - GENERAL
PAINLEVEL_OUTOF10: 6
PAINLEVEL_OUTOF10: 7
PAINLEVEL_OUTOF10: 8
PAINLEVEL_OUTOF10: 8
PAINLEVEL_OUTOF10: 6

## 2024-02-23 ASSESSMENT — PAIN SCALES - WONG BAKER
WONGBAKER_NUMERICALRESPONSE: 8
WONGBAKER_NUMERICALRESPONSE: 2

## 2024-02-23 ASSESSMENT — PAIN DESCRIPTION - DESCRIPTORS
DESCRIPTORS: SORE
DESCRIPTORS: SORE

## 2024-02-23 ASSESSMENT — PAIN - FUNCTIONAL ASSESSMENT
PAIN_FUNCTIONAL_ASSESSMENT: 0-10
PAIN_FUNCTIONAL_ASSESSMENT: PREVENTS OR INTERFERES WITH ALL ACTIVE AND SOME PASSIVE ACTIVITIES
PAIN_FUNCTIONAL_ASSESSMENT: PREVENTS OR INTERFERES WITH ALL ACTIVE AND SOME PASSIVE ACTIVITIES

## 2024-02-23 ASSESSMENT — PAIN DESCRIPTION - ORIENTATION: ORIENTATION: LEFT

## 2024-02-23 ASSESSMENT — PAIN DESCRIPTION - ONSET: ONSET: ON-GOING

## 2024-02-23 ASSESSMENT — PAIN DESCRIPTION - FREQUENCY: FREQUENCY: CONTINUOUS

## 2024-02-23 ASSESSMENT — PAIN DESCRIPTION - PAIN TYPE: TYPE: ACUTE PAIN

## 2024-02-23 NOTE — H&P
Hospital Medicine History & Physical      Date of Admission: 2/23/2024    Date of Service:  Pt seen/examined on 2/23/2024     [x]Admitted to Inpatient with expected LOS greater than two midnights due to medical therapy.  []Placed in Observation status.    Chief Admission Complaint:  AMS    Presenting Admission History:      53 y.o. female who presented to [facility] with AMS.  PMHx significant for ESRD on HD DM2 HTN.      Per family, patient has been altered for last 2 weeks not getting better, difficult to arouse disoriented confused.  Patient does respond to some questions and does follow commands at times but have to ask multiple times.  Patient reports left arm pain and left foot pain.  Family states she has been vomiting.  Patient does not remember last BM.  Patient knows its Friday and that she is in the hospital.  Patient repeats words several times.  Patient able to identify mother and sister in room.  Family does not know if she missed any dialysis sessions.  Family does not know if she has fallen.  Patient has opiates as home medication, family does not believe she took more than prescribed, patient denies taking more than prescribed.      In ER  CT head shows  No acute intracranial abnormality./Apical scalp contusion.    XR R shoulder shows No acute fracture or dislocation.      Chest XR shows No acute cardiopulmonary findings.     WBC WNL  Lactic acid 2.2    Potassium 6.8 but hemolyzed  Gap 19  Glucose 128  Cr 6.7/BUN 24  Alk phos 157  AST 65    BNP 5,064  Troponin 158     Assessment/Plan:      Current Principal Problem:  AMS (altered mental status)    AMS  - metabolic vs neurological vs cardiac vs infectious  - MRI head  - ammonia levels  - CK levels  - blood cultures  - pro calcitonin  - cardiac monitor  - neurology consult  - UDS  - trend lactic acid  - 500 cc bolus IVFs    ESRD on HD  - nephrology consult  - potassium hemolyzed, repeat labs    Elevated troponin  - trend troponin  -

## 2024-02-23 NOTE — CONSULTS
muscle surgery     IR TUNNELED CATHETER PLACEMENT GREATER THAN 5 YEARS  11/15/2021    IR TUNNELED CATHETER PLACEMENT GREATER THAN 5 YEARS 11/15/2021 [facility] SPECIAL PROCEDURES    IR TUNNELED CATHETER PLACEMENT GREATER THAN 5 YEARS  6/28/2022    IR TUNNELED CATHETER PLACEMENT GREATER THAN 5 YEARS 6/28/2022 [facility] SPECIAL PROCEDURES    IR TUNNELED CATHETER PLACEMENT GREATER THAN 5 YEARS  7/7/2022    IR TUNNELED CATHETER PLACEMENT GREATER THAN 5 YEARS 7/7/2022 [facility] SPECIAL PROCEDURES    IR TUNNELED CATHETER PLACEMENT GREATER THAN 5 YEARS  9/21/2022    IR TUNNELED CATHETER PLACEMENT GREATER THAN 5 YEARS 9/21/2022 [facility] SPECIAL PROCEDURES    TOE AMPUTATION Left     left great toe partial amputation    US ASP ABSCESS/HEMATOMA/BULLA/CYST  3/28/2022    US ASP ABSCESS/HEMATOMA/BULLA/CYST 3/28/2022 [facility] ULTRASOUND         Medications Prior to Admission:   Prior to Admission medications    Medication Sig Start Date End Date Taking? Authorizing Provider   oxyCODONE (ROXICODONE) 5 MG immediate release tablet Take 1 tablet by mouth every 6 hours as needed for Pain for up to 28 days. Max Daily Amount: 20 mg 2/13/24 3/12/24  Faby Lance APRN - CNP   scopolamine (TRANSDERM-SCOP) transdermal patch Place 1 patch onto the skin every 72 hours 2/6/24 3/7/24  Faby Lance APRN - CNP   topiramate (TOPAMAX SPRINKLE) 25 MG capsule Take 1 capsule by mouth daily    Provider, MD Corey   gabapentin (NEURONTIN) 300 MG capsule Take 1 capsule by mouth in the morning and at bedtime for 90 days. 1/31/24 4/30/24  Charu Nuñez PA-C   promethazine (PHENERGAN) 12.5 MG tablet Take 1 tablet by mouth every 8 hours as needed for Nausea 1/31/24   Charu Nuñez PA-C   spironolactone (ALDACTONE) 25 MG tablet Take 1 tablet by mouth daily 1/29/24   Charu Nuñez PA-C   chlorzoxazone (PARAFON FORTE) 500 MG tablet Take 1 tablet by mouth 3 times daily as needed for Muscle spasms 1/24/24   Charu Nuñez PA-C   torsemide (DEMADEX) 100 MG tablet Take 1 tablet 
rash, turgor wnl  Heent:  eomi, mmm  Neck: no bruits or jvd noted  Cardiovascular:  S1, S2 without m/r/g  Respiratory: CTA B without w/r/r  Abdomen:  soft, nt, nd  Ext:  lower extremity edema No  Psychiatric: mood and affect appropriate  Musculoskeletal:  Rom, muscular strength intact    Data:   Labs:  CBC:   Recent Labs     02/23/24 0234   WBC 6.6   HGB 12.9        BMP:    Recent Labs     02/23/24 0234      K 6.8*   CL 98*   CO2 22   BUN 24*   CREATININE 6.7*   GLUCOSE 128*     Ca/Mg/Phos:   Recent Labs     02/23/24 0234   CALCIUM 10.1     Hepatic:   Recent Labs     02/23/24 0234   AST 65*   ALT 25   BILITOT 0.4   ALKPHOS 157*     Troponin: No results for input(s): \"TROPONINI\" in the last 72 hours.  BNP: No results for input(s): \"BNP\" in the last 72 hours.  Lipids: No results for input(s): \"CHOL\", \"TRIG\", \"HDL\", \"LDLCALC\" in the last 72 hours.    Invalid input(s): \"LABVLDL\"  ABGs: No results for input(s): \"PHART\", \"PO2ART\", \"FUM6XGM\" in the last 72 hours.  INR: No results for input(s): \"INR\" in the last 72 hours.  UA:No results for input(s): \"COLORU\", \"CLARITYU\", \"GLUCOSEU\", \"BILIRUBINUR\", \"KETUA\", \"SPECGRAV\", \"BLOODU\", \"PHUR\", \"PROTEINU\", \"UROBILINOGEN\", \"NITRU\", \"LEUKOCYTESUR\", \"URINETYPE\" in the last 72 hours.    Invalid input(s): \"LABMICR\"   Urine Microscopic: No results for input(s): \"LABCAST\", \"BACTERIA\", \"COMU\", \"HYALCAST\", \"WBCUA\", \"RBCUA\", \"EPIU\" in the last 72 hours.  Urine Culture: No results for input(s): \"LABURIN\" in the last 72 hours.  Urine Chemistry: No results for input(s): \"CLUR\", \"LABCREA\", \"PROTEINUR\", \"NAUR\" in the last 72 hours.      IMAGING:  VL Extremity Venous Bilateral         XR FOOT LEFT (MIN 3 VIEWS)   Final Result   1.  Acute fractures of the fourth and fifth metatarsals as described above.      XR HUMERUS LEFT (MIN 2 VIEWS)   Final Result      No fracture or dislocation. Surgical clips in the mid arm. Vascular stent at the   elbow.      XR ELBOW LEFT (2 VIEWS)   Final 
      Scheduled Meds:   sodium chloride flush  5-40 mL IntraVENous 2 times per day    heparin (porcine)  5,000 Units SubCUTAneous 3 times per day    carvedilol  12.5 mg Oral BID WC    pantoprazole  40 mg Oral QAM AC    sevelamer  1,600 mg Oral TID WC    sodium chloride  500 mL IntraVENous Once    lidocaine 1 % injection  5 mL IntraDERmal Once    sodium chloride flush  5-40 mL IntraVENous 2 times per day       Continuous Infusions:  sodium chloride  sodium chloride         PRN Meds:  sodium chloride flush, 5-40 mL, PRN  sodium chloride, , PRN  polyethylene glycol, 17 g, Daily PRN  acetaminophen, 650 mg, Q6H PRN   Or  acetaminophen, 650 mg, Q6H PRN  sodium chloride flush, 5-40 mL, PRN  sodium chloride, 25 mL, PRN  hydrALAZINE, 5 mg, Q6H PRN              Discussed at length with patient; pt's 3 daughters; and nursing    Jeffrey Ulrich MD  Neurology/Neurocritical Care  February 23, 2024

## 2024-02-23 NOTE — ED PROVIDER NOTES
[facility]  EMERGENCY DEPARTMENT ENCOUNTER          EM RESIDENT NOTE       Date of evaluation: 2/23/2024    Chief Complaint     Altered Mental Status (Patient MWF dialysis patient - family called 911 due to patient \"acting different\")    History of Present Illness     Scottie Chiang is a 53 y.o. female with PMH of ESRD on dialysis MWF, HFpEF, DM2, HTN, HLD, JEAN who presents with altered mental status.    Patient brought in the request of family for concerns of altered mental status.  Family states that patient appeared confused.  Per family, patient has had multiple episodes of the last 2 weeks where she becomes unresponsive to verbal stimuli, with her eyes open during these episodes.  Patient has also been complaining of pain in her right arm for the last few months following fistula placement.  Patient successfully completed her dialysis session yesterday without complication.  Patient complains of right shoulder pain and nausea with nonbloody, nonbilious vomiting, but otherwise has no concerns.  Denies any other infectious symptoms; afebrile, no cough, normal bowel movements and urination.      The patient denies any other aggravating or alleviating factors unless otherwise explicitly stated above.    MEDICAL DECISION MAKING / ASSESSMENT / PLAN     INITIAL VITALS: BP: (!) 161/82, Temp: 98.6 °F (37 °C), Pulse: 90, Respirations: 20, SpO2: 100 %    Scottie Chiang is a 53 y.o. female with a history and presentation as described above in HPI. This patient was also evaluated by the attending physician. All care plans were discussed and agreed upon.    ED Course as of 02/23/24 0256   Fri Feb 23, 2024 0227 Patient is normotensive, afebrile, nontachycardic, nontachypneic on room air at this time.  Patient presents with concerns for altered mental status.  Patient is a dialysis patient which increases her risk for infection, electrolyte abnormalities, volume status changes, but patient reportedly completed full

## 2024-02-23 NOTE — ED PROVIDER NOTES
ED Attending Attestation Note     Date of evaluation: 2/23/2024    This patient was seen by the resident.  I have seen and examined the patient, agree with the workup, evaluation, management and diagnosis. The care plan has been discussed.  I have reviewed the ECG and concur with the resident's interpretation.  My assessment reveals a woman who presents with altered mental status.  Patient is able to tell me her name and knows that she is at the hospital but does not know which 1 and cannot correctly state the year.  Family reports that she has been very slow to answer questions and seems confused intermittently over the past few weeks.  Patient is able to follow commands.  Patient has no complaints to suggest underlying infection and no lab abnormalities to suggest infectious cause for altered mental status.  High concern for uremia.  Discussed with nephrology who will arrange dialysis     Callie Arenas MD  02/23/24 4033

## 2024-02-23 NOTE — ED NOTES
confused at times  Orientation Level:    NIH Score:    C-SSRS: Risk of Suicide: No Risk  Bedside swallow:    [place] Coma Scale (GCS): [place] Coma Scale  Eye Opening: Spontaneous  Best Verbal Response: Oriented  Best Motor Response: Obeys commands  [place] Coma Scale Score: 15  Active LDA's:   Peripheral IV 02/23/24 Left;Posterior Hand (Active)     PO Status: Nothing by Mouth  Pertinent or High Risk Medications/Drips: no   If Yes, please provide details:   Pending Blood Product Administration: no       You may also review the ED PT Care Timeline found under the Summary Nursing Index tab.    Recommendation    Pending orders dialysis   Plan for Discharge (if known):   Additional Comments:    If any further questions, please call Sending RN at [phone]    Electronically signed by: Electronically signed by Delmy Cano RN on 2/23/2024 at 5:11 AM     Delmy Cano RN  02/23/24 3665

## 2024-02-24 PROBLEM — E87.5 HYPERKALEMIA: Status: ACTIVE | Noted: 2024-02-24

## 2024-02-24 PROBLEM — I15.0 RENOVASCULAR HYPERTENSION: Status: ACTIVE | Noted: 2024-02-24

## 2024-02-24 LAB
ALBUMIN SERPL-MCNC: 3.3 G/DL (ref 3.4–5)
ALBUMIN/GLOB SERPL: 0.8 {RATIO} (ref 1.1–2.2)
ALP SERPL-CCNC: 138 U/L (ref 40–129)
ALT SERPL-CCNC: 16 U/L (ref 10–40)
ANION GAP SERPL CALCULATED.3IONS-SCNC: 21 MMOL/L (ref 3–16)
AST SERPL-CCNC: 39 U/L (ref 15–37)
BASOPHILS # BLD: 0 K/UL (ref 0–0.2)
BASOPHILS NFR BLD: 0.8 %
BILIRUB SERPL-MCNC: 0.4 MG/DL (ref 0–1)
BUN SERPL-MCNC: 17 MG/DL (ref 7–20)
CALCIUM SERPL-MCNC: 9.6 MG/DL (ref 8.3–10.6)
CHLORIDE SERPL-SCNC: 99 MMOL/L (ref 99–110)
CO2 SERPL-SCNC: 16 MMOL/L (ref 21–32)
CREAT SERPL-MCNC: 5.5 MG/DL (ref 0.6–1.1)
DEPRECATED RDW RBC AUTO: 17.4 % (ref 12.4–15.4)
EOSINOPHIL # BLD: 0.2 K/UL (ref 0–0.6)
EOSINOPHIL NFR BLD: 3.5 %
GFR SERPLBLD CREATININE-BSD FMLA CKD-EPI: 9 ML/MIN/{1.73_M2}
GLUCOSE SERPL-MCNC: 99 MG/DL (ref 70–99)
HCT VFR BLD AUTO: 39.9 % (ref 36–48)
HGB BLD-MCNC: 13.1 G/DL (ref 12–16)
LYMPHOCYTES # BLD: 1.3 K/UL (ref 1–5.1)
LYMPHOCYTES NFR BLD: 25.3 %
MCH RBC QN AUTO: 32.9 PG (ref 26–34)
MCHC RBC AUTO-ENTMCNC: 32.9 G/DL (ref 31–36)
MCV RBC AUTO: 99.9 FL (ref 80–100)
MONOCYTES # BLD: 0.7 K/UL (ref 0–1.3)
MONOCYTES NFR BLD: 13.3 %
NEUTROPHILS # BLD: 3 K/UL (ref 1.7–7.7)
NEUTROPHILS NFR BLD: 57.1 %
PLATELET # BLD AUTO: 136 K/UL (ref 135–450)
PMV BLD AUTO: 10.1 FL (ref 5–10.5)
POTASSIUM SERPL-SCNC: 5.4 MMOL/L (ref 3.5–5.1)
PROT SERPL-MCNC: 7.2 G/DL (ref 6.4–8.2)
RBC # BLD AUTO: 3.99 M/UL (ref 4–5.2)
SODIUM SERPL-SCNC: 136 MMOL/L (ref 136–145)
WBC # BLD AUTO: 5.2 K/UL (ref 4–11)

## 2024-02-24 PROCEDURE — 1200000000 HC SEMI PRIVATE

## 2024-02-24 PROCEDURE — 6360000002 HC RX W HCPCS: Performed by: FAMILY MEDICINE

## 2024-02-24 PROCEDURE — 6360000002 HC RX W HCPCS: Performed by: NURSE PRACTITIONER

## 2024-02-24 PROCEDURE — 6370000000 HC RX 637 (ALT 250 FOR IP): Performed by: FAMILY MEDICINE

## 2024-02-24 PROCEDURE — 99232 SBSQ HOSP IP/OBS MODERATE 35: CPT | Performed by: NURSE PRACTITIONER

## 2024-02-24 PROCEDURE — 6370000000 HC RX 637 (ALT 250 FOR IP): Performed by: NURSE PRACTITIONER

## 2024-02-24 PROCEDURE — 6370000000 HC RX 637 (ALT 250 FOR IP): Performed by: INTERNAL MEDICINE

## 2024-02-24 PROCEDURE — 36415 COLL VENOUS BLD VENIPUNCTURE: CPT

## 2024-02-24 PROCEDURE — 80053 COMPREHEN METABOLIC PANEL: CPT

## 2024-02-24 PROCEDURE — 85025 COMPLETE CBC W/AUTO DIFF WBC: CPT

## 2024-02-24 RX ORDER — MIDODRINE HYDROCHLORIDE 5 MG/1
10 TABLET ORAL
Status: DISCONTINUED | OUTPATIENT
Start: 2024-02-26 | End: 2024-02-26 | Stop reason: HOSPADM

## 2024-02-24 RX ORDER — BISACODYL 5 MG/1
10 TABLET, DELAYED RELEASE ORAL ONCE
Status: COMPLETED | OUTPATIENT
Start: 2024-02-24 | End: 2024-02-24

## 2024-02-24 RX ORDER — DOCUSATE SODIUM 100 MG/1
100 CAPSULE, LIQUID FILLED ORAL DAILY
Status: DISCONTINUED | OUTPATIENT
Start: 2024-02-24 | End: 2024-02-26 | Stop reason: HOSPADM

## 2024-02-24 RX ORDER — TORSEMIDE 20 MG/1
100 TABLET ORAL 2 TIMES DAILY
Status: DISCONTINUED | OUTPATIENT
Start: 2024-02-24 | End: 2024-02-26 | Stop reason: HOSPADM

## 2024-02-24 RX ORDER — ATORVASTATIN CALCIUM 40 MG/1
40 TABLET, FILM COATED ORAL DAILY
Status: DISCONTINUED | OUTPATIENT
Start: 2024-02-24 | End: 2024-02-26 | Stop reason: HOSPADM

## 2024-02-24 RX ORDER — TOPIRAMATE 25 MG/1
25 TABLET ORAL DAILY
Status: DISCONTINUED | OUTPATIENT
Start: 2024-02-24 | End: 2024-02-26 | Stop reason: HOSPADM

## 2024-02-24 RX ORDER — LEVETIRACETAM 500 MG/1
250 TABLET ORAL
Status: DISCONTINUED | OUTPATIENT
Start: 2024-02-26 | End: 2024-02-26 | Stop reason: HOSPADM

## 2024-02-24 RX ORDER — NEPHROCAP 1 MG
1 CAP ORAL DAILY
Status: DISCONTINUED | OUTPATIENT
Start: 2024-02-24 | End: 2024-02-26 | Stop reason: HOSPADM

## 2024-02-24 RX ORDER — PROCHLORPERAZINE EDISYLATE 5 MG/ML
10 INJECTION INTRAMUSCULAR; INTRAVENOUS EVERY 6 HOURS PRN
Status: DISCONTINUED | OUTPATIENT
Start: 2024-02-24 | End: 2024-02-26 | Stop reason: HOSPADM

## 2024-02-24 RX ORDER — LEVETIRACETAM 500 MG/1
500 TABLET ORAL DAILY
Status: DISCONTINUED | OUTPATIENT
Start: 2024-02-24 | End: 2024-02-26 | Stop reason: HOSPADM

## 2024-02-24 RX ORDER — GABAPENTIN 300 MG/1
300 CAPSULE ORAL 2 TIMES DAILY
Status: DISCONTINUED | OUTPATIENT
Start: 2024-02-24 | End: 2024-02-26

## 2024-02-24 RX ORDER — MECOBALAMIN 5000 MCG
10 TABLET,DISINTEGRATING ORAL NIGHTLY PRN
Status: DISCONTINUED | OUTPATIENT
Start: 2024-02-24 | End: 2024-02-26 | Stop reason: HOSPADM

## 2024-02-24 RX ADMIN — HEPARIN SODIUM 5000 UNITS: 5000 INJECTION INTRAVENOUS; SUBCUTANEOUS at 04:06

## 2024-02-24 RX ADMIN — TOPIRAMATE 25 MG: 25 TABLET, FILM COATED ORAL at 16:19

## 2024-02-24 RX ADMIN — SODIUM ZIRCONIUM CYCLOSILICATE 5 G: 5 POWDER, FOR SUSPENSION ORAL at 13:57

## 2024-02-24 RX ADMIN — PANTOPRAZOLE SODIUM 40 MG: 40 TABLET, DELAYED RELEASE ORAL at 06:03

## 2024-02-24 RX ADMIN — ATORVASTATIN CALCIUM 40 MG: 40 TABLET, FILM COATED ORAL at 16:20

## 2024-02-24 RX ADMIN — NEPHROCAP 1 MG: 1 CAP ORAL at 16:18

## 2024-02-24 RX ADMIN — HEPARIN SODIUM 5000 UNITS: 5000 INJECTION INTRAVENOUS; SUBCUTANEOUS at 19:45

## 2024-02-24 RX ADMIN — GABAPENTIN 300 MG: 300 CAPSULE ORAL at 20:52

## 2024-02-24 RX ADMIN — OXYCODONE AND ACETAMINOPHEN 1 TABLET: 5; 325 TABLET ORAL at 01:30

## 2024-02-24 RX ADMIN — DOCUSATE SODIUM 100 MG: 100 CAPSULE, LIQUID FILLED ORAL at 16:20

## 2024-02-24 RX ADMIN — BISACODYL 10 MG: 5 TABLET, COATED ORAL at 16:57

## 2024-02-24 RX ADMIN — CARVEDILOL 12.5 MG: 12.5 TABLET, FILM COATED ORAL at 09:03

## 2024-02-24 RX ADMIN — TORSEMIDE 100 MG: 100 TABLET ORAL at 20:52

## 2024-02-24 RX ADMIN — HEPARIN SODIUM 5000 UNITS: 5000 INJECTION INTRAVENOUS; SUBCUTANEOUS at 11:36

## 2024-02-24 RX ADMIN — OXYCODONE AND ACETAMINOPHEN 1 TABLET: 5; 325 TABLET ORAL at 16:55

## 2024-02-24 RX ADMIN — OXYCODONE AND ACETAMINOPHEN 1 TABLET: 5; 325 TABLET ORAL at 09:02

## 2024-02-24 RX ADMIN — CARVEDILOL 12.5 MG: 12.5 TABLET, FILM COATED ORAL at 18:09

## 2024-02-24 RX ADMIN — LEVETIRACETAM 500 MG: 500 TABLET, FILM COATED ORAL at 13:56

## 2024-02-24 RX ADMIN — SEVELAMER CARBONATE 1600 MG: 800 TABLET, FILM COATED ORAL at 12:53

## 2024-02-24 RX ADMIN — SEVELAMER CARBONATE 1600 MG: 800 TABLET, FILM COATED ORAL at 09:03

## 2024-02-24 RX ADMIN — SEVELAMER CARBONATE 1600 MG: 800 TABLET, FILM COATED ORAL at 18:09

## 2024-02-24 RX ADMIN — PROCHLORPERAZINE EDISYLATE 10 MG: 5 INJECTION INTRAMUSCULAR; INTRAVENOUS at 01:27

## 2024-02-24 ASSESSMENT — PAIN SCALES - PAIN ASSESSMENT IN ADVANCED DEMENTIA (PAINAD)
CONSOLABILITY: 1
TOTALSCORE: 5
FACIALEXPRESSION: 2
TOTALSCORE: 5
NEGVOCALIZATION: 1
BREATHING: 0
BREATHING: 0
NEGVOCALIZATION: 1
TOTALSCORE: 5
BODYLANGUAGE: 1
TOTALSCORE: 5
BODYLANGUAGE: 1
BODYLANGUAGE: 1
BREATHING: 0
CONSOLABILITY: 1
NEGVOCALIZATION: 1
BODYLANGUAGE: 1
NEGVOCALIZATION: 1
FACIALEXPRESSION: 2
BREATHING: 0
CONSOLABILITY: 1
CONSOLABILITY: 1

## 2024-02-24 ASSESSMENT — PAIN SCALES - GENERAL
PAINLEVEL_OUTOF10: 7
PAINLEVEL_OUTOF10: 8
PAINLEVEL_OUTOF10: 6
PAINLEVEL_OUTOF10: 6
PAINLEVEL_OUTOF10: 7

## 2024-02-24 ASSESSMENT — PAIN DESCRIPTION - ONSET
ONSET: ON-GOING

## 2024-02-24 ASSESSMENT — PAIN DESCRIPTION - LOCATION
LOCATION: FOOT
LOCATION: TOE (COMMENT WHICH ONE)
LOCATION: FOOT

## 2024-02-24 ASSESSMENT — PAIN DESCRIPTION - FREQUENCY
FREQUENCY: CONTINUOUS
FREQUENCY: CONTINUOUS

## 2024-02-24 ASSESSMENT — PAIN DESCRIPTION - DESCRIPTORS
DESCRIPTORS: ACHING

## 2024-02-24 ASSESSMENT — PAIN DESCRIPTION - ORIENTATION
ORIENTATION: LEFT

## 2024-02-24 ASSESSMENT — PAIN - FUNCTIONAL ASSESSMENT
PAIN_FUNCTIONAL_ASSESSMENT: PREVENTS OR INTERFERES SOME ACTIVE ACTIVITIES AND ADLS

## 2024-02-24 ASSESSMENT — PAIN DESCRIPTION - PAIN TYPE
TYPE: ACUTE PAIN

## 2024-02-24 NOTE — PLAN OF CARE
Problem: Discharge Planning  Goal: Discharge to home or other facility with appropriate resources  Outcome: Progressing     Problem: Pain  Goal: Verbalizes/displays adequate comfort level or baseline comfort level  2/24/2024 1820 by Ana Snowden RN  Outcome: Progressing  Flowsheets (Taken 2/24/2024 1820)  Verbalizes/displays adequate comfort level or baseline comfort level:   Encourage patient to monitor pain and request assistance   Consider cultural and social influences on pain and pain management   Assess pain using appropriate pain scale   Implement non-pharmacological measures as appropriate and evaluate response   Administer analgesics based on type and severity of pain and evaluate response  Note: Pt rated pain level 8 out of 10 on the left foot. Given PRN Percocet at 0902 and 1655. Repositioned frequently. Implemented non-pharmacological measures to provide comfort.      Problem: Skin/Tissue Integrity  Goal: Absence of new skin breakdown  Description: 1.  Monitor for areas of redness and/or skin breakdown  2.  Assess vascular access sites hourly  3.  Every 4-6 hours minimum:  Change oxygen saturation probe site  4.  Every 4-6 hours:  If on nasal continuous positive airway pressure, respiratory therapy assess nares and determine need for appliance change or resting period.  Outcome: Progressing  Note: Monitor skin breakdown/redness. Repositioned frequently.      Problem: Safety - Adult  Goal: Free from fall injury  Outcome: Progressing  Note: Pt remains free of falls. Bed at lowest position, bed alarm is on, and call light within reach.

## 2024-02-24 NOTE — PLAN OF CARE
Problem: Pain  Goal: Verbalizes/displays adequate comfort level or baseline comfort level  Outcome: Not Progressing       Problem: Discharge Planning  Goal: Discharge to home or other facility with appropriate resources  Outcome: Progressing     Problem: Chronic Conditions and Co-morbidities  Goal: Patient's chronic conditions and co-morbidity symptoms are monitored and maintained or improved  Outcome: Progressing     Problem: Skin/Tissue Integrity  Goal: Absence of new skin breakdown  Description: 1.  Monitor for areas of redness and/or skin breakdown  2.  Assess vascular access sites hourly  3.  Every 4-6 hours minimum:  Change oxygen saturation probe site  4.  Every 4-6 hours:  If on nasal continuous positive airway pressure, respiratory therapy assess nares and determine need for appliance change or resting period.  Outcome: Progressing     Problem: Safety - Adult  Goal: Free from fall injury  Outcome: Progressing

## 2024-02-24 NOTE — PLAN OF CARE
Problem: Pain  Goal: Verbalizes/displays adequate comfort level or baseline comfort level  2/24/2024 0158 by Kush Stanley RN  Outcome: Progressing  Flowsheets (Taken 2/24/2024 0158)  Verbalizes/displays adequate comfort level or baseline comfort level:   Encourage patient to monitor pain and request assistance   Administer analgesics based on type and severity of pain and evaluate response   Consider cultural and social influences on pain and pain management   Assess pain using appropriate pain scale   Implement non-pharmacological measures as appropriate and evaluate response   Notify Licensed Independent Practitioner if interventions unsuccessful or patient reports new pain     Problem: Chronic Conditions and Co-morbidities  Goal: Patient's chronic conditions and co-morbidity symptoms are monitored and maintained or improved  2/24/2024 0158 by Kush Stanley RN  Outcome: Progressing  Flowsheets (Taken 2/24/2024 0158)  Care Plan - Patient's Chronic Conditions and Co-Morbidity Symptoms are Monitored and Maintained or Improved:   Monitor and assess patient's chronic conditions and comorbid symptoms for stability, deterioration, or improvement   Collaborate with multidisciplinary team to address chronic and comorbid conditions and prevent exacerbation or deterioration   Update acute care plan with appropriate goals if chronic or comorbid symptoms are exacerbated and prevent overall improvement and discharge     Problem: Pain  Goal: Verbalizes/displays adequate comfort level or baseline comfort level  2/24/2024 0158 by Kush Stanley RN  Outcome: Progressing  Flowsheets (Taken 2/24/2024 0158)  Verbalizes/displays adequate comfort level or baseline comfort level:   Encourage patient to monitor pain and request assistance   Administer analgesics based on type and severity of pain and evaluate response   Consider cultural and social influences on pain and pain management   Assess pain using appropriate pain

## 2024-02-24 NOTE — PROCEDURES
Name: Scottie Chiang   : 1970   Interpreting Physician: Kathryn Amaya MD   Referring Physician: Marylin Kapoor MD  Date of EE2024    Clinical History:Altered mental status    Current Antiepileptic Medications: Current Facility-Administered Medications: prochlorperazine (COMPAZINE) injection 10 mg, 10 mg, IntraVENous, Q6H PRN  sodium zirconium cyclosilicate (LOKELMA) oral suspension 5 g, 5 g, Oral, Daily  levETIRAcetam (KEPPRA) tablet 500 mg, 500 mg, Oral, Daily  [START ON 2024] levETIRAcetam (KEPPRA) tablet 250 mg, 250 mg, Oral, Q MWF  sodium chloride flush 0.9 % injection 5-40 mL, 5-40 mL, IntraVENous, 2 times per day  sodium chloride flush 0.9 % injection 5-40 mL, 5-40 mL, IntraVENous, PRN  0.9 % sodium chloride infusion, , IntraVENous, PRN  heparin (porcine) injection 5,000 Units, 5,000 Units, SubCUTAneous, 3 times per day  polyethylene glycol (GLYCOLAX) packet 17 g, 17 g, Oral, Daily PRN  acetaminophen (TYLENOL) tablet 650 mg, 650 mg, Oral, Q6H PRN **OR** acetaminophen (TYLENOL) suppository 650 mg, 650 mg, Rectal, Q6H PRN  carvedilol (COREG) tablet 12.5 mg, 12.5 mg, Oral, BID WC  pantoprazole (PROTONIX) tablet 40 mg, 40 mg, Oral, QAM AC  sevelamer (RENVELA) tablet 1,600 mg, 1,600 mg, Oral, TID WC  lidocaine PF 1 % injection 5 mL, 5 mL, IntraDERmal, Once  sodium chloride flush 0.9 % injection 5-40 mL, 5-40 mL, IntraVENous, 2 times per day  sodium chloride flush 0.9 % injection 5-40 mL, 5-40 mL, IntraVENous, PRN  0.9 % sodium chloride infusion, 25 mL, IntraVENous, PRN  hydrALAZINE (APRESOLINE) injection 5 mg, 5 mg, IntraVENous, Q6H PRN  ondansetron (ZOFRAN) injection 4 mg, 4 mg, IntraVENous, Q6H PRN  oxyCODONE-acetaminophen (PERCOCET) 5-325 MG per tablet 1 tablet, 1 tablet, Oral, Q4H PRN  bisacodyl (DULCOLAX) suppository 10 mg, 10 mg, Rectal, Daily PRN         Technical Summary:  The EEG was recorded in a digital format on a patient who is reported to be awake and drowsy state during

## 2024-02-25 LAB
ALBUMIN SERPL-MCNC: 3.8 G/DL (ref 3.4–5)
ALBUMIN/GLOB SERPL: 1.2 {RATIO} (ref 1.1–2.2)
ALP SERPL-CCNC: 145 U/L (ref 40–129)
ALT SERPL-CCNC: 9 U/L (ref 10–40)
ANION GAP SERPL CALCULATED.3IONS-SCNC: 16 MMOL/L (ref 3–16)
AST SERPL-CCNC: 15 U/L (ref 15–37)
BASOPHILS # BLD: 0 K/UL (ref 0–0.2)
BASOPHILS NFR BLD: 0.6 %
BILIRUB SERPL-MCNC: 0.3 MG/DL (ref 0–1)
BUN SERPL-MCNC: 27 MG/DL (ref 7–20)
CALCIUM SERPL-MCNC: 9.6 MG/DL (ref 8.3–10.6)
CHLORIDE SERPL-SCNC: 97 MMOL/L (ref 99–110)
CO2 SERPL-SCNC: 24 MMOL/L (ref 21–32)
CREAT SERPL-MCNC: 7.6 MG/DL (ref 0.6–1.1)
DEPRECATED RDW RBC AUTO: 17.5 % (ref 12.4–15.4)
EOSINOPHIL # BLD: 0.2 K/UL (ref 0–0.6)
EOSINOPHIL NFR BLD: 4.2 %
GFR SERPLBLD CREATININE-BSD FMLA CKD-EPI: 6 ML/MIN/{1.73_M2}
GLUCOSE SERPL-MCNC: 132 MG/DL (ref 70–99)
HCT VFR BLD AUTO: 37.9 % (ref 36–48)
HGB BLD-MCNC: 12.2 G/DL (ref 12–16)
LYMPHOCYTES # BLD: 1.5 K/UL (ref 1–5.1)
LYMPHOCYTES NFR BLD: 30.3 %
MCH RBC QN AUTO: 33 PG (ref 26–34)
MCHC RBC AUTO-ENTMCNC: 32.1 G/DL (ref 31–36)
MCV RBC AUTO: 102.8 FL (ref 80–100)
MONOCYTES # BLD: 0.6 K/UL (ref 0–1.3)
MONOCYTES NFR BLD: 12.8 %
NEUTROPHILS # BLD: 2.6 K/UL (ref 1.7–7.7)
NEUTROPHILS NFR BLD: 52.1 %
PLATELET # BLD AUTO: 166 K/UL (ref 135–450)
PMV BLD AUTO: 9.2 FL (ref 5–10.5)
POTASSIUM SERPL-SCNC: 4.7 MMOL/L (ref 3.5–5.1)
PROT SERPL-MCNC: 7 G/DL (ref 6.4–8.2)
RBC # BLD AUTO: 3.69 M/UL (ref 4–5.2)
SODIUM SERPL-SCNC: 137 MMOL/L (ref 136–145)
WBC # BLD AUTO: 5 K/UL (ref 4–11)

## 2024-02-25 PROCEDURE — 2060000000 HC ICU INTERMEDIATE R&B

## 2024-02-25 PROCEDURE — 6370000000 HC RX 637 (ALT 250 FOR IP): Performed by: NURSE PRACTITIONER

## 2024-02-25 PROCEDURE — 6370000000 HC RX 637 (ALT 250 FOR IP): Performed by: FAMILY MEDICINE

## 2024-02-25 PROCEDURE — 6370000000 HC RX 637 (ALT 250 FOR IP): Performed by: INTERNAL MEDICINE

## 2024-02-25 PROCEDURE — 97166 OT EVAL MOD COMPLEX 45 MIN: CPT

## 2024-02-25 PROCEDURE — 36415 COLL VENOUS BLD VENIPUNCTURE: CPT

## 2024-02-25 PROCEDURE — 97535 SELF CARE MNGMENT TRAINING: CPT

## 2024-02-25 PROCEDURE — 6360000002 HC RX W HCPCS: Performed by: FAMILY MEDICINE

## 2024-02-25 PROCEDURE — 85025 COMPLETE CBC W/AUTO DIFF WBC: CPT

## 2024-02-25 PROCEDURE — 97530 THERAPEUTIC ACTIVITIES: CPT

## 2024-02-25 PROCEDURE — 80053 COMPREHEN METABOLIC PANEL: CPT

## 2024-02-25 RX ORDER — HYDROXYZINE HYDROCHLORIDE 25 MG/1
25 TABLET, FILM COATED ORAL ONCE
Status: COMPLETED | OUTPATIENT
Start: 2024-02-25 | End: 2024-02-25

## 2024-02-25 RX ORDER — PROMETHAZINE HYDROCHLORIDE 12.5 MG/1
12.5 TABLET ORAL EVERY 6 HOURS PRN
Status: DISCONTINUED | OUTPATIENT
Start: 2024-02-25 | End: 2024-02-26 | Stop reason: HOSPADM

## 2024-02-25 RX ADMIN — BISACODYL 10 MG: 10 SUPPOSITORY RECTAL at 22:16

## 2024-02-25 RX ADMIN — TORSEMIDE 100 MG: 100 TABLET ORAL at 10:25

## 2024-02-25 RX ADMIN — HYDROXYZINE HYDROCHLORIDE 25 MG: 25 TABLET ORAL at 18:54

## 2024-02-25 RX ADMIN — SEVELAMER CARBONATE 1600 MG: 800 TABLET, FILM COATED ORAL at 17:07

## 2024-02-25 RX ADMIN — PANTOPRAZOLE SODIUM 40 MG: 40 TABLET, DELAYED RELEASE ORAL at 06:33

## 2024-02-25 RX ADMIN — LEVETIRACETAM 500 MG: 500 TABLET, FILM COATED ORAL at 10:24

## 2024-02-25 RX ADMIN — ATORVASTATIN CALCIUM 40 MG: 40 TABLET, FILM COATED ORAL at 10:24

## 2024-02-25 RX ADMIN — SEVELAMER CARBONATE 1600 MG: 800 TABLET, FILM COATED ORAL at 08:36

## 2024-02-25 RX ADMIN — TORSEMIDE 100 MG: 100 TABLET ORAL at 21:37

## 2024-02-25 RX ADMIN — GABAPENTIN 300 MG: 300 CAPSULE ORAL at 21:38

## 2024-02-25 RX ADMIN — SODIUM ZIRCONIUM CYCLOSILICATE 5 G: 5 POWDER, FOR SUSPENSION ORAL at 10:38

## 2024-02-25 RX ADMIN — TOPIRAMATE 25 MG: 25 TABLET, FILM COATED ORAL at 10:48

## 2024-02-25 RX ADMIN — HEPARIN SODIUM 5000 UNITS: 5000 INJECTION INTRAVENOUS; SUBCUTANEOUS at 03:37

## 2024-02-25 RX ADMIN — CARVEDILOL 12.5 MG: 12.5 TABLET, FILM COATED ORAL at 08:36

## 2024-02-25 RX ADMIN — DOCUSATE SODIUM 100 MG: 100 CAPSULE, LIQUID FILLED ORAL at 10:24

## 2024-02-25 RX ADMIN — ACETAMINOPHEN 650 MG: 325 TABLET ORAL at 09:20

## 2024-02-25 RX ADMIN — SEVELAMER CARBONATE 1600 MG: 800 TABLET, FILM COATED ORAL at 13:19

## 2024-02-25 RX ADMIN — HEPARIN SODIUM 5000 UNITS: 5000 INJECTION INTRAVENOUS; SUBCUTANEOUS at 13:19

## 2024-02-25 RX ADMIN — NEPHROCAP 1 MG: 1 CAP ORAL at 10:24

## 2024-02-25 RX ADMIN — GABAPENTIN 300 MG: 300 CAPSULE ORAL at 10:25

## 2024-02-25 RX ADMIN — HEPARIN SODIUM 5000 UNITS: 5000 INJECTION INTRAVENOUS; SUBCUTANEOUS at 19:49

## 2024-02-25 RX ADMIN — CARVEDILOL 12.5 MG: 12.5 TABLET, FILM COATED ORAL at 17:06

## 2024-02-25 ASSESSMENT — PAIN SCALES - GENERAL
PAINLEVEL_OUTOF10: 2
PAINLEVEL_OUTOF10: 8
PAINLEVEL_OUTOF10: 8

## 2024-02-25 ASSESSMENT — PAIN DESCRIPTION - LOCATION: LOCATION: HEAD

## 2024-02-25 ASSESSMENT — PAIN DESCRIPTION - PAIN TYPE: TYPE: CHRONIC PAIN

## 2024-02-25 ASSESSMENT — PAIN - FUNCTIONAL ASSESSMENT: PAIN_FUNCTIONAL_ASSESSMENT: ACTIVITIES ARE NOT PREVENTED

## 2024-02-25 ASSESSMENT — PAIN DESCRIPTION - ONSET: ONSET: SUDDEN

## 2024-02-25 ASSESSMENT — PAIN DESCRIPTION - FREQUENCY: FREQUENCY: INTERMITTENT

## 2024-02-25 ASSESSMENT — PAIN DESCRIPTION - ORIENTATION: ORIENTATION: RIGHT;LEFT

## 2024-02-25 ASSESSMENT — PAIN DESCRIPTION - DESCRIPTORS: DESCRIPTORS: DISCOMFORT

## 2024-02-25 NOTE — PLAN OF CARE
Problem: Discharge Planning  Goal: Discharge to home or other facility with appropriate resources  2/25/2024 0736 by Tonia Davis RN  Outcome: Progressing     Problem: Pain  Goal: Verbalizes/displays adequate comfort level or baseline comfort level  2/25/2024 0736 by Tonia Davis RN  Outcome: Progressing     Problem: Chronic Conditions and Co-morbidities  Goal: Patient's chronic conditions and co-morbidity symptoms are monitored and maintained or improved  Outcome: Progressing

## 2024-02-25 NOTE — PLAN OF CARE
Problem: Discharge Planning  Goal: Discharge to home or other facility with appropriate resources  2/25/2024 1113 by Ana Snowden RN  Outcome: Progressing     Problem: Pain  Goal: Verbalizes/displays adequate comfort level or baseline comfort level  2/25/2024 1113 by Ana Snowden RN  Outcome: Progressing  Flowsheets (Taken 2/25/2024 1113)  Verbalizes/displays adequate comfort level or baseline comfort level:   Encourage patient to monitor pain and request assistance   Administer analgesics based on type and severity of pain and evaluate response   Assess pain using appropriate pain scale   Implement non-pharmacological measures as appropriate and evaluate response  Note: Pt rated a pain level 8 out of 10. Reported a headache. Given PRN Tylenol. Implemented non-pharmacological measures. Repositioned frequently to provide comfort.      Problem: Chronic Conditions and Co-morbidities  Goal: Patient's chronic conditions and co-morbidity symptoms are monitored and maintained or improved  2/25/2024 1113 by Ana Snowden RN  Outcome: Progressing     Problem: Skin/Tissue Integrity  Goal: Absence of new skin breakdown  Description: 1.  Monitor for areas of redness and/or skin breakdown  2.  Assess vascular access sites hourly  3.  Every 4-6 hours minimum:  Change oxygen saturation probe site  4.  Every 4-6 hours:  If on nasal continuous positive airway pressure, respiratory therapy assess nares and determine need for appliance change or resting period.  Outcome: Progressing  Note: Monitor for skin breakdown or redness. Reposition frequently.      Problem: Safety - Adult  Goal: Free from fall injury  Outcome: Progressing  Note: Pt remains free of falls. Pt bed alarm is on, bed at lowest position, and call light within reach.      Problem: Confusion  Goal: Confusion, delirium, dementia, or psychosis is improved or at baseline  Description: INTERVENTIONS:  1. Assess for possible contributors to thought disturbance, including

## 2024-02-26 VITALS
HEIGHT: 60 IN | DIASTOLIC BLOOD PRESSURE: 62 MMHG | TEMPERATURE: 97.7 F | OXYGEN SATURATION: 93 % | BODY MASS INDEX: 39 KG/M2 | HEART RATE: 83 BPM | WEIGHT: 198.63 LBS | RESPIRATION RATE: 17 BRPM | SYSTOLIC BLOOD PRESSURE: 136 MMHG

## 2024-02-26 LAB
ALBUMIN SERPL-MCNC: 3.8 G/DL (ref 3.4–5)
ALBUMIN/GLOB SERPL: 1.2 {RATIO} (ref 1.1–2.2)
ALP SERPL-CCNC: 138 U/L (ref 40–129)
ALT SERPL-CCNC: 11 U/L (ref 10–40)
ANION GAP SERPL CALCULATED.3IONS-SCNC: 14 MMOL/L (ref 3–16)
AST SERPL-CCNC: 19 U/L (ref 15–37)
BASOPHILS # BLD: 0.1 K/UL (ref 0–0.2)
BASOPHILS NFR BLD: 1.5 %
BILIRUB SERPL-MCNC: <0.2 MG/DL (ref 0–1)
BUN SERPL-MCNC: 38 MG/DL (ref 7–20)
CALCIUM SERPL-MCNC: 9.5 MG/DL (ref 8.3–10.6)
CHLORIDE SERPL-SCNC: 97 MMOL/L (ref 99–110)
CO2 SERPL-SCNC: 26 MMOL/L (ref 21–32)
CREAT SERPL-MCNC: 9.1 MG/DL (ref 0.6–1.1)
DEPRECATED RDW RBC AUTO: 17.3 % (ref 12.4–15.4)
EOSINOPHIL # BLD: 0.2 K/UL (ref 0–0.6)
EOSINOPHIL NFR BLD: 4.2 %
GFR SERPLBLD CREATININE-BSD FMLA CKD-EPI: 5 ML/MIN/{1.73_M2}
GLUCOSE SERPL-MCNC: 191 MG/DL (ref 70–99)
HCT VFR BLD AUTO: 35.2 % (ref 36–48)
HGB BLD-MCNC: 11.7 G/DL (ref 12–16)
LYMPHOCYTES # BLD: 1.9 K/UL (ref 1–5.1)
LYMPHOCYTES NFR BLD: 33.3 %
MCH RBC QN AUTO: 33.3 PG (ref 26–34)
MCHC RBC AUTO-ENTMCNC: 33.3 G/DL (ref 31–36)
MCV RBC AUTO: 99.8 FL (ref 80–100)
MONOCYTES # BLD: 1 K/UL (ref 0–1.3)
MONOCYTES NFR BLD: 18.5 %
NEUTROPHILS # BLD: 2.4 K/UL (ref 1.7–7.7)
NEUTROPHILS NFR BLD: 42.5 %
PHOSPHATE SERPL-MCNC: 4.2 MG/DL (ref 2.5–4.9)
PLATELET # BLD AUTO: 176 K/UL (ref 135–450)
PMV BLD AUTO: 9.3 FL (ref 5–10.5)
POTASSIUM SERPL-SCNC: 4.6 MMOL/L (ref 3.5–5.1)
POTASSIUM SERPL-SCNC: 4.6 MMOL/L (ref 3.5–5.1)
PROT SERPL-MCNC: 7 G/DL (ref 6.4–8.2)
RBC # BLD AUTO: 3.53 M/UL (ref 4–5.2)
SODIUM SERPL-SCNC: 137 MMOL/L (ref 136–145)
WBC # BLD AUTO: 5.7 K/UL (ref 4–11)

## 2024-02-26 PROCEDURE — 97116 GAIT TRAINING THERAPY: CPT

## 2024-02-26 PROCEDURE — 80053 COMPREHEN METABOLIC PANEL: CPT

## 2024-02-26 PROCEDURE — 6370000000 HC RX 637 (ALT 250 FOR IP): Performed by: FAMILY MEDICINE

## 2024-02-26 PROCEDURE — 6370000000 HC RX 637 (ALT 250 FOR IP): Performed by: INTERNAL MEDICINE

## 2024-02-26 PROCEDURE — 97535 SELF CARE MNGMENT TRAINING: CPT

## 2024-02-26 PROCEDURE — 6370000000 HC RX 637 (ALT 250 FOR IP): Performed by: NURSE PRACTITIONER

## 2024-02-26 PROCEDURE — 85025 COMPLETE CBC W/AUTO DIFF WBC: CPT

## 2024-02-26 PROCEDURE — 97530 THERAPEUTIC ACTIVITIES: CPT

## 2024-02-26 PROCEDURE — 36415 COLL VENOUS BLD VENIPUNCTURE: CPT

## 2024-02-26 PROCEDURE — 6360000002 HC RX W HCPCS: Performed by: FAMILY MEDICINE

## 2024-02-26 PROCEDURE — 90935 HEMODIALYSIS ONE EVALUATION: CPT

## 2024-02-26 PROCEDURE — 6360000002 HC RX W HCPCS: Performed by: INTERNAL MEDICINE

## 2024-02-26 PROCEDURE — 97162 PT EVAL MOD COMPLEX 30 MIN: CPT

## 2024-02-26 RX ORDER — LEVETIRACETAM 250 MG/1
250 TABLET ORAL
Qty: 60 TABLET | Refills: 3 | Status: SHIPPED | OUTPATIENT
Start: 2024-02-26

## 2024-02-26 RX ORDER — ALBUMIN (HUMAN) 12.5 G/50ML
25 SOLUTION INTRAVENOUS PRN
Status: DISCONTINUED | OUTPATIENT
Start: 2024-02-26 | End: 2024-02-26

## 2024-02-26 RX ORDER — HEPARIN SODIUM 1000 [USP'U]/ML
3200 INJECTION, SOLUTION INTRAVENOUS; SUBCUTANEOUS PRN
Status: DISCONTINUED | OUTPATIENT
Start: 2024-02-26 | End: 2024-02-26 | Stop reason: HOSPADM

## 2024-02-26 RX ORDER — GABAPENTIN 100 MG/1
100 CAPSULE ORAL 2 TIMES DAILY
Status: DISCONTINUED | OUTPATIENT
Start: 2024-02-26 | End: 2024-02-26 | Stop reason: HOSPADM

## 2024-02-26 RX ORDER — LEVETIRACETAM 500 MG/1
500 TABLET ORAL DAILY
Qty: 60 TABLET | Refills: 3 | Status: SHIPPED | OUTPATIENT
Start: 2024-02-27

## 2024-02-26 RX ADMIN — LEVETIRACETAM 500 MG: 500 TABLET, FILM COATED ORAL at 09:01

## 2024-02-26 RX ADMIN — LEVETIRACETAM 250 MG: 500 TABLET, FILM COATED ORAL at 18:53

## 2024-02-26 RX ADMIN — NEPHROCAP 1 MG: 1 CAP ORAL at 09:01

## 2024-02-26 RX ADMIN — SEVELAMER CARBONATE 1600 MG: 800 TABLET, FILM COATED ORAL at 11:51

## 2024-02-26 RX ADMIN — DOCUSATE SODIUM 100 MG: 100 CAPSULE, LIQUID FILLED ORAL at 09:02

## 2024-02-26 RX ADMIN — SODIUM ZIRCONIUM CYCLOSILICATE 5 G: 5 POWDER, FOR SUSPENSION ORAL at 10:04

## 2024-02-26 RX ADMIN — SEVELAMER CARBONATE 1600 MG: 800 TABLET, FILM COATED ORAL at 09:01

## 2024-02-26 RX ADMIN — ATORVASTATIN CALCIUM 40 MG: 40 TABLET, FILM COATED ORAL at 09:02

## 2024-02-26 RX ADMIN — HEPARIN SODIUM 5000 UNITS: 5000 INJECTION INTRAVENOUS; SUBCUTANEOUS at 11:48

## 2024-02-26 RX ADMIN — TOPIRAMATE 25 MG: 25 TABLET, FILM COATED ORAL at 09:01

## 2024-02-26 RX ADMIN — GABAPENTIN 300 MG: 300 CAPSULE ORAL at 09:01

## 2024-02-26 RX ADMIN — HEPARIN SODIUM 3200 UNITS: 1000 INJECTION INTRAVENOUS; SUBCUTANEOUS at 21:20

## 2024-02-26 RX ADMIN — HEPARIN SODIUM 5000 UNITS: 5000 INJECTION INTRAVENOUS; SUBCUTANEOUS at 18:53

## 2024-02-26 RX ADMIN — PANTOPRAZOLE SODIUM 40 MG: 40 TABLET, DELAYED RELEASE ORAL at 07:07

## 2024-02-26 RX ADMIN — HEPARIN SODIUM 5000 UNITS: 5000 INJECTION INTRAVENOUS; SUBCUTANEOUS at 03:24

## 2024-02-26 NOTE — CARE COORDINATION
Addendum at 2:55pm: Reviewed PT/OT notes. Spoke with pt at bedside. Pt stated she has help from family, including her son Alen whom she calls to assist her when she is getting close to home from HD. She stated she has a rolling walker, a rolator and 2 canes but uses her canes mostly at home. Discussed skilled home care for RN/PT/OT and how she would benefit from this. Pt declines stating she doesn't need this and is aware she can get it setup outpatient with her PCP if needed which she stated agreement. She is hopeful she gets to go home today and will have a ride home.     RN updated    9:02am: Case Management Assessment  Initial Evaluation    Date/Time of Evaluation: 2/26/2024 9:02 AM  Assessment Completed by: LOBITO Alvarez   for [facility] ([facility])  Office Phone: [phone]  [employer] Mobile: [phone]    If patient is discharged prior to next notation, then this note serves as note for discharge by case management.    Patient Name: Scottie Chiang                   YOB: 1970  Diagnosis: Transient alteration of awareness [R40.4]  Altered mental status, unspecified altered mental status type [R41.82]  AMS (altered mental status) [R41.82]                   Date / Time: 2/23/2024  2:09 AM    Patient Admission Status: Inpatient   Readmission Risk (Low < 19, Mod (19-27), High > 27): Readmission Risk Score: 21.1    Current PCP: Desiree Tee MD  PCP verified by ? Yes    Chart Reviewed: Yes      History Provided by: Patient  Patient Orientation: Alert and Oriented    Patient Cognition: Alert    Hospitalization in the last 30 days (Readmission):  No    If yes, Readmission Assessment in  Navigator will be completed.    Advance Directives:      Code Status: Full Code   Patient's Primary Decision Maker is: Legal Next of Kin      Discharge Planning:    Patient lives with: Family Members Type of Home: House  Primary Care Giver: Self  Patient

## 2024-02-26 NOTE — DISCHARGE INSTR - COC
Access:  { DANIEL IV ACCESS:290715815}    Nursing Mobility/ADLs:  Walking   {CHP DME ADLs:394872978}  Transfer  {CHP DME ADLs:718640687}  Bathing  {CHP DME ADLs:385165854}  Dressing  {CHP DME ADLs:131014320}  Toileting  {CHP DME ADLs:498147582}  Feeding  {CHP DME ADLs:894395188}  Med Admin  {CHP DME ADLs:604573385}  Med Delivery   { DANIEL MED Delivery:068556282}    Wound Care Documentation and Therapy:  Incision 06/28/22 Abdomen Left;Lower;Quadrant (Active)   Number of days: 608       Incision 02/02/24 Radial Proximal;Right (Active)   Dressing Status Clean;Dry;Intact 02/26/24 0800   Dressing/Treatment Surgical glue 02/26/24 0800   Closure Surgical glue 02/26/24 0800   Margins Approximated 02/26/24 0800   Drainage Amount None (dry) 02/26/24 0800   Odor None 02/26/24 0800   Number of days: 24        Elimination:  Continence:   Bowel: {YES / NO:19727}  Bladder: {YES / NO:19727}  Urinary Catheter: {Urinary Catheter:292474011}   Colostomy/Ileostomy/Ileal Conduit: {YES / NO:19727}       Date of Last BM: ***    Intake/Output Summary (Last 24 hours) at 2/26/2024 1725  Last data filed at 2/26/2024 0800  Gross per 24 hour   Intake 200 ml   Output --   Net 200 ml     I/O last 3 completed shifts:  In: 270 [P.O.:270]  Out: -     Safety Concerns:     { DANIEL Safety Concerns:375828704}    Impairments/Disabilities:      {[facility] Impairments/Disabilities:711603697}    Nutrition Therapy:  Current Nutrition Therapy:   { DANIEL Diet List:012707331}    Routes of Feeding: {CHP DME Other Feedings:504498513}  Liquids: {Slp liquid thickness:20973}  Daily Fluid Restriction: {CHP DME Yes amt example:944295612}  Last Modified Barium Swallow with Video (Video Swallowing Test): {Done Not Done Date:304088012}    Treatments at the Time of Hospital Discharge:   Respiratory Treatments: ***  Oxygen Therapy:  {Therapy; copd oxygen:05582}  Ventilator:    { CC Vent List:704605595}    Rehab Therapies: {THERAPEUTIC INTERVENTION:0042117217}  Weight Bearing

## 2024-02-27 LAB
BACTERIA BLD CULT ORG #2: NORMAL
BACTERIA BLD CULT: NORMAL

## 2024-02-27 NOTE — CARE COORDINATION
Pt discharged yesterday after CM left for the day. Pt denied skilled home care; see note 2/26/24    Emily JORDAN, LOBITO   for [facility] ([facility])  Office Phone: [phone]  [employer] Mobile: [phone]

## 2024-02-27 NOTE — PROGRESS NOTES
NEUROLOGY PROGRESS NOTE       Patient Name: Scottie Chiang YOB: 1970   Sex: Female Age: 53 yrs     CC / Reason for Consult: AMS    Subjective / ROS / Updates over last 24 hours:  No new changes overnight   MRI brain limited - d/t motion and patient unable to complete study     ASSESSMENT & RECOMMENDATIONS   Patient is a 53-year-old female with a history of end-stage renal disease on hemodialysis who presented to the ER with staring spells in the context of having several similar episodes over the last few weeks concerning for complex partial seizures    MRI completed but limited due to patient's inability to tolerate exam and motion artifact for the limited sequences that were reviewed.  DWI and FLAIR sequences were completed in the limited no obvious stroke or inflammatory process.  EEG was completed and shows generalized slowing but no seizures no epileptiform discharges    Plan  Keppra 500 mg daily, and 250 mg after dialysis only on dialysis days.  Follow-up with neurology as outpatient  We will sign off.  Please call with questions    Management and plan discussed with:  Nursing staff  Dr. Mojgan Conklin, APRN - CNP   NEUROLOGY  2/24/2024 4:22 PM  [facility]: [facility] NEUROLOGY    HISTORY     Allergies Allergies   Allergen Reactions    Adhesive Tape Itching      Diet ADULT DIET; Regular; Low Potassium (Less than 3000 mg/day); Low Phosphorus (Less than 1000 mg)   Isolation No active isolations     LABS   Metabolic Panel Recent Labs     02/23/24  0234 02/23/24  0830 02/24/24  0747    142 136   K 6.8* 4.5 5.4*   CL 98* 99 99   CO2 22 22 16*   BUN 24* 25* 17   CREATININE 6.7* 7.6* 5.5*   GLUCOSE 128* 113* 99   CALCIUM 10.1 10.3 9.6   LABALBU 4.2 4.1 3.3*   ALKPHOS 157* 161* 138*   ALT 25 21 16   AST 65* 39* 39*   AMMONIA  --  16  --       CBC / Coags Recent Labs     02/23/24  0234 02/24/24  0747   WBC 6.6 5.2   RBC 3.92* 3.99*   HGB 12.9 13.1   HCT 40.3 39.9    136    
    Pt transferred  on unit from [address] [place] [address] at 2130. VSS and assessment completed. Oriented pt to room and unit protocols. Educated pt on importance of calling for help when going to the bathroom. Bed alarm activated. Call light within reach. Denies further needs at this time.        
4 Eyes Skin Assessment     NAME:  Scottie Chiang  YOB: 1970  MEDICAL RECORD NUMBER:  [id number]    The patient is being assessed for  Transfer to New Unit    I agree that at least one RN has performed a thorough Head to Toe Skin Assessment on the patient. ALL assessment sites listed below have been assessed.      Areas assessed by both nurses:    Head, Face, Ears, Shoulders, Back, Chest, Arms, Elbows, Hands, Sacrum. Buttock, Coccyx, Ischium, Legs. Feet and Heels, and Under Medical Devices         Does the Patient have a Wound? No noted wound(s)       Margarito Prevention initiated by RN: No  Wound Care Orders initiated by RN: No    Pressure Injury (Stage 3,4, Unstageable, DTI, NWPT, and Complex wounds) if present, place Wound referral order by RN under : No    New Ostomies, if present place, Ostomy referral order under : No     Nurse 1 eSignature: Electronically signed by Poli Harden RN on 2/26/24 at 8:15 AM EST    **SHARE this note so that the co-signing nurse can place an eSignature**    Nurse 2 eSignature: Electronically signed by Curt Berg RN on 2/26/24 at 4:17 PM EST    
4 Eyes Skin Assessment     NAME:  Scottie Chiang  YOB: 1970  MEDICAL RECORD NUMBER:  [id number]    The patient is being assessed for  Admission    I agree that at least one RN has performed a thorough Head to Toe Skin Assessment on the patient. ALL assessment sites listed below have been assessed.      Areas assessed by both nurses:    Head, Face, Ears, Shoulders, Back, Chest, Arms, Elbows, Hands, Sacrum. Buttock, Coccyx, Ischium, Legs. Feet and Heels, and Under Medical Devices         Does the Patient have a Wound? No noted wound(s)     Scattered scars, abrasions    Margarito Prevention initiated by RN: No  Wound Care Orders initiated by RN: No    Pressure Injury (Stage 3,4, Unstageable, DTI, NWPT, and Complex wounds) if present, place Wound referral order by RN under : No    New Ostomies, if present place, Ostomy referral order under : No     Nurse 1 eSignature: Electronically signed by Kelly Alexander RN on 2/23/24 at 5:29 PM EST    **SHARE this note so that the co-signing nurse can place an eSignature**    Nurse 2 eSignature: Electronically signed by Magdalene Sanchez RN on 2/23/24 at 5:30 PM EST     
4 Eyes Skin Assessment     NAME:  Scottie Chiang  YOB: 1970  MEDICAL RECORD NUMBER:  [id number]    The patient is being assessed for  Shift Handoff    I agree that at least one RN has performed a thorough Head to Toe Skin Assessment on the patient. ALL assessment sites listed below have been assessed.      Areas assessed by both nurses:    Head, Face, Ears, Shoulders, Back, Chest, Arms, Elbows, Hands, Sacrum. Buttock, Coccyx, Ischium, and Legs. Feet and Heels        Does the Patient have a Wound? No noted wound(s)       Margarito Prevention initiated by RN: No  Wound Care Orders initiated by RN: No    Pressure Injury (Stage 3,4, Unstageable, DTI, NWPT, and Complex wounds) if present, place Wound referral order by RN under : No    New Ostomies, if present place, Ostomy referral order under : No     Nurse 1 eSignature: Electronically signed by Curt Berg RN on 2/26/24 at 4:17 PM EST    **SHARE this note so that the co-signing nurse can place an eSignature**    Nurse 2 eSignature: {Esignature:[id number]}   
Discharge teaching and instructions completed with patient using teachback method. AVS reviewed. Medications, dosages, schedule, and potential side effects reviewed with patient. Patient voiced understanding regarding prescriptions, follow up appointments, and care of self at home. Discharged in a wheelchair to home per family    Verified appropriate follow up appointments scheduled & pt instructed on how to schedule appts.      Patient verbalized understanding and questions were answered to her satisfaction.  Discharge instructions were given to the patient.    Right subclavian vascath clean, dry and intact.       Anny Mata RN  
EEG completed and available for interpretation on the [facility] database .    
MD Siddiqui messaged via [employer] regarding pt BP 86/59, held torsemide and coreg. MD notified pt feeling \"abnormal,\" per pt, no new orders at this time. Pt fitted for boot and OT/PT at bedside.   
Occupational Therapy  Facility/Department: [address] [facility]  Occupational Therapy Treatment Note     Name: Scottie Chiang  : 1970  MRN: [id number]  Date of Service: 2024    Discharge Recommendations:  24 hour supervision or assist  OT Equipment Recommendations  Equipment Needed: Yes  Other: RW for ambulation at home       Patient Diagnosis(es): The primary encounter diagnosis was Altered mental status, unspecified altered mental status type. A diagnosis of Transient alteration of awareness was also pertinent to this visit.  Past Medical History:  has a past medical history of Arthritis, Diabetes mellitus (HCC), Diastolic CHF (HCC), End stage renal disease (HCC), Hemodialysis patient (HCC), History of blood transfusion, Hyperlipidemia, Hypertension, On home O2, and JEAN (obstructive sleep apnea).  Past Surgical History:  has a past surgical history that includes Dialysis Catheter Insertion (N/A, 2021); Dialysis Catheter Removal (N/A, 2021); IR TUNNELED CVC PLACE WO SQ PORT/PUMP > 5 YEARS (11/15/2021); eye surgery (Bilateral, ); Cataract removal (Bilateral);  section; Toe amputation (Left); Dialysis Catheter Insertion (N/A, 2022); US ASP ABSCESS/HEMATOMA/BULLA/CYST (3/28/2022); Dialysis Catheter Removal (N/A, 2022); IR TUNNELED CVC PLACE WO SQ PORT/PUMP > 5 YEARS (2022); IR TUNNELED CVC PLACE WO SQ PORT/PUMP > 5 YEARS (2022); IR TUNNELED CVC PLACE WO SQ PORT/PUMP > 5 YEARS (2022); and Dialysis fistula creation (Right, 2024).    Treatment Diagnosis: impaired ADL and fxl mobtily 2/2 AMS / R foot fx      Assessment   Performance deficits / Impairments: Decreased endurance;Decreased functional mobility ;Decreased ADL status  Assessment: Pt demo functioning close to stated baseline per Pt report. Pt fatigues easily 2/2 mostly non mobile at home.  Pt would benefit from ongoing OT at d/c to maximize functional independence.  Pt reports she thinks she okay w/o 
Physical Therapy  Facility/Department: [address]  Physical Therapy Initial Assessment and Treat,ment     Name: Scottie Chiang  : 1970  MRN: [id number]  Date of Service: 2024    Discharge Recommendations:  24 hour supervision or assist, Home with Home health PT   PT Equipment Recommendations  Equipment Needed: Yes  Mobility Devices: Walker  Walker: Rolling      Patient Diagnosis(es): The primary encounter diagnosis was Altered mental status, unspecified altered mental status type. A diagnosis of Transient alteration of awareness was also pertinent to this visit.  Past Medical History:  has a past medical history of Arthritis, Diabetes mellitus (HCC), Diastolic CHF (HCC), End stage renal disease (HCC), Hemodialysis patient (HCC), History of blood transfusion, Hyperlipidemia, Hypertension, On home O2, and JEAN (obstructive sleep apnea).  Past Surgical History:  has a past surgical history that includes Dialysis Catheter Insertion (N/A, 2021); Dialysis Catheter Removal (N/A, 2021); IR TUNNELED CVC PLACE WO SQ PORT/PUMP > 5 YEARS (11/15/2021); eye surgery (Bilateral, ); Cataract removal (Bilateral);  section; Toe amputation (Left); Dialysis Catheter Insertion (N/A, 2022); US ASP ABSCESS/HEMATOMA/BULLA/CYST (3/28/2022); Dialysis Catheter Removal (N/A, 2022); IR TUNNELED CVC PLACE WO SQ PORT/PUMP > 5 YEARS (2022); IR TUNNELED CVC PLACE WO SQ PORT/PUMP > 5 YEARS (2022); IR TUNNELED CVC PLACE WO SQ PORT/PUMP > 5 YEARS (2022); and Dialysis fistula creation (Right, 2024).    Assessment   Body Structures, Functions, Activity Limitations Requiring Skilled Therapeutic Intervention: Decreased functional mobility ;Decreased cognition;Decreased balance;Decreased strength;Decreased safe awareness;Decreased endurance  Assessment: Pt was agreeable to PT assessment and tolerated with no adverse events. Pt requiring one person assist to complete basic functional 
Physical Therapy and Occupational Therapy  No Treatment    Orders received and chart reviewed.  Spoke with RN who reports pt unable to work with therapy at this time. Pt awaiting ortho consult for new acute fx in L foot. Pt with increased pain.  Will check back 2/23 as time and schedule allow and when WB recommendations are in. RN in agreement with plan.    Callie Hairston, PT #[id number]   Estelita Enriquez, OTR/L [id number]      
Physical Therapy& Occupational Therapy      Attempted to see patient this AM for initial evaluation/treatment however per order in alma patient to have walking boot for the LLE 2/2 fx (WBAT in boot at all times). Therapist contacted RN however patient does not have walking boot. Therefore therapy will hold evaluation/tx at this time and f/u when boot present and schedule permits. Thank you.       Kell Millan, PT DPT   Edith Culver  MS, OTR/L #[id number]       
Pt arrived to floor, VSS. Pt alert and oriented to self, place. Delayed responses. RA. IV in L wrist. Purewick in place.  Limb restriction in R arm due to dialysis fistula  Family at bedside. Fall precautions in place.   
Pt back from EEG.  
Pt back from MRI.  
Pt currently getting dialysis treatment. Hope R.N. to call EEG when pt is available.  
Pt off floor to dialysis    
Pt rated pain level 8 out of 10 on the left foot. Given PRN Percocet at 0902 and 1655. Repositioned frequently. Elevated the foot of the bed to provide comfort. Removed IV in the left forearm due to infiltration, discomfort, and inability to flush. Pt bed alarm is on, bed at lowest position, and call light within reach.   
Pt rated pain level 8 out of 10. Stated a headache. Given PRN Tylenol at 0920. Repositioned frequently. Elevated the foot of the bed to provide comfort. Also, sated feeling nauseous in the morning. Dr prescribed Phenergan. Pt refused the medication at the moment. Bed alarm is on, bed lowest position, and call light within reach.   
Pt returned from dialysis  
Pt to EEG.  
Pt to mri.  
Treatment time: 3 hours     Net UF: 1 L     Pre weight: 93.9 kg  Post weight: 92.9 kg  EDW:     Access used: Left chest TDC   Access function: tolerated 350 BFR     Medications or blood products given: NA     Regular outpatient schedule: MWF     Summary of response to treatment: tolerated well     Copy of dialysis treatment record placed in chart, to be scanned into EMR.  
Treatment time: 3 hours  Net UF: 1500 ml     Pre weight: 91.6 kg  Post weight:90.1 kg    Access used: rtdc    Access function: well with  ml/min     Medications or blood products given: heparin dwells     Regular outpatient schedule: tbd     Summary of response to treatment: Patient tolerated treatment well and without any complications. Patient remained stable throughout entire treatment and upon the exiting the dialysis suite via transport.     Report given to MACHO gonzalez and copy of dialysis treatment record faxed to [facility], to be scanned into EMR.  
Meds: prochlorperazine, 10 mg, Q6H PRN  sodium chloride flush, 5-40 mL, PRN  sodium chloride, , PRN  polyethylene glycol, 17 g, Daily PRN  acetaminophen, 650 mg, Q6H PRN   Or  acetaminophen, 650 mg, Q6H PRN  sodium chloride flush, 5-40 mL, PRN  sodium chloride, 25 mL, PRN  hydrALAZINE, 5 mg, Q6H PRN  ondansetron, 4 mg, Q6H PRN  oxyCODONE-acetaminophen, 1 tablet, Q4H PRN  bisacodyl, 10 mg, Daily PRN        Labs and Imaging   MRI BRAIN WO CONTRAST    Result Date: 2/24/2024  EXAM: MRI BRAIN WO CONTRAST INDICATION:AMS COMPARISON: 12/7/2021 and CT of the head 2/23/2024. TECHNIQUE: Multiplanar, multisequence MR imaging of the head obtained without IV contrast. The patient could not complete the entire exam and the axial T2 weighted images were not obtained due to patient intolerance. IV contrast: None. FINDINGS: SINUSES AND OSSEOUS STRUCTURES: The mastoid air cells and middle ears are clear. The paranasal sinuses are clear. Marrow signal is unremarkable. ORBITS: Unremarkable MIDLINE STRUCTURES: The sella turcica and pituitary gland are normal. Craniovertebral alignment and cerebellar tonsils are normal. VENTRICLES: Normal size and configuration for patient age. Cisternal spaces are intact. INTRACRANIAL HEMORRHAGE: None. BRAIN PARENCHYMA: Brain parenchyma is normal signal. There is no diffusion restriction. Grey white differentiation is intact. No mass. White matter signal is difficult to visualize due to motion artifact and inability to comply with the exam. Minimal periventricular signal abnormalities similar to the previous MRI in 2021. INTRACRANIAL VASCULATURE: Vascular flow voids are intact.     1. Limited exam with patient unable to tolerate the entirety of the imaging sequences. Motion artifact limits additional sequences obtained. No acute intracranial abnormality is identified within limitations. Electronically signed by Dex Gaines MD    VL Extremity Venous Bilateral    Result Date: 2/23/2024  Lower 
shows generalized slowing but no seizures no epileptiform discharges  Family / Caregiver Present: No  Referring Practitioner: Edward Curry MD  Diagnosis: transient alteration of awareness  Subjective  Subjective: Pt sitting up in bed on OT arrival and agreed to tx.  General Comment  Comments: patient denies numbness tingling arms/legs/hands/feet, pt reports taking Nj for neuropathy and alleviates N/T     Social/Functional History  Social/Functional History  Lives With: Son (son, kayla'son - son 23, g'son 12)  Type of Home: Apartment  Home Layout: Able to Live on Main level with bedroom/bathroom  Home Access: Stairs to enter without rails  Entrance Stairs - Number of Steps: 4 + 1(apt is [facility], has been trying to get railing installed for couple of years)  Bathroom Shower/Tub: Tub/Shower unit (pt report normally sponge bathing)  Bathroom Toilet:  (sink next to for commode for leverage)  Home Equipment: Rollator, Cane  Has the patient had two or more falls in the past year or any fall with injury in the past year?: Yes (pt reporting g'son told her she has fallen 2x in past month)  ADL Assistance: Independent  Homemaking Assistance: Independent  Homemaking Responsibilities: No (son and g'son complete)  Ambulation Assistance: Independent  Transfer Assistance: Independent  Active : Yes (not currently b/c motor is out on her car; but family is happy she isn't)  Leisure & Hobbies: sleep, ltd activity since starting dialysis appx 1 1/2yr ago  Additional Comments: pt reproting family can provide 24hr       Objective   Pulse: 65  Heart Rate Source: Monitor  BP: 125/76  BP Location: Left upper arm  BP Method: Automatic  Patient Position: Semi fowlers  MAP (Calculated): 92  Respirations: 17  SpO2: 90 %  O2 Device: None (Room air)  Temp: 98.3 °F (36.8 °C)                         ADL  Feeding: Independent  Grooming:  (declined need)  Toileting Skilled Clinical Factors:  (denied need at time of evl)  Functional Mobility 
     XR SHOULDER LEFT 1 VW   Final Result      Mild glenohumeral and acromioclavicular osteoarthritis. No fracture or   dislocation.      Surgical clips in the soft tissues of the upper arm.      CT CHEST WO CONTRAST   Final Result   1.  No acute process in the chest, abdomen, or pelvis.   2.  Stable appearance of an 8 mm nodule dating back to 2019 and favored to be   benign. No follow-up is recommended.   3.  Fibroid uterus.   4.  Status post cholecystectomy.      CT ABDOMEN PELVIS WO CONTRAST Additional Contrast? None   Final Result   1.  No acute process in the chest, abdomen, or pelvis.   2.  Stable appearance of an 8 mm nodule dating back to 2019 and favored to be   benign. No follow-up is recommended.   3.  Fibroid uterus.   4.  Status post cholecystectomy.      CT Head W/O Contrast   Final Result   1.  No acute intracranial abnormality.    2.  Apical scalp contusion.      Electronically signed by KAYAK      XR CHEST PORTABLE   Final Result   1.  No acute cardiopulmonary findings.      Electronically signed by KAYAK      XR SHOULDER RIGHT (MIN 2 VIEWS)   Final Result   1. No acute fracture or dislocation.      Electronically signed by KAYAK          Medical Decision Making:  The following items were considered in medical decision making:  Discussion of patient care with other providers  Reviewed clinical lab tests  Reviewed radiology tests  Reviewed other diagnostic tests/interventions  Independent review of radiologic images - reviewed     D/w Primary team             Gaston Dominguez, MS4   [facility]  Office -[phone]  Pln-[phone]    I have seen the patient independently from the resident .I discussed the care with the resident. I personally reviewed the HPI, PH, FH, SH, ROS and medications. I repeated pertinent portions of the examination and reviewed the relevant imaging and laboratory data. I agree with the findings, assessment and plan as 
injection 5,000 Units, 3 times per day  polyethylene glycol (GLYCOLAX) packet 17 g, Daily PRN  acetaminophen (TYLENOL) tablet 650 mg, Q6H PRN   Or  acetaminophen (TYLENOL) suppository 650 mg, Q6H PRN  carvedilol (COREG) tablet 12.5 mg, BID WC  pantoprazole (PROTONIX) tablet 40 mg, QAM AC  sevelamer (RENVELA) tablet 1,600 mg, TID WC  lidocaine PF 1 % injection 5 mL, Once  sodium chloride flush 0.9 % injection 5-40 mL, 2 times per day  sodium chloride flush 0.9 % injection 5-40 mL, PRN  0.9 % sodium chloride infusion, PRN  hydrALAZINE (APRESOLINE) injection 5 mg, Q6H PRN  ondansetron (ZOFRAN) injection 4 mg, Q6H PRN  oxyCODONE-acetaminophen (PERCOCET) 5-325 MG per tablet 1 tablet, Q4H PRN  bisacodyl (DULCOLAX) suppository 10 mg, Daily PRN        Review of Systems:   14 point ROS obtained but were negative except mentioned in HPI      Physical exam:     Vitals:  /66   Pulse 65   Temp 97.9 °F (36.6 °C) (Oral)   Resp 18   Ht 1.524 m (5')   Wt 97.5 kg (214 lb 15.2 oz)   SpO2 100%   BMI 41.98 kg/m²   Constitutional:  alert and oriented, perhaps mildy slow cognition .   Skin: no rash, turgor wnl  Heent:  eomi, mmm  Neck: no bruits or jvd noted  Cardiovascular:  S1, S2 without m/r/g  Respiratory: CTA B without w/r/r  Abdomen:  soft, nt, nd  Ext:  lower extremity edema No  Psychiatric: mood and affect appropriate  Musculoskeletal:  Rom, muscular strength intact  TDC- looks fine     Data:   Labs:  CBC:   Recent Labs     02/23/24  0234 02/24/24  0747 02/25/24  0603   WBC 6.6 5.2 5.0   HGB 12.9 13.1 12.2    136 166     BMP:    Recent Labs     02/23/24  0830 02/24/24  0747 02/25/24  0603    136 137   K 4.5 5.4* 4.7   CL 99 99 97*   CO2 22 16* 24   BUN 25* 17 27*   CREATININE 7.6* 5.5* 7.6*   GLUCOSE 113* 99 132*     Ca/Mg/Phos:   Recent Labs     02/23/24  0830 02/24/24  0747 02/25/24  0603   CALCIUM 10.3 9.6 9.6     Hepatic:   Recent Labs     02/23/24  0830 02/24/24  0747 02/25/24  0603   AST 39* 39* 15 
Result   1.  No acute intracranial abnormality.    2.  Apical scalp contusion.      Electronically signed by [email or web address]      XR CHEST PORTABLE   Final Result   1.  No acute cardiopulmonary findings.      Electronically signed by [email or web address]      XR SHOULDER RIGHT (MIN 2 VIEWS)   Final Result   1. No acute fracture or dislocation.      Electronically signed by [email or web address]            Medical Decision Making:  The following items were considered in medical decision making:  Discussion of patient care with other providers  Reviewed clinical lab tests  Reviewed radiology tests    Will be discussed w/  Primary team     Thank you for allowing us to participate in care of Scottie Chiang   Feel free to contact me,     Sergio Sterling MD,   
+------------------------+----------+---------------+----------+ Right Doppler Measurements +------------------------+------+------+------------+ !Location                !Signal!Reflux!Reflux (sec)! +------------------------+------+------+------------+ !Sapheno Femoral Junction!Phasic!      !            ! +------------------------+------+------+------------+ !Common Femoral          !Phasic!      !            ! +------------------------+------+------+------------+ !Femoral                 !Phasic!      !            ! +------------------------+------+------+------------+ !Deep Femoral            !Phasic!      !            ! +------------------------+------+------+------------+ !Popliteal               !Phasic!      !            ! +------------------------+------+------+------------+ Left Lower Extremities DVT Study Measurements Left 2D Measurements +------------------------+----------+---------------+----------+ !Location                !Visualized!Compressibility!Thrombosis! +------------------------+----------+---------------+----------+ !Sapheno Femoral Junction!Yes       !Yes            !None      ! +------------------------+----------+---------------+----------+ !GSV Thigh               !Yes       !Yes            !None      ! +------------------------+----------+---------------+----------+ !Common Femoral          !Yes       !Yes            !None      ! +------------------------+----------+---------------+----------+ !Femoral                 !Yes       !Yes            !None      ! +------------------------+----------+---------------+----------+ !Prox Femoral            !Yes       !Yes            !None      ! +------------------------+----------+---------------+----------+ !Mid Femoral             !Yes       !Yes            !None      ! +------------------------+----------+---------------+----------+ !Dist Femoral            !Yes       !Yes            !None      ! 
12/06/2021 03:28 PM    CLARITYU Clear 12/06/2021 03:28 PM    SPECGRAV 1.020 12/06/2021 03:28 PM    LEUKOCYTESUR Negative 12/06/2021 03:28 PM    UROBILINOGEN 0.2 12/06/2021 03:28 PM    BILIRUBINUR Negative 12/06/2021 03:28 PM    BLOODU Negative 12/06/2021 03:28 PM    GLUCOSEU Negative 12/06/2021 03:28 PM    KETUA Negative 12/06/2021 03:28 PM    AMORPHOUS 2+ 07/11/2021 12:18 AM     Urine Cultures:   Lab Results   Component Value Date/Time    LABURIN No growth at 18 to 36 hours 07/11/2021 12:18 AM     Blood Cultures:   Lab Results   Component Value Date/Time    BC  02/23/2024 08:30 AM     No Growth to date.  Any change in status will be called.     Lab Results   Component Value Date/Time    BLOODCULT2  02/23/2024 10:05 AM     No Growth to date.  Any change in status will be called.     Organism:   Lab Results   Component Value Date/Time    ORG Staphylococcus lugdunensis 11/14/2021 10:02 AM         Electronically signed by Chen Mosqueda MD on 2/26/2024 at 12:07 PM      Comment: Please note this report has been produced using speech recognition software and may contain errors related to that system including errors in grammar, punctuation, and spelling, as well as words and phrases that may be inappropriate. If there are any questions or concerns please feel free to contact the dictating provider for clarification.

## 2024-03-01 NOTE — DISCHARGE SUMMARY
08:34 PM     Troponin: No results found for: \"TROPONINT\"  Lactic Acid: No results for input(s): \"LACTA\" in the last 72 hours.  BNP: No results for input(s): \"PROBNP\" in the last 72 hours.  UA:  Lab Results   Component Value Date/Time    NITRU Negative 12/06/2021 03:28 PM    COLORU Yellow 12/06/2021 03:28 PM    PHUR 6.5 12/06/2021 03:28 PM    WBCUA 0-2 12/06/2021 03:28 PM    RBCUA 0-2 12/06/2021 03:28 PM    MUCUS 1+ 07/11/2021 12:18 AM    YEAST Present 11/14/2021 12:20 PM    BACTERIA 1+ 12/06/2021 03:28 PM    CLARITYU Clear 12/06/2021 03:28 PM    SPECGRAV 1.020 12/06/2021 03:28 PM    LEUKOCYTESUR Negative 12/06/2021 03:28 PM    UROBILINOGEN 0.2 12/06/2021 03:28 PM    BILIRUBINUR Negative 12/06/2021 03:28 PM    BLOODU Negative 12/06/2021 03:28 PM    GLUCOSEU Negative 12/06/2021 03:28 PM    KETUA Negative 12/06/2021 03:28 PM    AMORPHOUS 2+ 07/11/2021 12:18 AM     Urine Cultures:   Lab Results   Component Value Date/Time    LABURIN No growth at 18 to 36 hours 07/11/2021 12:18 AM     Blood Cultures:   Lab Results   Component Value Date/Time    BC No Growth after 4 days of incubation. 02/23/2024 08:30 AM     Lab Results   Component Value Date/Time    BLOODCULT2 No Growth after 4 days of incubation. 02/23/2024 10:05 AM     Organism:   Lab Results   Component Value Date/Time    ORG Staphylococcus lugdunensis 11/14/2021 10:02 AM       Time Spent Discharging patient 38 minutes    Electronically signed by Chen Mosqueda MD on 2/29/2024 at 11:02 PM

## 2024-03-25 PROBLEM — R11.0 NAUSEA: Status: ACTIVE | Noted: 2024-02-20

## 2024-03-26 ENCOUNTER — OFFICE VISIT (OUTPATIENT)
Dept: VASCULAR SURGERY | Age: 54
End: 2024-03-26

## 2024-03-26 ENCOUNTER — PROCEDURE VISIT (OUTPATIENT)
Dept: VASCULAR SURGERY | Age: 54
End: 2024-03-26
Payer: COMMERCIAL

## 2024-03-26 ENCOUNTER — OFFICE VISIT (OUTPATIENT)
Dept: PAIN MANAGEMENT | Age: 54
End: 2024-03-26
Payer: COMMERCIAL

## 2024-03-26 VITALS
DIASTOLIC BLOOD PRESSURE: 73 MMHG | BODY MASS INDEX: 41.62 KG/M2 | HEART RATE: 74 BPM | TEMPERATURE: 97.2 F | OXYGEN SATURATION: 94 % | SYSTOLIC BLOOD PRESSURE: 128 MMHG | WEIGHT: 212 LBS | HEIGHT: 60 IN

## 2024-03-26 VITALS
SYSTOLIC BLOOD PRESSURE: 113 MMHG | HEART RATE: 67 BPM | DIASTOLIC BLOOD PRESSURE: 69 MMHG | OXYGEN SATURATION: 97 % | BODY MASS INDEX: 41.01 KG/M2 | WEIGHT: 210 LBS

## 2024-03-26 DIAGNOSIS — Z51.81 ENCOUNTER FOR THERAPEUTIC DRUG MONITORING: ICD-10-CM

## 2024-03-26 DIAGNOSIS — N18.6 ESRD ON DIALYSIS (HCC): Primary | ICD-10-CM

## 2024-03-26 DIAGNOSIS — Z99.2 ESRD ON DIALYSIS (HCC): Primary | ICD-10-CM

## 2024-03-26 DIAGNOSIS — G89.4 CHRONIC PAIN SYNDROME: ICD-10-CM

## 2024-03-26 DIAGNOSIS — N18.6 END-STAGE RENAL DISEASE ON HEMODIALYSIS (HCC): Primary | ICD-10-CM

## 2024-03-26 DIAGNOSIS — M47.817 LUMBOSACRAL SPONDYLOSIS WITHOUT MYELOPATHY: ICD-10-CM

## 2024-03-26 DIAGNOSIS — G43.909 EPISODIC MIGRAINE: ICD-10-CM

## 2024-03-26 DIAGNOSIS — Z99.2 END-STAGE RENAL DISEASE ON HEMODIALYSIS (HCC): Primary | ICD-10-CM

## 2024-03-26 DIAGNOSIS — Z99.2 END-STAGE RENAL DISEASE ON HEMODIALYSIS (HCC): ICD-10-CM

## 2024-03-26 DIAGNOSIS — I50.43 CHF (CONGESTIVE HEART FAILURE), NYHA CLASS I, ACUTE ON CHRONIC, COMBINED (HCC): Primary | ICD-10-CM

## 2024-03-26 DIAGNOSIS — N18.6 END-STAGE RENAL DISEASE ON HEMODIALYSIS (HCC): ICD-10-CM

## 2024-03-26 DIAGNOSIS — M47.812 CERVICAL SPONDYLOSIS: ICD-10-CM

## 2024-03-26 DIAGNOSIS — N18.6 ESRD (END STAGE RENAL DISEASE) ON DIALYSIS (HCC): ICD-10-CM

## 2024-03-26 DIAGNOSIS — N18.6 ESRD (END STAGE RENAL DISEASE) (HCC): Primary | ICD-10-CM

## 2024-03-26 DIAGNOSIS — T82.858A STENOSIS OF ARTERIOVENOUS DIALYSIS FISTULA, INITIAL ENCOUNTER (HCC): ICD-10-CM

## 2024-03-26 DIAGNOSIS — Z99.2 ESRD (END STAGE RENAL DISEASE) ON DIALYSIS (HCC): ICD-10-CM

## 2024-03-26 PROBLEM — S98.119A: Status: ACTIVE | Noted: 2022-08-03

## 2024-03-26 PROBLEM — N20.0 CALCULUS OF KIDNEY: Status: ACTIVE | Noted: 2022-08-01

## 2024-03-26 PROBLEM — Z86.79 HISTORY OF HEART FAILURE: Status: ACTIVE | Noted: 2018-10-05

## 2024-03-26 PROCEDURE — G8484 FLU IMMUNIZE NO ADMIN: HCPCS | Performed by: NURSE PRACTITIONER

## 2024-03-26 PROCEDURE — 99024 POSTOP FOLLOW-UP VISIT: CPT | Performed by: SURGERY

## 2024-03-26 PROCEDURE — 3078F DIAST BP <80 MM HG: CPT | Performed by: NURSE PRACTITIONER

## 2024-03-26 PROCEDURE — G8417 CALC BMI ABV UP PARAM F/U: HCPCS | Performed by: NURSE PRACTITIONER

## 2024-03-26 PROCEDURE — 1036F TOBACCO NON-USER: CPT | Performed by: NURSE PRACTITIONER

## 2024-03-26 PROCEDURE — G8427 DOCREV CUR MEDS BY ELIG CLIN: HCPCS | Performed by: NURSE PRACTITIONER

## 2024-03-26 PROCEDURE — 3074F SYST BP LT 130 MM HG: CPT | Performed by: NURSE PRACTITIONER

## 2024-03-26 PROCEDURE — 3017F COLORECTAL CA SCREEN DOC REV: CPT | Performed by: NURSE PRACTITIONER

## 2024-03-26 PROCEDURE — 1111F DSCHRG MED/CURRENT MED MERGE: CPT | Performed by: NURSE PRACTITIONER

## 2024-03-26 PROCEDURE — 99213 OFFICE O/P EST LOW 20 MIN: CPT | Performed by: NURSE PRACTITIONER

## 2024-03-26 PROCEDURE — 93990 DOPPLER FLOW TESTING: CPT | Performed by: SURGERY

## 2024-03-26 RX ORDER — ROSUVASTATIN CALCIUM 20 MG/1
20 TABLET, COATED ORAL DAILY
COMMUNITY
End: 2024-04-16

## 2024-03-26 RX ORDER — OXYCODONE HYDROCHLORIDE 5 MG/1
5 TABLET ORAL EVERY 6 HOURS PRN
Qty: 112 TABLET | Refills: 0 | Status: SHIPPED | OUTPATIENT
Start: 2024-03-26 | End: 2024-04-23

## 2024-03-26 RX ORDER — CLOPIDOGREL BISULFATE 75 MG/1
75 TABLET ORAL DAILY
COMMUNITY
End: 2024-03-26

## 2024-03-26 RX ORDER — SUMATRIPTAN 25 MG/1
25 TABLET, FILM COATED ORAL
COMMUNITY
Start: 2024-03-12

## 2024-03-26 NOTE — PROGRESS NOTES
physical performance, general activity, work or disability,emotional distress, health care utilization and  decreased medication consumption. Will continue to monitor progress towards achieving/maintaining therapeutic goals with special emphasis on  1. Improvement in perceived interfernce  of pain with ADL's. Ability to do home exercises independently. Ability to do household chores indoor and/or outdoor work and social and leisure activities.Improve psychosocial and physical functioning. she is showing progression towards this treatment goal with the current regimen.     She was advised against drinking alcohol with the narcotic pain medicines, advised against driving or handling machinery while adjusting the dose of medicines or if having cognitive  issues related to the current medications.Risk of overdose and death, if medicines not taken as prescribed, were also discussed. If the patient develops new symptoms or if the symptoms worsen, the patient should call the office.    While transcribing every attempt was made to maintain the accuracy of the note in terms of it's contents,there may have been some errors made inadvertently.  Thank you for allowing me to participate in the care of this patient.    Faby Lance NP-C    Cc: Desiree Lino MD

## 2024-03-27 NOTE — PROGRESS NOTES
Called patient about procedure. Told to be here at 1200 for procedure at 1330. Must be NPO after midnight but can take morning medication with sips of water. Patient stated they do not take a blood thinner. Told to have a responsible adult with them to take them home and stay with them afterwards, if they do not get admitted to hospital. Also, to bring a current list of medications. No other questions or concerns.

## 2024-03-28 ENCOUNTER — HOSPITAL ENCOUNTER (OUTPATIENT)
Dept: INTERVENTIONAL RADIOLOGY/VASCULAR | Age: 54
Discharge: HOME | End: 2024-03-28
Payer: COMMERCIAL

## 2024-03-28 VITALS
HEIGHT: 61 IN | SYSTOLIC BLOOD PRESSURE: 120 MMHG | DIASTOLIC BLOOD PRESSURE: 90 MMHG | HEART RATE: 70 BPM | RESPIRATION RATE: 12 BRPM | WEIGHT: 210 LBS | TEMPERATURE: 97.6 F | OXYGEN SATURATION: 92 % | BODY MASS INDEX: 39.65 KG/M2

## 2024-03-28 DIAGNOSIS — N18.6 ESRD (END STAGE RENAL DISEASE) (HCC): ICD-10-CM

## 2024-03-28 LAB
ANION GAP SERPL CALCULATED.3IONS-SCNC: 14 MMOL/L (ref 3–16)
BUN SERPL-MCNC: 31 MG/DL (ref 7–20)
CALCIUM SERPL-MCNC: 9.4 MG/DL (ref 8.3–10.6)
CHLORIDE SERPL-SCNC: 96 MMOL/L (ref 99–110)
CO2 SERPL-SCNC: 25 MMOL/L (ref 21–32)
CREAT SERPL-MCNC: 5.8 MG/DL (ref 0.6–1.1)
DEPRECATED RDW RBC AUTO: 15.7 % (ref 12.4–15.4)
GFR SERPLBLD CREATININE-BSD FMLA CKD-EPI: 8 ML/MIN/{1.73_M2}
GLUCOSE SERPL-MCNC: 91 MG/DL (ref 70–99)
HCT VFR BLD AUTO: 37.1 % (ref 36–48)
HGB BLD-MCNC: 11.8 G/DL (ref 12–16)
INR PPP: 1.06 (ref 0.84–1.16)
MCH RBC QN AUTO: 31.3 PG (ref 26–34)
MCHC RBC AUTO-ENTMCNC: 31.7 G/DL (ref 31–36)
MCV RBC AUTO: 98.7 FL (ref 80–100)
PLATELET # BLD AUTO: 131 K/UL (ref 135–450)
PMV BLD AUTO: 10.1 FL (ref 5–10.5)
POTASSIUM SERPL-SCNC: 5.7 MMOL/L (ref 3.5–5.1)
PROTHROMBIN TIME: 13.8 SEC (ref 11.5–14.8)
RBC # BLD AUTO: 3.76 M/UL (ref 4–5.2)
SODIUM SERPL-SCNC: 135 MMOL/L (ref 136–145)
WBC # BLD AUTO: 5.4 K/UL (ref 4–11)

## 2024-03-28 PROCEDURE — C1725 CATH, TRANSLUMIN NON-LASER: HCPCS

## 2024-03-28 PROCEDURE — 36140 INTRO NDL ICATH UPR/LXTR ART: CPT

## 2024-03-28 PROCEDURE — 36902 INTRO CATH DIALYSIS CIRCUIT: CPT

## 2024-03-28 PROCEDURE — 85027 COMPLETE CBC AUTOMATED: CPT

## 2024-03-28 PROCEDURE — C1769 GUIDE WIRE: HCPCS | Performed by: SURGERY

## 2024-03-28 PROCEDURE — C1887 CATHETER, GUIDING: HCPCS | Performed by: SURGERY

## 2024-03-28 PROCEDURE — 99152 MOD SED SAME PHYS/QHP 5/>YRS: CPT

## 2024-03-28 PROCEDURE — C1894 INTRO/SHEATH, NON-LASER: HCPCS | Performed by: SURGERY

## 2024-03-28 PROCEDURE — 85610 PROTHROMBIN TIME: CPT

## 2024-03-28 PROCEDURE — 36907 BALO ANGIOP CTR DIALYSIS SEG: CPT

## 2024-03-28 PROCEDURE — 99153 MOD SED SAME PHYS/QHP EA: CPT

## 2024-03-28 PROCEDURE — 80048 BASIC METABOLIC PNL TOTAL CA: CPT

## 2024-03-28 PROCEDURE — C1769 GUIDE WIRE: HCPCS

## 2024-03-28 PROCEDURE — C2623 CATH, TRANSLUMIN, DRUG-COAT: HCPCS

## 2024-03-28 NOTE — BRIEF OP NOTE
Brief Postoperative Note      Patient: Scottie Chiang  YOB: 1970  MRN: [id number]    Date of Procedure: 3/28/2024    Pre-Op Diagnosis:  ESRD    Post-Op Diagnosis: Same       Procedure:  Ultrasound-guided access right radial artery  Right arm fistulogram with balloon angioplasty of perianastomotic and peripheral venous segment 4 mm x 100 mm [place] balloon  Balloon angioplasty of right brachiocephalic vein with 10 mm x 40 mm [place] balloon    Monitored anesthesia    Surgeon: Valeria Kumar MD    Estimated Blood Loss (mL): Minimal    Complications: None    Findings: Severe stenosis of the perianastomotic segment, approximately 1.5 cm past the arterial stenosis.  This responded well to balloon angioplasty.  Moderate stenosis of the right brachiocephalic vein around the insertion of the tunneled catheter.      Electronically signed by Valeria Kumar MD on 3/28/2024 at 4:12 PM

## 2024-03-28 NOTE — DISCHARGE INSTRUCTIONS
[facility]  Cardiovascular Special Procedures   Fistulagram  Patient Discharge Instructions      Day 1 (Procedure Day):  Rest for the remainder of the day.  Avoid excessive use of the affected arm.  Do not drive a car.  Do not be alone.  Leave dressing intact.    Day 2:  You may drive a car, unless otherwise directed by physician.  Remove dressing.  You may bathe/shower, but wash gently around the puncture site and pat dry.  Place new band-aid on site daily until skin heals.      Things to Avoid:  You may not do any strenuous exercises for one week. (ex: golfing, bowling, tennis, vacuum, snow removal, jogging, and aerobic exercises).  No hot tubs, baths, or pools for 3-5 days.  Do not lift anything over 3-5 pounds for 3 days, with affected arm.  No lotions, powders, or ointments near site for 5 days.    Things to watch for:  You may have a small lump or a bruise.  This is common and will go away.  If bleeding occurs from the site, or a hematoma (lump) begins to increase in size, immediately apply pressure directly over the site and call 911 to return to the hospital.  Report any coolness or numbness in the arm or hand immediately.  Report any excessive pain of the arm or hand immediately.  If mild discomfort occurs at the puncture site you may place an ice pack on the site at 15 minute intervals..   Watch for signs and symptoms of an infection at the arm site (fever, local pain, redness, warmth or discharge from the puncture site), call your physician immediately if any develop.    Other:  No work/school for 72 hours if you received a stent; otherwise you may go back to work the following day (as long as your job does not interfere with the lifting restrictions).      Any Questions please call us at [phone] (between 7am- 5pm, Mon-Fri).  After hours you should contact your physician.          [facility]  Cardiovascular Special Procedures  General Discharge Instructions    PROCEDURE:

## 2024-03-28 NOTE — OP NOTE
Operative Note      Patient: Scottie Cihang  YOB: 1970  MRN: [id number]    Date of Procedure: 3/28/2024    Pre-Op Diagnosis:  ESRD    Post-Op Diagnosis: Same       Procedure:  Ultrasound-guided access right radial artery  Right arm fistulogram with balloon angioplasty of perianastomotic and peripheral venous segment 4 mm x 100 mm [place] balloon  Balloon angioplasty of right brachiocephalic vein with 10 mm x 40 mm [place] balloon    Monitored anesthesia    Surgeon: Valeria Kumar MD    Estimated Blood Loss (mL): Minimal    Complications: None    Findings: Severe stenosis of the perianastomotic segment, approximately 1.5 cm past the arterial stenosis.  This responded well to balloon angioplasty.  Moderate stenosis of the right brachiocephalic vein around the insertion of the tunneled catheter.    Details of Procedure:  The patient was taken to the cardiac cath lab and placed in supine position.  IV sedation was administered by the nursing team. The operative area was clipped, prepared and draped in sterile fashion. A dose of intravenous antibiotics was administered. A brief time out was performed per hospital protocol.    The Right radial artery was accessed under ultrasound guidance with a micropuncture system.  A 6 Ni[place] Slender sheath was placed over a wire and a solution of heparin 2000 units, verapamil 50 mcg and nitroglycerin 200 mcg was instilled in the side port.  Fistulagram was then performed from the arterial anastomosis to the central venous outflow.      Findings:    Widely patent arterial anastomosis between the distal brachial artery and cephalic vein at the antecubitum.  Approximately 1.5 cm beyond this, there was severe stenosis over a two 3 cm segment.  The remainder of the cephalic vein was widely patent up to and including its insertion with the subclavian vein.  There was moderate stenosis of the brachiocephalic vein around the tunneled catheter.      Therapeutic Intervention:  The

## 2024-03-28 NOTE — FLOWSHEET NOTE
1710- discharge      Iv d/c tr band removed at 1655, and dressing applied , patient discharged via wheelchair out the main entrance , discharge instructions reviewed with patient and mother

## 2024-03-28 NOTE — PROGRESS NOTES
Cath Lab Pre Procedure Flowsheet    Plan of Care:     Hemodynamics and cardiac rhythm will remain stable.   Comfort level will be maintained.   Respiratory function will remain adequate.   Pt/family will verbalize understanding of the procedure.   Procedure will be tolerated without complications.   Patient will recover from procedure without complications.   ID armband on patient and identification verified.   Informed consent obtained.   Non invasive blood pressure cuff applied, monitoring initiated.   EKG pads and pulse oximeter applied, monitoring initiated.   Instructions given. Patient and / or family verbalize understanding.   H&P will be documented by physician in [facility].     Pre-procedure:    NPO Status: Pt has been NPO since midnight. .    Contrast / IV Dye Allergy:     Pregnancy Test: N/A.    Prep Sites: Wrist(s)  Chest    Mehdi's Test:    Pulses:     Anticoagulants: None.     Antiplatelets: None.     Chief Complaint:  Syncope    Diabetic: No    Pre EKG Rhythm: NSR    Pre SBP: 142    IV access:left a/c    Pre-procedure blood work collected by: margarita

## 2024-04-04 NOTE — DISCHARGE INSTRUCTIONS
Okay for WBAT in the walking boot.  Should wear boot at all times to support the foot fractures  F/U with Dr Recio in 2 weeks.  Call [facility] for appointment time and date for follow up at [phone].      Extra Heart Failure sites:     https://[employer].Sonarworks/publication/?q=463133   --- this is American Heart Association interactive Healthier Living with Heart Failure guidebook.  Please click hyperlink or copy / paste link into search bar. Use your mouse to scroll through the pages.  Lots of information about weight monitoring, diet tips, activity, meds, etc     HF [place] janie  -- this is a free smart phone janie available for iPhone and Android download.  Use your phone to track sodium / fluid intake, zone tool symptom tracking, weights, medications, etc. Click on this hyperlink  HF [place] Janie   for QR code for easy download.      DASH (Dietary Approach to Stop Hypertension) diet --  https://www.nhlbi.nih.gov/education/dash-eating-plan -- this diet is a flexible eating plan that promotes heart healthy eating style.  Click on hyperlink or copy / paste link into search bar.  Lots of low sodium recipes and tips.    https://www.Quantifind.com/recipes  -- more free recipes     poor balance

## 2024-04-09 ENCOUNTER — TELEPHONE (OUTPATIENT)
Dept: VASCULAR SURGERY | Age: 54
End: 2024-04-09

## 2024-04-09 NOTE — TELEPHONE ENCOUNTER
Spoke with Ms. Chiang and she stated pain was more so in her forearm and its on/off but tolerable. She is unable to come to office on thur 4/11, she is already scheduled for post-op appt on tue. Informed her she could you ice on/off to help with pain and to call if symptoms worsen.   discharge. Neck: Normal range of motion. Neck supple. Cardiovascular: Normal rate, regular rhythm and normal heart sounds. Exam reveals no gallop and no friction rub. No murmur heard. Pulmonary/Chest: Effort normal and breath sounds normal. No respiratory distress. She has no wheezes. Abdominal: Soft. Bowel sounds are normal. She exhibits no distension. There is no tenderness. There is no rebound. Musculoskeletal: Normal range of motion. She exhibits no edema or tenderness. Neurological: She is alert and oriented to person, place, and time. She has normal reflexes. No cranial nerve deficit. Diabetic foot examination:  Monofilament testing abnormal bilaterally. Sensation is normal elsewhere. Pt has decreased sensation on bilateral heels. No wounds, sores, or rashes present. Normal dorsal pedis pulses bilaterally      Skin: Skin is warm and dry. No rash noted. She is not diaphoretic. No erythema. Psychiatric: She has a normal mood and affect. Her behavior is normal.   Vitals reviewed. Assessment:  Rachna Cardenas is a 43 y.o. female, with a history of diabetes, asthma, hyperlipidemia , who presents for a new patient visit. Plan:       1. Preventative health care    - CBC Auto Differential; Future  - Hemoglobin A1C; Future  - Hepatic Function Panel; Future  - HIV Screen; Future  - Lipid Panel; Future  - Renal Function Panel; Future  - TSH with Reflex; Future    2. Needs flu shot    - completed at today's visit: INFLUENZA, QUADV, 3 YRS AND OLDER, IM, PF, PREFILL SYR OR SDV, 0.5ML (FLUZONE QUADV, PF)    3. Type 2 diabetes mellitus with diabetic neuropathy, without long-term current use of insulin ([facility])    -will check A1c prior to starting any medications   - Hemoglobin A1C; Future  - Diabetic Foot Exam    4. Mild intermittent asthma without complication    - albuterol sulfate HFA (VENTOLIN HFA) 108 (90 Base) MCG/ACT inhaler;  Inhale 2 puffs into the lungs every 6 hours as needed for

## 2024-04-09 NOTE — TELEPHONE ENCOUNTER
Patient states a Balloon procedure was completed on 03/28/2024. She is currently experiencing right wrist pain. Unsure if the pain is to be expected, or if she needs to be seen.     Patient can be reached at [phone].

## 2024-04-16 ENCOUNTER — OFFICE VISIT (OUTPATIENT)
Dept: VASCULAR SURGERY | Age: 54
End: 2024-04-16

## 2024-04-16 ENCOUNTER — TELEPHONE (OUTPATIENT)
Dept: VASCULAR SURGERY | Age: 54
End: 2024-04-16

## 2024-04-16 VITALS
HEART RATE: 65 BPM | WEIGHT: 205 LBS | BODY MASS INDEX: 38.71 KG/M2 | OXYGEN SATURATION: 90 % | TEMPERATURE: 98.3 F | DIASTOLIC BLOOD PRESSURE: 72 MMHG | SYSTOLIC BLOOD PRESSURE: 143 MMHG | HEIGHT: 61 IN

## 2024-04-16 DIAGNOSIS — N18.6 ESRD (END STAGE RENAL DISEASE) (HCC): Primary | ICD-10-CM

## 2024-04-16 PROCEDURE — 99024 POSTOP FOLLOW-UP VISIT: CPT | Performed by: SURGERY

## 2024-04-16 RX ORDER — PROMETHAZINE HYDROCHLORIDE 12.5 MG/1
25 TABLET ORAL EVERY 8 HOURS PRN
Qty: 15 TABLET | Refills: 1 | Status: SHIPPED | OUTPATIENT
Start: 2024-04-16 | End: 2024-04-17

## 2024-04-16 NOTE — PROGRESS NOTES
2024     Scottie Chiang (:  1970) is a 54 y.o. female,Established patient, here for evaluation of the following chief complaint(s):  Post-Op Check ( 1st post-op s/p fistulagram 3/28/)        ASSESSMENT/PLAN:  1. ESRD (end stage renal disease) (HCC)  Good result with angioplasty.  Continue to exercise hand to enhance maturation.  Return to office in 4-6 weeks, or sooner if she has any concerns or problems.     SUBJECTIVE/OBJECTIVE:  Right brachiocephalic AVF 2024  She returns today following balloon angioplasty of her AV fistula.  She had good result with this.    Unfortunately, she is having terrible nausea today.  States she has appt with GI in a few weeks.  Taking IV Zofran at HD.     On exam:  AAO x 3.  Tearful.    Right arm and hand warm and well-perfused with palpable radial pulse.  Pulse augments with brief compression of AVF.  Good thrill and bruit in cephalic vein--improved from pre-angioplasty exam.        Physical Exam  Vitals:    24 1121   BP: (!) 143/72   Site: Left Upper Arm   Position: Sitting   Pulse: 65   Temp: 98.3 °F (36.8 °C)   TempSrc: Infrared   SpO2: 90%   Weight: 93 kg (205 lb)   Height: 1.549 m (5' 1\")       An electronic signature was used to authenticate this note.    --Valeria Kumar MD

## 2024-04-16 NOTE — TELEPHONE ENCOUNTER
Patient called in stating that her her phenergan prescription was not updated before being sent to the pharmacy    Patient states that she is supposed to be taking the phenergan every 6 hours twice a day for 30 days. Patient states that the prescription that was sent over says to take the phenergan every 8 hours and that the quantity was only 15 tablets     Patient would like this prescription updated and sent to the [facility] [address] [facility] and would like to have a refill sent over with the initial prescription     Please contact the patient at [phone] once this has been updated at sent to the pharmacy

## 2024-04-17 RX ORDER — PROMETHAZINE HYDROCHLORIDE 12.5 MG/1
12.5 TABLET ORAL EVERY 6 HOURS PRN
Qty: 120 TABLET | Refills: 1 | Status: SHIPPED | OUTPATIENT
Start: 2024-04-17 | End: 2024-05-17

## 2024-04-17 NOTE — TELEPHONE ENCOUNTER
Spoke with Ms. Chiang and informed her Rx for phenergan has been changed and she should be able to get it from pharmacy now.

## 2024-04-23 ENCOUNTER — OFFICE VISIT (OUTPATIENT)
Dept: PAIN MANAGEMENT | Age: 54
End: 2024-04-23
Payer: COMMERCIAL

## 2024-04-23 VITALS
SYSTOLIC BLOOD PRESSURE: 146 MMHG | BODY MASS INDEX: 38.92 KG/M2 | WEIGHT: 206 LBS | OXYGEN SATURATION: 97 % | HEART RATE: 76 BPM | DIASTOLIC BLOOD PRESSURE: 76 MMHG

## 2024-04-23 DIAGNOSIS — N18.6 ESRD (END STAGE RENAL DISEASE) ON DIALYSIS (HCC): ICD-10-CM

## 2024-04-23 DIAGNOSIS — G89.4 CHRONIC PAIN SYNDROME: ICD-10-CM

## 2024-04-23 DIAGNOSIS — M47.812 CERVICAL SPONDYLOSIS: ICD-10-CM

## 2024-04-23 DIAGNOSIS — M47.817 LUMBOSACRAL SPONDYLOSIS WITHOUT MYELOPATHY: Primary | ICD-10-CM

## 2024-04-23 DIAGNOSIS — Z51.81 ENCOUNTER FOR THERAPEUTIC DRUG MONITORING: ICD-10-CM

## 2024-04-23 DIAGNOSIS — I50.43 CHF (CONGESTIVE HEART FAILURE), NYHA CLASS I, ACUTE ON CHRONIC, COMBINED (HCC): ICD-10-CM

## 2024-04-23 DIAGNOSIS — Z99.2 ESRD (END STAGE RENAL DISEASE) ON DIALYSIS (HCC): ICD-10-CM

## 2024-04-23 DIAGNOSIS — G43.909 EPISODIC MIGRAINE: ICD-10-CM

## 2024-04-23 DIAGNOSIS — M17.11 PRIMARY LOCALIZED OSTEOARTHROSIS OF THE KNEE, RIGHT: ICD-10-CM

## 2024-04-23 PROCEDURE — 1036F TOBACCO NON-USER: CPT | Performed by: NURSE PRACTITIONER

## 2024-04-23 PROCEDURE — 3077F SYST BP >= 140 MM HG: CPT | Performed by: NURSE PRACTITIONER

## 2024-04-23 PROCEDURE — 3078F DIAST BP <80 MM HG: CPT | Performed by: NURSE PRACTITIONER

## 2024-04-23 PROCEDURE — G8417 CALC BMI ABV UP PARAM F/U: HCPCS | Performed by: NURSE PRACTITIONER

## 2024-04-23 PROCEDURE — G8427 DOCREV CUR MEDS BY ELIG CLIN: HCPCS | Performed by: NURSE PRACTITIONER

## 2024-04-23 PROCEDURE — 99214 OFFICE O/P EST MOD 30 MIN: CPT | Performed by: NURSE PRACTITIONER

## 2024-04-23 PROCEDURE — 3017F COLORECTAL CA SCREEN DOC REV: CPT | Performed by: NURSE PRACTITIONER

## 2024-04-23 RX ORDER — OXYCODONE HYDROCHLORIDE 5 MG/1
5 TABLET ORAL EVERY 6 HOURS PRN
Qty: 112 TABLET | Refills: 0 | Status: SHIPPED | OUTPATIENT
Start: 2024-04-23 | End: 2024-05-21

## 2024-04-23 RX ORDER — MEMANTINE HYDROCHLORIDE 5 MG/1
TABLET ORAL
COMMUNITY
Start: 2024-04-22

## 2024-04-23 NOTE — PROGRESS NOTES
a week (Patient taking differently: Take 1 capsule by mouth every 30 days) 24 capsule 1    atorvastatin (LIPITOR) 40 MG tablet Take 1 tablet by mouth daily (Patient taking differently: Take 2 tablets by mouth daily) 90 tablet 1    carvedilol (COREG) 12.5 MG tablet TAKE 1 TABLETS BY MOUTH TWICE A DAY WITH MEALS 180 tablet 1    B complex-vitamin C-folic acid (NEPHRO-BHANU) 1 MG tablet Take 1 tablet by mouth daily 30 tablet 3     No current facility-administered medications for this visit.     I will continue her current medication regimen  which is part of the above treatment schedule. It has been helping with Ms. Chiang's chronic  medical problems which for this visit include:   The primary encounter diagnosis was Lumbosacral spondylosis without myelopathy. Diagnoses of Encounter for therapeutic drug monitoring, ESRD (end stage renal disease) on dialysis (HCC), Chronic pain syndrome, Cervical spondylosis, Episodic migraine, and CHF (congestive heart failure), NYHA class I, acute on chronic, combined (HCC) were also pertinent to this visit.   Risks and benefits of the medications and other alternative treatments  including no treatment were discussed with the patient.The common side effects of these medications were also explained to the patient.  Informed verbal consent was obtained.   Goals of current treatment regimen include improvement in pain, restoration of functioning- with focus on improvement in physical performance, general activity, work or disability,emotional distress, health care utilization and  decreased medication consumption. Will continue to monitor progress towards achieving/maintaining therapeutic goals with special emphasis on  1. Improvement in perceived interfernce  of pain with ADL's. Ability to do home exercises independently. Ability to do household chores indoor and/or outdoor work and social and leisure activities.Improve psychosocial and physical functioning. she is showing progression

## 2024-04-24 RX ORDER — DOCUSATE SODIUM 100 MG/1
100 CAPSULE, LIQUID FILLED ORAL DAILY
Qty: 90 CAPSULE | Refills: 1 | Status: SHIPPED | OUTPATIENT
Start: 2024-04-24

## 2024-04-24 RX ORDER — FOLIC ACID/VIT B COMPLEX AND C 0.8 MG
1 TABLET ORAL DAILY
Qty: 90 TABLET | Refills: 3 | Status: SHIPPED | OUTPATIENT
Start: 2024-04-24

## 2024-04-24 NOTE — PROGRESS NOTES
3/26/2024    Scottie Chiang (:  1970) is a 54 y.o. female,Established patient, here for evaluation of the following chief complaint(s):  Follow-up (4w f/u s/p AVF/)        ASSESSMENT/PLAN:  1. ESRD (end stage renal disease) ([facility])  2. Stenosis of arteriovenous dialysis fistula, initial encounter ([facility])    Plan right arm fistulogram with possible angioplasty of AV fistula stenosis.  Arteriogram is scheduled for 3/28/2024.  No follow-ups on file.       SUBJECTIVE/OBJECTIVE:  POV#1 Right brachiocephalic AVF.  No new complaints.  On exam, the antecubital incision is healing nicely.  Diminished thrill/bruit throughout the upper arm AV fistula.  Near the anastomosis, there is a whistling bruit.    Duplex ultrasound was performed in the office today, revealing focal stenosis within the proximal portion of the fistula.    I personally reviewed images of the following study:  VL Hemodialysis Access 3/26/2024      Physical Exam  Vitals:    24 1325   BP: 128/73   Site: Left Upper Arm   Position: Sitting   Pulse: 74   Temp: 97.2 °F (36.2 °C)   TempSrc: Infrared   SpO2: 94%   Weight: 96.2 kg (212 lb)   Height: 1.524 m (5')             An electronic signature was used to authenticate this note.    --Valeria Kumar MD   
yes

## 2024-05-20 DIAGNOSIS — N18.6 ESRD (END STAGE RENAL DISEASE) (HCC): Primary | ICD-10-CM

## 2024-05-21 ENCOUNTER — OFFICE VISIT (OUTPATIENT)
Dept: PAIN MANAGEMENT | Age: 54
End: 2024-05-21
Payer: COMMERCIAL

## 2024-05-21 VITALS
OXYGEN SATURATION: 95 % | SYSTOLIC BLOOD PRESSURE: 138 MMHG | HEART RATE: 66 BPM | BODY MASS INDEX: 38.92 KG/M2 | WEIGHT: 206 LBS | DIASTOLIC BLOOD PRESSURE: 73 MMHG

## 2024-05-21 DIAGNOSIS — N18.6 ESRD (END STAGE RENAL DISEASE) ON DIALYSIS (HCC): ICD-10-CM

## 2024-05-21 DIAGNOSIS — I50.43 CHF (CONGESTIVE HEART FAILURE), NYHA CLASS I, ACUTE ON CHRONIC, COMBINED (HCC): ICD-10-CM

## 2024-05-21 DIAGNOSIS — Z51.81 ENCOUNTER FOR THERAPEUTIC DRUG MONITORING: ICD-10-CM

## 2024-05-21 DIAGNOSIS — M47.817 LUMBOSACRAL SPONDYLOSIS WITHOUT MYELOPATHY: Primary | ICD-10-CM

## 2024-05-21 DIAGNOSIS — M17.11 PRIMARY LOCALIZED OSTEOARTHROSIS OF THE KNEE, RIGHT: ICD-10-CM

## 2024-05-21 DIAGNOSIS — G89.4 CHRONIC PAIN SYNDROME: ICD-10-CM

## 2024-05-21 DIAGNOSIS — M47.812 CERVICAL SPONDYLOSIS: ICD-10-CM

## 2024-05-21 DIAGNOSIS — Z99.2 ESRD (END STAGE RENAL DISEASE) ON DIALYSIS (HCC): ICD-10-CM

## 2024-05-21 DIAGNOSIS — G43.909 EPISODIC MIGRAINE: ICD-10-CM

## 2024-05-21 PROCEDURE — G8427 DOCREV CUR MEDS BY ELIG CLIN: HCPCS | Performed by: NURSE PRACTITIONER

## 2024-05-21 PROCEDURE — G8417 CALC BMI ABV UP PARAM F/U: HCPCS | Performed by: NURSE PRACTITIONER

## 2024-05-21 PROCEDURE — 99213 OFFICE O/P EST LOW 20 MIN: CPT | Performed by: NURSE PRACTITIONER

## 2024-05-21 PROCEDURE — 3017F COLORECTAL CA SCREEN DOC REV: CPT | Performed by: NURSE PRACTITIONER

## 2024-05-21 PROCEDURE — 1036F TOBACCO NON-USER: CPT | Performed by: NURSE PRACTITIONER

## 2024-05-21 PROCEDURE — 3078F DIAST BP <80 MM HG: CPT | Performed by: NURSE PRACTITIONER

## 2024-05-21 PROCEDURE — 3075F SYST BP GE 130 - 139MM HG: CPT | Performed by: NURSE PRACTITIONER

## 2024-05-21 RX ORDER — RIBOFLAVIN (VITAMIN B2) 100 MG
100 TABLET ORAL 2 TIMES DAILY
COMMUNITY

## 2024-05-21 RX ORDER — OXYCODONE HYDROCHLORIDE 5 MG/1
5 TABLET ORAL EVERY 6 HOURS PRN
Qty: 84 TABLET | Refills: 0 | Status: SHIPPED | OUTPATIENT
Start: 2024-05-21 | End: 2024-06-11

## 2024-05-21 NOTE — PROGRESS NOTES
visit.   Risks and benefits of the medications and other alternative treatments  including no treatment were discussed with the patient.The common side effects of these medications were also explained to the patient.  Informed verbal consent was obtained.   Goals of current treatment regimen include improvement in pain, restoration of functioning- with focus on improvement in physical performance, general activity, work or disability,emotional distress, health care utilization and  decreased medication consumption. Will continue to monitor progress towards achieving/maintaining therapeutic goals with special emphasis on  1. Improvement in perceived interfernce  of pain with ADL's. Ability to do home exercises independently. Ability to do household chores indoor and/or outdoor work and social and leisure activities.Improve psychosocial and physical functioning. she is showing progression towards this treatment goal with the current regimen.     She was advised against drinking alcohol with the narcotic pain medicines, advised against driving or handling machinery while adjusting the dose of medicines or if having cognitive  issues related to the current medications.Risk of overdose and death, if medicines not taken as prescribed, were also discussed. If the patient develops new symptoms or if the symptoms worsen, the patient should call the office.    While transcribing every attempt was made to maintain the accuracy of the note in terms of it's contents,there may have been some errors made inadvertently.  Thank you for allowing me to participate in the care of this patient.    Faby Lance, GIULIANO-C    Cc: , Provider, APRN - NP

## 2024-05-22 PROBLEM — S92.352D DISPLACED FRACTURE OF FIFTH METATARSAL BONE OF LEFT FOOT WITH ROUTINE HEALING: Status: ACTIVE | Noted: 2024-04-15

## 2024-05-22 PROBLEM — S92.342A CLOSED DISPLACED FRACTURE OF FOURTH METATARSAL BONE OF LEFT FOOT: Status: ACTIVE | Noted: 2024-04-15

## 2024-05-22 PROBLEM — Z87.310 H/O HEALED FRAGILITY FRACTURE: Status: ACTIVE | Noted: 2024-04-15

## 2024-05-23 ENCOUNTER — OFFICE VISIT (OUTPATIENT)
Dept: VASCULAR SURGERY | Age: 54
End: 2024-05-23

## 2024-05-23 VITALS
SYSTOLIC BLOOD PRESSURE: 156 MMHG | HEIGHT: 61 IN | OXYGEN SATURATION: 92 % | DIASTOLIC BLOOD PRESSURE: 79 MMHG | HEART RATE: 66 BPM | TEMPERATURE: 97.8 F | WEIGHT: 206 LBS | BODY MASS INDEX: 38.89 KG/M2

## 2024-05-23 DIAGNOSIS — N18.6 ESRD (END STAGE RENAL DISEASE) (HCC): Primary | ICD-10-CM

## 2024-05-28 ENCOUNTER — TELEPHONE (OUTPATIENT)
Dept: VASCULAR SURGERY | Age: 54
End: 2024-05-28

## 2024-05-28 NOTE — PRE-PROCEDURE INSTRUCTIONS
Attempted to call patient about procedure. No answer. Voicemail left. Told to be here at 0830 for procedure at 1000. NPO after midnight, but can take morning medication with sips of water. Office should have told them when and if they should stop any blood thinners. Told to have a responsible adult be with the patient to take them home and stay with them afterwards, if they are not admitted to hospital afterwards. And if available bring current list of medications.

## 2024-05-29 ENCOUNTER — HOSPITAL ENCOUNTER (OUTPATIENT)
Age: 54
Setting detail: OUTPATIENT SURGERY
Discharge: HOME | End: 2024-05-29
Attending: SURGERY | Admitting: SURGERY
Payer: COMMERCIAL

## 2024-05-29 VITALS
HEART RATE: 64 BPM | WEIGHT: 204 LBS | TEMPERATURE: 98.1 F | DIASTOLIC BLOOD PRESSURE: 79 MMHG | RESPIRATION RATE: 11 BRPM | BODY MASS INDEX: 38.51 KG/M2 | SYSTOLIC BLOOD PRESSURE: 147 MMHG | HEIGHT: 61 IN | OXYGEN SATURATION: 99 %

## 2024-05-29 DIAGNOSIS — N18.6 ESRD (END STAGE RENAL DISEASE) (HCC): ICD-10-CM

## 2024-05-29 LAB
ANION GAP SERPL CALCULATED.3IONS-SCNC: 13 MMOL/L (ref 3–16)
BUN SERPL-MCNC: 46 MG/DL (ref 7–20)
CALCIUM SERPL-MCNC: 9.3 MG/DL (ref 8.3–10.6)
CHLORIDE SERPL-SCNC: 101 MMOL/L (ref 99–110)
CO2 SERPL-SCNC: 27 MMOL/L (ref 21–32)
CREAT SERPL-MCNC: 7.1 MG/DL (ref 0.6–1.1)
DEPRECATED RDW RBC AUTO: 19.8 % (ref 12.4–15.4)
ECHO BSA: 2 M2
GFR SERPLBLD CREATININE-BSD FMLA CKD-EPI: 6 ML/MIN/{1.73_M2}
GLUCOSE SERPL-MCNC: 121 MG/DL (ref 70–99)
HCT VFR BLD AUTO: 36 % (ref 36–48)
HGB BLD-MCNC: 11.7 G/DL (ref 12–16)
INR PPP: 1.03 (ref 0.85–1.15)
MCH RBC QN AUTO: 32.4 PG (ref 26–34)
MCHC RBC AUTO-ENTMCNC: 32.4 G/DL (ref 31–36)
MCV RBC AUTO: 100.1 FL (ref 80–100)
PLATELET # BLD AUTO: 171 K/UL (ref 135–450)
PMV BLD AUTO: 9.9 FL (ref 5–10.5)
POTASSIUM SERPL-SCNC: 5.9 MMOL/L (ref 3.5–5.1)
PROTHROMBIN TIME: 13.7 SEC (ref 11.9–14.9)
RBC # BLD AUTO: 3.6 M/UL (ref 4–5.2)
SODIUM SERPL-SCNC: 141 MMOL/L (ref 136–145)
WBC # BLD AUTO: 4.2 K/UL (ref 4–11)

## 2024-05-29 PROCEDURE — 7100000011 HC PHASE II RECOVERY - ADDTL 15 MIN: Performed by: SURGERY

## 2024-05-29 PROCEDURE — 2709999900 HC NON-CHARGEABLE SUPPLY: Performed by: SURGERY

## 2024-05-29 PROCEDURE — C2623 CATH, TRANSLUMIN, DRUG-COAT: HCPCS | Performed by: SURGERY

## 2024-05-29 PROCEDURE — C1769 GUIDE WIRE: HCPCS | Performed by: SURGERY

## 2024-05-29 PROCEDURE — C1725 CATH, TRANSLUMIN NON-LASER: HCPCS | Performed by: SURGERY

## 2024-05-29 PROCEDURE — 85610 PROTHROMBIN TIME: CPT

## 2024-05-29 PROCEDURE — 6370000000 HC RX 637 (ALT 250 FOR IP): Performed by: SURGERY

## 2024-05-29 PROCEDURE — 99153 MOD SED SAME PHYS/QHP EA: CPT | Performed by: SURGERY

## 2024-05-29 PROCEDURE — 7100000010 HC PHASE II RECOVERY - FIRST 15 MIN: Performed by: SURGERY

## 2024-05-29 PROCEDURE — 36902 INTRO CATH DIALYSIS CIRCUIT: CPT | Performed by: SURGERY

## 2024-05-29 PROCEDURE — 36140 INTRO NDL ICATH UPR/LXTR ART: CPT | Performed by: SURGERY

## 2024-05-29 PROCEDURE — 85027 COMPLETE CBC AUTOMATED: CPT

## 2024-05-29 PROCEDURE — 80048 BASIC METABOLIC PNL TOTAL CA: CPT

## 2024-05-29 PROCEDURE — C1887 CATHETER, GUIDING: HCPCS | Performed by: SURGERY

## 2024-05-29 PROCEDURE — 6360000002 HC RX W HCPCS: Performed by: SURGERY

## 2024-05-29 PROCEDURE — 99152 MOD SED SAME PHYS/QHP 5/>YRS: CPT | Performed by: SURGERY

## 2024-05-29 PROCEDURE — 6360000004 HC RX CONTRAST MEDICATION: Performed by: SURGERY

## 2024-05-29 RX ORDER — MIDAZOLAM HYDROCHLORIDE 1 MG/ML
INJECTION INTRAMUSCULAR; INTRAVENOUS PRN
Status: DISCONTINUED | OUTPATIENT
Start: 2024-05-29 | End: 2024-05-29 | Stop reason: HOSPADM

## 2024-05-29 RX ORDER — DIPHENHYDRAMINE HCL 25 MG
25 TABLET ORAL ONCE AS NEEDED
Status: COMPLETED | OUTPATIENT
Start: 2024-05-29 | End: 2024-05-29

## 2024-05-29 RX ORDER — FENTANYL CITRATE 50 UG/ML
INJECTION, SOLUTION INTRAMUSCULAR; INTRAVENOUS PRN
Status: DISCONTINUED | OUTPATIENT
Start: 2024-05-29 | End: 2024-05-29 | Stop reason: HOSPADM

## 2024-05-29 RX ORDER — HEPARIN SODIUM 1000 [USP'U]/ML
INJECTION, SOLUTION INTRAVENOUS; SUBCUTANEOUS PRN
Status: DISCONTINUED | OUTPATIENT
Start: 2024-05-29 | End: 2024-05-29 | Stop reason: HOSPADM

## 2024-05-29 RX ADMIN — DIPHENHYDRAMINE HYDROCHLORIDE 25 MG: 25 TABLET ORAL at 15:53

## 2024-05-29 NOTE — PROGRESS NOTES
Spoke with patient's dialysis clinic [facility] per Dr. Kumar, asked if they would prefer patient get dialysis on Thursday as opposed to Friday due to potassium level (5.9). Dialysis RN said she would reach out to Dr. Nguyen.     When patient was told this information, she said \"tell them I'll be there on Friday\". Asked patient if she would be agreeable to dialysis tomorrow if appointment becomes available because of her potassium level, and patient said \"I don't need it, my levels are fine.\"    [facility] reported that they will reach out to Dr. Nguyen regardless of patient refusing dialysis until Friday. Dr. Kumar notified of all updates    Electronically signed by Hanh Ruiz RN on 5/29/2024 at 2:40 PM

## 2024-05-29 NOTE — PROGRESS NOTES
R radial site soft, dressing intact. TR band removed per orders. Wrist immobilizer in place. Tolerated PO fluids and meal. Given PRN benadryl per orders for itching.    IV removed. Patient/family given discharge instructions. Patient/family verbalize understanding of discharge instructions, all questions addressed, copy given to patient/family. Pt transferred to vehicle via wheelchair to be discharged home with family.

## 2024-05-29 NOTE — DISCHARGE INSTRUCTIONS
[facility]  Cardiovascular Special Procedures  Fistulagram  Patient Discharge Instructions    Go home and rest for the remainder of the day.      The dressing over the site may be removed in 12 hours, or by dialysis personnel.    If bleeding occurs, hold pressure on the site until the bleeding stops. If bleeding does not stop, continue to hold pressure and report to the nearest Emergency Room.  DO NOT REMOVE THE DRESSING!    Do not take aspirin, ibuprofen, vitamin E, or blood thinning products until approved by your primary care physician. You make take Tylenol for discomfort.     Keep your next scheduled dialysis appointment.    Unless your fistula is occluded, it may be used now.     [facility]  Cardiovascular Special Procedures  General Discharge Instructions  __X__ You may be drowsy or lightheaded after receiving sedation. DO NOT operate a vehicle (automobile, bicycle, motorcycle, machinery, or power tools), make any important decisions or sign any important/legal documents, or drink alcoholic beverages for the next 24 hours  __X__ We strongly suggest that a responsible adult be with you for the next 24 hours for your protection and safety  ____ If the intravenous catheter site is painful, apply warm wet compresses on the site until the soreness is relieved and elevate the arm above the heart.  Call your physician if no improvement in 2 to 3 days    DIETARY INSTRUCTIONS:  ____ Drink extra fluids over the next 24 hours (If not contraindicated by illness or by physician order)  ____ Start with clear liquids and progress to normal diet as you feel like eating.  If you experience nausea or repeated episodes of vomiting, which persist beyond 12-24 hours, notify your doctor        ____ Resume your previous diet    ACTIVITY INSTRUCTIONS:  ____ See other instructions  ____ No special instructions  ____ Rest for 24 hours    ____ Up as tolerated  __X__ Increase activity as tolerated    Wound/Dressing

## 2024-06-03 ENCOUNTER — TELEPHONE (OUTPATIENT)
Dept: VASCULAR SURGERY | Age: 54
End: 2024-06-03

## 2024-06-03 NOTE — TELEPHONE ENCOUNTER
Patient called to reschedule her post op appt with Dr. Kumar. Her surgery was originally set for 6/5 but took place on 5/29.     Patient states she is having knee surgery on 6/11 & would like her post op appt to be scheduled prior to that, which would be this week.     Please call patient to discuss rescheduling post op appt... I was not sure if I could reschedule her since it would put her at just one week post operatively.

## 2024-06-03 NOTE — TELEPHONE ENCOUNTER
Spoke with Ms. Chiang, unable to get her in before her 6/11 surgery d/t her having other appts scheduled. We did get her appt switched to the 6/18 at 2:45p after her PT appt.

## 2024-06-17 LAB — ECHO BSA: 2 M2

## 2024-06-17 NOTE — PRE SEDATION
Sedation Plan  ASA: class 4 - patient with severe systemic disease that is a constant threat to life     Mallampati class: II - soft palate, uvula, fauces visible.    Sedation plan: moderate (conscious sedation)    Risks, benefits, and alternatives discussed with patient.        Immediate reassessment prior to sedation:  Patient's status reviewed and vital signs assessed; acceptable to perform procedure and proceed to administer sedation as planned.    Pt has slowly maturing right brachiocephalic AVF.  Recent duplex showed stenosis of the perianastomotic area.  She now presents for fistulogram.    Heart: Regular rate and rhythm  Lungs: Clear to auscultation bilaterally.

## 2024-06-18 PROBLEM — M17.11 OSTEOARTHRITIS OF RIGHT KNEE: Status: ACTIVE | Noted: 2021-08-05

## 2024-06-20 NOTE — BRIEF OP NOTE
Brief Postoperative Note      Patient: Scottie Chiang  YOB: 1970  MRN: [id number]    Date of Procedure: 5/29/2024    Pre-Op Diagnosis Codes:     * ESRD (end stage renal disease) (HCC) [N18.6]    Post-Op Diagnosis: Same       Procedure:  1. Ultrasound-guided access right radial artery  2. Right arm fistulogram  3. Balloon angioplasty of perianastomotic and peripheral venous segments with 5 mm x 150 mm drug-eluting balloon ([place])    Surgeon(s):  Valeria Kumar MD    Assistant:  * No surgical staff found *    Anesthesia: IV Sedation    Estimated Blood Loss (mL): Minimal    Complications: None    Specimens:   * No specimens in log *    Implants:  * No implants in log *      Drains: * No LDAs found *    Findings:  Infection Present At Time Of Surgery (PATOS) (choose all levels that have infection present):  No infection present  Other Findings: see op note in Cardiology/Procedures tab.    Electronically signed by Valeria Kumar MD on 6/20/2024 at 8:52 AM

## 2024-06-20 NOTE — H&P
butalbital-acetaminophen-caffeine (FIORICET, ESGIC) -40 MG per tablet Take 1 tablet by mouth every 6 hours as needed for Headaches or Migraine 1/24/24 5/21/24  Bhavin Nguyen MD   pantoprazole (PROTONIX) 40 MG tablet Take 1 tablet by mouth every morning (before breakfast) 11/1/23   Charu Nuñez PA-C   midodrine (PROAMATINE) 10 MG tablet Take 1 tablet by mouth three times a week As needed for IDH during dialysis. Bring bottle to each dialysis treatment. Take 1 tablet for SBP < 100 mmHg 10/20/23   Charu Nuñez PA-C   naloxone 4 MG/0.1ML LIQD nasal spray 1 spray by Nasal route as needed for Opioid Reversal 10/2/23   Faby Lance, APRN - CNP   CALCITRIOL PO Take 3 capsules by mouth once a week    Provider, MD Corey   ergocalciferol (ERGOCALCIFEROL) 1.25 MG (02847 UT) capsule Take 1 capsule by mouth once a week  Patient taking differently: Take 1 capsule by mouth every 30 days 7/7/23   Charu Nuñez PA-C   atorvastatin (LIPITOR) 40 MG tablet Take 1 tablet by mouth daily  Patient taking differently: Take 2 tablets by mouth daily 1/27/23   Charu Nuñez PA-C   carvedilol (COREG) 12.5 MG tablet TAKE 1 TABLETS BY MOUTH TWICE A DAY WITH MEALS 11/7/22   Bhavin Nguyen MD        Allergies   Adhesive tape    Social History     Social History       Tobacco History       Smoking Status  Never      Smokeless Tobacco Use  Never              Alcohol History       Alcohol Use Status  Not Currently              Drug Use       Drug Use Status  Not Currently              Sexual Activity       Sexually Active  Not Currently                    Family History   No family history on file.    Review of Systems   Pertinent items in HPI    Physical Exam   BP (!) 147/79   Pulse 64   Temp 98.1 °F (36.7 °C) (Oral)   Resp 11   Ht 1.549 m (5' 1\")   Wt 92.5 kg (204 lb)   SpO2 99%   BMI 38.55 kg/m²     Physical Exam  General:  AAO X 3.  NAD.  WD/WN.  Appears in usual state of health  Heart:  RRR no m/r/g  Lungs:

## 2024-07-24 ENCOUNTER — OFFICE VISIT (OUTPATIENT)
Dept: PAIN MANAGEMENT | Age: 54
End: 2024-07-24
Payer: COMMERCIAL

## 2024-07-24 VITALS
HEART RATE: 73 BPM | SYSTOLIC BLOOD PRESSURE: 133 MMHG | DIASTOLIC BLOOD PRESSURE: 68 MMHG | OXYGEN SATURATION: 95 % | BODY MASS INDEX: 38.43 KG/M2 | WEIGHT: 203.4 LBS

## 2024-07-24 DIAGNOSIS — M47.817 LUMBOSACRAL SPONDYLOSIS WITHOUT MYELOPATHY: ICD-10-CM

## 2024-07-24 DIAGNOSIS — I50.43 CHF (CONGESTIVE HEART FAILURE), NYHA CLASS I, ACUTE ON CHRONIC, COMBINED (HCC): ICD-10-CM

## 2024-07-24 DIAGNOSIS — G89.4 CHRONIC PAIN SYNDROME: ICD-10-CM

## 2024-07-24 DIAGNOSIS — M47.812 CERVICAL SPONDYLOSIS: ICD-10-CM

## 2024-07-24 DIAGNOSIS — Z96.651 S/P TOTAL KNEE ARTHROPLASTY, RIGHT: ICD-10-CM

## 2024-07-24 DIAGNOSIS — Z99.2 ESRD (END STAGE RENAL DISEASE) ON DIALYSIS (HCC): ICD-10-CM

## 2024-07-24 DIAGNOSIS — Z51.81 ENCOUNTER FOR THERAPEUTIC DRUG MONITORING: ICD-10-CM

## 2024-07-24 DIAGNOSIS — G43.909 EPISODIC MIGRAINE: ICD-10-CM

## 2024-07-24 DIAGNOSIS — N18.6 ESRD (END STAGE RENAL DISEASE) ON DIALYSIS (HCC): ICD-10-CM

## 2024-07-24 DIAGNOSIS — M17.11 PRIMARY LOCALIZED OSTEOARTHROSIS OF THE KNEE, RIGHT: Primary | ICD-10-CM

## 2024-07-24 PROCEDURE — 99214 OFFICE O/P EST MOD 30 MIN: CPT | Performed by: NURSE PRACTITIONER

## 2024-07-24 PROCEDURE — 3017F COLORECTAL CA SCREEN DOC REV: CPT | Performed by: NURSE PRACTITIONER

## 2024-07-24 PROCEDURE — G8417 CALC BMI ABV UP PARAM F/U: HCPCS | Performed by: NURSE PRACTITIONER

## 2024-07-24 PROCEDURE — G8427 DOCREV CUR MEDS BY ELIG CLIN: HCPCS | Performed by: NURSE PRACTITIONER

## 2024-07-24 PROCEDURE — 3075F SYST BP GE 130 - 139MM HG: CPT | Performed by: NURSE PRACTITIONER

## 2024-07-24 PROCEDURE — 1036F TOBACCO NON-USER: CPT | Performed by: NURSE PRACTITIONER

## 2024-07-24 PROCEDURE — 3078F DIAST BP <80 MM HG: CPT | Performed by: NURSE PRACTITIONER

## 2024-07-24 RX ORDER — OXYCODONE AND ACETAMINOPHEN 7.5; 325 MG/1; MG/1
1 TABLET ORAL EVERY 6 HOURS PRN
Qty: 112 TABLET | Refills: 0 | Status: SHIPPED | OUTPATIENT
Start: 2024-07-24 | End: 2024-08-21

## 2024-07-24 RX ORDER — ASPIRIN 81 MG/1
81 TABLET ORAL 2 TIMES DAILY
COMMUNITY

## 2024-07-24 RX ORDER — METHOCARBAMOL 500 MG/1
TABLET, FILM COATED ORAL
COMMUNITY
Start: 2024-07-18

## 2024-07-24 NOTE — PROGRESS NOTES
torsemide (DEMADEX) 100 MG tablet TAKE 1 TABLET BY MOUTH 2 TIMES DAILY 60 tablet 11    vitamin B-2 (RIBOFLAVIN) 100 MG TABS tablet Take 1 tablet by mouth 2 times daily      docusate sodium (COLACE) 100 MG capsule Take 1 capsule by mouth daily 90 capsule 1    B Complex-C-Folic Acid (DIALYVITE 800) 0.8 MG TABS Take 1 tablet by mouth daily 90 tablet 3    memantine (NAMENDA) 5 MG tablet Take by mouth      metoclopramide (REGLAN) 10 MG tablet Take 1 tablet by mouth daily for 10 days 10 tablet 0    SUMAtriptan (IMITREX) 25 MG tablet Take 1 tablet by mouth once as needed for Migraine      levETIRAcetam (KEPPRA) 500 MG tablet Take 1 tablet by mouth daily 60 tablet 3    levETIRAcetam (KEPPRA) 250 MG tablet Take 1 tablet by mouth three times a week 60 tablet 3    topiramate (TOPAMAX) 25 MG tablet Take 1 tablet by mouth daily      gabapentin (NEURONTIN) 300 MG capsule Take 1 capsule by mouth in the morning and at bedtime for 90 days. 90 capsule 1    butalbital-acetaminophen-caffeine (FIORICET, ESGIC) -40 MG per tablet Take 1 tablet by mouth every 6 hours as needed for Headaches or Migraine 120 tablet 0    pantoprazole (PROTONIX) 40 MG tablet Take 1 tablet by mouth every morning (before breakfast) 90 tablet 1    midodrine (PROAMATINE) 10 MG tablet Take 1 tablet by mouth three times a week As needed for IDH during dialysis. Bring bottle to each dialysis treatment. Take 1 tablet for SBP < 100 mmHg 30 tablet 2    naloxone 4 MG/0.1ML LIQD nasal spray 1 spray by Nasal route as needed for Opioid Reversal 1 each 0    CALCITRIOL PO Take 3 capsules by mouth once a week      ergocalciferol (ERGOCALCIFEROL) 1.25 MG (06806 UT) capsule Take 1 capsule by mouth once a week (Patient taking differently: Take 1 capsule by mouth every 30 days) 24 capsule 1    atorvastatin (LIPITOR) 40 MG tablet Take 1 tablet by mouth daily (Patient taking differently: Take 2 tablets by mouth daily) 90 tablet 1    carvedilol (COREG) 12.5 MG tablet TAKE 1

## 2024-07-26 PROBLEM — I50.32 CHRONIC DIASTOLIC (CONGESTIVE) HEART FAILURE (HCC): Status: ACTIVE | Noted: 2018-11-09

## 2024-07-26 PROBLEM — G92.9 ENCEPHALOPATHY, TOXIC: Status: ACTIVE | Noted: 2024-06-14

## 2024-07-26 PROBLEM — R53.1 WEAKNESS: Status: ACTIVE | Noted: 2024-06-14

## 2024-07-30 ENCOUNTER — OFFICE VISIT (OUTPATIENT)
Dept: VASCULAR SURGERY | Age: 54
End: 2024-07-30
Payer: COMMERCIAL

## 2024-07-30 VITALS
HEART RATE: 77 BPM | HEIGHT: 61 IN | SYSTOLIC BLOOD PRESSURE: 114 MMHG | OXYGEN SATURATION: 90 % | DIASTOLIC BLOOD PRESSURE: 66 MMHG | TEMPERATURE: 98.1 F | BODY MASS INDEX: 38.51 KG/M2 | WEIGHT: 204 LBS

## 2024-07-30 DIAGNOSIS — N18.6 ESRD (END STAGE RENAL DISEASE) (HCC): Primary | ICD-10-CM

## 2024-07-30 PROCEDURE — G8427 DOCREV CUR MEDS BY ELIG CLIN: HCPCS | Performed by: SURGERY

## 2024-07-30 PROCEDURE — 3074F SYST BP LT 130 MM HG: CPT | Performed by: SURGERY

## 2024-07-30 PROCEDURE — G8417 CALC BMI ABV UP PARAM F/U: HCPCS | Performed by: SURGERY

## 2024-07-30 PROCEDURE — 3078F DIAST BP <80 MM HG: CPT | Performed by: SURGERY

## 2024-07-30 PROCEDURE — 99214 OFFICE O/P EST MOD 30 MIN: CPT | Performed by: SURGERY

## 2024-07-30 PROCEDURE — 3017F COLORECTAL CA SCREEN DOC REV: CPT | Performed by: SURGERY

## 2024-07-30 PROCEDURE — 1036F TOBACCO NON-USER: CPT | Performed by: SURGERY

## 2024-07-30 RX ORDER — NIFEDIPINE 30 MG
60 TABLET, EXTENDED RELEASE ORAL DAILY
COMMUNITY

## 2024-07-30 NOTE — PROGRESS NOTES
2024     Scottie Chiang (:  1970) is a 54 y.o. female,Established patient, here for evaluation of the following chief complaint(s):  Post-Op Check (1st post-op s/p right fistulagram )        ASSESSMENT/PLAN:  1. ESRD (end stage renal disease) (HCC)  Modest improvement in the size of the AV fistula following angioplasty, though she does have a good thrill.  Continue to exercise hand to enhance maturation.  Recheck hemodialysis duplex study to evaluate size and volume flow.  Will likely offer her repeat balloon angioplasty of the fistula to promote maturation.    SUBJECTIVE/OBJECTIVE:  Right brachiocephalic AVF 2024  Series returns status post balloon angioplasty 2024  Since I last saw her, she had a right knee replacement.  She recovered quite well from this.  She reports she is feeling much better.  Nausea is significantly improved.  Overall, she looks much better today, and is in much better spirits.   On exam:  AAO x 3.  Very pleasant and positive appearing.     Right arm and hand warm and well-perfused with palpable radial pulse.  Pulse augments with brief compression of AVF.  Good thrill and bruit in cephalic vein--improved from pre-angioplasty exam.  Still a little smaller than expected, however.           Physical Exam  Vitals:    24 1000   BP: 114/66   Site: Left Upper Arm   Position: Sitting   Pulse: 77   Temp: 98.1 °F (36.7 °C)   TempSrc: Infrared   SpO2: 90%   Weight: 92.5 kg (204 lb)   Height: 1.549 m (5' 1\")       An electronic signature was used to authenticate this note.    --Valeria Kumar MD

## 2024-08-14 ENCOUNTER — HOSPITAL ENCOUNTER (OUTPATIENT)
Dept: VASCULAR LAB | Age: 54
Discharge: HOME | End: 2024-08-16
Attending: SURGERY
Payer: COMMERCIAL

## 2024-08-14 DIAGNOSIS — N18.6 ESRD (END STAGE RENAL DISEASE) (HCC): ICD-10-CM

## 2024-08-14 LAB
VAS RIGHT ARTERIAL PROX ANASTOMOSIS AVF EDV: 158 CM/S
VAS RIGHT ARTERIAL PROX ANASTOMOSIS AVF PSV: 576 CM/S
VAS RIGHT AVF AVG DIAMETER 1: 0.5 CM
VAS RIGHT AVF AVG DIAMETER 2: 0.57 CM
VAS RIGHT AVF AVG DIAMETER 3: 0.29 CM
VAS RIGHT AVF AVG DIST OUTFLOW VOL FLOW: 157 ML/MIN
VAS RIGHT AVF AVG INFLOW VOL FLOW: 310 ML/MIN
VAS RIGHT AVF AVG MID OUTFLOW VOL FLOW: 393 ML/MIN
VAS RIGHT AVF AVG OUTFLOW VESSEL NAME: NORMAL
VAS RIGHT AVF AVG PROX ANAST VOL FLOW: 744 ML/MIN
VAS RIGHT AVF AVG PROX OUTFLOW VOL FLOW: 569 ML/MIN
VAS RIGHT BRACHIAL A DIST EDV: 67.4 CM/S
VAS RIGHT BRACHIAL A DIST PSV: 142 CM/S
VAS RIGHT DIST OUTFLOW AVF EDV: 67.8 CM/S
VAS RIGHT DIST OUTFLOW AVF PSV: 118 CM/S
VAS RIGHT INFLOW ARTERY AVF EDV: 67.4 CM/S
VAS RIGHT INFLOW ARTERY AVF PSV: 142 CM/S
VAS RIGHT MID OUTFLOW AVF EDV: 50.8 CM/S
VAS RIGHT MID OUTFLOW AVF PSV: 103 CM/S
VAS RIGHT PROX OUTFLOW AVF EDV: 77.7 CM/S
VAS RIGHT PROX OUTFLOW AVF PSV: 199 CM/S

## 2024-08-14 PROCEDURE — 93990 DOPPLER FLOW TESTING: CPT

## 2024-08-14 PROCEDURE — 93990 DOPPLER FLOW TESTING: CPT | Performed by: SURGERY

## 2024-08-15 ENCOUNTER — TELEPHONE (OUTPATIENT)
Dept: VASCULAR SURGERY | Age: 54
End: 2024-08-15

## 2024-08-15 NOTE — TELEPHONE ENCOUNTER
Patient states she completed the ultrasound that was requested of the right forearm area yesterday, 08/14/2024.     Patient would like to know if she needs to schedule a follow up appointment now or a balloon procedure.     Patient can be reached at [phone].

## 2024-08-15 NOTE — TELEPHONE ENCOUNTER
Spoke with Ms. Mariia informing her fistula looks better but Dr. Kumar would like to proceed with fistulagram as discussed previously. Ms. Chiang agreed and we got her scheduled for right fistulagram on 8/21 at 1:30p with arrival time of 12p. Also informed her to hold her demadex that morning but could resume it after procedure.

## 2024-08-15 NOTE — TELEPHONE ENCOUNTER
----- Message from Dr. Valeria Kumar MD sent at 8/15/2024  8:53 AM EDT -----  Let her know that duplex shows that the fistula looks much better, but still needs a little help.  I had offered her repeat fistulagram at last visit, and I would like to proceed with that.

## 2024-08-19 DIAGNOSIS — G89.4 CHRONIC PAIN SYNDROME: ICD-10-CM

## 2024-08-19 DIAGNOSIS — N18.6 ESRD (END STAGE RENAL DISEASE) ON DIALYSIS (HCC): ICD-10-CM

## 2024-08-19 DIAGNOSIS — Z96.651 S/P TOTAL KNEE ARTHROPLASTY, RIGHT: ICD-10-CM

## 2024-08-19 DIAGNOSIS — I50.43 CHF (CONGESTIVE HEART FAILURE), NYHA CLASS I, ACUTE ON CHRONIC, COMBINED (HCC): ICD-10-CM

## 2024-08-19 DIAGNOSIS — Z99.2 ESRD (END STAGE RENAL DISEASE) ON DIALYSIS (HCC): ICD-10-CM

## 2024-08-19 DIAGNOSIS — M17.11 PRIMARY LOCALIZED OSTEOARTHROSIS OF THE KNEE, RIGHT: ICD-10-CM

## 2024-08-19 DIAGNOSIS — Z51.81 ENCOUNTER FOR THERAPEUTIC DRUG MONITORING: ICD-10-CM

## 2024-08-19 DIAGNOSIS — M47.817 LUMBOSACRAL SPONDYLOSIS WITHOUT MYELOPATHY: ICD-10-CM

## 2024-08-19 DIAGNOSIS — M47.812 CERVICAL SPONDYLOSIS: ICD-10-CM

## 2024-08-19 DIAGNOSIS — G43.909 EPISODIC MIGRAINE: ICD-10-CM

## 2024-08-19 NOTE — ANESTHESIA POSTPROCEDURE EVALUATION
Department of Anesthesiology  Postprocedure Note    Patient: Anisa Hart  MRN: [id number]  YOB: 1970  Date of evaluation: 2/8/2022  Time:  9:45 AM     Procedure Summary     Date: 02/08/22 Room / Location: [address] [place] [address]    Anesthesia Start: 4319 Anesthesia Stop: 9218    Procedure: LAPAROSCOPIC PERITONEAL DIALYSIS CATHETER PLACEMENT (N/A ) Diagnosis:       ESRD (end stage renal disease) ([address]      (ESRD (end stage renal disease) ([facility] [address] [N18.6])    Surgeons: Gautam Lockwood DO Responsible Provider: Gardenia Merino MD    Anesthesia Type: general ASA Status: 4          Anesthesia Type: general    Otto Phase I: Otto Score: 10    Otto Phase II:      Last vitals: Reviewed and per EMR flowsheets.        Anesthesia Post Evaluation    Patient location during evaluation: PACU  Patient participation: complete - patient participated  Level of consciousness: awake and alert  Airway patency: patent  Nausea & Vomiting: no nausea and no vomiting  Complications: no  Cardiovascular status: hemodynamically stable  Respiratory status: acceptable  Hydration status: euvolemic To get better and follow your care plan as instructed.

## 2024-08-20 RX ORDER — OXYCODONE AND ACETAMINOPHEN 7.5; 325 MG/1; MG/1
1 TABLET ORAL EVERY 6 HOURS PRN
Qty: 112 TABLET | Refills: 0 | OUTPATIENT
Start: 2024-08-20 | End: 2024-09-17

## 2024-08-20 NOTE — PROGRESS NOTES
Called patient about procedure.Told to be here at 12 noon for procedure at 1330. NPO after midnight, but can take morning medication with sips of water, patient stated they are not on  blood thinners. To have a responsible adult be with patient take them home and stay with them afterwards, if they do not get admitted to hosptial. And if available bring current list of medications. No other questions or concerns.

## 2024-08-21 ENCOUNTER — HOSPITAL ENCOUNTER (OUTPATIENT)
Age: 54
Setting detail: OUTPATIENT SURGERY
Discharge: HOME | End: 2024-08-21
Attending: SURGERY | Admitting: SURGERY
Payer: COMMERCIAL

## 2024-08-21 VITALS
SYSTOLIC BLOOD PRESSURE: 160 MMHG | TEMPERATURE: 97.8 F | HEIGHT: 61 IN | RESPIRATION RATE: 16 BRPM | DIASTOLIC BLOOD PRESSURE: 67 MMHG | WEIGHT: 214 LBS | HEART RATE: 71 BPM | OXYGEN SATURATION: 100 % | BODY MASS INDEX: 40.4 KG/M2

## 2024-08-21 DIAGNOSIS — N18.6 ESRD (END STAGE RENAL DISEASE) (HCC): ICD-10-CM

## 2024-08-21 LAB
ANION GAP SERPL CALCULATED.3IONS-SCNC: 13 MMOL/L (ref 3–16)
BUN SERPL-MCNC: 57 MG/DL (ref 7–20)
CALCIUM SERPL-MCNC: 9.2 MG/DL (ref 8.3–10.6)
CHLORIDE SERPL-SCNC: 98 MMOL/L (ref 99–110)
CO2 SERPL-SCNC: 28 MMOL/L (ref 21–32)
CREAT SERPL-MCNC: 6.9 MG/DL (ref 0.6–1.1)
DEPRECATED RDW RBC AUTO: 18.7 % (ref 12.4–15.4)
GFR SERPLBLD CREATININE-BSD FMLA CKD-EPI: 7 ML/MIN/{1.73_M2}
GLUCOSE SERPL-MCNC: 138 MG/DL (ref 70–99)
HCT VFR BLD AUTO: 34.6 % (ref 36–48)
HGB BLD-MCNC: 11.1 G/DL (ref 12–16)
INR PPP: 1.08 (ref 0.85–1.15)
MCH RBC QN AUTO: 32.3 PG (ref 26–34)
MCHC RBC AUTO-ENTMCNC: 32 G/DL (ref 31–36)
MCV RBC AUTO: 100.7 FL (ref 80–100)
PLATELET # BLD AUTO: 165 K/UL (ref 135–450)
PMV BLD AUTO: 9.3 FL (ref 5–10.5)
POTASSIUM SERPL-SCNC: 5.7 MMOL/L (ref 3.5–5.1)
PROTHROMBIN TIME: 14.2 SEC (ref 11.9–14.9)
RBC # BLD AUTO: 3.44 M/UL (ref 4–5.2)
SODIUM SERPL-SCNC: 139 MMOL/L (ref 136–145)
WBC # BLD AUTO: 4.8 K/UL (ref 4–11)

## 2024-08-21 PROCEDURE — 37246 TRLUML BALO ANGIOP 1ST ART: CPT | Performed by: SURGERY

## 2024-08-21 PROCEDURE — 36902 INTRO CATH DIALYSIS CIRCUIT: CPT | Performed by: SURGERY

## 2024-08-21 PROCEDURE — 7100000010 HC PHASE II RECOVERY - FIRST 15 MIN: Performed by: SURGERY

## 2024-08-21 PROCEDURE — 36907 BALO ANGIOP CTR DIALYSIS SEG: CPT | Performed by: SURGERY

## 2024-08-21 PROCEDURE — 99153 MOD SED SAME PHYS/QHP EA: CPT | Performed by: SURGERY

## 2024-08-21 PROCEDURE — 2500000003 HC RX 250 WO HCPCS: Performed by: SURGERY

## 2024-08-21 PROCEDURE — 6360000002 HC RX W HCPCS: Performed by: SURGERY

## 2024-08-21 PROCEDURE — 85610 PROTHROMBIN TIME: CPT

## 2024-08-21 PROCEDURE — 36140 INTRO NDL ICATH UPR/LXTR ART: CPT | Performed by: SURGERY

## 2024-08-21 PROCEDURE — C1894 INTRO/SHEATH, NON-LASER: HCPCS | Performed by: SURGERY

## 2024-08-21 PROCEDURE — C1887 CATHETER, GUIDING: HCPCS | Performed by: SURGERY

## 2024-08-21 PROCEDURE — 80048 BASIC METABOLIC PNL TOTAL CA: CPT

## 2024-08-21 PROCEDURE — C1769 GUIDE WIRE: HCPCS | Performed by: SURGERY

## 2024-08-21 PROCEDURE — 76937 US GUIDE VASCULAR ACCESS: CPT

## 2024-08-21 PROCEDURE — 85027 COMPLETE CBC AUTOMATED: CPT

## 2024-08-21 PROCEDURE — C2623 CATH, TRANSLUMIN, DRUG-COAT: HCPCS | Performed by: SURGERY

## 2024-08-21 PROCEDURE — C1725 CATH, TRANSLUMIN NON-LASER: HCPCS | Performed by: SURGERY

## 2024-08-21 PROCEDURE — 7100000011 HC PHASE II RECOVERY - ADDTL 15 MIN: Performed by: SURGERY

## 2024-08-21 PROCEDURE — 6360000004 HC RX CONTRAST MEDICATION: Performed by: SURGERY

## 2024-08-21 PROCEDURE — 2709999900 HC NON-CHARGEABLE SUPPLY: Performed by: SURGERY

## 2024-08-21 PROCEDURE — 99152 MOD SED SAME PHYS/QHP 5/>YRS: CPT | Performed by: SURGERY

## 2024-08-21 RX ORDER — HEPARIN SODIUM 1000 [USP'U]/ML
INJECTION, SOLUTION INTRAVENOUS; SUBCUTANEOUS PRN
Status: DISCONTINUED | OUTPATIENT
Start: 2024-08-21 | End: 2024-08-21 | Stop reason: HOSPADM

## 2024-08-21 RX ORDER — IODIXANOL 270 MG/ML
INJECTION, SOLUTION INTRAVASCULAR PRN
Status: DISCONTINUED | OUTPATIENT
Start: 2024-08-21 | End: 2024-08-21 | Stop reason: HOSPADM

## 2024-08-21 RX ORDER — LIDOCAINE HYDROCHLORIDE 10 MG/ML
INJECTION, SOLUTION INFILTRATION; PERINEURAL PRN
Status: DISCONTINUED | OUTPATIENT
Start: 2024-08-21 | End: 2024-08-21 | Stop reason: HOSPADM

## 2024-08-21 RX ORDER — MIDAZOLAM HYDROCHLORIDE 1 MG/ML
INJECTION INTRAMUSCULAR; INTRAVENOUS PRN
Status: DISCONTINUED | OUTPATIENT
Start: 2024-08-21 | End: 2024-08-21 | Stop reason: HOSPADM

## 2024-08-21 NOTE — DISCHARGE INSTRUCTIONS
[facility]  Cardiovascular Special Procedures   Radial/Brachial Access Angiogram  Patient Discharge Instructions      Day 1 (Procedure Day):  Rest for the remainder of the day.  Avoid excessive use of the affected arm.  Do not drive a car.  Do not be alone.  Leave dressing intact.    Day 2:  You may drive a car, unless otherwise directed by physician.  Remove dressing.  You may bathe/shower, but wash gently around the puncture site and pat dry.  Place new band-aid on site daily until skin heals.      Things to Avoid:  You may not do any strenuous exercises for one week. (ex: golfing, bowling, tennis, vacuum, snow removal, jogging, and aerobic exercises).  No hot tubs, baths, or pools for 3-5 days.  Do not lift anything over 3-5 pounds for 3 days, with affected arm.  No lotions, powders, or ointments near site for 5 days.    Things to watch for:  You may have a small lump or a bruise.  This is common and will go away.  If bleeding occurs from the site, or a hematoma (lump) begins to increase in size, immediately apply pressure directly over the site and call 911 to return to the hospital.  Report any coolness or numbness in the arm or hand immediately.  Report any excessive pain of the arm or hand immediately.  If mild discomfort occurs at the puncture site you may place an ice pack on the site at 15 minute intervals..   Watch for signs and symptoms of an infection at the arm site (fever, local pain, redness, warmth or discharge from the puncture site), call your physician immediately if any develop.    Other:  No work/school for 72 hours if you received a stent; otherwise you may go back to work the following day (as long as your job does not interfere with the lifting restrictions).      Any Questions please call us at [phone] (between 7am- 5pm, Mon-Fri).  After hours you should contact your physician.          [facility]  Cardiovascular Special Procedures  General Discharge  Instructions    PROCEDURE: Fistulagram    __x__ You may be drowsy or lightheaded after receiving sedation. DO NOT operate a vehicle (automobile, bicycle, motorcycle, machinery, or power tools), make any important decisions or sign any important/legal documents, or drink alcoholic beverages for the next 24 hours  __x__ We strongly suggest that a responsible adult be with you for the next 24 hours for your protection and safety  __x__ If the intravenous catheter site is painful, apply warm wet compresses on the site until the soreness is relieved and elevate the arm above the heart.  Call your physician if no improvement in 2 to 3 days    DIETARY INSTRUCTIONS:    ____ Drink extra fluids over the next 24 hours (If not contraindicated by illness or by physician order)  ____ Start with clear liquids and progress to normal diet as you feel like eating.  If you experience nausea or repeated episodes of vomiting, which persist beyond 12-24 hours, notify your doctor        ___x_ Resume your previous diet      FOLLOW-UP APPOINTMENT    Follow up with MD as directed.    Belongings returned to patient and/or family: Yes.    The Discharge Instructions have been explained to me.  I understand and can verbalize these instructions.          Discharge Instructions:    Diet:   You may resume a regular diet.    Wound Care:   Skin glue was used to cover your incision(s). It will fall off on its own in about 10 days. You may shower, but do not scrub the incision sites directly or soak (tub, pool, etc.).    Activity:   No heavy lifting greater than a milk jug until follow up.    Pain management:   Unless informed of any restrictions by your primary care physician, please use your preferred over-the-counter pain reliever as your primary pain medication.    Return Precautions:   Call/ Return to ED for increased redness, worsening pain, drainage from wound, fevers, or any other concerns about your incision or post op course.    Follow up  with Dr. Kumar in 2 weeks. Please call [phone] to schedule your appointment.

## 2024-08-21 NOTE — PROGRESS NOTES
Right radial site clean, dry and intact. PIV removed. Patient/family given discharge instructions. Patient/family verbalize understanding of discharge instructions, all questions addressed, copy given to patient/family. Pt transferred to vehicle via wheelchair to be discharged home with family.

## 2024-08-21 NOTE — BRIEF OP NOTE
Brief Postoperative Note      Patient: Scottie Chiang  YOB: 1970  MRN: [id number]    Date of Procedure: 8/21/2024    Pre-Op Diagnosis Codes:      * ESRD (end stage renal disease) (HCC) [N18.6]    Post-Op Diagnosis: Same       Procedure(s):  Fistulogram right    Surgeon(s):  Valeria Kumar MD    Assistant:  * No surgical staff found *    Anesthesia: IV Sedation    Estimated Blood Loss (mL): Minimal    Complications: None    Specimens:   * No specimens in log *    Implants:  * No implants in log *      Drains: * No LDAs found *    Findings:  Infection Present At Time Of Surgery (PATOS) (choose all levels that have infection present):  No infection present  Other Findings: Right brachiocephalic fistula angioplasty at the cephalic vein and distal end of the brachial a.     Electronically signed by Abel Stafford DO on 8/21/2024 at 2:52 PM

## 2024-08-21 NOTE — H&P
Update History & Physical    The patient's History and Physical of August 15 was reviewed with the patient and I examined the patient. There was no change. The surgical site was confirmed by the patient and me.       Plan: The risks, benefits, expected outcome, and alternative to the recommended procedure have been discussed with the patient. Patient understands and wants to proceed with the procedure.     Electronically signed by Abel Stafford DO on 8/21/2024 at 2:50 PM

## 2024-08-22 LAB — ECHO BSA: 2.04 M2

## 2024-08-26 ENCOUNTER — TELEPHONE (OUTPATIENT)
Dept: VASCULAR SURGERY | Age: 54
End: 2024-08-26

## 2024-08-26 NOTE — TELEPHONE ENCOUNTER
Spoke with Ms. Chiang she states she is doing fine since procedure, no issues. We were unable to schedule post-op appt d/t her being at dentist. Attempted to call back and got voicemail and left message to call office to schedule post-op appt.

## 2024-09-04 NOTE — PROGRESS NOTES
2024    Scottie Chiang (:  1970) is a 54 y.o. female,Established patient, here for evaluation of the following chief complaint(s):  Follow-up (4w f/u s/p fistulagram, HD scan prior w/stephen)    ASSESSMENT/PLAN:  1. ESRD (end stage renal disease) (HCC)  She is agreeable to repeat fistulogram to promote maturation of the fistula.  I think we can continue providing this with balloon assisted maturation.  She will be scheduled 2024.    No follow-ups on file.       SUBJECTIVE/OBJECTIVE:  S/p Right brachiocephalic AVF 2024   R arm fistulagram and PTA 3/28/2024    She returns today in follow-up of AV fistula and fistulogram.  She continues to have significant GI issues and nausea.  She is on Zofran daily.  She is having a lot of pain related to her knee.  She had a repeat duplex done today which shows marginal flow volumes, though the fistula continues to improve in size and quality.    Exam:  General:  AAo x 3.  NAD.  Tearful, frustrated--mostly at constant medical issues including knee pain and nausea.  Obviously does not feel well.  Heart regular rate and rhythm  Lungs clear to auscultation bilaterally  Right upper arm with good thrill in AV fistula though it remains a little small.  Hand is warm and well-perfused with no ischemia.  Palpable radial pulse.    Physical Exam  Vitals:    24 1113   BP: (!) 156/79   Site: Left Upper Arm   Position: Sitting   Pulse: 66   Temp: 97.8 °F (36.6 °C)   TempSrc: Infrared   SpO2: 92%   Weight: 93.4 kg (206 lb)   Height: 1.549 m (5' 1\")         An electronic signature was used to authenticate this note.    --Valeria Kumar MD

## 2024-09-05 ENCOUNTER — OFFICE VISIT (OUTPATIENT)
Dept: PAIN MANAGEMENT | Age: 54
End: 2024-09-05
Payer: COMMERCIAL

## 2024-09-05 VITALS
OXYGEN SATURATION: 96 % | DIASTOLIC BLOOD PRESSURE: 80 MMHG | SYSTOLIC BLOOD PRESSURE: 135 MMHG | WEIGHT: 206 LBS | BODY MASS INDEX: 38.92 KG/M2 | HEART RATE: 81 BPM

## 2024-09-05 DIAGNOSIS — G43.909 EPISODIC MIGRAINE: ICD-10-CM

## 2024-09-05 DIAGNOSIS — M47.812 CERVICAL SPONDYLOSIS: ICD-10-CM

## 2024-09-05 DIAGNOSIS — I50.43 CHF (CONGESTIVE HEART FAILURE), NYHA CLASS I, ACUTE ON CHRONIC, COMBINED (HCC): ICD-10-CM

## 2024-09-05 DIAGNOSIS — Z99.2 ESRD (END STAGE RENAL DISEASE) ON DIALYSIS (HCC): ICD-10-CM

## 2024-09-05 DIAGNOSIS — M17.11 PRIMARY LOCALIZED OSTEOARTHROSIS OF THE KNEE, RIGHT: ICD-10-CM

## 2024-09-05 DIAGNOSIS — M47.817 LUMBOSACRAL SPONDYLOSIS WITHOUT MYELOPATHY: Primary | ICD-10-CM

## 2024-09-05 DIAGNOSIS — N18.6 ESRD (END STAGE RENAL DISEASE) ON DIALYSIS (HCC): ICD-10-CM

## 2024-09-05 DIAGNOSIS — Z51.81 ENCOUNTER FOR THERAPEUTIC DRUG MONITORING: ICD-10-CM

## 2024-09-05 DIAGNOSIS — G89.4 CHRONIC PAIN SYNDROME: ICD-10-CM

## 2024-09-05 DIAGNOSIS — Z96.651 S/P TOTAL KNEE ARTHROPLASTY, RIGHT: ICD-10-CM

## 2024-09-05 PROCEDURE — 1036F TOBACCO NON-USER: CPT | Performed by: NURSE PRACTITIONER

## 2024-09-05 PROCEDURE — 99213 OFFICE O/P EST LOW 20 MIN: CPT | Performed by: NURSE PRACTITIONER

## 2024-09-05 PROCEDURE — G8427 DOCREV CUR MEDS BY ELIG CLIN: HCPCS | Performed by: NURSE PRACTITIONER

## 2024-09-05 PROCEDURE — 3017F COLORECTAL CA SCREEN DOC REV: CPT | Performed by: NURSE PRACTITIONER

## 2024-09-05 PROCEDURE — 3079F DIAST BP 80-89 MM HG: CPT | Performed by: NURSE PRACTITIONER

## 2024-09-05 PROCEDURE — 3075F SYST BP GE 130 - 139MM HG: CPT | Performed by: NURSE PRACTITIONER

## 2024-09-05 PROCEDURE — G8417 CALC BMI ABV UP PARAM F/U: HCPCS | Performed by: NURSE PRACTITIONER

## 2024-09-05 RX ORDER — OXYCODONE AND ACETAMINOPHEN 7.5; 325 MG/1; MG/1
1 TABLET ORAL EVERY 6 HOURS PRN
Qty: 112 TABLET | Refills: 0 | Status: SHIPPED | OUTPATIENT
Start: 2024-09-05 | End: 2024-10-03

## 2024-09-05 RX ORDER — ONDANSETRON 4 MG/1
4 TABLET, FILM COATED ORAL EVERY 8 HOURS PRN
COMMUNITY

## 2024-09-05 NOTE — PROGRESS NOTES
Lumbosacral spondylosis without myelopathy. Diagnoses of Encounter for therapeutic drug monitoring, Cervical spondylosis, CHF (congestive heart failure), NYHA class I, acute on chronic, combined (HCC), Episodic migraine, ESRD (end stage renal disease) on dialysis (HCC), Primary localized osteoarthrosis of the knee, right, Chronic pain syndrome, and S/P total knee arthroplasty, right were also pertinent to this visit.   Risks and benefits of the medications and other alternative treatments  including no treatment were discussed with the patient.The common side effects of these medications were also explained to the patient.  Informed verbal consent was obtained.   Goals of current treatment regimen include improvement in pain, restoration of functioning- with focus on improvement in physical performance, general activity, work or disability,emotional distress, health care utilization and  decreased medication consumption. Will continue to monitor progress towards achieving/maintaining therapeutic goals with special emphasis on  1. Improvement in perceived interfernce  of pain with ADL's. Ability to do home exercises independently. Ability to do household chores indoor and/or outdoor work and social and leisure activities.Improve psychosocial and physical functioning. she is showing progression towards this treatment goal with the current regimen.     She was advised against drinking alcohol with the narcotic pain medicines, advised against driving or handling machinery while adjusting the dose of medicines or if having cognitive  issues related to the current medications.Risk of overdose and death, if medicines not taken as prescribed, were also discussed. If the patient develops new symptoms or if the symptoms worsen, the patient should call the office.    While transcribing every attempt was made to maintain the accuracy of the note in terms of it's contents,there may have been some errors made

## 2024-09-06 PROBLEM — Z96.651 S/P TOTAL KNEE ARTHROPLASTY, RIGHT: Status: ACTIVE | Noted: 2024-09-06

## 2024-09-09 ENCOUNTER — TELEPHONE (OUTPATIENT)
Dept: VASCULAR SURGERY | Age: 54
End: 2024-09-09

## 2024-09-09 DIAGNOSIS — N18.6 ESRD (END STAGE RENAL DISEASE) (HCC): Primary | ICD-10-CM

## 2024-09-09 PROBLEM — G47.00 INSOMNIA: Status: ACTIVE | Noted: 2024-08-01

## 2024-09-09 PROBLEM — Z89.412 ACQUIRED ABSENCE OF LEFT GREAT TOE (HCC): Chronic | Status: ACTIVE | Noted: 2023-10-04

## 2024-09-09 PROBLEM — H91.92 HEARING LOSS OF LEFT EAR: Status: ACTIVE | Noted: 2024-08-01

## 2024-09-09 PROBLEM — Z79.891 LONG TERM (CURRENT) USE OF OPIATE ANALGESIC: Status: ACTIVE | Noted: 2023-10-04

## 2024-09-09 PROBLEM — G43.719 INTRACTABLE CHRONIC MIGRAINE WITHOUT AURA AND WITHOUT STATUS MIGRAINOSUS: Status: ACTIVE | Noted: 2021-10-01

## 2024-09-09 PROBLEM — R19.8 ALTERNATING CONSTIPATION AND DIARRHEA: Status: ACTIVE | Noted: 2024-08-01

## 2024-09-09 PROBLEM — Z76.82 AWAITING ORGAN TRANSPLANT STATUS: Status: ACTIVE | Noted: 2023-10-04

## 2024-09-09 PROBLEM — R68.89: Status: ACTIVE | Noted: 2024-08-01

## 2024-09-09 NOTE — PROGRESS NOTES
9/10/2024    Scottie Chiang (:  1970) is a 54 y.o. female,Established patient, here for evaluation of the following chief complaint(s):  Post-Op Check (1st post-op s/p fistulagram )        ASSESSMENT/PLAN:  1. ESRD (end stage renal disease) (HCC)    While there has been some improvement in the AVF, will require further angioplasty.    SUBJECTIVE/OBJECTIVE:  Returns today in follow up of fistulagram and balloon angioplasty 2024  Other:  Right brachiocephalic AVF 2024  Fistulagram and balloon angioplasty 2024    No complaints.    On exam:  Right arm AVF with improved fistula, but still modest in size.  Hand is warm and well-perfused.  Right radial access with normal pulse exam.     Physical Exam  Vitals:    09/10/24 1118   BP: 133/79   Pulse: 84   Temp: 97.2 °F (36.2 °C)   SpO2: 95%   Weight: 91.6 kg (202 lb)   Height: 1.549 m (5' 1\")         An electronic signature was used to authenticate this note.    --Valeria Kumar MD

## 2024-09-10 ENCOUNTER — OFFICE VISIT (OUTPATIENT)
Dept: VASCULAR SURGERY | Age: 54
End: 2024-09-10
Payer: COMMERCIAL

## 2024-09-10 VITALS
SYSTOLIC BLOOD PRESSURE: 133 MMHG | OXYGEN SATURATION: 95 % | DIASTOLIC BLOOD PRESSURE: 79 MMHG | HEIGHT: 61 IN | BODY MASS INDEX: 38.14 KG/M2 | TEMPERATURE: 97.2 F | HEART RATE: 84 BPM | WEIGHT: 202 LBS

## 2024-09-10 DIAGNOSIS — N18.6 ESRD (END STAGE RENAL DISEASE) (HCC): Primary | ICD-10-CM

## 2024-09-10 PROCEDURE — 99213 OFFICE O/P EST LOW 20 MIN: CPT | Performed by: SURGERY

## 2024-09-10 PROCEDURE — G8417 CALC BMI ABV UP PARAM F/U: HCPCS | Performed by: SURGERY

## 2024-09-10 PROCEDURE — 3017F COLORECTAL CA SCREEN DOC REV: CPT | Performed by: SURGERY

## 2024-09-10 PROCEDURE — 3078F DIAST BP <80 MM HG: CPT | Performed by: SURGERY

## 2024-09-10 PROCEDURE — 1036F TOBACCO NON-USER: CPT | Performed by: SURGERY

## 2024-09-10 PROCEDURE — 3075F SYST BP GE 130 - 139MM HG: CPT | Performed by: SURGERY

## 2024-09-10 PROCEDURE — G8427 DOCREV CUR MEDS BY ELIG CLIN: HCPCS | Performed by: SURGERY

## 2024-09-27 ENCOUNTER — TELEPHONE (OUTPATIENT)
Dept: VASCULAR SURGERY | Age: 54
End: 2024-09-27

## 2024-09-27 RX ORDER — DOCUSATE SODIUM 100 MG/1
100 CAPSULE, LIQUID FILLED ORAL 2 TIMES DAILY
Qty: 90 CAPSULE | Refills: 1 | Status: SHIPPED | OUTPATIENT
Start: 2024-09-27

## 2024-09-30 ENCOUNTER — TELEPHONE (OUTPATIENT)
Dept: VASCULAR SURGERY | Age: 54
End: 2024-09-30

## 2024-09-30 NOTE — TELEPHONE ENCOUNTER
Spoke with Ms. Chiang and informed her she may or may not need another fistulagram and Dr. Kumar would like to see her in office in couple weeks to discuss. We have Ms. Chiang scheduled 10/15 at [id number].

## 2024-09-30 NOTE — TELEPHONE ENCOUNTER
----- Message from Dr. Valeria Kumar MD sent at 9/30/2024  1:38 PM EDT -----  Just have her come in next week or 2.  She may or may not require one more fistulagram.  If she does, you can let her know we probably will not access her wrist again.

## 2024-10-03 ENCOUNTER — OFFICE VISIT (OUTPATIENT)
Dept: PAIN MANAGEMENT | Age: 54
End: 2024-10-03

## 2024-10-03 DIAGNOSIS — I50.43 CHF (CONGESTIVE HEART FAILURE), NYHA CLASS I, ACUTE ON CHRONIC, COMBINED (HCC): ICD-10-CM

## 2024-10-03 DIAGNOSIS — G43.909 EPISODIC MIGRAINE: ICD-10-CM

## 2024-10-03 DIAGNOSIS — Z96.651 S/P TOTAL KNEE ARTHROPLASTY, RIGHT: Primary | ICD-10-CM

## 2024-10-03 DIAGNOSIS — M17.11 PRIMARY LOCALIZED OSTEOARTHROSIS OF THE KNEE, RIGHT: ICD-10-CM

## 2024-10-03 DIAGNOSIS — G89.4 CHRONIC PAIN SYNDROME: ICD-10-CM

## 2024-10-03 DIAGNOSIS — Z51.81 ENCOUNTER FOR THERAPEUTIC DRUG MONITORING: ICD-10-CM

## 2024-10-03 DIAGNOSIS — M47.817 LUMBOSACRAL SPONDYLOSIS WITHOUT MYELOPATHY: ICD-10-CM

## 2024-10-03 DIAGNOSIS — N18.6 ESRD (END STAGE RENAL DISEASE) ON DIALYSIS (HCC): ICD-10-CM

## 2024-10-03 DIAGNOSIS — M47.812 CERVICAL SPONDYLOSIS: ICD-10-CM

## 2024-10-03 DIAGNOSIS — Z99.2 ESRD (END STAGE RENAL DISEASE) ON DIALYSIS (HCC): ICD-10-CM

## 2024-10-03 RX ORDER — SCOLOPAMINE TRANSDERMAL SYSTEM 1 MG/1
1 PATCH, EXTENDED RELEASE TRANSDERMAL
Qty: 10 PATCH | Refills: 0 | Status: SHIPPED | OUTPATIENT
Start: 2024-10-03 | End: 2024-11-02

## 2024-10-03 RX ORDER — OXYCODONE AND ACETAMINOPHEN 7.5; 325 MG/1; MG/1
1 TABLET ORAL EVERY 6 HOURS PRN
Qty: 112 TABLET | Refills: 0 | Status: SHIPPED | OUTPATIENT
Start: 2024-10-03 | End: 2024-10-31

## 2024-10-03 RX ORDER — METHOCARBAMOL 500 MG/1
500 TABLET, FILM COATED ORAL 2 TIMES DAILY
Qty: 60 TABLET | Refills: 0 | Status: SHIPPED | OUTPATIENT
Start: 2024-10-03 | End: 2024-11-02

## 2024-10-03 NOTE — PROGRESS NOTES
Scottie Chiang  1970  [id number]      HISTORY OF PRESENT ILLNESS: Ms. Chiang is a 54 y.o. female returns for a follow up visit for pain management  She has a diagnosis of   1. S/P total knee arthroplasty, right    2. Encounter for therapeutic drug monitoring    3. Cervical spondylosis    4. CHF (congestive heart failure), NYHA class I, acute on chronic, combined ([facility])    5. Episodic migraine    6. ESRD (end stage renal disease) on dialysis ([facility])    7. Primary localized osteoarthrosis of the knee, right    8. Chronic pain syndrome    9. Lumbosacral spondylosis without myelopathy    .      New Medications since Last Office visit have been reviewed with patient.     As per Information Obtained from the PADT (Patient Assessment and Documentation Tool)    She complains of pain in the head, neck, right arm, right knee. She rates the pain 5/10 and describes it as aching. Current treatment regimen has helped relieve about 50% of the pain since beginning treatment plan.  She denies any side effects from the current pain regimen. Patient reports that since implementation of their treatment plan; their physical functioning is better, family/social relationships are better, mood is better sleep patterns are unchanged, and that the overall functioning is better.  Patient denies/admits that any of the above have changed since last office visit. Patient denies misusing/abusing her narcotic pain medications or using any illegal drugs.      Upon obtaining medical history from Ms. Chiang    ALLERGIES: Patients list of allergies were reviewed     MEDICATIONS: Ms. Key list of medications were reviewed.Her current medications are   Outpatient Medications Prior to Visit   Medication Sig Dispense Refill    docusate sodium (COLACE) 100 MG capsule Take 1 capsule by mouth 2 times daily 90 capsule 1    ondansetron (ZOFRAN) 4 MG tablet Take 1 tablet by mouth every 8 hours as needed for Nausea or Vomiting      oxyCODONE-acetaminophen (PERCOCET)

## 2024-10-04 VITALS
HEART RATE: 86 BPM | DIASTOLIC BLOOD PRESSURE: 82 MMHG | BODY MASS INDEX: 38.92 KG/M2 | SYSTOLIC BLOOD PRESSURE: 139 MMHG | OXYGEN SATURATION: 91 % | WEIGHT: 206 LBS

## 2024-10-04 RX ORDER — GABAPENTIN 300 MG/1
300 CAPSULE ORAL 2 TIMES DAILY
Qty: 90 CAPSULE | Refills: 1 | Status: SHIPPED | OUTPATIENT
Start: 2024-10-04 | End: 2025-01-02

## 2024-10-15 ENCOUNTER — OFFICE VISIT (OUTPATIENT)
Dept: VASCULAR SURGERY | Age: 54
End: 2024-10-15

## 2024-10-15 VITALS
WEIGHT: 203 LBS | DIASTOLIC BLOOD PRESSURE: 84 MMHG | SYSTOLIC BLOOD PRESSURE: 140 MMHG | BODY MASS INDEX: 38.36 KG/M2 | HEART RATE: 74 BPM

## 2024-10-15 DIAGNOSIS — N18.6 ESRD (END STAGE RENAL DISEASE) (HCC): Primary | ICD-10-CM

## 2024-10-15 NOTE — PROGRESS NOTES
10/15/2024    Scottie Chiang (:  1970) is a 54 y.o. female,Established patient, here for evaluation of the following chief complaint(s):  Follow-up ( f/u HD scan/)        ASSESSMENT/PLAN:  1. ESRD (end stage renal disease) (HCC)  It is not helpful that the right IJ catheter is still in place and has been so for roughly 3 years.  I have recommended switching it to the other side at the time of recommended fistulogram.    She is scheduled for the following:  Right arm fistulagram (via right common femoral vein) and catheter exchange on 10/23/2024.    SUBJECTIVE/OBJECTIVE:  Returns today in follow up of fistulagram and balloon angioplasty 2024  Other:  Right brachiocephalic AVF 2024  Fistulagram and balloon angioplasty 2024, 2024    Her right arm AVF is still slow to mature.   Repeat duplex shows persistent anastomotic stenosis.    I reports reviewed images of the following study:  Vascular US Duplex Hemodialysis Access Right 9/10/2024      Physical Exam  Vitals:    10/15/24 1021   BP: (!) 140/84   Pulse: 74   Weight: 92.1 kg (203 lb)     Right upper arm is warm and well-perfused with no significant swelling.  Right radial pulse is easily palpable, not dilated or aneurysmal.  Hand is warm and well-perfused.  The AV fistula is palpable with a good thrill but still is somewhat small and not protruding as expected.    An electronic signature was used to authenticate this note.    --Valeria Kumar MD

## 2024-10-23 ENCOUNTER — HOSPITAL ENCOUNTER (INPATIENT)
Age: 54
LOS: 1 days | Discharge: HOME | End: 2024-10-25
Attending: SURGERY | Admitting: INTERNAL MEDICINE
Payer: COMMERCIAL

## 2024-10-23 DIAGNOSIS — N18.6 ESRD (END STAGE RENAL DISEASE) (HCC): ICD-10-CM

## 2024-10-23 LAB
ALBUMIN SERPL-MCNC: 3.7 G/DL (ref 3.4–5)
ANION GAP SERPL CALCULATED.3IONS-SCNC: 19 MMOL/L (ref 3–16)
ANION GAP SERPL CALCULATED.3IONS-SCNC: 21 MMOL/L (ref 3–16)
ANION GAP SERPL CALCULATED.3IONS-SCNC: 23 MMOL/L (ref 3–16)
BASE EXCESS BLDA CALC-SCNC: -4 MMOL/L (ref -3–3)
BUN SERPL-MCNC: 89 MG/DL (ref 7–20)
BUN SERPL-MCNC: 91 MG/DL (ref 7–20)
BUN SERPL-MCNC: 93 MG/DL (ref 7–20)
CA-I BLD-SCNC: 1.03 MMOL/L (ref 1.12–1.32)
CALCIUM SERPL-MCNC: 8.5 MG/DL (ref 8.3–10.6)
CALCIUM SERPL-MCNC: 8.6 MG/DL (ref 8.3–10.6)
CALCIUM SERPL-MCNC: 8.7 MG/DL (ref 8.3–10.6)
CHLORIDE SERPL-SCNC: 95 MMOL/L (ref 99–110)
CHLORIDE SERPL-SCNC: 97 MMOL/L (ref 99–110)
CHLORIDE SERPL-SCNC: 98 MMOL/L (ref 99–110)
CO2 BLDA-SCNC: 24 MMOL/L
CO2 SERPL-SCNC: 19 MMOL/L (ref 21–32)
CO2 SERPL-SCNC: 19 MMOL/L (ref 21–32)
CO2 SERPL-SCNC: 21 MMOL/L (ref 21–32)
CREAT SERPL-MCNC: 10 MG/DL (ref 0.6–1.1)
CREAT SERPL-MCNC: 10.4 MG/DL (ref 0.6–1.1)
CREAT SERPL-MCNC: 10.5 MG/DL (ref 0.6–1.1)
DEPRECATED RDW RBC AUTO: 16.2 % (ref 12.4–15.4)
ECHO BSA: 2.06 M2
GFR SERPLBLD CREATININE-BSD FMLA CKD-EPI: 4 ML/MIN/{1.73_M2}
GLUCOSE BLD-MCNC: 139 MG/DL (ref 70–99)
GLUCOSE SERPL-MCNC: 119 MG/DL (ref 70–99)
GLUCOSE SERPL-MCNC: 128 MG/DL (ref 70–99)
GLUCOSE SERPL-MCNC: 194 MG/DL (ref 70–99)
HCO3 BLDA-SCNC: 22.3 MMOL/L (ref 21–29)
HCT VFR BLD AUTO: 35.1 % (ref 36–48)
HGB BLD-MCNC: 11.3 G/DL (ref 12–16)
INR PPP: 1.03 (ref 0.85–1.15)
LACTATE BLD-SCNC: 0.41 MMOL/L (ref 0.4–2)
MCH RBC QN AUTO: 32.2 PG (ref 26–34)
MCHC RBC AUTO-ENTMCNC: 32.3 G/DL (ref 31–36)
MCV RBC AUTO: 99.6 FL (ref 80–100)
PCO2 BLDA: 42 MM HG (ref 35–45)
PERFORMED ON: ABNORMAL
PH BLDA: 7.33 [PH] (ref 7.35–7.45)
PHOSPHATE SERPL-MCNC: 7.2 MG/DL (ref 2.5–4.9)
PLATELET # BLD AUTO: 117 K/UL (ref 135–450)
PMV BLD AUTO: 10.7 FL (ref 5–10.5)
PO2 BLDA: 55.7 MM HG (ref 75–108)
POC SAMPLE TYPE: ABNORMAL
POTASSIUM BLD-SCNC: 6.5 MMOL/L (ref 3.5–5.1)
POTASSIUM SERPL-SCNC: 5.9 MMOL/L (ref 3.5–5.1)
POTASSIUM SERPL-SCNC: 6.4 MMOL/L (ref 3.5–5.1)
POTASSIUM SERPL-SCNC: 6.4 MMOL/L (ref 3.5–5.1)
PROTHROMBIN TIME: 13.7 SEC (ref 11.9–14.9)
RBC # BLD AUTO: 3.52 M/UL (ref 4–5.2)
SAO2 % BLDA: 87 % (ref 93–100)
SODIUM BLD-SCNC: 135 MMOL/L (ref 136–145)
SODIUM SERPL-SCNC: 136 MMOL/L (ref 136–145)
SODIUM SERPL-SCNC: 137 MMOL/L (ref 136–145)
SODIUM SERPL-SCNC: 139 MMOL/L (ref 136–145)
WBC # BLD AUTO: 4.9 K/UL (ref 4–11)

## 2024-10-23 PROCEDURE — 84132 ASSAY OF SERUM POTASSIUM: CPT

## 2024-10-23 PROCEDURE — 37799 UNLISTED PX VASCULAR SURGERY: CPT | Performed by: SURGERY

## 2024-10-23 PROCEDURE — 7100000010 HC PHASE II RECOVERY - FIRST 15 MIN: Performed by: SURGERY

## 2024-10-23 PROCEDURE — 05753Z1 DILATION OF RIGHT SUBCLAVIAN VEIN USING DRUG-COATED BALLOON, PERCUTANEOUS APPROACH: ICD-10-PCS | Performed by: SURGERY

## 2024-10-23 PROCEDURE — 2500000003 HC RX 250 WO HCPCS: Performed by: SURGERY

## 2024-10-23 PROCEDURE — G0378 HOSPITAL OBSERVATION PER HR: HCPCS | Performed by: INTERNAL MEDICINE

## 2024-10-23 PROCEDURE — 36558 INSERT TUNNELED CV CATH: CPT | Performed by: SURGERY

## 2024-10-23 PROCEDURE — G0378 HOSPITAL OBSERVATION PER HR: HCPCS

## 2024-10-23 PROCEDURE — 99153 MOD SED SAME PHYS/QHP EA: CPT | Performed by: SURGERY

## 2024-10-23 PROCEDURE — C1894 INTRO/SHEATH, NON-LASER: HCPCS | Performed by: SURGERY

## 2024-10-23 PROCEDURE — C1769 GUIDE WIRE: HCPCS | Performed by: SURGERY

## 2024-10-23 PROCEDURE — 0JH63XZ INSERTION OF TUNNELED VASCULAR ACCESS DEVICE INTO CHEST SUBCUTANEOUS TISSUE AND FASCIA, PERCUTANEOUS APPROACH: ICD-10-PCS | Performed by: SURGERY

## 2024-10-23 PROCEDURE — 84295 ASSAY OF SERUM SODIUM: CPT

## 2024-10-23 PROCEDURE — 6360000002 HC RX W HCPCS

## 2024-10-23 PROCEDURE — 82803 BLOOD GASES ANY COMBINATION: CPT

## 2024-10-23 PROCEDURE — 80069 RENAL FUNCTION PANEL: CPT

## 2024-10-23 PROCEDURE — 2580000003 HC RX 258

## 2024-10-23 PROCEDURE — 2580000003 HC RX 258: Performed by: HOSPITALIST

## 2024-10-23 PROCEDURE — 03773Z1 DILATION OF RIGHT BRACHIAL ARTERY USING DRUG-COATED BALLOON, PERCUTANEOUS APPROACH: ICD-10-PCS | Performed by: SURGERY

## 2024-10-23 PROCEDURE — 0JPV0XZ REMOVAL OF TUNNELED VASCULAR ACCESS DEVICE FROM UPPER EXTREMITY SUBCUTANEOUS TISSUE AND FASCIA, OPEN APPROACH: ICD-10-PCS | Performed by: SURGERY

## 2024-10-23 PROCEDURE — C1887 CATHETER, GUIDING: HCPCS | Performed by: SURGERY

## 2024-10-23 PROCEDURE — 6360000004 HC RX CONTRAST MEDICATION: Performed by: SURGERY

## 2024-10-23 PROCEDURE — 85027 COMPLETE CBC AUTOMATED: CPT

## 2024-10-23 PROCEDURE — 36556 INSERT NON-TUNNEL CV CATH: CPT

## 2024-10-23 PROCEDURE — 6360000002 HC RX W HCPCS: Performed by: SURGERY

## 2024-10-23 PROCEDURE — 36415 COLL VENOUS BLD VENIPUNCTURE: CPT

## 2024-10-23 PROCEDURE — 99223 1ST HOSP IP/OBS HIGH 75: CPT | Performed by: HOSPITALIST

## 2024-10-23 PROCEDURE — 99152 MOD SED SAME PHYS/QHP 5/>YRS: CPT | Performed by: SURGERY

## 2024-10-23 PROCEDURE — 87070 CULTURE OTHR SPECIMN AEROBIC: CPT

## 2024-10-23 PROCEDURE — 6370000000 HC RX 637 (ALT 250 FOR IP): Performed by: HOSPITALIST

## 2024-10-23 PROCEDURE — 87077 CULTURE AEROBIC IDENTIFY: CPT

## 2024-10-23 PROCEDURE — 36902 INTRO CATH DIALYSIS CIRCUIT: CPT | Performed by: SURGERY

## 2024-10-23 PROCEDURE — 02HV33Z INSERTION OF INFUSION DEVICE INTO SUPERIOR VENA CAVA, PERCUTANEOUS APPROACH: ICD-10-PCS | Performed by: SURGERY

## 2024-10-23 PROCEDURE — C2623 CATH, TRANSLUMIN, DRUG-COAT: HCPCS | Performed by: SURGERY

## 2024-10-23 PROCEDURE — C1750 CATH, HEMODIALYSIS,LONG-TERM: HCPCS | Performed by: SURGERY

## 2024-10-23 PROCEDURE — C1725 CATH, TRANSLUMIN NON-LASER: HCPCS | Performed by: SURGERY

## 2024-10-23 PROCEDURE — 02PY33Z REMOVAL OF INFUSION DEVICE FROM GREAT VESSEL, PERCUTANEOUS APPROACH: ICD-10-PCS | Performed by: SURGERY

## 2024-10-23 PROCEDURE — C1773 RET DEV, INSERTABLE: HCPCS | Performed by: SURGERY

## 2024-10-23 PROCEDURE — 82947 ASSAY GLUCOSE BLOOD QUANT: CPT

## 2024-10-23 PROCEDURE — 6370000000 HC RX 637 (ALT 250 FOR IP)

## 2024-10-23 PROCEDURE — 82330 ASSAY OF CALCIUM: CPT

## 2024-10-23 PROCEDURE — 83605 ASSAY OF LACTIC ACID: CPT

## 2024-10-23 PROCEDURE — 36589 REMOVAL TUNNELED CV CATH: CPT | Performed by: SURGERY

## 2024-10-23 PROCEDURE — 2709999900 HC NON-CHARGEABLE SUPPLY: Performed by: SURGERY

## 2024-10-23 PROCEDURE — 77001 FLUOROGUIDE FOR VEIN DEVICE: CPT | Performed by: SURGERY

## 2024-10-23 PROCEDURE — 7100000011 HC PHASE II RECOVERY - ADDTL 15 MIN: Performed by: SURGERY

## 2024-10-23 PROCEDURE — 85610 PROTHROMBIN TIME: CPT

## 2024-10-23 PROCEDURE — 36907 BALO ANGIOP CTR DIALYSIS SEG: CPT | Performed by: SURGERY

## 2024-10-23 PROCEDURE — 5A1D70Z PERFORMANCE OF URINARY FILTRATION, INTERMITTENT, LESS THAN 6 HOURS PER DAY: ICD-10-PCS | Performed by: INTERNAL MEDICINE

## 2024-10-23 PROCEDURE — 6360000002 HC RX W HCPCS: Performed by: INTERNAL MEDICINE

## 2024-10-23 RX ORDER — CALCIUM GLUCONATE 10 MG/ML
1000 INJECTION, SOLUTION INTRAVENOUS ONCE
Status: COMPLETED | OUTPATIENT
Start: 2024-10-23 | End: 2024-10-24

## 2024-10-23 RX ORDER — SCOLOPAMINE TRANSDERMAL SYSTEM 1 MG/1
1 PATCH, EXTENDED RELEASE TRANSDERMAL
Status: DISCONTINUED | OUTPATIENT
Start: 2024-10-23 | End: 2024-10-25 | Stop reason: HOSPADM

## 2024-10-23 RX ORDER — DIPHENHYDRAMINE HYDROCHLORIDE 50 MG/ML
INJECTION INTRAMUSCULAR; INTRAVENOUS PRN
Status: DISCONTINUED | OUTPATIENT
Start: 2024-10-23 | End: 2024-10-23 | Stop reason: HOSPADM

## 2024-10-23 RX ORDER — HEPARIN SODIUM 5000 [USP'U]/ML
5000 INJECTION, SOLUTION INTRAVENOUS; SUBCUTANEOUS EVERY 8 HOURS SCHEDULED
Status: DISCONTINUED | OUTPATIENT
Start: 2024-10-23 | End: 2024-10-25 | Stop reason: HOSPADM

## 2024-10-23 RX ORDER — ATORVASTATIN CALCIUM 80 MG/1
80 TABLET, FILM COATED ORAL DAILY
Status: DISCONTINUED | OUTPATIENT
Start: 2024-10-23 | End: 2024-10-24

## 2024-10-23 RX ORDER — LEVETIRACETAM 500 MG/1
500 TABLET ORAL DAILY
Status: DISCONTINUED | OUTPATIENT
Start: 2024-10-23 | End: 2024-10-24

## 2024-10-23 RX ORDER — DOCUSATE SODIUM 100 MG/1
100 CAPSULE, LIQUID FILLED ORAL 2 TIMES DAILY
Status: DISCONTINUED | OUTPATIENT
Start: 2024-10-23 | End: 2024-10-25 | Stop reason: HOSPADM

## 2024-10-23 RX ORDER — NALOXONE HYDROCHLORIDE 0.4 MG/ML
0.4 INJECTION, SOLUTION INTRAMUSCULAR; INTRAVENOUS; SUBCUTANEOUS PRN
Status: DISCONTINUED | OUTPATIENT
Start: 2024-10-23 | End: 2024-10-25 | Stop reason: HOSPADM

## 2024-10-23 RX ORDER — OXYCODONE AND ACETAMINOPHEN 7.5; 325 MG/1; MG/1
1 TABLET ORAL EVERY 6 HOURS PRN
Status: DISCONTINUED | OUTPATIENT
Start: 2024-10-23 | End: 2024-10-25 | Stop reason: HOSPADM

## 2024-10-23 RX ORDER — MEMANTINE HYDROCHLORIDE 5 MG/1
5 TABLET ORAL DAILY
Status: DISCONTINUED | OUTPATIENT
Start: 2024-10-23 | End: 2024-10-25 | Stop reason: HOSPADM

## 2024-10-23 RX ORDER — SODIUM CHLORIDE 9 MG/ML
INJECTION, SOLUTION INTRAVENOUS PRN
Status: DISCONTINUED | OUTPATIENT
Start: 2024-10-23 | End: 2024-10-25 | Stop reason: HOSPADM

## 2024-10-23 RX ORDER — PANTOPRAZOLE SODIUM 40 MG/1
40 TABLET, DELAYED RELEASE ORAL
Status: DISCONTINUED | OUTPATIENT
Start: 2024-10-24 | End: 2024-10-25 | Stop reason: HOSPADM

## 2024-10-23 RX ORDER — IOPAMIDOL 612 MG/ML
INJECTION, SOLUTION INTRAVASCULAR PRN
Status: DISCONTINUED | OUTPATIENT
Start: 2024-10-23 | End: 2024-10-23 | Stop reason: HOSPADM

## 2024-10-23 RX ORDER — SODIUM CHLORIDE 0.9 % (FLUSH) 0.9 %
5-40 SYRINGE (ML) INJECTION EVERY 12 HOURS SCHEDULED
Status: DISCONTINUED | OUTPATIENT
Start: 2024-10-23 | End: 2024-10-25 | Stop reason: HOSPADM

## 2024-10-23 RX ORDER — SODIUM CHLORIDE 0.9 % (FLUSH) 0.9 %
5-40 SYRINGE (ML) INJECTION PRN
Status: DISCONTINUED | OUTPATIENT
Start: 2024-10-23 | End: 2024-10-25 | Stop reason: HOSPADM

## 2024-10-23 RX ORDER — SEVELAMER CARBONATE 800 MG/1
800 TABLET, FILM COATED ORAL
Status: DISCONTINUED | OUTPATIENT
Start: 2024-10-23 | End: 2024-10-25 | Stop reason: HOSPADM

## 2024-10-23 RX ORDER — FENTANYL CITRATE 50 UG/ML
INJECTION, SOLUTION INTRAMUSCULAR; INTRAVENOUS PRN
Status: DISCONTINUED | OUTPATIENT
Start: 2024-10-23 | End: 2024-10-23 | Stop reason: HOSPADM

## 2024-10-23 RX ORDER — HEPARIN SODIUM 1000 [USP'U]/ML
INJECTION, SOLUTION INTRAVENOUS; SUBCUTANEOUS PRN
Status: DISCONTINUED | OUTPATIENT
Start: 2024-10-23 | End: 2024-10-23 | Stop reason: HOSPADM

## 2024-10-23 RX ORDER — CALCIUM GLUCONATE 20 MG/ML
1000 INJECTION, SOLUTION INTRAVENOUS ONCE
Status: DISCONTINUED | OUTPATIENT
Start: 2024-10-23 | End: 2024-10-23

## 2024-10-23 RX ORDER — CARVEDILOL 12.5 MG/1
12.5 TABLET ORAL 2 TIMES DAILY
Status: DISCONTINUED | OUTPATIENT
Start: 2024-10-23 | End: 2024-10-25 | Stop reason: HOSPADM

## 2024-10-23 RX ORDER — NIFEDIPINE 30 MG
60 TABLET, EXTENDED RELEASE ORAL DAILY
Status: DISCONTINUED | OUTPATIENT
Start: 2024-10-23 | End: 2024-10-25 | Stop reason: HOSPADM

## 2024-10-23 RX ORDER — LEVETIRACETAM 250 MG/1
250 TABLET ORAL
Status: DISCONTINUED | OUTPATIENT
Start: 2024-10-23 | End: 2024-10-24

## 2024-10-23 RX ORDER — ONDANSETRON 4 MG/1
4 TABLET, FILM COATED ORAL EVERY 8 HOURS PRN
Status: DISCONTINUED | OUTPATIENT
Start: 2024-10-23 | End: 2024-10-23

## 2024-10-23 RX ORDER — HYDRALAZINE HYDROCHLORIDE 20 MG/ML
INJECTION INTRAMUSCULAR; INTRAVENOUS PRN
Status: DISCONTINUED | OUTPATIENT
Start: 2024-10-23 | End: 2024-10-23 | Stop reason: HOSPADM

## 2024-10-23 RX ORDER — MIDODRINE HYDROCHLORIDE 5 MG/1
10 TABLET ORAL
Status: DISCONTINUED | OUTPATIENT
Start: 2024-10-23 | End: 2024-10-25 | Stop reason: HOSPADM

## 2024-10-23 RX ORDER — MIDAZOLAM HYDROCHLORIDE 1 MG/ML
INJECTION, SOLUTION INTRAMUSCULAR; INTRAVENOUS PRN
Status: DISCONTINUED | OUTPATIENT
Start: 2024-10-23 | End: 2024-10-23 | Stop reason: HOSPADM

## 2024-10-23 RX ORDER — CEFAZOLIN SODIUM 1 G/3ML
INJECTION, POWDER, FOR SOLUTION INTRAMUSCULAR; INTRAVENOUS PRN
Status: DISCONTINUED | OUTPATIENT
Start: 2024-10-23 | End: 2024-10-23 | Stop reason: HOSPADM

## 2024-10-23 RX ORDER — ONDANSETRON 2 MG/ML
4 INJECTION INTRAMUSCULAR; INTRAVENOUS EVERY 6 HOURS PRN
Status: DISCONTINUED | OUTPATIENT
Start: 2024-10-23 | End: 2024-10-25 | Stop reason: HOSPADM

## 2024-10-23 RX ORDER — METHOCARBAMOL 500 MG/1
500 TABLET, FILM COATED ORAL 2 TIMES DAILY
Status: DISCONTINUED | OUTPATIENT
Start: 2024-10-23 | End: 2024-10-25 | Stop reason: HOSPADM

## 2024-10-23 RX ORDER — TORSEMIDE 100 MG/1
100 TABLET ORAL 2 TIMES DAILY
Status: DISCONTINUED | OUTPATIENT
Start: 2024-10-23 | End: 2024-10-25 | Stop reason: HOSPADM

## 2024-10-23 RX ORDER — DEXTROSE MONOHYDRATE 100 MG/ML
INJECTION, SOLUTION INTRAVENOUS CONTINUOUS PRN
Status: DISCONTINUED | OUTPATIENT
Start: 2024-10-23 | End: 2024-10-25 | Stop reason: HOSPADM

## 2024-10-23 RX ORDER — GABAPENTIN 300 MG/1
300 CAPSULE ORAL 2 TIMES DAILY
Status: DISCONTINUED | OUTPATIENT
Start: 2024-10-23 | End: 2024-10-25 | Stop reason: HOSPADM

## 2024-10-23 RX ORDER — ONDANSETRON 4 MG/1
4 TABLET, ORALLY DISINTEGRATING ORAL EVERY 8 HOURS PRN
Status: DISCONTINUED | OUTPATIENT
Start: 2024-10-23 | End: 2024-10-25 | Stop reason: HOSPADM

## 2024-10-23 RX ORDER — GLUCAGON 1 MG/ML
1 KIT INJECTION PRN
Status: DISCONTINUED | OUTPATIENT
Start: 2024-10-23 | End: 2024-10-25 | Stop reason: HOSPADM

## 2024-10-23 RX ADMIN — METHOCARBAMOL TABLETS 500 MG: 500 TABLET, COATED ORAL at 21:10

## 2024-10-23 RX ADMIN — ATORVASTATIN CALCIUM 80 MG: 80 TABLET, FILM COATED ORAL at 19:16

## 2024-10-23 RX ADMIN — LEVETIRACETAM 500 MG: 500 TABLET, FILM COATED ORAL at 19:16

## 2024-10-23 RX ADMIN — SEVELAMER CARBONATE 800 MG: 800 TABLET, FILM COATED ORAL at 19:16

## 2024-10-23 RX ADMIN — INSULIN HUMAN 5 UNITS: 100 INJECTION, SOLUTION PARENTERAL at 23:25

## 2024-10-23 RX ADMIN — TORSEMIDE 100 MG: 100 TABLET ORAL at 21:10

## 2024-10-23 RX ADMIN — NIFEDIPINE 60 MG: 30 TABLET, EXTENDED RELEASE ORAL at 19:16

## 2024-10-23 RX ADMIN — SODIUM CHLORIDE, PRESERVATIVE FREE 10 ML: 5 INJECTION INTRAVENOUS at 21:13

## 2024-10-23 RX ADMIN — SODIUM ZIRCONIUM CYCLOSILICATE 15 G: 10 POWDER, FOR SUSPENSION ORAL at 23:43

## 2024-10-23 RX ADMIN — DEXTROSE MONOHYDRATE 250 ML: 100 INJECTION, SOLUTION INTRAVENOUS at 23:25

## 2024-10-23 RX ADMIN — GABAPENTIN 300 MG: 300 CAPSULE ORAL at 21:10

## 2024-10-23 RX ADMIN — HEPARIN SODIUM 5000 UNITS: 5000 INJECTION INTRAVENOUS; SUBCUTANEOUS at 21:09

## 2024-10-23 RX ADMIN — CARVEDILOL 12.5 MG: 12.5 TABLET, FILM COATED ORAL at 21:12

## 2024-10-23 RX ADMIN — DOCUSATE SODIUM 100 MG: 100 CAPSULE, LIQUID FILLED ORAL at 21:10

## 2024-10-23 RX ADMIN — OXYCODONE AND ACETAMINOPHEN 1 TABLET: 7.5; 325 TABLET ORAL at 18:27

## 2024-10-23 RX ADMIN — CALCIUM GLUCONATE 1000 MG: 10 INJECTION, SOLUTION INTRAVENOUS at 23:55

## 2024-10-23 ASSESSMENT — PAIN DESCRIPTION - PAIN TYPE
TYPE: ACUTE PAIN
TYPE: ACUTE PAIN

## 2024-10-23 ASSESSMENT — PAIN DESCRIPTION - FREQUENCY
FREQUENCY: INTERMITTENT
FREQUENCY: INTERMITTENT

## 2024-10-23 ASSESSMENT — PAIN DESCRIPTION - ORIENTATION
ORIENTATION: LEFT;ANTERIOR
ORIENTATION: RIGHT;LEFT;ANTERIOR;POSTERIOR
ORIENTATION: LEFT

## 2024-10-23 ASSESSMENT — PAIN DESCRIPTION - LOCATION
LOCATION: NECK
LOCATION: SHOULDER;NECK;HEAD
LOCATION: NECK

## 2024-10-23 ASSESSMENT — PAIN SCALES - WONG BAKER
WONGBAKER_NUMERICALRESPONSE: NO HURT
WONGBAKER_NUMERICALRESPONSE: NO HURT

## 2024-10-23 ASSESSMENT — PAIN SCALES - GENERAL
PAINLEVEL_OUTOF10: 9
PAINLEVEL_OUTOF10: 7
PAINLEVEL_OUTOF10: 4

## 2024-10-23 ASSESSMENT — PAIN - FUNCTIONAL ASSESSMENT
PAIN_FUNCTIONAL_ASSESSMENT: ACTIVITIES ARE NOT PREVENTED

## 2024-10-23 ASSESSMENT — PAIN DESCRIPTION - ONSET
ONSET: ON-GOING
ONSET: ON-GOING

## 2024-10-23 ASSESSMENT — PAIN DESCRIPTION - DESCRIPTORS
DESCRIPTORS: ACHING

## 2024-10-23 NOTE — PROGRESS NOTES
Department of Surgery:  Post-op Note    CC: ESRD    Procedure(s) Performed: Placement of L IJ vein tunneled HD catheter, Removed R tunneled HD catheter, femoral and RUE AV access with US, R brachiocephalic AV fistula angioplasty, R subclavian vein and brachiocephalic vein angioplasty    Subjective:   Pain is controlled, denies nausea or vomiting. Tolerating a diet. Had minimal bleeding at her old tunneled line site. Nursing staff changed the dressing once and held pressure.     Objective:    Exam:  Vitals:    10/23/24 0821 10/23/24 1215 10/23/24 1230 10/23/24 1245   BP:  113/79 133/89 104/75   Pulse:  81 79 75   Resp:  11 12 16   Temp:       TempSrc:       SpO2: 98% (!) 86% 94% (!) 83%   Weight:       Height:           General appearance: alert, no acute distress  Chest/Lungs:  normal effort, symmetric chest rise   Cardiovascular: RRR  Abdomen: soft, obese, non-tender, non-distended,  Skin: no erythema or rashes, no cyanosis, R groin stick site dressing c/d/I, no palpable assess or expanding hematoma, Previous R tunneled HD cath site with dressing with minimal bloody strike through, Dressing over RUE fistula site C/D/I, RUE fistula site with good palpable thrill  Extremities: no edema, no cyanosis  Neuro: A&Ox3, no focal deficits, sensation intact    Assessment and Plan  This is a 54 y.o. year old female s/p Placement of L IJ vein tunneled HD catheter, Removed R tunneled HD catheter, femoral and RUE AV access with US, R brachiocephalic AV fistula angioplasty, R subclavian vein and brachiocephalic vein angioplasty POD #0    Pain management: percocet prn, continue home medications  Cardiovascular: hemodynamically stable  Respiratory: extubated, encourage hourly incentive spirometry and deep breathing  FEN:  Fluids: SLIV, Diet: Regular  Wound: local care, will monitor R chest wound for continued bleeding, please reach out to residents if the site continues to ooze  Ambulation: OOB to chair, encourage

## 2024-10-23 NOTE — BRIEF OP NOTE
Brief Postoperative Note      Patient: Scottie Chiang  YOB: 1970  MRN: [id number]    Date of Procedure: 10/23/2024    Pre-Op Diagnosis Codes:      * ESRD (end stage renal disease) ([facility]) [N18.6]    Post-Op Diagnosis: Same       Procedure:  1.  Placement of left internal jugular vein tunneled hemodialysis catheter  2.  Removal of right sided tunneled hemodialysis catheter  3.  Ultrasound-guided access right common femoral artery and ultrasound-guided access right upper arm AV fistula  4.  Right brachiocephalic arteriovenous fistula angioplasty 4 mm x 100 mm [place], 5 mm x 150 mm [place], 6 mm x 150 mm [place]  5.  Right subclavian and brachiocephalic vein angioplasty 7 mm x 60 mm and 9 mm x 60 mm [place] balloon    Surgeon(s):  Valeria Kumar MD    Assistant:  * No surgical staff found *    Anesthesia: IV Sedation    Estimated Blood Loss (mL): less than 100     Complications: None    Specimens:   * No specimens in log *    Implants:  * No implants in log *      Drains: * No LDAs found *    Findings:  Infection Present At Time Of Surgery (PATOS) (choose all levels that have infection present):  No infection present  Other Findings: Severe stenosis in focal areas throughout the peripheral segment of the brachiocephalic vein fistula.  Greater than 50% stenosis of the right brachiocephalic vein, responding to angioplasty with approximately 30% residual stenosis.    Electronically signed by Valeria Kumar MD on 10/23/2024 at 1:09 PM

## 2024-10-24 ENCOUNTER — APPOINTMENT (OUTPATIENT)
Dept: CT IMAGING | Age: 54
End: 2024-10-24
Attending: SURGERY
Payer: COMMERCIAL

## 2024-10-24 ENCOUNTER — APPOINTMENT (OUTPATIENT)
Dept: MRI IMAGING | Age: 54
End: 2024-10-24
Attending: SURGERY
Payer: COMMERCIAL

## 2024-10-24 PROBLEM — R53.1 RIGHT SIDED WEAKNESS: Status: ACTIVE | Noted: 2024-10-24

## 2024-10-24 LAB
ALBUMIN SERPL-MCNC: 3.8 G/DL (ref 3.4–5)
ALBUMIN SERPL-MCNC: 3.8 G/DL (ref 3.4–5)
ANION GAP SERPL CALCULATED.3IONS-SCNC: 16 MMOL/L (ref 3–16)
ANION GAP SERPL CALCULATED.3IONS-SCNC: 19 MMOL/L (ref 3–16)
BASOPHILS # BLD: 0.1 K/UL (ref 0–0.2)
BASOPHILS NFR BLD: 1.1 %
BUN SERPL-MCNC: 105 MG/DL (ref 7–20)
BUN SERPL-MCNC: 51 MG/DL (ref 7–20)
CALCIUM SERPL-MCNC: 8.3 MG/DL (ref 8.3–10.6)
CALCIUM SERPL-MCNC: 8.9 MG/DL (ref 8.3–10.6)
CHLORIDE SERPL-SCNC: 91 MMOL/L (ref 99–110)
CHLORIDE SERPL-SCNC: 94 MMOL/L (ref 99–110)
CO2 SERPL-SCNC: 21 MMOL/L (ref 21–32)
CO2 SERPL-SCNC: 23 MMOL/L (ref 21–32)
CREAT SERPL-MCNC: 11.4 MG/DL (ref 0.6–1.1)
CREAT SERPL-MCNC: 6.5 MG/DL (ref 0.6–1.1)
DEPRECATED RDW RBC AUTO: 16 % (ref 12.4–15.4)
DEPRECATED RDW RBC AUTO: 16.2 % (ref 12.4–15.4)
EOSINOPHIL # BLD: 0.2 K/UL (ref 0–0.6)
EOSINOPHIL NFR BLD: 4 %
GFR SERPLBLD CREATININE-BSD FMLA CKD-EPI: 4 ML/MIN/{1.73_M2}
GFR SERPLBLD CREATININE-BSD FMLA CKD-EPI: 7 ML/MIN/{1.73_M2}
GLUCOSE BLD-MCNC: 133 MG/DL (ref 70–99)
GLUCOSE BLD-MCNC: 134 MG/DL (ref 70–99)
GLUCOSE BLD-MCNC: 134 MG/DL (ref 70–99)
GLUCOSE BLD-MCNC: 153 MG/DL (ref 70–99)
GLUCOSE BLD-MCNC: 193 MG/DL (ref 70–99)
GLUCOSE BLD-MCNC: 94 MG/DL (ref 70–99)
GLUCOSE SERPL-MCNC: 114 MG/DL (ref 70–99)
GLUCOSE SERPL-MCNC: 116 MG/DL (ref 70–99)
HCT VFR BLD AUTO: 30 % (ref 36–48)
HCT VFR BLD AUTO: 34.9 % (ref 36–48)
HGB BLD-MCNC: 11.2 G/DL (ref 12–16)
HGB BLD-MCNC: 9.7 G/DL (ref 12–16)
LACTATE BLDV-SCNC: 2 MMOL/L (ref 0.4–2)
LYMPHOCYTES # BLD: 1.2 K/UL (ref 1–5.1)
LYMPHOCYTES NFR BLD: 22.1 %
MAGNESIUM SERPL-MCNC: 2.4 MG/DL (ref 1.8–2.4)
MCH RBC QN AUTO: 32.1 PG (ref 26–34)
MCH RBC QN AUTO: 32.2 PG (ref 26–34)
MCHC RBC AUTO-ENTMCNC: 32.2 G/DL (ref 31–36)
MCHC RBC AUTO-ENTMCNC: 32.5 G/DL (ref 31–36)
MCV RBC AUTO: 99.1 FL (ref 80–100)
MCV RBC AUTO: 99.7 FL (ref 80–100)
MONOCYTES # BLD: 0.5 K/UL (ref 0–1.3)
MONOCYTES NFR BLD: 9.6 %
NEUTROPHILS # BLD: 3.3 K/UL (ref 1.7–7.7)
NEUTROPHILS NFR BLD: 63.2 %
PERFORMED ON: ABNORMAL
PERFORMED ON: NORMAL
PHOSPHATE SERPL-MCNC: 4.3 MG/DL (ref 2.5–4.9)
PHOSPHATE SERPL-MCNC: 7.1 MG/DL (ref 2.5–4.9)
PLATELET # BLD AUTO: 111 K/UL (ref 135–450)
PLATELET # BLD AUTO: 99 K/UL (ref 135–450)
PMV BLD AUTO: 10 FL (ref 5–10.5)
PMV BLD AUTO: 10.4 FL (ref 5–10.5)
POTASSIUM SERPL-SCNC: 4.8 MMOL/L (ref 3.5–5.1)
POTASSIUM SERPL-SCNC: 7.2 MMOL/L (ref 3.5–5.1)
RBC # BLD AUTO: 3.03 M/UL (ref 4–5.2)
RBC # BLD AUTO: 3.5 M/UL (ref 4–5.2)
SODIUM SERPL-SCNC: 130 MMOL/L (ref 136–145)
SODIUM SERPL-SCNC: 134 MMOL/L (ref 136–145)
TROPONIN, HIGH SENSITIVITY: 191 NG/L (ref 0–14)
TROPONIN, HIGH SENSITIVITY: 195 NG/L (ref 0–14)
WBC # BLD AUTO: 5.3 K/UL (ref 4–11)
WBC # BLD AUTO: 5.4 K/UL (ref 4–11)

## 2024-10-24 PROCEDURE — 2580000003 HC RX 258

## 2024-10-24 PROCEDURE — 6360000002 HC RX W HCPCS

## 2024-10-24 PROCEDURE — 70551 MRI BRAIN STEM W/O DYE: CPT

## 2024-10-24 PROCEDURE — 6370000000 HC RX 637 (ALT 250 FOR IP)

## 2024-10-24 PROCEDURE — 80069 RENAL FUNCTION PANEL: CPT

## 2024-10-24 PROCEDURE — 85027 COMPLETE CBC AUTOMATED: CPT

## 2024-10-24 PROCEDURE — 6360000004 HC RX CONTRAST MEDICATION

## 2024-10-24 PROCEDURE — 70450 CT HEAD/BRAIN W/O DYE: CPT

## 2024-10-24 PROCEDURE — 93005 ELECTROCARDIOGRAM TRACING: CPT

## 2024-10-24 PROCEDURE — 2060000000 HC ICU INTERMEDIATE R&B

## 2024-10-24 PROCEDURE — 85025 COMPLETE CBC W/AUTO DIFF WBC: CPT

## 2024-10-24 PROCEDURE — 84484 ASSAY OF TROPONIN QUANT: CPT

## 2024-10-24 PROCEDURE — 36415 COLL VENOUS BLD VENIPUNCTURE: CPT

## 2024-10-24 PROCEDURE — 6360000002 HC RX W HCPCS: Performed by: INTERNAL MEDICINE

## 2024-10-24 PROCEDURE — 36556 INSERT NON-TUNNEL CV CATH: CPT

## 2024-10-24 PROCEDURE — 70496 CT ANGIOGRAPHY HEAD: CPT

## 2024-10-24 PROCEDURE — 83605 ASSAY OF LACTIC ACID: CPT

## 2024-10-24 PROCEDURE — 83735 ASSAY OF MAGNESIUM: CPT

## 2024-10-24 PROCEDURE — 90935 HEMODIALYSIS ONE EVALUATION: CPT

## 2024-10-24 PROCEDURE — 70498 CT ANGIOGRAPHY NECK: CPT

## 2024-10-24 PROCEDURE — 99232 SBSQ HOSP IP/OBS MODERATE 35: CPT | Performed by: HOSPITALIST

## 2024-10-24 RX ORDER — GABAPENTIN 300 MG/1
300 CAPSULE ORAL 2 TIMES DAILY
COMMUNITY

## 2024-10-24 RX ORDER — LOPERAMIDE HYDROCHLORIDE 2 MG/1
2 CAPSULE ORAL 4 TIMES DAILY PRN
COMMUNITY

## 2024-10-24 RX ORDER — ATORVASTATIN CALCIUM 80 MG/1
80 TABLET, FILM COATED ORAL DAILY
COMMUNITY

## 2024-10-24 RX ORDER — ATORVASTATIN CALCIUM 80 MG/1
80 TABLET, FILM COATED ORAL NIGHTLY
Status: DISCONTINUED | OUTPATIENT
Start: 2024-10-24 | End: 2024-10-25 | Stop reason: HOSPADM

## 2024-10-24 RX ORDER — LEVETIRACETAM 250 MG/1
TABLET ORAL
COMMUNITY

## 2024-10-24 RX ORDER — INSULIN LISPRO 100 [IU]/ML
0-4 INJECTION, SOLUTION INTRAVENOUS; SUBCUTANEOUS
Status: DISCONTINUED | OUTPATIENT
Start: 2024-10-24 | End: 2024-10-25 | Stop reason: HOSPADM

## 2024-10-24 RX ORDER — MIDODRINE HYDROCHLORIDE 10 MG/1
TABLET ORAL
COMMUNITY

## 2024-10-24 RX ORDER — GALCANEZUMAB 120 MG/ML
120 INJECTION, SOLUTION SUBCUTANEOUS
COMMUNITY

## 2024-10-24 RX ORDER — LEVETIRACETAM 500 MG/5ML
250 INJECTION, SOLUTION, CONCENTRATE INTRAVENOUS DAILY PRN
Status: DISCONTINUED | OUTPATIENT
Start: 2024-10-24 | End: 2024-10-25

## 2024-10-24 RX ORDER — HYDROXYZINE HYDROCHLORIDE 25 MG/1
25 TABLET, FILM COATED ORAL
Status: COMPLETED | OUTPATIENT
Start: 2024-10-24 | End: 2024-10-24

## 2024-10-24 RX ORDER — ONDANSETRON 4 MG/1
4 TABLET, ORALLY DISINTEGRATING ORAL EVERY 8 HOURS PRN
COMMUNITY

## 2024-10-24 RX ORDER — SCOLOPAMINE TRANSDERMAL SYSTEM 1 MG/1
1 PATCH, EXTENDED RELEASE TRANSDERMAL
Qty: 2 PATCH | Refills: 0 | OUTPATIENT
Start: 2024-10-24 | End: 2024-11-23

## 2024-10-24 RX ORDER — LEVETIRACETAM 500 MG/5ML
500 INJECTION, SOLUTION, CONCENTRATE INTRAVENOUS DAILY
Status: DISCONTINUED | OUTPATIENT
Start: 2024-10-24 | End: 2024-10-25

## 2024-10-24 RX ORDER — NIFEDIPINE 90 MG/1
90 TABLET, EXTENDED RELEASE ORAL DAILY
Status: ON HOLD | COMMUNITY
End: 2024-10-25 | Stop reason: HOSPADM

## 2024-10-24 RX ORDER — CARVEDILOL 6.25 MG/1
6.25 TABLET ORAL 2 TIMES DAILY WITH MEALS
COMMUNITY

## 2024-10-24 RX ORDER — IOPAMIDOL 755 MG/ML
75 INJECTION, SOLUTION INTRAVASCULAR
Status: COMPLETED | OUTPATIENT
Start: 2024-10-24 | End: 2024-10-24

## 2024-10-24 RX ORDER — LEVETIRACETAM 500 MG/5ML
500 INJECTION, SOLUTION, CONCENTRATE INTRAVENOUS DAILY
Status: DISCONTINUED | OUTPATIENT
Start: 2024-10-25 | End: 2024-10-24

## 2024-10-24 RX ORDER — LEVETIRACETAM 500 MG/1
250 TABLET ORAL ONCE
Status: DISCONTINUED | OUTPATIENT
Start: 2024-10-24 | End: 2024-10-24

## 2024-10-24 RX ORDER — HEPARIN SODIUM 1000 [USP'U]/ML
3600 INJECTION, SOLUTION INTRAVENOUS; SUBCUTANEOUS PRN
Status: DISCONTINUED | OUTPATIENT
Start: 2024-10-24 | End: 2024-10-25 | Stop reason: HOSPADM

## 2024-10-24 RX ORDER — METHOCARBAMOL 500 MG/1
500 TABLET, FILM COATED ORAL DAILY
COMMUNITY

## 2024-10-24 RX ORDER — HYDROXYZINE HYDROCHLORIDE 10 MG/1
10 TABLET, FILM COATED ORAL 3 TIMES DAILY PRN
Status: DISCONTINUED | OUTPATIENT
Start: 2024-10-24 | End: 2024-10-24

## 2024-10-24 RX ORDER — MEMANTINE HYDROCHLORIDE 10 MG/1
10 TABLET ORAL 2 TIMES DAILY
COMMUNITY

## 2024-10-24 RX ADMIN — GABAPENTIN 300 MG: 300 CAPSULE ORAL at 21:23

## 2024-10-24 RX ADMIN — LEVETIRACETAM 500 MG: 100 INJECTION INTRAVENOUS at 17:22

## 2024-10-24 RX ADMIN — ATORVASTATIN CALCIUM 80 MG: 80 TABLET, FILM COATED ORAL at 21:23

## 2024-10-24 RX ADMIN — DOCUSATE SODIUM 100 MG: 100 CAPSULE, LIQUID FILLED ORAL at 21:23

## 2024-10-24 RX ADMIN — OXYCODONE AND ACETAMINOPHEN 1 TABLET: 7.5; 325 TABLET ORAL at 21:23

## 2024-10-24 RX ADMIN — GABAPENTIN 300 MG: 300 CAPSULE ORAL at 09:02

## 2024-10-24 RX ADMIN — HEPARIN SODIUM 3600 UNITS: 1000 INJECTION INTRAVENOUS; SUBCUTANEOUS at 11:57

## 2024-10-24 RX ADMIN — LEVETIRACETAM 250 MG: 100 INJECTION INTRAVENOUS at 15:05

## 2024-10-24 RX ADMIN — HYDROXYZINE HYDROCHLORIDE 25 MG: 25 TABLET ORAL at 22:13

## 2024-10-24 RX ADMIN — SODIUM CHLORIDE, PRESERVATIVE FREE 10 ML: 5 INJECTION INTRAVENOUS at 21:35

## 2024-10-24 RX ADMIN — MEMANTINE HYDROCHLORIDE 5 MG: 5 TABLET, FILM COATED ORAL at 09:03

## 2024-10-24 RX ADMIN — METHOCARBAMOL TABLETS 500 MG: 500 TABLET, COATED ORAL at 09:03

## 2024-10-24 RX ADMIN — IOPAMIDOL 75 ML: 755 INJECTION, SOLUTION INTRAVENOUS at 14:53

## 2024-10-24 RX ADMIN — SEVELAMER CARBONATE 800 MG: 800 TABLET, FILM COATED ORAL at 09:03

## 2024-10-24 RX ADMIN — LEVETIRACETAM 500 MG: 500 TABLET, FILM COATED ORAL at 09:03

## 2024-10-24 RX ADMIN — DOCUSATE SODIUM 100 MG: 100 CAPSULE, LIQUID FILLED ORAL at 09:02

## 2024-10-24 RX ADMIN — METHOCARBAMOL TABLETS 500 MG: 500 TABLET, COATED ORAL at 21:23

## 2024-10-24 RX ADMIN — SODIUM CHLORIDE, PRESERVATIVE FREE 10 ML: 5 INJECTION INTRAVENOUS at 09:07

## 2024-10-24 RX ADMIN — HEPARIN SODIUM 5000 UNITS: 5000 INJECTION INTRAVENOUS; SUBCUTANEOUS at 21:24

## 2024-10-24 ASSESSMENT — PAIN SCALES - GENERAL
PAINLEVEL_OUTOF10: 10
PAINLEVEL_OUTOF10: 0
PAINLEVEL_OUTOF10: 6
PAINLEVEL_OUTOF10: 6
PAINLEVEL_OUTOF10: 0
PAINLEVEL_OUTOF10: 0
PAINLEVEL_OUTOF10: 5
PAINLEVEL_OUTOF10: 5
PAINLEVEL_OUTOF10: 6

## 2024-10-24 ASSESSMENT — PAIN DESCRIPTION - FREQUENCY
FREQUENCY: INTERMITTENT

## 2024-10-24 ASSESSMENT — PAIN DESCRIPTION - ORIENTATION
ORIENTATION: LEFT

## 2024-10-24 ASSESSMENT — PAIN DESCRIPTION - DESCRIPTORS
DESCRIPTORS: ACHING

## 2024-10-24 ASSESSMENT — PAIN - FUNCTIONAL ASSESSMENT
PAIN_FUNCTIONAL_ASSESSMENT: ACTIVITIES ARE NOT PREVENTED
PAIN_FUNCTIONAL_ASSESSMENT: ACTIVITIES ARE NOT PREVENTED
PAIN_FUNCTIONAL_ASSESSMENT: PREVENTS OR INTERFERES SOME ACTIVE ACTIVITIES AND ADLS
PAIN_FUNCTIONAL_ASSESSMENT: ACTIVITIES ARE NOT PREVENTED

## 2024-10-24 ASSESSMENT — PAIN SCALES - WONG BAKER
WONGBAKER_NUMERICALRESPONSE: NO HURT

## 2024-10-24 ASSESSMENT — PAIN DESCRIPTION - PAIN TYPE
TYPE: ACUTE PAIN

## 2024-10-24 ASSESSMENT — PAIN DESCRIPTION - LOCATION
LOCATION: CHEST
LOCATION: NECK

## 2024-10-24 ASSESSMENT — PAIN DESCRIPTION - ONSET
ONSET: ON-GOING

## 2024-10-24 NOTE — PROGRESS NOTES
Reeducated patient on importance of dialysis. Patient stated that she wanted to eat her breakfast before going to dialysis and then she will go.

## 2024-10-24 NOTE — CONSULTS
Neurology Consult Note    Reason for Consult: unresponsive episode     Chief complaint: unresponsiveness     Bright Morris MD asked me to see Scottie Chiang in consultation for evaluation of unresponsiveness.     History of Present Illness:  Scottie Chiang is a 54 y.o. female who presents with unresponsiveness. Scottie was receiving HD for her ESRD, normal schedule MWF, and she following dilaysis had episode with poor interactivity, right arm tremulousness, and lack of following instruction for extremity movement. History was obtained from chart review and staff. She states that she feels fatigued and generally \"terrible\" but cannot elaborate how.    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 Keppra was started 2/23/2024 by Dr. Ulrich for these episodes. She was to see Dr. Edith Cox thereafter.  She has been seeing Dr. Hal Sanders (first established 4/22/2024 neurologist at  for headaches, who referred her to  Epileptologist, though ti doesn't appear she has been seen.      She denies any weakness, change in sensation, change in vision, or change in hearing.  She denies any dysphagia.  She denies any nausea or vomiting, and denies tongue biting.  She denies any headache or neck pain.      Current Facility-Administered Medications:     atorvastatin (LIPITOR) tablet 80 mg, 80 mg, Oral, Nightly, Yessy Leeornia, DO    heparin (porcine) injection 3,600 Units, 3,600 Units, IntraCATHeter, PRN, Bhavin Nguyen MD, 3,600 Units at 10/24/24 1157    levETIRAcetam (KEPPRA) injection 250 mg, 250 mg, IntraVENous, Daily PRN, Bright Garcia MD, 250 mg at 10/24/24 1505    levETIRAcetam (KEPPRA) injection 500 mg, 500 mg, IntraVENous, Daily, Edson Adam, DO    sodium chloride flush 0.9 % injection 5-40 mL, 5-40 mL, IntraVENous, 2 times per day, Yessy Lee

## 2024-10-24 NOTE — SIGNIFICANT EVENT
Code Stroke    Code Stroke called at 1420, for bed 4453.     When the ICU resident team arrived, the patient was A/Ox3 and at baseline responsiveness.   Vitals were HR 84, /88, SpO2 93% on RA    Patient had returned from dialysis at 1407 without complications.    Code stroke called as patient was not moving right sided extremities and no longer following commands.  On our arrival she was able to follow some commands, but was not moving her extremities spontaneously nor was able to hold them against gravity.  No gaze deviation or facial droop on exam.  Tactile sensation present in all extremities.  Nephrologist notified and okay with contrast for imaging.      NIHSS score 23, though patient may not have been following directions as she was moving extremities against gravity on command on way to CT scan.     Stroke team contacted at 1425 and recommended CT Head with and without contrast.    Labs and tests ordered:   RFP, CBC, Lactic Acid, EKG, MRI Brain w/o contrast, CT Head w/o, CTA Head.    Interventions preformed:   Keppra  mg (medical history significant for Complex Partial Seizures)  Consult placed to Neurology and Hospitalist.    CT Head w/o:  No acute hemorrhage or mass effect.    CT Head with Contrast:  Chronic occlusion of right carotid artery.  Present on imaging since 2021.       When we left, the patient was medically stable.      Girma Pina, PGY-2  10/24/24  3:24 PM     ADDENDUM:  Called patient's mother to update. Does state that the symptoms described sound like a post-ictal phase from the patient's absence seizures that she has. Requesting update upon MRI.    Edson Adam DO  PGY-3, Internal Medicine  10/24/24  3:52 PM    I have seen, examined and evaluated the patient as did the resident physician, on 10/24/24. We have discussed the plan of care and decisions made during that discussion were incorporated into this note. I have reviewed the resident physician's note and agree with  place  Midodrine prior to HD  Nephrology following  Nephrology planning for possibility doing dialysis again tomorrow  Vascular surgery following    History of seizure disorder, continue Keppra 500 once daily and 250 extra dose of her dialysis    History of chronic pain syndrome with opiate dependence-continue gabapentin, methocarbamol, home dose Percocet  Lumbosacral spondylosis without myelopathy    Pt has a high probability of imminent or life-threatening deterioration requiring close monitoring, and highly complex decision-making and/or interventions of high intensity to assess, manipulate, and support his critical organ systems to prevent the his inevitable decline which would occur if left untreated.   A total critical care time 32 minutes spent, this includes but not limited to examining patient, collaborating with other physicians, monitoring vital signs, telemetry, and clinical response to IV medications; documentation time, review and interpretation of laboratory and radiological data, review of nursing notes and old record review. This time excludes any time that may have been spent performing procedures.     Comment: Please note this report has been produced using speech recognition software and may contain errors related to that system including errors in grammar, punctuation, and spelling, as well as words and phrases that may be inappropriate. If there are any questions or concerns, please feel free to contact the dictating provider for clarification.     Bright Garcia MD  Hospitalist  Attending Physician

## 2024-10-24 NOTE — PLAN OF CARE
Problem: Chronic Conditions and Co-morbidities  Goal: Patient's chronic conditions and co-morbidity symptoms are monitored and maintained or improved  Outcome: Progressing  Flowsheets (Taken 10/24/2024 0508)  Care Plan - Patient's Chronic Conditions and Co-Morbidity Symptoms are Monitored and Maintained or Improved:   Monitor and assess patient's chronic conditions and comorbid symptoms for stability, deterioration, or improvement   Collaborate with multidisciplinary team to address chronic and comorbid conditions and prevent exacerbation or deterioration   Update acute care plan with appropriate goals if chronic or comorbid symptoms are exacerbated and prevent overall improvement and discharge     Problem: Discharge Planning  Goal: Discharge to home or other facility with appropriate resources  Outcome: Progressing  Flowsheets (Taken 10/24/2024 0508)  Discharge to home or other facility with appropriate resources:   Identify barriers to discharge with patient and caregiver   Identify discharge learning needs (meds, wound care, etc)   Refer to discharge planning if patient needs post-hospital services based on physician order or complex needs related to functional status, cognitive ability or social support system   Arrange for interpreters to assist at discharge as needed   Arrange for needed discharge resources and transportation as appropriate     Problem: Skin/Tissue Integrity  Goal: Absence of new skin breakdown  Description: 1.  Monitor for areas of redness and/or skin breakdown  2.  Assess vascular access sites hourly  3.  Every 4-6 hours minimum:  Change oxygen saturation probe site  4.  Every 4-6 hours:  If on nasal continuous positive airway pressure, respiratory therapy assess nares and determine need for appliance change or resting period.  Outcome: Progressing     Problem: Safety - Adult  Goal: Free from fall injury  Outcome: Progressing  Flowsheets (Taken 10/24/2024 0508)  Free From Fall Injury:

## 2024-10-24 NOTE — DISCHARGE INSTRUCTIONS
Please schedule a follow-up appointment with your primary care physician within 1 week.    Please schedule a follow-up appointment with your neurologist, Dr. Hong, within 1 week.      ACTIVITY  - No lifting >8lbs or a gallon of milk for 2 weeks.   - No driving while on narcotics  Otherwise, you can drive when you can sit comfortably behind the steering wheel and can slam on the brake without it hurting or turn the wheel sharply without it hurting.  Practice this when sitting the car with it parked in your driveway before trying to drive.    Preventing Pneumonia   --use Incentive spirometer (IS-the breathing thing that the hospital gave you) every couple of hours.  This will help prevent you from getting pneumonia.  You want to do this for the next couple of weeks until you can take deep breaths without it hurting.      Bathing  Hygiene is important so that you do not get an infection  - May shower, let water run over incision sites. No soaking in tub, hot tub, or pool. Do not submerge incision site in water.   - Notify MD immediately if experierencing fevers/chills, nausea/vomiting, pus-like discharge from incision sites, redness swelling, or warmth to incisions.     Diet:  -Diet-renal    FOLLOW UP:  - Follow up with Dr. Kumar in 2 weeks - call [phone] for appointment.       Pain management:   Unless informed of any restrictions by your primary care physician, please use your preferred over-the-counter pain reliever as your primary pain medication. If you have pain that persists despite over-the-counter pain medications, you have been provided with a prescription for an opioid/narcotic pain reliever.   No driving or operating machinery while taking opioid/narcotic medications. Daily limit of acetaminophen is 4,000mg.       Bowel Regimen:   Opioid/Narcotic pain relievers have a common side effect of constipation; therefore, you have been provided with a prescription for a stool softener, Docusate

## 2024-10-24 NOTE — PROGRESS NOTES
Vascular Surgery   Daily Progress Note  Patient: Scottie Chiang      PROCEDURES:   1.  Placement of left internal jugular vein tunneled hemodialysis catheter  2.  Removal of right sided tunneled hemodialysis catheter  3.  Ultrasound-guided access right common femoral artery and ultrasound-guided access right upper arm AV fistula  4.  Right brachiocephalic arteriovenous fistula angioplasty 4 mm x 100 mm [place], 5 mm x 150 mm [place], 6 mm x 150 mm [place]  5.  Right subclavian and brachiocephalic vein angioplasty 7 mm x 60 mm and 9 mm x 60 mm [place] balloon    SUBJECTIVE:   Patient rested well overnight. Pain is controlled. Tolerating diet without nausea or vomiting. Has not had dialysis yet, reports will receive it this morning.    OBJECTIVE:  PHYSICAL EXAM:    Vitals:    10/23/24 2110 10/23/24 2302 10/24/24 0325 10/24/24 0616   BP: 116/70 (!) 132/59 (!) 141/79    Pulse: 78 87 86    Resp:  16 16    Temp:  98.7 °F (37.1 °C) 100 °F (37.8 °C)    TempSrc:  Oral Axillary    SpO2:  90% 92%    Weight:    99.2 kg (218 lb 11.1 oz)   Height:           General appearance: alert, no acute distress  Chest/Lungs:  normal effort, symmetric chest rise   Cardiovascular: RRR  Abdomen: soft, obese, non-tender, non-distended,  Skin: no erythema or rashes, no cyanosis, R groin stick site dressing c/d/I, no palpable assess or expanding hematoma, Previous R tunneled HD cath site with dressing with minimal bloody strike through, Dressing over RUE fistula site C/D/I, RUE fistula site with good palpable thrill  Extremities: no edema, no cyanosis  Neuro: A&Ox3, no focal deficits, sensation intact      ASSESSMENT & PLAN:   This is a 54 y.o. female who is s/p placement of L IJ vein tunneled HD catheter, Removed R tunneled HD catheter, femoral and RUE AV access with US, R brachiocephalic AV fistula angioplasty, R subclavian vein and brachiocephalic vein angioplasty POD #1.    - HD per Nephrology prior to discharge today  - Pain control and ice pack

## 2024-10-24 NOTE — PROGRESS NOTES
Code stroke called on patient at 1428 due to decrease responsiveness, decrease in response to commands, slurred speech and staring deviating to the right. Blood glucose was 134, HR 85 /85 89% on RA. Patient was place on 2L. CTA ordered and completed, Neurology consulted.

## 2024-10-24 NOTE — PROGRESS NOTES
4 Eyes Skin Assessment     NAME:  Scottie Chiang  YOB: 1970  MEDICAL RECORD NUMBER:  [id number]    The patient is being assessed for  Admission    I agree that at least one RN has performed a thorough Head to Toe Skin Assessment on the patient. ALL assessment sites listed below have been assessed.      Areas assessed by both nurses:    Head, Face, Ears, Shoulders, Back, Chest, Arms, Elbows, Hands, Sacrum. Buttock, Coccyx, Ischium, Legs. Feet and Heels, and Under Medical Devices         Does the Patient have a Wound? No noted wound(s)       Scattered abrasion on abdomen  Surgical scar R knee  Amputated L Great Toe    Margarito Prevention initiated by RN: No  Wound Care Orders initiated by RN: No    Pressure Injury (Stage 3,4, Unstageable, DTI, NWPT, and Complex wounds) if present, place Wound referral order by RN under : No    New Ostomies, if present place, Ostomy referral order under : No     Nurse 1 eSignature: Electronically signed by CODY HARRISON RN on 10/24/24 at 5:07 AM EDT    **SHARE this note so that the co-signing nurse can place an eSignature**    Nurse 2 eSignature: Electronically signed by Simran Vega RN on 10/24/24 at 5:14 AM EDT

## 2024-10-24 NOTE — H&P
Internal Medicine H&P    Date:   10/23/2024   Patient:  Scottie Chiang   :   1970     CC:  No chief complaint on file.       Source of HPI: Patient & Chart Review    Subjective     HPI:  Ms. Scottie Chiang is a 54 y.o. female with a MHx significant for, ESRD on HD MWF, T2DM, HFpEF, HTN, HLD, Complex Partial Seizures, who is s/p Placement of left internal jugular vein tunneled hemodialysis catheter, Removal of right sided tunneled hemodialysis catheter, Right brachiocephalic arteriovenous fistula angioplasty, Right subclavian and brachiocephalic vein angioplasty.     Code Stroke called at 1420. Patient had returned from dialysis at 1407 without complications. Code stroke was called as patient was not moving right sided extremities and no longer following commands.  On our arrival she was able to follow some commands, but was not moving her extremities spontaneously nor was able to hold them against gravity.  There was no gaze deviation or facial droop on exam.  Tactile sensation was present in all extremities.  Nephrologist was notified and okay with contrast for imaging. CT head showed no acute hemorrhage or mass effect. CTA showed no new LVO.     Patient has hx of partial complex seizures, and started keppra in February. Suspect she had another episode following keppra wash out with HD given that she only received half her dose following.    PMHx:      Diagnosis Date    Arthritis     Diabetes mellitus (HCC)     Diastolic CHF (HCC)     End stage renal disease (HCC)     Hemodialysis patient (HCC)     MWF    History of blood transfusion     Hyperlipidemia     Hypertension     On home O2     but does not use it    JEAN (obstructive sleep apnea)     currently not on cpap       PSurgHx:      Procedure Laterality Date    CATARACT REMOVAL Bilateral      SECTION      DIALYSIS CATHETER INSERTION N/A 2021    LAPAROSCOPIC PERITONEAL DIALYSIS CATHETER INSERTION, OMENTOPLEXY performed by Henri Elizalde DO at [facility] OR  subsequently, and her electrolyte derangements improved.  - HD MWF  - C/s nephrology    Complex Partial Seizure  Shortly after returning to floors from HD a code stroke was called. Her symptoms are better explained as recurrence of her seizures, especially given that her keppra would have washed out with HD. She was only given half her normal dose following HD before having her episode.  - Keppra 500 mg IV qd  - C/s neurology   - Obtain complete metabolic and infectious workup   - EEG   - MRI brain   - Seizure precautions   - f/u with neurologist outpt    Chronic medical conditions:    HTN  - Continue home meds    HLD  - Continue home atorvastatin    T2DM  - Low dose SS    DVT PPx:    Diet:  ADULT DIET; Regular; Low Sodium (2 gm); Low Potassium (Less than 3000 mg/day); Low Phosphorus (Less than 1000 mg)   Code status:  Full Code     ELOS:  Barriers to discharge:  Disposition  - Preadmission:  - Current:  - Upon discharge:  TBD      Will discuss with attending physician Bright Wetzel MD Mudasser Mohammed, MD, PGY-1  Internal Medicine Resident  Contact via ioSafe

## 2024-10-24 NOTE — CONSULTS
Nephrology Consult Note                                                                                                                                                                                                                                                                                                                                                               Office : [phone]     Fax :[phone]    Patient's Name: Scottie Chiang  11:58 PM  10/23/2024    Reason for Consult:  ESRD on HD, altered mental status  Requesting Physician:  Unknown, Provider  Chief Complaint:    No chief complaint on file.      Assessment/Plan     # ESRD on HD MWF  - She has missed few HD sessions because of fistula issues   - TDC placed today    # Hyperkalemia   - We will run HD tonight for 2 hours     # HTN  Stable.  - Continue home meds    # Acid- base/ Electrolyte imbalance   - HD now to correct imbalance       History of Present Ilness:    Scottie Chiang is a 54 y.o. female with prior history of ESRD on HD MWF, T2DM, HFpEF, HTN, HLD who is s/p Placement of left internal jugular vein tunneled hemodialysis catheter, Removal of right sided tunneled hemodialysis catheter, Right brachiocephalic arteriovenous fistula angioplasty, Right subclavian and brachiocephalic vein angioplasty      Interval hx       Past Medical History:   Diagnosis Date    Arthritis     Diabetes mellitus (HCC)     Diastolic CHF (HCC)     End stage renal disease (HCC)     Hemodialysis patient (HCC)     MWF    History of blood transfusion     Hyperlipidemia     Hypertension     On home O2     but does not use it    JEAN (obstructive sleep apnea)     currently not on cpap       Past Surgical History:   Procedure Laterality Date    CATARACT REMOVAL Bilateral      SECTION      DIALYSIS CATHETER INSERTION N/A 2021    LAPAROSCOPIC PERITONEAL DIALYSIS CATHETER INSERTION, OMENTOPLEXY performed by Henri Elizalde DO at [facility] OR    DIALYSIS CATHETER INSERTION N/A  % sodium chloride infusion, PRN  ondansetron (ZOFRAN-ODT) disintegrating tablet 4 mg, Q8H PRN   Or  ondansetron (ZOFRAN) injection 4 mg, Q6H PRN  heparin (porcine) injection 5,000 Units, 3 times per day  atorvastatin (LIPITOR) tablet 80 mg, Daily  carvedilol (COREG) tablet 12.5 mg, BID  docusate sodium (COLACE) capsule 100 mg, BID  gabapentin (NEURONTIN) capsule 300 mg, BID  levETIRAcetam (KEPPRA) tablet 250 mg, Once per day on Monday Wednesday Friday  levETIRAcetam (KEPPRA) tablet 500 mg, Daily  memantine (NAMENDA) tablet 5 mg, Daily  methocarbamol (ROBAXIN) tablet 500 mg, BID  midodrine (PROAMATINE) tablet 10 mg, Once per day on Monday Wednesday Friday  NIFEdipine (ADALAT CC) extended release tablet 60 mg, Daily  oxyCODONE-acetaminophen (PERCOCET) 7.5-325 MG per tablet 1 tablet, Q6H PRN  [START ON 10/24/2024] pantoprazole (PROTONIX) tablet 40 mg, QAM AC  scopolamine (TRANSDERM-SCOP) transdermal patch 1 patch, Q72H  sevelamer (RENVELA) tablet 800 mg, TID WC  torsemide (DEMADEX) tablet 100 mg, BID  naloxone (NARCAN) injection 0.4 mg, PRN  glucose chewable tablet 16 g, PRN  dextrose bolus 10% 125 mL, PRN   Or  dextrose bolus 10% 250 mL, PRN  glucagon injection 1 mg, PRN  dextrose 10 % infusion, Continuous PRN  calcium gluconate 1000 mg in sodium chloride 100 mL, Once        Review of Systems:   14 point ROS obtained but were negative except mentioned in HPI      Physical exam:     Vitals:  BP (!) 132/59   Pulse 87   Temp 98.7 °F (37.1 °C) (Oral)   Resp 16   Ht 1.549 m (5' 1\")   Wt 98.3 kg (216 lb 12.8 oz)   SpO2 90%   BMI 40.96 kg/m²   Constitutional:  Ill-appearing. Awake and engaged with conversation. Oriented to person and place but not situation.   Skin: no rash, turgor wnl  Heent:  eomi, mmm  Neck: no bruits or jvd noted  Cardiovascular:  S1, S2 without m/r/g  Respiratory: CTA B without w/r/r  Abdomen:  soft, nt, nd  Ext:  lower extremity edema No  Psychiatric: mood and affect  appropriate  Musculoskeletal:  Rom, muscular strength intact    Data:   Labs:  CBC:   Recent Labs     10/23/24  0757   WBC 4.9   HGB 11.3*   *     BMP:    Recent Labs     10/23/24  0757 10/23/24  0831 10/23/24  1933    139 137   K 6.4* 5.9* 6.4*   CL 98* 97* 95*   CO2 19* 21 19*   BUN 91* 89* 93*   CREATININE 10.4* 10.0* 10.5*   GLUCOSE 119* 128* 194*     Ca/Mg/Phos:   Recent Labs     10/23/24  0757 10/23/24  0831 10/23/24  1933   CALCIUM 8.5 8.6 8.7   PHOS  --   --  7.2*     Hepatic:   No results for input(s): \"AST\", \"ALT\", \"BILITOT\", \"ALKPHOS\" in the last 72 hours.    Invalid input(s): \"ALB\"    Troponin: No results for input(s): \"TROPONINI\" in the last 72 hours.  BNP: No results for input(s): \"BNP\" in the last 72 hours.  Lipids: No results for input(s): \"CHOL\", \"TRIG\", \"HDL\" in the last 72 hours.    Invalid input(s): \"LDLCALC\", \"LABVLDL\"  ABGs:   Recent Labs     10/23/24  2251   PHART 7.333*   PO2ART 55.7*   [id number] 42.0     INR:   Recent Labs     10/23/24  0757   INR 1.03     UA:No results for input(s): \"COLORU\", \"CLARITYU\", \"GLUCOSEU\", \"BILIRUBINUR\", \"KETUA\", \"SPECGRAV\", \"BLOODU\", \"PHUR\", \"PROTEINU\", \"UROBILINOGEN\", \"NITRU\", \"LEUKOCYTESUR\", \"URINETYPE\" in the last 72 hours.    Invalid input(s): \"LABMICR\"   Urine Microscopic: No results for input(s): \"LABCAST\", \"BACTERIA\", \"COMU\", \"HYALCAST\", \"WBCUA\", \"RBCUA\" in the last 72 hours.    Invalid input(s): \"EPIU\"  Urine Culture: No results for input(s): \"LABURIN\" in the last 72 hours.  Urine Chemistry: No results for input(s): \"CLUR\", \"LABCREA\", \"PROTEINUR\", \"NAUR\" in the last 72 hours.      IMAGING:  No orders to display         Medical Decision Making:  The following items were considered in medical decision making:  Discussion of patient care with other providers  Reviewed clinical lab tests  Reviewed radiology tests  Reviewed other diagnostic tests/interventions    Will be discussed w/  Primary team     Thank you for allowing us to participate in care

## 2024-10-24 NOTE — CONSULTS
Nephrology Note                                                                                                                                                                                                                                                                                                                                                               Office : [phone]     Fax :[phone]    Patient's Name: Scottie Chiang  12:27 PM  10/24/2024    Reason for Consult:  ESRD on HD, altered mental status  Requesting Physician:  Unknown, Provider  Chief Complaint:    No chief complaint on file.      Assessment/Plan     # ESRD on HD MWF  # Hyperkalemia  - She has missed few HD sessions because of fistula issues   - TDC in place   - HD today for 4 hours with 2 K bath  - HD again tomorrow at her dialysis center     # HTN  Stable.  - Continue home meds    # Acid- base/ Electrolyte imbalance   - HD to correct imbalance       History of Present Ilness:    Scottie Chiang is a 54 y.o. female with prior history of ESRD on HD MWF, T2DM, HFpEF, HTN, HLD who is s/p Placement of left internal jugular vein tunneled hemodialysis catheter, Removal of right sided tunneled hemodialysis catheter, Right brachiocephalic arteriovenous fistula angioplasty, Right subclavian and brachiocephalic vein angioplasty      Interval hx       Past Medical History:   Diagnosis Date    Arthritis     Diabetes mellitus (HCC)     Diastolic CHF (HCC)     End stage renal disease (HCC)     Hemodialysis patient (HCC)     MWF    History of blood transfusion     Hyperlipidemia     Hypertension     On home O2     but does not use it    JEAN (obstructive sleep apnea)     currently not on cpap       Past Surgical History:   Procedure Laterality Date    CATARACT REMOVAL Bilateral      SECTION      DIALYSIS CATHETER INSERTION N/A 2021    LAPAROSCOPIC PERITONEAL DIALYSIS CATHETER INSERTION, OMENTOPLEXY performed by Henri Elizalde DO at [facility] OR     nd  Ext:  lower extremity edema No  Psychiatric: mood and affect appropriate  Musculoskeletal:  Rom, muscular strength intact    Data:   Labs:  CBC:   Recent Labs     10/23/24  0757 10/24/24  0743   WBC 4.9 5.3   HGB 11.3* 9.7*   * 111*     BMP:    Recent Labs     10/23/24  0831 10/23/24  1933 10/24/24  0743    137 134*   K 5.9* 6.4* 7.2*   CL 97* 95* 94*   CO2 21 19* 21   BUN 89* 93* 105*   CREATININE 10.0* 10.5* 11.4*   GLUCOSE 128* 194* 114*     Ca/Mg/Phos:   Recent Labs     10/23/24  0831 10/23/24  1933 10/24/24  0743   CALCIUM 8.6 8.7 8.3   MG  --   --  2.40   PHOS  --  7.2* 7.1*     Hepatic:   No results for input(s): \"AST\", \"ALT\", \"BILITOT\", \"ALKPHOS\" in the last 72 hours.    Invalid input(s): \"ALB\"    Troponin: No results for input(s): \"TROPONINI\" in the last 72 hours.  BNP: No results for input(s): \"BNP\" in the last 72 hours.  Lipids: No results for input(s): \"CHOL\", \"TRIG\", \"HDL\" in the last 72 hours.    Invalid input(s): \"LDLCALC\", \"LABVLDL\"  ABGs:   Recent Labs     10/23/24  2251   PHART 7.333*   PO2ART 55.7*   KSK7HLD 42.0     INR:   Recent Labs     10/23/24  0757   INR 1.03     UA:No results for input(s): \"COLORU\", \"CLARITYU\", \"GLUCOSEU\", \"BILIRUBINUR\", \"KETUA\", \"SPECGRAV\", \"BLOODU\", \"PHUR\", \"PROTEINU\", \"UROBILINOGEN\", \"NITRU\", \"LEUKOCYTESUR\", \"URINETYPE\" in the last 72 hours.    Invalid input(s): \"LABMICR\"   Urine Microscopic: No results for input(s): \"LABCAST\", \"BACTERIA\", \"COMU\", \"HYALCAST\", \"WBCUA\", \"RBCUA\" in the last 72 hours.    Invalid input(s): \"EPIU\"  Urine Culture: No results for input(s): \"LABURIN\" in the last 72 hours.  Urine Chemistry: No results for input(s): \"CLUR\", \"LABCREA\", \"PROTEINUR\", \"NAUR\" in the last 72 hours.      IMAGING:  No orders to display         Medical Decision Making:  The following items were considered in medical decision making:  Discussion of patient care with other providers  Reviewed clinical lab tests  Reviewed radiology tests  Reviewed other  diagnostic tests/interventions    Will be discussed w/  Primary team     Thank you for allowing us to participate in care of Scottie Chiang   Feel free to contact me,     Amadou Guy MD, MS4   [facility]  Office : [phone] or through [employer]  Fax :[phone]       I have seen the patient independently from the resident .I discussed the care with the resident. I personally reviewed the HPI, PH, FH, SH, ROS and medications. I repeated pertinent portions of the examination and reviewed the relevant imaging and laboratory data. I agree with the findings, assessment and plan as documented, with the following addendum:      Amadou Guy MD

## 2024-10-24 NOTE — PROGRESS NOTES
Patient refusing dialysis at this time. Patient educated on importance of dialysis. Patient still refusing. Dr. Sterling notified and asked to come to bedside to educate patient on importance of dialysis

## 2024-10-25 VITALS
HEART RATE: 79 BPM | SYSTOLIC BLOOD PRESSURE: 122 MMHG | OXYGEN SATURATION: 93 % | BODY MASS INDEX: 38.88 KG/M2 | RESPIRATION RATE: 16 BRPM | TEMPERATURE: 98.7 F | DIASTOLIC BLOOD PRESSURE: 69 MMHG | HEIGHT: 61 IN | WEIGHT: 205.91 LBS

## 2024-10-25 LAB
ALBUMIN SERPL-MCNC: 3.8 G/DL (ref 3.4–5)
ANION GAP SERPL CALCULATED.3IONS-SCNC: 12 MMOL/L (ref 3–16)
BACTERIA CATH TIP CULT: ABNORMAL
BASOPHILS # BLD: 0 K/UL (ref 0–0.2)
BASOPHILS NFR BLD: 1.2 %
BUN SERPL-MCNC: 47 MG/DL (ref 7–20)
CALCIUM SERPL-MCNC: 8.3 MG/DL (ref 8.3–10.6)
CHLORIDE SERPL-SCNC: 94 MMOL/L (ref 99–110)
CO2 SERPL-SCNC: 25 MMOL/L (ref 21–32)
CREAT SERPL-MCNC: 6.6 MG/DL (ref 0.6–1.1)
DEPRECATED RDW RBC AUTO: 15.4 % (ref 12.4–15.4)
EKG ATRIAL RATE: 89 BPM
EKG DIAGNOSIS: NORMAL
EKG P AXIS: 48 DEGREES
EKG P-R INTERVAL: 206 MS
EKG Q-T INTERVAL: 346 MS
EKG QRS DURATION: 82 MS
EKG QTC CALCULATION (BAZETT): 420 MS
EKG R AXIS: 3 DEGREES
EKG T AXIS: 52 DEGREES
EKG VENTRICULAR RATE: 89 BPM
EOSINOPHIL # BLD: 0.2 K/UL (ref 0–0.6)
EOSINOPHIL NFR BLD: 5.9 %
GFR SERPLBLD CREATININE-BSD FMLA CKD-EPI: 7 ML/MIN/{1.73_M2}
GLUCOSE BLD-MCNC: 116 MG/DL (ref 70–99)
GLUCOSE SERPL-MCNC: 117 MG/DL (ref 70–99)
HCT VFR BLD AUTO: 29 % (ref 36–48)
HGB BLD-MCNC: 9.4 G/DL (ref 12–16)
LYMPHOCYTES # BLD: 1.3 K/UL (ref 1–5.1)
LYMPHOCYTES NFR BLD: 32 %
MCH RBC QN AUTO: 31.5 PG (ref 26–34)
MCHC RBC AUTO-ENTMCNC: 32.3 G/DL (ref 31–36)
MCV RBC AUTO: 97.5 FL (ref 80–100)
MONOCYTES # BLD: 0.5 K/UL (ref 0–1.3)
MONOCYTES NFR BLD: 11.7 %
NEUTROPHILS # BLD: 2 K/UL (ref 1.7–7.7)
NEUTROPHILS NFR BLD: 49.2 %
ORGANISM: ABNORMAL
PERFORMED ON: ABNORMAL
PHOSPHATE SERPL-MCNC: 5.4 MG/DL (ref 2.5–4.9)
PLATELET # BLD AUTO: 110 K/UL (ref 135–450)
PMV BLD AUTO: 11 FL (ref 5–10.5)
POTASSIUM SERPL-SCNC: 4.4 MMOL/L (ref 3.5–5.1)
RBC # BLD AUTO: 2.98 M/UL (ref 4–5.2)
SODIUM SERPL-SCNC: 131 MMOL/L (ref 136–145)
WBC # BLD AUTO: 4 K/UL (ref 4–11)

## 2024-10-25 PROCEDURE — 2580000003 HC RX 258

## 2024-10-25 PROCEDURE — 99232 SBSQ HOSP IP/OBS MODERATE 35: CPT | Performed by: HOSPITALIST

## 2024-10-25 PROCEDURE — 6360000002 HC RX W HCPCS

## 2024-10-25 PROCEDURE — 80069 RENAL FUNCTION PANEL: CPT

## 2024-10-25 PROCEDURE — 93005 ELECTROCARDIOGRAM TRACING: CPT

## 2024-10-25 PROCEDURE — 93010 ELECTROCARDIOGRAM REPORT: CPT | Performed by: INTERNAL MEDICINE

## 2024-10-25 PROCEDURE — 85025 COMPLETE CBC W/AUTO DIFF WBC: CPT

## 2024-10-25 PROCEDURE — 6360000002 HC RX W HCPCS: Performed by: INTERNAL MEDICINE

## 2024-10-25 PROCEDURE — 90935 HEMODIALYSIS ONE EVALUATION: CPT

## 2024-10-25 PROCEDURE — 6370000000 HC RX 637 (ALT 250 FOR IP)

## 2024-10-25 RX ORDER — ACETAMINOPHEN 325 MG/1
650 TABLET ORAL EVERY 4 HOURS PRN
Status: DISCONTINUED | OUTPATIENT
Start: 2024-10-25 | End: 2024-10-25 | Stop reason: HOSPADM

## 2024-10-25 RX ORDER — CARVEDILOL 12.5 MG/1
12.5 TABLET ORAL 2 TIMES DAILY
Qty: 60 TABLET | Refills: 3 | Status: CANCELLED | OUTPATIENT
Start: 2024-10-25

## 2024-10-25 RX ORDER — LEVETIRACETAM 500 MG/1
250 TABLET ORAL
Status: DISCONTINUED | OUTPATIENT
Start: 2024-10-25 | End: 2024-10-25 | Stop reason: HOSPADM

## 2024-10-25 RX ORDER — LEVETIRACETAM 500 MG/1
500 TABLET ORAL DAILY
Status: DISCONTINUED | OUTPATIENT
Start: 2024-10-26 | End: 2024-10-25 | Stop reason: HOSPADM

## 2024-10-25 RX ORDER — FLUTICASONE PROPIONATE 50 MCG
1 SPRAY, SUSPENSION (ML) NASAL DAILY PRN
Status: DISCONTINUED | OUTPATIENT
Start: 2024-10-25 | End: 2024-10-25 | Stop reason: HOSPADM

## 2024-10-25 RX ADMIN — LEVETIRACETAM 250 MG: 500 TABLET, FILM COATED ORAL at 15:00

## 2024-10-25 RX ADMIN — HEPARIN SODIUM 3600 UNITS: 1000 INJECTION INTRAVENOUS; SUBCUTANEOUS at 14:23

## 2024-10-25 RX ADMIN — GABAPENTIN 300 MG: 300 CAPSULE ORAL at 09:26

## 2024-10-25 RX ADMIN — HEPARIN SODIUM 5000 UNITS: 5000 INJECTION INTRAVENOUS; SUBCUTANEOUS at 05:05

## 2024-10-25 RX ADMIN — MEMANTINE HYDROCHLORIDE 5 MG: 5 TABLET, FILM COATED ORAL at 09:26

## 2024-10-25 RX ADMIN — DOCUSATE SODIUM 100 MG: 100 CAPSULE, LIQUID FILLED ORAL at 09:26

## 2024-10-25 RX ADMIN — SEVELAMER CARBONATE 800 MG: 800 TABLET, FILM COATED ORAL at 09:26

## 2024-10-25 RX ADMIN — METHOCARBAMOL TABLETS 500 MG: 500 TABLET, COATED ORAL at 09:26

## 2024-10-25 RX ADMIN — SODIUM CHLORIDE, PRESERVATIVE FREE 10 ML: 5 INJECTION INTRAVENOUS at 07:29

## 2024-10-25 RX ADMIN — OXYCODONE AND ACETAMINOPHEN 1 TABLET: 7.5; 325 TABLET ORAL at 07:40

## 2024-10-25 RX ADMIN — LEVETIRACETAM 500 MG: 100 INJECTION INTRAVENOUS at 07:29

## 2024-10-25 ASSESSMENT — PAIN DESCRIPTION - LOCATION
LOCATION: HEAD
LOCATION: HEAD
LOCATION: CHEST

## 2024-10-25 ASSESSMENT — PAIN SCALES - GENERAL
PAINLEVEL_OUTOF10: 4
PAINLEVEL_OUTOF10: 5
PAINLEVEL_OUTOF10: 8
PAINLEVEL_OUTOF10: 6

## 2024-10-25 ASSESSMENT — PAIN DESCRIPTION - DESCRIPTORS: DESCRIPTORS: ACHING

## 2024-10-25 NOTE — PROGRESS NOTES
Patient was brought to EEG by [facility]. Patient stated she is in to much pain to have Routine EEG this evening. Abilio VELÁZQUEZ notified of patient's pain and trouble breathing. Routine EEG will be attempted in AM.

## 2024-10-25 NOTE — PROGRESS NOTES
Treatment time: 3 hours  Net UF: 3000 ml     Pre weight: 96.4 kg  Post weight:93.4 kg  EDW: TBD    Access used: LTDC    Access function: Tolerated well with  ml/min     Medications or blood products given: Heparin dwells     Regular outpatient schedule: [facility]      Summary of response to treatment: Patient tolerated treatment well and without any complications. Patient remained stable throughout entire treatment and upon the exiting the dialysis suite via transport.    Copy of dialysis treatment record placed in chart, to be scanned into EMR.

## 2024-10-25 NOTE — PROGRESS NOTES
While going over discharge paperwork, pt complained of chest pain that is worse with movement. RN notified provider. EKG ordered and completed.   Vitals:    10/25/24 1434 10/25/24 1451 10/25/24 1549 10/25/24 1556   BP: 130/78 109/72 102/69 122/69   Pulse: 77 78 81 79   Resp: 18 16     Temp: 99 °F (37.2 °C) 98.7 °F (37.1 °C)     TempSrc:  Axillary     SpO2:  96% 96% 93%   Weight: 93.4 kg (205 lb 14.6 oz)      Height:         Provider at bedside. Ok to continue with discharge. Pain is reproducible with touch and movement. Pt is also ok with discharging.

## 2024-10-25 NOTE — PROGRESS NOTES
than 1000 mg)   Isolation No active isolations     CURRENT SCHEDULED MEDICATIONS   Inpatient Medications     levETIRAcetam, 250 mg, Oral, Once per day on Monday Wednesday Friday    [START ON 10/26/2024] levETIRAcetam, 500 mg, Oral, Daily    atorvastatin, 80 mg, Oral, Nightly    insulin lispro, 0-4 Units, SubCUTAneous, 4x Daily AC & HS    sodium chloride flush, 5-40 mL, IntraVENous, 2 times per day    heparin (porcine), 5,000 Units, SubCUTAneous, 3 times per day    [Held by provider] carvedilol, 12.5 mg, Oral, BID    docusate sodium, 100 mg, Oral, BID    gabapentin, 300 mg, Oral, BID    memantine, 5 mg, Oral, Daily    methocarbamol, 500 mg, Oral, BID    midodrine, 10 mg, Oral, Once per day on Monday Wednesday Friday    [Held by provider] NIFEdipine, 60 mg, Oral, Daily    pantoprazole, 40 mg, Oral, QAM AC    scopolamine, 1 patch, TransDERmal, Q72H    sevelamer, 800 mg, Oral, TID WC    [Held by provider] torsemide, 100 mg, Oral, BID   Infusions    sodium chloride      dextrose        Antibiotics   Recent Abx Admin        No antibiotic orders with administrations found.                      LABS   Metabolic Panel Recent Labs     10/24/24  0743 10/24/24  1534 10/25/24  1130   * 130* 131*   K 7.2* 4.8 4.4   CL 94* 91* 94*   CO2 21 23 25   * 51* 47*   CREATININE 11.4* 6.5* 6.6*   GLUCOSE 114* 116* 117*   CALCIUM 8.3 8.9 8.3   PHOS 7.1* 4.3 5.4*   MG 2.40  --   --       CBC / Coags Recent Labs     10/23/24  0757 10/24/24  0743 10/24/24  1534 10/25/24  1130   WBC 4.9 5.3 5.4 4.0   RBC 3.52* 3.03* 3.50* 2.98*   HGB 11.3* 9.7* 11.2* 9.4*   HCT 35.1* 30.0* 34.9* 29.0*   * 111* 99* 110*   INR 1.03  --   --   --       Other No results for input(s): \"LABA1C\", \"TRIG\", \"TSH\", \"CFNFHNCC92\", \"FOLATE\", \"LABSALI\", \"COVID19\" in the last 72 hours.    Invalid input(s): \"LDLCALC\"  Recent Labs     10/24/24  1534   LACTA 2.0        DIAGNOSTICS   IMAGES:  Images personally reviewed and agree w/ radiology interpretation of  MRI Brain    MRI Brain w/o Contrast:  IMPRESSION:     1.  Limited MRI with absence of T2 and T2 FLAIR axial sequences due to patient  inability to complete the examination. No acute abnormality. No acute infarct.    PHYSICAL EXAMINATION     PHYSICAL EXAM:  Vitals:    10/25/24 0740 10/25/24 0810 10/25/24 1100 10/25/24 1112   BP:   113/67 101/72   Pulse:   72 69   Resp: 18 16 18 18   Temp:   98 °F (36.7 °C) 99 °F (37.2 °C)   TempSrc:   Axillary    SpO2:   94%    Weight:    96.4 kg (212 lb 8.4 oz)   Height:             General: Alert, no distress, well-nourished  Neurologic  Mental status: Alert, will answer orientation questions but when asked questions about her seizure history repeats \"I don't know\"  Follows commands in all extrmeities    Cranial nerves:   CN2: Visual fields full w/o extinction on confrontational testing   CN 3,4,6: Pupils equal and reactive to light, extraocular muscles intact  CN5: Facial sensation symmetric   CN7: Face symmetric  CN8: Hearing symmetric to spoken voice  CN9: Palate elevated symmetrically  CN11: Traps full strength on shoulder shrug  CN12: Tongue midline with protrusion    Motor Exam:  5/5 strength throughout    Sensory: light touch intact and symmetric in all 4 extremities.  No sensory extinction on bilateral simultaneous stimulation  Tone: normal in all 4 extremities  Gait: held for patient safety    OTHER SYSTEMS:  Cardiovascular: Warm, appears well perfused   Respiratory: Easy, non-labored respiratory pattern   Abdominal: Abdomen is without distention   Extremities: Upper and lower extremities are atraumatic in appearance without deformity. No swelling or erythema

## 2024-10-25 NOTE — CONSULTS
Clinical Pharmacy Consult Note  Medication History     Admit Date: 10/23/2024    Pharmacy consulted to verify home medication list by Twin Butler MD.    List of current medications patient is taking is complete. Home Medication list in EPIC updated to reflect changes noted below.    Source of information: I spoke to the patient and viewed her dispense report.    Patient's home pharmacy: [address] - P [phone] - [facility] [phone]      Changes made to medication list:   Medications removed: (include reason, ex: therapy completed, patient no longer taking, etc.)  Vitamin D2- Not taking  Medications added:   Loperamide  Medication doses adjusted:   Carvedilol 6.25 mg BID (not 12.5 mg BID)  Memantine 10 mg BID (not 5 mg once daily)  Nifedipine 90 mg once daily (not 30 mg once daily)  Ondansetron ODT (not tablets)  Other notes:   Patient reports receiving Emgality samples from her provider but has not started therapy yet.     Current Outpatient Medications   Medication Instructions    atorvastatin (LIPITOR) 80 mg, Oral, DAILY    B Complex-C-Folic Acid (DIALYVITE 800) 0.8 MG TABS 1 tablet, Oral, DAILY    CALCITRIOL PO Take one capsule by mouth on dialysis days (MWF)    carvedilol (COREG) 6.25 mg, Oral, 2 TIMES DAILY WITH MEALS    docusate sodium (COLACE) 100 mg, Oral, 2 TIMES DAILY    Emgality 120 mg, SubCUTAneous, EVERY 30 DAYS    gabapentin (NEURONTIN) 300 mg, Oral, 2 TIMES DAILY    levETIRAcetam (KEPPRA) 250 MG tablet Take one tablet by mouth three times a week after dialysis on Mondays, Wednesdays, and Fridays (500 mg daily, plus 250 mg on dialysis days).    levETIRAcetam (KEPPRA) 500 mg, Oral, DAILY    loperamide (IMODIUM) 2 mg, Oral, 4 TIMES DAILY PRN    memantine (NAMENDA) 10 mg, Oral, 2 TIMES DAILY    methocarbamol (ROBAXIN) 500 mg, Oral, DAILY    midodrine (PROAMATINE) 10 MG tablet Take one tablet by mouth as needed for SBP<100 during dialysis. Bring bottle to

## 2024-10-25 NOTE — DISCHARGE SUMMARY
INTERNAL MEDICINE DEPARTMENT AT THE [facility]  DISCHARGE SUMMARY    Patient ID: Scottie Chiang                                             Discharge Date: 10/26/2024   Patient's PCP: Unknown, Provider                                          Discharge Physician: No att. providers found  Admit Date: 10/23/2024   Admitting Physician: Bright Garcia MD    DISCHARGE DIAGNOSES:  1- ESRD needing dialysis  2-     Hospital Course:  Scottie Chiang is a 53 yo female who presented to the hospital on 10/23/2024 for placement of an HD catheter. Following dialysis on 10/24/2024, she was noted to have new onset right-sided weakness and a Code Stroke was called. Imaging showed no acute concerns, and on chart review it was noted that she had not received her post-dialysis dose of Keppra. She was given a spot dose of Keppra with improvement. MRI brain was obtained but the patient was too agitated to tolerate the scan. She also refused routine EEG during this admission. She was kept for observation and discharged home on the following day following her dialysis session.    On the date of discharge, the patient reported feeling stable. The patient was found to not be in any acute distress, with vital signs within normal limits, and no new abnormalities on physical examination. Further, the patient expressed appropriate understanding of, and agreement with, the discharge recommendations, medications, and plan.    Physical Exam:  /69   Pulse 79   Temp 98.7 °F (37.1 °C) (Axillary)   Resp 16   Ht 1.549 m (5' 1\")   Wt 93.4 kg (205 lb 14.6 oz)   SpO2 93%   BMI 38.91 kg/m²   Vitals and nursing note reviewed.   Constitutional:       Appearance: She is obese. She is ill-appearing.   HENT:      Head: Normocephalic and atraumatic.      Right Ear: External ear normal.      Left Ear: External ear normal.      Nose: Nose normal.      Mouth/Throat:      Mouth: Mucous membranes are moist.      Pharynx: Oropharynx is clear.   Eyes:

## 2024-10-25 NOTE — CARE COORDINATION
Case Management Assessment  Initial Evaluation    Date/Time of Evaluation: 10/25/2024 5:47 PM  Assessment Completed by: Yamilka Mann    If patient is discharged prior to next notation, then this note serves as note for discharge by case management.    Patient Name: Scottie Chiang                   YOB: 1970  Diagnosis: ESRD (end stage renal disease) (HCC) [N18.6]  ESRD needing dialysis (HCC) [N18.6, Z99.2]  Right sided weakness [R53.1]                   Date / Time: 10/23/2024  6:53 AM    Patient Admission Status: Inpatient   Readmission Risk (Low < 19, Mod (19-27), High > 27): Readmission Risk Score: 22.7    Current PCP: Unknown, Provider  PCP verified by CM? No    Chart Reviewed: Yes      History Provided by: Patient, Medical Record  Patient Orientation: Alert and Oriented    Patient Cognition: Alert    Hospitalization in the last 30 days (Readmission):  No    If yes, Readmission Assessment in CM Navigator will be completed.    Advance Directives:      Code Status: Full Code   Patient's Primary Decision Maker is: Legal Next of Kin      Discharge Planning:    Patient lives with: Children, Family Members Type of Home: Apartment  Primary Care Giver: Self  Patient Support Systems include: None   Current Financial resources: Medicare, Medicaid  Current community resources: None  Current services prior to admission: None            Current DME:              Type of Home Care services:  None    ADLS  Prior functional level: Independent in ADLs/IADLs  Current functional level: Independent in ADLs/IADLs    PT AM-PAC:   /24  OT AM-PAC:   /24    Family can provide assistance at DC: No  Would you like Case Management to discuss the discharge plan with any other family members/significant others, and if so, who? No  Plans to Return to Present Housing: Yes  Other Identified Issues/Barriers to RETURNING to current housing: none  Potential Assistance needed at discharge: N/A            Potential DME:    Patient

## 2024-10-25 NOTE — PROGRESS NOTES
Vascular Surgery   Daily Progress Note  Patient: Scottie Chiang      PROCEDURES:   1.  Placement of left internal jugular vein tunneled hemodialysis catheter  2.  Removal of right sided tunneled hemodialysis catheter  3.  Ultrasound-guided access right common femoral artery and ultrasound-guided access right upper arm AV fistula  4.  Right brachiocephalic arteriovenous fistula angioplasty 4 mm x 100 mm Bandar, 5 mm x 150 mm Empire, 6 mm x 150 mm Empire  5.  Right subclavian and brachiocephalic vein angioplasty 7 mm x 60 mm and 9 mm x 60 mm [place]ford balloon    SUBJECTIVE:   Had seizure-like versus stroke symptoms after dialysis yesterday. CT head negative. Pain is controlled. No neurologic complaints this morning per patient     OBJECTIVE:  PHYSICAL EXAM:    Vitals:    10/24/24 2118 10/24/24 2153 10/24/24 2257 10/25/24 0312   BP: (!) 151/81  110/86 121/74   Pulse: 84  82 71   Resp: 16 16 16 16   Temp:   98.7 °F (37.1 °C) 97.9 °F (36.6 °C)   TempSrc:   Oral Axillary   SpO2: 99%  92% 93%   Weight:       Height:           General appearance: alert, no acute distress  Chest/Lungs:  normal effort, symmetric chest rise   Cardiovascular: RRR  Abdomen: soft, obese, non-tender, non-distended,  Skin: no erythema or rashes, no cyanosis, R groin stick site dressing C/D/I, no palpable assess or expanding hematoma, Previous R tunneled HD cath site with dressing with minimal bloody strike through, Dressing over RUE fistula site C/D/I and removed, RUE fistula site with good palpable thrill  Extremities: no edema, no cyanosis  Neuro: A&Ox3, no focal deficits, sensation intact      ASSESSMENT & PLAN:   This is a 54 y.o. female who is s/p placement of L IJ vein tunneled HD catheter, Removed R tunneled HD catheter, femoral and RUE AV access with US, R brachiocephalic AV fistula angioplasty, R subclavian vein and brachiocephalic vein angioplasty    - Care transferred over to medicine  - Okay to follow-up outpatient with Dr. Kumar  -

## 2024-10-25 NOTE — PROGRESS NOTES
Brought patient to MRI for scan of brain. Upon arrival, patient kept saying \"I can't breath\" and \"Ouch\".  Approximately 5 minutes into MRI, patient began trying to crawl out of scanner. When asked what was wrong, patient continued saying \"I can't breath\" and \"Ouch\". Made multiple attempts to communicate with patient and explain what was going on with no success. Limited MRI sent to radiologists to be read.

## 2024-10-25 NOTE — PROGRESS NOTES
Pt refusing EEG at this time. Pt's family at bedside encouraging pt to do it, pt continues to refuse. Providers are aware.

## 2024-10-25 NOTE — PROGRESS NOTES
Pt with active discharge order. RN went over discharge instructions with patient, pt and pt's family states understanding. IV and telemetry removed. Pt with no questions or concerns voiced to RN at this time.     Pt's home meds that were kept in outpatient pharmacy returned to patient.

## 2024-10-25 NOTE — PLAN OF CARE
Problem: Chronic Conditions and Co-morbidities  Goal: Patient's chronic conditions and co-morbidity symptoms are monitored and maintained or improved  10/25/2024 0027 by Scout Sue RN  Outcome: Progressing  Flowsheets (Taken 10/25/2024 0027)  Care Plan - Patient's Chronic Conditions and Co-Morbidity Symptoms are Monitored and Maintained or Improved:   Collaborate with multidisciplinary team to address chronic and comorbid conditions and prevent exacerbation or deterioration   Monitor and assess patient's chronic conditions and comorbid symptoms for stability, deterioration, or improvement   Update acute care plan with appropriate goals if chronic or comorbid symptoms are exacerbated and prevent overall improvement and discharge     Problem: Discharge Planning  Goal: Discharge to home or other facility with appropriate resources  Outcome: Progressing  Flowsheets (Taken 10/25/2024 0027)  Discharge to home or other facility with appropriate resources:   Identify barriers to discharge with patient and caregiver   Arrange for needed discharge resources and transportation as appropriate   Identify discharge learning needs (meds, wound care, etc)   Refer to discharge planning if patient needs post-hospital services based on physician order or complex needs related to functional status, cognitive ability or social support system   Arrange for interpreters to assist at discharge as needed     Problem: Skin/Tissue Integrity  Goal: Absence of new skin breakdown  Description: 1.  Monitor for areas of redness and/or skin breakdown  2.  Assess vascular access sites hourly  3.  Every 4-6 hours minimum:  Change oxygen saturation probe site  4.  Every 4-6 hours:  If on nasal continuous positive airway pressure, respiratory therapy assess nares and determine need for appliance change or resting period.  Outcome: Progressing     Problem: Safety - Adult  Goal: Free from fall injury  10/25/2024 0027 by Scout Sue  RN  Outcome: Progressing  Flowsheets (Taken 10/25/2024 0027)  Free From Fall Injury:   Based on caregiver fall risk screen, instruct family/caregiver to ask for assistance with transferring infant if caregiver noted to have fall risk factors   Instruct family/caregiver on patient safety     Problem: ABCDS Injury Assessment  Goal: Absence of physical injury  Outcome: Progressing  Flowsheets (Taken 10/25/2024 0027)  Absence of Physical Injury: Implement safety measures based on patient assessment     Problem: Pain  Goal: Verbalizes/displays adequate comfort level or baseline comfort level  10/25/2024 0027 by Scout Sue, RN  Outcome: Progressing  Flowsheets (Taken 10/25/2024 0027)  Verbalizes/displays adequate comfort level or baseline comfort level:   Encourage patient to monitor pain and request assistance   Assess pain using appropriate pain scale   Administer analgesics based on type and severity of pain and evaluate response   Implement non-pharmacological measures as appropriate and evaluate response   Consider cultural and social influences on pain and pain management   Notify Licensed Independent Practitioner if interventions unsuccessful or patient reports new pain     Problem: Neurosensory - Adult  Goal: Achieves stable or improved neurological status  Outcome: Progressing  Flowsheets (Taken 10/25/2024 0027)  Achieves stable or improved neurological status:   Monitor temperature, glucose, and sodium. Initiate appropriate interventions as ordered   Maintain blood pressure and fluid volume within ordered parameters to optimize cerebral perfusion and minimize risk of hemorrhage   Initiate measures to prevent increased intracranial pressure   Assess for and report changes in neurological status     Problem: Neurosensory - Adult  Goal: Absence of seizures  Outcome: Progressing  Flowsheets (Taken 10/25/2024 0027)  Absence of seizures:   Diagnostic studies as ordered   Administer anticonvulsants as ordered

## 2024-10-25 NOTE — PROGRESS NOTES
HD nurse called this RN and said per nephrology, pt is to be seen first in HD. Scheduled keppra given early to have it administered prior to HD. While in route to dialysis, HD nurse called this RN that the provider (dr. Guy) wants to see the patient in her room before HD. Pt returned to room 4453.

## 2024-10-25 NOTE — PROGRESS NOTES
Pt A&Ox 4 on RA. AM assessment and vitals completed and put into flowsheets. AM medications given with no s/s of aspiration. Patient takes pills whole with water. Pt with no questions or concerns voiced to RN at this time. Fall precautions in place and call light within reach.   Vitals:    10/25/24 0510 10/25/24 0728 10/25/24 0740 10/25/24 0810   BP:  125/62     Pulse:  74     Resp:  18 18 16   Temp:  98.3 °F (36.8 °C)     TempSrc:  Axillary     SpO2:  92%     Weight: 96.4 kg (212 lb 8.4 oz)      Height:

## 2024-10-25 NOTE — PROGRESS NOTES
Per Pt's RN, this pt was headed to HD but had to return back to her room. This EEG tech will check back after the pts HD has been completed. This EEG tech go to the pt room to completed the rEEG if pt can not travel to the EEG Lab.

## 2024-10-25 NOTE — PLAN OF CARE
Problem: Chronic Conditions and Co-morbidities  Goal: Patient's chronic conditions and co-morbidity symptoms are monitored and maintained or improved  10/25/2024 1011 by Teresa Rausch RN  Outcome: Progressing  Flowsheets (Taken 10/25/2024 0728)  Care Plan - Patient's Chronic Conditions and Co-Morbidity Symptoms are Monitored and Maintained or Improved:   Collaborate with multidisciplinary team to address chronic and comorbid conditions and prevent exacerbation or deterioration   Monitor and assess patient's chronic conditions and comorbid symptoms for stability, deterioration, or improvement   Update acute care plan with appropriate goals if chronic or comorbid symptoms are exacerbated and prevent overall improvement and discharge  10/25/2024 0027 by Scout Sue RN  Outcome: Progressing  Flowsheets (Taken 10/25/2024 0027)  Care Plan - Patient's Chronic Conditions and Co-Morbidity Symptoms are Monitored and Maintained or Improved:   Collaborate with multidisciplinary team to address chronic and comorbid conditions and prevent exacerbation or deterioration   Monitor and assess patient's chronic conditions and comorbid symptoms for stability, deterioration, or improvement   Update acute care plan with appropriate goals if chronic or comorbid symptoms are exacerbated and prevent overall improvement and discharge     Problem: Discharge Planning  Goal: Discharge to home or other facility with appropriate resources  10/25/2024 1011 by Teresa Rausch RN  Outcome: Progressing  Flowsheets (Taken 10/25/2024 0728)  Discharge to home or other facility with appropriate resources: Refer to discharge planning if patient needs post-hospital services based on physician order or complex needs related to functional status, cognitive ability or social support system  10/25/2024 0027 by Scout Sue RN  Outcome: Progressing  Flowsheets (Taken 10/25/2024 0027)  Discharge to home or other facility with appropriate  RN  Outcome: Progressing  Flowsheets (Taken 10/25/2024 0027)  Verbalizes/displays adequate comfort level or baseline comfort level:   Encourage patient to monitor pain and request assistance   Assess pain using appropriate pain scale   Administer analgesics based on type and severity of pain and evaluate response   Implement non-pharmacological measures as appropriate and evaluate response   Consider cultural and social influences on pain and pain management   Notify Licensed Independent Practitioner if interventions unsuccessful or patient reports new pain     Problem: Neurosensory - Adult  Goal: Achieves stable or improved neurological status  10/25/2024 1011 by Teresa Rausch RN  Outcome: Progressing  Flowsheets (Taken 10/25/2024 0728)  Achieves stable or improved neurological status: Assess for and report changes in neurological status  10/25/2024 0027 by Scout Sue RN  Outcome: Progressing  Flowsheets (Taken 10/25/2024 0027)  Achieves stable or improved neurological status:   Monitor temperature, glucose, and sodium. Initiate appropriate interventions as ordered   Maintain blood pressure and fluid volume within ordered parameters to optimize cerebral perfusion and minimize risk of hemorrhage   Initiate measures to prevent increased intracranial pressure   Assess for and report changes in neurological status  Goal: Absence of seizures  10/25/2024 1011 by Teresa Rausch RN  Outcome: Progressing  Flowsheets (Taken 10/25/2024 0728)  Absence of seizures: Monitor for seizure activity.  If seizure occurs, document type and location of movements and any associated apnea  10/25/2024 0027 by Scout Sue RN  Outcome: Progressing  Flowsheets (Taken 10/25/2024 0027)  Absence of seizures:   Diagnostic studies as ordered   Administer anticonvulsants as ordered   Monitor for seizure activity.  If seizure occurs, document type and location of movements and any associated apnea   Support airway/breathing,

## 2024-10-25 NOTE — PROGRESS NOTES
Nephrology Note                                                                                                                                                                                                                                                                                                                                                               Office : [phone]     Fax :[phone]    Patient's Name: Scottie Chiang  1:52 PM  10/25/2024    Reason for Consult:  ESRD on HD, altered mental status  Requesting Physician:  Unknown, Provider  Chief Complaint:    No chief complaint on file.      Assessment/Plan     # ESRD on HD MWF  # Hyperkalemia  - She has missed few HD sessions because of fistula issues   - NEW TDC in place   - HD again TODAY, last HD was yesterday  - If she discharges today, then we will dialyze her on Monday at her usual HD center in [place]    # HTN  Stable.  - Continue home meds    # Acid- base/ Electrolyte imbalance   - HD to correct imbalance     # Chronic seizures   - Neurology on board       History of Present Ilness:    Scottie Chiang is a 54 y.o. female with prior history of ESRD on HD MWF, T2DM, HFpEF, HTN, HLD who is s/p Placement of left internal jugular vein tunneled hemodialysis catheter, Removal of right sided tunneled hemodialysis catheter, Right brachiocephalic arteriovenous fistula angioplasty, Right subclavian and brachiocephalic vein angioplasty      Interval hx       Past Medical History:   Diagnosis Date    Arthritis     Diabetes mellitus (HCC)     Diastolic CHF (HCC)     End stage renal disease (HCC)     Hemodialysis patient (HCC)     MWF    History of blood transfusion     Hyperlipidemia     Hypertension     On home O2     but does not use it    JEAN (obstructive sleep apnea)     currently not on cpap       Past Surgical History:   Procedure Laterality Date    CATARACT REMOVAL Bilateral      SECTION      DIALYSIS CATHETER INSERTION N/A 2021     Wt 96.4 kg (212 lb 8.4 oz)   SpO2 94%   BMI 40.16 kg/m²   Constitutional:  Ill-appearing. Awake and engaged with conversation. Oriented to person and place but not situation.   Skin: no rash, turgor wnl  Heent:  eomi, mmm  Neck: no bruits or jvd noted  Cardiovascular:  S1, S2 without m/r/g  Respiratory: CTA B without w/r/r  Abdomen:  soft, nt, nd  Ext:  lower extremity edema No  Psychiatric: mood and affect appropriate  Musculoskeletal:  Rom, muscular strength intact    Data:   Labs:  CBC:   Recent Labs     10/24/24  0743 10/24/24  1534 10/25/24  1130   WBC 5.3 5.4 4.0   HGB 9.7* 11.2* 9.4*   * 99* 110*     BMP:    Recent Labs     10/24/24  0743 10/24/24  1534 10/25/24  1130   * 130* 131*   K 7.2* 4.8 4.4   CL 94* 91* 94*   CO2 21 23 25   * 51* 47*   CREATININE 11.4* 6.5* 6.6*   GLUCOSE 114* 116* 117*     Ca/Mg/Phos:   Recent Labs     10/24/24  0743 10/24/24  1534 10/25/24  1130   CALCIUM 8.3 8.9 8.3   MG 2.40  --   --    PHOS 7.1* 4.3 5.4*     Hepatic:   No results for input(s): \"AST\", \"ALT\", \"BILITOT\", \"ALKPHOS\" in the last 72 hours.    Invalid input(s): \"ALB\"    Troponin: No results for input(s): \"TROPONINI\" in the last 72 hours.  BNP: No results for input(s): \"BNP\" in the last 72 hours.  Lipids: No results for input(s): \"CHOL\", \"TRIG\", \"HDL\" in the last 72 hours.    Invalid input(s): \"LDLCALC\", \"LABVLDL\"  ABGs:   Recent Labs     10/23/24  2251   PHART 7.333*   PO2ART 55.7*   RWK5POR 42.0     INR:   Recent Labs     10/23/24  0757   INR 1.03     UA:No results for input(s): \"COLORU\", \"CLARITYU\", \"GLUCOSEU\", \"BILIRUBINUR\", \"KETUA\", \"SPECGRAV\", \"BLOODU\", \"PHUR\", \"PROTEINU\", \"UROBILINOGEN\", \"NITRU\", \"LEUKOCYTESUR\", \"URINETYPE\" in the last 72 hours.    Invalid input(s): \"LABMICR\"   Urine Microscopic: No results for input(s): \"LABCAST\", \"BACTERIA\", \"COMU\", \"HYALCAST\", \"WBCUA\", \"RBCUA\" in the last 72 hours.    Invalid input(s): \"EPIU\"  Urine Culture: No results for input(s): \"LABURIN\" in the last 72

## 2024-10-26 LAB
EKG ATRIAL RATE: 78 BPM
EKG DIAGNOSIS: NORMAL
EKG P AXIS: 43 DEGREES
EKG P-R INTERVAL: 186 MS
EKG Q-T INTERVAL: 374 MS
EKG QRS DURATION: 86 MS
EKG QTC CALCULATION (BAZETT): 426 MS
EKG R AXIS: -4 DEGREES
EKG T AXIS: 30 DEGREES
EKG VENTRICULAR RATE: 78 BPM

## 2024-10-26 PROCEDURE — 93010 ELECTROCARDIOGRAM REPORT: CPT | Performed by: INTERNAL MEDICINE

## 2024-10-28 DIAGNOSIS — M47.817 LUMBOSACRAL SPONDYLOSIS WITHOUT MYELOPATHY: ICD-10-CM

## 2024-10-28 DIAGNOSIS — I50.43 CHF (CONGESTIVE HEART FAILURE), NYHA CLASS I, ACUTE ON CHRONIC, COMBINED (HCC): ICD-10-CM

## 2024-10-28 DIAGNOSIS — M47.812 CERVICAL SPONDYLOSIS: ICD-10-CM

## 2024-10-28 DIAGNOSIS — G43.909 EPISODIC MIGRAINE: ICD-10-CM

## 2024-10-28 DIAGNOSIS — G89.4 CHRONIC PAIN SYNDROME: ICD-10-CM

## 2024-10-28 DIAGNOSIS — Z51.81 ENCOUNTER FOR THERAPEUTIC DRUG MONITORING: ICD-10-CM

## 2024-10-28 DIAGNOSIS — Z96.651 S/P TOTAL KNEE ARTHROPLASTY, RIGHT: ICD-10-CM

## 2024-10-28 DIAGNOSIS — Z99.2 ESRD (END STAGE RENAL DISEASE) ON DIALYSIS (HCC): ICD-10-CM

## 2024-10-28 DIAGNOSIS — N18.6 ESRD (END STAGE RENAL DISEASE) ON DIALYSIS (HCC): ICD-10-CM

## 2024-10-28 DIAGNOSIS — M17.11 PRIMARY LOCALIZED OSTEOARTHROSIS OF THE KNEE, RIGHT: ICD-10-CM

## 2024-10-28 RX ORDER — OXYCODONE AND ACETAMINOPHEN 7.5; 325 MG/1; MG/1
1 TABLET ORAL EVERY 6 HOURS PRN
Qty: 112 TABLET | Refills: 0 | OUTPATIENT
Start: 2024-10-28 | End: 2024-11-25

## 2024-10-31 ENCOUNTER — OFFICE VISIT (OUTPATIENT)
Dept: PAIN MANAGEMENT | Age: 54
End: 2024-10-31
Payer: COMMERCIAL

## 2024-10-31 VITALS
HEART RATE: 78 BPM | OXYGEN SATURATION: 99 % | DIASTOLIC BLOOD PRESSURE: 74 MMHG | WEIGHT: 206 LBS | SYSTOLIC BLOOD PRESSURE: 142 MMHG | BODY MASS INDEX: 38.92 KG/M2

## 2024-10-31 DIAGNOSIS — N18.6 ESRD (END STAGE RENAL DISEASE) ON DIALYSIS (HCC): ICD-10-CM

## 2024-10-31 DIAGNOSIS — M17.11 PRIMARY LOCALIZED OSTEOARTHROSIS OF THE KNEE, RIGHT: Primary | ICD-10-CM

## 2024-10-31 DIAGNOSIS — Z99.2 ESRD (END STAGE RENAL DISEASE) ON DIALYSIS (HCC): ICD-10-CM

## 2024-10-31 DIAGNOSIS — Z96.651 S/P TOTAL KNEE ARTHROPLASTY, RIGHT: ICD-10-CM

## 2024-10-31 DIAGNOSIS — G43.909 EPISODIC MIGRAINE: ICD-10-CM

## 2024-10-31 DIAGNOSIS — Z51.81 ENCOUNTER FOR THERAPEUTIC DRUG MONITORING: ICD-10-CM

## 2024-10-31 DIAGNOSIS — M47.817 LUMBOSACRAL SPONDYLOSIS WITHOUT MYELOPATHY: ICD-10-CM

## 2024-10-31 DIAGNOSIS — M47.812 CERVICAL SPONDYLOSIS: ICD-10-CM

## 2024-10-31 DIAGNOSIS — G89.4 CHRONIC PAIN SYNDROME: ICD-10-CM

## 2024-10-31 PROCEDURE — 99214 OFFICE O/P EST MOD 30 MIN: CPT | Performed by: NURSE PRACTITIONER

## 2024-10-31 PROCEDURE — G8427 DOCREV CUR MEDS BY ELIG CLIN: HCPCS | Performed by: NURSE PRACTITIONER

## 2024-10-31 PROCEDURE — 3078F DIAST BP <80 MM HG: CPT | Performed by: NURSE PRACTITIONER

## 2024-10-31 PROCEDURE — 3077F SYST BP >= 140 MM HG: CPT | Performed by: NURSE PRACTITIONER

## 2024-10-31 PROCEDURE — G8484 FLU IMMUNIZE NO ADMIN: HCPCS | Performed by: NURSE PRACTITIONER

## 2024-10-31 PROCEDURE — 1111F DSCHRG MED/CURRENT MED MERGE: CPT | Performed by: NURSE PRACTITIONER

## 2024-10-31 PROCEDURE — G8417 CALC BMI ABV UP PARAM F/U: HCPCS | Performed by: NURSE PRACTITIONER

## 2024-10-31 PROCEDURE — 1036F TOBACCO NON-USER: CPT | Performed by: NURSE PRACTITIONER

## 2024-10-31 PROCEDURE — 3017F COLORECTAL CA SCREEN DOC REV: CPT | Performed by: NURSE PRACTITIONER

## 2024-10-31 RX ORDER — SCOLOPAMINE TRANSDERMAL SYSTEM 1 MG/1
1 PATCH, EXTENDED RELEASE TRANSDERMAL
Qty: 10 PATCH | Refills: 0 | Status: SHIPPED | OUTPATIENT
Start: 2024-10-31 | End: 2024-11-30

## 2024-10-31 RX ORDER — OXYCODONE AND ACETAMINOPHEN 7.5; 325 MG/1; MG/1
1 TABLET ORAL EVERY 6 HOURS PRN
Qty: 112 TABLET | Refills: 0 | Status: SHIPPED | OUTPATIENT
Start: 2024-10-31 | End: 2024-11-28

## 2024-10-31 NOTE — PROGRESS NOTES
Scottie Chiang  1970  [id number]      HISTORY OF PRESENT ILLNESS: Ms. Chiang is a 54 y.o. female returns for a follow up visit for pain management  She has a diagnosis of   1. Primary localized osteoarthrosis of the knee, right    2. Encounter for therapeutic drug monitoring    3. Cervical spondylosis    4. CHF (congestive heart failure), NYHA class I, acute on chronic, combined (HCC)    5. Episodic migraine    6. ESRD (end stage renal disease) on dialysis ([facility])    7. Chronic pain syndrome    8. Lumbosacral spondylosis without myelopathy    9. S/P total knee arthroplasty, right    .      New Medications since Last Office visit have been reviewed with patient.     As per Information Obtained from the PADT (Patient Assessment and Documentation Tool)    She complains of pain in the head, neck, right lower arm, right knee. She rates the pain 4/10 and describes it as aching. Current treatment regimen has helped relieve about 50% of the pain since beginning treatment plan.  She denies any side effects from the current pain regimen. Patient reports that since implementation of their treatment plan; their physical functioning is better, family/social relationships are better, mood is better sleep patterns are better, and that the overall functioning is better.  Patient denies/admits that any of the above have changed since last office visit. Patient denies misusing/abusing her narcotic pain medications or using any illegal drugs.      Upon obtaining medical history from Ms. Chiang    ALLERGIES: Patients list of allergies were reviewed     MEDICATIONS: Ms. Key list of medications were reviewed.Her current medications are   Outpatient Medications Prior to Visit   Medication Sig Dispense Refill    atorvastatin (LIPITOR) 80 MG tablet Take 1 tablet by mouth daily      carvedilol (COREG) 6.25 MG tablet Take 1 tablet by mouth 2 times daily (with meals)      gabapentin (NEURONTIN) 300 MG capsule Take 1 capsule by mouth 2 times daily.

## 2024-11-07 LAB — ECHO BSA: 2.06 M2

## 2024-11-12 ENCOUNTER — OFFICE VISIT (OUTPATIENT)
Dept: VASCULAR SURGERY | Age: 54
End: 2024-11-12
Payer: COMMERCIAL

## 2024-11-12 VITALS
OXYGEN SATURATION: 94 % | DIASTOLIC BLOOD PRESSURE: 56 MMHG | TEMPERATURE: 97.9 F | BODY MASS INDEX: 38.33 KG/M2 | HEART RATE: 79 BPM | WEIGHT: 203 LBS | HEIGHT: 61 IN | SYSTOLIC BLOOD PRESSURE: 123 MMHG

## 2024-11-12 DIAGNOSIS — N18.6 ESRD (END STAGE RENAL DISEASE) (HCC): Primary | ICD-10-CM

## 2024-11-12 PROCEDURE — 3017F COLORECTAL CA SCREEN DOC REV: CPT | Performed by: SURGERY

## 2024-11-12 PROCEDURE — G8484 FLU IMMUNIZE NO ADMIN: HCPCS | Performed by: SURGERY

## 2024-11-12 PROCEDURE — G8417 CALC BMI ABV UP PARAM F/U: HCPCS | Performed by: SURGERY

## 2024-11-12 PROCEDURE — 1036F TOBACCO NON-USER: CPT | Performed by: SURGERY

## 2024-11-12 PROCEDURE — 1111F DSCHRG MED/CURRENT MED MERGE: CPT | Performed by: SURGERY

## 2024-11-12 PROCEDURE — 99213 OFFICE O/P EST LOW 20 MIN: CPT | Performed by: SURGERY

## 2024-11-12 PROCEDURE — G8427 DOCREV CUR MEDS BY ELIG CLIN: HCPCS | Performed by: SURGERY

## 2024-11-12 PROCEDURE — 3078F DIAST BP <80 MM HG: CPT | Performed by: SURGERY

## 2024-11-12 PROCEDURE — 3074F SYST BP LT 130 MM HG: CPT | Performed by: SURGERY

## 2024-11-12 NOTE — PROGRESS NOTES
2024     Scottie Chiang (:  1970) is a 54 y.o. female,Established patient, here for evaluation of the following chief complaint(s):  Post-Op Check ( 1st post-op s/p right fistulagram w/vascath exchange 10/23/)        ASSESSMENT/PLAN:  1. ESRD (end stage renal disease) (HCC)  Right arm AVF with good thrill now and not pulsatile; however, still small.   We did discuss the possibility of placing on the graft in the left arm while the right arm continues to mature if this is an ongoing problem.  We will check an HD duplex of both arms prior to her next visit in 1 month.       SUBJECTIVE/OBJECTIVE:  POV#1 Exchange of right to left IJ tunneled dialysis cath.  Right arm fistulagram--was noted to have severe outflow stenosis related to her long-term indwelling cath.   No new issues.  Feeling well today and in good spirits.     Physical Exam  Musculoskeletal:        Arms:       Comments: Thrombosed AV graft and left forearm with anastomosis of left radial artery and left upper arm vein outflow.  Right arm with healed incision at the antecubitum.  Good thrill and bruit which does not seem obstructive but the fistula remains small.       Vitals:    24 1010   BP: (!) 123/56   Site: Left Upper Arm   Position: Sitting   Pulse: 79   Temp: 97.9 °F (36.6 °C)   TempSrc: Infrared   SpO2: 94%   Weight: 92.1 kg (203 lb)   Height: 1.549 m (5' 1\")         An electronic signature was used to authenticate this note.    --Valeria Kumar MD

## 2024-11-22 RX ORDER — MECLIZINE HCL 12.5 MG 12.5 MG/1
12.5 TABLET ORAL 3 TIMES DAILY PRN
Qty: 15 TABLET | Refills: 1 | Status: SHIPPED | OUTPATIENT
Start: 2024-11-22 | End: 2024-12-02

## 2024-11-25 DIAGNOSIS — Z96.651 S/P TOTAL KNEE ARTHROPLASTY, RIGHT: ICD-10-CM

## 2024-11-25 DIAGNOSIS — M47.812 CERVICAL SPONDYLOSIS: ICD-10-CM

## 2024-11-25 DIAGNOSIS — G43.909 EPISODIC MIGRAINE: ICD-10-CM

## 2024-11-25 DIAGNOSIS — M47.817 LUMBOSACRAL SPONDYLOSIS WITHOUT MYELOPATHY: ICD-10-CM

## 2024-11-25 DIAGNOSIS — G89.4 CHRONIC PAIN SYNDROME: ICD-10-CM

## 2024-11-25 DIAGNOSIS — Z99.2 ESRD (END STAGE RENAL DISEASE) ON DIALYSIS (HCC): ICD-10-CM

## 2024-11-25 DIAGNOSIS — Z51.81 ENCOUNTER FOR THERAPEUTIC DRUG MONITORING: ICD-10-CM

## 2024-11-25 DIAGNOSIS — N18.6 ESRD (END STAGE RENAL DISEASE) ON DIALYSIS (HCC): ICD-10-CM

## 2024-11-25 DIAGNOSIS — M17.11 PRIMARY LOCALIZED OSTEOARTHROSIS OF THE KNEE, RIGHT: ICD-10-CM

## 2024-11-25 RX ORDER — OXYCODONE AND ACETAMINOPHEN 7.5; 325 MG/1; MG/1
1 TABLET ORAL EVERY 6 HOURS PRN
Qty: 112 TABLET | Refills: 0 | OUTPATIENT
Start: 2024-11-25 | End: 2024-12-23

## 2024-11-26 ENCOUNTER — TELEPHONE (OUTPATIENT)
Dept: PAIN MANAGEMENT | Age: 54
End: 2024-11-26

## 2024-11-26 DIAGNOSIS — Z96.651 S/P TOTAL KNEE ARTHROPLASTY, RIGHT: ICD-10-CM

## 2024-11-26 DIAGNOSIS — G89.4 CHRONIC PAIN SYNDROME: ICD-10-CM

## 2024-11-26 DIAGNOSIS — M47.817 LUMBOSACRAL SPONDYLOSIS WITHOUT MYELOPATHY: ICD-10-CM

## 2024-11-26 DIAGNOSIS — M17.11 PRIMARY LOCALIZED OSTEOARTHROSIS OF THE KNEE, RIGHT: ICD-10-CM

## 2024-11-26 DIAGNOSIS — M47.812 CERVICAL SPONDYLOSIS: ICD-10-CM

## 2024-11-26 DIAGNOSIS — N18.6 ESRD (END STAGE RENAL DISEASE) ON DIALYSIS (HCC): ICD-10-CM

## 2024-11-26 DIAGNOSIS — G43.909 EPISODIC MIGRAINE: ICD-10-CM

## 2024-11-26 DIAGNOSIS — Z51.81 ENCOUNTER FOR THERAPEUTIC DRUG MONITORING: ICD-10-CM

## 2024-11-26 DIAGNOSIS — Z99.2 ESRD (END STAGE RENAL DISEASE) ON DIALYSIS (HCC): ICD-10-CM

## 2024-11-26 RX ORDER — OXYCODONE AND ACETAMINOPHEN 7.5; 325 MG/1; MG/1
1 TABLET ORAL EVERY 6 HOURS PRN
Qty: 28 TABLET | Refills: 0 | Status: SHIPPED | OUTPATIENT
Start: 2024-11-27 | End: 2024-12-04

## 2024-11-27 RX ORDER — SCOPOLAMINE 1 MG/3D
1 PATCH, EXTENDED RELEASE TRANSDERMAL
Qty: 2 PATCH | Refills: 0 | OUTPATIENT
Start: 2024-11-27 | End: 2024-12-27

## 2024-12-02 DIAGNOSIS — N18.6 ESRD (END STAGE RENAL DISEASE) ON DIALYSIS (HCC): ICD-10-CM

## 2024-12-02 DIAGNOSIS — M47.817 LUMBOSACRAL SPONDYLOSIS WITHOUT MYELOPATHY: ICD-10-CM

## 2024-12-02 DIAGNOSIS — M17.11 PRIMARY LOCALIZED OSTEOARTHROSIS OF THE KNEE, RIGHT: ICD-10-CM

## 2024-12-02 DIAGNOSIS — Z51.81 ENCOUNTER FOR THERAPEUTIC DRUG MONITORING: ICD-10-CM

## 2024-12-02 DIAGNOSIS — Z99.2 ESRD (END STAGE RENAL DISEASE) ON DIALYSIS (HCC): ICD-10-CM

## 2024-12-02 DIAGNOSIS — G43.909 EPISODIC MIGRAINE: ICD-10-CM

## 2024-12-02 DIAGNOSIS — G89.4 CHRONIC PAIN SYNDROME: ICD-10-CM

## 2024-12-02 DIAGNOSIS — Z96.651 S/P TOTAL KNEE ARTHROPLASTY, RIGHT: ICD-10-CM

## 2024-12-02 DIAGNOSIS — M47.812 CERVICAL SPONDYLOSIS: ICD-10-CM

## 2024-12-02 RX ORDER — OXYCODONE AND ACETAMINOPHEN 7.5; 325 MG/1; MG/1
1 TABLET ORAL EVERY 6 HOURS PRN
Qty: 28 TABLET | Refills: 0 | OUTPATIENT
Start: 2024-12-02 | End: 2024-12-09

## 2024-12-18 ENCOUNTER — TELEPHONE (OUTPATIENT)
Dept: PAIN MANAGEMENT | Age: 54
End: 2024-12-18

## 2024-12-18 ENCOUNTER — TELEPHONE (OUTPATIENT)
Dept: VASCULAR SURGERY | Age: 54
End: 2024-12-18

## 2024-12-18 DIAGNOSIS — G89.4 CHRONIC PAIN SYNDROME: ICD-10-CM

## 2024-12-18 DIAGNOSIS — M47.817 LUMBOSACRAL SPONDYLOSIS WITHOUT MYELOPATHY: ICD-10-CM

## 2024-12-18 DIAGNOSIS — M47.812 CERVICAL SPONDYLOSIS: ICD-10-CM

## 2024-12-18 DIAGNOSIS — Z99.2 ESRD (END STAGE RENAL DISEASE) ON DIALYSIS (HCC): ICD-10-CM

## 2024-12-18 DIAGNOSIS — Z51.81 ENCOUNTER FOR THERAPEUTIC DRUG MONITORING: ICD-10-CM

## 2024-12-18 DIAGNOSIS — N18.6 ESRD (END STAGE RENAL DISEASE) ON DIALYSIS (HCC): ICD-10-CM

## 2024-12-18 DIAGNOSIS — Z96.651 S/P TOTAL KNEE ARTHROPLASTY, RIGHT: ICD-10-CM

## 2024-12-18 DIAGNOSIS — M17.11 PRIMARY LOCALIZED OSTEOARTHROSIS OF THE KNEE, RIGHT: ICD-10-CM

## 2024-12-18 DIAGNOSIS — G43.909 EPISODIC MIGRAINE: ICD-10-CM

## 2024-12-18 RX ORDER — OXYCODONE AND ACETAMINOPHEN 7.5; 325 MG/1; MG/1
1 TABLET ORAL EVERY 6 HOURS PRN
Qty: 28 TABLET | Refills: 0 | Status: SHIPPED | OUTPATIENT
Start: 2024-12-18 | End: 2024-12-25

## 2024-12-18 NOTE — TELEPHONE ENCOUNTER
Spoke with Ms. Chiang she is going out of town and unable to make appts on 12/18 for HD duplex and 12/19 for appt with Dr. Kumar. We got her rescheduled her for 1/7 for HD duplex at 3p and 1/14 at 0900a with Dr. Kumar.

## 2024-12-18 NOTE — TELEPHONE ENCOUNTER
Patient called asking for Ginny to call her back, she needs to reschedule her vein scans & office visit appt with Dr. Kumar    [phone]

## 2024-12-18 NOTE — TELEPHONE ENCOUNTER
I reviewed hospital notes and sent her a week extension. She does need to get an appt with me though.

## 2024-12-18 NOTE — TELEPHONE ENCOUNTER
Pt is asking for pain meds because she forgot her last appt and insisted that a message is sent because she has been in and out of the hosp.

## 2024-12-18 NOTE — TELEPHONE ENCOUNTER
I called patient, lvm.    If patient calls back, please schedule for f/u appointment on 12/23/24 @ 2 pm at the [facility] office.

## 2024-12-23 DIAGNOSIS — G43.909 EPISODIC MIGRAINE: ICD-10-CM

## 2024-12-23 DIAGNOSIS — M17.11 PRIMARY LOCALIZED OSTEOARTHROSIS OF THE KNEE, RIGHT: ICD-10-CM

## 2024-12-23 DIAGNOSIS — N18.6 ESRD (END STAGE RENAL DISEASE) ON DIALYSIS (HCC): ICD-10-CM

## 2024-12-23 DIAGNOSIS — G89.4 CHRONIC PAIN SYNDROME: ICD-10-CM

## 2024-12-23 DIAGNOSIS — Z99.2 ESRD (END STAGE RENAL DISEASE) ON DIALYSIS (HCC): ICD-10-CM

## 2024-12-23 DIAGNOSIS — Z96.651 S/P TOTAL KNEE ARTHROPLASTY, RIGHT: ICD-10-CM

## 2024-12-23 DIAGNOSIS — M47.812 CERVICAL SPONDYLOSIS: ICD-10-CM

## 2024-12-23 DIAGNOSIS — Z51.81 ENCOUNTER FOR THERAPEUTIC DRUG MONITORING: ICD-10-CM

## 2024-12-23 DIAGNOSIS — M47.817 LUMBOSACRAL SPONDYLOSIS WITHOUT MYELOPATHY: ICD-10-CM

## 2024-12-26 RX ORDER — OXYCODONE AND ACETAMINOPHEN 7.5; 325 MG/1; MG/1
1 TABLET ORAL EVERY 6 HOURS PRN
Qty: 28 TABLET | Refills: 0 | OUTPATIENT
Start: 2024-12-26 | End: 2025-01-02

## 2024-12-30 ENCOUNTER — TELEPHONE (OUTPATIENT)
Dept: PAIN MANAGEMENT | Age: 54
End: 2024-12-30

## 2024-12-30 DIAGNOSIS — G43.909 EPISODIC MIGRAINE: ICD-10-CM

## 2024-12-30 DIAGNOSIS — M47.817 LUMBOSACRAL SPONDYLOSIS WITHOUT MYELOPATHY: ICD-10-CM

## 2024-12-30 DIAGNOSIS — G89.4 CHRONIC PAIN SYNDROME: ICD-10-CM

## 2024-12-30 DIAGNOSIS — Z96.651 S/P TOTAL KNEE ARTHROPLASTY, RIGHT: ICD-10-CM

## 2024-12-30 DIAGNOSIS — M17.11 PRIMARY LOCALIZED OSTEOARTHROSIS OF THE KNEE, RIGHT: ICD-10-CM

## 2024-12-30 DIAGNOSIS — Z99.2 ESRD (END STAGE RENAL DISEASE) ON DIALYSIS (HCC): ICD-10-CM

## 2024-12-30 DIAGNOSIS — N18.6 ESRD (END STAGE RENAL DISEASE) ON DIALYSIS (HCC): ICD-10-CM

## 2024-12-30 DIAGNOSIS — M47.812 CERVICAL SPONDYLOSIS: ICD-10-CM

## 2024-12-30 DIAGNOSIS — Z51.81 ENCOUNTER FOR THERAPEUTIC DRUG MONITORING: ICD-10-CM

## 2024-12-30 RX ORDER — OXYCODONE AND ACETAMINOPHEN 7.5; 325 MG/1; MG/1
1 TABLET ORAL EVERY 6 HOURS PRN
Qty: 28 TABLET | Refills: 0 | Status: SHIPPED | OUTPATIENT
Start: 2024-12-30 | End: 2025-01-06

## 2025-01-04 DIAGNOSIS — Z99.2 ESRD (END STAGE RENAL DISEASE) ON DIALYSIS (HCC): ICD-10-CM

## 2025-01-04 DIAGNOSIS — G89.4 CHRONIC PAIN SYNDROME: ICD-10-CM

## 2025-01-04 DIAGNOSIS — M47.817 LUMBOSACRAL SPONDYLOSIS WITHOUT MYELOPATHY: ICD-10-CM

## 2025-01-04 DIAGNOSIS — Z51.81 ENCOUNTER FOR THERAPEUTIC DRUG MONITORING: ICD-10-CM

## 2025-01-04 DIAGNOSIS — M47.812 CERVICAL SPONDYLOSIS: ICD-10-CM

## 2025-01-04 DIAGNOSIS — G43.909 EPISODIC MIGRAINE: ICD-10-CM

## 2025-01-04 DIAGNOSIS — N18.6 ESRD (END STAGE RENAL DISEASE) ON DIALYSIS (HCC): ICD-10-CM

## 2025-01-04 DIAGNOSIS — M17.11 PRIMARY LOCALIZED OSTEOARTHROSIS OF THE KNEE, RIGHT: ICD-10-CM

## 2025-01-04 DIAGNOSIS — Z96.651 S/P TOTAL KNEE ARTHROPLASTY, RIGHT: ICD-10-CM

## 2025-01-07 RX ORDER — OXYCODONE AND ACETAMINOPHEN 7.5; 325 MG/1; MG/1
1 TABLET ORAL EVERY 6 HOURS PRN
Qty: 28 TABLET | Refills: 0 | OUTPATIENT
Start: 2025-01-07 | End: 2025-01-14

## 2025-01-13 ENCOUNTER — OFFICE VISIT (OUTPATIENT)
Dept: PAIN MANAGEMENT | Age: 55
End: 2025-01-13
Payer: COMMERCIAL

## 2025-01-13 VITALS
DIASTOLIC BLOOD PRESSURE: 45 MMHG | SYSTOLIC BLOOD PRESSURE: 74 MMHG | TEMPERATURE: 71 F | OXYGEN SATURATION: 96 % | WEIGHT: 203 LBS | BODY MASS INDEX: 38.36 KG/M2

## 2025-01-13 DIAGNOSIS — M47.817 LUMBOSACRAL SPONDYLOSIS WITHOUT MYELOPATHY: Primary | ICD-10-CM

## 2025-01-13 DIAGNOSIS — M47.812 CERVICAL SPONDYLOSIS: ICD-10-CM

## 2025-01-13 DIAGNOSIS — I50.43 CHF (CONGESTIVE HEART FAILURE), NYHA CLASS I, ACUTE ON CHRONIC, COMBINED (HCC): ICD-10-CM

## 2025-01-13 DIAGNOSIS — Z99.2 ESRD (END STAGE RENAL DISEASE) ON DIALYSIS (HCC): ICD-10-CM

## 2025-01-13 DIAGNOSIS — Z96.651 S/P TOTAL KNEE ARTHROPLASTY, RIGHT: ICD-10-CM

## 2025-01-13 DIAGNOSIS — N18.6 ESRD (END STAGE RENAL DISEASE) ON DIALYSIS (HCC): ICD-10-CM

## 2025-01-13 DIAGNOSIS — G89.4 CHRONIC PAIN SYNDROME: ICD-10-CM

## 2025-01-13 DIAGNOSIS — M17.11 PRIMARY LOCALIZED OSTEOARTHROSIS OF THE KNEE, RIGHT: ICD-10-CM

## 2025-01-13 DIAGNOSIS — Z51.81 ENCOUNTER FOR THERAPEUTIC DRUG MONITORING: ICD-10-CM

## 2025-01-13 DIAGNOSIS — G43.909 EPISODIC MIGRAINE: ICD-10-CM

## 2025-01-13 PROBLEM — K92.1 MELENA: Status: ACTIVE | Noted: 2024-12-23

## 2025-01-13 PROBLEM — K92.2 ACUTE GASTROINTESTINAL HEMORRHAGE: Status: ACTIVE | Noted: 2024-12-24

## 2025-01-13 PROBLEM — N95.0 POSTMENOPAUSAL BLEEDING: Status: ACTIVE | Noted: 2024-12-29

## 2025-01-13 PROCEDURE — G8417 CALC BMI ABV UP PARAM F/U: HCPCS | Performed by: NURSE PRACTITIONER

## 2025-01-13 PROCEDURE — 1036F TOBACCO NON-USER: CPT | Performed by: NURSE PRACTITIONER

## 2025-01-13 PROCEDURE — 3078F DIAST BP <80 MM HG: CPT | Performed by: NURSE PRACTITIONER

## 2025-01-13 PROCEDURE — 3017F COLORECTAL CA SCREEN DOC REV: CPT | Performed by: NURSE PRACTITIONER

## 2025-01-13 PROCEDURE — 99214 OFFICE O/P EST MOD 30 MIN: CPT | Performed by: NURSE PRACTITIONER

## 2025-01-13 PROCEDURE — G8427 DOCREV CUR MEDS BY ELIG CLIN: HCPCS | Performed by: NURSE PRACTITIONER

## 2025-01-13 PROCEDURE — 3074F SYST BP LT 130 MM HG: CPT | Performed by: NURSE PRACTITIONER

## 2025-01-13 RX ORDER — METHOCARBAMOL 500 MG/1
500 TABLET, FILM COATED ORAL 2 TIMES DAILY
Qty: 60 TABLET | Refills: 0 | Status: SHIPPED | OUTPATIENT
Start: 2025-01-13 | End: 2025-02-12

## 2025-01-13 RX ORDER — OXYCODONE AND ACETAMINOPHEN 7.5; 325 MG/1; MG/1
1 TABLET ORAL EVERY 6 HOURS PRN
Qty: 112 TABLET | Refills: 0 | Status: SHIPPED | OUTPATIENT
Start: 2025-01-13 | End: 2025-02-10

## 2025-01-13 NOTE — PROGRESS NOTES
capsule Take 1 capsule by mouth 4 times daily as needed for Diarrhea      memantine (NAMENDA) 10 MG tablet Take 1 tablet by mouth 2 times daily      galcanezumab-gnlm (EMGALITY) 120 MG/ML SOSY injection Inject 1 mL into the skin every 30 days      sevelamer (RENVELA) 800 MG tablet TAKE 2 TABLETS BY MOUTH 3 TIMES DAILY WITH A MEAL 180 tablet 11    torsemide (DEMADEX) 100 MG tablet TAKE 1 TABLET BY MOUTH 2 TIMES DAILY 60 tablet 11    vitamin B-2 (RIBOFLAVIN) 100 MG TABS tablet Take 1 tablet by mouth 2 times daily      B Complex-C-Folic Acid (DIALYVITE 800) 0.8 MG TABS Take 1 tablet by mouth daily 90 tablet 3    SUMAtriptan (IMITREX) 25 MG tablet Take 1 tablet by mouth once as needed for Migraine      levETIRAcetam (KEPPRA) 500 MG tablet Take 1 tablet by mouth daily 60 tablet 3    pantoprazole (PROTONIX) 40 MG tablet Take 1 tablet by mouth every morning (before breakfast) (Patient taking differently: Take 1 tablet by mouth in the morning and at bedtime) 90 tablet 1    naloxone 4 MG/0.1ML LIQD nasal spray 1 spray by Nasal route as needed for Opioid Reversal 1 each 0    CALCITRIOL PO Take one capsule by mouth on dialysis days (MWF)       No current facility-administered medications for this visit.     I will continue her current medication regimen  which is part of the above treatment schedule. It has been helping with Ms. Chiang's chronic  medical problems which for this visit include:   The primary encounter diagnosis was Lumbosacral spondylosis without myelopathy. Diagnoses of Encounter for therapeutic drug monitoring, Cervical spondylosis, Episodic migraine, ESRD (end stage renal disease) on dialysis (HCC), Primary localized osteoarthrosis of the knee, right, Chronic pain syndrome, S/P total knee arthroplasty, right, and CHF (congestive heart failure), NYHA class I, acute on chronic, combined ([facility]) were also pertinent to this visit.   Risks and benefits of the medications and other alternative treatments  including no

## 2025-01-14 ENCOUNTER — TELEPHONE (OUTPATIENT)
Dept: VASCULAR SURGERY | Age: 55
End: 2025-01-14

## 2025-01-14 NOTE — TELEPHONE ENCOUNTER
Attempted to call MsToni Chiang, will need to reschedule her appt. She did not show for her HD duplex and will need to have that done.

## 2025-01-17 NOTE — ED PROVIDER NOTES
[address] [facility] RESIDENT NOTE       Date of evaluation: 6/24/2022    Chief Complaint     Vascular Access Problem (PD catheter  recurring infections  Dr Sai Messina told patient to come to ED to have catheter switched out)      History of Present Illness     Martell Horn is a 47 y.o. female with a past medical history of CHFpEF (grade 2 , hypertension, diabetes, JEAN, ESRD on dialysis, peritonitis associated with dialysis who presents with a chief complaint of PD catheter associated infection. She has a history of recurring infections associate with her PD catheter and Dr. Lina Murillo told the patient to come to the ED to have the current catheter switched out and changed to a hemodialysis catheter. Patient reports that she noticed her PD fluid last night as she was draining was cloudy. She brought the fluid to Dr. Lina Murillo earlier today who told her that her PD catheter was likely infected again. She was last on antibiotics during her last hospitalization a month ago. She complains of generalized abdominal pain worse in the center of the abdomen that she describes as an intermittent stabbing pain, similar but less severe than her prior episode of SBP. She endorses chronic nausea and reduced p.o. intake. She does make urine but denies any dysuria. She denies any fevers or chills, chest or leg pain, constipation, or diarrhea. She is on Plavix but not on any anticoagulation. She is a diabetic with supposed to be on insulin however she reports not being on insulin for the last month. She is reportedly compliant with her other home medications besides her gabapentin (not taking due to improved neuropathic pain)    She was last admitted to the [facility]. a month ago for SBP. Over a month ago she underwent removal of a right chest tunneled central venous catheter. Review of Systems     Review of Systems   Constitutional: Positive for appetite change and fatigue. Received cardiac clearance from Dr. Gus Arriaga fro colonoscopy on 1/24/2025. Requesting to stop Asprin and Brilinta.   Negative for chills and fever. HENT: Negative for congestion, rhinorrhea, sinus pressure, sinus pain, sneezing, sore throat and trouble swallowing. Eyes: Negative for visual disturbance. Respiratory: Negative for cough, chest tightness, shortness of breath, wheezing and stridor. Cardiovascular: Negative for chest pain, palpitations and leg swelling. Gastrointestinal: Positive for abdominal pain and nausea. Negative for abdominal distention, blood in stool, constipation, diarrhea and vomiting. Genitourinary: Negative for difficulty urinating and dysuria. Musculoskeletal: Positive for back pain. Negative for arthralgias, joint swelling, myalgias and neck pain. Skin: Negative for rash and wound. Neurological: Positive for headaches. Negative for dizziness, tremors, seizures, syncope, speech difficulty, weakness and light-headedness. Psychiatric/Behavioral: Negative for agitation, behavioral problems and confusion. Past Medical, Surgical, Family, and Social History     She has a past medical history of Acute respiratory failure with hypoxia ([facility] [address] Anemia, Arthritis, CHF (congestive heart failure) ([facility] [address] Diabetes mellitus ([facility] [address] End stage renal disease ([facility] [address] Hemodialysis patient ([facility] [address] History of blood transfusion, Hyperlipidemia, Hypertension, Migraine, On home O2, and JEAN (obstructive sleep apnea). She has a past surgical history that includes Dialysis Catheter Insertion (N/A, 2021); Dialysis Catheter Removal (N/A, 2021); IR TUNNELED CVC PLACE WO SQ PORT/PUMP > 5 YEARS (11/15/2021); eye surgery (Bilateral, ); Cataract removal (Bilateral);  section; Toe amputation (Left); Dialysis Catheter Insertion (N/A, 2022); and US ASP ABSCESS/HEMATOMA/BULLA/CYST (3/28/2022). Her family history is not on file. She reports that she has never smoked. She has never used smokeless tobacco. She reports previous alcohol use. She reports previous drug use.     Medications Previous Medications    ACETAMINOPHEN (TYLENOL) 500 MG TABLET    Take 1,000 mg by mouth every 4 hours as needed    AMLODIPINE (NORVASC) 5 MG TABLET    Take 1 tablet by mouth daily    ATORVASTATIN (LIPITOR) 40 MG TABLET    Take 1 tablet by mouth daily    B COMPLEX-VITAMIN C-FOLIC ACID (NEPHRO-BHANU) 1 MG TABLET    Take 1 tablet by mouth daily    BUTALBITAL-ACETAMINOPHEN-CAFFEINE (FIORICET, ESGIC) -40 MG PER TABLET    Take 1 tablet by mouth every 4 hours as needed for Headaches    CALCIUM ACETATE (PHOSLO) 667 MG CAPS CAPSULE    Take 667 mg by mouth 3 times daily    CARVEDILOL (COREG) 12.5 MG TABLET    Take 1 tablet by mouth 2 times daily    CLINDAMYCIN (CLEOCIN) 300 MG CAPSULE        CLOPIDOGREL (PLAVIX) 75 MG TABLET    Take 1 tablet by mouth daily    DIPHENHYDRAMINE (BENADRYL) 25 MG CAPSULE    Take 25 mg by mouth every 6 hours as needed for Itching    DOCUSATE (COLACE, DULCOLAX) 100 MG CAPS        ERGOCALCIFEROL (ERGOCALCIFEROL) 1.25 MG (60679 UT) CAPSULE    Take 50,000 Units by mouth once a week    FLUTICASONE (FLONASE) 50 MCG/ACT NASAL SPRAY    1 spray by Each Nostril route daily    GABAPENTIN (NEURONTIN) 100 MG CAPSULE    Take 3 capsules by mouth 2 times daily for 90 days. INSULIN GLARGINE (LANTUS;BASAGLAR) 100 UNIT/ML INJECTION PEN    Inject 10 Units into the skin nightly    INSULIN LISPRO, 1 UNIT DIAL, 100 UNIT/ML SOPN    Inject 5 Units into the skin 3 times daily (with meals)    LOSARTAN (COZAAR) 100 MG TABLET    Take 1 tablet by mouth nightly    NITROGLYCERIN (NITROSTAT) 0.4 MG SL TABLET    Place 1 tablet under the tongue every 5 minutes as needed for Chest pain    ONDANSETRON (ZOFRAN) 4 MG TABLET    Take 1 tablet by mouth every 12 hours as needed for Nausea or Vomiting    SPIRONOLACTONE (ALDACTONE) 50 MG TABLET    TAKE 1 TABLET BY MOUTH DAILY       Allergies     She has No Known Allergies.     Physical Exam     INITIAL VITALS: BP: 130/74, Temp: 97.9 °F (36.6 °C), Heart Rate: 71, Resp: 20, SpO2: 95 %   Physical Exam  Constitutional:       Appearance: She is obese. She is ill-appearing. HENT:      Head: Normocephalic. Right Ear: External ear normal.      Left Ear: External ear normal.      Nose: Nose normal.      Mouth/Throat:      Mouth: Mucous membranes are moist.      Pharynx: Oropharynx is clear. No oropharyngeal exudate. Eyes:      General:         Right eye: No discharge. Left eye: No discharge. Extraocular Movements: Extraocular movements intact. Conjunctiva/sclera: Conjunctivae normal.      Pupils: Pupils are equal, round, and reactive to light. Cardiovascular:      Rate and Rhythm: Normal rate and regular rhythm. Pulses: Normal pulses. Heart sounds: Normal heart sounds. Pulmonary:      Effort: Pulmonary effort is normal. No respiratory distress. Breath sounds: Normal breath sounds. No stridor. No wheezing, rhonchi or rales. Abdominal:      General: Bowel sounds are normal. There is no distension. Palpations: Abdomen is soft. Tenderness: There is no abdominal tenderness. There is no right CVA tenderness, left CVA tenderness, guarding or rebound. Comments: Patient has a peritoneal dialysis catheter in the left lower quadrant of the abdomen. It is currently dry. There is no drainage, erythema, or tenderness to palpation around the catheter site. Musculoskeletal:         General: No swelling or deformity. Normal range of motion. Cervical back: Normal range of motion. No rigidity. Right lower leg: No edema. Left lower leg: No edema. Skin:     General: Skin is warm. Coloration: Skin is not jaundiced or pale. Findings: No bruising or erythema. Neurological:      Mental Status: She is alert and oriented to person, place, and time. Mental status is at baseline. Cranial Nerves: No cranial nerve deficit. Motor: No weakness.    Psychiatric:         Mood and Affect: Mood normal.         Behavior: Behavior normal.         Diagnostic Results       RADIOLOGY:  XR CHEST PORTABLE    (Results Pending)       LABS:   No results found for this visit on 06/24/22. ED BEDSIDE ULTRASOUND:  No results found. RECENT VITALS:  BP: 130/74, Temp: 97.9 °F (36.6 °C),Heart Rate: 71, Resp: 20, SpO2: 95 %     Procedures     None    ED Course     Nursing Notes, Past Medical Hx, Past Surgical Hx, Social Hx, Allergies, and FamilyHx were reviewed. The patient was giventhe following medications:  Orders Placed This Encounter   Medications    cefepime (MAXIPIME) 2000 mg IVPB minibag     PD catheter infection, ESRD     Order Specific Question:   Antimicrobial Indications     Answer:   Intra-Abdominal Infection    vancomycin (VANCOCIN) 1,500 mg in dextrose 5 % 250 mL IVPB     Order Specific Question:   Antimicrobial Indications     Answer: Other     Order Specific Question:   Other Abx Indication     Answer:   PD catheter infection       CONSULTS:  IP CONSULT TO NEPHROLOGY  IP CONSULT TO HOSPITALIST  IP CONSULT TO PHARMACY  PHARMACY TO DOSE VANCOMYCIN  IP CONSULT TO PHARMACY  IP CONSULT TO [facility] / ASSESSMENT / Benitez Johnathon is a 47 y.o. female with a past medical history of CHFpEF (grade 2 , hypertension, diabetes, JEAN, ESRD on dialysis, peritonitis associated with dialysis who presents with a chief complaint of PD catheter associated infection. Patient reported to the ED after her nephrologist Dr. Sherry Santana did an analysis of her cloudy PD catheter fluid and reportedly told the patient she had an infection and need to go to the ED to have her PD catheter placed with an HD cath. She first noticed the cloudy fluid last night which is when she completed her typical PD. She denies any fevers, chills. And the patient is hemodynamically stable at this time. Blood cultures as well as cultures from the PD cath were drawn and cultured.   Patient will empirically be started on antibiotics and be admitted to the hospital for treatment as well as replacement of her current PD catheter with hemodialysis catheter for recurrent line infections. This patient was also evaluated by the attending physician. All care plans were discussed and agreed upon. Clinical Impression     1. Infection associated with peritoneal dialysis catheter, initial encounter [facility])        Disposition     PATIENT REFERRED TO:  No follow-up provider specified.     DISCHARGE MEDICATIONS:  New Prescriptions    No medications on file       DISPOSITION Admitted 06/24/2022 02:54:10 PM     Shannan Sheehan MD  Resident  06/24/22 8050

## 2025-01-28 ENCOUNTER — OFFICE VISIT (OUTPATIENT)
Dept: VASCULAR SURGERY | Age: 55
End: 2025-01-28
Payer: COMMERCIAL

## 2025-01-28 VITALS
HEART RATE: 55 BPM | HEIGHT: 61 IN | BODY MASS INDEX: 39.46 KG/M2 | WEIGHT: 209 LBS | SYSTOLIC BLOOD PRESSURE: 128 MMHG | DIASTOLIC BLOOD PRESSURE: 70 MMHG

## 2025-01-28 DIAGNOSIS — N18.6 ESRD (END STAGE RENAL DISEASE) (HCC): Primary | ICD-10-CM

## 2025-01-28 PROCEDURE — 3078F DIAST BP <80 MM HG: CPT | Performed by: SURGERY

## 2025-01-28 PROCEDURE — 3017F COLORECTAL CA SCREEN DOC REV: CPT | Performed by: SURGERY

## 2025-01-28 PROCEDURE — G8427 DOCREV CUR MEDS BY ELIG CLIN: HCPCS | Performed by: SURGERY

## 2025-01-28 PROCEDURE — 99214 OFFICE O/P EST MOD 30 MIN: CPT | Performed by: SURGERY

## 2025-01-28 PROCEDURE — G8417 CALC BMI ABV UP PARAM F/U: HCPCS | Performed by: SURGERY

## 2025-01-28 PROCEDURE — 1036F TOBACCO NON-USER: CPT | Performed by: SURGERY

## 2025-01-28 PROCEDURE — 3074F SYST BP LT 130 MM HG: CPT | Performed by: SURGERY

## 2025-01-28 RX ORDER — MEDROXYPROGESTERONE ACETATE 10 MG
10 TABLET ORAL DAILY
COMMUNITY
Start: 2025-01-15

## 2025-01-28 NOTE — PROGRESS NOTES
2025    Scottie Chiang (:  1970) is a 54 y.o. female,Established patient, here for evaluation of the following chief complaint(s):  Follow-up (6 wks HD Duplex 24)        ASSESSMENT/PLAN:  1. ESRD (end stage renal disease) (HCC)    Unfortunately, right arm BC AVF is now thrombosed.  Will need new AV access, which may be a graft.    Plan right arm AV access.         SUBJECTIVE/OBJECTIVE:  POV#2 Exchange of right to left IJ tunneled dialysis cath.  Right arm fistulagram--was noted to have severe outflow stenosis related to her long-term indwelling cath.   No new issues.       Duplex evaluation today shows no flow in her right upper arm BC AVF    Physical Exam  Musculoskeletal:        Arms:       Comments: Thrombosed AV graft and left forearm with anastomosis of left radial artery and left upper arm vein outflow.  Right arm with healed incision at the antecubitum.  There is no thrill or bruit in the upper arm brachiocephalic AVF, consistent with thrombosis.        Vitals:    25 1405   BP: 128/70   Site: Left Upper Arm   Position: Sitting   Cuff Size: Small Adult   Pulse: 55   Weight: 94.8 kg (209 lb)   Height: 1.549 m (5' 0.98\")         An electronic signature was used to authenticate this note.    --Valeria Kumar MD

## 2025-02-07 DIAGNOSIS — G89.4 CHRONIC PAIN SYNDROME: ICD-10-CM

## 2025-02-07 DIAGNOSIS — Z96.651 S/P TOTAL KNEE ARTHROPLASTY, RIGHT: ICD-10-CM

## 2025-02-07 DIAGNOSIS — Z99.2 ESRD (END STAGE RENAL DISEASE) ON DIALYSIS (HCC): ICD-10-CM

## 2025-02-07 DIAGNOSIS — Z51.81 ENCOUNTER FOR THERAPEUTIC DRUG MONITORING: ICD-10-CM

## 2025-02-07 DIAGNOSIS — N18.6 ESRD (END STAGE RENAL DISEASE) ON DIALYSIS (HCC): ICD-10-CM

## 2025-02-07 DIAGNOSIS — M17.11 PRIMARY LOCALIZED OSTEOARTHROSIS OF THE KNEE, RIGHT: ICD-10-CM

## 2025-02-07 DIAGNOSIS — G43.909 EPISODIC MIGRAINE: ICD-10-CM

## 2025-02-07 DIAGNOSIS — M47.812 CERVICAL SPONDYLOSIS: ICD-10-CM

## 2025-02-07 DIAGNOSIS — M47.817 LUMBOSACRAL SPONDYLOSIS WITHOUT MYELOPATHY: ICD-10-CM

## 2025-02-07 RX ORDER — OXYCODONE AND ACETAMINOPHEN 7.5; 325 MG/1; MG/1
1 TABLET ORAL EVERY 6 HOURS PRN
Qty: 112 TABLET | Refills: 0 | OUTPATIENT
Start: 2025-02-07 | End: 2025-03-07

## 2025-02-19 ENCOUNTER — PREP FOR PROCEDURE (OUTPATIENT)
Dept: VASCULAR SURGERY | Age: 55
End: 2025-02-19

## 2025-02-19 DIAGNOSIS — N18.6 END STAGE RENAL DISEASE (HCC): ICD-10-CM

## 2025-02-20 RX ORDER — SODIUM CHLORIDE 0.9 % (FLUSH) 0.9 %
5-40 SYRINGE (ML) INJECTION PRN
Status: CANCELLED | OUTPATIENT
Start: 2025-03-07

## 2025-02-20 RX ORDER — SODIUM CHLORIDE 9 MG/ML
INJECTION, SOLUTION INTRAVENOUS PRN
Status: CANCELLED | OUTPATIENT
Start: 2025-03-07

## 2025-02-20 RX ORDER — SODIUM CHLORIDE 0.9 % (FLUSH) 0.9 %
5-40 SYRINGE (ML) INJECTION EVERY 12 HOURS SCHEDULED
Status: CANCELLED | OUTPATIENT
Start: 2025-03-07

## 2025-02-24 ENCOUNTER — OFFICE VISIT (OUTPATIENT)
Dept: PAIN MANAGEMENT | Age: 55
End: 2025-02-24
Payer: COMMERCIAL

## 2025-02-24 VITALS
WEIGHT: 206.8 LBS | OXYGEN SATURATION: 93 % | SYSTOLIC BLOOD PRESSURE: 104 MMHG | BODY MASS INDEX: 39.1 KG/M2 | HEART RATE: 65 BPM | DIASTOLIC BLOOD PRESSURE: 69 MMHG

## 2025-02-24 DIAGNOSIS — M47.817 LUMBOSACRAL SPONDYLOSIS WITHOUT MYELOPATHY: ICD-10-CM

## 2025-02-24 DIAGNOSIS — G89.4 CHRONIC PAIN SYNDROME: ICD-10-CM

## 2025-02-24 DIAGNOSIS — G43.909 EPISODIC MIGRAINE: ICD-10-CM

## 2025-02-24 DIAGNOSIS — R11.0 CHRONIC NAUSEA: ICD-10-CM

## 2025-02-24 DIAGNOSIS — N18.6 ESRD (END STAGE RENAL DISEASE) ON DIALYSIS (HCC): ICD-10-CM

## 2025-02-24 DIAGNOSIS — Z96.651 S/P TOTAL KNEE ARTHROPLASTY, RIGHT: ICD-10-CM

## 2025-02-24 DIAGNOSIS — Z51.81 ENCOUNTER FOR THERAPEUTIC DRUG MONITORING: ICD-10-CM

## 2025-02-24 DIAGNOSIS — Z99.2 ESRD (END STAGE RENAL DISEASE) ON DIALYSIS (HCC): ICD-10-CM

## 2025-02-24 DIAGNOSIS — M17.11 PRIMARY LOCALIZED OSTEOARTHROSIS OF THE KNEE, RIGHT: Primary | ICD-10-CM

## 2025-02-24 DIAGNOSIS — I50.43 CHF (CONGESTIVE HEART FAILURE), NYHA CLASS I, ACUTE ON CHRONIC, COMBINED (HCC): ICD-10-CM

## 2025-02-24 DIAGNOSIS — M47.812 CERVICAL SPONDYLOSIS: ICD-10-CM

## 2025-02-24 PROCEDURE — 3078F DIAST BP <80 MM HG: CPT | Performed by: NURSE PRACTITIONER

## 2025-02-24 PROCEDURE — 99214 OFFICE O/P EST MOD 30 MIN: CPT | Performed by: NURSE PRACTITIONER

## 2025-02-24 PROCEDURE — 3074F SYST BP LT 130 MM HG: CPT | Performed by: NURSE PRACTITIONER

## 2025-02-24 PROCEDURE — G8417 CALC BMI ABV UP PARAM F/U: HCPCS | Performed by: NURSE PRACTITIONER

## 2025-02-24 PROCEDURE — 3017F COLORECTAL CA SCREEN DOC REV: CPT | Performed by: NURSE PRACTITIONER

## 2025-02-24 PROCEDURE — G8427 DOCREV CUR MEDS BY ELIG CLIN: HCPCS | Performed by: NURSE PRACTITIONER

## 2025-02-24 PROCEDURE — 1036F TOBACCO NON-USER: CPT | Performed by: NURSE PRACTITIONER

## 2025-02-24 RX ORDER — SCOPOLAMINE 1 MG/3D
1 PATCH, EXTENDED RELEASE TRANSDERMAL
Qty: 10 PATCH | Refills: 0 | Status: SHIPPED | OUTPATIENT
Start: 2025-02-24 | End: 2025-03-26

## 2025-02-24 RX ORDER — METHOCARBAMOL 500 MG/1
500 TABLET, FILM COATED ORAL 2 TIMES DAILY
Qty: 60 TABLET | Refills: 0 | Status: SHIPPED | OUTPATIENT
Start: 2025-02-24 | End: 2025-03-26

## 2025-02-24 RX ORDER — OXYCODONE AND ACETAMINOPHEN 7.5; 325 MG/1; MG/1
1 TABLET ORAL EVERY 6 HOURS PRN
Qty: 112 TABLET | Refills: 0 | Status: SHIPPED | OUTPATIENT
Start: 2025-02-24 | End: 2025-03-24

## 2025-02-24 NOTE — PROGRESS NOTES
and benefits of the medications and other alternative treatments  including no treatment were discussed with the patient.The common side effects of these medications were also explained to the patient.  Informed verbal consent was obtained.   Goals of current treatment regimen include improvement in pain, restoration of functioning- with focus on improvement in physical performance, general activity, work or disability,emotional distress, health care utilization and  decreased medication consumption. Will continue to monitor progress towards achieving/maintaining therapeutic goals with special emphasis on  1. Improvement in perceived interfernce  of pain with ADL's. Ability to do home exercises independently. Ability to do household chores indoor and/or outdoor work and social and leisure activities.Improve psychosocial and physical functioning. she is showing progression towards this treatment goal with the current regimen.     She was advised against drinking alcohol with the narcotic pain medicines, advised against driving or handling machinery while adjusting the dose of medicines or if having cognitive  issues related to the current medications.Risk of overdose and death, if medicines not taken as prescribed, were also discussed. If the patient develops new symptoms or if the symptoms worsen, the patient should call the office.    While transcribing every attempt was made to maintain the accuracy of the note in terms of it's contents,there may have been some errors made inadvertently.  Thank you for allowing me to participate in the care of this patient.    Faby Lance, NP-C    Cc: , Provider, ANP

## 2025-02-28 LAB
6-ACETYLMORPHINE, SALIVA: NOT DETECTED NG/ML
6MAM SAL QL CFM: NEGATIVE
ALPRAZOLAM, SALIVA: NOT DETECTED NG/ML
AMPHETAMINE, SALIVA: NOT DETECTED NG/ML
AMPHETAMINES SCREEN, SALIVA: NEGATIVE
ANTICONVULSANTS SCREEN, SALIVA: ABNORMAL
BENZODIAZEPINES SCREEN, SALIVA: NEGATIVE
BENZOYLECGONINE, SALIVA: NOT DETECTED NG/ML
BUPRENORPHINE SCREEN, SALIVA: NEGATIVE
BUPRENORPHINE, SALIVA: NOT DETECTED NG/ML
CANNABINOIDS SCREEN, SALIVA: NEGATIVE
CARISOPRODOL, SALIVA: NOT DETECTED NG/ML
CLONAZEPAM, SALIVA: NOT DETECTED NG/ML
COCAINE + METABOLITE, SALIVA: NEGATIVE
COCAINE, SALIVA: NOT DETECTED NG/ML
CODEINE, SALIVA: NOT DETECTED NG/ML
COTININE, SALIVA: NOT DETECTED NG/ML
CYCLOBENZAPRINE, SALIVA: NOT DETECTED NG/ML
DESMETHYLDIAZEPAM, SALIVA: NOT DETECTED NG/ML
DIAZEPAM, SALIVA: NOT DETECTED NG/ML
DIHYDROCODEINE, SALIVA: NOT DETECTED NG/ML
DULOXETINE SCREEN, SALIVA: NEGATIVE
DULOXETINE, SALIVA: NOT DETECTED NG/ML
FENTANYL SCREEN, SALIVA: NEGATIVE
FENTANYL, SALIVA: NOT DETECTED NG/ML
FLUNITRAZEPAM, SALIVA: NOT DETECTED NG/ML
FLURAZEPAM, SALIVA: NOT DETECTED NG/ML
GABAPENTIN, SALIVA: 2.3 UG/ML
HYDROCODONE SAL CFM-MCNC: NOT DETECTED NG/ML
HYDROMORPHONE, SALIVA: NOT DETECTED NG/ML
LORAZEPAM: NOT DETECTED NG/ML
MDMA, SALIVA: NOT DETECTED NG/ML
MEPERIDINE, SALIVA: NOT DETECTED NG/ML
MEPROBAMATE, SALIVA: NOT DETECTED NG/ML
METHADONE METABOLITE, SALIVA: NOT DETECTED NG/ML
METHADONE SCREEN, SALIVA: NEGATIVE
METHADONE, SALIVA: NOT DETECTED NG/ML
METHAMPHETAMINE, SALIVA: NOT DETECTED NG/ML
MIDAZOLAM, SALIVA: NOT DETECTED NG/ML
MORPHINE, SALIVA: NOT DETECTED NG/ML
MUSCLE RELAXANTS SCREEN, SALIVA: NEGATIVE
NICOTINE METABOLITE SCREEN, SALIVA: NEGATIVE
NORBUPRENORPHINE, SALIVA: NOT DETECTED NG/ML
NORDIAZEPAM IN SALIVA: NOT DETECTED NG/ML
NORHYDROCODONE, SALIVA: NOT DETECTED NG/ML
NOROXYCODONE, SALIVA: 34.4 NG/ML
OPIATES SAL QL CFM: NEGATIVE
OTHER OPIOIDS SCREEN, SALIVA: NEGATIVE
OXYCODONE+OXYMORPHONE SCREEN, SALIVA: ABNORMAL
OXYCODONE, SALIVA: 42.9 NG/ML
OXYMORPHONE, SALIVA: NOT DETECTED NG/ML
PHENCYCLIDINE SCREEN, SALIVA: NEGATIVE
PHENCYCLIDINE, SALIVA: NOT DETECTED NG/ML
PREGABALIN, SALIVA: NOT DETECTED UG/ML
PROPOXYPHENE, SALIVA: NOT DETECTED NG/ML
SEDATIVE/HYPNOTICS SCREEN, SALIVA: NEGATIVE
TAPENTADOL SCREEN, SALIVA: NEGATIVE
TAPENTADOL, SALIVA: NOT DETECTED NG/ML
TEMAZEPAM, SALIVA: NOT DETECTED NG/ML
TETRAHYDROCANNABINOL, SALIVA: NOT DETECTED NG/ML
TRAMADOL, SALIVA: NOT DETECTED NG/ML
TRIAZOLAM, SALIVA: NOT DETECTED NG/ML
ZOLPIDEM, SALIVA: NOT DETECTED NG/ML

## 2025-03-03 NOTE — PROGRESS NOTES
PRE-OP INSTRUCTIONS FOR SURGICAL PATIENTS          Our Pre-admission Testing Nurses tried and were unable to reach you today.  Please read the attached instructions if you did not listen to your voicemail.     Follow all instructions provided to you from your surgeon's office, including your ARRIVAL TIME.   Arrange for someone to drive you home and be with you for the first 24 hours after discharge.     NOTE: at this time ONLY 2 ADULTS may accompany you   One person encouraged to stay at hospital entire time if outpatient surgery    Enter the MAIN entrance located on [address] and report to the surgical desk on the LEFT side of the lobby. Please park in the parking garage or there is free  Parking available after 7am for your use.    Bring your insurance card & photo ID with you to register.  Bring your medication list with you with dose and frequency listed (including over the counter medications)  Contact your ordering physician/surgeon for medication instructions as soon as possible, especially if taking blood thinners, aspirin, heart, or diabetic medication.  Bariatric surgical patients need to call your surgeon if on diabetic medications (as some may need to be stopped 1-week preop)  A Pre-Surgical History and Physical MUST be completed WITHIN 30 DAYS OR LESS prior to your procedure by your Physician or an Urgent Care.  DO NOT EAT ANYTHING 8 hours prior to arrival for surgery.  You may have sips of WATER ONLY (up to 8 ounces) 4 hours prior to your arrival for surgery. Then nothing further 4 hours prior to arriving at hospital.   NOTE: ALL Gastric, Bariatric & Bowel surgery patients - you MUST follow your surgeon's instructions regarding eating/drinking as you will have very specific instructions to follow.  If you did not receive these, call your surgeon's office immediately.   No gum, candy, mints, or ice chips day of procedure.   Please refrain from drinking alcohol the day before or day of your  procedure.   Do not use any recreational marijuana at least 24 hours or street drugs (heroin, cocaine) at minimum 5 days prior to your procedure.   Please do not smoke the day of your procedure.    Dress in loose, comfortable clothing appropriate for redressing after your procedure.   Do not wear jewelry (including body piercings), make-up, fingernail polish, lotion, powders, or metal hairclips.   Contacts, glasses, dentures, and hearing aids will need to be removed prior to surgery.  Bring your case(s) to protect them while you are in surgery.    If you use a CPAP, please bring it with you on the day of your procedure.  If you use oxygen at home, please bring your oxygen tank with you to hospital.  Do not shave or wax for 72 hours prior to procedure near your operative site.  Leave all other valuables at home.  IF there is a change in your physical condition for some reason, such as: a cold, fever, rash, cuts, sores, or any other infection, especially near your surgical site, contact your surgeon's office immediately.  FOR WOMAN OF CHILDBEARING AGE ONLY- please make sure we can collect a urine sample upon arrival.     Instructions left on patient's voicemail.  Reji Davila RN.3/3/2025 .2:13 PM

## 2025-03-07 ENCOUNTER — HOSPITAL ENCOUNTER (INPATIENT)
Age: 55
LOS: 5 days | Discharge: HOME | DRG: 252 | End: 2025-03-12
Attending: EMERGENCY MEDICINE | Admitting: SURGERY
Payer: COMMERCIAL

## 2025-03-07 ENCOUNTER — TELEPHONE (OUTPATIENT)
Dept: VASCULAR SURGERY | Age: 55
End: 2025-03-07

## 2025-03-07 ENCOUNTER — ANESTHESIA (OUTPATIENT)
Dept: OPERATING ROOM | Age: 55
End: 2025-03-07
Payer: COMMERCIAL

## 2025-03-07 ENCOUNTER — HOSPITAL ENCOUNTER (OUTPATIENT)
Age: 55
Setting detail: OUTPATIENT SURGERY
Discharge: HOME | DRG: 252 | End: 2025-03-07
Attending: SURGERY | Admitting: SURGERY
Payer: COMMERCIAL

## 2025-03-07 ENCOUNTER — ANESTHESIA EVENT (OUTPATIENT)
Dept: OPERATING ROOM | Age: 55
End: 2025-03-07
Payer: COMMERCIAL

## 2025-03-07 VITALS
HEIGHT: 61 IN | TEMPERATURE: 97.1 F | OXYGEN SATURATION: 95 % | DIASTOLIC BLOOD PRESSURE: 80 MMHG | RESPIRATION RATE: 13 BRPM | HEART RATE: 82 BPM | BODY MASS INDEX: 39.76 KG/M2 | SYSTOLIC BLOOD PRESSURE: 176 MMHG | WEIGHT: 210.6 LBS

## 2025-03-07 DIAGNOSIS — T82.838A HEMORRHAGE OF ARTERIOVENOUS FISTULA, INITIAL ENCOUNTER: Primary | ICD-10-CM

## 2025-03-07 DIAGNOSIS — G89.18 ACUTE POST-OPERATIVE PAIN: Primary | ICD-10-CM

## 2025-03-07 LAB
ABO/RH: NORMAL
ANION GAP SERPL CALCULATED.3IONS-SCNC: 16 MMOL/L (ref 3–16)
ANION GAP SERPL CALCULATED.3IONS-SCNC: 16 MMOL/L (ref 3–16)
ANION GAP SERPL CALCULATED.3IONS-SCNC: 17 MMOL/L (ref 3–16)
ANTIBODY SCREEN: NORMAL
APTT BLD: 165 SEC (ref 22.1–36.4)
APTT BLD: 29.3 SEC (ref 22.1–36.4)
BASOPHILS # BLD: 0 K/UL (ref 0–0.2)
BASOPHILS NFR BLD: 0.4 %
BUN SERPL-MCNC: 41 MG/DL (ref 7–20)
BUN SERPL-MCNC: 49 MG/DL (ref 7–20)
BUN SERPL-MCNC: 49 MG/DL (ref 7–20)
CALCIUM SERPL-MCNC: 8.6 MG/DL (ref 8.3–10.6)
CALCIUM SERPL-MCNC: 8.8 MG/DL (ref 8.3–10.6)
CALCIUM SERPL-MCNC: 9.4 MG/DL (ref 8.3–10.6)
CHLORIDE SERPL-SCNC: 95 MMOL/L (ref 99–110)
CHLORIDE SERPL-SCNC: 97 MMOL/L (ref 99–110)
CHLORIDE SERPL-SCNC: 98 MMOL/L (ref 99–110)
CO2 SERPL-SCNC: 21 MMOL/L (ref 21–32)
CO2 SERPL-SCNC: 22 MMOL/L (ref 21–32)
CO2 SERPL-SCNC: 25 MMOL/L (ref 21–32)
CREAT SERPL-MCNC: 7.7 MG/DL (ref 0.6–1.1)
CREAT SERPL-MCNC: 8.2 MG/DL (ref 0.6–1.1)
CREAT SERPL-MCNC: 8.4 MG/DL (ref 0.6–1.1)
DEPRECATED RDW RBC AUTO: 19.2 % (ref 12.4–15.4)
DEPRECATED RDW RBC AUTO: 19.5 % (ref 12.4–15.4)
DEPRECATED RDW RBC AUTO: 19.7 % (ref 12.4–15.4)
EKG ATRIAL RATE: 63 BPM
EKG ATRIAL RATE: 71 BPM
EKG ATRIAL RATE: 73 BPM
EKG DIAGNOSIS: NORMAL
EKG P AXIS: -12 DEGREES
EKG P AXIS: 54 DEGREES
EKG P AXIS: 55 DEGREES
EKG P-R INTERVAL: 166 MS
EKG P-R INTERVAL: 180 MS
EKG P-R INTERVAL: 198 MS
EKG Q-T INTERVAL: 374 MS
EKG Q-T INTERVAL: 384 MS
EKG Q-T INTERVAL: 396 MS
EKG QRS DURATION: 80 MS
EKG QRS DURATION: 82 MS
EKG QRS DURATION: 90 MS
EKG QTC CALCULATION (BAZETT): 405 MS
EKG QTC CALCULATION (BAZETT): 406 MS
EKG QTC CALCULATION (BAZETT): 423 MS
EKG R AXIS: -24 DEGREES
EKG R AXIS: -6 DEGREES
EKG R AXIS: -6 DEGREES
EKG T AXIS: 25 DEGREES
EKG T AXIS: 31 DEGREES
EKG T AXIS: 51 DEGREES
EKG VENTRICULAR RATE: 63 BPM
EKG VENTRICULAR RATE: 71 BPM
EKG VENTRICULAR RATE: 73 BPM
EOSINOPHIL # BLD: 0 K/UL (ref 0–0.6)
EOSINOPHIL NFR BLD: 0.1 %
GFR SERPLBLD CREATININE-BSD FMLA CKD-EPI: 5 ML/MIN/{1.73_M2}
GFR SERPLBLD CREATININE-BSD FMLA CKD-EPI: 5 ML/MIN/{1.73_M2}
GFR SERPLBLD CREATININE-BSD FMLA CKD-EPI: 6 ML/MIN/{1.73_M2}
GLUCOSE BLD-MCNC: 174 MG/DL (ref 70–99)
GLUCOSE BLD-MCNC: 202 MG/DL (ref 70–99)
GLUCOSE BLD-MCNC: 372 MG/DL (ref 70–99)
GLUCOSE BLD-MCNC: 406 MG/DL (ref 70–99)
GLUCOSE BLD-MCNC: 427 MG/DL (ref 70–99)
GLUCOSE SERPL-MCNC: 179 MG/DL (ref 70–99)
GLUCOSE SERPL-MCNC: 427 MG/DL (ref 70–99)
GLUCOSE SERPL-MCNC: 473 MG/DL (ref 70–99)
HCT VFR BLD AUTO: 31.9 % (ref 36–48)
HCT VFR BLD AUTO: 33.9 % (ref 36–48)
HCT VFR BLD AUTO: 38.3 % (ref 36–48)
HGB BLD-MCNC: 10.3 G/DL (ref 12–16)
HGB BLD-MCNC: 10.8 G/DL (ref 12–16)
HGB BLD-MCNC: 12.1 G/DL (ref 12–16)
INR PPP: 1 (ref 0.85–1.15)
INR PPP: 1.1 (ref 0.85–1.15)
INR PPP: 1.1 (ref 0.85–1.15)
LYMPHOCYTES # BLD: 0.9 K/UL (ref 1–5.1)
LYMPHOCYTES NFR BLD: 10.7 %
MCH RBC QN AUTO: 33.1 PG (ref 26–34)
MCH RBC QN AUTO: 33.1 PG (ref 26–34)
MCH RBC QN AUTO: 33.2 PG (ref 26–34)
MCHC RBC AUTO-ENTMCNC: 31.6 G/DL (ref 31–36)
MCHC RBC AUTO-ENTMCNC: 31.8 G/DL (ref 31–36)
MCHC RBC AUTO-ENTMCNC: 32.2 G/DL (ref 31–36)
MCV RBC AUTO: 103.2 FL (ref 80–100)
MCV RBC AUTO: 104.2 FL (ref 80–100)
MCV RBC AUTO: 104.9 FL (ref 80–100)
MONOCYTES # BLD: 0.4 K/UL (ref 0–1.3)
MONOCYTES NFR BLD: 5 %
NEUTROPHILS # BLD: 7 K/UL (ref 1.7–7.7)
NEUTROPHILS NFR BLD: 83.8 %
PERFORMED ON: ABNORMAL
PLATELET # BLD AUTO: 115 K/UL (ref 135–450)
PLATELET # BLD AUTO: 118 K/UL (ref 135–450)
PLATELET # BLD AUTO: 128 K/UL (ref 135–450)
PMV BLD AUTO: 10.2 FL (ref 5–10.5)
PMV BLD AUTO: 9.4 FL (ref 5–10.5)
PMV BLD AUTO: 9.6 FL (ref 5–10.5)
POTASSIUM SERPL-SCNC: 5.4 MMOL/L (ref 3.5–5.1)
POTASSIUM SERPL-SCNC: 7.2 MMOL/L (ref 3.5–5.1)
POTASSIUM SERPL-SCNC: 7.4 MMOL/L (ref 3.5–5.1)
PROTHROMBIN TIME: 13.4 SEC (ref 11.9–14.9)
PROTHROMBIN TIME: 14.4 SEC (ref 11.9–14.9)
PROTHROMBIN TIME: 14.4 SEC (ref 11.9–14.9)
RBC # BLD AUTO: 3.09 M/UL (ref 4–5.2)
RBC # BLD AUTO: 3.26 M/UL (ref 4–5.2)
RBC # BLD AUTO: 3.65 M/UL (ref 4–5.2)
SODIUM SERPL-SCNC: 133 MMOL/L (ref 136–145)
SODIUM SERPL-SCNC: 134 MMOL/L (ref 136–145)
SODIUM SERPL-SCNC: 140 MMOL/L (ref 136–145)
WBC # BLD AUTO: 5.7 K/UL (ref 4–11)
WBC # BLD AUTO: 8.3 K/UL (ref 4–11)
WBC # BLD AUTO: 8.6 K/UL (ref 4–11)

## 2025-03-07 PROCEDURE — 7100000001 HC PACU RECOVERY - ADDTL 15 MIN: Performed by: SURGERY

## 2025-03-07 PROCEDURE — 2500000003 HC RX 250 WO HCPCS: Performed by: SURGERY

## 2025-03-07 PROCEDURE — 80048 BASIC METABOLIC PNL TOTAL CA: CPT

## 2025-03-07 PROCEDURE — 2500000003 HC RX 250 WO HCPCS

## 2025-03-07 PROCEDURE — 6370000000 HC RX 637 (ALT 250 FOR IP): Performed by: ANESTHESIOLOGY

## 2025-03-07 PROCEDURE — 6360000002 HC RX W HCPCS: Performed by: ANESTHESIOLOGY

## 2025-03-07 PROCEDURE — 05CB0ZZ EXTIRPATION OF MATTER FROM RIGHT BASILIC VEIN, OPEN APPROACH: ICD-10-PCS | Performed by: STUDENT IN AN ORGANIZED HEALTH CARE EDUCATION/TRAINING PROGRAM

## 2025-03-07 PROCEDURE — 93005 ELECTROCARDIOGRAM TRACING: CPT | Performed by: SURGERY

## 2025-03-07 PROCEDURE — 6360000002 HC RX W HCPCS

## 2025-03-07 PROCEDURE — 03170ZD BYPASS RIGHT BRACHIAL ARTERY TO UPPER ARM VEIN, OPEN APPROACH: ICD-10-PCS | Performed by: STUDENT IN AN ORGANIZED HEALTH CARE EDUCATION/TRAINING PROGRAM

## 2025-03-07 PROCEDURE — 2500000003 HC RX 250 WO HCPCS: Performed by: NURSE ANESTHETIST, CERTIFIED REGISTERED

## 2025-03-07 PROCEDURE — 7100000010 HC PHASE II RECOVERY - FIRST 15 MIN: Performed by: SURGERY

## 2025-03-07 PROCEDURE — 6360000002 HC RX W HCPCS: Performed by: NURSE ANESTHETIST, CERTIFIED REGISTERED

## 2025-03-07 PROCEDURE — 3700000000 HC ANESTHESIA ATTENDED CARE: Performed by: SURGERY

## 2025-03-07 PROCEDURE — 36821 AV FUSION DIRECT ANY SITE: CPT | Performed by: SURGERY

## 2025-03-07 PROCEDURE — 6360000002 HC RX W HCPCS: Performed by: SURGERY

## 2025-03-07 PROCEDURE — 2709999900 HC NON-CHARGEABLE SUPPLY: Performed by: SURGERY

## 2025-03-07 PROCEDURE — C1713 ANCHOR/SCREW BN/BN,TIS/BN: HCPCS | Performed by: SURGERY

## 2025-03-07 PROCEDURE — 99285 EMERGENCY DEPT VISIT HI MDM: CPT

## 2025-03-07 PROCEDURE — 85025 COMPLETE CBC W/AUTO DIFF WBC: CPT

## 2025-03-07 PROCEDURE — 3700000001 HC ADD 15 MINUTES (ANESTHESIA): Performed by: SURGERY

## 2025-03-07 PROCEDURE — 85610 PROTHROMBIN TIME: CPT

## 2025-03-07 PROCEDURE — 1200000000 HC SEMI PRIVATE

## 2025-03-07 PROCEDURE — C1757 CATH, THROMBECTOMY/EMBOLECT: HCPCS | Performed by: SURGERY

## 2025-03-07 PROCEDURE — 3600000014 HC SURGERY LEVEL 4 ADDTL 15MIN: Performed by: SURGERY

## 2025-03-07 PROCEDURE — 93010 ELECTROCARDIOGRAM REPORT: CPT | Performed by: INTERNAL MEDICINE

## 2025-03-07 PROCEDURE — 36415 COLL VENOUS BLD VENIPUNCTURE: CPT

## 2025-03-07 PROCEDURE — 3600000004 HC SURGERY LEVEL 4 BASE: Performed by: SURGERY

## 2025-03-07 PROCEDURE — 7100000000 HC PACU RECOVERY - FIRST 15 MIN: Performed by: SURGERY

## 2025-03-07 PROCEDURE — 35860 EXPLORE LIMB VESSELS: CPT | Performed by: SURGERY

## 2025-03-07 PROCEDURE — 2580000003 HC RX 258: Performed by: ANESTHESIOLOGY

## 2025-03-07 PROCEDURE — 03C70ZZ EXTIRPATION OF MATTER FROM RIGHT BRACHIAL ARTERY, OPEN APPROACH: ICD-10-PCS | Performed by: STUDENT IN AN ORGANIZED HEALTH CARE EDUCATION/TRAINING PROGRAM

## 2025-03-07 PROCEDURE — 86900 BLOOD TYPING SEROLOGIC ABO: CPT

## 2025-03-07 PROCEDURE — 86901 BLOOD TYPING SEROLOGIC RH(D): CPT

## 2025-03-07 PROCEDURE — 85027 COMPLETE CBC AUTOMATED: CPT

## 2025-03-07 PROCEDURE — 2720000010 HC SURG SUPPLY STERILE: Performed by: SURGERY

## 2025-03-07 PROCEDURE — 7100000011 HC PHASE II RECOVERY - ADDTL 15 MIN: Performed by: SURGERY

## 2025-03-07 PROCEDURE — 96372 THER/PROPH/DIAG INJ SC/IM: CPT

## 2025-03-07 PROCEDURE — 85730 THROMBOPLASTIN TIME PARTIAL: CPT

## 2025-03-07 PROCEDURE — 86850 RBC ANTIBODY SCREEN: CPT

## 2025-03-07 PROCEDURE — 90935 HEMODIALYSIS ONE EVALUATION: CPT

## 2025-03-07 PROCEDURE — 2580000003 HC RX 258: Performed by: NURSE ANESTHETIST, CERTIFIED REGISTERED

## 2025-03-07 RX ORDER — METOPROLOL TARTRATE 1 MG/ML
INJECTION, SOLUTION INTRAVENOUS
Status: DISCONTINUED | OUTPATIENT
Start: 2025-03-07 | End: 2025-03-07 | Stop reason: SDUPTHER

## 2025-03-07 RX ORDER — DEXAMETHASONE SODIUM PHOSPHATE 4 MG/ML
INJECTION, SOLUTION INTRA-ARTICULAR; INTRALESIONAL; INTRAMUSCULAR; INTRAVENOUS; SOFT TISSUE
Status: DISCONTINUED | OUTPATIENT
Start: 2025-03-07 | End: 2025-03-07 | Stop reason: SDUPTHER

## 2025-03-07 RX ORDER — SODIUM CHLORIDE 0.9 % (FLUSH) 0.9 %
5-40 SYRINGE (ML) INJECTION PRN
Status: DISCONTINUED | OUTPATIENT
Start: 2025-03-07 | End: 2025-03-07 | Stop reason: HOSPADM

## 2025-03-07 RX ORDER — PROPOFOL 10 MG/ML
INJECTION, EMULSION INTRAVENOUS
Status: DISCONTINUED | OUTPATIENT
Start: 2025-03-07 | End: 2025-03-07 | Stop reason: SDUPTHER

## 2025-03-07 RX ORDER — SUMATRIPTAN SUCCINATE 25 MG/1
25 TABLET ORAL
Status: DISCONTINUED | OUTPATIENT
Start: 2025-03-07 | End: 2025-03-08

## 2025-03-07 RX ORDER — SODIUM CHLORIDE 0.9 % (FLUSH) 0.9 %
5-40 SYRINGE (ML) INJECTION PRN
Status: DISCONTINUED | OUTPATIENT
Start: 2025-03-07 | End: 2025-03-12 | Stop reason: HOSPADM

## 2025-03-07 RX ORDER — LABETALOL HYDROCHLORIDE 5 MG/ML
10 INJECTION, SOLUTION INTRAVENOUS
Status: DISCONTINUED | OUTPATIENT
Start: 2025-03-07 | End: 2025-03-07 | Stop reason: HOSPADM

## 2025-03-07 RX ORDER — LIDOCAINE HYDROCHLORIDE 10 MG/ML
1 INJECTION, SOLUTION EPIDURAL; INFILTRATION; INTRACAUDAL; PERINEURAL
Status: DISCONTINUED | OUTPATIENT
Start: 2025-03-07 | End: 2025-03-07 | Stop reason: HOSPADM

## 2025-03-07 RX ORDER — CALCIUM GLUCONATE 20 MG/ML
2000 INJECTION, SOLUTION INTRAVENOUS ONCE
Status: COMPLETED | OUTPATIENT
Start: 2025-03-07 | End: 2025-03-07

## 2025-03-07 RX ORDER — SODIUM CHLORIDE 9 MG/ML
INJECTION, SOLUTION INTRAVENOUS CONTINUOUS
Status: DISCONTINUED | OUTPATIENT
Start: 2025-03-07 | End: 2025-03-08

## 2025-03-07 RX ORDER — ACETAMINOPHEN 325 MG/1
650 TABLET ORAL EVERY 6 HOURS
Status: DISCONTINUED | OUTPATIENT
Start: 2025-03-07 | End: 2025-03-12 | Stop reason: HOSPADM

## 2025-03-07 RX ORDER — HYDROMORPHONE HYDROCHLORIDE 1 MG/ML
0.5 INJECTION, SOLUTION INTRAMUSCULAR; INTRAVENOUS; SUBCUTANEOUS EVERY 5 MIN PRN
Status: DISCONTINUED | OUTPATIENT
Start: 2025-03-07 | End: 2025-03-07

## 2025-03-07 RX ORDER — MECLIZINE HCL 12.5 MG 12.5 MG/1
12.5 TABLET ORAL 3 TIMES DAILY PRN
Status: DISCONTINUED | OUTPATIENT
Start: 2025-03-07 | End: 2025-03-12 | Stop reason: HOSPADM

## 2025-03-07 RX ORDER — SODIUM CHLORIDE 0.9 % (FLUSH) 0.9 %
5-40 SYRINGE (ML) INJECTION EVERY 12 HOURS SCHEDULED
Status: DISCONTINUED | OUTPATIENT
Start: 2025-03-07 | End: 2025-03-07 | Stop reason: HOSPADM

## 2025-03-07 RX ORDER — DEXTROSE MONOHYDRATE 100 MG/ML
INJECTION, SOLUTION INTRAVENOUS CONTINUOUS PRN
Status: DISCONTINUED | OUTPATIENT
Start: 2025-03-07 | End: 2025-03-12 | Stop reason: HOSPADM

## 2025-03-07 RX ORDER — ONDANSETRON 2 MG/ML
INJECTION INTRAMUSCULAR; INTRAVENOUS
Status: DISCONTINUED | OUTPATIENT
Start: 2025-03-07 | End: 2025-03-07 | Stop reason: SDUPTHER

## 2025-03-07 RX ORDER — LIDOCAINE HYDROCHLORIDE 20 MG/ML
INJECTION, SOLUTION INTRAVENOUS
Status: DISCONTINUED | OUTPATIENT
Start: 2025-03-07 | End: 2025-03-07 | Stop reason: SDUPTHER

## 2025-03-07 RX ORDER — DIPHENHYDRAMINE HYDROCHLORIDE 50 MG/ML
12.5 INJECTION, SOLUTION INTRAMUSCULAR; INTRAVENOUS
Status: DISCONTINUED | OUTPATIENT
Start: 2025-03-07 | End: 2025-03-07 | Stop reason: HOSPADM

## 2025-03-07 RX ORDER — GLUCAGON 1 MG/ML
1 KIT INJECTION PRN
Status: DISCONTINUED | OUTPATIENT
Start: 2025-03-07 | End: 2025-03-12 | Stop reason: HOSPADM

## 2025-03-07 RX ORDER — METOCLOPRAMIDE HYDROCHLORIDE 5 MG/ML
10 INJECTION INTRAMUSCULAR; INTRAVENOUS
Status: COMPLETED | OUTPATIENT
Start: 2025-03-07 | End: 2025-03-07

## 2025-03-07 RX ORDER — ATORVASTATIN CALCIUM 80 MG/1
80 TABLET, FILM COATED ORAL DAILY
Status: DISCONTINUED | OUTPATIENT
Start: 2025-03-08 | End: 2025-03-12 | Stop reason: HOSPADM

## 2025-03-07 RX ORDER — LABETALOL HYDROCHLORIDE 5 MG/ML
10 INJECTION, SOLUTION INTRAVENOUS
Status: DISCONTINUED | OUTPATIENT
Start: 2025-03-07 | End: 2025-03-07

## 2025-03-07 RX ORDER — METHOCARBAMOL 500 MG/1
500 TABLET, FILM COATED ORAL DAILY
Status: DISCONTINUED | OUTPATIENT
Start: 2025-03-08 | End: 2025-03-11

## 2025-03-07 RX ORDER — LORAZEPAM 2 MG/ML
0.5 INJECTION INTRAMUSCULAR
Status: DISCONTINUED | OUTPATIENT
Start: 2025-03-07 | End: 2025-03-07

## 2025-03-07 RX ORDER — ONDANSETRON 4 MG/1
4 TABLET, ORALLY DISINTEGRATING ORAL EVERY 8 HOURS PRN
Status: DISCONTINUED | OUTPATIENT
Start: 2025-03-07 | End: 2025-03-12 | Stop reason: HOSPADM

## 2025-03-07 RX ORDER — MEPERIDINE HYDROCHLORIDE 25 MG/ML
12.5 INJECTION INTRAMUSCULAR; INTRAVENOUS; SUBCUTANEOUS EVERY 5 MIN PRN
Status: DISCONTINUED | OUTPATIENT
Start: 2025-03-07 | End: 2025-03-07 | Stop reason: HOSPADM

## 2025-03-07 RX ORDER — OXYCODONE HYDROCHLORIDE 5 MG/1
5 TABLET ORAL EVERY 6 HOURS PRN
Qty: 20 TABLET | Refills: 0 | Status: SHIPPED | OUTPATIENT
Start: 2025-03-07 | End: 2025-03-12

## 2025-03-07 RX ORDER — EPINEPHRINE 1 MG/ML
INJECTION, SOLUTION, CONCENTRATE INTRAVENOUS PRN
Status: DISCONTINUED | OUTPATIENT
Start: 2025-03-07 | End: 2025-03-07 | Stop reason: HOSPADM

## 2025-03-07 RX ORDER — HYDRALAZINE HYDROCHLORIDE 20 MG/ML
10 INJECTION INTRAMUSCULAR; INTRAVENOUS
Status: DISCONTINUED | OUTPATIENT
Start: 2025-03-07 | End: 2025-03-07 | Stop reason: HOSPADM

## 2025-03-07 RX ORDER — SODIUM CHLORIDE 9 MG/ML
INJECTION, SOLUTION INTRAVENOUS PRN
Status: DISCONTINUED | OUTPATIENT
Start: 2025-03-07 | End: 2025-03-12 | Stop reason: HOSPADM

## 2025-03-07 RX ORDER — NALOXONE HYDROCHLORIDE 0.4 MG/ML
INJECTION, SOLUTION INTRAMUSCULAR; INTRAVENOUS; SUBCUTANEOUS PRN
Status: DISCONTINUED | OUTPATIENT
Start: 2025-03-07 | End: 2025-03-07 | Stop reason: HOSPADM

## 2025-03-07 RX ORDER — MORPHINE SULFATE 4 MG/ML
4 INJECTION INTRAVENOUS
Status: DISPENSED | OUTPATIENT
Start: 2025-03-07 | End: 2025-03-08

## 2025-03-07 RX ORDER — HYDRALAZINE HYDROCHLORIDE 20 MG/ML
INJECTION INTRAMUSCULAR; INTRAVENOUS
Status: DISCONTINUED | OUTPATIENT
Start: 2025-03-07 | End: 2025-03-07 | Stop reason: SDUPTHER

## 2025-03-07 RX ORDER — IPRATROPIUM BROMIDE AND ALBUTEROL SULFATE 2.5; .5 MG/3ML; MG/3ML
1 SOLUTION RESPIRATORY (INHALATION)
Status: DISCONTINUED | OUTPATIENT
Start: 2025-03-07 | End: 2025-03-07

## 2025-03-07 RX ORDER — ROCURONIUM BROMIDE 10 MG/ML
INJECTION, SOLUTION INTRAVENOUS
Status: DISCONTINUED | OUTPATIENT
Start: 2025-03-07 | End: 2025-03-07 | Stop reason: SDUPTHER

## 2025-03-07 RX ORDER — MAGNESIUM HYDROXIDE 1200 MG/15ML
LIQUID ORAL CONTINUOUS PRN
Status: COMPLETED | OUTPATIENT
Start: 2025-03-07 | End: 2025-03-07

## 2025-03-07 RX ORDER — HYDROMORPHONE HYDROCHLORIDE 1 MG/ML
0.25 INJECTION, SOLUTION INTRAMUSCULAR; INTRAVENOUS; SUBCUTANEOUS
Status: DISCONTINUED | OUTPATIENT
Start: 2025-03-07 | End: 2025-03-12 | Stop reason: HOSPADM

## 2025-03-07 RX ORDER — SODIUM CHLORIDE 9 MG/ML
INJECTION, SOLUTION INTRAVENOUS
Status: DISCONTINUED | OUTPATIENT
Start: 2025-03-07 | End: 2025-03-07 | Stop reason: SDUPTHER

## 2025-03-07 RX ORDER — FENTANYL CITRATE 50 UG/ML
INJECTION, SOLUTION INTRAMUSCULAR; INTRAVENOUS
Status: DISCONTINUED | OUTPATIENT
Start: 2025-03-07 | End: 2025-03-07 | Stop reason: SDUPTHER

## 2025-03-07 RX ORDER — OXYCODONE HYDROCHLORIDE 5 MG/1
5 TABLET ORAL EVERY 4 HOURS PRN
Status: DISCONTINUED | OUTPATIENT
Start: 2025-03-07 | End: 2025-03-12 | Stop reason: HOSPADM

## 2025-03-07 RX ORDER — HYDROMORPHONE HYDROCHLORIDE 1 MG/ML
0.5 INJECTION, SOLUTION INTRAMUSCULAR; INTRAVENOUS; SUBCUTANEOUS EVERY 10 MIN PRN
Status: DISCONTINUED | OUTPATIENT
Start: 2025-03-07 | End: 2025-03-07 | Stop reason: HOSPADM

## 2025-03-07 RX ORDER — FENTANYL CITRATE 50 UG/ML
25 INJECTION, SOLUTION INTRAMUSCULAR; INTRAVENOUS ONCE
Status: COMPLETED | OUTPATIENT
Start: 2025-03-07 | End: 2025-03-07

## 2025-03-07 RX ORDER — DIPHENHYDRAMINE HYDROCHLORIDE 50 MG/ML
12.5 INJECTION, SOLUTION INTRAMUSCULAR; INTRAVENOUS
Status: DISCONTINUED | OUTPATIENT
Start: 2025-03-07 | End: 2025-03-07

## 2025-03-07 RX ORDER — ONDANSETRON 4 MG/1
4 TABLET, FILM COATED ORAL 3 TIMES DAILY PRN
Qty: 15 TABLET | Refills: 0 | Status: SHIPPED | OUTPATIENT
Start: 2025-03-07

## 2025-03-07 RX ORDER — PROCHLORPERAZINE EDISYLATE 5 MG/ML
5 INJECTION INTRAMUSCULAR; INTRAVENOUS
Status: DISCONTINUED | OUTPATIENT
Start: 2025-03-07 | End: 2025-03-07

## 2025-03-07 RX ORDER — PANTOPRAZOLE SODIUM 40 MG/1
40 TABLET, DELAYED RELEASE ORAL DAILY
Status: DISCONTINUED | OUTPATIENT
Start: 2025-03-08 | End: 2025-03-12 | Stop reason: HOSPADM

## 2025-03-07 RX ORDER — SODIUM CHLORIDE 9 MG/ML
INJECTION, SOLUTION INTRAVENOUS PRN
Status: DISCONTINUED | OUTPATIENT
Start: 2025-03-07 | End: 2025-03-07 | Stop reason: HOSPADM

## 2025-03-07 RX ORDER — SODIUM CHLORIDE 9 MG/ML
INJECTION, SOLUTION INTRAVENOUS PRN
Status: DISCONTINUED | OUTPATIENT
Start: 2025-03-07 | End: 2025-03-07

## 2025-03-07 RX ORDER — PHENYLEPHRINE HYDROCHLORIDE 10 MG/ML
INJECTION INTRAVENOUS
Status: DISCONTINUED | OUTPATIENT
Start: 2025-03-07 | End: 2025-03-07 | Stop reason: SDUPTHER

## 2025-03-07 RX ORDER — SODIUM CHLORIDE 0.9 % (FLUSH) 0.9 %
5-40 SYRINGE (ML) INJECTION EVERY 12 HOURS SCHEDULED
Status: DISCONTINUED | OUTPATIENT
Start: 2025-03-07 | End: 2025-03-07

## 2025-03-07 RX ORDER — SODIUM CHLORIDE 0.9 % (FLUSH) 0.9 %
5-40 SYRINGE (ML) INJECTION PRN
Status: DISCONTINUED | OUTPATIENT
Start: 2025-03-07 | End: 2025-03-07

## 2025-03-07 RX ORDER — FAMOTIDINE 10 MG/ML
INJECTION, SOLUTION INTRAVENOUS
Status: DISCONTINUED | OUTPATIENT
Start: 2025-03-07 | End: 2025-03-07 | Stop reason: SDUPTHER

## 2025-03-07 RX ORDER — OXYCODONE HYDROCHLORIDE 5 MG/1
5 TABLET ORAL
Status: COMPLETED | OUTPATIENT
Start: 2025-03-07 | End: 2025-03-07

## 2025-03-07 RX ORDER — SODIUM CHLORIDE 9 MG/ML
INJECTION, SOLUTION INTRAVENOUS CONTINUOUS
Status: DISCONTINUED | OUTPATIENT
Start: 2025-03-07 | End: 2025-03-07 | Stop reason: HOSPADM

## 2025-03-07 RX ORDER — HEPARIN 100 UNIT/ML
500 SYRINGE INTRAVENOUS
Status: DISPENSED | OUTPATIENT
Start: 2025-03-07 | End: 2025-03-08

## 2025-03-07 RX ORDER — LEVETIRACETAM 500 MG/1
500 TABLET ORAL DAILY
Status: DISCONTINUED | OUTPATIENT
Start: 2025-03-08 | End: 2025-03-12 | Stop reason: HOSPADM

## 2025-03-07 RX ORDER — OXYCODONE HYDROCHLORIDE 5 MG/1
10 TABLET ORAL EVERY 4 HOURS PRN
Status: DISCONTINUED | OUTPATIENT
Start: 2025-03-07 | End: 2025-03-12 | Stop reason: HOSPADM

## 2025-03-07 RX ORDER — DOCUSATE SODIUM 100 MG/1
100 CAPSULE, LIQUID FILLED ORAL 2 TIMES DAILY
Qty: 20 CAPSULE | Refills: 0 | Status: SHIPPED | OUTPATIENT
Start: 2025-03-07 | End: 2025-03-17

## 2025-03-07 RX ORDER — FENTANYL CITRATE 50 UG/ML
25 INJECTION, SOLUTION INTRAMUSCULAR; INTRAVENOUS EVERY 5 MIN PRN
Status: DISCONTINUED | OUTPATIENT
Start: 2025-03-07 | End: 2025-03-07

## 2025-03-07 RX ORDER — SODIUM CHLORIDE 0.9 % (FLUSH) 0.9 %
5-40 SYRINGE (ML) INJECTION EVERY 12 HOURS SCHEDULED
Status: DISCONTINUED | OUTPATIENT
Start: 2025-03-07 | End: 2025-03-12 | Stop reason: HOSPADM

## 2025-03-07 RX ORDER — HEPARIN SODIUM 1000 [USP'U]/ML
INJECTION, SOLUTION INTRAVENOUS; SUBCUTANEOUS
Status: DISCONTINUED | OUTPATIENT
Start: 2025-03-07 | End: 2025-03-07 | Stop reason: SDUPTHER

## 2025-03-07 RX ORDER — NALOXONE HYDROCHLORIDE 0.4 MG/ML
INJECTION, SOLUTION INTRAMUSCULAR; INTRAVENOUS; SUBCUTANEOUS PRN
Status: DISCONTINUED | OUTPATIENT
Start: 2025-03-07 | End: 2025-03-07

## 2025-03-07 RX ORDER — HYDROMORPHONE HYDROCHLORIDE 1 MG/ML
0.5 INJECTION, SOLUTION INTRAMUSCULAR; INTRAVENOUS; SUBCUTANEOUS EVERY 5 MIN PRN
Status: DISCONTINUED | OUTPATIENT
Start: 2025-03-07 | End: 2025-03-07 | Stop reason: HOSPADM

## 2025-03-07 RX ORDER — ONDANSETRON 2 MG/ML
4 INJECTION INTRAMUSCULAR; INTRAVENOUS
Status: COMPLETED | OUTPATIENT
Start: 2025-03-07 | End: 2025-03-07

## 2025-03-07 RX ORDER — OXYCODONE HYDROCHLORIDE 5 MG/1
5 TABLET ORAL
Status: DISCONTINUED | OUTPATIENT
Start: 2025-03-07 | End: 2025-03-07

## 2025-03-07 RX ORDER — MEMANTINE HYDROCHLORIDE 10 MG/1
10 TABLET ORAL 2 TIMES DAILY
Status: DISCONTINUED | OUTPATIENT
Start: 2025-03-07 | End: 2025-03-12 | Stop reason: HOSPADM

## 2025-03-07 RX ORDER — ONDANSETRON 2 MG/ML
4 INJECTION INTRAMUSCULAR; INTRAVENOUS EVERY 6 HOURS PRN
Status: DISCONTINUED | OUTPATIENT
Start: 2025-03-07 | End: 2025-03-12 | Stop reason: HOSPADM

## 2025-03-07 RX ORDER — HYDROMORPHONE HYDROCHLORIDE 1 MG/ML
0.5 INJECTION, SOLUTION INTRAMUSCULAR; INTRAVENOUS; SUBCUTANEOUS
Status: DISCONTINUED | OUTPATIENT
Start: 2025-03-07 | End: 2025-03-12 | Stop reason: HOSPADM

## 2025-03-07 RX ORDER — LEVETIRACETAM 250 MG/1
250 TABLET ORAL 3 TIMES DAILY
Status: DISCONTINUED | OUTPATIENT
Start: 2025-03-08 | End: 2025-03-10

## 2025-03-07 RX ADMIN — PHENYLEPHRINE HYDROCHLORIDE 40 MCG/MIN: 10 INJECTION, SOLUTION INTRAVENOUS at 17:38

## 2025-03-07 RX ADMIN — HYDRALAZINE HYDROCHLORIDE 20 MG: 20 INJECTION INTRAMUSCULAR; INTRAVENOUS at 18:13

## 2025-03-07 RX ADMIN — FENTANYL CITRATE 50 MCG: 50 INJECTION, SOLUTION INTRAMUSCULAR; INTRAVENOUS at 07:30

## 2025-03-07 RX ADMIN — PHENYLEPHRINE HYDROCHLORIDE 200 MCG: 10 INJECTION, SOLUTION INTRAVENOUS at 17:38

## 2025-03-07 RX ADMIN — HEPARIN SODIUM 5000 UNITS: 1000 INJECTION INTRAVENOUS; SUBCUTANEOUS at 09:14

## 2025-03-07 RX ADMIN — ROCURONIUM BROMIDE 5 MG: 10 INJECTION, SOLUTION INTRAVENOUS at 09:02

## 2025-03-07 RX ADMIN — ONDANSETRON 4 MG: 2 INJECTION INTRAMUSCULAR; INTRAVENOUS at 07:45

## 2025-03-07 RX ADMIN — DEXAMETHASONE SODIUM PHOSPHATE 4 MG: 4 INJECTION INTRA-ARTICULAR; INTRALESIONAL; INTRAMUSCULAR; INTRAVENOUS; SOFT TISSUE at 07:45

## 2025-03-07 RX ADMIN — SUGAMMADEX 400 MG: 100 INJECTION, SOLUTION INTRAVENOUS at 18:11

## 2025-03-07 RX ADMIN — ROCURONIUM BROMIDE 10 MG: 10 INJECTION, SOLUTION INTRAVENOUS at 08:15

## 2025-03-07 RX ADMIN — ONDANSETRON 4 MG: 2 INJECTION, SOLUTION INTRAMUSCULAR; INTRAVENOUS at 19:50

## 2025-03-07 RX ADMIN — ONDANSETRON 8 MG: 2 INJECTION INTRAMUSCULAR; INTRAVENOUS at 17:31

## 2025-03-07 RX ADMIN — FENTANYL CITRATE 25 MCG: 50 INJECTION, SOLUTION INTRAMUSCULAR; INTRAVENOUS at 08:15

## 2025-03-07 RX ADMIN — FENTANYL CITRATE 100 MCG: 50 INJECTION, SOLUTION INTRAMUSCULAR; INTRAVENOUS at 17:23

## 2025-03-07 RX ADMIN — ROCURONIUM BROMIDE 10 MG: 10 INJECTION, SOLUTION INTRAVENOUS at 08:46

## 2025-03-07 RX ADMIN — SUGAMMADEX 200 MG: 100 INJECTION, SOLUTION INTRAVENOUS at 10:13

## 2025-03-07 RX ADMIN — PHENYLEPHRINE HYDROCHLORIDE 100 MCG: 10 INJECTION, SOLUTION INTRAVENOUS at 08:55

## 2025-03-07 RX ADMIN — HYDROMORPHONE HYDROCHLORIDE 0.5 MG: 1 INJECTION, SOLUTION INTRAMUSCULAR; INTRAVENOUS; SUBCUTANEOUS at 19:02

## 2025-03-07 RX ADMIN — PHENYLEPHRINE HYDROCHLORIDE 50 MCG: 10 INJECTION, SOLUTION INTRAVENOUS at 08:40

## 2025-03-07 RX ADMIN — PHENYLEPHRINE HYDROCHLORIDE 100 MCG: 10 INJECTION, SOLUTION INTRAVENOUS at 08:44

## 2025-03-07 RX ADMIN — PHENYLEPHRINE HYDROCHLORIDE 50 MCG: 10 INJECTION, SOLUTION INTRAVENOUS at 09:51

## 2025-03-07 RX ADMIN — PHENYLEPHRINE HYDROCHLORIDE 100 MCG: 10 INJECTION, SOLUTION INTRAVENOUS at 09:34

## 2025-03-07 RX ADMIN — PHENYLEPHRINE HYDROCHLORIDE 50 MCG: 10 INJECTION, SOLUTION INTRAVENOUS at 10:00

## 2025-03-07 RX ADMIN — PHENYLEPHRINE HYDROCHLORIDE 50 MCG: 10 INJECTION, SOLUTION INTRAVENOUS at 09:54

## 2025-03-07 RX ADMIN — PHENYLEPHRINE HYDROCHLORIDE 50 MCG: 10 INJECTION, SOLUTION INTRAVENOUS at 08:25

## 2025-03-07 RX ADMIN — HYDROMORPHONE HYDROCHLORIDE 0.5 MG: 1 INJECTION, SOLUTION INTRAMUSCULAR; INTRAVENOUS; SUBCUTANEOUS at 18:50

## 2025-03-07 RX ADMIN — OXYCODONE HYDROCHLORIDE 5 MG: 5 TABLET ORAL at 12:02

## 2025-03-07 RX ADMIN — FAMOTIDINE 20 MG: 10 INJECTION, SOLUTION INTRAVENOUS at 17:31

## 2025-03-07 RX ADMIN — METOCLOPRAMIDE HYDROCHLORIDE 10 MG: 5 INJECTION INTRAMUSCULAR; INTRAVENOUS at 11:14

## 2025-03-07 RX ADMIN — PHENYLEPHRINE HYDROCHLORIDE 100 MCG: 10 INJECTION, SOLUTION INTRAVENOUS at 17:36

## 2025-03-07 RX ADMIN — ROCURONIUM BROMIDE 10 MG: 10 INJECTION, SOLUTION INTRAVENOUS at 07:50

## 2025-03-07 RX ADMIN — ROCURONIUM BROMIDE 100 MG: 10 INJECTION, SOLUTION INTRAVENOUS at 17:28

## 2025-03-07 RX ADMIN — WATER 2000 MG: 1 INJECTION INTRAMUSCULAR; INTRAVENOUS; SUBCUTANEOUS at 17:32

## 2025-03-07 RX ADMIN — ROCURONIUM BROMIDE 50 MG: 10 INJECTION, SOLUTION INTRAVENOUS at 07:34

## 2025-03-07 RX ADMIN — FENTANYL CITRATE 25 MCG: 50 INJECTION, SOLUTION INTRAMUSCULAR; INTRAVENOUS at 07:38

## 2025-03-07 RX ADMIN — LIDOCAINE HYDROCHLORIDE 80 MG: 20 INJECTION, SOLUTION INTRAVENOUS at 07:33

## 2025-03-07 RX ADMIN — PHENYLEPHRINE HYDROCHLORIDE 50 MCG: 10 INJECTION, SOLUTION INTRAVENOUS at 07:45

## 2025-03-07 RX ADMIN — SODIUM CHLORIDE: 9 INJECTION, SOLUTION INTRAVENOUS at 07:10

## 2025-03-07 RX ADMIN — INSULIN HUMAN 10 UNITS: 100 INJECTION, SOLUTION PARENTERAL at 18:38

## 2025-03-07 RX ADMIN — PHENYLEPHRINE HYDROCHLORIDE 50 MCG: 10 INJECTION, SOLUTION INTRAVENOUS at 09:47

## 2025-03-07 RX ADMIN — PHENYLEPHRINE HYDROCHLORIDE 50 MCG: 10 INJECTION, SOLUTION INTRAVENOUS at 09:42

## 2025-03-07 RX ADMIN — HYDRALAZINE HYDROCHLORIDE 5 MG: 20 INJECTION INTRAMUSCULAR; INTRAVENOUS at 10:23

## 2025-03-07 RX ADMIN — PROPOFOL 150 MG: 10 INJECTION, EMULSION INTRAVENOUS at 17:28

## 2025-03-07 RX ADMIN — FENTANYL CITRATE 25 MCG: 50 INJECTION INTRAMUSCULAR; INTRAVENOUS at 16:15

## 2025-03-07 RX ADMIN — PHENYLEPHRINE HYDROCHLORIDE 100 MCG: 10 INJECTION, SOLUTION INTRAVENOUS at 09:23

## 2025-03-07 RX ADMIN — CALCIUM GLUCONATE 2000 MG: 20 INJECTION, SOLUTION INTRAVENOUS at 19:52

## 2025-03-07 RX ADMIN — WATER 2000 MG: 1 INJECTION INTRAMUSCULAR; INTRAVENOUS; SUBCUTANEOUS at 07:41

## 2025-03-07 RX ADMIN — METOPROLOL TARTRATE 3 MG: 1 INJECTION, SOLUTION INTRAVENOUS at 18:13

## 2025-03-07 RX ADMIN — ROCURONIUM BROMIDE 5 MG: 10 INJECTION, SOLUTION INTRAVENOUS at 09:46

## 2025-03-07 RX ADMIN — PROPOFOL 150 MG: 10 INJECTION, EMULSION INTRAVENOUS at 07:33

## 2025-03-07 RX ADMIN — SODIUM CHLORIDE: 9 INJECTION, SOLUTION INTRAVENOUS at 17:21

## 2025-03-07 ASSESSMENT — PAIN DESCRIPTION - ORIENTATION
ORIENTATION: MID
ORIENTATION: RIGHT

## 2025-03-07 ASSESSMENT — PAIN SCALES - GENERAL
PAINLEVEL_OUTOF10: 0
PAINLEVEL_OUTOF10: 8
PAINLEVEL_OUTOF10: 2
PAINLEVEL_OUTOF10: 5
PAINLEVEL_OUTOF10: 0
PAINLEVEL_OUTOF10: 3
PAINLEVEL_OUTOF10: 7
PAINLEVEL_OUTOF10: 10
PAINLEVEL_OUTOF10: 0
PAINLEVEL_OUTOF10: 0
PAINLEVEL_OUTOF10: 9

## 2025-03-07 ASSESSMENT — PAIN - FUNCTIONAL ASSESSMENT
PAIN_FUNCTIONAL_ASSESSMENT: 0-10
PAIN_FUNCTIONAL_ASSESSMENT: NONE - DENIES PAIN
PAIN_FUNCTIONAL_ASSESSMENT: PREVENTS OR INTERFERES SOME ACTIVE ACTIVITIES AND ADLS
PAIN_FUNCTIONAL_ASSESSMENT: ACTIVITIES ARE NOT PREVENTED
PAIN_FUNCTIONAL_ASSESSMENT: NONE - DENIES PAIN

## 2025-03-07 ASSESSMENT — ENCOUNTER SYMPTOMS
SHORTNESS OF BREATH: 1
SHORTNESS OF BREATH: 1

## 2025-03-07 ASSESSMENT — PAIN DESCRIPTION - DESCRIPTORS
DESCRIPTORS: ACHING;DISCOMFORT
DESCRIPTORS: ACHING
DESCRIPTORS: DULL;THROBBING
DESCRIPTORS: ACHING

## 2025-03-07 ASSESSMENT — PAIN DESCRIPTION - PAIN TYPE
TYPE: SURGICAL PAIN
TYPE: ACUTE PAIN

## 2025-03-07 ASSESSMENT — PAIN DESCRIPTION - LOCATION
LOCATION: ARM
LOCATION: CHEST
LOCATION: ARM
LOCATION: ARM

## 2025-03-07 NOTE — ANESTHESIA POSTPROCEDURE EVALUATION
Department of Anesthesiology  Postprocedure Note    Patient: Scottie Chiang  MRN: [id number]  YOB: 1970  Date of evaluation: 3/7/2025    Procedure Summary       Date: 03/07/25 Room / Location: [place] [address] / [facility]    Anesthesia Start: 0729 Anesthesia Stop: 1034    Procedure: RIGHT ARTERIOVENOUS FISTULA CREATION (Right: Arm Upper) Diagnosis:       End stage renal disease (HCC)      (End stage renal disease (HCC) [N18.6])    Surgeons: Valeria Kumar MD Responsible Provider: Raghavendra Jewell MD    Anesthesia Type: general ASA Status: 4            Anesthesia Type: No value filed.    Otto Phase I: Otto Score: 10    Otto Phase II: Otto Score: 10    Anesthesia Post Evaluation    Pain score: 7        No notable events documented.

## 2025-03-07 NOTE — PROGRESS NOTES
Vascular Surgery  Post-operative Note      Procedure(s) Performed: right upper extremity hematoma evacuation    Subjective:   Patient reports pain at this time. Reports numbness to medial right forearm, no tingling. Patient is experiencing nausea as well. Underwent dialysis successfully. Denies flatus or BM at this time.     Objective:  Anesthesia type: General      I/O    Intra op    Post op     Fluids  400 mL 200      mL 0 mL     Vitals:   Vitals:    03/07/25 1529 03/07/25 1823   BP: (!) 178/93 106/62   Pulse: 76 75   Resp: 18 12   Temp: 98.3 °F (36.8 °C) 97.3 °F (36.3 °C)   TempSrc: Oral Temporal   SpO2: (!) 89% 92%       Physical Exam:  Post-op vital signs:  Hypertensive SBP: 200s.   General appearance: alert, no acute distress  Eyes: No scleral icterus, EOM grossly intact  Neck: trachea midline, no JVD, no lymphadenopathy, neck supple  Chest/Lungs: normal effort with no accessory muscle use on RA  Cardiovascular: RRR, well perfused  Skin: warm and dry, no rashes  Extremities: RUE incisions c/d/I with optifoam in place, wound vac in place and suctioning, appropriately tender to palpation. Radial and brachial pulses palpable, palpable faint thrill of AVF.  strength 4/5 R, 5/5/ L. Sensation in tact to RUE.   Genitourinary: ESRD  Neuro: A&Ox3, no focal deficits, sensation intact    Assessment and Plan  This is a 54 y.o. year old female status post right upper extremity hematoma evacuation secondary to post op bleeding after Right brachiobasilic AV fistula creation today. (3/7) POD0.    Pain management: Roxicodone pain panel, Dilaudid, PRN  Cardiovasc: Goal SBP < 160  Respiratory:  IS ordered to bedside, encourage hourly IS and deep breathing, wean oxygen as tolerated  Fluids:  NS 50, Diet: General diet  : ESRD  Ambulation: OOB to chair, encourage ambulation  Prophylaxis: SCDs, will start ppx tomorrow   Wound: Local wound care    Emma Murphy MD  PGY1, General Surgery  03/08/25  12:44 AM  [phone]

## 2025-03-07 NOTE — H&P
Vascular Surgery  Resident Honey Note    Reason for Consult: post op bleeding    History of Present Illness:   Scottie Chiang is a 54 y.o. female with Hx of DM, ESRD, HLD, HTn. Surgical history significant for Multiple AV fistula and AV graft creations. She underwent Right brachiobasilic AV fistula creation today and represents now for bleeding.    Patient states she went home and sat on the couch and she noticed her shirt was wet. She saw blood and applied paper towels. The bleeding continued and she represented to [facility] ED. She denies any light headedness, dizziness, SOB, or CP.      Past Medical History:        Diagnosis Date    Arthritis     Diabetes mellitus (HCC)     Diastolic CHF (HCC)     End stage renal disease (HCC)     Hemodialysis patient     MWF    History of blood transfusion     Hyperlipidemia     Hypertension     On home O2     but does not use it    JEAN (obstructive sleep apnea)     currently not on cpap    Seizures (HCC)        Past Surgical History:        Procedure Laterality Date    CATARACT REMOVAL Bilateral      SECTION      DIALYSIS CATHETER INSERTION N/A 2021    LAPAROSCOPIC PERITONEAL DIALYSIS CATHETER INSERTION, OMENTOPLEXY performed by Henri Elizalde DO at [facility] OR    DIALYSIS CATHETER INSERTION N/A 2022    LAPAROSCOPIC PERITONEAL DIALYSIS CATHETER PLACEMENT performed by Henri Elizalde DO at [facility] OR    DIALYSIS CATHETER REMOVAL N/A 2021    CATHETER REMOVAL PERITONEAL DIALYSIS performed by Henri Elizalde DO at [facility] OR    DIALYSIS CATHETER REMOVAL N/A 2022    PERITONEAL DIALYSIS CATHETER REMOVAL performed by Henri Elizalde DO at [facility] OR    DIALYSIS FISTULA CREATION Right 2024    RIGHT ARTERIOVENOUS FISTULA CREATION performed by Valeria Kumar MD at [facility] OR    DIALYSIS FISTULA CREATION Right 3/7/2025    RIGHT ARTERIOVENOUS FISTULA CREATION performed by Valeria Kumar MD at [facility] OR    EYE SURGERY Bilateral 1988    muscle surgery     INVASIVE VASCULAR N/A 2024

## 2025-03-07 NOTE — DISCHARGE INSTRUCTIONS
Discharge Instructions:      Wound Care:  Place strip of silver ribbon in wound, cover with mepilex. Change every 3 days or if soiled. Supplies given at discharge.     Diet:   You may resume a regular diet.    Activity:   No heavy lifting greater than a milk jug until follow up.    Pain management:   Unless informed of any restrictions by your primary care physician, please use your preferred over-the-counter pain reliever as your primary pain medication. If you have pain that persists despite over-the-counter pain medications, you have been provided with a prescription for an opioid/narcotic pain reliever.   No driving or operating machinery while taking opioid/narcotic medications.    Return Precautions:   Call/ Return to ED for increased redness, worsening pain, drainage from wound, fevers, or any other concerns about your incision or post op course.    Follow up with Dr. Kumar in 1-2 weeks. Please call [phone] to schedule your appointment.     Extra Heart Failure Education/ Tools/ Resources:     https://[employer]/publication/?x=771470   --- this is American Heart Association interactive Healthier Living with Heart Failure guidebook.  Please click hyperlink or copy / paste link into search bar. The QR Code is also available below. Use your mouse to scroll through the pages.  Lots of information about weight monitoring, diet tips, activity, meds, etc    Heart Failure Tools and Resources QR Code is below. It includes multiple resources to include symptom tracker, med tracker, further HF info, and access to a HF Support Network online Community    HF [place]  -- this is a free smart phone janie available for iPhone and Android download.  Use your phone to track sodium / fluid intake, zone tool symptom tracking, weights, medications, etc. Click on this hyperlink  HF [place]   for QR code for easy download or the link is also found in the below HF Tools and Resources.      DASH (Dietary  Approach to Stop Hypertension) diet --  https://www.nhlbi.nih.gov/education/dash-eating-plan -- this diet is a flexible eating plan that promotes heart healthy eating style.  Click on hyperlink or copy / paste link into search bar.  Lots of low sodium recipes and tips.    https://www.[employer]/recipes  -- more free recipes

## 2025-03-07 NOTE — TELEPHONE ENCOUNTER
Spoke with Ms. Chiang and she stated she was bleeding through the bandage/chloths and onto pillow, she had called hospital and they informed her to go to ED. I let her know I would message Dr. Kumar and let her know.

## 2025-03-07 NOTE — ED NOTES
Dr Ziegler anesthesia accessed vas cath for initial blood work and hung NS. Dr Ziegler was given 5mL of heparinized syringe     River Matthews RN  03/07/25 5993

## 2025-03-07 NOTE — BRIEF OP NOTE
Brief Postoperative Note      Patient: Scottie Chiang  YOB: 1970  MRN: [id number]    Date of Procedure: 3/7/2025    Pre-Op Diagnosis Codes:      * End stage renal disease (HCC) [N18.6]     * Hematoma of right forearm [S50.11XA]    Post-Op Diagnosis: Same       Procedure(s):  RIGHT UPPER EXTREMITY HEMATOMA EVACUATION    Surgeon(s):  Valeria Kumar MD    Assistant:  Resident: Riaz Foster DO    Anesthesia: General    Estimated Blood Loss (mL): 200     Complications: None    Specimens:   * No specimens in log *    Implants:  * No implants in log *      Drains:   Negative Pressure Wound Therapy Arm Right;Upper;Anterior (Active)   Dressing Status Intact w/seal 03/07/25 1823   Unit Type ULTA WOUND VAC 03/07/25 1823   Mode Continuous 03/07/25 1823   Target Pressure (mmHg) 125 03/07/25 1823   Dressing Type White foam 03/07/25 1823       Findings:  Infection Present At Time Of Surgery (PATOS) (choose all levels that have infection present):  No infection present  Other Findings: 200cc of clot evacuated from RUE. No obvious source of bleeding identified. Raw surface area oozing.    Electronically signed by Riaz Foster DO on 3/7/2025 at 6:39 PM

## 2025-03-07 NOTE — FLOWSHEET NOTE
Ambulatory Surgery/Procedure Discharge Note    Vitals:    03/07/25 1143   BP:    Pulse:    Resp:    Temp:    SpO2: 95%   /80  P 82  R 16  T 97.1    In: 655 [I.V.:655]  Out: 50     Restroom use offered before discharge.  Yes    Pain assessment:  present - adequately treated  Pain Level: 8 Oxycodone given in SDS    Pt and S.O./family states \"ready to go home\". Pt alert and oriented x4. IV removed. Denies N/V. Dressing to right arm with scant amount drainaige.  Pt tolerating po intake. Discharge instructions given to pt and mom with pt permission. Pt and mom verbalized understanding of all instructions. Left with all belongings, 3 prescriptions, and discharge instructions.         Patient discharged to home/self care. Patient discharged via wheel chair by transporter to waiting family/S.O.       3/7/2025 12:04 PM

## 2025-03-07 NOTE — TELEPHONE ENCOUNTER
RIGHT ARTERIOVENOUS FISTULA CREATION [[id number]] was completed 03/07/2025.     Patient states she noticed a wet feeling when leaving the hospital and now the fistula is bleeding and seeping through the bandage, her clothes, and onto the pillow.     She was instructed to go to the emergency room and she is headed to the [facility].     Patient can be reached at [phone].

## 2025-03-07 NOTE — ANESTHESIA PRE PROCEDURE
Department of Anesthesiology  Preprocedure Note       Name:  Scottie Chiang   Age:  54 y.o.  :  1970                                          MRN:  [id number]         Date:  3/7/2025      Surgeon: Surgeon(s):  Valeria Kumar MD    Procedure: Procedure(s):  RIGHT ARM HEMATOMA EVACUATION    Medications prior to admission:   Prior to Admission medications    Medication Sig Start Date End Date Taking? Authorizing Provider   oxyCODONE (ROXICODONE) 5 MG immediate release tablet Take 1 tablet by mouth every 6 hours as needed for Pain for up to 5 days. Intended supply: 5 days. Take lowest dose possible to manage pain Max Daily Amount: 20 mg 3/7/25 3/12/25  Altaf Jones DO   docusate sodium (COLACE) 100 MG capsule Take 1 capsule by mouth 2 times daily for 10 days 3/7/25 3/17/25  Altaf Jones DO   ondansetron (ZOFRAN) 4 MG tablet Take 1 tablet by mouth 3 times daily as needed for Nausea or Vomiting 3/7/25   Altaf Jones DO   methocarbamol (ROBAXIN) 500 MG tablet Take 1 tablet by mouth 2 times daily 2/24/25 3/26/25  Faby Lance APRN - CNP   oxyCODONE-acetaminophen (PERCOCET) 7.5-325 MG per tablet Take 1 tablet by mouth every 6 hours as needed for Pain for up to 28 days. Max Daily Amount: 4 tablets 2/24/25 3/24/25  Faby Lance APRN - CNP   scopolamine (TRANSDERM-SCOP) transdermal patch Place 1 patch onto the skin every 72 hours 2/24/25 3/26/25  Faby Lance APRN - CNP   medroxyPROGESTERone (PROVERA) 10 MG tablet Take 1 tablet by mouth daily 1/15/25   Provider, MD Corey   gabapentin (NEURONTIN) 300 MG capsule Take 1 capsule by mouth 2 times daily for 180 days. 25  Charu Nuñez PA-C   meclizine (ANTIVERT) 12.5 MG tablet TAKE 1 TABLET BY MOUTH 3 TIMES DAILY AS NEEDED FOR DIZZINESS 24  Charu Nuñez PA-C   docusate sodium (COLACE) 100 MG capsule TAKE 1 CAPSULE BY MOUTH 2 TIMES DAILY 24   Charu Nuñez PA-C   atorvastatin (LIPITOR) 80 MG tablet Take 1 tablet by mouth

## 2025-03-07 NOTE — ED PROVIDER NOTES
ED Attending Attestation Note     Date of evaluation: 3/7/2025    This patient was seen by the advance practice provider.  I have seen and examined the patient, agree with the workup, evaluation, management and diagnosis. The care plan has been discussed.  My assessment reveals adult female history of end-stage renal disease had right upper extremity fistula placement for access for hemodialysis today, noticed that she had bleeding on the way home from the hospital.  Hemodynamically stable, bit hypertensive, but not tachycardic.  Surgery consulted.  Wound is not actively bleeding at the moment, intact with staple, but right upper extremity does appear significantly swollen compared to left.     Prosper Velásquez MD  03/07/25 2762

## 2025-03-07 NOTE — OP NOTE
Operative Note      Patient: Scottie Chiang  YOB: 1970  MRN: [id number]    Date of Procedure: 3/7/2025    Pre-Op Diagnosis Codes:      * End stage renal disease (HCC) [N18.6]     * Hematoma of right forearm [S50.11XA]    Post-Op Diagnosis: Same       Procedure(s):  RIGHT UPPER EXTREMITY HEMATOMA EVACUATION    Surgeon(s):  Valeria Kumar MD    Assistant:   Resident: Riaz Foster DO    Anesthesia: General    Estimated Blood Loss (mL): 200     Complications: None    Specimens:   * No specimens in log *    Implants:  * No implants in log *      Drains:   Negative Pressure Wound Therapy Arm Right;Upper;Anterior (Active)   Dressing Status Intact w/seal 03/07/25 1823   Unit Type ULTA WOUND VAC 03/07/25 1823   Mode Continuous 03/07/25 1823   Target Pressure (mmHg) 125 03/07/25 1823   Dressing Type White foam 03/07/25 1823       Findings:  Infection Present At Time Of Surgery (PATOS) (choose all levels that have infection present):  No infection present  Other Findings: Right upper extremity hematoma evacuation    Detailed Description of Procedure:   Patient is a 54-year-old female who presented to the emergency department with right upper extremity swelling and bleeding after brachiobasilic fistula placement earlier today.  The decision was made to bring the patient back to the operating room for hematoma evacuation and washout.    Patient was brought to the operating room placed in the supine position with an armboard to the right upper extremity.  General endotracheal  intubation and anesthesia was induced.  A timeout was called prior to incision. The right upper extremity was prepped and draped in normal sterile fashion and all staples were removed.  Deeper sutures were cut and around 200cc of hematoma was encountered with no obvious source of active bleeding.  The fistula was inspected and found to have good flow and a palpable thrill.  The deep tissue within the disection area was Pulse lavaged and

## 2025-03-07 NOTE — H&P
Scottie Chiang    [id number]    [facility] Same Day Surgery Update H & P  Department of General Surgery   Surgical Service   Pre-operative History and Physical  Last H & P within the last 30 days.    DIAGNOSIS:   End stage renal disease (HCC) [N18.6]    PROCEDURE:  DE CRTJ ARVEN FSTL XCP DIR MORTEZA [place]ON NONAUTOG GRF [21939] (RIGHT ARTERIOVENOUS FISTULA CREATION)     HISTORY OF PRESENT ILLNESS:   Patient presents for above procedure. Denies issues with current TDC. No recent changes in health and feels well today. Denies blood thinning medications or allergies.      Past Medical History:        Diagnosis Date    Arthritis     Diabetes mellitus (HCC)     Diastolic CHF (HCC)     End stage renal disease (HCC)     Hemodialysis patient     MWF    History of blood transfusion     Hyperlipidemia     Hypertension     On home O2     but does not use it    JEAN (obstructive sleep apnea)     currently not on cpap    Seizures (HCC)      Past Surgical History:        Procedure Laterality Date    CATARACT REMOVAL Bilateral      SECTION      DIALYSIS CATHETER INSERTION N/A 2021    LAPAROSCOPIC PERITONEAL DIALYSIS CATHETER INSERTION, OMENTOPLEXY performed by Henri Elizalde DO at [facility] OR    DIALYSIS CATHETER INSERTION N/A 2022    LAPAROSCOPIC PERITONEAL DIALYSIS CATHETER PLACEMENT performed by Henri Elizalde DO at [facility] OR    DIALYSIS CATHETER REMOVAL N/A 2021    CATHETER REMOVAL PERITONEAL DIALYSIS performed by Henri Elizalde DO at [facility] OR    DIALYSIS CATHETER REMOVAL N/A 2022    PERITONEAL DIALYSIS CATHETER REMOVAL performed by Henri Elizalde DO at [facility] OR    DIALYSIS FISTULA CREATION Right 2024    RIGHT ARTERIOVENOUS FISTULA CREATION performed by Valeria Kumar MD at [facility] OR    EYE SURGERY Bilateral 1988    muscle surgery     INVASIVE VASCULAR N/A 2024    Angioplasty fistula/dialysis circuit performed by Valeria Kumar MD at [facility] CARDIAC CATH LAB    INVASIVE VASCULAR N/A 2024    Fistulogram  right performed by Valeria Kumar MD at [facility] CARDIAC CATH LAB    INVASIVE VASCULAR N/A 10/23/2024    Fistulogram right performed by Valeria Kumar MD at [facility] CARDIAC CATH LAB    IR TUNNELED CVC PLACE WO SQ PORT/PUMP > 5 YEARS  11/15/2021    IR TUNNELED CATHETER PLACEMENT GREATER THAN 5 YEARS 11/15/2021 [facility] SPECIAL PROCEDURES    IR TUNNELED CVC PLACE WO SQ PORT/PUMP > 5 YEARS  06/28/2022    IR TUNNELED CATHETER PLACEMENT GREATER THAN 5 YEARS 6/28/2022 [facility] SPECIAL PROCEDURES    IR TUNNELED CVC PLACE WO SQ PORT/PUMP > 5 YEARS  07/07/2022    IR TUNNELED CATHETER PLACEMENT GREATER THAN 5 YEARS 7/7/2022 [facility] SPECIAL PROCEDURES    IR TUNNELED CVC PLACE WO SQ PORT/PUMP > 5 YEARS  09/21/2022    IR TUNNELED CATHETER PLACEMENT GREATER THAN 5 YEARS 9/21/2022 [facility] SPECIAL PROCEDURES    JOINT REPLACEMENT Right     right knee    TOE AMPUTATION Left     left great toe partial amputation    US ASP ABSCESS/HEMATOMA/BULLA/CYST  03/28/2022    US ASP ABSCESS/HEMATOMA/BULLA/CYST 3/28/2022 [facility] ULTRASOUND     Past Social History:  Social History     Socioeconomic History    Marital status: Single     Spouse name: None    Number of children: None    Years of education: None    Highest education level: None   Tobacco Use    Smoking status: Never    Smokeless tobacco: Never   Vaping Use    Vaping status: Never Used   Substance and Sexual Activity    Alcohol use: Not Currently    Drug use: Not Currently    Sexual activity: Not Currently     Social Determinants of Health     Food Insecurity: Food Insecurity Present (2/14/2025)    Received from [employer] [facility]    Hunger Vital Sign     Worried About Running Out of Food in the Last Year: Sometimes true     Ran Out of Food in the Last Year: Sometimes true   Transportation Needs: Not At Risk (2/14/2025)    Received from [employer] [facility]    Transportation     Within the past 12 months, has a lack of transportation kept you from medical appointments    docusate sodium (COLACE) 100 MG capsule TAKE 1 CAPSULE BY MOUTH 2 TIMES DAILY 12/18/24  Yes Charu Nuñez PA-C   atorvastatin (LIPITOR) 80 MG tablet Take 1 tablet by mouth daily   Yes ProviderCorey MD   carvedilol (COREG) 6.25 MG tablet Take 1 tablet by mouth 2 times daily (with meals)   Yes Corey Caceres MD   levETIRAcetam (KEPPRA) 250 MG tablet Take one tablet by mouth three times a week after dialysis on Mondays, Wednesdays, and Fridays (500 mg daily, plus 250 mg on dialysis days).   Yes Corey Caceres MD   methocarbamol (ROBAXIN) 500 MG tablet Take 1 tablet by mouth daily   Yes Corey Caceres MD   ondansetron (ZOFRAN-ODT) 4 MG disintegrating tablet Take 1 tablet by mouth every 8 hours as needed for Nausea or Vomiting   Yes Provider, MD Corey   loperamide (IMODIUM) 2 MG capsule Take 1 capsule by mouth 4 times daily as needed for Diarrhea   Yes ProviderCorey MD   memantine (NAMENDA) 10 MG tablet Take 1 tablet by mouth 2 times daily   Yes Provider, MD Corey   sevelamer (RENVELA) 800 MG tablet TAKE 2 TABLETS BY MOUTH 3 TIMES DAILY WITH A MEAL 5/21/24  Yes Charu Nuñez PA-C   torsemide (DEMADEX) 100 MG tablet TAKE 1 TABLET BY MOUTH 2 TIMES DAILY 5/21/24  Yes Charu Nuñez PA-C   vitamin B-2 (RIBOFLAVIN) 100 MG TABS tablet Take 1 tablet by mouth 2 times daily   Yes Provider, Historical, MD   B Complex-ROBIN-Folic Acid (DIALYVITE 800) 0.8 MG TABS Take 1 tablet by mouth daily 4/24/24  Yes Charu Nuñez PA-C   SUMAtriptan (IMITREX) 25 MG tablet Take 1 tablet by mouth once as needed for Migraine 3/12/24  Yes Corey Caceres MD   levETIRAcetam (KEPPRA) 500 MG tablet Take 1 tablet by mouth daily 2/27/24  Yes Chen Mosqueda MD   pantoprazole (PROTONIX) 40 MG tablet Take 1 tablet by mouth every morning (before breakfast)  Patient taking differently: Take 1 tablet by mouth in the morning and at bedtime 11/1/23  Yes Charu Nuñez PA-C   midodrine (PROAMATINE) 10 MG

## 2025-03-07 NOTE — ED PROVIDER NOTES
THE [facility]  EMERGENCY DEPARTMENT ENCOUNTER          PHYSICIAN ASSISTANT NOTE       Date of evaluation: 3/7/2025    Chief Complaint     Post-op Problem (Pt had right arm fistula placement at 1200 today. She came back for bleeding from fistula placement)      History of Present Illness     Scottie Chiang is a 54 y.o. female with PMHx of ESRD who presents with bleeding from the surgical site. She states she had a right AV fistula placed today here at [facility]. The site was bleeding when she was leaving the hospital and continues to bleed after going home. States the site was bleeding and seeping through the bandage, her clothes, and onto the pillow. She contacted her vascular surgeon who recommended patient to come to the ED. She currently denies any other complaints. Denies chest pain or shortness of breath.     ASSESSMENT / PLAN  (MEDICAL DECISION MAKING)     INITIAL VITALS: BP: (!) 178/93, Temp: 98.3 °F (36.8 °C), Pulse: 76, Respirations: 18, SpO2: (!) 89 %    Scottie Chiang is a 54 y.o. female  PMHx of ESRD who presents with bleeding from the surgical site. She states she had a right AV fistula placed today here at [facility]. The site was bleeding when she was leaving the hospital and continues to bleed after going home. States the site was bleeding and seeping through the bandage, her clothes, and onto the pillow. She contacted her vascular surgeon who recommended patient to come to the ED. She currently denies any other complaints. Denies chest pain or shortness of breath.     On exam, patient does have swelling along with bleeding around the surgical incision site. No surrounding redness.     General surgery team was consulted and they do recommend patient going back to the OR for further management. Patient will be admitted under general surgery.     Is this patient to be included in the SEP-1 core measure? No Exclusion criteria - the patient is NOT to be included for SEP-1 Core Measure due to:  METHOCARBAMOL (ROBAXIN) 500 MG TABLET    Take 1 tablet by mouth 2 times daily    MIDODRINE (PROAMATINE) 10 MG TABLET    Take one tablet by mouth as needed for SBP<100 during dialysis. Bring bottle to each dialysis treatment.    NALOXONE 4 MG/0.1ML LIQD NASAL SPRAY    1 spray by Nasal route as needed for Opioid Reversal    ONDANSETRON (ZOFRAN) 4 MG TABLET    Take 1 tablet by mouth 3 times daily as needed for Nausea or Vomiting    ONDANSETRON (ZOFRAN-ODT) 4 MG DISINTEGRATING TABLET    Take 1 tablet by mouth every 8 hours as needed for Nausea or Vomiting    OXYCODONE (ROXICODONE) 5 MG IMMEDIATE RELEASE TABLET    Take 1 tablet by mouth every 6 hours as needed for Pain for up to 5 days. Intended supply: 5 days. Take lowest dose possible to manage pain Max Daily Amount: 20 mg    OXYCODONE-ACETAMINOPHEN (PERCOCET) 7.5-325 MG PER TABLET    Take 1 tablet by mouth every 6 hours as needed for Pain for up to 28 days. Max Daily Amount: 4 tablets    PANTOPRAZOLE (PROTONIX) 40 MG TABLET    Take 1 tablet by mouth every morning (before breakfast)    SCOPOLAMINE (TRANSDERM-SCOP) TRANSDERMAL PATCH    Place 1 patch onto the skin every 72 hours    SEVELAMER (RENVELA) 800 MG TABLET    TAKE 2 TABLETS BY MOUTH 3 TIMES DAILY WITH A MEAL    SUMATRIPTAN (IMITREX) 25 MG TABLET    Take 1 tablet by mouth once as needed for Migraine    TORSEMIDE (DEMADEX) 100 MG TABLET    TAKE 1 TABLET BY MOUTH 2 TIMES DAILY    VITAMIN B-2 (RIBOFLAVIN) 100 MG TABS TABLET    Take 1 tablet by mouth 2 times daily       Allergies     She is allergic to adhesive tape.    Physical Exam     INITIAL VITALS: BP: (!) 178/93, Temp: 98.3 °F (36.8 °C), Pulse: 76, Respirations: 18, SpO2: (!) 89 %  Physical Exam  Vitals and nursing note reviewed.   Constitutional:       Appearance: Normal appearance.   HENT:      Head: Normocephalic.   Cardiovascular:      Rate and Rhythm: Normal rate.   Pulmonary:      Effort: Pulmonary effort is normal.   Abdominal:      Tenderness: There is

## 2025-03-07 NOTE — PROGRESS NOTES
Patient woke up from anesthesia at 1040 and removed aggressively her 02 mask. I tried to put it back but she became verbally aggressive.  Charge nurse aware.   Patient 02 dropping tp 88%.  Patient accepted to put the nasal canula.    Patient is experimenting chest pain, Dr Jewell called, EKG ordered and done.    Patient is not cooperative and wants ice chips, which will be given after the EKG will be seen by Dr Jewell and he agrees to give PO fluids. Reason explained but the patient does not want to listen to my explanation, states she does not have chest pain anymore and wants ice chips right now.  Patient states she wants to leave and will walk away if the recovery process is too long. Charge Nurse aware about the situation and talked to the patient.     Dr Jewell came to look at the EKG and is ok with PO fluids.  Instruction is to practice the spirometers couple of times and then if on room air she does not go below 88%, she will be able to get transferred to same day surgery.    Dialysis has to be contacted prior to discharge to see if she can go today.   Instruction written on SDS report sheet.

## 2025-03-07 NOTE — ANESTHESIA POSTPROCEDURE EVALUATION
Department of Anesthesiology  Postprocedure Note    Patient: Scottie Chiang  MRN: [id number]  YOB: 1970  Date of evaluation: 3/7/2025    Procedure Summary       Date: 03/07/25 Room / Location: [place] [address] / [facility]    Anesthesia Start: 1721 Anesthesia Stop: 1824    Procedure: RIGHT UPPER EXTREMITY HEMATOMA EVACUATION (Right: Arm Upper) Diagnosis:       End stage renal disease (HCC)      Hematoma of right forearm      (End stage renal disease (HCC) [N18.6])      (Hematoma of right forearm [S50.11XA])    Surgeons: Valeria Kumar MD Responsible Provider: Kenney Elizabeth MD    Anesthesia Type: general ASA Status: 4 - Emergent            Anesthesia Type: No value filed.    Otto Phase I:      Otto Phase II:      Anesthesia Post Evaluation    Patient location during evaluation: PACU  Patient participation: complete - patient participated  Level of consciousness: awake  Airway patency: patent  Nausea & Vomiting: no nausea and no vomiting  Cardiovascular status: blood pressure returned to baseline and hemodynamically stable  Respiratory status: acceptable  Hydration status: euvolemic  Multimodal analgesia pain management approach  Pain management: adequate    No notable events documented.

## 2025-03-07 NOTE — PROGRESS NOTES
Vitals:    03/07/25 1130   BP: (!) 159/86   Pulse: 79   Resp: 10   Temp: 97.5 °F (36.4 °C)   SpO2: (!) 89%         Intake/Output Summary (Last 24 hours) at 3/7/2025 1134  Last data filed at 3/7/2025 1028  Gross per 24 hour   Intake 655 ml   Output 50 ml   Net 605 ml       Pain assessment:  none  Pain Level: 0    Patient transferred to care of ARCHANA RN.    3/7/2025 11:34 AM

## 2025-03-07 NOTE — BRIEF OP NOTE
Brief Postoperative Note      Patient: Scottie Chiang  YOB: 1970  MRN: [id number]    Date of Procedure: 3/7/2025    Pre-Op Diagnosis Codes:      * End stage renal disease (HCC) [N18.6]    Post-Op Diagnosis: Same       Procedure(s):  RIGHT ARTERIOVENOUS FISTULA CREATION    Surgeon(s):  Valeria Kumar MD    Assistant:  Surgical Assistant: Gerson Castillo  Resident: Altaf Jones DO    Anesthesia: General    Estimated Blood Loss (mL): Minimal    Complications: None    Specimens:   * No specimens in log *    Implants:  * No implants in log *      Drains: * No LDAs found *    Findings:  Infection Present At Time Of Surgery (PATOS) (choose all levels that have infection present):  No infection present  Other Findings:   Right arm brachiobasiilic fistula creation. No complications. Dopplerable radial and ulnar pulses at end of case.     Electronically signed by Altaf Jones DO on 3/7/2025 at 10:20 AM

## 2025-03-07 NOTE — PROGRESS NOTES
Patient admitted to PACU # 7 from OR at 1030 post RIGHT ARTERIOVENOUS FISTULA CREATION - Right  per Valeria Kumar MD .  Attached to PACU monitoring system and report received from anesthesia provider.  Patient was reported to be hemodynamically stable during procedure.  Patient drowsy on admission and denied pain.

## 2025-03-07 NOTE — ANESTHESIA PRE PROCEDURE
Josh Alvarez MD       • sodium chloride flush 0.9 % injection 5-40 mL  5-40 mL IntraVENous PRN Josh Alvarez MD       • 0.9 % sodium chloride infusion   IntraVENous PRN Josh Alvarez MD       • 0.9 % sodium chloride infusion   IntraVENous Continuous Josh Alvarez MD       • sodium chloride flush 0.9 % injection 5-40 mL  5-40 mL IntraVENous 2 times per day Valeria Kumar MD       • sodium chloride flush 0.9 % injection 5-40 mL  5-40 mL IntraVENous PRN Valeria Kumar MD       • 0.9 % sodium chloride infusion   IntraVENous PRN Valeria Kumar MD       • ceFAZolin (ANCEF) 2,000 mg in sterile water 20 mL IV syringe  2,000 mg IntraVENous On Call to OR Valeria Kumar MD           Allergies:    Allergies   Allergen Reactions   • Adhesive Tape Itching       Problem List:    Patient Active Problem List   Diagnosis Code   • CHF (congestive heart failure), NYHA class I, acute on chronic, combined ([facility]) I50.43   • Abnormal myocardial perfusion study R94.39   • Essential hypertension I10   • Type 2 diabetes mellitus with hyperglycemia, with long-term current use of insulin ([facility]) E11.65, Z79.4   • JEAN (obstructive sleep apnea) G47.33   • HARRIETT (acute kidney injury) N17.9   • Uremia N19   • CKD (chronic kidney disease) stage 5, GFR less than 15 ml/min ([facility]) N18.5   • Electrolyte imbalance E87.8   • Anemia D64.9   • Abdominal pain R10.9   • Infection due to peritoneal dialysis catheter T85.71XA   • Free intraperitoneal air K66.8   • Positive blood culture R78.81   • Hypertensive emergency I16.1   • ESRD (end stage renal disease) on dialysis ([facility]) N18.6, Z99.2   • Stroke-like symptoms R29.90   • Peritonitis ([facility]) K65.9   • ESRD (end stage renal disease) ([facility]) N18.6   • ESRD needing dialysis ([facility]) N18.6, Z99.2   • Episodic migraine G43.909   • Cervical spondylosis M47.812   • Lumbosacral spondylosis without myelopathy M47.817   • Chronic pain syndrome G89.4   • Encounter for therapeutic drug monitoring Z51.81   • Acute

## 2025-03-07 NOTE — DISCHARGE INSTRUCTIONS
[facility] AMBULATORY PROCEDURE DISCHARGE INSTRUCTIONS      Diet:   May resume regular diet    Wound Care:   You have a bandage over your incision. This should stay in place for 3-4 days unless it becomes saturated. Staples are were used to close your wound. These will be removed at your post op appointment. Keep the wound clean and dry.     Activity:   No heavy lifting greater than a milk jug, or 8 lbs until follow up.    Pain management:   For moderate to severe pain please take the Roxicodone that you were prescribed. If you take narcotics, note that they may cause constipation. For constipation take Colace up to two times a day as needed. If stools become loose, you may reduce the amount of colace you are taking or stop taking all together. Take as little narcotic pain medication as you can tolerate. No driving while on narcotics.    For mild to moderate pain you may take over-the-counter Tylenol or Motrin as directed on the packaging. Do not take more than 4000 mg acetaminophen (the active ingredient in tylenol) per day.     Bowel Regimen:   Opioid/Narcotic pain relievers have a common side effect of constipation; therefore, you have been provided with a prescription for a stool softener, Docusate (Colace).  These medications are intended to help prevent you from experiencing this very common side effect and also help to regulate your bowels after surgery.   If your stools become too loose and/or frequent, decrease the Colace to one pill one time each day. If your stools are still loose after this modification, stop taking this medication all together.    Return if:   Call/ Return to ED for increased redness, worsening pain, drainage from wound, or fevers greater than 101.5 F.    Watch for these possible complications, symptoms, or side effects of anesthesia.  Call physician if they or any other problems occur:  Signs of INFECTION   > Fever over 101°     > Redness, swelling, hardness or warmth at the  instructions  []Medication information sheet(S) including potential side effects  []Nan’s egress test  []Pain Ball management  []FAQ Catheter associated blood stream infections  []FAQ Surgical Site Infections  []Other-    I have read and understand the instructions given to me: ____________________________________________   (Patient/S.O. Signature)            Date/time 3/7/2025 11:03 AM         If you smoke STOP. We care about your health!

## 2025-03-08 PROBLEM — T82.838A: Status: ACTIVE | Noted: 2025-03-08

## 2025-03-08 LAB
ALBUMIN SERPL-MCNC: 3.6 G/DL (ref 3.4–5)
ANION GAP SERPL CALCULATED.3IONS-SCNC: 15 MMOL/L (ref 3–16)
BASOPHILS # BLD: 0 K/UL (ref 0–0.2)
BASOPHILS NFR BLD: 0.3 %
BUN SERPL-MCNC: 30 MG/DL (ref 7–20)
CALCIUM SERPL-MCNC: 8.8 MG/DL (ref 8.3–10.6)
CHLORIDE SERPL-SCNC: 98 MMOL/L (ref 99–110)
CO2 SERPL-SCNC: 23 MMOL/L (ref 21–32)
CREAT SERPL-MCNC: 6.3 MG/DL (ref 0.6–1.1)
DEPRECATED RDW RBC AUTO: 19.9 % (ref 12.4–15.4)
EOSINOPHIL # BLD: 0.1 K/UL (ref 0–0.6)
EOSINOPHIL NFR BLD: 1.7 %
GFR SERPLBLD CREATININE-BSD FMLA CKD-EPI: 7 ML/MIN/{1.73_M2}
GLUCOSE BLD-MCNC: 100 MG/DL (ref 70–99)
GLUCOSE BLD-MCNC: 110 MG/DL (ref 70–99)
GLUCOSE BLD-MCNC: 211 MG/DL (ref 70–99)
GLUCOSE BLD-MCNC: 72 MG/DL (ref 70–99)
GLUCOSE BLD-MCNC: 75 MG/DL (ref 70–99)
GLUCOSE BLD-MCNC: 86 MG/DL (ref 70–99)
GLUCOSE SERPL-MCNC: 54 MG/DL (ref 70–99)
HCT VFR BLD AUTO: 28.6 % (ref 36–48)
HGB BLD-MCNC: 9.2 G/DL (ref 12–16)
LYMPHOCYTES # BLD: 1.9 K/UL (ref 1–5.1)
LYMPHOCYTES NFR BLD: 27.7 %
MAGNESIUM SERPL-MCNC: 2.25 MG/DL (ref 1.8–2.4)
MCH RBC QN AUTO: 33.4 PG (ref 26–34)
MCHC RBC AUTO-ENTMCNC: 32.1 G/DL (ref 31–36)
MCV RBC AUTO: 104 FL (ref 80–100)
MONOCYTES # BLD: 0.8 K/UL (ref 0–1.3)
MONOCYTES NFR BLD: 11.6 %
NEUTROPHILS # BLD: 4.1 K/UL (ref 1.7–7.7)
NEUTROPHILS NFR BLD: 58.7 %
PERFORMED ON: ABNORMAL
PERFORMED ON: NORMAL
PHOSPHATE SERPL-MCNC: 3.2 MG/DL (ref 2.5–4.9)
PLATELET # BLD AUTO: 106 K/UL (ref 135–450)
PMV BLD AUTO: 9.7 FL (ref 5–10.5)
POTASSIUM SERPL-SCNC: 4.7 MMOL/L (ref 3.5–5.1)
RBC # BLD AUTO: 2.75 M/UL (ref 4–5.2)
SODIUM SERPL-SCNC: 136 MMOL/L (ref 136–145)
WBC # BLD AUTO: 7 K/UL (ref 4–11)

## 2025-03-08 PROCEDURE — 6370000000 HC RX 637 (ALT 250 FOR IP): Performed by: STUDENT IN AN ORGANIZED HEALTH CARE EDUCATION/TRAINING PROGRAM

## 2025-03-08 PROCEDURE — 6360000002 HC RX W HCPCS

## 2025-03-08 PROCEDURE — 1200000000 HC SEMI PRIVATE

## 2025-03-08 PROCEDURE — 80069 RENAL FUNCTION PANEL: CPT

## 2025-03-08 PROCEDURE — 2580000003 HC RX 258: Performed by: STUDENT IN AN ORGANIZED HEALTH CARE EDUCATION/TRAINING PROGRAM

## 2025-03-08 PROCEDURE — 90935 HEMODIALYSIS ONE EVALUATION: CPT

## 2025-03-08 PROCEDURE — 85025 COMPLETE CBC W/AUTO DIFF WBC: CPT

## 2025-03-08 PROCEDURE — 5A1D70Z PERFORMANCE OF URINARY FILTRATION, INTERMITTENT, LESS THAN 6 HOURS PER DAY: ICD-10-PCS | Performed by: INTERNAL MEDICINE

## 2025-03-08 PROCEDURE — 2500000003 HC RX 250 WO HCPCS

## 2025-03-08 PROCEDURE — 99222 1ST HOSP IP/OBS MODERATE 55: CPT | Performed by: INTERNAL MEDICINE

## 2025-03-08 PROCEDURE — 90935 HEMODIALYSIS ONE EVALUATION: CPT | Performed by: INTERNAL MEDICINE

## 2025-03-08 PROCEDURE — 36415 COLL VENOUS BLD VENIPUNCTURE: CPT

## 2025-03-08 PROCEDURE — 6370000000 HC RX 637 (ALT 250 FOR IP)

## 2025-03-08 PROCEDURE — 6360000002 HC RX W HCPCS: Performed by: INTERNAL MEDICINE

## 2025-03-08 PROCEDURE — 6370000000 HC RX 637 (ALT 250 FOR IP): Performed by: NURSE PRACTITIONER

## 2025-03-08 PROCEDURE — 83735 ASSAY OF MAGNESIUM: CPT

## 2025-03-08 RX ORDER — CARVEDILOL 6.25 MG/1
6.25 TABLET ORAL 2 TIMES DAILY WITH MEALS
Status: DISCONTINUED | OUTPATIENT
Start: 2025-03-08 | End: 2025-03-12 | Stop reason: HOSPADM

## 2025-03-08 RX ORDER — HEPARIN SODIUM 1000 [USP'U]/ML
3600 INJECTION, SOLUTION INTRAVENOUS; SUBCUTANEOUS PRN
Status: DISCONTINUED | OUTPATIENT
Start: 2025-03-08 | End: 2025-03-12 | Stop reason: HOSPADM

## 2025-03-08 RX ADMIN — INSULIN HUMAN 10 UNITS: 100 INJECTION, SOLUTION PARENTERAL at 01:08

## 2025-03-08 RX ADMIN — ACETAMINOPHEN 650 MG: 325 TABLET, FILM COATED ORAL at 00:27

## 2025-03-08 RX ADMIN — HEPARIN SODIUM 3600 UNITS: 1000 INJECTION INTRAVENOUS; SUBCUTANEOUS at 10:28

## 2025-03-08 RX ADMIN — ACETAMINOPHEN 650 MG: 325 TABLET, FILM COATED ORAL at 11:08

## 2025-03-08 RX ADMIN — ATORVASTATIN CALCIUM 80 MG: 80 TABLET, FILM COATED ORAL at 11:08

## 2025-03-08 RX ADMIN — SODIUM CHLORIDE, PRESERVATIVE FREE 10 ML: 5 INJECTION INTRAVENOUS at 20:52

## 2025-03-08 RX ADMIN — LEVETIRACETAM 500 MG: 500 TABLET, FILM COATED ORAL at 11:08

## 2025-03-08 RX ADMIN — ACETAMINOPHEN 650 MG: 325 TABLET, FILM COATED ORAL at 06:38

## 2025-03-08 RX ADMIN — MEMANTINE 10 MG: 10 TABLET ORAL at 01:08

## 2025-03-08 RX ADMIN — LEVETIRACETAM 250 MG: 250 TABLET, FILM COATED ORAL at 15:52

## 2025-03-08 RX ADMIN — SODIUM CHLORIDE: 0.9 INJECTION, SOLUTION INTRAVENOUS at 03:27

## 2025-03-08 RX ADMIN — CARVEDILOL 6.25 MG: 6.25 TABLET, FILM COATED ORAL at 16:45

## 2025-03-08 RX ADMIN — ACETAMINOPHEN 650 MG: 325 TABLET, FILM COATED ORAL at 16:35

## 2025-03-08 RX ADMIN — MEMANTINE 10 MG: 10 TABLET ORAL at 11:08

## 2025-03-08 RX ADMIN — LEVETIRACETAM 250 MG: 250 TABLET, FILM COATED ORAL at 11:09

## 2025-03-08 RX ADMIN — OXYCODONE 5 MG: 5 TABLET ORAL at 16:35

## 2025-03-08 RX ADMIN — PANTOPRAZOLE SODIUM 40 MG: 40 TABLET, DELAYED RELEASE ORAL at 11:07

## 2025-03-08 RX ADMIN — METHOCARBAMOL 500 MG: 500 TABLET ORAL at 11:08

## 2025-03-08 RX ADMIN — HYDROMORPHONE HYDROCHLORIDE 0.5 MG: 1 INJECTION, SOLUTION INTRAMUSCULAR; INTRAVENOUS; SUBCUTANEOUS at 00:31

## 2025-03-08 ASSESSMENT — PAIN SCALES - GENERAL
PAINLEVEL_OUTOF10: 9
PAINLEVEL_OUTOF10: 0

## 2025-03-08 NOTE — PROGRESS NOTES
Diet ordered for patient.   Perfect serve sent to resident Dr. Lee- about K+ level 7.2 creatine 8.4. Reports aware of lab values and nephrology is consulted.     Patient complains with discomfort right upper arm- treated with dilaudid as ordered.

## 2025-03-08 NOTE — PROGRESS NOTES
Treatment time: 1 hour 56 minutes  Net UF: 0 ml     Pre weight: 95.5 kg  Post weight:95.5 kg       Access used: LTDC    Access function: well with  ml/min     Medications or blood products given: NA     Regular outpatient schedule: MWF     Summary of response to treatment: Patient tolerated treatment well and without any complications. Patient remained stable throughout entire treatment.

## 2025-03-08 NOTE — PROGRESS NOTES
From OR to PACU  Post op Procedures  RIGHT UPPER EXTREMITY HEMATOMA EVACUATION - Right  Valeria Kumar MD  In PACU, OR 01  Report received from CRNA at bedside.   Patient drowsy no complaints of pain or needs at this time.   O2 saturation 97% on 3 liters NC.   Right upper forearm with white foam dressing/tegaderm - wound vac ULTA 125 mmgh over the fistula site from previous surgery this morning.   Patient had dialysis last Wednesday ( on Monday/Wednesday/Friday).   Post op blood sugar - 406- treated with 10 units regular insulin sub q.

## 2025-03-08 NOTE — PROGRESS NOTES
4 Eyes Skin Assessment     NAME:  Scottie Chiang  YOB: 1970  MEDICAL RECORD NUMBER:  [id number]    The patient is being assessed for  Admission    I agree that at least one RN has performed a thorough Head to Toe Skin Assessment on the patient. ALL assessment sites listed below have been assessed.      Areas assessed by both nurses:    Head, Face, Ears, Shoulders, Back, Chest, Arms, Elbows, Hands, Sacrum. Buttock, Coccyx, Ischium, Legs. Feet and Heels, and Under Medical Devices         Does the Patient have a Wound? No       Margarito Prevention initiated by RN: Yes  Wound Care Orders initiated by RN: No    Pressure Injury (Stage 3,4, Unstageable, DTI, NWPT, and Complex wounds) if present, place Wound referral order by RN under : No    New Ostomies, if present place, Ostomy referral order under : No     Nurse 1 eSignature: Electronically signed by Cece Curtis RN on 3/8/25 at 1:56 AM EST    **SHARE this note so that the co-signing nurse can place an eSignature**    Nurse 2 eSignature: Electronically signed by Maria A Curtis RN on 3/8/25 at 6:51 AM EST  .

## 2025-03-08 NOTE — PROGRESS NOTES
PACU Transfer Note    Vitals:    03/07/25 1945   BP: (!) 81/64   Pulse: 75   Resp:    Temp:    SpO2: 97%       No intake/output data recorded.    Pain assessment:  none VS stable. Report given to RN 5 renetta at bedside. Spoke with Nephro MD. Patient to transfer to dialysis then to room # 5318 as scheduled.   Pain Level: 0    Report given to Receiving unit RN.    3/7/2025 7:48 PM

## 2025-03-08 NOTE — PLAN OF CARE
Problem: Discharge Planning  Goal: Discharge to home or other facility with appropriate resources  3/8/2025 1237 by Dominique Last, RN  Outcome: Progressing  3/8/2025 0158 by Cece Curtis RN  Outcome: Progressing  3/8/2025 0157 by Cece Curtis RN  Outcome: Progressing     Problem: Pain  Goal: Verbalizes/displays adequate comfort level or baseline comfort level  3/8/2025 1237 by Dominique Last RN  Outcome: Progressing  3/8/2025 0158 by Cece Curtis RN  Outcome: Progressing  Flowsheets (Taken 3/8/2025 0158)  Verbalizes/displays adequate comfort level or baseline comfort level:   Encourage patient to monitor pain and request assistance   Assess pain using appropriate pain scale   Administer analgesics based on type and severity of pain and evaluate response   Implement non-pharmacological measures as appropriate and evaluate response   Consider cultural and social influences on pain and pain management   Notify Licensed Independent Practitioner if interventions unsuccessful or patient reports new pain  3/8/2025 0157 by Cece Curtis RN  Outcome: Progressing     Problem: Safety - Adult  Goal: Free from fall injury  3/8/2025 1237 by Dominique Last RN  Outcome: Progressing  3/8/2025 0158 by Cece Curtis RN  Outcome: Progressing  Flowsheets (Taken 3/8/2025 0158)  Free From Fall Injury:   Instruct family/caregiver on patient safety   Based on caregiver fall risk screen, instruct family/caregiver to ask for assistance with transferring infant if caregiver noted to have fall risk factors  3/8/2025 0157 by Cece Curtis RN  Outcome: Progressing     Problem: Chronic Conditions and Co-morbidities  Goal: Patient's chronic conditions and co-morbidity symptoms are monitored and maintained or improved  3/8/2025 1237 by Dominique Last, RN  Outcome: Progressing  3/8/2025 0158 by Cece Curtis RN  Outcome: Progressing  Flowsheets (Taken 3/8/2025 0158)  Care Plan - Patient's Chronic

## 2025-03-08 NOTE — PROGRESS NOTES
Vascular Surgery  Interval Note:    Notified of mental status change (not responding to questions appropriately) and worsening right arm pain upon patient returning from dialysis.  Patient evaluated at bedside.  Now interacting more appropriately (although still requiring frequent redirection), but still with reported right arm pain that began \"since her fistula got made\".  No motor weakness or paresthesias distally within the right arm.  Palpable radial/ulnar pulses.  Does have some slight edema of the right upper arm, soft throughout.  Surgical dressing with stable sanguinous drainage, wound vac functioning appropriately.  Recommend right arm elevation for edema control.  Prn oral analgesics; last dose just after midnight overnight.    Dae Ackerman IV, MD  General Surgery, PGY-4  03/08/25  4:39 PM  [phone]

## 2025-03-08 NOTE — CONSULTS
Nephrology Consult Note                                                                                                                                                                                                                                                                                                                                                               Office : [phone]     Fax :[phone]    Patient's Name: Scottie Chiang  8:19 AM  3/8/2025    Reason for Consult:  ESRD, hyperkalemia   Requesting Physician:  Unknown, Provider, ANP  Chief Complaint:    Chief Complaint   Patient presents with    Post-op Problem     Pt had right arm fistula placement at 1200 today. She came back for bleeding from fistula placement       Assessment/Plan     # severe Hyperkalemia   2/2 ESRD +/- hematoma   Resolved with urgent HD   Yuri:  HD today  Monitor BMP     # ESRD   Recent AVF placement and hematoma- s/p RIGHT UPPER EXTREMITY HEMATOMA EVACUATION  BP soft today  Seen during HD   Plan:  HD (TDC line)    Seen during HD  C/o R arm pain   No signs of ischemia of hand   Plan:  HD, pain control  Acces: TDC   Blood flow rate (BFR) 300   K+ kss   Ca++ 2.5   Bicarb 32   Na+ 135   Dialyzer F180   Dialysate Temperature (C) 36   Dialysate flow rate (DFR) 600   Treatment Time 2.5   Fluid removal (L) 1-1.5 L as tolerated   Access Central Line     # HTN  BP soft  Off BP meds    # Anemia of CKD   Check when was last CANDELARIA dose  Check iron     # AVF hematoma   S/p hematoma evacuation POD1   Vascular following     History of Present Ilness:    Scottie Chiang is a 54 y.o. female with    DM, ESRD, HLD, HTn. Surgical history significant for Multiple AV fistula and AV graft creations. She underwent Right brachiobasilic AV fistula creation today and represents now for bleeding.   She denied any light headedness, dizziness, SOB, or CP.    Pt was in the OR for hematoma evacuation.   K was 7.4 - pt had urgent HD 2 hours with resolution of  PRN  dextrose 10 % infusion, Continuous PRN        Review of Systems:   14 point ROS obtained but were negative except mentioned in HPI      Physical exam:     Vitals:  /74   Pulse 84   Temp 98.5 °F (36.9 °C) (Oral)   Resp 16   Ht 1.549 m (5' 1\")   Wt 95.3 kg (210 lb)   SpO2 94%   BMI 39.68 kg/m²   Constitutional:  OAA X3 NAD Yes  Skin: no rash, turgor wnl  Heent:  eomi, mmm  Neck: no bruits or jvd noted  Cardiovascular:  S1, S2 without m/r/g  Respiratory: CTA B without w/r/r  Abdomen:  , soft, nt, nd  Ext:  lower extremity edema No  Psychiatric: mood and affect ok  Musculoskeletal:  Rom, muscular strength intact  R arm- wrapped, drain , no ischemic changes   Data:   Labs:  CBC:   Recent Labs     03/07/25 1723 03/07/25  1800 03/08/25  0459   WBC 8.3 8.6 7.0   HGB 10.8* 10.3* 9.2*   * 128* 106*     BMP:    Recent Labs     03/07/25 1723 03/07/25  1800 03/08/25  0459   * 134* 136   K 7.4* 7.2* 4.7   CL 95* 97* 98*   CO2 22 21 23   BUN 49* 49* 30*   CREATININE 8.2* 8.4* 6.3*   GLUCOSE 473* 427* 54*     Ca/Mg/Phos:   Recent Labs     03/07/25 1723 03/07/25  1800 03/08/25  0459   CALCIUM 8.8 8.6 8.8   MG  --   --  2.25   PHOS  --   --  3.2     Hepatic: No results for input(s): \"AST\", \"ALT\", \"BILITOT\", \"ALKPHOS\" in the last 72 hours.    Invalid input(s): \"ALB\"  Troponin: No results for input(s): \"TROPONINI\" in the last 72 hours.  BNP: No results for input(s): \"BNP\" in the last 72 hours.  Lipids: No results for input(s): \"CHOL\", \"TRIG\", \"HDL\" in the last 72 hours.    Invalid input(s): \"LDLCALC\", \"LABVLDL\"  ABGs: No results for input(s): \"PHART\", \"PO2ART\", \"ZOW6XVG\" in the last 72 hours.  INR:   Recent Labs     03/07/25  0722 03/07/25  1723 03/07/25  1800   INR 1.00 1.10 1.10     UA:No results for input(s): \"COLORU\", \"CLARITYU\", \"GLUCOSEU\", \"BILIRUBINUR\", \"KETUA\", \"SPECGRAV\", \"BLOODU\", \"PHUR\", \"PROTEINU\", \"UROBILINOGEN\", \"NITRU\", \"LEUKOCYTESUR\", \"URINETYPE\" in the last 72 hours.    Invalid

## 2025-03-08 NOTE — PROGRESS NOTES
Patient not following commands, alert sitting at the end of the bed. Family at bedside stating concerns regarding patient. Vitals stable, patient complains of pain and does not verbalize pain level, responds at times, unable to answer orientation questions. Patient not following commands with hand /grasp. Pupils appear pinpoint, reacting to light. . Dr. Kelley notified. Waiting on response.

## 2025-03-08 NOTE — PROGRESS NOTES
Vascular Surgery  Progress Note      Procedure(s) Performed: right upper extremity hematoma evacuation    Subjective:   No acute events overnight. Underwent dialysis post-operatively last night. Able to wiggle fingers without any numbness or tingling.     Objective:  Vitals:   Vitals:    03/07/25 2040 03/07/25 2236 03/07/25 2333 03/08/25 0328   BP: 116/60 133/63 116/69 101/65   Pulse: 75 73 80 81   Resp:   18 18   Temp: 97.6 °F (36.4 °C) 97.6 °F (36.4 °C) 98.4 °F (36.9 °C) 99.2 °F (37.3 °C)   TempSrc:   Oral Oral   SpO2:   95% 93%   Weight:   95.3 kg (210 lb)    Height:   1.549 m (5' 1\")        Physical Exam:  General appearance: alert, no acute distress  Eyes: No scleral icterus, EOM grossly intact  Neck: trachea midline, no JVD, no lymphadenopathy, neck supple  Chest/Lungs: normal effort with no accessory muscle use on RA  Cardiovascular: RRR, well perfused  Skin: warm and dry, no rashes  Extremities: RUE incisions c/d/I with optifoam in place, wound vac in place and suctioning, appropriately tender to palpation. Radial and brachial pulses palpable, palpable faint thrill of AVF.  strength 4/5 R, 5/5/ L. Sensation in tact to RUE.   Genitourinary: ESRD  Neuro: A&Ox3, no focal deficits, sensation intact    Assessment and Plan  This is a 54 y.o. year old female status post right upper extremity hematoma evacuation secondary to post op bleeding after Right brachiobasilic AV fistula creation today. (3/7) POD1.    Continue [facility]  Appreciate IM recommendations for management of other comorbidities  SLIV when having adequate po intake  Needs [place] paperwork  Nephrology following for HD; last HD session 3/7  Dispo pending [place] paperwork and HD with mouna Cherry DO  PGY-1, General Surgery Resident  03/08/25 8:13 AM  [phone]

## 2025-03-08 NOTE — PROGRESS NOTES
Dr. Cherry assessed patient, patient responded to physician and follow commands. Orientation X3, with cues for situation.

## 2025-03-08 NOTE — PLAN OF CARE
Problem: Discharge Planning  Goal: Discharge to home or other facility with appropriate resources  3/8/2025 0158 by Cece Curtis RN  Outcome: Progressing     Problem: Pain  Goal: Verbalizes/displays adequate comfort level or baseline comfort level  3/8/2025 0158 by Cece Curtis RN  Outcome: Progressing  Flowsheets (Taken 3/8/2025 0158)  Verbalizes/displays adequate comfort level or baseline comfort level:   Encourage patient to monitor pain and request assistance   Assess pain using appropriate pain scale   Administer analgesics based on type and severity of pain and evaluate response   Implement non-pharmacological measures as appropriate and evaluate response   Consider cultural and social influences on pain and pain management   Notify Licensed Independent Practitioner if interventions unsuccessful or patient reports new pain     Problem: Safety - Adult  Goal: Free from fall injury  3/8/2025 0158 by Cece Curtis RN  Outcome: Progressing  Flowsheets (Taken 3/8/2025 0158)  Free From Fall Injury:   Instruct family/caregiver on patient safety   Based on caregiver fall risk screen, instruct family/caregiver to ask for assistance with transferring infant if caregiver noted to have fall risk factors     Problem: Chronic Conditions and Co-morbidities  Goal: Patient's chronic conditions and co-morbidity symptoms are monitored and maintained or improved  3/8/2025 0158 by Cece Curtis RN  Outcome: Progressing  Flowsheets (Taken 3/8/2025 0158)  Care Plan - Patient's Chronic Conditions and Co-Morbidity Symptoms are Monitored and Maintained or Improved:   Monitor and assess patient's chronic conditions and comorbid symptoms for stability, deterioration, or improvement   Collaborate with multidisciplinary team to address chronic and comorbid conditions and prevent exacerbation or deterioration   Update acute care plan with appropriate goals if chronic or comorbid symptoms are exacerbated and prevent

## 2025-03-08 NOTE — FLOWSHEET NOTE
Pt was from PACU with right brachial post hematoma evacuation and connected to vacuum dressing, then transferred to dialysis for 2 hours HD, then  brought to her room in 5318. Pt is alert and oriented x4, on room air. Vital signs taken and recorded. Oriented to staff, room set up, call system and bed control. Vital signs taken and recorded. Tele maintained. Physical assessment done.    03/07/25 2333   Vitals   Temp 98.4 °F (36.9 °C)   Temp Source Oral   Pulse 80   Heart Rate Source Monitor   Respirations 18   /69   MAP (Calculated) 85   BP Location Left leg   BP Upper/Lower Lower   BP Method Automatic   Cardiac Rhythm Sinus rhythm   Pain Assessment   Pain Assessment 0-10   Pain Level 8   Pain Type Surgical pain   Oxygen Therapy   SpO2 95 %   Pulse Oximetry Type Intermittent   Pulse Oximeter Device Mode Intermittent   Pulse Oximeter Device Location Left;Finger   O2 Device None (Room air)   Ventilator Associated Pneumonia Bundle   Anti-Embolism Devices Pneumatic compression devices, below knee   Anti-Embolism Intervention On   Laterality Bilateral   Height and Weight   Height 1.549 m (5' 1\")   Weight - Scale 95.3 kg (210 lb)   BSA (Calculated - sq m) 2.02 sq meters   BMI (Calculated) 39.8

## 2025-03-08 NOTE — PROGRESS NOTES
Treatment time: 2.5 hours  Net UF: 1500 ml     Pre weight: 95.3 kg  Post weight:93.8 kg    Access used: LCVC    Access function: well with  ml/min          Summary of response to treatment: Patient tolerated treatment well and without any complications. Patient remained stable throughout entire treatment and upon the exiting the dialysis suite via transport.     Report given to Dominique Last RN and copy of dialysis treatment record placed in chart, to be scanned into EMR.

## 2025-03-08 NOTE — PROGRESS NOTES
Encouraged to get up to ambulate, but she refused because she's still sleepy. Looked drowsy, blood sugar checked 72 mg/dl. Juice given.

## 2025-03-08 NOTE — PROGRESS NOTES
Patient moved to dialysis department. 5 south telemetry box in place.   Family updated in room.  Belongings placed in 5318 and patient has cell phone in hand.

## 2025-03-09 LAB
ALBUMIN SERPL-MCNC: 3.9 G/DL (ref 3.4–5)
ANION GAP SERPL CALCULATED.3IONS-SCNC: 17 MMOL/L (ref 3–16)
BASOPHILS # BLD: 0 K/UL (ref 0–0.2)
BASOPHILS NFR BLD: 0.5 %
BUN SERPL-MCNC: 28 MG/DL (ref 7–20)
CALCIUM SERPL-MCNC: 9.5 MG/DL (ref 8.3–10.6)
CHLORIDE SERPL-SCNC: 95 MMOL/L (ref 99–110)
CO2 SERPL-SCNC: 21 MMOL/L (ref 21–32)
CREAT SERPL-MCNC: 6.5 MG/DL (ref 0.6–1.1)
DEPRECATED RDW RBC AUTO: 20 % (ref 12.4–15.4)
EOSINOPHIL # BLD: 0.1 K/UL (ref 0–0.6)
EOSINOPHIL NFR BLD: 2 %
FERRITIN SERPL IA-MCNC: 1531 NG/ML (ref 15–150)
GFR SERPLBLD CREATININE-BSD FMLA CKD-EPI: 7 ML/MIN/{1.73_M2}
GLUCOSE BLD-MCNC: 106 MG/DL (ref 70–99)
GLUCOSE BLD-MCNC: 126 MG/DL (ref 70–99)
GLUCOSE SERPL-MCNC: 115 MG/DL (ref 70–99)
HCT VFR BLD AUTO: 32 % (ref 36–48)
HGB BLD-MCNC: 10.2 G/DL (ref 12–16)
LYMPHOCYTES # BLD: 1.2 K/UL (ref 1–5.1)
LYMPHOCYTES NFR BLD: 19.1 %
MAGNESIUM SERPL-MCNC: 2.26 MG/DL (ref 1.8–2.4)
MCH RBC QN AUTO: 33.4 PG (ref 26–34)
MCHC RBC AUTO-ENTMCNC: 31.9 G/DL (ref 31–36)
MCV RBC AUTO: 104.7 FL (ref 80–100)
MONOCYTES # BLD: 0.7 K/UL (ref 0–1.3)
MONOCYTES NFR BLD: 10.9 %
NEUTROPHILS # BLD: 4.4 K/UL (ref 1.7–7.7)
NEUTROPHILS NFR BLD: 67.5 %
PERFORMED ON: ABNORMAL
PERFORMED ON: ABNORMAL
PHOSPHATE SERPL-MCNC: 5.2 MG/DL (ref 2.5–4.9)
PLATELET # BLD AUTO: 103 K/UL (ref 135–450)
PMV BLD AUTO: 9.8 FL (ref 5–10.5)
POTASSIUM SERPL-SCNC: 5.3 MMOL/L (ref 3.5–5.1)
RBC # BLD AUTO: 3.05 M/UL (ref 4–5.2)
SODIUM SERPL-SCNC: 133 MMOL/L (ref 136–145)
WBC # BLD AUTO: 6.5 K/UL (ref 4–11)

## 2025-03-09 PROCEDURE — 93005 ELECTROCARDIOGRAM TRACING: CPT | Performed by: SURGERY

## 2025-03-09 PROCEDURE — 6370000000 HC RX 637 (ALT 250 FOR IP): Performed by: INTERNAL MEDICINE

## 2025-03-09 PROCEDURE — 85025 COMPLETE CBC W/AUTO DIFF WBC: CPT

## 2025-03-09 PROCEDURE — 1200000000 HC SEMI PRIVATE

## 2025-03-09 PROCEDURE — 6370000000 HC RX 637 (ALT 250 FOR IP): Performed by: STUDENT IN AN ORGANIZED HEALTH CARE EDUCATION/TRAINING PROGRAM

## 2025-03-09 PROCEDURE — 99232 SBSQ HOSP IP/OBS MODERATE 35: CPT | Performed by: INTERNAL MEDICINE

## 2025-03-09 PROCEDURE — 6360000002 HC RX W HCPCS

## 2025-03-09 PROCEDURE — 80069 RENAL FUNCTION PANEL: CPT

## 2025-03-09 PROCEDURE — 2500000003 HC RX 250 WO HCPCS

## 2025-03-09 PROCEDURE — 83540 ASSAY OF IRON: CPT

## 2025-03-09 PROCEDURE — 36415 COLL VENOUS BLD VENIPUNCTURE: CPT

## 2025-03-09 PROCEDURE — 6370000000 HC RX 637 (ALT 250 FOR IP)

## 2025-03-09 PROCEDURE — 82728 ASSAY OF FERRITIN: CPT

## 2025-03-09 PROCEDURE — 83550 IRON BINDING TEST: CPT

## 2025-03-09 PROCEDURE — 83735 ASSAY OF MAGNESIUM: CPT

## 2025-03-09 RX ORDER — SEVELAMER CARBONATE 800 MG/1
1600 TABLET, FILM COATED ORAL
Status: DISCONTINUED | OUTPATIENT
Start: 2025-03-09 | End: 2025-03-12 | Stop reason: HOSPADM

## 2025-03-09 RX ORDER — TORSEMIDE 100 MG/1
100 TABLET ORAL DAILY
Status: DISCONTINUED | OUTPATIENT
Start: 2025-03-09 | End: 2025-03-12 | Stop reason: HOSPADM

## 2025-03-09 RX ADMIN — SODIUM CHLORIDE, PRESERVATIVE FREE 10 ML: 5 INJECTION INTRAVENOUS at 20:43

## 2025-03-09 RX ADMIN — HYDROMORPHONE HYDROCHLORIDE 0.5 MG: 1 INJECTION, SOLUTION INTRAMUSCULAR; INTRAVENOUS; SUBCUTANEOUS at 11:11

## 2025-03-09 RX ADMIN — CARVEDILOL 6.25 MG: 6.25 TABLET, FILM COATED ORAL at 16:18

## 2025-03-09 RX ADMIN — METHOCARBAMOL 500 MG: 500 TABLET ORAL at 07:34

## 2025-03-09 RX ADMIN — LEVETIRACETAM 500 MG: 500 TABLET, FILM COATED ORAL at 07:34

## 2025-03-09 RX ADMIN — ACETAMINOPHEN 650 MG: 325 TABLET, FILM COATED ORAL at 16:19

## 2025-03-09 RX ADMIN — ATORVASTATIN CALCIUM 80 MG: 80 TABLET, FILM COATED ORAL at 07:34

## 2025-03-09 RX ADMIN — SEVELAMER CARBONATE 1600 MG: 800 TABLET, FILM COATED ORAL at 11:55

## 2025-03-09 RX ADMIN — HYDROMORPHONE HYDROCHLORIDE 0.5 MG: 1 INJECTION, SOLUTION INTRAMUSCULAR; INTRAVENOUS; SUBCUTANEOUS at 17:50

## 2025-03-09 RX ADMIN — TORSEMIDE 100 MG: 100 TABLET ORAL at 11:55

## 2025-03-09 RX ADMIN — LEVETIRACETAM 250 MG: 250 TABLET, FILM COATED ORAL at 07:35

## 2025-03-09 RX ADMIN — PANTOPRAZOLE SODIUM 40 MG: 40 TABLET, DELAYED RELEASE ORAL at 07:34

## 2025-03-09 RX ADMIN — SEVELAMER CARBONATE 1600 MG: 800 TABLET, FILM COATED ORAL at 16:19

## 2025-03-09 RX ADMIN — MEMANTINE 10 MG: 10 TABLET ORAL at 07:34

## 2025-03-09 RX ADMIN — LEVETIRACETAM 250 MG: 250 TABLET, FILM COATED ORAL at 16:19

## 2025-03-09 RX ADMIN — CARVEDILOL 6.25 MG: 6.25 TABLET, FILM COATED ORAL at 07:34

## 2025-03-09 ASSESSMENT — PAIN SCALES - GENERAL
PAINLEVEL_OUTOF10: 3
PAINLEVEL_OUTOF10: 8

## 2025-03-09 ASSESSMENT — PAIN DESCRIPTION - PAIN TYPE: TYPE: SURGICAL PAIN

## 2025-03-09 ASSESSMENT — PAIN DESCRIPTION - DESCRIPTORS: DESCRIPTORS: DISCOMFORT

## 2025-03-09 ASSESSMENT — PAIN DESCRIPTION - ORIENTATION: ORIENTATION: RIGHT

## 2025-03-09 ASSESSMENT — PAIN - FUNCTIONAL ASSESSMENT: PAIN_FUNCTIONAL_ASSESSMENT: ACTIVITIES ARE NOT PREVENTED

## 2025-03-09 ASSESSMENT — PAIN DESCRIPTION - LOCATION: LOCATION: ARM

## 2025-03-09 NOTE — CONSULTS
V2.0  History and Physical      Name:  Scottie Chiang /Age/Sex: 1970  (54 y.o. female)   MRN & CSN:  [id number] & [id number] Encounter Date/Time: 3/9/2025 12:10 PM EDT   Location:  [facility]- PCP: Unknown, Provider, ANP       Hospital Day: 3    Assessment and Plan:       Hospital Problems           Last Modified POA    Primary hypertension 3/9/2025 Yes    Chronic kidney disease-mineral bone disorder (CKD-MBD) with stage 5 chronic kidney disease, on chronic dialysis (HCC) 3/9/2025 Yes    Surgical arteriovenous fistula hemorrhage 3/9/2025 Yes       Assessment/Plan: Admit patient to inpatient unit.    Recent AVF creation with hematoma s/p hematoma evacuation  - wound vac to RUE   - awaiting paperwork related to [place] prior to discharge  - continue HD per nephology    Severe hyperkalemia  - no ECG changes  - as above    Chronic:  Anemia  HTN  CHF    Dispo: per primary  Ppx:  indications for ulcer and DVT ppx reviewed and medications ordered as needed     Personally reviewed patient's home medications      History from:     Patient, direct communication with ER provider, EMR    History of Present Illness:     Chief Complaint: RUE hematoma  Scottie Chiang is a 54 y.o. female who presents with RUE hematoma after recent AV fistula creation. Specificially, R brachiobasilic AV fistula creation. Hematoma has been evacuated and wound vac in place. IM consulted for medical management. Will follow till discharge.     Review of Systems:        Pertinent positives and negatives discussed in HPI     Objective:     Intake/Output Summary (Last 24 hours) at 3/9/2025 1210  Last data filed at 3/9/2025 0722  Gross per 24 hour   Intake 573.21 ml   Output --   Net 573.21 ml      Vitals:   Vitals:    25 2301 25 0345 25 0722 25 1157   BP: 130/80 (!) 140/82 (!) 140/88 121/68   Pulse: 90 88 (!) 107 92   Resp: 16 16 16 16   Temp: 98.5 °F (36.9 °C) 98.2 °F (36.8 °C) 99.2 °F (37.3 °C) 98.3 °F (36.8 °C)   TempSrc: Oral Oral  once as needed for Migraine  Patient not taking: Reported on 3/7/2025 3/12/24   Provider, MD Corey       Physical Exam:    Physical Exam     General: NAD  Eyes: EOMI  ENT: neck supple  Cardiovascular: Regular rate.  Respiratory: Clear to auscultation  Gastrointestinal: Soft, non tender  Genitourinary: no suprapubic tenderness  Musculoskeletal: No edema  Skin: warm, dry  Neuro: Alert.  Psych: Mood appropriate.       Past Medical History:   PMHx   Past Medical History:   Diagnosis Date    Arthritis     Diabetes mellitus (HCC)     Diastolic CHF (HCC)     End stage renal disease (HCC)     Hemodialysis patient     MWF    History of blood transfusion     Hyperlipidemia     Hypertension     On home O2     but does not use it    JEAN (obstructive sleep apnea)     currently not on cpap    Seizures (HCC)      PSHX:  has a past surgical history that includes Dialysis Catheter Insertion (N/A, 2021); Dialysis Catheter Removal (N/A, 2021); IR TUNNELED CVC PLACE WO SQ PORT/PUMP > 5 YEARS (11/15/2021); eye surgery (Bilateral, ); Cataract removal (Bilateral);  section; Toe amputation (Left); Dialysis Catheter Insertion (N/A, 2022); US ASP ABSCESS/HEMATOMA/BULLA/CYST (2022); Dialysis Catheter Removal (N/A, 2022); IR TUNNELED CVC PLACE WO SQ PORT/PUMP > 5 YEARS (2022); IR TUNNELED CVC PLACE WO SQ PORT/PUMP > 5 YEARS (2022); IR TUNNELED CVC PLACE WO SQ PORT/PUMP > 5 YEARS (2022); Dialysis fistula creation (Right, 2024); invasive vascular (N/A, 2024); invasive vascular (N/A, 2024); invasive vascular (N/A, 10/23/2024); joint replacement (Right); and Dialysis fistula creation (Right, 3/7/2025).  Allergies:   Allergies   Allergen Reactions    Adhesive Tape Itching     Fam HX: family history is not on file.  Soc HX:   Social History     Socioeconomic History    Marital status: Single   Tobacco Use    Smoking status: Never    Smokeless tobacco: Never   Vaping  Use    Vaping status: Never Used   Substance and Sexual Activity    Alcohol use: Not Currently    Drug use: Not Currently    Sexual activity: Not Currently     Social Drivers of Health     Food Insecurity: No Food Insecurity (3/7/2025)    Hunger Vital Sign     Worried About Running Out of Food in the Last Year: Never true     Ran Out of Food in the Last Year: Never true   Recent Concern: Food Insecurity - Food Insecurity Present (2/14/2025)    Received from [facility]    Hunger Vital Sign     Worried About Running Out of Food in the Last Year: Sometimes true     Ran Out of Food in the Last Year: Sometimes true   Transportation Needs: No Transportation Needs (3/7/2025)    PRAPARE - Transportation     Lack of Transportation (Medical): No     Lack of Transportation (Non-Medical): No   Intimate Partner Violence: Not At Risk (2/14/2025)    Received from [facility] and [facility]    Interpersonal Safety     Do you feel physically or emotionally unsafe where you currently live?: No     Within the past 12 months, have you been hit, slapped, kicked or otherwise physically hurt by anyone? : No     Within the past 12 months, have you been hit, slapped, kicked or otherwise physically hurt by anyone? : No   Housing Stability: Low Risk  (3/7/2025)    Housing Stability Vital Sign     Unable to Pay for Housing in the Last Year: No     Number of Times Moved in the Last Year: 1     Homeless in the Last Year: No       Medications:   Medications:    torsemide  100 mg Oral Daily    sevelamer  1,600 mg Oral TID WC    carvedilol  6.25 mg Oral BID WC    atorvastatin  80 mg Oral Daily    levETIRAcetam  250 mg Oral TID    levETIRAcetam  500 mg Oral Daily    memantine  10 mg Oral BID    methocarbamol  500 mg Oral Daily    sodium chloride flush  5-40 mL IntraVENous 2 times per day    acetaminophen  650 mg Oral Q6H    pantoprazole  40 mg Oral Daily      Infusions:    sodium chloride      dextrose

## 2025-03-09 NOTE — PROGRESS NOTES
Nephrology Consult Note                                                                                                                                                                                                                                                                                                                                                               Office : [phone]     Fax :[phone]    Patient's Name: Scottie Chiang  11:15 AM  3/9/2025    Reason for Consult:  ESRD, hyperkalemia   Requesting Physician:  Unknown, Provider, ANP  Chief Complaint:    Chief Complaint   Patient presents with    Post-op Problem     Pt had right arm fistula placement at 1200 today. She came back for bleeding from fistula placement       Assessment/Plan     # severe Hyperkalemia - resolved   Monitor BMP     # ESRD   Recent AVF placement and hematoma- s/p RIGHT UPPER EXTREMITY HEMATOMA EVACUATION  BP higher   Plan:  HD (TDC line)   HD next week     # HTN  BP soft  Resume Torsemide   Keep Coreg     # Anemia of CKD   Check when was last CANDELARIA dose  Check iron     # AVF hematoma   S/p hematoma evacuation POD1   Vascular following   Pain control per vascular     # MBD  Resume sevelamer     History of Present Ilness:    Scottie Chiang is a 54 y.o. female with    DM, ESRD, HLD, HTn. Surgical history significant for Multiple AV fistula and AV graft creations. She underwent Right brachiobasilic AV fistula creation today and represents now for bleeding.   She denied any light headedness, dizziness, SOB, or CP.    Pt was in the OR for hematoma evacuation.   K was 7.4 - pt had urgent HD 2 hours with resolution of hyper-K     Interval hx   S/p HD yesterday  Pt has R arm pain- severe   Pt denies CP/SOB/palpitations/abdominal pain/N/V.   K 4.7   BP a little high       Past Medical History:   Diagnosis Date    Arthritis     Diabetes mellitus (HCC)     Diastolic CHF (HCC)     End stage renal disease (HCC)     Hemodialysis patient     MWF  99.2 °F (37.3 °C) (Oral)   Resp 16   Ht 1.549 m (5' 1\")   Wt 93.8 kg (206 lb 12.7 oz)   SpO2 94%   BMI 39.07 kg/m²   Constitutional:  OAA X2 NAD Yes  Skin: no rash, turgor wnl  Heent:  eomi, mmm  Neck: no bruits or jvd noted  Cardiovascular:  S1, S2 without m/r/g  Respiratory: CTA B without w/r/r  Abdomen:  , soft, nt, nd  Ext:  lower extremity edema No  Psychiatric: mood and affect ok  Musculoskeletal:  Rom, muscular strength intact  R arm- wrapped, drain , no ischemic changes   Data:   Labs:  CBC:   Recent Labs     03/07/25 1723 03/07/25  1800 03/08/25  0459   WBC 8.3 8.6 7.0   HGB 10.8* 10.3* 9.2*   * 128* 106*     BMP:    Recent Labs     03/07/25 1723 03/07/25  1800 03/08/25  0459   * 134* 136   K 7.4* 7.2* 4.7   CL 95* 97* 98*   CO2 22 21 23   BUN 49* 49* 30*   CREATININE 8.2* 8.4* 6.3*   GLUCOSE 473* 427* 54*     Ca/Mg/Phos:   Recent Labs     03/07/25 1723 03/07/25  1800 03/08/25  0459   CALCIUM 8.8 8.6 8.8   MG  --   --  2.25   PHOS  --   --  3.2     Hepatic: No results for input(s): \"AST\", \"ALT\", \"BILITOT\", \"ALKPHOS\" in the last 72 hours.    Invalid input(s): \"ALB\"  Troponin: No results for input(s): \"TROPONINI\" in the last 72 hours.  BNP: No results for input(s): \"BNP\" in the last 72 hours.  Lipids: No results for input(s): \"CHOL\", \"TRIG\", \"HDL\" in the last 72 hours.    Invalid input(s): \"LDLCALC\", \"LABVLDL\"  ABGs: No results for input(s): \"PHART\", \"PO2ART\", \"GFD3KSQ\" in the last 72 hours.  INR:   Recent Labs     03/07/25  0722 03/07/25  1723 03/07/25  1800   INR 1.00 1.10 1.10     UA:No results for input(s): \"COLORU\", \"CLARITYU\", \"GLUCOSEU\", \"BILIRUBINUR\", \"KETUA\", \"SPECGRAV\", \"BLOODU\", \"PHUR\", \"PROTEINU\", \"UROBILINOGEN\", \"NITRU\", \"LEUKOCYTESUR\", \"URINETYPE\" in the last 72 hours.    Invalid input(s): \"LABMICR\"   Urine Microscopic: No results for input(s): \"LABCAST\", \"BACTERIA\", \"COMU\", \"HYALCAST\", \"WBCUA\", \"RBCUA\" in the last 72 hours.    Invalid input(s): \"EPIU\"  Urine Culture: No

## 2025-03-09 NOTE — PLAN OF CARE
Problem: Discharge Planning  Goal: Discharge to home or other facility with appropriate resources  3/8/2025 2337 by Kell Cornelius RN  Outcome: Progressing  3/8/2025 1237 by Dominique Last RN  Outcome: Progressing     Problem: Pain  Goal: Verbalizes/displays adequate comfort level or baseline comfort level  3/8/2025 2337 by Kell Cornelius RN  Outcome: Progressing  3/8/2025 1237 by Dominique Last RN  Outcome: Progressing     Problem: Safety - Adult  Goal: Free from fall injury  3/8/2025 2337 by Kell Cornelius RN  Outcome: Progressing  3/8/2025 1237 by Dominique Last RN  Outcome: Progressing     Problem: Chronic Conditions and Co-morbidities  Goal: Patient's chronic conditions and co-morbidity symptoms are monitored and maintained or improved  3/8/2025 2337 by Kell Cornelius RN  Outcome: Progressing  3/8/2025 1237 by Dominique Last RN  Outcome: Progressing     Problem: Skin/Tissue Integrity - Adult  Goal: Incisions, wounds, or drain sites healing without S/S of infection  3/8/2025 1237 by Dominique Last, RN  Outcome: Progressing     Problem: Musculoskeletal - Adult  Goal: Return mobility to safest level of function  3/8/2025 1237 by Dominique Last RN  Outcome: Progressing     Problem: Infection - Adult  Goal: Absence of infection during hospitalization  3/8/2025 1237 by Dominique Last, RN  Outcome: Progressing     Problem: Metabolic/Fluid and Electrolytes - Adult  Goal: Electrolytes maintained within normal limits  3/8/2025 1237 by Dominique Last, RN  Outcome: Progressing  Goal: Glucose maintained within prescribed range  3/8/2025 1237 by Dominique Last, RN  Outcome: Progressing      no

## 2025-03-09 NOTE — PLAN OF CARE
Problem: Discharge Planning  Goal: Discharge to home or other facility with appropriate resources  3/9/2025 0903 by Boob Panchal RN  Outcome: Progressing  3/8/2025 2337 by Kell Cornelius RN  Outcome: Progressing     Problem: Pain  Goal: Verbalizes/displays adequate comfort level or baseline comfort level  3/9/2025 0903 by Bobo Panchal RN  Outcome: Progressing  3/8/2025 2337 by Kell Cornelius RN  Outcome: Progressing     Problem: Safety - Adult  Goal: Free from fall injury  3/9/2025 0903 by Bobo Panchal RN  Outcome: Progressing  3/8/2025 2337 by Kell Cornelius RN  Outcome: Progressing     Problem: Chronic Conditions and Co-morbidities  Goal: Patient's chronic conditions and co-morbidity symptoms are monitored and maintained or improved  3/9/2025 0903 by Bobo Panchal RN  Outcome: Progressing  3/8/2025 2337 by Kell Cornelius RN  Outcome: Progressing     Problem: Skin/Tissue Integrity - Adult  Goal: Incisions, wounds, or drain sites healing without S/S of infection  Outcome: Progressing

## 2025-03-09 NOTE — PROGRESS NOTES
Vascular Surgery  Progress Note      Procedure(s) Performed: right upper extremity hematoma evacuation    Subjective:   No acute issues overnight. Patient having pain to incision site. Able to move extremity. Minimally participating with interview and exam. Denies numbness and tingling.     Objective:  Vitals:   Vitals:    03/08/25 2047 03/08/25 2301 03/09/25 0345 03/09/25 0722   BP: (!) 133/95 130/80 (!) 140/82 (!) 140/88   Pulse: 98 90 88 (!) 107   Resp: 16 16 16 16   Temp: 99.5 °F (37.5 °C) 98.5 °F (36.9 °C) 98.2 °F (36.8 °C) 99.2 °F (37.3 °C)   TempSrc: Oral Oral Oral Oral   SpO2: 92% 95% 93% 94%   Weight:       Height:           Physical Exam:  General appearance: alert, no acute distress  Eyes: No scleral icterus, EOM grossly intact  Neck: trachea midline, neck supple  Chest/Lungs: normal effort with no accessory muscle use on RA, left chest hemodialysis catheter in place  Cardiovascular: RRR, well perfused  Skin: warm and dry, no rashes  Extremities: RUE incisions c/d/I with optifoam in place, minimal strike through, wound vac in place and suctioning, some blood in cannister, appropriately tender to palpation. Radial and brachial pulses palpable, palpable faint thrill of AVF.  strength 4/5 R, 5/5/ L. Sensation in tact to RUE.   Neuro: A&Ox3, no focal deficits, sensation intact    Assessment and Plan  This is a 54 y.o. year old female status post right upper extremity hematoma evacuation secondary to post op bleeding after Right brachiobasilic AV fistula creation today. (3/7)    Continue [facility]  Appreciate IM recommendations for management of other co morbidities, still needs to see patient  Needs [place] paperwork  Nephrology following for HD; last HD session 3/7  Dispo pending [place] paperwork and HD with mouna Cherry DO  PGY-1, General Surgery Resident  03/09/25 8:36 AM  [phone]

## 2025-03-10 LAB
ALBUMIN SERPL-MCNC: 4 G/DL (ref 3.4–5)
ANION GAP SERPL CALCULATED.3IONS-SCNC: 17 MMOL/L (ref 3–16)
BASOPHILS # BLD: 0 K/UL (ref 0–0.2)
BASOPHILS NFR BLD: 0.5 %
BUN SERPL-MCNC: 38 MG/DL (ref 7–20)
CALCIUM SERPL-MCNC: 9.4 MG/DL (ref 8.3–10.6)
CHLORIDE SERPL-SCNC: 95 MMOL/L (ref 99–110)
CO2 SERPL-SCNC: 21 MMOL/L (ref 21–32)
CREAT SERPL-MCNC: 7.6 MG/DL (ref 0.6–1.1)
DEPRECATED RDW RBC AUTO: 19.7 % (ref 12.4–15.4)
EKG ATRIAL RATE: 92 BPM
EKG DIAGNOSIS: NORMAL
EKG P AXIS: 60 DEGREES
EKG P-R INTERVAL: 202 MS
EKG Q-T INTERVAL: 336 MS
EKG QRS DURATION: 72 MS
EKG QTC CALCULATION (BAZETT): 415 MS
EKG R AXIS: 37 DEGREES
EKG T AXIS: 28 DEGREES
EKG VENTRICULAR RATE: 92 BPM
EOSINOPHIL # BLD: 0.2 K/UL (ref 0–0.6)
EOSINOPHIL NFR BLD: 3.2 %
FOLATE SERPL-MCNC: 25.2 NG/ML (ref 4.78–24.2)
GFR SERPLBLD CREATININE-BSD FMLA CKD-EPI: 6 ML/MIN/{1.73_M2}
GLUCOSE BLD-MCNC: 116 MG/DL (ref 70–99)
GLUCOSE BLD-MCNC: 127 MG/DL (ref 70–99)
GLUCOSE BLD-MCNC: 98 MG/DL (ref 70–99)
GLUCOSE SERPL-MCNC: 111 MG/DL (ref 70–99)
HCT VFR BLD AUTO: 29.2 % (ref 36–48)
HGB BLD-MCNC: 9.5 G/DL (ref 12–16)
LYMPHOCYTES # BLD: 1.1 K/UL (ref 1–5.1)
LYMPHOCYTES NFR BLD: 19.5 %
MAGNESIUM SERPL-MCNC: 2.32 MG/DL (ref 1.8–2.4)
MCH RBC QN AUTO: 33.2 PG (ref 26–34)
MCHC RBC AUTO-ENTMCNC: 32.4 G/DL (ref 31–36)
MCV RBC AUTO: 102.5 FL (ref 80–100)
MONOCYTES # BLD: 0.7 K/UL (ref 0–1.3)
MONOCYTES NFR BLD: 13.2 %
NEUTROPHILS # BLD: 3.5 K/UL (ref 1.7–7.7)
NEUTROPHILS NFR BLD: 63.6 %
PERFORMED ON: ABNORMAL
PERFORMED ON: ABNORMAL
PERFORMED ON: NORMAL
PHOSPHATE SERPL-MCNC: 5.6 MG/DL (ref 2.5–4.9)
PLATELET # BLD AUTO: 112 K/UL (ref 135–450)
PMV BLD AUTO: 10.3 FL (ref 5–10.5)
POTASSIUM SERPL-SCNC: 4.8 MMOL/L (ref 3.5–5.1)
RBC # BLD AUTO: 2.84 M/UL (ref 4–5.2)
SODIUM SERPL-SCNC: 133 MMOL/L (ref 136–145)
VIT B12 SERPL-MCNC: 1090 PG/ML (ref 211–911)
WBC # BLD AUTO: 5.5 K/UL (ref 4–11)

## 2025-03-10 PROCEDURE — 6360000002 HC RX W HCPCS: Performed by: INTERNAL MEDICINE

## 2025-03-10 PROCEDURE — 99233 SBSQ HOSP IP/OBS HIGH 50: CPT | Performed by: HOSPITALIST

## 2025-03-10 PROCEDURE — 83735 ASSAY OF MAGNESIUM: CPT

## 2025-03-10 PROCEDURE — 93010 ELECTROCARDIOGRAM REPORT: CPT | Performed by: INTERNAL MEDICINE

## 2025-03-10 PROCEDURE — 85025 COMPLETE CBC W/AUTO DIFF WBC: CPT

## 2025-03-10 PROCEDURE — 83921 ORGANIC ACID SINGLE QUANT: CPT

## 2025-03-10 PROCEDURE — 2500000003 HC RX 250 WO HCPCS

## 2025-03-10 PROCEDURE — 6370000000 HC RX 637 (ALT 250 FOR IP)

## 2025-03-10 PROCEDURE — 82607 VITAMIN B-12: CPT

## 2025-03-10 PROCEDURE — 80069 RENAL FUNCTION PANEL: CPT

## 2025-03-10 PROCEDURE — 6370000000 HC RX 637 (ALT 250 FOR IP): Performed by: STUDENT IN AN ORGANIZED HEALTH CARE EDUCATION/TRAINING PROGRAM

## 2025-03-10 PROCEDURE — 1200000000 HC SEMI PRIVATE

## 2025-03-10 PROCEDURE — 36415 COLL VENOUS BLD VENIPUNCTURE: CPT

## 2025-03-10 PROCEDURE — 6370000000 HC RX 637 (ALT 250 FOR IP): Performed by: INTERNAL MEDICINE

## 2025-03-10 PROCEDURE — 6360000002 HC RX W HCPCS

## 2025-03-10 PROCEDURE — 90935 HEMODIALYSIS ONE EVALUATION: CPT

## 2025-03-10 PROCEDURE — 82746 ASSAY OF FOLIC ACID SERUM: CPT

## 2025-03-10 RX ORDER — LEVETIRACETAM 500 MG/1
250 TABLET ORAL
Status: DISCONTINUED | OUTPATIENT
Start: 2025-03-10 | End: 2025-03-12 | Stop reason: HOSPADM

## 2025-03-10 RX ADMIN — METHOCARBAMOL 500 MG: 500 TABLET ORAL at 09:07

## 2025-03-10 RX ADMIN — HEPARIN SODIUM 3600 UNITS: 1000 INJECTION INTRAVENOUS; SUBCUTANEOUS at 10:23

## 2025-03-10 RX ADMIN — OXYCODONE 10 MG: 5 TABLET ORAL at 23:57

## 2025-03-10 RX ADMIN — HYDROMORPHONE HYDROCHLORIDE 0.5 MG: 1 INJECTION, SOLUTION INTRAMUSCULAR; INTRAVENOUS; SUBCUTANEOUS at 10:52

## 2025-03-10 RX ADMIN — ACETAMINOPHEN 650 MG: 325 TABLET, FILM COATED ORAL at 14:28

## 2025-03-10 RX ADMIN — OXYCODONE 5 MG: 5 TABLET ORAL at 09:07

## 2025-03-10 RX ADMIN — MEMANTINE 10 MG: 10 TABLET ORAL at 19:26

## 2025-03-10 RX ADMIN — ATORVASTATIN CALCIUM 80 MG: 80 TABLET, FILM COATED ORAL at 14:29

## 2025-03-10 RX ADMIN — EPOETIN ALFA-EPBX 5000 UNITS: 3000 INJECTION, SOLUTION INTRAVENOUS; SUBCUTANEOUS at 10:22

## 2025-03-10 RX ADMIN — LEVETIRACETAM 250 MG: 250 TABLET, FILM COATED ORAL at 16:46

## 2025-03-10 RX ADMIN — LEVETIRACETAM 500 MG: 500 TABLET, FILM COATED ORAL at 14:28

## 2025-03-10 RX ADMIN — OXYCODONE 10 MG: 5 TABLET ORAL at 16:35

## 2025-03-10 RX ADMIN — PANTOPRAZOLE SODIUM 40 MG: 40 TABLET, DELAYED RELEASE ORAL at 14:29

## 2025-03-10 RX ADMIN — SEVELAMER CARBONATE 1600 MG: 800 TABLET, FILM COATED ORAL at 16:36

## 2025-03-10 RX ADMIN — SODIUM CHLORIDE, PRESERVATIVE FREE 10 ML: 5 INJECTION INTRAVENOUS at 19:26

## 2025-03-10 RX ADMIN — TORSEMIDE 100 MG: 100 TABLET ORAL at 14:29

## 2025-03-10 RX ADMIN — ACETAMINOPHEN 650 MG: 325 TABLET, FILM COATED ORAL at 16:23

## 2025-03-10 RX ADMIN — CARVEDILOL 6.25 MG: 6.25 TABLET, FILM COATED ORAL at 16:36

## 2025-03-10 ASSESSMENT — PAIN DESCRIPTION - ONSET: ONSET: GRADUAL

## 2025-03-10 ASSESSMENT — PAIN DESCRIPTION - ORIENTATION
ORIENTATION: RIGHT
ORIENTATION: RIGHT

## 2025-03-10 ASSESSMENT — PAIN DESCRIPTION - PAIN TYPE: TYPE: SURGICAL PAIN

## 2025-03-10 ASSESSMENT — PAIN DESCRIPTION - LOCATION
LOCATION: ARM

## 2025-03-10 ASSESSMENT — PAIN DESCRIPTION - DESCRIPTORS
DESCRIPTORS: ACHING;SORE
DESCRIPTORS: SHARP;POUNDING
DESCRIPTORS: SHARP;POUNDING

## 2025-03-10 ASSESSMENT — PAIN DESCRIPTION - FREQUENCY: FREQUENCY: CONTINUOUS

## 2025-03-10 ASSESSMENT — PAIN - FUNCTIONAL ASSESSMENT
PAIN_FUNCTIONAL_ASSESSMENT: ACTIVITIES ARE NOT PREVENTED
PAIN_FUNCTIONAL_ASSESSMENT: PREVENTS OR INTERFERES SOME ACTIVE ACTIVITIES AND ADLS
PAIN_FUNCTIONAL_ASSESSMENT: PREVENTS OR INTERFERES SOME ACTIVE ACTIVITIES AND ADLS

## 2025-03-10 ASSESSMENT — PAIN SCALES - GENERAL
PAINLEVEL_OUTOF10: 5
PAINLEVEL_OUTOF10: 9
PAINLEVEL_OUTOF10: 10
PAINLEVEL_OUTOF10: 4
PAINLEVEL_OUTOF10: 6

## 2025-03-10 NOTE — PROGRESS NOTES
Nephrology Consult Note                                                                                                                                                                                                                                                                                                                                                               Office : [phone]     Fax :[phone]    Patient's Name: Scottie Chiang  11:51 AM  3/10/2025    Reason for Consult:  ESRD, hyperkalemia   Requesting Physician:  Unknown, Provider, ANP  Chief Complaint:    Chief Complaint   Patient presents with    Post-op Problem     Pt had right arm fistula placement at 1200 today. She came back for bleeding from fistula placement       Assessment/Plan     # ESRD   HD TODAY, seen during HD on machine   Recent AVF placement and hematoma- s/p RIGHT UPPER EXTREMITY HEMATOMA EVACUATION  Plan:  HD (TDC line)     # severe Hyperkalemia - resolved   Monitor BMP     # HTN  BP soft  Resumed Torsemide   Keep Coreg     # Anemia of CKD   Check when was last CANDELARIA dose  Check iron     # AVF hematoma   S/p hematoma evacuation  Vascular following   Pain control per vascular     # MBD  Resume sevelamer     History of Present Ilness:    Scottie Chiang is a 54 y.o. female with    DM, ESRD, HLD, HTn. Surgical history significant for Multiple AV fistula and AV graft creations. She underwent Right brachiobasilic AV fistula creation today and represents now for bleeding.   She denied any light headedness, dizziness, SOB, or CP.    Pt was in the OR for hematoma evacuation.   K was 7.4 - pt had urgent HD 2 hours with resolution of hyper-K     Interval hx     HD TODAY, seen during HD on machine   Right arm pain  Pt denies CP/SOB/palpitations/abdominal pain/N/V.         Past Medical History:   Diagnosis Date    Arthritis     Diabetes mellitus (HCC)     Diastolic CHF (HCC)     End stage renal disease (HCC)     Hemodialysis patient     MWF

## 2025-03-10 NOTE — DISCHARGE INSTR - COC
Continuity of Care Form    Patient Name: Scottie Chiang   :  1970  MRN:  [id number]    Admit date:  3/7/2025  Discharge date:  ***    Code Status Order: Full Code   Advance Directives:     Admitting Physician:  Valeria Kumar MD  PCP: Unknown, Provider, ANP    Discharging Nurse: ***  Discharging Hospital Unit/Room#: 5318/5318-01  Discharging Unit Phone Number: ***    Emergency Contact:   Extended Emergency Contact Information  Primary Emergency Contact: Hayley Chiang  Home Phone: [phone]  Mobile Phone: [phone]  Relation: Parent  Secondary Emergency Contact: Yeimi Chiang  Home Phone: [phone]  Mobile Phone: [phone]  Relation: Brother/Sister    Past Surgical History:  Past Surgical History:   Procedure Laterality Date    CATARACT REMOVAL Bilateral      SECTION      DIALYSIS CATHETER INSERTION N/A 2021    LAPAROSCOPIC PERITONEAL DIALYSIS CATHETER INSERTION, OMENTOPLEXY performed by Henri Elizalde DO at [facility] OR    DIALYSIS CATHETER INSERTION N/A 2022    LAPAROSCOPIC PERITONEAL DIALYSIS CATHETER PLACEMENT performed by Henri Elizalde DO at [facility] OR    DIALYSIS CATHETER REMOVAL N/A 2021    CATHETER REMOVAL PERITONEAL DIALYSIS performed by Henri Elizalde DO at [facility] OR    DIALYSIS CATHETER REMOVAL N/A 2022    PERITONEAL DIALYSIS CATHETER REMOVAL performed by Henri Elizalde DO at [facility] OR    DIALYSIS FISTULA CREATION Right 2024    RIGHT ARTERIOVENOUS FISTULA CREATION performed by Valeria Kumar MD at [facility] OR    DIALYSIS FISTULA CREATION Right 3/7/2025    RIGHT ARTERIOVENOUS FISTULA CREATION performed by Valeria Kumar MD at [facility] OR    DIALYSIS FISTULA CREATION Right 3/7/2025    RIGHT UPPER EXTREMITY HEMATOMA EVACUATION performed by Valeria Kumar MD at [facility] OR    EYE SURGERY Bilateral 1988    muscle surgery     INVASIVE VASCULAR N/A 2024    Angioplasty fistula/dialysis circuit performed by Valeria Kumar MD at [facility] CARDIAC CATH LAB    INVASIVE VASCULAR N/A 2024

## 2025-03-10 NOTE — PROGRESS NOTES
This RN attempted AM assessment and vitals - pt refused stating she \"doesn't want to be touched\". Was able to visually assess wound vac dressing - clean, dry and intact. Pt to dialysis.    Electronically signed by Norman Lopez RN on 3/10/2025 at 7:47 AM

## 2025-03-10 NOTE — PLAN OF CARE
Problem: Discharge Planning  Goal: Discharge to home or other facility with appropriate resources  3/9/2025 2110 by Kell Cornelius RN  Outcome: Progressing  3/9/2025 0903 by Bobo Panchal RN  Outcome: Progressing     Problem: Pain  Goal: Verbalizes/displays adequate comfort level or baseline comfort level  3/9/2025 2110 by Kell Cornelius RN  Outcome: Progressing  3/9/2025 0903 by Bobo Panchal RN  Outcome: Progressing     Problem: Safety - Adult  Goal: Free from fall injury  3/9/2025 2110 by Kell Cornelius RN  Outcome: Progressing  3/9/2025 0903 by Bobo Panchal RN  Outcome: Progressing     Problem: Chronic Conditions and Co-morbidities  Goal: Patient's chronic conditions and co-morbidity symptoms are monitored and maintained or improved  3/9/2025 2110 by Kell Cornelius RN  Outcome: Progressing  3/9/2025 0903 by Bobo Panchal RN  Outcome: Progressing     Problem: Skin/Tissue Integrity - Adult  Goal: Incisions, wounds, or drain sites healing without S/S of infection  3/9/2025 0903 by Bobo Panchal RN  Outcome: Progressing

## 2025-03-10 NOTE — PROGRESS NOTES
Treatment time: 2.5 hrs    Net UF:  1500 ml     Pre weight: 93.8 kg  Post weight: 92.3 kg     Access used: Ltdc  Access function:  tolerated well,  BFR 300ml/min     Medications or blood products given: heparin dwells, retacrit 5kiu     Regular outpatient schedule: MWF     Summary of response to treatment: Pt tolerated well. Pt remained stable throughout entire treatment and upon exiting the hemodialysis suite.      Copy of dialysis treatment record placed in chart, to be scanned into EMR.

## 2025-03-10 NOTE — CARE COORDINATION
Case Management Assessment  Initial Evaluation    Date/Time of Evaluation: 3/10/2025 2:57 PM  Assessment Completed by: JULIAN Rivera    If patient is discharged prior to next notation, then this note serves as note for discharge by case management.    Patient Name: Scottie Chiang                   YOB: 1970  Diagnosis: Hematoma [T14.8XXA]  Hemorrhage of arteriovenous fistula, initial encounter [T82.838A]                   Date / Time: 3/7/2025  3:24 PM    Patient Admission Status: Inpatient   Readmission Risk (Low < 19, Mod (19-27), High > 27): Readmission Risk Score: 22.8    Current PCP: Unknown, Provider, ANP  PCP verified by CM? Yes    Chart Reviewed: Yes      History Provided by: Medical Record, Other (see comment) (parent)  Patient Orientation: Other (see comment) (groggy and tired)    Patient Cognition: Other (see comment) (groggy and tired)    Hospitalization in the last 30 days (Readmission):  No    If yes, Readmission Assessment in  Navigator will be completed.    Advance Directives:      Code Status: Full Code   Patient's Primary Decision Maker is: Legal Next of Kin      Discharge Planning:    Patient lives with: Alone Type of Home: Apartment  Primary Care Giver: Self  Patient Support Systems include: Family Members   Current Financial resources: Medicare, Medicaid  Current community resources: None  Current services prior to admission: None            Current DME:              Type of Home Care services:  None    ADLS  Prior functional level: Independent in ADLs/IADLs  Current functional level: Independent in ADLs/IADLs    PT AM-PAC:   /24  OT AM-PAC:   /24    Family can provide assistance at DC:    Would you like Case Management to discuss the discharge plan with any other family members/significant others, and if so, who? Yes (mother at bedside.)  Plans to Return to Present Housing:    Other Identified Issues/Barriers to RETURNING to current housing: yes  Potential Assistance needed at  discharge: N/A            Potential DME:    Patient expects to discharge to: Apartment  Plan for transportation at discharge: Other (see comment) (will need transportation assistance)    Financial    Payor: DALTON MATHEW / Plan: HOFFMAN LISE [place] DUAL / Product Type: *No Product type* /     Does insurance require precert for SNF: Yes    Potential assistance Purchasing Medications: No  Meds-to-Beds request: Yes      [facility] - [address] - P [phone] - F [phone]  [address]  [facility] [address]  Phone: [phone] Fax: [phone]      Notes:    Factors facilitating achievement of predicted outcomes: Family support, Cooperative, and Pleasant    Barriers to discharge: wound vac approval pending, gen surg follow, vascular sug follow, nephro follow    Additional Case Management Notes: CM met with pt and pt's mother at bedside. Pt lives in an apartment alone and pt was very tired during meeting. Pt's mother providing much of the information. Pt attends [facility] MWF and has transportation through insurance. Pt has no active services at home. Pt is agreeable to home health nursing should she require the wound vac at discharge. Mother is hopeful the pt will not need the wound vac at discharge. Pt will need transportation home at discharge and address on file is verified. CM will continue to follow for discharge planning.    The Plan for Transition of Care is related to the following treatment goals of Hematoma [T14.8XXA]  Hemorrhage of arteriovenous fistula, initial encounter [T82.838A]    IF APPLICABLE: The Patient and/or patient representative Scottie and her family were provided with a choice of provider and agrees with the discharge plan. Freedom of choice list with basic dialogue that supports the patient's individualized plan of care/goals and shares the quality data associated with the providers was provided to: Patient Representative, Patient

## 2025-03-10 NOTE — PROGRESS NOTES
Hospital Medicine Progress Note      Date of Admission: 3/7/2025  Hospital Day: 4    Chief Admission Complaint:  AV fistula hematoma     Subjective:  Patient is without complaints. Refusing being touched or moved. No adverse interval events.   HD this AM.     Presenting Admission History:       Scottie Chiang is a 54 y.o. female who presents with RUE hematoma after recent AV fistula creation. Specificially, R brachiobasilic AV fistula creation. Hematoma has been evacuated and wound vac in place. IM consulted for medical management. Will follow till discharge.     Assessment/Plan:      Current Principal Problem:  <principal problem not specified>    Recent AVF creation with hematoma s/p hematoma evacuation  - wound vac to RUE   - awaiting paperwork related to [place] prior to discharge  - continue HD per nephology  - medically cleared for discharge from IM standpoint     Chronic:  Anemia  HTN  CHF    Physical Exam Performed:      General: NAD  Eyes: EOMI  ENT: neck supple  Cardiovascular: Regular rate.  Respiratory: Clear to auscultation  Gastrointestinal: Soft, non tender  Genitourinary: no suprapubic tenderness  Musculoskeletal: No edema  Skin: warm, dry  Neuro: Alert.  Psych: Mood appropriate.     BP (!) 169/88   Pulse 94   Temp 98.4 °F (36.9 °C) (Oral)   Resp 16   Ht 1.549 m (5' 1\")   Wt 92.3 kg (203 lb 7.8 oz)   SpO2 98%   BMI 38.45 kg/m²     Diet: ADULT DIET; Regular; 4 carb choices (60 gm/meal); Low Fat/Low Chol/High Fiber/2 gm Na  DVT Prophylaxis: []PPx LMWH  []SQ Heparin  []IPC/SCDs  []Eliquis  []Xarelto  []Coumadin  []Other -      Code status: Full Code  PT/OT Eval Status:   []NOT yet ordered  []Ordered and Pending   []Seen with Recommendations for:  []Home independently  []Home w/ assist  []HHC  []SNF  []Acute Rehab    Anticipated Discharge Day/Date: per primary    Anticipated Discharge Location: []Home  []HHC  []SNF  []Acute Rehab  []ECF  []LTAC  []Hospice  []Other -      Consults:      IP CONSULT TO      Consults:      IP CONSULT TO INTERNAL MEDICINE  IP CONSULT TO NEPHROLOGY  IP CONSULT TO SOCIAL WORK      This patient has a high likelihood of being discharged tomorrow and is appropriate for A1 Discharge Unit in AM pending clinical course overnight: []Yes  []No    ------------------------------------------------------------------------------------------------------------------------------------------------------------------------    [facility] - level 2  [] High (any 2):    A. Problems (any 1):  [] Acute/Chronic Illness/injury posing threat to life or bodily function:    [] Severe exacerbation of chronic illness:    ---------------------------------------------------------------------  B. Risk of Treatment (any 1):   [] Drugs/treatments that requiring intensive monitoring for toxicity:    [] IV ABX requiring serial renal monitoring for nephrotoxicity:     [] IV Narcotic analgesia for adverse drug reaction  [] IV diuresis requiring serial monitoring for renal impairment and electrolyte derangements  [] Critical electrolyte abnormalities requiring IV replacement and close serial monitoring  [] Insulin - monitoring serial glucose measurements for hypoglycemia  [] Anticoagulation requiring serial monitoring of coagulation factors  [] Other -  [] Change in code status:    [] Decision to de-escalate care this DOS due to change in treatment goals:    [] Decision to escalate care to:   [] Major surgery/procedure with associated risk factors (any 1):     [] Elective procedure with patient risk factors:    []  Emergent procedure:  [] IV/IM controlled substances (any 1):   [] One-time Order including Drug Name/Route, Reason Ordered:    [] Scheduled Order including Drug Name/Route, Reason Ordered or Continued:    [] PRN Order including Drug Name/Route, Reason Ordered or Continued:   ----------------------------------------------------------------------  C. Data (any 2)  [] Discussion of Management with External Professional (any  1):   [] Discussed current management and discharge planning options with Case Management.   [] Discussed management of the case with:    [] Study interpreted by me (any 1):   [] Telemetry personally reviewed and interpreted:     [] Imaging personally reviewed and interpreted, includes:    [] Data Review (3+ points):  [] Collateral history obtained from:    [] Consultant Note reviewed with note date, specialty, and summary (1 point each):  [] Studies Reviewed (1 point each):    [] Lab Tests Reviewed (1 point each):   [] Appropriate follow-up labs were ordered:    Medications:  Personally reviewed in detail in conjunction w/ labs as documented for evidence of drug toxicity.     Infusion Medications    sodium chloride      dextrose       Scheduled Medications    torsemide  100 mg Oral Daily    sevelamer  1,600 mg Oral TID WC    carvedilol  6.25 mg Oral BID WC    atorvastatin  80 mg Oral Daily    levETIRAcetam  250 mg Oral TID    levETIRAcetam  500 mg Oral Daily    memantine  10 mg Oral BID    methocarbamol  500 mg Oral Daily    sodium chloride flush  5-40 mL IntraVENous 2 times per day    acetaminophen  650 mg Oral Q6H    pantoprazole  40 mg Oral Daily     PRN Meds: heparin (porcine), meclizine, sodium chloride flush, sodium chloride, ondansetron **OR** ondansetron, oxyCODONE **OR** oxyCODONE, HYDROmorphone **OR** HYDROmorphone, glucose, dextrose bolus **OR** dextrose bolus, glucagon (rDNA), dextrose     Labs:  Personally reviewed and interpreted for clinical significance.     Recent Labs     03/08/25 0459 03/09/25 1335 03/10/25  0635   WBC 7.0 6.5 5.5   HGB 9.2* 10.2* 9.5*   HCT 28.6* 32.0* 29.2*   * 103* 112*     Recent Labs     03/08/25 0459 03/09/25  1335 03/10/25  0635    133* 133*   K 4.7 5.3* 4.8   CL 98* 95* 95*   CO2 23 21 21   BUN 30* 28* 38*   CREATININE 6.3* 6.5* 7.6*   CALCIUM 8.8 9.5 9.4   MG 2.25 2.26 2.32   PHOS 3.2 5.2* 5.6*     No results for input(s): \"PROBNP\", \"TROPHS\" in the last

## 2025-03-10 NOTE — PROGRESS NOTES
Vascular Surgery  Progress Note      Procedure(s) Performed: right upper extremity hematoma evacuation    Subjective:   Reports back itches.  pain to incision site. Minimally participating with interview and exam. Denies numbness and tingling.     Objective:  Vitals:   Vitals:    03/09/25 1443 03/09/25 2034 03/09/25 2314 03/10/25 0345   BP: (!) 157/75 (!) 147/88 (!) 163/66 (!) 157/77   Pulse: 96 95 91 91   Resp: 16 16 16 16   Temp: 99.1 °F (37.3 °C) 98.9 °F (37.2 °C) 98.6 °F (37 °C) 98 °F (36.7 °C)   TempSrc: Oral Oral Oral Oral   SpO2: 94% 94% 97% 95%   Weight:       Height:           Physical Exam:  General appearance: alert, no acute distress  Chest/Lungs: normal effort with no accessory muscle use on RA, left chest hemodialysis catheter in place  Cardiovascular:  well perfused  Skin: warm and dry, no rashes  Extremities: RUE incisions c/d/I with optifoam in place, minimal strike through, wound vac in place and suctioning, some blood in cannister, appropriately tender to palpation. Radial and brachial pulses palpable, palpable faint thrill of AVF.  strength 4/5 R, 5/5/ L. Sensation in tact to RUE.   Neuro: alert, no focal deficits, sensation intact, motor intact    Assessment and Plan  This is a 54 y.o. year old female status post right upper extremity hematoma evacuation secondary to post op bleeding after Right brachiobasilic AV fistula creation today. (3/7)    Continue [facility]  Appreciate IM recommendations for management of other co morbidities  Nephrology following for HD; last HD session 3/7  Dispo pending [place] paperwork approval and HD with mouna Cano, RAYA  General Surgery

## 2025-03-10 NOTE — CARE COORDINATION
CM following: CM faxed [employer] wound vac prescription to Marta with [employer]. Marta reports to the CM that pt's insurance is not likely to approve the wound vac the same day and she would expect it to take between 24 and 48 hours for approval.     [facility] has accepted the referral for home nursing services. CM will continue to follow for discharge planning.  Electronically signed by JULIAN Rivera on 3/10/2025 at 11:34 AM  [phone]

## 2025-03-10 NOTE — PROGRESS NOTES
WOUND VAC PROGRESS NOTE    Wound vac changed. Black Foam utilized. Continous suction set to -125 mmHg. Dressing with good seal and no leaks noted. Anticipate changes on M/W/F. Please reach out to surgery team if vac alarms. PLEASE KEEP VAC PLUGGED IN AT ALL TIMES.           Charu Cano, RAYA  General Surgery

## 2025-03-11 LAB
ALBUMIN SERPL-MCNC: 3.7 G/DL (ref 3.4–5)
ANION GAP SERPL CALCULATED.3IONS-SCNC: 18 MMOL/L (ref 3–16)
BASOPHILS # BLD: 0 K/UL (ref 0–0.2)
BASOPHILS NFR BLD: 0.7 %
BUN SERPL-MCNC: 34 MG/DL (ref 7–20)
CALCIUM SERPL-MCNC: 8.9 MG/DL (ref 8.3–10.6)
CHLORIDE SERPL-SCNC: 95 MMOL/L (ref 99–110)
CO2 SERPL-SCNC: 20 MMOL/L (ref 21–32)
CREAT SERPL-MCNC: 6.9 MG/DL (ref 0.6–1.1)
DEPRECATED RDW RBC AUTO: 19.8 % (ref 12.4–15.4)
EOSINOPHIL # BLD: 0.2 K/UL (ref 0–0.6)
EOSINOPHIL NFR BLD: 3.6 %
GFR SERPLBLD CREATININE-BSD FMLA CKD-EPI: 7 ML/MIN/{1.73_M2}
GLUCOSE BLD-MCNC: 122 MG/DL (ref 70–99)
GLUCOSE BLD-MCNC: 184 MG/DL (ref 70–99)
GLUCOSE BLD-MCNC: 74 MG/DL (ref 70–99)
GLUCOSE BLD-MCNC: 94 MG/DL (ref 70–99)
GLUCOSE BLD-MCNC: 97 MG/DL (ref 70–99)
GLUCOSE SERPL-MCNC: 72 MG/DL (ref 70–99)
HCT VFR BLD AUTO: 29.3 % (ref 36–48)
HGB BLD-MCNC: 9.4 G/DL (ref 12–16)
LYMPHOCYTES # BLD: 1.3 K/UL (ref 1–5.1)
LYMPHOCYTES NFR BLD: 25.8 %
MAGNESIUM SERPL-MCNC: 2.23 MG/DL (ref 1.8–2.4)
MCH RBC QN AUTO: 33.3 PG (ref 26–34)
MCHC RBC AUTO-ENTMCNC: 32.1 G/DL (ref 31–36)
MCV RBC AUTO: 103.9 FL (ref 80–100)
MONOCYTES # BLD: 0.8 K/UL (ref 0–1.3)
MONOCYTES NFR BLD: 15.2 %
NEUTROPHILS # BLD: 2.8 K/UL (ref 1.7–7.7)
NEUTROPHILS NFR BLD: 54.7 %
PERFORMED ON: ABNORMAL
PERFORMED ON: ABNORMAL
PERFORMED ON: NORMAL
PHOSPHATE SERPL-MCNC: 5.7 MG/DL (ref 2.5–4.9)
PLATELET # BLD AUTO: 115 K/UL (ref 135–450)
PMV BLD AUTO: 9.7 FL (ref 5–10.5)
POTASSIUM SERPL-SCNC: 4.4 MMOL/L (ref 3.5–5.1)
RBC # BLD AUTO: 2.82 M/UL (ref 4–5.2)
SODIUM SERPL-SCNC: 133 MMOL/L (ref 136–145)
WBC # BLD AUTO: 5.2 K/UL (ref 4–11)

## 2025-03-11 PROCEDURE — 80069 RENAL FUNCTION PANEL: CPT

## 2025-03-11 PROCEDURE — 6370000000 HC RX 637 (ALT 250 FOR IP)

## 2025-03-11 PROCEDURE — 99233 SBSQ HOSP IP/OBS HIGH 50: CPT | Performed by: HOSPITALIST

## 2025-03-11 PROCEDURE — 6370000000 HC RX 637 (ALT 250 FOR IP): Performed by: INTERNAL MEDICINE

## 2025-03-11 PROCEDURE — 83735 ASSAY OF MAGNESIUM: CPT

## 2025-03-11 PROCEDURE — 36415 COLL VENOUS BLD VENIPUNCTURE: CPT

## 2025-03-11 PROCEDURE — 85025 COMPLETE CBC W/AUTO DIFF WBC: CPT

## 2025-03-11 PROCEDURE — 6360000002 HC RX W HCPCS: Performed by: STUDENT IN AN ORGANIZED HEALTH CARE EDUCATION/TRAINING PROGRAM

## 2025-03-11 PROCEDURE — 6370000000 HC RX 637 (ALT 250 FOR IP): Performed by: STUDENT IN AN ORGANIZED HEALTH CARE EDUCATION/TRAINING PROGRAM

## 2025-03-11 PROCEDURE — 2500000003 HC RX 250 WO HCPCS

## 2025-03-11 PROCEDURE — 1200000000 HC SEMI PRIVATE

## 2025-03-11 RX ORDER — SUMATRIPTAN SUCCINATE 25 MG/1
25 TABLET ORAL
Status: COMPLETED | OUTPATIENT
Start: 2025-03-11 | End: 2025-03-11

## 2025-03-11 RX ORDER — HYDROXYZINE HYDROCHLORIDE 10 MG/1
10 TABLET, FILM COATED ORAL 3 TIMES DAILY PRN
Status: DISCONTINUED | OUTPATIENT
Start: 2025-03-11 | End: 2025-03-12 | Stop reason: HOSPADM

## 2025-03-11 RX ORDER — DIPHENHYDRAMINE HYDROCHLORIDE 50 MG/ML
12.5 INJECTION, SOLUTION INTRAMUSCULAR; INTRAVENOUS ONCE
Status: COMPLETED | OUTPATIENT
Start: 2025-03-11 | End: 2025-03-11

## 2025-03-11 RX ORDER — METHOCARBAMOL 500 MG/1
500 TABLET, FILM COATED ORAL 4 TIMES DAILY
Status: DISCONTINUED | OUTPATIENT
Start: 2025-03-11 | End: 2025-03-12 | Stop reason: HOSPADM

## 2025-03-11 RX ORDER — GABAPENTIN 300 MG/1
300 CAPSULE ORAL 2 TIMES DAILY
Status: DISCONTINUED | OUTPATIENT
Start: 2025-03-11 | End: 2025-03-12 | Stop reason: HOSPADM

## 2025-03-11 RX ADMIN — ACETAMINOPHEN 650 MG: 325 TABLET, FILM COATED ORAL at 10:32

## 2025-03-11 RX ADMIN — MEMANTINE 10 MG: 10 TABLET ORAL at 08:24

## 2025-03-11 RX ADMIN — GABAPENTIN 300 MG: 300 CAPSULE ORAL at 10:32

## 2025-03-11 RX ADMIN — DIPHENHYDRAMINE HYDROCHLORIDE 12.5 MG: 50 INJECTION INTRAMUSCULAR; INTRAVENOUS at 00:26

## 2025-03-11 RX ADMIN — OXYCODONE 10 MG: 5 TABLET ORAL at 18:45

## 2025-03-11 RX ADMIN — OXYCODONE 10 MG: 5 TABLET ORAL at 08:28

## 2025-03-11 RX ADMIN — SEVELAMER CARBONATE 1600 MG: 800 TABLET, FILM COATED ORAL at 10:32

## 2025-03-11 RX ADMIN — ACETAMINOPHEN 650 MG: 325 TABLET, FILM COATED ORAL at 16:13

## 2025-03-11 RX ADMIN — METHOCARBAMOL 500 MG: 500 TABLET ORAL at 08:24

## 2025-03-11 RX ADMIN — ATORVASTATIN CALCIUM 80 MG: 80 TABLET, FILM COATED ORAL at 08:24

## 2025-03-11 RX ADMIN — PANTOPRAZOLE SODIUM 40 MG: 40 TABLET, DELAYED RELEASE ORAL at 08:24

## 2025-03-11 RX ADMIN — SUMATRIPTAN SUCCINATE 25 MG: 25 TABLET ORAL at 00:26

## 2025-03-11 RX ADMIN — HYDROXYZINE HYDROCHLORIDE 10 MG: 10 TABLET, FILM COATED ORAL at 10:32

## 2025-03-11 RX ADMIN — CARVEDILOL 6.25 MG: 6.25 TABLET, FILM COATED ORAL at 16:13

## 2025-03-11 RX ADMIN — SEVELAMER CARBONATE 1600 MG: 800 TABLET, FILM COATED ORAL at 08:23

## 2025-03-11 RX ADMIN — LEVETIRACETAM 500 MG: 500 TABLET, FILM COATED ORAL at 08:23

## 2025-03-11 RX ADMIN — CARVEDILOL 6.25 MG: 6.25 TABLET, FILM COATED ORAL at 08:23

## 2025-03-11 RX ADMIN — SODIUM CHLORIDE, PRESERVATIVE FREE 10 ML: 5 INJECTION INTRAVENOUS at 08:26

## 2025-03-11 RX ADMIN — HYDROXYZINE HYDROCHLORIDE 10 MG: 10 TABLET, FILM COATED ORAL at 18:45

## 2025-03-11 RX ADMIN — TORSEMIDE 100 MG: 100 TABLET ORAL at 08:24

## 2025-03-11 RX ADMIN — SEVELAMER CARBONATE 1600 MG: 800 TABLET, FILM COATED ORAL at 16:13

## 2025-03-11 ASSESSMENT — PAIN DESCRIPTION - LOCATION
LOCATION: ARM

## 2025-03-11 ASSESSMENT — PAIN DESCRIPTION - PAIN TYPE: TYPE: SURGICAL PAIN

## 2025-03-11 ASSESSMENT — PAIN DESCRIPTION - ONSET: ONSET: ON-GOING

## 2025-03-11 ASSESSMENT — PAIN SCALES - GENERAL
PAINLEVEL_OUTOF10: 6
PAINLEVEL_OUTOF10: 0
PAINLEVEL_OUTOF10: 8
PAINLEVEL_OUTOF10: 10
PAINLEVEL_OUTOF10: 9
PAINLEVEL_OUTOF10: 6
PAINLEVEL_OUTOF10: 10

## 2025-03-11 ASSESSMENT — PAIN - FUNCTIONAL ASSESSMENT: PAIN_FUNCTIONAL_ASSESSMENT: ACTIVITIES ARE NOT PREVENTED

## 2025-03-11 ASSESSMENT — PAIN DESCRIPTION - FREQUENCY: FREQUENCY: CONTINUOUS

## 2025-03-11 ASSESSMENT — PAIN DESCRIPTION - ORIENTATION: ORIENTATION: RIGHT

## 2025-03-11 ASSESSMENT — PAIN DESCRIPTION - DESCRIPTORS: DESCRIPTORS: ACHING

## 2025-03-11 NOTE — PROGRESS NOTES
Occupational Therapy  REFUSAL  OT attempted to see pt this morning for evaluation. Pt was in bed and reported \"I'm not doing it\". Pt reported she has been in too much pain and she will not get up. OT explained purpose of evaluation and recommendations for OOB to assess safety for d/c home today. Pt continued to decline, stating \"I don't need to do anything, my son waits on me hand and foot and I lay in bed all day. The only thing I do is go to dialysis\". Pt adamantly declining OT evaluation despite education.     Henna Hightower, OTR/L

## 2025-03-11 NOTE — PROGRESS NOTES
Pt alert and oriented. VSS. Pt wound vac in place with good seal CDI. Pt reporting surgical pain, PRN Roxicodone given see MAR. Pt reporting migraine, surgical residents notified and one time dose of Imitrex ordered and given see MAR. Pt reporting generalized itching one time order of IV Benadryl received from surgical resident.

## 2025-03-11 NOTE — PROGRESS NOTES
Vascular Surgery  Progress Note      Procedure(s) Performed: right upper extremity hematoma evacuation    Subjective:   Continues to be minimally participating with interview and exam. No new complaints.     Objective:  Vitals:   Vitals:    03/10/25 1705 03/10/25 1924 03/10/25 2255 03/11/25 0401   BP:  108/68 117/78 (!) 158/84   Pulse:  85 85 84   Resp: 15 16 16 16   Temp:  98.7 °F (37.1 °C) 97.4 °F (36.3 °C) 97.4 °F (36.3 °C)   TempSrc:  Oral Axillary Oral   SpO2:  94% 97% 92%   Weight:       Height:           Physical Exam:  General appearance: alert, no acute distress  Chest/Lungs: normal effort with no accessory muscle use on RA, left chest hemodialysis catheter in place  Cardiovascular:  well perfused  Skin: warm and dry, no rashes  Extremities: RUE incisions c/d/I with optifoam in place, minimal strike through, wound vac in place and suctioning, some blood in cannister, appropriately tender to palpation. Radial and brachial pulses palpable, palpable faint thrill of AVF. Sensation in tact to RUE.   Neuro: alert, no focal deficits, sensation intact, motor intact    Assessment and Plan  This is a 54 y.o. year old female status post right upper extremity hematoma evacuation secondary to post op bleeding after Right brachiobasilic AV fistula creation today. (3/7)    Continue [facility]  Appreciate IM recommendations for management of other co morbidities  Nephrology following for HD; last HD session 3/10  Dispo pending [place] paperwork approval and HD with mouna Cano, RAYA  General Surgery

## 2025-03-11 NOTE — CARE COORDINATION
CM following: CM met with pt at bedside. Pt reports that she lives at home with her son and grandson. Pt reports she will return home at discharge and her mother will provide transportation home. Pt states that medicare provides transportation to and from HD on MWF at [facility] with no issues. Pt reports she does not require DME to ambulate at home.    CM discussed wound vac need for the pt at home. Pt expressed that this was news to her and that she will not be going home with the wound vac. CM provided education about the benefits of following medical recommendations and the need for a nurse to come out to the pt's home after discharge. Pt reports she does not want a wound vac or a home nurse coming to the house. Pt requesting to speak with surgical team about these concerns, which CM shared with Charu Cano NP, for appropriate f/u.    Pt also expressed concerns with communication with the \"DON\" about obtaining a pt requested blood sugar. CM agreed to reach out to management to speak with the pt at bedside. As CM left [facility] with nurse management attended pt's room.    CM will continue to follow for discharge planning.  Electronically signed by JULIAN Rivera on 3/11/2025 at 2:20 PM  [phone]

## 2025-03-11 NOTE — PROGRESS NOTES
Nephrology Consult Note                                                                                                                                                                                                                                                                                                                                                               Office : [phone]     Fax :[phone]    Patient's Name: Scottie Chiang  1:33 PM  3/11/2025    Reason for Consult:  ESRD, hyperkalemia   Requesting Physician:  Unknown, Provider, ANP  Chief Complaint:    Chief Complaint   Patient presents with    Post-op Problem     Pt had right arm fistula placement at 1200 today. She came back for bleeding from fistula placement       Assessment/Plan     # ESRD   HD TOMORROW  Recent AVF placement and hematoma  s/p RIGHT UPPER EXTREMITY HEMATOMA EVACUATION  HD ([facility]DC line)     # severe Hyperkalemia   resolved   Monitor BMP     # HTN  Variable   Torsemide   Coreg   Monitor    # Anemia of CKD   - EPO during HD     # AVF hematoma   S/p hematoma evacuation  Vascular following   Pain control per vascular     # MBD  Resume sevelamer     History of Present Ilness:    Scottie Chiang is a 54 y.o. female with    DM, ESRD, HLD, HTn. Surgical history significant for Multiple AV fistula and AV graft creations. She underwent Right brachiobasilic AV fistula creation today and represents now for bleeding.   She denied any light headedness, dizziness, SOB, or CP.    Pt was in the OR for hematoma evacuation.   K was 7.4 - pt had urgent HD 2 hours with resolution of hyper-K     Interval hx     HD TOMORROW  Right arm pain  Pt denies CP/SOB/palpitations/abdominal pain/N/V.         Past Medical History:   Diagnosis Date    Arthritis     Diabetes mellitus (HCC)     Diastolic CHF (HCC)     End stage renal disease (HCC)     Hemodialysis patient     MWF    History of blood transfusion     Hyperlipidemia     Hypertension     On home O2     but  5 YEARS 7/7/2022 [facility] SPECIAL PROCEDURES    IR TUNNELED CVC PLACE WO SQ PORT/PUMP > 5 YEARS  09/21/2022    IR TUNNELED CATHETER PLACEMENT GREATER THAN 5 YEARS 9/21/2022 [facility] SPECIAL PROCEDURES    JOINT REPLACEMENT Right     right knee    TOE AMPUTATION Left     left great toe partial amputation    US ASP ABSCESS/HEMATOMA/BULLA/CYST  03/28/2022    US ASP ABSCESS/HEMATOMA/BULLA/CYST 3/28/2022 [facility] ULTRASOUND       No family history on file.     reports that she has never smoked. She has never used smokeless tobacco. She reports that she does not currently use alcohol. She reports that she does not currently use drugs.    Allergies:  Adhesive tape    Current Medications:    hydrOXYzine HCl (ATARAX) tablet 10 mg, TID PRN  gabapentin (NEURONTIN) capsule 300 mg, BID  levETIRAcetam (KEPPRA) tablet 250 mg, Once per day on Monday Wednesday Friday  torsemide (DEMADEX) tablet 100 mg, Daily  sevelamer (RENVELA) tablet 1,600 mg, TID WC  heparin (porcine) injection 3,600 Units, PRN  carvedilol (COREG) tablet 6.25 mg, BID WC  atorvastatin (LIPITOR) tablet 80 mg, Daily  levETIRAcetam (KEPPRA) tablet 500 mg, Daily  meclizine (ANTIVERT) tablet 12.5 mg, TID PRN  memantine (NAMENDA) tablet 10 mg, BID  methocarbamol (ROBAXIN) tablet 500 mg, Daily  sodium chloride flush 0.9 % injection 5-40 mL, 2 times per day  sodium chloride flush 0.9 % injection 5-40 mL, PRN  0.9 % sodium chloride infusion, PRN  ondansetron (ZOFRAN-ODT) disintegrating tablet 4 mg, Q8H PRN   Or  ondansetron (ZOFRAN) injection 4 mg, Q6H PRN  acetaminophen (TYLENOL) tablet 650 mg, Q6H  oxyCODONE (ROXICODONE) immediate release tablet 5 mg, Q4H PRN   Or  oxyCODONE (ROXICODONE) immediate release tablet 10 mg, Q4H PRN  HYDROmorphone HCl PF (DILAUDID) injection 0.25 mg, Q3H PRN   Or  HYDROmorphone HCl PF (DILAUDID) injection 0.5 mg, Q3H PRN  pantoprazole (PROTONIX) tablet 40 mg, Daily  glucose chewable tablet 16 g, PRN  dextrose bolus 10% 125 mL, PRN   Or  dextrose bolus 10%

## 2025-03-11 NOTE — PROGRESS NOTES
Hospital Medicine Progress Note      Date of Admission: 3/7/2025  Hospital Day: 5    Chief Admission Complaint:  AV fistula hematoma     Subjective:  Patient is without complaints. No adverse interval events.      Presenting Admission History:       Scottie Chiang is a 54 y.o. female who presents with RUE hematoma after recent AV fistula creation. Specificially, R brachiobasilic AV fistula creation. Hematoma has been evacuated and wound vac in place. IM consulted for medical management. Will follow till discharge.     Assessment/Plan:      Current Principal Problem:  <principal problem not specified>    Recent AVF creation with hematoma s/p hematoma evacuation  - wound vac to RUE   - awaiting paperwork related to [place] prior to discharge  - continue HD per nephology  - medically cleared for discharge from IM standpoint     Chronic:  Anemia - macrocytic, f/u B12/MMA/folate  HTN  CHF    Physical Exam Performed:      General: NAD  Eyes: EOMI  ENT: neck supple  Cardiovascular: Regular rate.  Respiratory: Clear to auscultation  Gastrointestinal: Soft, non tender  Genitourinary: no suprapubic tenderness  Musculoskeletal: No edema  Skin: warm, dry  Neuro: Alert.  Psych: Mood appropriate.     BP (!) 176/135   Pulse 76   Temp 98.4 °F (36.9 °C) (Oral)   Resp 17   Ht 1.549 m (5' 1\")   Wt 92.3 kg (203 lb 7.8 oz)   SpO2 91%   BMI 38.45 kg/m²     Diet: ADULT DIET; Regular; 4 carb choices (60 gm/meal); Low Fat/Low Chol/High Fiber/2 gm Na  DVT Prophylaxis: []PPx LMWH  []SQ Heparin  []IPC/SCDs  []Eliquis  []Xarelto  []Coumadin  []Other -      Code status: Full Code  PT/OT Eval Status:   []NOT yet ordered  []Ordered and Pending   []Seen with Recommendations for:  []Home independently  []Home w/ assist  []HHC  []SNF  []Acute Rehab    Anticipated Discharge Day/Date: per primary    Anticipated Discharge Location: []Home  []HHC  []SNF  []Acute Rehab  []ECF  []LTAC  []Hospice  []Other -      Consults:      IP CONSULT TO INTERNAL  MEDICINE  IP CONSULT TO NEPHROLOGY  IP CONSULT TO SOCIAL WORK  IP CONSULT TO HOME CARE NEEDS      This patient has a high likelihood of being discharged tomorrow and is appropriate for A1 Discharge Unit in AM pending clinical course overnight: []Yes  []No    ------------------------------------------------------------------------------------------------------------------------------------------------------------------------    MDM - level 2  [] High (any 2):    A. Problems (any 1):  [] Acute/Chronic Illness/injury posing threat to life or bodily function:    [] Severe exacerbation of chronic illness:    ---------------------------------------------------------------------  B. Risk of Treatment (any 1):   [] Drugs/treatments that requiring intensive monitoring for toxicity:    [] IV ABX requiring serial renal monitoring for nephrotoxicity:     [] IV Narcotic analgesia for adverse drug reaction  [] IV diuresis requiring serial monitoring for renal impairment and electrolyte derangements  [] Critical electrolyte abnormalities requiring IV replacement and close serial monitoring  [] Insulin - monitoring serial glucose measurements for hypoglycemia  [] Anticoagulation requiring serial monitoring of coagulation factors  [] Other -  [] Change in code status:    [] Decision to de-escalate care this DOS due to change in treatment goals:    [] Decision to escalate care to:   [] Major surgery/procedure with associated risk factors (any 1):     [] Elective procedure with patient risk factors:    []  Emergent procedure:  [] IV/IM controlled substances (any 1):   [] One-time Order including Drug Name/Route, Reason Ordered:    [] Scheduled Order including Drug Name/Route, Reason Ordered or Continued:    [] PRN Order including Drug Name/Route, Reason Ordered or Continued:   ----------------------------------------------------------------------  C. Data (any 2)  [] Discussion of Management with External Professional (any 1):   []    PHOS 5.2* 5.6* 5.7*     No results for input(s): \"PROBNP\", \"TROPHS\" in the last 72 hours.  No results for input(s): \"LABA1C\" in the last 72 hours.  No results for input(s): \"AST\", \"ALT\", \"BILIDIR\", \"BILITOT\", \"ALKPHOS\" in the last 72 hours.  No results for input(s): \"INR\", \"LACTA\", \"TSH\" in the last 72 hours.      Urine Cultures:   Lab Results   Component Value Date/Time    LABURIN No growth at 18 to 36 hours 07/11/2021 12:18 AM     Blood Cultures:   Lab Results   Component Value Date/Time    BC No Growth after 4 days of incubation. 02/23/2024 08:30 AM     Lab Results   Component Value Date/Time    BLOODCULT2 No Growth after 4 days of incubation. 02/23/2024 10:05 AM     Organism:   Lab Results   Component Value Date/Time    ORG Staphylococcus epidermidis 10/23/2024 09:01 AM         Aaron Kelley MD

## 2025-03-11 NOTE — PROGRESS NOTES
Physical Therapy  Scottie Chiang    PT orders received, chart reviewed.  PT attempted to see patient for initial evaluation although patient adamantly refusing to participate.  Patient reports she ambulates short distance from car to bed for dialysis, otherwise spends most of time in bed/resting.  Patient reports ongoing right UE pain.  PT attempted to educate patient on role of PT, importance of mobility, and OOB activity although patient continued to decline. \"Maybe tomorrow but I can't promise anything.\"  RN notified.  Plan to continue to follow for PT evaluation as agreeable.    Sera Quijano PT, DPT [id number]

## 2025-03-12 ENCOUNTER — HOSPITAL ENCOUNTER (EMERGENCY)
Age: 55
Discharge: LEFT BEFORE DISCHARGE | End: 2025-03-12
Attending: EMERGENCY MEDICINE
Payer: COMMERCIAL

## 2025-03-12 VITALS
RESPIRATION RATE: 18 BRPM | TEMPERATURE: 99 F | HEIGHT: 61 IN | BODY MASS INDEX: 38.5 KG/M2 | OXYGEN SATURATION: 94 % | SYSTOLIC BLOOD PRESSURE: 94 MMHG | DIASTOLIC BLOOD PRESSURE: 56 MMHG | HEART RATE: 81 BPM | WEIGHT: 203.93 LBS

## 2025-03-12 VITALS
HEART RATE: 80 BPM | WEIGHT: 210.9 LBS | SYSTOLIC BLOOD PRESSURE: 97 MMHG | OXYGEN SATURATION: 88 % | BODY MASS INDEX: 39.82 KG/M2 | TEMPERATURE: 98.6 F | HEIGHT: 61 IN | DIASTOLIC BLOOD PRESSURE: 68 MMHG | RESPIRATION RATE: 13 BRPM

## 2025-03-12 DIAGNOSIS — R53.1 GENERALIZED WEAKNESS: ICD-10-CM

## 2025-03-12 DIAGNOSIS — R53.83 OTHER FATIGUE: Primary | ICD-10-CM

## 2025-03-12 DIAGNOSIS — R09.02 HYPOXEMIA: ICD-10-CM

## 2025-03-12 LAB
ALBUMIN SERPL-MCNC: 4.1 G/DL (ref 3.4–5)
ALBUMIN/GLOB SERPL: 1 {RATIO} (ref 1.1–2.2)
ALP SERPL-CCNC: 148 U/L (ref 40–129)
ALT SERPL-CCNC: <5 U/L (ref 10–40)
ANION GAP SERPL CALCULATED.3IONS-SCNC: 18 MMOL/L (ref 3–16)
AST SERPL-CCNC: 21 U/L (ref 15–37)
BASOPHILS # BLD: 0 K/UL (ref 0–0.2)
BASOPHILS NFR BLD: 0.5 %
BILIRUB SERPL-MCNC: 0.4 MG/DL (ref 0–1)
BUN SERPL-MCNC: 28 MG/DL (ref 7–20)
CALCIUM SERPL-MCNC: 9.1 MG/DL (ref 8.3–10.6)
CHLORIDE SERPL-SCNC: 92 MMOL/L (ref 99–110)
CO2 SERPL-SCNC: 20 MMOL/L (ref 21–32)
CREAT SERPL-MCNC: 5.8 MG/DL (ref 0.6–1.1)
DEPRECATED RDW RBC AUTO: 19.2 % (ref 12.4–15.4)
EOSINOPHIL # BLD: 0.3 K/UL (ref 0–0.6)
EOSINOPHIL NFR BLD: 3.2 %
GFR SERPLBLD CREATININE-BSD FMLA CKD-EPI: 8 ML/MIN/{1.73_M2}
GLUCOSE BLD-MCNC: 132 MG/DL (ref 70–99)
GLUCOSE SERPL-MCNC: 153 MG/DL (ref 70–99)
HCT VFR BLD AUTO: 30.9 % (ref 36–48)
HGB BLD-MCNC: 10 G/DL (ref 12–16)
IRON SATN MFR SERPL: 23 % (ref 15–50)
IRON SERPL-MCNC: 42 UG/DL (ref 37–145)
LYMPHOCYTES # BLD: 1.2 K/UL (ref 1–5.1)
LYMPHOCYTES NFR BLD: 14.3 %
MCH RBC QN AUTO: 33 PG (ref 26–34)
MCHC RBC AUTO-ENTMCNC: 32.3 G/DL (ref 31–36)
MCV RBC AUTO: 102.1 FL (ref 80–100)
MONOCYTES # BLD: 1.3 K/UL (ref 0–1.3)
MONOCYTES NFR BLD: 15.4 %
NEUTROPHILS # BLD: 5.5 K/UL (ref 1.7–7.7)
NEUTROPHILS NFR BLD: 66.6 %
PERFORMED ON: ABNORMAL
PLATELET # BLD AUTO: 165 K/UL (ref 135–450)
PMV BLD AUTO: 9.4 FL (ref 5–10.5)
POTASSIUM SERPL-SCNC: 4.7 MMOL/L (ref 3.5–5.1)
PROT SERPL-MCNC: 8.1 G/DL (ref 6.4–8.2)
RBC # BLD AUTO: 3.02 M/UL (ref 4–5.2)
SODIUM SERPL-SCNC: 130 MMOL/L (ref 136–145)
TIBC SERPL-MCNC: 186 UG/DL (ref 260–445)
WBC # BLD AUTO: 8.2 K/UL (ref 4–11)

## 2025-03-12 PROCEDURE — 6370000000 HC RX 637 (ALT 250 FOR IP): Performed by: INTERNAL MEDICINE

## 2025-03-12 PROCEDURE — 2500000003 HC RX 250 WO HCPCS

## 2025-03-12 PROCEDURE — 97116 GAIT TRAINING THERAPY: CPT

## 2025-03-12 PROCEDURE — 6370000000 HC RX 637 (ALT 250 FOR IP)

## 2025-03-12 PROCEDURE — 97166 OT EVAL MOD COMPLEX 45 MIN: CPT

## 2025-03-12 PROCEDURE — 90935 HEMODIALYSIS ONE EVALUATION: CPT

## 2025-03-12 PROCEDURE — 97162 PT EVAL MOD COMPLEX 30 MIN: CPT

## 2025-03-12 PROCEDURE — 97530 THERAPEUTIC ACTIVITIES: CPT

## 2025-03-12 PROCEDURE — 99284 EMERGENCY DEPT VISIT MOD MDM: CPT

## 2025-03-12 PROCEDURE — 99233 SBSQ HOSP IP/OBS HIGH 50: CPT | Performed by: HOSPITALIST

## 2025-03-12 PROCEDURE — 6370000000 HC RX 637 (ALT 250 FOR IP): Performed by: STUDENT IN AN ORGANIZED HEALTH CARE EDUCATION/TRAINING PROGRAM

## 2025-03-12 PROCEDURE — 85025 COMPLETE CBC W/AUTO DIFF WBC: CPT

## 2025-03-12 PROCEDURE — 80053 COMPREHEN METABOLIC PANEL: CPT

## 2025-03-12 RX ADMIN — GABAPENTIN 300 MG: 300 CAPSULE ORAL at 00:10

## 2025-03-12 RX ADMIN — LEVETIRACETAM 500 MG: 500 TABLET, FILM COATED ORAL at 08:09

## 2025-03-12 RX ADMIN — ATORVASTATIN CALCIUM 80 MG: 80 TABLET, FILM COATED ORAL at 08:09

## 2025-03-12 RX ADMIN — PANTOPRAZOLE SODIUM 40 MG: 40 TABLET, DELAYED RELEASE ORAL at 08:08

## 2025-03-12 RX ADMIN — TORSEMIDE 100 MG: 100 TABLET ORAL at 08:08

## 2025-03-12 RX ADMIN — ACETAMINOPHEN 650 MG: 325 TABLET, FILM COATED ORAL at 00:11

## 2025-03-12 RX ADMIN — MEMANTINE 10 MG: 10 TABLET ORAL at 08:08

## 2025-03-12 RX ADMIN — METHOCARBAMOL 500 MG: 500 TABLET ORAL at 08:08

## 2025-03-12 RX ADMIN — HYDROXYZINE HYDROCHLORIDE 10 MG: 10 TABLET, FILM COATED ORAL at 00:10

## 2025-03-12 RX ADMIN — SODIUM CHLORIDE, PRESERVATIVE FREE 10 ML: 5 INJECTION INTRAVENOUS at 00:12

## 2025-03-12 RX ADMIN — SEVELAMER CARBONATE 1600 MG: 800 TABLET, FILM COATED ORAL at 08:09

## 2025-03-12 RX ADMIN — METHOCARBAMOL 500 MG: 500 TABLET ORAL at 00:10

## 2025-03-12 RX ADMIN — GABAPENTIN 300 MG: 300 CAPSULE ORAL at 08:08

## 2025-03-12 RX ADMIN — ACETAMINOPHEN 650 MG: 325 TABLET, FILM COATED ORAL at 10:53

## 2025-03-12 RX ADMIN — MEMANTINE 10 MG: 10 TABLET ORAL at 00:11

## 2025-03-12 RX ADMIN — CARVEDILOL 6.25 MG: 6.25 TABLET, FILM COATED ORAL at 08:08

## 2025-03-12 RX ADMIN — METHOCARBAMOL 500 MG: 500 TABLET ORAL at 12:51

## 2025-03-12 ASSESSMENT — PAIN DESCRIPTION - ORIENTATION: ORIENTATION: MID

## 2025-03-12 ASSESSMENT — LIFESTYLE VARIABLES
HOW OFTEN DO YOU HAVE A DRINK CONTAINING ALCOHOL: NEVER
HOW MANY STANDARD DRINKS CONTAINING ALCOHOL DO YOU HAVE ON A TYPICAL DAY: PATIENT DOES NOT DRINK

## 2025-03-12 ASSESSMENT — ENCOUNTER SYMPTOMS
NAUSEA: 0
DIARRHEA: 0
VOMITING: 0
SHORTNESS OF BREATH: 0

## 2025-03-12 ASSESSMENT — PAIN - FUNCTIONAL ASSESSMENT: PAIN_FUNCTIONAL_ASSESSMENT: 0-10

## 2025-03-12 ASSESSMENT — PAIN DESCRIPTION - PAIN TYPE: TYPE: ACUTE PAIN

## 2025-03-12 ASSESSMENT — PAIN SCALES - GENERAL
PAINLEVEL_OUTOF10: 9
PAINLEVEL_OUTOF10: 3
PAINLEVEL_OUTOF10: 3

## 2025-03-12 ASSESSMENT — PAIN DESCRIPTION - FREQUENCY: FREQUENCY: CONTINUOUS

## 2025-03-12 ASSESSMENT — PAIN DESCRIPTION - LOCATION: LOCATION: BACK

## 2025-03-12 NOTE — CARE COORDINATION
UPDATE: MARK spoke with Marta with [facility] who reports that the pt's insurance has denied the [facility] wound vac. Marta is unable to determine if they denied due to being out of network with insurance or if the insurance company denied due to lack of medical necessity. Marta reports the insurance company would not provide that clarifying information. Marta shares that the pt's insurance is in network with [employer] [phone] and spoke with Kirstin who reports they are in network with pt's insurance and Kirstin sent required documentation to be completed by surgical team for approval. MARK has passed this along to surgical team. MARK will continue to follow for discharge planning.  Electronically signed by JULIAN Rivera on 3/12/2025 at 12:29 PM  [phone]    MARK following: MARK verified that insurance approval for wound vac is still pending as of this AM. MARK will continue to follow for discharge planning.  Electronically signed by JULIAN Rivera on 3/12/2025 at 8:18 AM  [phone]

## 2025-03-12 NOTE — PROGRESS NOTES
Hospital Medicine Progress Note      Date of Admission: 3/7/2025  Hospital Day: 6    Chief Admission Complaint:  AV fistula hematoma     Subjective:  Patient is without complaints. No adverse interval events.      Presenting Admission History:       Scottie Chiang is a 54 y.o. female who presents with RUE hematoma after recent AV fistula creation. Specificially, R brachiobasilic AV fistula creation. Hematoma has been evacuated and wound vac in place. IM consulted for medical management. Will follow till discharge.     Assessment/Plan:      Current Principal Problem:  <principal problem not specified>    Recent AVF creation with hematoma s/p hematoma evacuation  - wound vac to RUE   - awaiting paperwork related to [place] prior to discharge  - continue HD per nephology  - medically cleared for discharge from IM standpoint     Chronic:  Anemia - macrocytic, f/u B12/MMA/folate  HTN  CHF    Physical Exam Performed:      General: NAD  Eyes: EOMI  ENT: neck supple  Cardiovascular: Regular rate.  Respiratory: Clear to auscultation  Gastrointestinal: Soft, non tender  Genitourinary: no suprapubic tenderness  Musculoskeletal: No edema  Skin: warm, dry  Neuro: Alert.  Psych: Mood appropriate.     /65   Pulse 73   Temp 98.1 °F (36.7 °C) (Oral)   Resp 18   Ht 1.549 m (5' 1\")   Wt 92.4 kg (203 lb 11.3 oz)   SpO2 93%   BMI 38.49 kg/m²     Diet: ADULT DIET; Regular; 4 carb choices (60 gm/meal); Low Fat/Low Chol/High Fiber/2 gm Na; Low Potassium (Less than 3000 mg/day); Low Phosphorus (Less than 1000 mg)  DVT Prophylaxis: []PPx LMWH  []SQ Heparin  []IPC/SCDs  []Eliquis  []Xarelto  []Coumadin  []Other -      Code status: Full Code  PT/OT Eval Status:   []NOT yet ordered  []Ordered and Pending   []Seen with Recommendations for:  []Home independently  []Home w/ assist  []HHC  []SNF  []Acute Rehab    Anticipated Discharge Day/Date: per primary    Anticipated Discharge Location: []Home  []HHC  []SNF  []Acute Rehab  []ECF  []LTAC

## 2025-03-12 NOTE — PROGRESS NOTES
Occupational Therapy  Facility/Department: [facility]  Occupational Therapy Initial Assessment    Name: Scottie Chiang  : 1970  MRN: [id number]  Date of Service: 3/12/2025    Discharge Recommendations:  Subacute/Skilled Nursing Facility  OT Equipment Recommendations  Equipment Needed: No       Patient Diagnosis(es): The encounter diagnosis was Hemorrhage of arteriovenous fistula, initial encounter.  Past Medical History:  has a past medical history of Arthritis, Diabetes mellitus (HCC), Diastolic CHF (HCC), End stage renal disease (HCC), Hemodialysis patient, History of blood transfusion, Hyperlipidemia, Hypertension, On home O2, JEAN (obstructive sleep apnea), and Seizures ([facility]).  Past Surgical History:  has a past surgical history that includes Dialysis Catheter Insertion (N/A, 2021); Dialysis Catheter Removal (N/A, 2021); IR TUNNELED CVC PLACE WO SQ PORT/PUMP > 5 YEARS (11/15/2021); eye surgery (Bilateral, ); Cataract removal (Bilateral);  section; Toe amputation (Left); Dialysis Catheter Insertion (N/A, 2022); US ASP ABSCESS/HEMATOMA/BULLA/CYST (2022); Dialysis Catheter Removal (N/A, 2022); IR TUNNELED CVC PLACE WO SQ PORT/PUMP > 5 YEARS (2022); IR TUNNELED CVC PLACE WO SQ PORT/PUMP > 5 YEARS (2022); IR TUNNELED CVC PLACE WO SQ PORT/PUMP > 5 YEARS (2022); Dialysis fistula creation (Right, 2024); invasive vascular (N/A, 2024); invasive vascular (N/A, 2024); invasive vascular (N/A, 10/23/2024); joint replacement (Right); Dialysis fistula creation (Right, 3/7/2025); and Dialysis fistula creation (Right, 3/7/2025).    Treatment Diagnosis: imp mob, tf, ADL      Assessment  Performance deficits / Impairments: Decreased functional mobility ;Decreased ADL status;Decreased endurance  Assessment: From home with family. Admit with hematoma. Pt completing mobility and transfers with Ax2 this date unsteady on her feet. Improved with

## 2025-03-12 NOTE — PROGRESS NOTES
Physical Therapy  Facility/Department: [address]  Physical Therapy Initial Assessment/Treatment    Name: Scottie Chiang  : 1970  MRN: [id number]  Date of Service: 3/12/2025    Discharge Recommendations:  Subacute/Skilled Nursing Facility   PT Equipment Recommendations  Equipment Needed: No       Patient Diagnosis(es): The encounter diagnosis was Hemorrhage of arteriovenous fistula, initial encounter.  Past Medical History:  has a past medical history of Arthritis, Diabetes mellitus (HCC), Diastolic CHF (HCC), End stage renal disease (HCC), Hemodialysis patient, History of blood transfusion, Hyperlipidemia, Hypertension, On home O2, JEAN (obstructive sleep apnea), and Seizures ([facility]).  Past Surgical History:  has a past surgical history that includes Dialysis Catheter Insertion (N/A, 2021); Dialysis Catheter Removal (N/A, 2021); IR TUNNELED CVC PLACE WO SQ PORT/PUMP > 5 YEARS (11/15/2021); eye surgery (Bilateral, ); Cataract removal (Bilateral);  section; Toe amputation (Left); Dialysis Catheter Insertion (N/A, 2022); US ASP ABSCESS/HEMATOMA/BULLA/CYST (2022); Dialysis Catheter Removal (N/A, 2022); IR TUNNELED CVC PLACE WO SQ PORT/PUMP > 5 YEARS (2022); IR TUNNELED CVC PLACE WO SQ PORT/PUMP > 5 YEARS (2022); IR TUNNELED CVC PLACE WO SQ PORT/PUMP > 5 YEARS (2022); Dialysis fistula creation (Right, 2024); invasive vascular (N/A, 2024); invasive vascular (N/A, 2024); invasive vascular (N/A, 10/23/2024); joint replacement (Right); Dialysis fistula creation (Right, 3/7/2025); and Dialysis fistula creation (Right, 3/7/2025).    Assessment  Assessment: 55 yo seen for PT eval.  Pt with dc order in for today.  Pt nearly falling off bed upon PT arrival to room (was reaching to  object from floor).  Demo very unsteady gait requiring A of 2.  Gait was somewhat steadier with use of walker but still required A to stay upright.  Has 4

## 2025-03-12 NOTE — CARE COORDINATION
Case Management Assessment            Discharge Note                    Date / Time of Note: 3/12/2025 1:57 PM                  Discharge Note Completed by: JULIAN Rivera    Patient Name: Scottie Chiang   YOB: 1970  Diagnosis: Hematoma [T14.8XXA]  Hemorrhage of arteriovenous fistula, initial encounter [T82.838A]   Date / Time: 3/7/2025  3:24 PM    Current PCP: Unknown, Provider, ANP  Clinic patient: No    Hospitalization in the last 30 days: No       Advance Directives:  Code Status: Full Code  [place] DNR form completed and on chart: Not Indicated    Financial:  Payor: [employer] [place] / Plan: [employer] [place] DUAL / Product Type: *No Product type* /      Pharmacy:    [place] [id number] [facility] [phone] - [facility] [phone]  [facility] [address]  Phone: [phone] Fax: [phone]      Assistance purchasing medications?: Potential Assistance Purchasing Medications: No  Assistance provided by Case Management: None at this time    Does patient want to participate in local refill/ meds to beds program?: Yes    Meds To Beds General Rules:  1. Can ONLY be done Monday- Friday between 8:30am-5pm  2. Prescription(s) must be in pharmacy by 3pm to be filled same day  3.Copy of patient's insurance/ prescription drug card and patient face sheet must be sent along with the prescription(s)  4. Cost of Rx cannot be added to hospital bill. If financial assistance is needed, please contact unit  or ;  or  CANNOT provide pharmacy voucher for patients co-pays  5. Patients can then  the prescription on their way out of the hospital at discharge, or pharmacy can deliver to the bedside if staff is available. (payment due at time of pick-up or delivery - cash, check, or card accepted)     Able to afford home medications/ co-pay costs: Yes    ADLS:  Current PT AM-PAC Score:   /24  Current OT AM-PAC  discharge.    COVID Result:    Lab Results   Component Value Date/Time    COVID19 NOT DETECTED 02/23/2024 02:54 AM       The Plan for Transition of Care is related to the following treatment goals of Hematoma [T14.8XXA]  Hemorrhage of arteriovenous fistula, initial encounter [T82.838A]    The Patient and/or patient representative Scottie and her family were provided with a choice of provider and agrees with the discharge plan Yes    Freedom of choice list was provided with basic dialogue that supports the patient's individualized plan of care/goals and shares the quality data associated with the providers. Yes    Care Transitions patient: No    JULIAN Rivera  [facility]  Case Management Department  Ph: [phone]  Fax: [phone]

## 2025-03-12 NOTE — PROGRESS NOTES
Occupational / Physical Therapy  Off floor   Leaving floor for dialysis. Will follow up later this date as treatment schedule allows.   Update PM- pt just returned from dialysis and eating meal. Will attempt back later as schedule allows.     Romi Mora, MOT, OTR/L, CNS  Fidelina Merlos, PT [id number]

## 2025-03-12 NOTE — PROGRESS NOTES
Nephrology Progress Note                                                                                                                                                                                                                                                                                                                                                               Office : [phone]     Fax :[phone]    Patient's Name: Scottie Chiang  10:58 AM  3/12/2025    Reason for Consult:  ESRD, hyperkalemia   Requesting Physician:  Unknown, Provider, ANP  Chief Complaint:    Chief Complaint   Patient presents with    Post-op Problem     Pt had right arm fistula placement at 1200 today. She came back for bleeding from fistula placement       Assessment/Plan     # ESRD   HD TODAY - Seen on HD machine   Dialyzes every MWF outpatient   Recent AVF placement and hematoma  S/p RIGHT UPPER EXTREMITY HEMATOMA EVACUATION. TDC in place  Renally dose meds for ESRD  Monitor renal labs     # Severe hyperkalemia - Resolved  Low K diet   Monitor BMP     # HTN  Variable   Continue Coreg & Torsemide   Monitor    # Anemia of CKD   EPO during HD   Monitor     # AVF hematoma   S/p hematoma evacuation   Vascular following   Pain control per vascular     # MBD  Resumed Sevelamer   Low phos diet         History of Present Ilness:    Scottie Chiang is a 54 y.o. female with    DM, ESRD, HLD, HTn. Surgical history significant for Multiple AV fistula and AV graft creations. She underwent Right brachiobasilic AV fistula creation today and represents now for bleeding.  She denied any light headedness, dizziness, SOB, or CP.    Pt was in the OR for hematoma evacuation.   K was 7.4 - pt had urgent HD 2 hours with resolution of hyper-K     Interval hx:     - Seen on HD machine   Resting in bed  Doing OK  BP's improved  Labs pending this AM      Past Medical History:   Diagnosis Date    Arthritis     Diabetes mellitus (HCC)     Diastolic CHF (HCC)

## 2025-03-12 NOTE — PLAN OF CARE
Problem: Discharge Planning  Goal: Discharge to home or other facility with appropriate resources  Outcome: Progressing  Flowsheets (Taken 3/11/2025 2003)  Discharge to home or other facility with appropriate resources: Identify barriers to discharge with patient and caregiver     Problem: Pain  Goal: Verbalizes/displays adequate comfort level or baseline comfort level  Outcome: Progressing  Flowsheets  Taken 3/12/2025 0015  Verbalizes/displays adequate comfort level or baseline comfort level:   Encourage patient to monitor pain and request assistance   Assess pain using appropriate pain scale  Taken 3/11/2025 2003  Verbalizes/displays adequate comfort level or baseline comfort level:   Encourage patient to monitor pain and request assistance   Assess pain using appropriate pain scale     Problem: Safety - Adult  Goal: Free from fall injury  Outcome: Progressing  Flowsheets (Taken 3/12/2025 0248)  Free From Fall Injury: Instruct family/caregiver on patient safety     Problem: Chronic Conditions and Co-morbidities  Goal: Patient's chronic conditions and co-morbidity symptoms are monitored and maintained or improved  Outcome: Progressing  Flowsheets (Taken 3/11/2025 2003)  Care Plan - Patient's Chronic Conditions and Co-Morbidity Symptoms are Monitored and Maintained or Improved: Monitor and assess patient's chronic conditions and comorbid symptoms for stability, deterioration, or improvement     Problem: Skin/Tissue Integrity - Adult  Goal: Incisions, wounds, or drain sites healing without S/S of infection  Outcome: Progressing  Flowsheets  Taken 3/12/2025 0248  Incisions, Wounds, or Drain Sites Healing Without Sign and Symptoms of Infection: ADMISSION and DAILY: Assess and document risk factors for pressure ulcer development  Taken 3/11/2025 2003  Incisions, Wounds, or Drain Sites Healing Without Sign and Symptoms of Infection: ADMISSION and DAILY: Assess and document risk factors for pressure ulcer development

## 2025-03-12 NOTE — PLAN OF CARE
Problem: Discharge Planning  Goal: Discharge to home or other facility with appropriate resources  3/12/2025 1010 by Bobo Panchal RN  Outcome: Progressing  3/12/2025 0249 by Valeria Moctezuma RN  Outcome: Progressing  Flowsheets (Taken 3/11/2025 2003)  Discharge to home or other facility with appropriate resources: Identify barriers to discharge with patient and caregiver     Problem: Pain  Goal: Verbalizes/displays adequate comfort level or baseline comfort level  3/12/2025 1010 by Bobo Panchal RN  Outcome: Progressing  3/12/2025 0249 by Valeria Moctezuma RN  Outcome: Progressing  Flowsheets  Taken 3/12/2025 0015  Verbalizes/displays adequate comfort level or baseline comfort level:   Encourage patient to monitor pain and request assistance   Assess pain using appropriate pain scale  Taken 3/11/2025 2003  Verbalizes/displays adequate comfort level or baseline comfort level:   Encourage patient to monitor pain and request assistance   Assess pain using appropriate pain scale     Problem: Safety - Adult  Goal: Free from fall injury  3/12/2025 1010 by Bobo Panchal RN  Outcome: Progressing  3/12/2025 0249 by Valeria Moctezuma RN  Outcome: Progressing  Flowsheets (Taken 3/12/2025 0248)  Free From Fall Injury: Instruct family/caregiver on patient safety     Problem: Chronic Conditions and Co-morbidities  Goal: Patient's chronic conditions and co-morbidity symptoms are monitored and maintained or improved  3/12/2025 0249 by Valeria Moctezuma RN  Outcome: Progressing  Flowsheets (Taken 3/11/2025 2003)  Care Plan - Patient's Chronic Conditions and Co-Morbidity Symptoms are Monitored and Maintained or Improved: Monitor and assess patient's chronic conditions and comorbid symptoms for stability, deterioration, or improvement     Problem: Skin/Tissue Integrity - Adult  Goal: Incisions, wounds, or drain sites healing without S/S of infection  3/12/2025 0249 by Valeria Moctezuma, RN  Outcome: Progressing  Flowsheets  Taken 3/12/2025 0248  Incisions,  Wounds, or Drain Sites Healing Without Sign and Symptoms of Infection: ADMISSION and DAILY: Assess and document risk factors for pressure ulcer development  Taken 3/11/2025 2003  Incisions, Wounds, or Drain Sites Healing Without Sign and Symptoms of Infection: ADMISSION and DAILY: Assess and document risk factors for pressure ulcer development

## 2025-03-12 NOTE — PROGRESS NOTES
Vascular Surgery  Progress Note      Procedure(s) Performed: right upper extremity hematoma evacuation    Subjective:   Reports she didn't get any sleep last night but is now agreeable to being discharged with wound vac. Still pending approval.     Objective:  Vitals:   Vitals:    03/12/25 0015 03/12/25 0400 03/12/25 0600 03/12/25 0807   BP: 110/62 105/64  107/65   Pulse: 64 71  73   Resp: 20 18  18   Temp: 98.2 °F (36.8 °C) 98.4 °F (36.9 °C)  98.1 °F (36.7 °C)   TempSrc: Oral Oral  Oral   SpO2: 98% 99%  93%   Weight:   92.4 kg (203 lb 11.3 oz)    Height:           Physical Exam:  General appearance: alert, no acute distress  Chest/Lungs: normal effort with no accessory muscle use on RA, left chest hemodialysis catheter in place  Cardiovascular:  well perfused  Skin: warm and dry, no rashes  Extremities: RUE incisions c/d/I with optifoam in place, minimal strike through, wound vac in place with good seal and suction. appropriately tender to palpation. Radial and brachial pulses palpable, palpable faint thrill of AVF. Sensation in tact to RUE.   Neuro: alert, no focal deficits, sensation intact, motor intact    Assessment and Plan  This is a 54 y.o. year old female status post right upper extremity hematoma evacuation secondary to post op bleeding after Right brachiobasilic AV fistula creation today. (3/7)    Continue [facility]  Nephrology following for HD; last HD session 3/12  Dispo pending [place] paperwork approval  hopefully today    Charu Cano CNP  General Surgery

## 2025-03-12 NOTE — PROGRESS NOTES
Treatment time: 2.5 hours  Net UF: +147 ml     Pre weight: 92.4 kg  Post weight:92.5 kg    Access used: LCVC    Access function: well with  ml/min     Medications or blood products given: heparin dwells       Summary of response to treatment: Patient tolerated treatment well and without any complications. Patient remained stable throughout entire treatment and upon the exiting the dialysis suite via transport.     Report given to Bobo Panchal RN and copy of dialysis treatment record placed in chart, to be scanned into EMR.

## 2025-03-12 NOTE — PROGRESS NOTES
Home wound vac not approved from insurance. Wound vac removed and Aqu[employer]el [employer] ribbon placed in wound and covered with mepliex. Patient denies need for HHC stating she feels comfortable changing herself and if she were to need help her mother would assist. Patient able to verbalize steps of how to change dressing. Dressing supplies given to patient for discharge. Patient denies need for pain medication at discharge stating she has \"lots of medications at home.\" No additional medications prescribed. All questions answered to patient's satisfaction.

## 2025-03-12 NOTE — ED PROVIDER NOTES
THE [facility]  EMERGENCY DEPARTMENT ENCOUNTER          ATTENDING PHYSICIAN NOTE       Date of evaluation: 3/12/2025    Chief Complaint     Fatigue (Pt presents to the Ed for complaints of weakness and bleeding from fistula site. Pt reports having gotten new fistula on Friday and was discharged today. Pt today was getting out of car and her knees buckled and she was unable to get back up so called the squad. )      History of Present Illness     Scottie Chiang is a 54 y.o. female with a history of ESRD on hemodialysis who presents with complaints of generalized weakness and fatigue after finishing her dialysis today to the point where she collapsed without injury in the parking lot under her own weight.  She just had her fistula redone about 5 days ago and was in the hospital until completing dialysis today and was offered rehab because of the generalized weakness but declined.  She states that she usually gets very weak and fatigued following dialysis and this is not unusual for her to feel this way.    ASSESSMENT / PLAN  (MEDICAL DECISION MAKING)     INITIAL VITALS: BP: (!) 102/41, Temp: 98.6 °F (37 °C), Pulse: 76, Respirations: 15, SpO2: 91 %      Scottie Chiang is a 54 y.o. female with a history of ESRD on hemodialysis here with generalized weakness and fatigue following dialysis today which was done in the hospital because of a redo of her fistula and evacuation of hematoma that was performed this past week.  She was released from the hospital today following dialysis and offered rehab but declined.  Here the patient is extremely somnolent and weak in general but she states that this is her baseline and that she just \"tanks\" after dialysis sessions.  Lab evaluation confirm stability of both her CBC and renal profiles.  She was requiring 2 to 3 L of oxygen but ultimately told me that she does have oxygen at home that she can use.  ED observation with consultation with social work was again offered to her for the

## 2025-03-13 ENCOUNTER — TELEPHONE (OUTPATIENT)
Dept: VASCULAR SURGERY | Age: 55
End: 2025-03-13

## 2025-03-13 LAB — METHYLMALONATE SERPL-SCNC: 0.36 UMOL/L (ref 0–0.4)

## 2025-03-13 NOTE — TELEPHONE ENCOUNTER
Spoke with Ms. Mariia and she states she just extremely weak that yesterday after she was discharged she was getting out of car and her knees buckled and was just laying there. She called 911 and EMT to her back to hospital and then after a few hours left AMA and went home. She is currently at home and trying to build her strength. She is not due to change her dressing on her arm till Sat. Will f/u with her tomorrow.

## 2025-03-14 NOTE — DISCHARGE SUMMARY
Physician Discharge Summary     Patient ID:  Scottie Chiang  [id number]  55 y.o.  1970    Admit date: 3/7/2025    Discharge date and time: 3/12/2025  3:02 PM     Admitting Physician: Valeria Kumar MD     Discharge Physician: same    Admission Diagnoses: Hematoma [T14.8XXA]  Hemorrhage of arteriovenous fistula, initial encounter [T82.838A]    Discharge Diagnoses: same    Admission Condition: stable    Discharged Condition: stable    Indication for Admission: surgery     Hospital Course: underwent RIGHT UPPER EXTREMITY HEMATOMA EVACUATION om 03/07/25.  Nephro was consulted for HD management, underwent HD on 03/08 and had complaints of pain after HD. IM was consulted on 03/09 for medical management while inpatient. Underwent HD on 03/10 and wound vac was applied. 3/11 awaiting for wound vac to be approved. 03/12 Underwent HD. wound vac  not approved. Patient wound dressed with silver ribbon and mepliex. Patient denied need for HHC or Rx at dc. Discharged home with follow up appt and instruction.     Consults: nephrology, IM     Significant Diagnostic Studies:     Treatments: surgery: RIGHT UPPER EXTREMITY HEMATOMA EVACUATION     Discharge Exam:  General appearance: alert, no acute distress  Chest/Lungs: normal effort with no accessory muscle use on RA, left chest hemodialysis catheter in place  Cardiovascular:  well perfused  Skin: warm and dry, no rashes  Extremities: RUE incisions c/d/I with optifoam in place, minimal strike through, wound vac in place with good seal and suction. appropriately tender to palpation. Radial and brachial pulses palpable, palpable faint thrill of AVF. Sensation in tact to RUE.   Neuro: alert, no focal deficits, sensation intact, motor intact    Disposition: home    In process/preliminary results:  Outstanding Order Results       Date and Time Order Name Status Description    3/7/2025  7:47 PM EKG 12 Lead Preliminary     3/7/2025 10:53 AM EKG 12 Lead Preliminary             Patient  Instructions:   Discharge Medication List as of 3/12/2025  2:45 PM        CONTINUE these medications which have NOT CHANGED    Details   oxyCODONE (ROXICODONE) 5 MG immediate release tablet Take 1 tablet by mouth every 6 hours as needed for Pain for up to 5 days. Intended supply: 5 days. Take lowest dose possible to manage pain Max Daily Amount: 20 mg, Disp-20 tablet, R-0Print      !! docusate sodium (COLACE) 100 MG capsule Take 1 capsule by mouth 2 times daily for 10 days, Disp-20 capsule, R-0Print      ondansetron (ZOFRAN) 4 MG tablet Take 1 tablet by mouth 3 times daily as needed for Nausea or Vomiting, Disp-15 tablet, R-0Print      !! methocarbamol (ROBAXIN) 500 MG tablet Take 1 tablet by mouth 2 times daily, Disp-60 tablet, R-0Normal      oxyCODONE-acetaminophen (PERCOCET) 7.5-325 MG per tablet Take 1 tablet by mouth every 6 hours as needed for Pain for up to 28 days. Max Daily Amount: 4 tablets, Disp-112 tablet, R-0Normal      scopolamine (TRANSDERM-SCOP) transdermal patch Place 1 patch onto the skin every 72 hours, Disp-10 patch, R-0Normal      medroxyPROGESTERone (PROVERA) 10 MG tablet Take 1 tablet by mouth dailyHistorical Med      gabapentin (NEURONTIN) 300 MG capsule Take 1 capsule by mouth 2 times daily for 180 days., Disp-180 capsule, R-1Normal      meclizine (ANTIVERT) 12.5 MG tablet TAKE 1 TABLET BY MOUTH 3 TIMES DAILY AS NEEDED FOR DIZZINESS, Disp-90 tablet, R-11Normal      !! docusate sodium (COLACE) 100 MG capsule TAKE 1 CAPSULE BY MOUTH 2 TIMES DAILY, Disp-60 capsule, R-11Normal      atorvastatin (LIPITOR) 80 MG tablet Take 1 tablet by mouth dailyHistorical Med      carvedilol (COREG) 6.25 MG tablet Take 1 tablet by mouth 2 times daily (with meals)Historical Med      !! levETIRAcetam (KEPPRA) 250 MG tablet Take one tablet by mouth three times a week after dialysis on Mondays, Wednesdays, and Fridays (500 mg daily, plus 250 mg on dialysis days).Historical Med      midodrine (PROAMATINE) 10 MG  tablet Take one tablet by mouth as needed for SBP<100 during dialysis. Bring bottle to each dialysis treatment.Historical Med      !! methocarbamol (ROBAXIN) 500 MG tablet Take 1 tablet by mouth dailyHistorical Med      ondansetron (ZOFRAN-ODT) 4 MG disintegrating tablet Take 1 tablet by mouth every 8 hours as needed for Nausea or VomitingHistorical Med      loperamide (IMODIUM) 2 MG capsule Take 1 capsule by mouth 4 times daily as needed for DiarrheaHistorical Med      memantine (NAMENDA) 10 MG tablet Take 1 tablet by mouth 2 times dailyHistorical Med      sevelamer (RENVELA) 800 MG tablet TAKE 2 TABLETS BY MOUTH 3 TIMES DAILY WITH A MEAL, Disp-180 tablet, R-11Normal      torsemide (DEMADEX) 100 MG tablet TAKE 1 TABLET BY MOUTH 2 TIMES DAILY, Disp-60 tablet, R-11Normal      vitamin B-2 (RIBOFLAVIN) 100 MG TABS tablet Take 1 tablet by mouth 2 times dailyHistorical Med      B Complex-C-Folic Acid (DIALYVITE 800) 0.8 MG TABS Take 1 tablet by mouth daily, Disp-90 tablet, R-3Normal      SUMAtriptan (IMITREX) 25 MG tablet Take 1 tablet by mouth once as needed for MigraineHistorical Med      !! levETIRAcetam (KEPPRA) 500 MG tablet Take 1 tablet by mouth daily, Disp-60 tablet, R-3Normal      pantoprazole (PROTONIX) 40 MG tablet Take 1 tablet by mouth every morning (before breakfast), Disp-90 tablet, R-1Normal      naloxone 4 MG/0.1ML LIQD nasal spray 1 spray by Nasal route as needed for Opioid Reversal, Disp-1 each, R-0Normal      CALCITRIOL PO Take one capsule by mouth on dialysis days (MWF)Historical Med       !! - Potential duplicate medications found. Please discuss with provider.            Signed:  Charu Cano CNP  General Surgery    3/14/2025  10:13 AM

## 2025-03-17 ENCOUNTER — TELEPHONE (OUTPATIENT)
Dept: VASCULAR SURGERY | Age: 55
End: 2025-03-17

## 2025-03-17 NOTE — TELEPHONE ENCOUNTER
Patient called regarding her cancelled apt for this Thursday 3/20/25.Was not aware and was requesting call back at [phone]. larissa

## 2025-03-17 NOTE — TELEPHONE ENCOUNTER
Patient called stating she missed a call this morning from our office. There was no voicemail left for patient. There is no message in patient chart.    Thanks,  Robert

## 2025-03-18 ENCOUNTER — TELEPHONE (OUTPATIENT)
Dept: VASCULAR SURGERY | Age: 55
End: 2025-03-18

## 2025-03-18 DIAGNOSIS — Z51.81 ENCOUNTER FOR THERAPEUTIC DRUG MONITORING: ICD-10-CM

## 2025-03-18 DIAGNOSIS — M47.817 LUMBOSACRAL SPONDYLOSIS WITHOUT MYELOPATHY: ICD-10-CM

## 2025-03-18 DIAGNOSIS — M17.11 PRIMARY LOCALIZED OSTEOARTHROSIS OF THE KNEE, RIGHT: ICD-10-CM

## 2025-03-18 DIAGNOSIS — G43.909 EPISODIC MIGRAINE: ICD-10-CM

## 2025-03-18 DIAGNOSIS — M47.812 CERVICAL SPONDYLOSIS: ICD-10-CM

## 2025-03-18 DIAGNOSIS — Z96.651 S/P TOTAL KNEE ARTHROPLASTY, RIGHT: ICD-10-CM

## 2025-03-18 DIAGNOSIS — I50.43 CHF (CONGESTIVE HEART FAILURE), NYHA CLASS I, ACUTE ON CHRONIC, COMBINED (HCC): ICD-10-CM

## 2025-03-18 NOTE — TELEPHONE ENCOUNTER
Ms. Chiang stopped in office today to have her arm looked at. She is s/p right AVF on 3/7 then had to go back to ED for bleeding later that day and had a right upper extremity hematoma evacuation. She was concerned about swelling and the feeling of heaviness. The swelling appeared normal post-op swelling, upper arm is soft to touch no redness, not warm to touch. I did inform her to elevate above heart when sitting, she can use ice packs on/off, she can use tylenol to help with pain. She asked if she could use a sling and I informed her if it made her feel more comfortable she could. I told her to call with any other concerns and I will f/u with later in the week after elevating and ice to she how swelling is.

## 2025-03-19 RX ORDER — METHOCARBAMOL 500 MG/1
500 TABLET, FILM COATED ORAL 2 TIMES DAILY
Qty: 60 TABLET | Refills: 5 | OUTPATIENT
Start: 2025-03-19 | End: 2025-04-18

## 2025-03-21 DIAGNOSIS — G89.4 CHRONIC PAIN SYNDROME: ICD-10-CM

## 2025-03-21 DIAGNOSIS — Z99.2 ESRD (END STAGE RENAL DISEASE) ON DIALYSIS (HCC): ICD-10-CM

## 2025-03-21 DIAGNOSIS — N18.6 ESRD (END STAGE RENAL DISEASE) ON DIALYSIS (HCC): ICD-10-CM

## 2025-03-21 DIAGNOSIS — Z51.81 ENCOUNTER FOR THERAPEUTIC DRUG MONITORING: ICD-10-CM

## 2025-03-21 DIAGNOSIS — M47.812 CERVICAL SPONDYLOSIS: ICD-10-CM

## 2025-03-21 DIAGNOSIS — G43.909 EPISODIC MIGRAINE: ICD-10-CM

## 2025-03-21 DIAGNOSIS — Z96.651 S/P TOTAL KNEE ARTHROPLASTY, RIGHT: ICD-10-CM

## 2025-03-21 DIAGNOSIS — M47.817 LUMBOSACRAL SPONDYLOSIS WITHOUT MYELOPATHY: ICD-10-CM

## 2025-03-21 DIAGNOSIS — M17.11 PRIMARY LOCALIZED OSTEOARTHROSIS OF THE KNEE, RIGHT: ICD-10-CM

## 2025-03-21 RX ORDER — OXYCODONE AND ACETAMINOPHEN 7.5; 325 MG/1; MG/1
1 TABLET ORAL EVERY 6 HOURS PRN
Qty: 112 TABLET | Refills: 0 | OUTPATIENT
Start: 2025-03-21 | End: 2025-04-18

## 2025-03-25 ENCOUNTER — CLINICAL DOCUMENTATION (OUTPATIENT)
Dept: VASCULAR SURGERY | Age: 55
End: 2025-03-25

## 2025-03-25 ENCOUNTER — OFFICE VISIT (OUTPATIENT)
Dept: PAIN MANAGEMENT | Age: 55
End: 2025-03-25
Payer: COMMERCIAL

## 2025-03-25 VITALS
BODY MASS INDEX: 38.55 KG/M2 | DIASTOLIC BLOOD PRESSURE: 79 MMHG | HEART RATE: 73 BPM | SYSTOLIC BLOOD PRESSURE: 117 MMHG | WEIGHT: 204 LBS

## 2025-03-25 DIAGNOSIS — M17.11 PRIMARY LOCALIZED OSTEOARTHROSIS OF THE KNEE, RIGHT: ICD-10-CM

## 2025-03-25 DIAGNOSIS — M47.817 LUMBOSACRAL SPONDYLOSIS WITHOUT MYELOPATHY: Primary | ICD-10-CM

## 2025-03-25 DIAGNOSIS — G89.4 CHRONIC PAIN SYNDROME: ICD-10-CM

## 2025-03-25 DIAGNOSIS — Z99.2 ESRD (END STAGE RENAL DISEASE) ON DIALYSIS (HCC): ICD-10-CM

## 2025-03-25 DIAGNOSIS — N18.6 ESRD (END STAGE RENAL DISEASE) ON DIALYSIS (HCC): ICD-10-CM

## 2025-03-25 DIAGNOSIS — I50.43 CHF (CONGESTIVE HEART FAILURE), NYHA CLASS I, ACUTE ON CHRONIC, COMBINED (HCC): ICD-10-CM

## 2025-03-25 DIAGNOSIS — G43.909 EPISODIC MIGRAINE: ICD-10-CM

## 2025-03-25 DIAGNOSIS — Z51.81 ENCOUNTER FOR THERAPEUTIC DRUG MONITORING: ICD-10-CM

## 2025-03-25 DIAGNOSIS — Z96.651 S/P TOTAL KNEE ARTHROPLASTY, RIGHT: ICD-10-CM

## 2025-03-25 DIAGNOSIS — M47.812 CERVICAL SPONDYLOSIS: ICD-10-CM

## 2025-03-25 DIAGNOSIS — R11.0 CHRONIC NAUSEA: ICD-10-CM

## 2025-03-25 PROCEDURE — 3074F SYST BP LT 130 MM HG: CPT | Performed by: NURSE PRACTITIONER

## 2025-03-25 PROCEDURE — 1036F TOBACCO NON-USER: CPT | Performed by: NURSE PRACTITIONER

## 2025-03-25 PROCEDURE — 1111F DSCHRG MED/CURRENT MED MERGE: CPT | Performed by: NURSE PRACTITIONER

## 2025-03-25 PROCEDURE — G8417 CALC BMI ABV UP PARAM F/U: HCPCS | Performed by: NURSE PRACTITIONER

## 2025-03-25 PROCEDURE — 3078F DIAST BP <80 MM HG: CPT | Performed by: NURSE PRACTITIONER

## 2025-03-25 PROCEDURE — 99214 OFFICE O/P EST MOD 30 MIN: CPT | Performed by: NURSE PRACTITIONER

## 2025-03-25 PROCEDURE — G8427 DOCREV CUR MEDS BY ELIG CLIN: HCPCS | Performed by: NURSE PRACTITIONER

## 2025-03-25 PROCEDURE — 3017F COLORECTAL CA SCREEN DOC REV: CPT | Performed by: NURSE PRACTITIONER

## 2025-03-25 RX ORDER — OXYCODONE AND ACETAMINOPHEN 10; 325 MG/1; MG/1
1 TABLET ORAL EVERY 6 HOURS PRN
Qty: 112 TABLET | Refills: 0 | Status: SHIPPED | OUTPATIENT
Start: 2025-03-25 | End: 2025-04-22

## 2025-03-25 NOTE — PROGRESS NOTES
Ms. Chiang was in office today to have incision looked at, it was cleansed and redressed. States swelling has gone down a little. Is firm to touch in upper arm, c/o heaviness. She would like a sling for arm to help alleviate the heaviness feeling. Will send message to Dr. Kumar.

## 2025-03-25 NOTE — PROGRESS NOTES
(NEURONTIN) 300 MG capsule Take 1 capsule by mouth 2 times daily for 180 days. 180 capsule 1    meclizine (ANTIVERT) 12.5 MG tablet TAKE 1 TABLET BY MOUTH 3 TIMES DAILY AS NEEDED FOR DIZZINESS 90 tablet 11    docusate sodium (COLACE) 100 MG capsule TAKE 1 CAPSULE BY MOUTH 2 TIMES DAILY 60 capsule 11    atorvastatin (LIPITOR) 80 MG tablet Take 1 tablet by mouth daily      carvedilol (COREG) 6.25 MG tablet Take 1 tablet by mouth 2 times daily (with meals)      levETIRAcetam (KEPPRA) 250 MG tablet Take one tablet by mouth three times a week after dialysis on Mondays, Wednesdays, and Fridays (500 mg daily, plus 250 mg on dialysis days).      midodrine (PROAMATINE) 10 MG tablet Take one tablet by mouth as needed for SBP<100 during dialysis. Bring bottle to each dialysis treatment.      methocarbamol (ROBAXIN) 500 MG tablet Take 1 tablet by mouth daily      ondansetron (ZOFRAN-ODT) 4 MG disintegrating tablet Take 1 tablet by mouth every 8 hours as needed for Nausea or Vomiting      loperamide (IMODIUM) 2 MG capsule Take 1 capsule by mouth 4 times daily as needed for Diarrhea      memantine (NAMENDA) 10 MG tablet Take 1 tablet by mouth 2 times daily      sevelamer (RENVELA) 800 MG tablet TAKE 2 TABLETS BY MOUTH 3 TIMES DAILY WITH A MEAL 180 tablet 11    torsemide (DEMADEX) 100 MG tablet TAKE 1 TABLET BY MOUTH 2 TIMES DAILY 60 tablet 11    vitamin B-2 (RIBOFLAVIN) 100 MG TABS tablet Take 1 tablet by mouth 2 times daily      B Complex-C-Folic Acid (DIALYVITE 800) 0.8 MG TABS Take 1 tablet by mouth daily 90 tablet 3    SUMAtriptan (IMITREX) 25 MG tablet Take 1 tablet by mouth once as needed for Migraine      levETIRAcetam (KEPPRA) 500 MG tablet Take 1 tablet by mouth daily 60 tablet 3    pantoprazole (PROTONIX) 40 MG tablet Take 1 tablet by mouth every morning (before breakfast) 90 tablet 1    naloxone 4 MG/0.1ML LIQD nasal spray 1 spray by Nasal route as needed for Opioid Reversal 1 each 0    CALCITRIOL PO Take one capsule

## 2025-03-26 ENCOUNTER — TELEPHONE (OUTPATIENT)
Dept: VASCULAR SURGERY | Age: 55
End: 2025-03-26

## 2025-03-26 NOTE — TELEPHONE ENCOUNTER
Late entry:  spoke with Dr. Kumar yesterday 3/25 and informed her of Ms. Chiang incision and pic was sent to her. We are setting up for wound care to go see here and do dressing changes. I spoke with [facility] and they are going to try and get someone out this week but if not it would be the first of next week. Orders were faxed to [facility] today 3/26 for collagen and mepilex every 2-3days. Savana with [facility] will call me and let me know if they are able to get out this week or not. If they are unable to till first of the week then I will have Ms. Chiang come to office and I will dress wound till [facility] came come. LVM today 3/26 for Ms. Chiang informing her of this and I would call her when I heard back from [facility].

## 2025-03-27 ENCOUNTER — TELEPHONE (OUTPATIENT)
Dept: VASCULAR SURGERY | Age: 55
End: 2025-03-27

## 2025-03-27 NOTE — TELEPHONE ENCOUNTER
Ms. Chiang was in office today and dressing placed on right incision opening. Collagen placed in wound bed and covered with mepilex. C to see on Monday or Tuesday.

## 2025-03-31 ENCOUNTER — TELEPHONE (OUTPATIENT)
Dept: VASCULAR SURGERY | Age: 55
End: 2025-03-31

## 2025-03-31 NOTE — TELEPHONE ENCOUNTER
Spoke with Rohith at [facility] to f/u on appt for Ms. Chiang. They will send someone out tomorrow. I informed them that Ms. Chiang has appt with us and we will need to see wound and change dressing. He is switching her start date to thurs 4/3 since we will put a fresh dressing on.

## 2025-04-01 ENCOUNTER — OFFICE VISIT (OUTPATIENT)
Dept: VASCULAR SURGERY | Age: 55
End: 2025-04-01

## 2025-04-01 VITALS
HEIGHT: 61 IN | WEIGHT: 204 LBS | DIASTOLIC BLOOD PRESSURE: 70 MMHG | SYSTOLIC BLOOD PRESSURE: 132 MMHG | BODY MASS INDEX: 38.51 KG/M2

## 2025-04-01 DIAGNOSIS — N18.6 ESRD (END STAGE RENAL DISEASE) (HCC): Primary | ICD-10-CM

## 2025-04-02 NOTE — OP NOTE
Operative Note      Patient: Scottie Chiang  YOB: 1970  MRN: [id number]    Date of Procedure: 3/7/2025    Pre-Op Diagnosis Codes:      * End stage renal disease (HCC) [N18.6]    Post-Op Diagnosis: Same       Procedure(s):  RIGHT ARTERIOVENOUS FISTULA CREATION:  BRACHIOBASILIC    Surgeon(s):  Valeria Kumar MD    Assistant:   Surgical Assistant: Gerson Castillo  Resident: Altaf Jones DO    Anesthesia: General    Estimated Blood Loss (mL): less than 100     Complications: None    Specimens:   * No specimens in log *    Implants:  * No implants in log *      Drains:   Negative Pressure Wound Therapy Arm Right;Upper;Anterior (Active)   Dressing Status Intact w/seal 03/11/25 2003   Canister changed? No 03/11/25 2003   Unit Type ULTA WOUND VAC 03/11/25 2003   Mode Continuous 03/11/25 2003   Target Pressure (mmHg) 125 03/11/25 2003   Dressing Type Black foam 03/11/25 2003       Findings:  Infection Present At Time Of Surgery (PATOS) (choose all levels that have infection present):  No infection present      Detailed Description of Procedure:     The patient was taken to the operating room and placed in the supine position with the right arm outstretched, and prepped and draped in the usual sterile fashion using Betadine solution.  Time-out was called and the patient and operative site were appropriately identified.  Patient was given IV antibiotics prior to the incision.  A longitudinal incision was made on the medial aspect of the right upper arm from the elbow to the axilla.  Tissue was dissected down through the subcutaneous tissue with electrocautery.  Basilic vein was identified and mobilized for the length of the incision.  All side branches were ligated with a combination of 4-0 silk sutures and 2-0 silk sutures and transected.   The fascial sheath overlying the neurovascular bundle was opened longitudinally and the distal brachial artery was identified.  The artery was identified and dissected for

## 2025-04-02 NOTE — PROGRESS NOTES
2025    Scottie Chiang (:  1970) is a 55 y.o. female,Established patient, here for evaluation of the following chief complaint(s):  Post-Op Check ( 1st post-op s/p right AVF)        ASSESSMENT/PLAN:  1. ESRD (end stage renal disease) (HCC)    Development is status post a new right brachiobasilic AV fistula on 3/7/2025, complicated by hematoma requiring exploration.  She left AGAINST MEDICAL ADVICE and without a wound VAC as recommended.  Since surgery and discharge, she has had no wound care though I recommended this.  Home health nurses are apparently scheduled to come in on Thursday, per the patient.  She complains of pain.  She still has some residual swelling and a Tubigrip was applied.  A collagen dressing was applied to the arm wound and instructions will be sent to home health.  I will see her back in 1 to 2 weeks.      SUBJECTIVE/OBJECTIVE:  Scottie is s/p right brachiobasilic AVF 3/7/2025.  This was complicated by hematoma, requiring exploration and evacuation of the hematoma on the same day.  At that time, a wound VAC was placed to allow further drainage of the arm.  However, after a few days, she left AMA and refused the wound VAC.  She was seen in the office this past week and I tried to get her set up for home health but she tells me today that still no home health has arrived and she has not been treating the wound at all.  She has not been dressing it or applying any wound care products.    On examination, the right upper arm remains swollen but softer.  The incision line is intact.  She has a distal incisional surgical wound measuring approximately 5 cm x 3 cm x 3 cm.  There is exudate and small amount of granulation.  There is a palpable thrill in the fistula.    Physical Exam  Vitals:    25 1421   BP: 132/70   BP Site: Left Upper Arm   Patient Position: Sitting   Weight: 92.5 kg (204 lb)   Height: 1.549 m (5' 1\")         An electronic signature was used to authenticate this

## 2025-04-08 ENCOUNTER — OFFICE VISIT (OUTPATIENT)
Dept: VASCULAR SURGERY | Age: 55
End: 2025-04-08

## 2025-04-08 VITALS
DIASTOLIC BLOOD PRESSURE: 70 MMHG | BODY MASS INDEX: 38.89 KG/M2 | HEIGHT: 61 IN | TEMPERATURE: 97.1 F | SYSTOLIC BLOOD PRESSURE: 112 MMHG | WEIGHT: 206 LBS | OXYGEN SATURATION: 97 % | HEART RATE: 68 BPM

## 2025-04-08 DIAGNOSIS — N18.6 ESRD (END STAGE RENAL DISEASE) (HCC): Primary | ICD-10-CM

## 2025-04-08 PROCEDURE — 99024 POSTOP FOLLOW-UP VISIT: CPT | Performed by: SURGERY

## 2025-04-08 NOTE — PROGRESS NOTES
2025    Scottie Chiang (:  1970) is a 55 y.o. female,Established patient, here for evaluation of the following chief complaint(s):  Follow-up (1w f/u)        ASSESSMENT/PLAN:  1. ESRD (end stage renal disease) (HCC)  She is doing much better.    Continue collagen dressing and tubigrip.  Follow up in 2-3 weeks.     SUBJECTIVE/OBJECTIVE:  POV#3:   Scottie is s/p right brachiobasilic AVF 3/7/2025, complicated by hematoma requiring exploration and evacuation of the hematoma.  No complaints today.  Arm is feeling better and she says wound is improving with the collagen dressings.     On examination, the right upper arm swelling is improved.  The incision line is intact.  She has a distal incisional surgical wound measuring approximately 5 cm x 1.5 cm x 0.2 cm.  There is exudate and small amount of granulation.  There is a palpable thrill in the fistula. +strong bruit    Physical Exam  Vitals:    25 1438   BP: 112/70   BP Site: Left Upper Arm   Patient Position: Sitting   Pulse: 68   Temp: 97.1 °F (36.2 °C)   TempSrc: Infrared   SpO2: 97%   Weight: 93.4 kg (206 lb)   Height: 1.549 m (5' 1\")         An electronic signature was used to authenticate this note.    --Valeria Kumar MD

## 2025-04-19 DIAGNOSIS — M47.817 LUMBOSACRAL SPONDYLOSIS WITHOUT MYELOPATHY: ICD-10-CM

## 2025-04-19 DIAGNOSIS — M17.11 PRIMARY LOCALIZED OSTEOARTHROSIS OF THE KNEE, RIGHT: ICD-10-CM

## 2025-04-19 DIAGNOSIS — I50.43 CHF (CONGESTIVE HEART FAILURE), NYHA CLASS I, ACUTE ON CHRONIC, COMBINED (HCC): ICD-10-CM

## 2025-04-19 DIAGNOSIS — G43.909 EPISODIC MIGRAINE: ICD-10-CM

## 2025-04-19 DIAGNOSIS — Z96.651 S/P TOTAL KNEE ARTHROPLASTY, RIGHT: ICD-10-CM

## 2025-04-19 DIAGNOSIS — Z51.81 ENCOUNTER FOR THERAPEUTIC DRUG MONITORING: ICD-10-CM

## 2025-04-19 DIAGNOSIS — M47.812 CERVICAL SPONDYLOSIS: ICD-10-CM

## 2025-04-22 ENCOUNTER — TELEPHONE (OUTPATIENT)
Dept: PAIN MANAGEMENT | Age: 55
End: 2025-04-22

## 2025-04-22 ENCOUNTER — OFFICE VISIT (OUTPATIENT)
Dept: VASCULAR SURGERY | Age: 55
End: 2025-04-22

## 2025-04-22 VITALS
DIASTOLIC BLOOD PRESSURE: 60 MMHG | HEIGHT: 61 IN | HEART RATE: 78 BPM | TEMPERATURE: 97 F | WEIGHT: 209 LBS | OXYGEN SATURATION: 98 % | SYSTOLIC BLOOD PRESSURE: 132 MMHG | BODY MASS INDEX: 39.46 KG/M2

## 2025-04-22 DIAGNOSIS — Z96.651 S/P TOTAL KNEE ARTHROPLASTY, RIGHT: ICD-10-CM

## 2025-04-22 DIAGNOSIS — M17.11 PRIMARY LOCALIZED OSTEOARTHROSIS OF THE KNEE, RIGHT: ICD-10-CM

## 2025-04-22 DIAGNOSIS — Z51.81 ENCOUNTER FOR THERAPEUTIC DRUG MONITORING: ICD-10-CM

## 2025-04-22 DIAGNOSIS — M47.817 LUMBOSACRAL SPONDYLOSIS WITHOUT MYELOPATHY: ICD-10-CM

## 2025-04-22 DIAGNOSIS — G43.909 EPISODIC MIGRAINE: ICD-10-CM

## 2025-04-22 DIAGNOSIS — I50.43 CHF (CONGESTIVE HEART FAILURE), NYHA CLASS I, ACUTE ON CHRONIC, COMBINED (HCC): ICD-10-CM

## 2025-04-22 DIAGNOSIS — M47.812 CERVICAL SPONDYLOSIS: ICD-10-CM

## 2025-04-22 DIAGNOSIS — N18.6 ESRD (END STAGE RENAL DISEASE) (HCC): ICD-10-CM

## 2025-04-22 DIAGNOSIS — R11.0 NAUSEA: Primary | ICD-10-CM

## 2025-04-22 PROCEDURE — 99024 POSTOP FOLLOW-UP VISIT: CPT | Performed by: SURGERY

## 2025-04-22 RX ORDER — OXYCODONE AND ACETAMINOPHEN 10; 325 MG/1; MG/1
1 TABLET ORAL EVERY 6 HOURS PRN
Qty: 28 TABLET | Refills: 0 | Status: SHIPPED | OUTPATIENT
Start: 2025-04-22 | End: 2025-04-29

## 2025-04-22 RX ORDER — PROMETHAZINE HYDROCHLORIDE 12.5 MG/1
12.5 TABLET ORAL 4 TIMES DAILY PRN
Qty: 56 TABLET | Refills: 0 | Status: SHIPPED | OUTPATIENT
Start: 2025-04-22 | End: 2025-05-06

## 2025-04-22 NOTE — PROGRESS NOTES
2025    Scottie Chiang (:  1970) is a 55 y.o. female,Established patient, here for evaluation of the following chief complaint(s):  Follow-up (2w f/u)        ASSESSMENT/PLAN:  1. Nausea  -     promethazine (PHENERGAN) 12.5 MG tablet; Take 1 tablet by mouth 4 times daily as needed for Nausea, Disp-56 tablet, R-0Normal  2. ESRD (end stage renal disease) (HCC)    Continues to improve  Continue collagen dressing and tubigrip.  Follow up in one month.     SUBJECTIVE/OBJECTIVE:  POV#4:   Scottie is s/p right brachiobasilic AVF 3/7/2025, complicated by hematoma requiring exploration and evacuation of the hematoma.  No new complaints.     Right arm is slowly improving in terms of swelling.  +thrill/bruit      Physical Exam  Vitals:    25 1342   BP: 132/60   BP Site: Left Upper Arm   Patient Position: Sitting   Pulse: 78   Temp: 97 °F (36.1 °C)   TempSrc: Infrared   SpO2: 98%   Weight: 94.8 kg (209 lb)   Height: 1.549 m (5' 1\")         An electronic signature was used to authenticate this note.    --Valeria Kumar MD

## 2025-04-28 RX ORDER — OXYCODONE AND ACETAMINOPHEN 10; 325 MG/1; MG/1
1 TABLET ORAL EVERY 6 HOURS PRN
Qty: 112 TABLET | Refills: 1 | OUTPATIENT
Start: 2025-04-28 | End: 2025-05-26

## 2025-04-29 ENCOUNTER — OFFICE VISIT (OUTPATIENT)
Dept: PAIN MANAGEMENT | Age: 55
End: 2025-04-29
Payer: COMMERCIAL

## 2025-04-29 VITALS
BODY MASS INDEX: 39.49 KG/M2 | HEART RATE: 74 BPM | OXYGEN SATURATION: 97 % | WEIGHT: 209 LBS | DIASTOLIC BLOOD PRESSURE: 78 MMHG | SYSTOLIC BLOOD PRESSURE: 137 MMHG

## 2025-04-29 DIAGNOSIS — I50.43 CHF (CONGESTIVE HEART FAILURE), NYHA CLASS I, ACUTE ON CHRONIC, COMBINED (HCC): Primary | ICD-10-CM

## 2025-04-29 DIAGNOSIS — R11.0 CHRONIC NAUSEA: ICD-10-CM

## 2025-04-29 DIAGNOSIS — G43.909 EPISODIC MIGRAINE: ICD-10-CM

## 2025-04-29 DIAGNOSIS — Z51.81 ENCOUNTER FOR THERAPEUTIC DRUG MONITORING: ICD-10-CM

## 2025-04-29 DIAGNOSIS — Z96.651 S/P TOTAL KNEE ARTHROPLASTY, RIGHT: ICD-10-CM

## 2025-04-29 DIAGNOSIS — M47.817 LUMBOSACRAL SPONDYLOSIS WITHOUT MYELOPATHY: ICD-10-CM

## 2025-04-29 DIAGNOSIS — M17.11 PRIMARY LOCALIZED OSTEOARTHROSIS OF THE KNEE, RIGHT: ICD-10-CM

## 2025-04-29 DIAGNOSIS — N18.6 ESRD (END STAGE RENAL DISEASE) ON DIALYSIS (HCC): ICD-10-CM

## 2025-04-29 DIAGNOSIS — M47.812 CERVICAL SPONDYLOSIS: ICD-10-CM

## 2025-04-29 DIAGNOSIS — Z99.2 ESRD (END STAGE RENAL DISEASE) ON DIALYSIS (HCC): ICD-10-CM

## 2025-04-29 DIAGNOSIS — G89.4 CHRONIC PAIN SYNDROME: ICD-10-CM

## 2025-04-29 PROCEDURE — 1036F TOBACCO NON-USER: CPT | Performed by: NURSE PRACTITIONER

## 2025-04-29 PROCEDURE — 3017F COLORECTAL CA SCREEN DOC REV: CPT | Performed by: NURSE PRACTITIONER

## 2025-04-29 PROCEDURE — 99214 OFFICE O/P EST MOD 30 MIN: CPT | Performed by: NURSE PRACTITIONER

## 2025-04-29 PROCEDURE — G8427 DOCREV CUR MEDS BY ELIG CLIN: HCPCS | Performed by: NURSE PRACTITIONER

## 2025-04-29 PROCEDURE — G8417 CALC BMI ABV UP PARAM F/U: HCPCS | Performed by: NURSE PRACTITIONER

## 2025-04-29 PROCEDURE — 3075F SYST BP GE 130 - 139MM HG: CPT | Performed by: NURSE PRACTITIONER

## 2025-04-29 PROCEDURE — 3078F DIAST BP <80 MM HG: CPT | Performed by: NURSE PRACTITIONER

## 2025-04-29 RX ORDER — OXYCODONE AND ACETAMINOPHEN 10; 325 MG/1; MG/1
1 TABLET ORAL EVERY 6 HOURS PRN
Qty: 112 TABLET | Refills: 0 | Status: SHIPPED | OUTPATIENT
Start: 2025-04-29 | End: 2025-05-27

## 2025-04-29 RX ORDER — METHOCARBAMOL 500 MG/1
500 TABLET, FILM COATED ORAL 2 TIMES DAILY
Qty: 60 TABLET | Refills: 0 | Status: SHIPPED | OUTPATIENT
Start: 2025-04-29 | End: 2025-05-29

## 2025-04-29 NOTE — PROGRESS NOTES
by mouth on dialysis days (MWF)       No current facility-administered medications for this visit.     I will continue her current medication regimen  which is part of the above treatment schedule. It has been helping with Ms. Chiang's chronic  medical problems which for this visit include:   The primary encounter diagnosis was CHF (congestive heart failure), NYHA class I, acute on chronic, combined (HCC). Diagnoses of Encounter for therapeutic drug monitoring, Primary localized osteoarthrosis of the knee, right, Cervical spondylosis, Episodic migraine, Lumbosacral spondylosis without myelopathy, S/P total knee arthroplasty, right, ESRD (end stage renal disease) on dialysis (HCC), Chronic pain syndrome, and Chronic nausea were also pertinent to this visit.   Risks and benefits of the medications and other alternative treatments  including no treatment were discussed with the patient.The common side effects of these medications were also explained to the patient.  Informed verbal consent was obtained.   Goals of current treatment regimen include:   *Improvement in pain by using the least amount of medication therapy to be satisfactorily functional.    *To restore functioning with focus on improvement in physical performance, general activity, ADLs, work or disability, emotional distress   *Patient was educated on and understands the limitations of opiates regarding their inability to remove pain long term.   Will continue to monitor progress towards achieving/maintaining therapeutic goals with special emphasis on  1. Improvement in perceived interfernce of pain with ADL's. Ability to do home exercises independently. Ability to do household chores indoor and/or outdoor work and social and leisure activities.Improve psychosocial and physical functioning. she is showing progression towards this treatment goal with the current regimen.     She was advised against drinking alcohol with the narcotic pain medicines, advised

## 2025-05-03 DIAGNOSIS — R11.0 NAUSEA: ICD-10-CM

## 2025-05-07 RX ORDER — PROMETHAZINE HYDROCHLORIDE 12.5 MG/1
12.5 TABLET ORAL 4 TIMES DAILY PRN
Qty: 56 TABLET | Refills: 1 | OUTPATIENT
Start: 2025-05-07 | End: 2025-05-21

## 2025-05-07 NOTE — TELEPHONE ENCOUNTER
I told her at her last visit that I would only fill this once and she would need to get from her PCP or Nephrology.  I cannot continue to refill.

## 2025-05-22 ENCOUNTER — OFFICE VISIT (OUTPATIENT)
Dept: VASCULAR SURGERY | Age: 55
End: 2025-05-22

## 2025-05-22 VITALS
DIASTOLIC BLOOD PRESSURE: 68 MMHG | OXYGEN SATURATION: 84 % | BODY MASS INDEX: 39.3 KG/M2 | WEIGHT: 208 LBS | HEART RATE: 71 BPM | SYSTOLIC BLOOD PRESSURE: 118 MMHG

## 2025-05-22 DIAGNOSIS — N18.6 ESRD (END STAGE RENAL DISEASE) (HCC): Primary | ICD-10-CM

## 2025-05-22 PROCEDURE — 99024 POSTOP FOLLOW-UP VISIT: CPT | Performed by: SURGERY

## 2025-05-22 NOTE — PROGRESS NOTES
2025    Scottie Chiang (:  1970) is a 55 y.o. female,Established patient, here for evaluation of the following chief complaint(s):  Follow-up        ASSESSMENT/PLAN:  1. ESRD (end stage renal disease) (HCC)    Scottie is 2 and half months status post right basilic vein transposition.  This remains slow to mature and she has evidence of venous outflow stenosis.  Will plan fistulogram next week.  In terms of the right posterior elbow fluid, I believe this represents a bursitis and recommend orthopedic evaluation if necessary.      SUBJECTIVE/OBJECTIVE:  POV#5:   Scottie is s/p right brachiobasilic AVF 3/7/2025, complicated by hematoma requiring exploration and evacuation of the hematoma.  No complaints regarding the fistula itself.   She complains of a pocket of fluid on the posterior right elbow which she believes has been there since the surgery.  On examination, she has a fluctuant fluid collection, well-circumscribed on the posterior right elbow.  This is consistent with a bursitis.  There is no warmth or erythema.  Fistula is still somewhat deep but demonstrates good thrill and bruit.  She has mild swelling of the right arm compared to the left.  No pitting.  The hand is warm and well-perfused with normal motor function.      Physical Exam  Vitals:    25 0859   BP: 118/68   Pulse: 71   SpO2: (!) 84%   Weight: 94.3 kg (208 lb)           An electronic signature was used to authenticate this note.    --Valeria Kumar MD

## 2025-05-23 ENCOUNTER — TELEPHONE (OUTPATIENT)
Dept: VASCULAR SURGERY | Age: 55
End: 2025-05-23

## 2025-05-24 DIAGNOSIS — M17.11 PRIMARY LOCALIZED OSTEOARTHROSIS OF THE KNEE, RIGHT: ICD-10-CM

## 2025-05-24 DIAGNOSIS — I50.43 CHF (CONGESTIVE HEART FAILURE), NYHA CLASS I, ACUTE ON CHRONIC, COMBINED (HCC): ICD-10-CM

## 2025-05-24 DIAGNOSIS — Z96.651 S/P TOTAL KNEE ARTHROPLASTY, RIGHT: ICD-10-CM

## 2025-05-24 DIAGNOSIS — Z51.81 ENCOUNTER FOR THERAPEUTIC DRUG MONITORING: ICD-10-CM

## 2025-05-24 DIAGNOSIS — M47.812 CERVICAL SPONDYLOSIS: ICD-10-CM

## 2025-05-24 DIAGNOSIS — G43.909 EPISODIC MIGRAINE: ICD-10-CM

## 2025-05-24 DIAGNOSIS — M47.817 LUMBOSACRAL SPONDYLOSIS WITHOUT MYELOPATHY: ICD-10-CM

## 2025-05-27 ENCOUNTER — TELEPHONE (OUTPATIENT)
Dept: PAIN MANAGEMENT | Age: 55
End: 2025-05-27

## 2025-05-27 DIAGNOSIS — M47.812 CERVICAL SPONDYLOSIS: ICD-10-CM

## 2025-05-27 DIAGNOSIS — Z96.651 S/P TOTAL KNEE ARTHROPLASTY, RIGHT: ICD-10-CM

## 2025-05-27 DIAGNOSIS — I50.43 CHF (CONGESTIVE HEART FAILURE), NYHA CLASS I, ACUTE ON CHRONIC, COMBINED (HCC): ICD-10-CM

## 2025-05-27 DIAGNOSIS — M17.11 PRIMARY LOCALIZED OSTEOARTHROSIS OF THE KNEE, RIGHT: ICD-10-CM

## 2025-05-27 DIAGNOSIS — Z51.81 ENCOUNTER FOR THERAPEUTIC DRUG MONITORING: ICD-10-CM

## 2025-05-27 DIAGNOSIS — M47.817 LUMBOSACRAL SPONDYLOSIS WITHOUT MYELOPATHY: ICD-10-CM

## 2025-05-27 DIAGNOSIS — G43.909 EPISODIC MIGRAINE: ICD-10-CM

## 2025-05-27 NOTE — PRE-PROCEDURE INSTRUCTIONS
Called patient about procedure.Told to be here at 0800 for procedure at 0930. NPO after midnight, but can take morning medication with sips of water, patient stated they are not on blood thinners. To have a responsible adult be with patient take them home and stay with them afterwards, if they do not get admitted to hosptial. And if available bring current list of medications. No other questions or concerns.

## 2025-05-28 ENCOUNTER — HOSPITAL ENCOUNTER (OUTPATIENT)
Age: 55
Setting detail: OBSERVATION
Discharge: HOME | End: 2025-05-29
Attending: SURGERY | Admitting: SURGERY
Payer: COMMERCIAL

## 2025-05-28 DIAGNOSIS — N18.6 ESRD (END STAGE RENAL DISEASE) (HCC): ICD-10-CM

## 2025-05-28 DIAGNOSIS — I82.B11: Primary | ICD-10-CM

## 2025-05-28 PROBLEM — I77.0 ARTERIOVENOUS FISTULA: Status: ACTIVE | Noted: 2025-05-28

## 2025-05-28 LAB
ANION GAP SERPL CALCULATED.3IONS-SCNC: 13 MMOL/L (ref 3–16)
BUN SERPL-MCNC: 42 MG/DL (ref 7–20)
CALCIUM SERPL-MCNC: 9.7 MG/DL (ref 8.3–10.6)
CHLORIDE SERPL-SCNC: 96 MMOL/L (ref 99–110)
CO2 SERPL-SCNC: 30 MMOL/L (ref 21–32)
CREAT SERPL-MCNC: 5.9 MG/DL (ref 0.6–1.1)
DEPRECATED RDW RBC AUTO: 16.4 % (ref 12.4–15.4)
GFR SERPLBLD CREATININE-BSD FMLA CKD-EPI: 8 ML/MIN/{1.73_M2}
GLUCOSE SERPL-MCNC: 90 MG/DL (ref 70–99)
HCT VFR BLD AUTO: 33.7 % (ref 36–48)
HGB BLD-MCNC: 11.1 G/DL (ref 12–16)
INR PPP: 1.08 (ref 0.85–1.15)
MCH RBC QN AUTO: 33.1 PG (ref 26–34)
MCHC RBC AUTO-ENTMCNC: 32.8 G/DL (ref 31–36)
MCV RBC AUTO: 100.8 FL (ref 80–100)
PLATELET # BLD AUTO: 93 K/UL (ref 135–450)
PLATELET BLD QL SMEAR: ABNORMAL
PMV BLD AUTO: 11.2 FL (ref 5–10.5)
POTASSIUM SERPL-SCNC: 5 MMOL/L (ref 3.5–5.1)
PROTHROMBIN TIME: 14.3 SEC (ref 11.9–14.9)
RBC # BLD AUTO: 3.34 M/UL (ref 4–5.2)
SODIUM SERPL-SCNC: 139 MMOL/L (ref 136–145)
WBC # BLD AUTO: 3.3 K/UL (ref 4–11)

## 2025-05-28 PROCEDURE — 36901 INTRO CATH DIALYSIS CIRCUIT: CPT | Performed by: SURGERY

## 2025-05-28 PROCEDURE — C1725 CATH, TRANSLUMIN NON-LASER: HCPCS | Performed by: SURGERY

## 2025-05-28 PROCEDURE — 2580000003 HC RX 258: Performed by: STUDENT IN AN ORGANIZED HEALTH CARE EDUCATION/TRAINING PROGRAM

## 2025-05-28 PROCEDURE — 6370000000 HC RX 637 (ALT 250 FOR IP): Performed by: STUDENT IN AN ORGANIZED HEALTH CARE EDUCATION/TRAINING PROGRAM

## 2025-05-28 PROCEDURE — 2709999900 HC NON-CHARGEABLE SUPPLY: Performed by: SURGERY

## 2025-05-28 PROCEDURE — C1887 CATHETER, GUIDING: HCPCS | Performed by: SURGERY

## 2025-05-28 PROCEDURE — 85027 COMPLETE CBC AUTOMATED: CPT

## 2025-05-28 PROCEDURE — 36556 INSERT NON-TUNNEL CV CATH: CPT

## 2025-05-28 PROCEDURE — 99153 MOD SED SAME PHYS/QHP EA: CPT | Performed by: SURGERY

## 2025-05-28 PROCEDURE — 6360000002 HC RX W HCPCS: Performed by: SURGERY

## 2025-05-28 PROCEDURE — 7100000011 HC PHASE II RECOVERY - ADDTL 15 MIN: Performed by: SURGERY

## 2025-05-28 PROCEDURE — 6360000002 HC RX W HCPCS: Performed by: STUDENT IN AN ORGANIZED HEALTH CARE EDUCATION/TRAINING PROGRAM

## 2025-05-28 PROCEDURE — C1894 INTRO/SHEATH, NON-LASER: HCPCS | Performed by: SURGERY

## 2025-05-28 PROCEDURE — 80048 BASIC METABOLIC PNL TOTAL CA: CPT

## 2025-05-28 PROCEDURE — 7100000010 HC PHASE II RECOVERY - FIRST 15 MIN: Performed by: SURGERY

## 2025-05-28 PROCEDURE — 6360000004 HC RX CONTRAST MEDICATION: Performed by: SURGERY

## 2025-05-28 PROCEDURE — C1769 GUIDE WIRE: HCPCS | Performed by: SURGERY

## 2025-05-28 PROCEDURE — G0378 HOSPITAL OBSERVATION PER HR: HCPCS

## 2025-05-28 PROCEDURE — 96372 THER/PROPH/DIAG INJ SC/IM: CPT

## 2025-05-28 PROCEDURE — 85610 PROTHROMBIN TIME: CPT

## 2025-05-28 PROCEDURE — 2500000003 HC RX 250 WO HCPCS: Performed by: SURGERY

## 2025-05-28 PROCEDURE — 99152 MOD SED SAME PHYS/QHP 5/>YRS: CPT | Performed by: SURGERY

## 2025-05-28 RX ORDER — HEPARIN SODIUM 1000 [USP'U]/ML
INJECTION, SOLUTION INTRAVENOUS; SUBCUTANEOUS PRN
Status: DISCONTINUED | OUTPATIENT
Start: 2025-05-28 | End: 2025-05-28 | Stop reason: HOSPADM

## 2025-05-28 RX ORDER — MECLIZINE HCL 12.5 MG 12.5 MG/1
12.5 TABLET ORAL 3 TIMES DAILY PRN
Status: DISCONTINUED | OUTPATIENT
Start: 2025-05-28 | End: 2025-05-29 | Stop reason: HOSPADM

## 2025-05-28 RX ORDER — MEMANTINE HYDROCHLORIDE 10 MG/1
10 TABLET ORAL 2 TIMES DAILY
Status: DISCONTINUED | OUTPATIENT
Start: 2025-05-28 | End: 2025-05-29 | Stop reason: HOSPADM

## 2025-05-28 RX ORDER — OXYCODONE AND ACETAMINOPHEN 10; 325 MG/1; MG/1
1 TABLET ORAL EVERY 6 HOURS PRN
Qty: 112 TABLET | Refills: 0 | OUTPATIENT
Start: 2025-05-28 | End: 2025-06-25

## 2025-05-28 RX ORDER — ACETAMINOPHEN 500 MG
1000 TABLET ORAL EVERY 6 HOURS
Status: DISCONTINUED | OUTPATIENT
Start: 2025-05-28 | End: 2025-05-29 | Stop reason: HOSPADM

## 2025-05-28 RX ORDER — IOPAMIDOL 612 MG/ML
INJECTION, SOLUTION INTRAVASCULAR PRN
Status: DISCONTINUED | OUTPATIENT
Start: 2025-05-28 | End: 2025-05-28 | Stop reason: HOSPADM

## 2025-05-28 RX ORDER — SODIUM CHLORIDE 0.9 % (FLUSH) 0.9 %
5-40 SYRINGE (ML) INJECTION EVERY 12 HOURS SCHEDULED
Status: DISCONTINUED | OUTPATIENT
Start: 2025-05-28 | End: 2025-05-29 | Stop reason: HOSPADM

## 2025-05-28 RX ORDER — TORSEMIDE 100 MG/1
100 TABLET ORAL 2 TIMES DAILY
Status: DISCONTINUED | OUTPATIENT
Start: 2025-05-29 | End: 2025-05-29 | Stop reason: HOSPADM

## 2025-05-28 RX ORDER — SODIUM CHLORIDE 0.9 % (FLUSH) 0.9 %
5-40 SYRINGE (ML) INJECTION PRN
Status: DISCONTINUED | OUTPATIENT
Start: 2025-05-28 | End: 2025-05-29 | Stop reason: HOSPADM

## 2025-05-28 RX ORDER — LIDOCAINE HYDROCHLORIDE 10 MG/ML
INJECTION, SOLUTION INFILTRATION; PERINEURAL PRN
Status: DISCONTINUED | OUTPATIENT
Start: 2025-05-28 | End: 2025-05-28 | Stop reason: HOSPADM

## 2025-05-28 RX ORDER — MIDAZOLAM HYDROCHLORIDE 1 MG/ML
INJECTION, SOLUTION INTRAMUSCULAR; INTRAVENOUS PRN
Status: DISCONTINUED | OUTPATIENT
Start: 2025-05-28 | End: 2025-05-28 | Stop reason: HOSPADM

## 2025-05-28 RX ORDER — LEVETIRACETAM 500 MG/1
500 TABLET ORAL DAILY
Status: DISCONTINUED | OUTPATIENT
Start: 2025-05-28 | End: 2025-05-29 | Stop reason: HOSPADM

## 2025-05-28 RX ORDER — CARVEDILOL 6.25 MG/1
6.25 TABLET ORAL 2 TIMES DAILY WITH MEALS
Status: DISCONTINUED | OUTPATIENT
Start: 2025-05-28 | End: 2025-05-29 | Stop reason: HOSPADM

## 2025-05-28 RX ORDER — METHOCARBAMOL 500 MG/1
500 TABLET, FILM COATED ORAL 2 TIMES DAILY
Status: DISCONTINUED | OUTPATIENT
Start: 2025-05-28 | End: 2025-05-29 | Stop reason: HOSPADM

## 2025-05-28 RX ORDER — PANTOPRAZOLE SODIUM 40 MG/1
40 TABLET, DELAYED RELEASE ORAL 2 TIMES DAILY
Status: DISCONTINUED | OUTPATIENT
Start: 2025-05-28 | End: 2025-05-29 | Stop reason: HOSPADM

## 2025-05-28 RX ORDER — SUMATRIPTAN SUCCINATE 25 MG/1
25 TABLET ORAL
Status: DISCONTINUED | OUTPATIENT
Start: 2025-05-28 | End: 2025-05-29 | Stop reason: HOSPADM

## 2025-05-28 RX ORDER — DOCUSATE SODIUM 100 MG/1
100 CAPSULE, LIQUID FILLED ORAL 2 TIMES DAILY
Status: DISCONTINUED | OUTPATIENT
Start: 2025-05-28 | End: 2025-05-29 | Stop reason: HOSPADM

## 2025-05-28 RX ORDER — SODIUM CHLORIDE 9 MG/ML
INJECTION, SOLUTION INTRAVENOUS PRN
Status: DISCONTINUED | OUTPATIENT
Start: 2025-05-28 | End: 2025-05-29 | Stop reason: HOSPADM

## 2025-05-28 RX ORDER — OXYCODONE HYDROCHLORIDE 5 MG/1
5 TABLET ORAL EVERY 4 HOURS PRN
Status: DISCONTINUED | OUTPATIENT
Start: 2025-05-28 | End: 2025-05-29 | Stop reason: HOSPADM

## 2025-05-28 RX ORDER — SEVELAMER CARBONATE 800 MG/1
800 TABLET, FILM COATED ORAL 2 TIMES DAILY WITH MEALS
Status: DISCONTINUED | OUTPATIENT
Start: 2025-05-28 | End: 2025-05-29 | Stop reason: HOSPADM

## 2025-05-28 RX ORDER — ONDANSETRON 2 MG/ML
4 INJECTION INTRAMUSCULAR; INTRAVENOUS EVERY 6 HOURS PRN
Status: DISCONTINUED | OUTPATIENT
Start: 2025-05-28 | End: 2025-05-29 | Stop reason: HOSPADM

## 2025-05-28 RX ORDER — OXYCODONE HYDROCHLORIDE 5 MG/1
10 TABLET ORAL EVERY 4 HOURS PRN
Status: DISCONTINUED | OUTPATIENT
Start: 2025-05-28 | End: 2025-05-29 | Stop reason: HOSPADM

## 2025-05-28 RX ORDER — GABAPENTIN 300 MG/1
300 CAPSULE ORAL 2 TIMES DAILY
Status: DISCONTINUED | OUTPATIENT
Start: 2025-05-28 | End: 2025-05-29 | Stop reason: HOSPADM

## 2025-05-28 RX ORDER — ONDANSETRON 4 MG/1
4 TABLET, ORALLY DISINTEGRATING ORAL EVERY 8 HOURS PRN
Status: DISCONTINUED | OUTPATIENT
Start: 2025-05-28 | End: 2025-05-29 | Stop reason: HOSPADM

## 2025-05-28 RX ORDER — OXYCODONE AND ACETAMINOPHEN 10; 325 MG/1; MG/1
1 TABLET ORAL EVERY 4 HOURS PRN
COMMUNITY

## 2025-05-28 RX ORDER — SODIUM CHLORIDE, SODIUM LACTATE, POTASSIUM CHLORIDE, CALCIUM CHLORIDE 600; 310; 30; 20 MG/100ML; MG/100ML; MG/100ML; MG/100ML
INJECTION, SOLUTION INTRAVENOUS CONTINUOUS
Status: DISCONTINUED | OUTPATIENT
Start: 2025-05-28 | End: 2025-05-29

## 2025-05-28 RX ORDER — METHOCARBAMOL 500 MG/1
500 TABLET, FILM COATED ORAL 2 TIMES DAILY
Qty: 60 TABLET | Refills: 0 | OUTPATIENT
Start: 2025-05-28 | End: 2025-06-27

## 2025-05-28 RX ORDER — HYDRALAZINE HYDROCHLORIDE 20 MG/ML
INJECTION INTRAMUSCULAR; INTRAVENOUS PRN
Status: DISCONTINUED | OUTPATIENT
Start: 2025-05-28 | End: 2025-05-28 | Stop reason: HOSPADM

## 2025-05-28 RX ORDER — HEPARIN SODIUM 5000 [USP'U]/ML
5000 INJECTION, SOLUTION INTRAVENOUS; SUBCUTANEOUS EVERY 8 HOURS SCHEDULED
Status: DISCONTINUED | OUTPATIENT
Start: 2025-05-28 | End: 2025-05-29 | Stop reason: HOSPADM

## 2025-05-28 RX ORDER — ATORVASTATIN CALCIUM 80 MG/1
80 TABLET, FILM COATED ORAL DAILY
Status: DISCONTINUED | OUTPATIENT
Start: 2025-05-28 | End: 2025-05-29 | Stop reason: HOSPADM

## 2025-05-28 RX ADMIN — GABAPENTIN 300 MG: 300 CAPSULE ORAL at 19:50

## 2025-05-28 RX ADMIN — MEMANTINE 10 MG: 10 TABLET ORAL at 17:23

## 2025-05-28 RX ADMIN — CARVEDILOL 6.25 MG: 6.25 TABLET, FILM COATED ORAL at 17:24

## 2025-05-28 RX ADMIN — SODIUM CHLORIDE, SODIUM LACTATE, POTASSIUM CHLORIDE, AND CALCIUM CHLORIDE: .6; .31; .03; .02 INJECTION, SOLUTION INTRAVENOUS at 17:33

## 2025-05-28 RX ADMIN — MEMANTINE 10 MG: 10 TABLET ORAL at 19:50

## 2025-05-28 RX ADMIN — DOCUSATE SODIUM 100 MG: 100 CAPSULE, LIQUID FILLED ORAL at 19:50

## 2025-05-28 RX ADMIN — LEVETIRACETAM 500 MG: 500 TABLET, FILM COATED ORAL at 17:24

## 2025-05-28 RX ADMIN — SEVELAMER CARBONATE 800 MG: 800 TABLET, FILM COATED ORAL at 17:23

## 2025-05-28 RX ADMIN — DOCUSATE SODIUM 100 MG: 100 CAPSULE, LIQUID FILLED ORAL at 17:24

## 2025-05-28 RX ADMIN — ATORVASTATIN CALCIUM 80 MG: 80 TABLET, FILM COATED ORAL at 17:24

## 2025-05-28 RX ADMIN — GABAPENTIN 300 MG: 300 CAPSULE ORAL at 17:24

## 2025-05-28 RX ADMIN — PANTOPRAZOLE SODIUM 40 MG: 40 TABLET, DELAYED RELEASE ORAL at 19:50

## 2025-05-28 RX ADMIN — METHOCARBAMOL 500 MG: 500 TABLET ORAL at 17:23

## 2025-05-28 RX ADMIN — HEPARIN SODIUM 5000 UNITS: 5000 INJECTION INTRAVENOUS; SUBCUTANEOUS at 21:12

## 2025-05-28 RX ADMIN — METHOCARBAMOL 500 MG: 500 TABLET ORAL at 19:50

## 2025-05-28 RX ADMIN — HEPARIN SODIUM 5000 UNITS: 5000 INJECTION INTRAVENOUS; SUBCUTANEOUS at 17:23

## 2025-05-28 RX ADMIN — ACETAMINOPHEN 1000 MG: 500 TABLET ORAL at 19:50

## 2025-05-28 NOTE — PROGRESS NOTES
Department of Surgery:  Post-op Note    CC: Fistula present    Procedure(s) Performed: Fistulagram    Subjective:   Pain is controlled, denies nausea or vomiting. Last dialysis yesterday. Next dialysis Friday    Objective:  Anesthesia type: General    Exam:  Vitals:    05/28/25 1145 05/28/25 1200 05/28/25 1215 05/28/25 1230   BP: (!) 141/69 120/70 129/64 (!) 141/67   Pulse: 64 62 65 60   Resp: (!) 8 (!) 9 11 10   Temp:       TempSrc:       SpO2:  91%  91%   Weight:       Height:           General appearance: alert, no acute distress, grooming appropriate  Chest/Lungs: No adventitious breath sounds. No increased work of breathing.   Cardiovascular: RRR  Abdomen: soft, appropriately tender, non-distended  Skin: no erythema or rashes, no cyanosis  Extremities: R groin site with no hematoma. R arm site with no hematoma.   Neuro: A&Ox3, no focal deficits, sensation intact    Assessment and Plan  This is a 55 y.o. year old female s/p fistulagram POD #0    Pain management: [facility]  Cardiovascular: monitor vitals as scheduled  Respiratory: extubated, encourage hourly incentive spirometry and deep breathing  Diet: reg  Wound: local care   Ambulation: OOB to chair, encourage ambulation      Pino Russ DO  PGY2, General Surgery  05/28/25   4:00 PM   [facility]  [phone]

## 2025-05-28 NOTE — PROGRESS NOTES
4 Eyes Admission Assessment     I agree as the admission nurse that 2 RN's have performed a thorough Head to Toe Skin Assessment on the patient. ALL assessment sites listed below have been assessed on admission.       Areas assessed by both nurses: Dano Cobb and Nyasia Hines Rn  [x]   Head, Face, and Ears   [x]   Shoulders, Back, and Chest  [x]   Arms, Elbows, and Hands   [x]   Coccyx, Sacrum, and Ischum  [x]   Legs, Feet, and Heels        Does the Patient have Skin Breakdown?  No   Pt has missing Big left toe, blanchable redness on bilateral heels, old scar on Left inner thigh, and old surgery incision on Right upper arm. Scattered old scars throughout body        Margarito Prevention initiated:  Yes   Wound Care Orders initiated:  No      WOC nurse consulted for Pressure Injury (Stage 3,4, Unstageable, DTI, NWPT, and Complex wounds):  No      Nurse 1 eSignature: Electronically signed by Dano Cobb RN on 5/28/25 at 6:42 PM EDT    **SHARE this note so that the co-signing nurse is able to place an eSignature**    Nurse 2 eSignature: Electronically signed by Nyasia Hines RN on 5/28/25 at 6:44 PM EDT

## 2025-05-28 NOTE — PLAN OF CARE
Problem: Chronic Conditions and Co-morbidities  Goal: Patient's chronic conditions and co-morbidity symptoms are monitored and maintained or improved  Outcome: Progressing  Flowsheets (Taken 5/28/2025 1826)  Care Plan - Patient's Chronic Conditions and Co-Morbidity Symptoms are Monitored and Maintained or Improved:   Monitor and assess patient's chronic conditions and comorbid symptoms for stability, deterioration, or improvement   Update acute care plan with appropriate goals if chronic or comorbid symptoms are exacerbated and prevent overall improvement and discharge   Collaborate with multidisciplinary team to address chronic and comorbid conditions and prevent exacerbation or deterioration     Problem: Discharge Planning  Goal: Discharge to home or other facility with appropriate resources  Outcome: Progressing  Flowsheets (Taken 5/28/2025 1826)  Discharge to home or other facility with appropriate resources:   Identify barriers to discharge with patient and caregiver   Identify discharge learning needs (meds, wound care, etc)   Refer to discharge planning if patient needs post-hospital services based on physician order or complex needs related to functional status, cognitive ability or social support system   Arrange for needed discharge resources and transportation as appropriate     Problem: Safety - Adult  Goal: Free from fall injury  Outcome: Progressing  Flowsheets (Taken 5/28/2025 1826)  Free From Fall Injury: Instruct family/caregiver on patient safety

## 2025-05-28 NOTE — INTERVAL H&P NOTE
Update History & Physical    The patient's History and Physical of May 28, 2025 was reviewed with the patient and I examined the patient. There was no change. The surgical site was confirmed by the patient and me.     Plan: The risks, benefits, expected outcome, and alternative to the recommended procedure have been discussed with the patient. Patient understands and wants to proceed with the procedure.     Electronically signed by Pino Russ DO on 5/28/2025 at 3:39 PM

## 2025-05-29 VITALS
HEIGHT: 61 IN | TEMPERATURE: 98 F | DIASTOLIC BLOOD PRESSURE: 54 MMHG | SYSTOLIC BLOOD PRESSURE: 135 MMHG | RESPIRATION RATE: 18 BRPM | WEIGHT: 220.46 LBS | OXYGEN SATURATION: 97 % | BODY MASS INDEX: 41.62 KG/M2 | HEART RATE: 83 BPM

## 2025-05-29 LAB
ALBUMIN SERPL-MCNC: 3.4 G/DL (ref 3.4–5)
ALBUMIN SERPL-MCNC: 3.6 G/DL (ref 3.4–5)
ANION GAP SERPL CALCULATED.3IONS-SCNC: 13 MMOL/L (ref 3–16)
ANION GAP SERPL CALCULATED.3IONS-SCNC: 15 MMOL/L (ref 3–16)
BASOPHILS # BLD: 0 K/UL (ref 0–0.2)
BASOPHILS NFR BLD: 0.8 %
BUN SERPL-MCNC: 60 MG/DL (ref 7–20)
BUN SERPL-MCNC: 63 MG/DL (ref 7–20)
CALCIUM SERPL-MCNC: 8.6 MG/DL (ref 8.3–10.6)
CALCIUM SERPL-MCNC: 9 MG/DL (ref 8.3–10.6)
CHLORIDE SERPL-SCNC: 96 MMOL/L (ref 99–110)
CHLORIDE SERPL-SCNC: 97 MMOL/L (ref 99–110)
CO2 SERPL-SCNC: 25 MMOL/L (ref 21–32)
CO2 SERPL-SCNC: 26 MMOL/L (ref 21–32)
CREAT SERPL-MCNC: 7.7 MG/DL (ref 0.6–1.1)
CREAT SERPL-MCNC: 7.8 MG/DL (ref 0.6–1.1)
DEPRECATED RDW RBC AUTO: 16.8 % (ref 12.4–15.4)
EOSINOPHIL # BLD: 0.3 K/UL (ref 0–0.6)
EOSINOPHIL NFR BLD: 7.2 %
GFR SERPLBLD CREATININE-BSD FMLA CKD-EPI: 6 ML/MIN/{1.73_M2}
GFR SERPLBLD CREATININE-BSD FMLA CKD-EPI: 6 ML/MIN/{1.73_M2}
GLUCOSE BLD-MCNC: 153 MG/DL (ref 70–99)
GLUCOSE BLD-MCNC: 49 MG/DL (ref 70–99)
GLUCOSE BLD-MCNC: 97 MG/DL (ref 70–99)
GLUCOSE SERPL-MCNC: 137 MG/DL (ref 70–99)
GLUCOSE SERPL-MCNC: 62 MG/DL (ref 70–99)
HCT VFR BLD AUTO: 31.6 % (ref 36–48)
HGB BLD-MCNC: 10.5 G/DL (ref 12–16)
LYMPHOCYTES # BLD: 1.2 K/UL (ref 1–5.1)
LYMPHOCYTES NFR BLD: 27.2 %
MAGNESIUM SERPL-MCNC: 2.31 MG/DL (ref 1.8–2.4)
MCH RBC QN AUTO: 33.4 PG (ref 26–34)
MCHC RBC AUTO-ENTMCNC: 33.1 G/DL (ref 31–36)
MCV RBC AUTO: 100.9 FL (ref 80–100)
MONOCYTES # BLD: 0.8 K/UL (ref 0–1.3)
MONOCYTES NFR BLD: 17.6 %
NEUTROPHILS # BLD: 2 K/UL (ref 1.7–7.7)
NEUTROPHILS NFR BLD: 47.2 %
PERFORMED ON: ABNORMAL
PERFORMED ON: ABNORMAL
PERFORMED ON: NORMAL
PHOSPHATE SERPL-MCNC: 6.2 MG/DL (ref 2.5–4.9)
PHOSPHATE SERPL-MCNC: 6.6 MG/DL (ref 2.5–4.9)
PLATELET # BLD AUTO: 86 K/UL (ref 135–450)
PMV BLD AUTO: 10.5 FL (ref 5–10.5)
POTASSIUM SERPL-SCNC: 6.1 MMOL/L (ref 3.5–5.1)
POTASSIUM SERPL-SCNC: 6.6 MMOL/L (ref 3.5–5.1)
RBC # BLD AUTO: 3.13 M/UL (ref 4–5.2)
SODIUM SERPL-SCNC: 135 MMOL/L (ref 136–145)
SODIUM SERPL-SCNC: 137 MMOL/L (ref 136–145)
WBC # BLD AUTO: 4.3 K/UL (ref 4–11)

## 2025-05-29 PROCEDURE — 6370000000 HC RX 637 (ALT 250 FOR IP)

## 2025-05-29 PROCEDURE — 6360000002 HC RX W HCPCS

## 2025-05-29 PROCEDURE — 2500000003 HC RX 250 WO HCPCS: Performed by: STUDENT IN AN ORGANIZED HEALTH CARE EDUCATION/TRAINING PROGRAM

## 2025-05-29 PROCEDURE — 6370000000 HC RX 637 (ALT 250 FOR IP): Performed by: STUDENT IN AN ORGANIZED HEALTH CARE EDUCATION/TRAINING PROGRAM

## 2025-05-29 PROCEDURE — 96365 THER/PROPH/DIAG IV INF INIT: CPT

## 2025-05-29 PROCEDURE — 96361 HYDRATE IV INFUSION ADD-ON: CPT

## 2025-05-29 PROCEDURE — G0378 HOSPITAL OBSERVATION PER HR: HCPCS

## 2025-05-29 PROCEDURE — 36415 COLL VENOUS BLD VENIPUNCTURE: CPT

## 2025-05-29 PROCEDURE — 96375 TX/PRO/DX INJ NEW DRUG ADDON: CPT

## 2025-05-29 PROCEDURE — 2580000003 HC RX 258

## 2025-05-29 PROCEDURE — 93005 ELECTROCARDIOGRAM TRACING: CPT

## 2025-05-29 PROCEDURE — 80069 RENAL FUNCTION PANEL: CPT

## 2025-05-29 PROCEDURE — 85025 COMPLETE CBC W/AUTO DIFF WBC: CPT

## 2025-05-29 PROCEDURE — 90935 HEMODIALYSIS ONE EVALUATION: CPT

## 2025-05-29 PROCEDURE — 6360000002 HC RX W HCPCS: Performed by: STUDENT IN AN ORGANIZED HEALTH CARE EDUCATION/TRAINING PROGRAM

## 2025-05-29 PROCEDURE — 83735 ASSAY OF MAGNESIUM: CPT

## 2025-05-29 PROCEDURE — 96372 THER/PROPH/DIAG INJ SC/IM: CPT

## 2025-05-29 RX ORDER — CALCIUM GLUCONATE 20 MG/ML
1000 INJECTION, SOLUTION INTRAVENOUS ONCE
Status: COMPLETED | OUTPATIENT
Start: 2025-05-29 | End: 2025-05-29

## 2025-05-29 RX ORDER — GLUCAGON 1 MG/ML
1 KIT INJECTION PRN
Status: DISCONTINUED | OUTPATIENT
Start: 2025-05-29 | End: 2025-05-29 | Stop reason: HOSPADM

## 2025-05-29 RX ORDER — DEXTROSE MONOHYDRATE 100 MG/ML
INJECTION, SOLUTION INTRAVENOUS CONTINUOUS PRN
Status: DISCONTINUED | OUTPATIENT
Start: 2025-05-29 | End: 2025-05-29 | Stop reason: HOSPADM

## 2025-05-29 RX ADMIN — HEPARIN SODIUM 5000 UNITS: 5000 INJECTION INTRAVENOUS; SUBCUTANEOUS at 05:07

## 2025-05-29 RX ADMIN — DOCUSATE SODIUM 100 MG: 100 CAPSULE, LIQUID FILLED ORAL at 09:38

## 2025-05-29 RX ADMIN — MEMANTINE 10 MG: 10 TABLET ORAL at 09:38

## 2025-05-29 RX ADMIN — LEVETIRACETAM 500 MG: 500 TABLET, FILM COATED ORAL at 09:38

## 2025-05-29 RX ADMIN — CARVEDILOL 6.25 MG: 6.25 TABLET, FILM COATED ORAL at 09:38

## 2025-05-29 RX ADMIN — SODIUM CHLORIDE, PRESERVATIVE FREE 10 ML: 5 INJECTION INTRAVENOUS at 09:37

## 2025-05-29 RX ADMIN — SEVELAMER CARBONATE 800 MG: 800 TABLET, FILM COATED ORAL at 09:38

## 2025-05-29 RX ADMIN — PANTOPRAZOLE SODIUM 40 MG: 40 TABLET, DELAYED RELEASE ORAL at 09:38

## 2025-05-29 RX ADMIN — INSULIN HUMAN 10 UNITS: 100 INJECTION, SOLUTION PARENTERAL at 05:40

## 2025-05-29 RX ADMIN — GABAPENTIN 300 MG: 300 CAPSULE ORAL at 09:38

## 2025-05-29 RX ADMIN — DEXTROSE MONOHYDRATE 125 ML: 10 INJECTION, SOLUTION INTRAVENOUS at 07:02

## 2025-05-29 RX ADMIN — CALCIUM GLUCONATE 1000 MG: 20 INJECTION, SOLUTION INTRAVENOUS at 05:37

## 2025-05-29 RX ADMIN — DEXTROSE MONOHYDRATE 250 ML: 100 INJECTION, SOLUTION INTRAVENOUS at 05:46

## 2025-05-29 RX ADMIN — ATORVASTATIN CALCIUM 80 MG: 80 TABLET, FILM COATED ORAL at 09:38

## 2025-05-29 RX ADMIN — METHOCARBAMOL 500 MG: 500 TABLET ORAL at 09:38

## 2025-05-29 NOTE — PROGRESS NOTES
Treatment time: 3 hours  Net UF: 2500 ml     Pre weight: 100 kg  Post weight:97.5 kg  EDW: tbd kg    Access used: cvc    Access function: good with  ml/min     Medications or blood products given: na     Regular outpatient schedule: MWF     Summary of response to treatment: Patient tolerated treatment well and without any complications. Report given to [facility].  Copy of dialysis treatment record placed in chart, to be scanned into EMR.

## 2025-05-29 NOTE — CONSULTS
Nephrology Consult Note                                                                                                                                                                                                                                                                                                                                                               Office : [phone]     Fax :[phone]    Patient's Name: Scottie Chiang  1:45 PM  5/29/2025    Reason for Consult:  ESRD on HD   Requesting Physician:  Unknown, Provider  Chief Complaint:  No chief complaint on file.      Assessment/Plan     # ESRD on HD   # Hyperkalemia   - Dialyzes every MWF outpatient via [facility]  - Last dialyzed on Tuesday 2/2 procedure (fistulagram) yesterday  - Plan for HD today for both clearance and UF  - Renally dose meds  - Monitor renal labs    # S/p R brachiobasilic AVF  - Occurred on 3/7, complicated by hematoma requiring exploration and evacuation  - S/p fistulagram on 5/28  - Vascular on board     # HTN  - BP's acceptable  - Monitor     # Anemia of chronic disease  - Hgb at goal for ESRD  - Monitor     # Acid- base/ Electrolyte imbalance   # Hyperkalemia  - As above       History of Present Ilness:    Scottie Chiang is a 55 y.o. female with a PMH of ESRD on HD, CHF, HTN, and chronic pain syndrome, who presented for planned fistulagram of her RUE AVF. She underwent this procedure on 5/28 without issue. She was admitted post-operatively and found to have hyperkalemia. We are consulted for ESRD and HD management.     Interval hx:    Seen on dialysis   Doing OK  Wants to go home       Past Medical History:   Diagnosis Date    Arthritis     Diabetes mellitus (HCC)     Diastolic CHF (HCC)     End stage renal disease (HCC)     Hemodialysis patient     MWF    History of blood transfusion     Hyperlipidemia     Hypertension     On home O2     but does not use it    JEAN (obstructive sleep apnea)     currently not on cpap    Seizures

## 2025-05-29 NOTE — PLAN OF CARE
Problem: Chronic Conditions and Co-morbidities  Goal: Patient's chronic conditions and co-morbidity symptoms are monitored and maintained or improved  5/28/2025 2053 by Renata Gray RN  Outcome: Progressing  Flowsheets (Taken 5/28/2025 2053)  Care Plan - Patient's Chronic Conditions and Co-Morbidity Symptoms are Monitored and Maintained or Improved:   Monitor and assess patient's chronic conditions and comorbid symptoms for stability, deterioration, or improvement   Collaborate with multidisciplinary team to address chronic and comorbid conditions and prevent exacerbation or deterioration   Update acute care plan with appropriate goals if chronic or comorbid symptoms are exacerbated and prevent overall improvement and discharge     Problem: Discharge Planning  Goal: Discharge to home or other facility with appropriate resources  5/28/2025 2053 by Renata Gray RN  Outcome: Progressing  Flowsheets (Taken 5/28/2025 2053)  Discharge to home or other facility with appropriate resources:   Identify barriers to discharge with patient and caregiver   Identify discharge learning needs (meds, wound care, etc)   Refer to discharge planning if patient needs post-hospital services based on physician order or complex needs related to functional status, cognitive ability or social support system     Problem: Safety - Adult  Goal: Free from fall injury  5/28/2025 2053 by Renata Gray RN  Outcome: Progressing  Flowsheets (Taken 5/28/2025 2053)  Free From Fall Injury:   Instruct family/caregiver on patient safety   Based on caregiver fall risk screen, instruct family/caregiver to ask for assistance with transferring infant if caregiver noted to have fall risk factors

## 2025-05-29 NOTE — DISCHARGE INSTRUCTIONS
Discharge Instructions:    Diet:   You may resume a regular diet.    Wound Care:   Your percutaneous incision sites at both your groin and arm will heal with time.     Activity:   Gradual activity back to your baseline    Pain management:   Unless informed of any restrictions by your primary care physician, please use your preferred over-the-counter pain reliever as your primary pain medication.     Return Precautions:   Call/ Return to ED for increased redness, worsening pain, drainage from wound, fevers, or any other concerns about your incision or post op course.    Follow up with Dr. Kumar in 1-2 weeks. Please call [phone] to schedule your appointment.

## 2025-05-29 NOTE — PROGRESS NOTES
Vascular Surgery   Daily Progress Note  Patient: Scottie Chiang      CC: ESRD    SUBJECTIVE:   Patient rested well overnight. No complaints. Eager to go home.     ROS:   A 14 point review of systems was conducted, significant findings as noted above. All other systems negative.    OBJECTIVE:    PHYSICAL EXAM:    Vitals:    05/29/25 0200 05/29/25 0400 05/29/25 0503 05/29/25 0600   BP:   (!) 142/78    Pulse: 78 78 78 76   Resp:   19    Temp:   97.9 °F (36.6 °C)    TempSrc:   Axillary    SpO2:   94%    Weight:   100 kg (220 lb 7.4 oz)    Height:           General appearance: alert, no acute distress, grooming appropriate  Chest/Lungs: No adventitious breath sounds. No increased work of breathing.   Cardiovascular: RRR  Abdomen: soft, appropriately tender, non-distended  Skin: no erythema or rashes, no cyanosis  Extremities: R groin site with no hematoma. R arm site with no hematoma.   Neuro: A&Ox3, no focal deficits, sensation intact    LABS:   Recent Labs     05/28/25  0800 05/29/25  0405   WBC 3.3* 4.3   HGB 11.1* 10.5*   HCT 33.7* 31.6*   .8* 100.9*   PLT 93* 86*        Recent Labs     05/28/25  0800 05/29/25  0405    135*   K 5.0 6.6*   CL 96* 96*   CO2 30 26   PHOS  --  6.6*   BUN 42* 60*   CREATININE 5.9* 7.8*      No results for input(s): \"AST\", \"ALT\", \"BILIDIR\", \"BILITOT\", \"ALKPHOS\" in the last 72 hours.    Invalid input(s): \"ALB\"   No results for input(s): \"LIPASE\", \"AMYLASE\" in the last 72 hours.     Recent Labs     05/28/25  0800   INR 1.08      No results for input(s): \"CKTOTAL\", \"CKMB\", \"CKMBINDEX\", \"TROPONINI\" in the last 72 hours.      ASSESSMENT & PLAN:   This is a 55 y.o. female with a diagnosis of ESRD s/p fistulagram    - MMPC   - Shifted overnight for hyperkalemia   - Nephrology consult: will plan for HD today then discharge  - Dispo: later today    Pino Russ DO  PGY2, General Surgery  05/29/25   6:14 AM   [facility]  [phone]

## 2025-05-29 NOTE — PROGRESS NOTES
Pt discharged home via sister. LDAs removed, tele box placed at , all pt belongings secured and sent with patient. AVS reviewed with patient and all questions answered. Pt wheeled downstairs in wheelchair/

## 2025-05-29 NOTE — PLAN OF CARE
Problem: Chronic Conditions and Co-morbidities  Goal: Patient's chronic conditions and co-morbidity symptoms are monitored and maintained or improved  5/29/2025 1029 by Dano Cobb RN  Outcome: Progressing  Flowsheets (Taken 5/28/2025 2053 by Renata Gray RN)  Care Plan - Patient's Chronic Conditions and Co-Morbidity Symptoms are Monitored and Maintained or Improved:   Monitor and assess patient's chronic conditions and comorbid symptoms for stability, deterioration, or improvement   Collaborate with multidisciplinary team to address chronic and comorbid conditions and prevent exacerbation or deterioration   Update acute care plan with appropriate goals if chronic or comorbid symptoms are exacerbated and prevent overall improvement and discharge  5/28/2025 2053 by Renata Gray RN  Outcome: Progressing  Flowsheets (Taken 5/28/2025 2053)  Care Plan - Patient's Chronic Conditions and Co-Morbidity Symptoms are Monitored and Maintained or Improved:   Monitor and assess patient's chronic conditions and comorbid symptoms for stability, deterioration, or improvement   Collaborate with multidisciplinary team to address chronic and comorbid conditions and prevent exacerbation or deterioration   Update acute care plan with appropriate goals if chronic or comorbid symptoms are exacerbated and prevent overall improvement and discharge     Problem: Discharge Planning  Goal: Discharge to home or other facility with appropriate resources  5/29/2025 1029 by Dano Cobb RN  Outcome: Progressing  Flowsheets (Taken 5/29/2025 1029)  Discharge to home or other facility with appropriate resources:   Identify barriers to discharge with patient and caregiver   Identify discharge learning needs (meds, wound care, etc)   Refer to discharge planning if patient needs post-hospital services based on physician order or complex needs related to functional status, cognitive ability or social support system   Arrange for needed

## 2025-05-29 NOTE — CARE COORDINATION
1:58 PM  Upon review of patients chart; pt is from home, IPTA, no current home services.   Pt will not need transport assistance at time of dc. No CM/SW needs anticipated. CM will sign off at this time. Please consult CM/SW if any dcp needs arise.    Admitted for ESRD (end stage renal disease) (HCC) [N18.6]  Arteriovenous fistula [I77.0]  Plan for dc home today    Electronically signed by Eugenio Jhonson RN, CM on 5/29/2025 at 1:58 PM.  Phone: [phone]  Fax: [phone]

## 2025-05-30 LAB
EKG ATRIAL RATE: 81 BPM
EKG DIAGNOSIS: NORMAL
EKG P AXIS: 61 DEGREES
EKG P-R INTERVAL: 212 MS
EKG Q-T INTERVAL: 352 MS
EKG QRS DURATION: 74 MS
EKG QTC CALCULATION (BAZETT): 408 MS
EKG R AXIS: -1 DEGREES
EKG T AXIS: 44 DEGREES
EKG VENTRICULAR RATE: 81 BPM

## 2025-05-30 PROCEDURE — 93010 ELECTROCARDIOGRAM REPORT: CPT | Performed by: INTERNAL MEDICINE

## 2025-06-01 LAB — ECHO BSA: 2.02 M2

## 2025-06-02 ENCOUNTER — TELEPHONE (OUTPATIENT)
Dept: CARDIOTHORACIC SURGERY | Age: 55
End: 2025-06-02

## 2025-06-02 NOTE — PRE-PROCEDURE INSTRUCTIONS
Attempted to call patient about procedure. No answer. Voicemail left. Told to be here at 1130 for procedure at 1300. NPO after midnight, but can take morning medication with sips of water, office should have told them when and if they should stop any blood thinners. To have a responsible adult be with patient take them home and stay with them afterwards, if they are not admitted to hospital afterwards. And if available bring current list of medications.

## 2025-06-03 ENCOUNTER — HOSPITAL ENCOUNTER (OUTPATIENT)
Dept: INTERVENTIONAL RADIOLOGY/VASCULAR | Age: 55
Discharge: HOME | End: 2025-06-05
Attending: SURGERY
Payer: COMMERCIAL

## 2025-06-03 VITALS
WEIGHT: 220.46 LBS | DIASTOLIC BLOOD PRESSURE: 69 MMHG | HEART RATE: 88 BPM | RESPIRATION RATE: 13 BRPM | TEMPERATURE: 98.2 F | HEIGHT: 61 IN | SYSTOLIC BLOOD PRESSURE: 139 MMHG | BODY MASS INDEX: 41.62 KG/M2 | OXYGEN SATURATION: 93 %

## 2025-06-03 LAB
ALBUMIN SERPL-MCNC: 4.3 G/DL (ref 3.4–5)
ALBUMIN/GLOB SERPL: 1.2 {RATIO} (ref 1.1–2.2)
ALP SERPL-CCNC: 146 U/L (ref 40–129)
ALT SERPL-CCNC: 29 U/L (ref 10–40)
ANION GAP SERPL CALCULATED.3IONS-SCNC: 17 MMOL/L (ref 3–16)
AST SERPL-CCNC: 30 U/L (ref 15–37)
BILIRUB SERPL-MCNC: <0.2 MG/DL (ref 0–1)
BUN SERPL-MCNC: 41 MG/DL (ref 7–20)
CALCIUM SERPL-MCNC: 9.1 MG/DL (ref 8.3–10.6)
CHLORIDE SERPL-SCNC: 97 MMOL/L (ref 99–110)
CO2 SERPL-SCNC: 23 MMOL/L (ref 21–32)
CREAT SERPL-MCNC: 7.3 MG/DL (ref 0.6–1.1)
DEPRECATED RDW RBC AUTO: 15.9 % (ref 12.4–15.4)
GFR SERPLBLD CREATININE-BSD FMLA CKD-EPI: 6 ML/MIN/{1.73_M2}
GLUCOSE SERPL-MCNC: 89 MG/DL (ref 70–99)
HCT VFR BLD AUTO: 31.7 % (ref 36–48)
HGB BLD-MCNC: 10.4 G/DL (ref 12–16)
INR PPP: 1.07 (ref 0.85–1.15)
MCH RBC QN AUTO: 32.8 PG (ref 26–34)
MCHC RBC AUTO-ENTMCNC: 32.8 G/DL (ref 31–36)
MCV RBC AUTO: 100 FL (ref 80–100)
PLATELET # BLD AUTO: 97 K/UL (ref 135–450)
PLATELET BLD QL SMEAR: ABNORMAL
PMV BLD AUTO: 10.1 FL (ref 5–10.5)
POTASSIUM SERPL-SCNC: 5.3 MMOL/L (ref 3.5–5.1)
PROT SERPL-MCNC: 8 G/DL (ref 6.4–8.2)
PROTHROMBIN TIME: 14.1 SEC (ref 11.9–14.9)
RBC # BLD AUTO: 3.17 M/UL (ref 4–5.2)
SLIDE REVIEW: ABNORMAL
SODIUM SERPL-SCNC: 137 MMOL/L (ref 136–145)
WBC # BLD AUTO: 3.8 K/UL (ref 4–11)

## 2025-06-03 PROCEDURE — 85610 PROTHROMBIN TIME: CPT

## 2025-06-03 PROCEDURE — 85027 COMPLETE CBC AUTOMATED: CPT

## 2025-06-03 PROCEDURE — C1887 CATHETER, GUIDING: HCPCS

## 2025-06-03 PROCEDURE — 75774 ARTERY X-RAY EACH VESSEL: CPT

## 2025-06-03 PROCEDURE — 80053 COMPREHEN METABOLIC PANEL: CPT

## 2025-06-03 PROCEDURE — 2709999900 HC NON-CHARGEABLE SUPPLY

## 2025-06-03 PROCEDURE — 75820 VEIN X-RAY ARM/LEG: CPT

## 2025-06-03 PROCEDURE — 75827 VEIN X-RAY CHEST: CPT

## 2025-06-03 PROCEDURE — 36010 PLACE CATHETER IN VEIN: CPT

## 2025-06-03 PROCEDURE — 36005 INJECTION EXT VENOGRAPHY: CPT

## 2025-06-03 PROCEDURE — 7100000010 HC PHASE II RECOVERY - FIRST 15 MIN

## 2025-06-03 PROCEDURE — 7100000011 HC PHASE II RECOVERY - ADDTL 15 MIN

## 2025-06-03 PROCEDURE — C1894 INTRO/SHEATH, NON-LASER: HCPCS

## 2025-06-03 PROCEDURE — 6360000004 HC RX CONTRAST MEDICATION: Performed by: STUDENT IN AN ORGANIZED HEALTH CARE EDUCATION/TRAINING PROGRAM

## 2025-06-03 PROCEDURE — C1769 GUIDE WIRE: HCPCS

## 2025-06-03 PROCEDURE — 6360000002 HC RX W HCPCS: Performed by: STUDENT IN AN ORGANIZED HEALTH CARE EDUCATION/TRAINING PROGRAM

## 2025-06-03 PROCEDURE — 99152 MOD SED SAME PHYS/QHP 5/>YRS: CPT

## 2025-06-03 PROCEDURE — 76937 US GUIDE VASCULAR ACCESS: CPT

## 2025-06-03 PROCEDURE — 99153 MOD SED SAME PHYS/QHP EA: CPT

## 2025-06-03 RX ORDER — LIDOCAINE HYDROCHLORIDE 10 MG/ML
INJECTION, SOLUTION EPIDURAL; INFILTRATION; INTRACAUDAL; PERINEURAL PRN
Status: COMPLETED | OUTPATIENT
Start: 2025-06-03 | End: 2025-06-03

## 2025-06-03 RX ORDER — IOPAMIDOL 612 MG/ML
INJECTION, SOLUTION INTRAVASCULAR PRN
Status: COMPLETED | OUTPATIENT
Start: 2025-06-03 | End: 2025-06-03

## 2025-06-03 RX ORDER — DIPHENHYDRAMINE HYDROCHLORIDE 50 MG/ML
INJECTION, SOLUTION INTRAMUSCULAR; INTRAVENOUS PRN
Status: COMPLETED | OUTPATIENT
Start: 2025-06-03 | End: 2025-06-03

## 2025-06-03 RX ORDER — MIDAZOLAM HYDROCHLORIDE 5 MG/ML
INJECTION, SOLUTION INTRAMUSCULAR; INTRAVENOUS PRN
Status: COMPLETED | OUTPATIENT
Start: 2025-06-03 | End: 2025-06-03

## 2025-06-03 RX ORDER — FENTANYL CITRATE 50 UG/ML
INJECTION, SOLUTION INTRAMUSCULAR; INTRAVENOUS PRN
Status: COMPLETED | OUTPATIENT
Start: 2025-06-03 | End: 2025-06-03

## 2025-06-03 RX ADMIN — LIDOCAINE HYDROCHLORIDE 10 ML: 10 INJECTION, SOLUTION EPIDURAL; INFILTRATION; INTRACAUDAL; PERINEURAL at 15:02

## 2025-06-03 RX ADMIN — FENTANYL CITRATE 50 MCG: 50 INJECTION, SOLUTION INTRAMUSCULAR; INTRAVENOUS at 14:46

## 2025-06-03 RX ADMIN — IOPAMIDOL 60 ML: 612 INJECTION, SOLUTION INTRAVENOUS at 15:59

## 2025-06-03 RX ADMIN — FENTANYL CITRATE 50 MCG: 50 INJECTION, SOLUTION INTRAMUSCULAR; INTRAVENOUS at 14:38

## 2025-06-03 RX ADMIN — MIDAZOLAM HYDROCHLORIDE 1 MG: 5 INJECTION, SOLUTION INTRAMUSCULAR; INTRAVENOUS at 14:38

## 2025-06-03 RX ADMIN — LIDOCAINE HYDROCHLORIDE 5 ML: 10 INJECTION, SOLUTION EPIDURAL; INFILTRATION; INTRACAUDAL; PERINEURAL at 14:38

## 2025-06-03 RX ADMIN — MIDAZOLAM HYDROCHLORIDE 1 MG: 5 INJECTION, SOLUTION INTRAMUSCULAR; INTRAVENOUS at 14:47

## 2025-06-03 RX ADMIN — DIPHENHYDRAMINE HYDROCHLORIDE 50 MG: 50 INJECTION, SOLUTION INTRAMUSCULAR; INTRAVENOUS at 15:08

## 2025-06-03 NOTE — DISCHARGE INSTRUCTIONS
[facility]  Cardiovascular Special Procedures  General Discharge Instructions    PROCEDURE: Vein Angiogram with Right Brachial (inner elbow) and Right Femoral (groin) Access Sites    _x__ You may be drowsy or lightheaded after receiving sedation. DO NOT operate a vehicle (automobile, bicycle, motorcycle, machinery, or power tools), make any important decisions or sign any important/legal documents, or drink alcoholic beverages for the next 24 hours  _x__ We strongly suggest that a responsible adult be with you for the next 24 hours for your protection and safety  _x__ If the intravenous catheter site is painful, apply warm wet compresses on the site until the soreness is relieved and elevate the arm above the heart.  Call your physician if no improvement in 2 to 3 days    DIETARY INSTRUCTIONS:    _x__ Drink extra fluids over the next 24 hours (If not contraindicated by illness or by physician order)  ____ Start with clear liquids and progress to normal diet as you feel like eating.  If you experience nausea or repeated episodes of vomiting, which persist beyond 12-24 hours, notify your doctor        _x__ Resume your previous diet      MEDICATION INSTRUCTIONS:    _x__ See Medication Reconciliation Sheet      SPECIAL INSTRUCTIONS: Okay to return to dialysis on next scheduled day.      FOLLOW-UP APPOINTMENT    Follow up with NP, Denice Ram in 1 week. Denice will call you within the week to check up on you.     Follow up with MD as directed.    Belongings returned to patient and/or family: Yes.    The Discharge Instructions have been explained to me. I understand and can verbalize these instructions.

## 2025-06-03 NOTE — PROGRESS NOTES
Cath Lab Pre Procedure Flowsheet    Plan of Care:     Hemodynamics and cardiac rhythm will remain stable.   Comfort level will be maintained.   Respiratory function will remain adequate.   Pt/family will verbalize understanding of the procedure.   Procedure will be tolerated without complications.   Patient will recover from procedure without complications.   ID armband on patient and identification verified.   Informed consent obtained.   Non invasive blood pressure cuff applied, monitoring initiated.   EKG pads and pulse oximeter applied, monitoring initiated.   Instructions given. Patient and / or family verbalize understanding.   H&P will be documented by physician in [facility].     Pre-procedure:    NPO Status: Pt has been NPO since midnight. .    Contrast / IV Dye Allergy: None    Pregnancy Test: No.    Prep Sites:     Mehdi's Test: N/A    Pulses:  Right Radial 2, Left Radial 1     Anticoagulants: None.     Antiplatelets: None.     Chief Complaint:   Vein Angiogram    Diabetic: No    Pre EKG Rhythm:  Sinus Rhythm    Pre SBP: 165    IV access:  USG #22 LFA    Pre-procedure blood work collected by: PICC Team    NIH Scale: N/A

## 2025-06-03 NOTE — H&P
Patient:  Scottie Chiang   :   1970      Relevant clinical history, particularly as it involves the pending procedure, was reviewed and discussed.    The procedure including risks and benefits was discussed at length with the patient (or designated family member) and all questions were answered.  Informed consent to proceed with the procedure was given.    Vital signs were monitored and documented by the Radiology nurse.    Targeted physical examination  Heart : regular rate and rhythm  Lungs : clear, breathing easily  Condition : stable    [facility] nurses notes reviewed and agreed.    Past Medical History:        Diagnosis Date    Arthritis     Diabetes mellitus (HCC)     Diastolic CHF (HCC)     End stage renal disease (HCC)     Hemodialysis patient     MWF    History of blood transfusion     Hyperlipidemia     Hypertension     On home O2     but does not use it    JEAN (obstructive sleep apnea)     currently not on cpap    Seizures (HCC)        Past Surgical History:           Procedure Laterality Date    CATARACT REMOVAL Bilateral      SECTION      DIALYSIS CATHETER INSERTION N/A 2021    LAPAROSCOPIC PERITONEAL DIALYSIS CATHETER INSERTION, OMENTOPLEXY performed by Henri Elizalde DO at [facility] OR    DIALYSIS CATHETER INSERTION N/A 2022    LAPAROSCOPIC PERITONEAL DIALYSIS CATHETER PLACEMENT performed by Henri Elizalde DO at [facility] OR    DIALYSIS CATHETER REMOVAL N/A 2021    CATHETER REMOVAL PERITONEAL DIALYSIS performed by Henri Elizalde DO at [facility] OR    DIALYSIS CATHETER REMOVAL N/A 2022    PERITONEAL DIALYSIS CATHETER REMOVAL performed by Henri Elizalde DO at [facility] OR    DIALYSIS FISTULA CREATION Right 2024    RIGHT ARTERIOVENOUS FISTULA CREATION performed by Valeria Kumar MD at [facility] OR    DIALYSIS FISTULA CREATION Right 3/7/2025    RIGHT ARTERIOVENOUS FISTULA CREATION performed by Valeria Kumar MD at [facility] OR    DIALYSIS FISTULA CREATION Right 3/7/2025    RIGHT UPPER EXTREMITY HEMATOMA

## 2025-06-03 NOTE — PROGRESS NOTES
Patient returns from procedure. Patient alert, awake, and oriented. No complaints of pain or discomfort. Patient does state that she will need to turn on her side and will not be able to lie flat for recovery duration. Modifications made. Patient requests both food and drink and has no complaints of nausea/vomiting - writer will place order.    Site: Right brachial - Border gauze dressing, clean, dry, and intact. No oozing, bleeding, or swelling noted. Right femoral - Border gauze dressing, clean, dry, and intact. No oozing, bleeding, or swelling noted.     Writer reminded patient to keep head down and not to move or bend right leg while lying flat. Patient verbalized understanding.

## 2025-06-03 NOTE — PROCEDURES
Interventional Radiology Post Procedure    Date: 6/3/2025    Physician: Armando Begum MD    Pre-op Diagnosis: right brachiocephalic occlusion    Post-op Diagnosis: same    Variation from Planned Procedure: None       Findings: unsuccessful recanalization. Will plan for repeat procedure with general anesthesia.    Patient condition: Stable    Estimated Blood Loss: 5 cc    Specimens:  none      Signed,  Jeffrey Begum MD  4:00 PM  6/3/2025

## 2025-06-03 NOTE — PROGRESS NOTES
Vascular Access Team called to assist with PIV.  See Flowsheet for USG PIV information, below is ultrasound evaluation of vein:

## 2025-06-04 ENCOUNTER — TELEPHONE (OUTPATIENT)
Dept: VASCULAR SURGERY | Age: 55
End: 2025-06-04

## 2025-06-05 LAB — ECHO BSA: 2.07 M2

## 2025-06-10 ENCOUNTER — TELEPHONE (OUTPATIENT)
Dept: VASCULAR SURGERY | Age: 55
End: 2025-06-10

## 2025-06-10 NOTE — TELEPHONE ENCOUNTER
Spoke with Ms. Chiang, we have her post-op appt scheduled for 6/19 at 1048q. She also asked about procedure Dr. Begum was going to repeat with general anesthesia. VM was left for GIULIANO Galdamez of IR to clarify this and status.

## 2025-06-11 ENCOUNTER — TELEPHONE (OUTPATIENT)
Dept: VASCULAR SURGERY | Age: 55
End: 2025-06-11

## 2025-06-11 DIAGNOSIS — I87.1 STENOSIS OF BRACHIOCEPHALIC VEIN: Primary | ICD-10-CM

## 2025-06-11 NOTE — TELEPHONE ENCOUNTER
Spoke with GIULIANO Galdamez for IR at [facility], she will speak with Dr. Begum and let me know what the plan is for Ms. Chiang.

## 2025-06-17 ENCOUNTER — OFFICE VISIT (OUTPATIENT)
Dept: PAIN MANAGEMENT | Age: 55
End: 2025-06-17
Payer: COMMERCIAL

## 2025-06-17 VITALS
DIASTOLIC BLOOD PRESSURE: 74 MMHG | SYSTOLIC BLOOD PRESSURE: 142 MMHG | BODY MASS INDEX: 40.06 KG/M2 | WEIGHT: 212 LBS | OXYGEN SATURATION: 96 % | HEART RATE: 74 BPM

## 2025-06-17 DIAGNOSIS — Z99.2 ESRD (END STAGE RENAL DISEASE) ON DIALYSIS (HCC): ICD-10-CM

## 2025-06-17 DIAGNOSIS — Z51.81 ENCOUNTER FOR THERAPEUTIC DRUG MONITORING: ICD-10-CM

## 2025-06-17 DIAGNOSIS — Z96.651 S/P TOTAL KNEE ARTHROPLASTY, RIGHT: ICD-10-CM

## 2025-06-17 DIAGNOSIS — R11.0 CHRONIC NAUSEA: ICD-10-CM

## 2025-06-17 DIAGNOSIS — N18.6 ESRD (END STAGE RENAL DISEASE) ON DIALYSIS (HCC): ICD-10-CM

## 2025-06-17 DIAGNOSIS — M47.812 CERVICAL SPONDYLOSIS: ICD-10-CM

## 2025-06-17 DIAGNOSIS — I50.43 CHF (CONGESTIVE HEART FAILURE), NYHA CLASS I, ACUTE ON CHRONIC, COMBINED (HCC): ICD-10-CM

## 2025-06-17 DIAGNOSIS — M17.11 PRIMARY LOCALIZED OSTEOARTHROSIS OF THE KNEE, RIGHT: ICD-10-CM

## 2025-06-17 DIAGNOSIS — G43.909 EPISODIC MIGRAINE: ICD-10-CM

## 2025-06-17 DIAGNOSIS — G89.4 CHRONIC PAIN SYNDROME: ICD-10-CM

## 2025-06-17 DIAGNOSIS — M47.817 LUMBOSACRAL SPONDYLOSIS WITHOUT MYELOPATHY: Primary | ICD-10-CM

## 2025-06-17 PROBLEM — H33.20 RETINAL DETACHMENT: Status: ACTIVE | Noted: 2017-04-14

## 2025-06-17 PROCEDURE — G8417 CALC BMI ABV UP PARAM F/U: HCPCS | Performed by: NURSE PRACTITIONER

## 2025-06-17 PROCEDURE — 99213 OFFICE O/P EST LOW 20 MIN: CPT | Performed by: NURSE PRACTITIONER

## 2025-06-17 PROCEDURE — 3077F SYST BP >= 140 MM HG: CPT | Performed by: NURSE PRACTITIONER

## 2025-06-17 PROCEDURE — G8427 DOCREV CUR MEDS BY ELIG CLIN: HCPCS | Performed by: NURSE PRACTITIONER

## 2025-06-17 PROCEDURE — 3078F DIAST BP <80 MM HG: CPT | Performed by: NURSE PRACTITIONER

## 2025-06-17 PROCEDURE — 1036F TOBACCO NON-USER: CPT | Performed by: NURSE PRACTITIONER

## 2025-06-17 PROCEDURE — 3017F COLORECTAL CA SCREEN DOC REV: CPT | Performed by: NURSE PRACTITIONER

## 2025-06-17 RX ORDER — METHYLPREDNISOLONE 4 MG/1
TABLET ORAL
Qty: 21 TABLET | Refills: 0 | Status: SHIPPED | OUTPATIENT
Start: 2025-06-17 | End: 2025-06-23

## 2025-06-17 RX ORDER — OXYCODONE AND ACETAMINOPHEN 10; 325 MG/1; MG/1
1 TABLET ORAL EVERY 6 HOURS PRN
Qty: 112 TABLET | Refills: 0 | Status: SHIPPED | OUTPATIENT
Start: 2025-06-17 | End: 2025-07-15

## 2025-06-17 NOTE — PROGRESS NOTES
Scottie Chiang  1970  [id number]      HISTORY OF PRESENT ILLNESS: Ms. Chiang is a 55 y.o. female returns for a follow up visit for pain management  She has a diagnosis of   1. Lumbosacral spondylosis without myelopathy    2. Primary localized osteoarthrosis of the knee, right    3. Cervical spondylosis    4. Episodic migraine    5. S/P total knee arthroplasty, right    6. CHF (congestive heart failure), NYHA class I, acute on chronic, combined ([facility])    7. ESRD (end stage renal disease) on dialysis ([facility])    8. Chronic pain syndrome    9. Chronic nausea    .      New Medications since Last Office visit have been reviewed with patient.     As per Information Obtained from the PADT (Patient Assessment and Documentation Tool)    She complains of pain in the head, neck, upper back, mid back, lower back, bilateral knees. She rates the pain 8/10 and describes it as aching. Current treatment regimen has helped relieve about 40% of the pain since beginning treatment plan.  She denies any side effects from the current pain regimen. Patient reports that since implementation of their treatment plan; their physical functioning is unchanged, family/social relationships are unchanged, mood is unchanged sleep patterns are worse, and that the overall functioning is better.  Patient denies/admits that any of the above have changed since last office visit. Patient denies misusing/abusing her narcotic pain medications or using any illegal drugs.      Upon obtaining medical history from Ms. Chiang    ALLERGIES: Patients list of allergies were reviewed     MEDICATIONS: Ms. Key list of medications were reviewed.Her current medications are   Outpatient Medications Prior to Visit   Medication Sig Dispense Refill    oxyCODONE-acetaminophen (PERCOCET)  MG per tablet Take 1 tablet by mouth every 4 hours as needed for Pain.      B Complex-C-Folic Acid (DIALYVITE TABLET) TABS TAKE 1 TABLET BY MOUTH EVERY DAY 30 tablet 11    ondansetron

## 2025-06-27 DIAGNOSIS — M47.817 LUMBOSACRAL SPONDYLOSIS WITHOUT MYELOPATHY: ICD-10-CM

## 2025-06-27 DIAGNOSIS — M17.11 PRIMARY LOCALIZED OSTEOARTHROSIS OF THE KNEE, RIGHT: ICD-10-CM

## 2025-06-27 DIAGNOSIS — M47.812 CERVICAL SPONDYLOSIS: ICD-10-CM

## 2025-06-27 DIAGNOSIS — G43.909 EPISODIC MIGRAINE: ICD-10-CM

## 2025-06-27 DIAGNOSIS — I50.43 CHF (CONGESTIVE HEART FAILURE), NYHA CLASS I, ACUTE ON CHRONIC, COMBINED (HCC): ICD-10-CM

## 2025-06-27 DIAGNOSIS — Z96.651 S/P TOTAL KNEE ARTHROPLASTY, RIGHT: ICD-10-CM

## 2025-06-27 DIAGNOSIS — Z51.81 ENCOUNTER FOR THERAPEUTIC DRUG MONITORING: ICD-10-CM

## 2025-07-02 RX ORDER — METHOCARBAMOL 500 MG/1
500 TABLET, FILM COATED ORAL 2 TIMES DAILY
Qty: 60 TABLET | Refills: 0 | Status: SHIPPED | OUTPATIENT
Start: 2025-07-02 | End: 2025-07-29 | Stop reason: SDUPTHER

## 2025-07-13 ENCOUNTER — HOSPITAL ENCOUNTER (INPATIENT)
Age: 55
LOS: 1 days | Discharge: HOME | DRG: 640 | End: 2025-07-14
Attending: EMERGENCY MEDICINE | Admitting: INTERNAL MEDICINE
Payer: COMMERCIAL

## 2025-07-13 ENCOUNTER — APPOINTMENT (OUTPATIENT)
Dept: GENERAL RADIOLOGY | Age: 55
DRG: 640 | End: 2025-07-13
Payer: COMMERCIAL

## 2025-07-13 DIAGNOSIS — Z99.2 ESRD NEEDING DIALYSIS (HCC): Primary | ICD-10-CM

## 2025-07-13 DIAGNOSIS — N18.6 ESRD NEEDING DIALYSIS (HCC): Primary | ICD-10-CM

## 2025-07-13 DIAGNOSIS — E87.5 HYPERKALEMIA: ICD-10-CM

## 2025-07-13 LAB
ANION GAP SERPL CALCULATED.3IONS-SCNC: 21 MMOL/L (ref 3–16)
BASE EXCESS BLDV CALC-SCNC: -5 MMOL/L (ref -2–3)
BASOPHILS # BLD: 0 K/UL (ref 0–0.2)
BASOPHILS NFR BLD: 0.4 %
BUN SERPL-MCNC: 125 MG/DL (ref 7–20)
CALCIUM SERPL-MCNC: 8.5 MG/DL (ref 8.3–10.6)
CHLORIDE SERPL-SCNC: 95 MMOL/L (ref 99–110)
CO2 BLDV-SCNC: 25 MMOL/L
CO2 SERPL-SCNC: 20 MMOL/L (ref 21–32)
COHGB MFR BLDV: 1.5 % (ref 0–1.5)
CREAT SERPL-MCNC: 13.7 MG/DL (ref 0.6–1.1)
DEPRECATED RDW RBC AUTO: 19.6 % (ref 12.4–15.4)
DO-HGB MFR BLDV: 45.8 %
EOSINOPHIL # BLD: 0.1 K/UL (ref 0–0.6)
EOSINOPHIL NFR BLD: 2.5 %
GFR SERPLBLD CREATININE-BSD FMLA CKD-EPI: 3 ML/MIN/{1.73_M2}
GLUCOSE BLD-MCNC: 271 MG/DL (ref 70–99)
GLUCOSE BLD-MCNC: 294 MG/DL (ref 70–99)
GLUCOSE SERPL-MCNC: 292 MG/DL (ref 70–99)
HCO3 BLDV-SCNC: 22.8 MMOL/L (ref 24–28)
HCT VFR BLD AUTO: 35.8 % (ref 36–48)
HGB BLD-MCNC: 11.9 G/DL (ref 12–16)
LYMPHOCYTES # BLD: 0.9 K/UL (ref 1–5.1)
LYMPHOCYTES NFR BLD: 20.1 %
MCH RBC QN AUTO: 34.4 PG (ref 26–34)
MCHC RBC AUTO-ENTMCNC: 33.2 G/DL (ref 31–36)
MCV RBC AUTO: 103.8 FL (ref 80–100)
METHGB MFR BLDV: 0.6 % (ref 0–1.5)
MONOCYTES # BLD: 0.4 K/UL (ref 0–1.3)
MONOCYTES NFR BLD: 9.3 %
NEUTROPHILS # BLD: 2.9 K/UL (ref 1.7–7.7)
NEUTROPHILS NFR BLD: 67.7 %
NT-PROBNP SERPL-MCNC: 9955 PG/ML (ref 0–124)
PCO2 BLDV: 53.8 MMHG (ref 41–51)
PERFORMED ON: ABNORMAL
PERFORMED ON: ABNORMAL
PH BLDV: 7.24 [PH] (ref 7.35–7.45)
PLATELET # BLD AUTO: 151 K/UL (ref 135–450)
PMV BLD AUTO: 9.9 FL (ref 5–10.5)
PO2 BLDV: 35.8 MMHG (ref 25–40)
POTASSIUM SERPL-SCNC: 7.6 MMOL/L (ref 3.5–5.1)
RBC # BLD AUTO: 3.44 M/UL (ref 4–5.2)
SAO2 % BLDV: 53 %
SODIUM SERPL-SCNC: 136 MMOL/L (ref 136–145)
TROPONIN, HIGH SENSITIVITY: 171 NG/L (ref 0–14)
TROPONIN, HIGH SENSITIVITY: 187 NG/L (ref 0–14)
WBC # BLD AUTO: 4.2 K/UL (ref 4–11)

## 2025-07-13 PROCEDURE — 96374 THER/PROPH/DIAG INJ IV PUSH: CPT

## 2025-07-13 PROCEDURE — 99285 EMERGENCY DEPT VISIT HI MDM: CPT

## 2025-07-13 PROCEDURE — 83880 ASSAY OF NATRIURETIC PEPTIDE: CPT

## 2025-07-13 PROCEDURE — 94761 N-INVAS EAR/PLS OXIMETRY MLT: CPT

## 2025-07-13 PROCEDURE — 90935 HEMODIALYSIS ONE EVALUATION: CPT

## 2025-07-13 PROCEDURE — 82803 BLOOD GASES ANY COMBINATION: CPT

## 2025-07-13 PROCEDURE — 87340 HEPATITIS B SURFACE AG IA: CPT

## 2025-07-13 PROCEDURE — 6360000002 HC RX W HCPCS: Performed by: PHYSICIAN ASSISTANT

## 2025-07-13 PROCEDURE — 2500000003 HC RX 250 WO HCPCS: Performed by: PHYSICIAN ASSISTANT

## 2025-07-13 PROCEDURE — 93005 ELECTROCARDIOGRAM TRACING: CPT | Performed by: EMERGENCY MEDICINE

## 2025-07-13 PROCEDURE — 2580000003 HC RX 258: Performed by: PHYSICIAN ASSISTANT

## 2025-07-13 PROCEDURE — 86706 HEP B SURFACE ANTIBODY: CPT

## 2025-07-13 PROCEDURE — 71046 X-RAY EXAM CHEST 2 VIEWS: CPT

## 2025-07-13 PROCEDURE — 2000000000 HC ICU R&B

## 2025-07-13 PROCEDURE — 6370000000 HC RX 637 (ALT 250 FOR IP): Performed by: PHYSICIAN ASSISTANT

## 2025-07-13 PROCEDURE — 2500000003 HC RX 250 WO HCPCS

## 2025-07-13 PROCEDURE — 84484 ASSAY OF TROPONIN QUANT: CPT

## 2025-07-13 PROCEDURE — 80048 BASIC METABOLIC PNL TOTAL CA: CPT

## 2025-07-13 PROCEDURE — 85025 COMPLETE CBC W/AUTO DIFF WBC: CPT

## 2025-07-13 PROCEDURE — 5A1D70Z PERFORMANCE OF URINARY FILTRATION, INTERMITTENT, LESS THAN 6 HOURS PER DAY: ICD-10-PCS | Performed by: INTERNAL MEDICINE

## 2025-07-13 PROCEDURE — 2700000000 HC OXYGEN THERAPY PER DAY

## 2025-07-13 RX ORDER — LEVETIRACETAM 500 MG/1
500 TABLET ORAL DAILY
Status: DISCONTINUED | OUTPATIENT
Start: 2025-07-14 | End: 2025-07-14 | Stop reason: HOSPADM

## 2025-07-13 RX ORDER — DEXTROSE MONOHYDRATE 100 MG/ML
INJECTION, SOLUTION INTRAVENOUS CONTINUOUS PRN
Status: DISCONTINUED | OUTPATIENT
Start: 2025-07-13 | End: 2025-07-14 | Stop reason: HOSPADM

## 2025-07-13 RX ORDER — LEVETIRACETAM 250 MG/1
250 TABLET ORAL
Status: DISCONTINUED | OUTPATIENT
Start: 2025-07-14 | End: 2025-07-14 | Stop reason: HOSPADM

## 2025-07-13 RX ORDER — CARVEDILOL 6.25 MG/1
6.25 TABLET ORAL 2 TIMES DAILY WITH MEALS
Status: DISCONTINUED | OUTPATIENT
Start: 2025-07-14 | End: 2025-07-14 | Stop reason: HOSPADM

## 2025-07-13 RX ORDER — OXYCODONE AND ACETAMINOPHEN 10; 325 MG/1; MG/1
1 TABLET ORAL EVERY 6 HOURS PRN
Refills: 0 | Status: DISCONTINUED | OUTPATIENT
Start: 2025-07-13 | End: 2025-07-14 | Stop reason: HOSPADM

## 2025-07-13 RX ORDER — DOCUSATE SODIUM 100 MG/1
100 CAPSULE, LIQUID FILLED ORAL 2 TIMES DAILY
Status: DISCONTINUED | OUTPATIENT
Start: 2025-07-13 | End: 2025-07-14 | Stop reason: HOSPADM

## 2025-07-13 RX ORDER — TORSEMIDE 100 MG/1
100 TABLET ORAL 2 TIMES DAILY
Status: DISCONTINUED | OUTPATIENT
Start: 2025-07-13 | End: 2025-07-14 | Stop reason: HOSPADM

## 2025-07-13 RX ORDER — CALCIUM GLUCONATE 20 MG/ML
1000 INJECTION, SOLUTION INTRAVENOUS ONCE
Status: COMPLETED | OUTPATIENT
Start: 2025-07-13 | End: 2025-07-13

## 2025-07-13 RX ORDER — GLUCAGON 1 MG/ML
1 KIT INJECTION PRN
Status: DISCONTINUED | OUTPATIENT
Start: 2025-07-13 | End: 2025-07-14 | Stop reason: HOSPADM

## 2025-07-13 RX ORDER — ACETAMINOPHEN 650 MG/1
650 SUPPOSITORY RECTAL EVERY 6 HOURS PRN
Status: DISCONTINUED | OUTPATIENT
Start: 2025-07-13 | End: 2025-07-14 | Stop reason: HOSPADM

## 2025-07-13 RX ORDER — GABAPENTIN 300 MG/1
300 CAPSULE ORAL 2 TIMES DAILY
Status: DISCONTINUED | OUTPATIENT
Start: 2025-07-13 | End: 2025-07-14 | Stop reason: HOSPADM

## 2025-07-13 RX ORDER — SODIUM CHLORIDE 0.9 % (FLUSH) 0.9 %
5-40 SYRINGE (ML) INJECTION EVERY 12 HOURS SCHEDULED
Status: DISCONTINUED | OUTPATIENT
Start: 2025-07-13 | End: 2025-07-14 | Stop reason: HOSPADM

## 2025-07-13 RX ORDER — ACETAMINOPHEN 325 MG/1
650 TABLET ORAL EVERY 6 HOURS PRN
Status: DISCONTINUED | OUTPATIENT
Start: 2025-07-13 | End: 2025-07-14 | Stop reason: HOSPADM

## 2025-07-13 RX ORDER — POTASSIUM CHLORIDE 29.8 MG/ML
20 INJECTION INTRAVENOUS PRN
Status: DISCONTINUED | OUTPATIENT
Start: 2025-07-13 | End: 2025-07-14 | Stop reason: HOSPADM

## 2025-07-13 RX ORDER — ALBUTEROL SULFATE 0.83 MG/ML
10 SOLUTION RESPIRATORY (INHALATION) ONCE
Status: DISCONTINUED | OUTPATIENT
Start: 2025-07-13 | End: 2025-07-14

## 2025-07-13 RX ORDER — MEMANTINE HYDROCHLORIDE 10 MG/1
10 TABLET ORAL 2 TIMES DAILY
Status: DISCONTINUED | OUTPATIENT
Start: 2025-07-13 | End: 2025-07-14 | Stop reason: HOSPADM

## 2025-07-13 RX ORDER — PANTOPRAZOLE SODIUM 40 MG/1
40 TABLET, DELAYED RELEASE ORAL
Status: DISCONTINUED | OUTPATIENT
Start: 2025-07-14 | End: 2025-07-14 | Stop reason: HOSPADM

## 2025-07-13 RX ORDER — CLONIDINE HYDROCHLORIDE 0.1 MG/1
0.1 TABLET ORAL ONCE
Status: COMPLETED | OUTPATIENT
Start: 2025-07-13 | End: 2025-07-13

## 2025-07-13 RX ORDER — ATORVASTATIN CALCIUM 80 MG/1
80 TABLET, FILM COATED ORAL DAILY
Status: DISCONTINUED | OUTPATIENT
Start: 2025-07-14 | End: 2025-07-14 | Stop reason: HOSPADM

## 2025-07-13 RX ORDER — POTASSIUM CHLORIDE 7.45 MG/ML
10 INJECTION INTRAVENOUS PRN
Status: DISCONTINUED | OUTPATIENT
Start: 2025-07-13 | End: 2025-07-14 | Stop reason: HOSPADM

## 2025-07-13 RX ORDER — HEPARIN SODIUM 5000 [USP'U]/ML
5000 INJECTION, SOLUTION INTRAVENOUS; SUBCUTANEOUS EVERY 8 HOURS SCHEDULED
Status: DISCONTINUED | OUTPATIENT
Start: 2025-07-13 | End: 2025-07-14 | Stop reason: HOSPADM

## 2025-07-13 RX ORDER — MAGNESIUM SULFATE IN WATER 40 MG/ML
2000 INJECTION, SOLUTION INTRAVENOUS PRN
Status: DISCONTINUED | OUTPATIENT
Start: 2025-07-13 | End: 2025-07-14 | Stop reason: HOSPADM

## 2025-07-13 RX ORDER — SODIUM CHLORIDE 9 MG/ML
INJECTION, SOLUTION INTRAVENOUS PRN
Status: DISCONTINUED | OUTPATIENT
Start: 2025-07-13 | End: 2025-07-14 | Stop reason: HOSPADM

## 2025-07-13 RX ORDER — INDOMETHACIN 25 MG/1
50 CAPSULE ORAL ONCE
Status: COMPLETED | OUTPATIENT
Start: 2025-07-13 | End: 2025-07-13

## 2025-07-13 RX ORDER — SODIUM CHLORIDE 0.9 % (FLUSH) 0.9 %
5-40 SYRINGE (ML) INJECTION PRN
Status: DISCONTINUED | OUTPATIENT
Start: 2025-07-13 | End: 2025-07-14 | Stop reason: HOSPADM

## 2025-07-13 RX ORDER — POLYETHYLENE GLYCOL 3350 17 G/17G
17 POWDER, FOR SOLUTION ORAL DAILY PRN
Status: DISCONTINUED | OUTPATIENT
Start: 2025-07-13 | End: 2025-07-14 | Stop reason: HOSPADM

## 2025-07-13 RX ORDER — HEPARIN SODIUM 1000 [USP'U]/ML
3600 INJECTION, SOLUTION INTRAVENOUS; SUBCUTANEOUS PRN
Status: DISCONTINUED | OUTPATIENT
Start: 2025-07-13 | End: 2025-07-14 | Stop reason: HOSPADM

## 2025-07-13 RX ORDER — CALCITRIOL 0.25 UG/1
0.25 CAPSULE, LIQUID FILLED ORAL
Status: DISCONTINUED | OUTPATIENT
Start: 2025-07-14 | End: 2025-07-14 | Stop reason: HOSPADM

## 2025-07-13 RX ORDER — FUROSEMIDE 10 MG/ML
40 INJECTION INTRAMUSCULAR; INTRAVENOUS ONCE
Status: COMPLETED | OUTPATIENT
Start: 2025-07-13 | End: 2025-07-13

## 2025-07-13 RX ORDER — METHOCARBAMOL 500 MG/1
500 TABLET, FILM COATED ORAL 2 TIMES DAILY
Status: DISCONTINUED | OUTPATIENT
Start: 2025-07-13 | End: 2025-07-14 | Stop reason: HOSPADM

## 2025-07-13 RX ORDER — MECLIZINE HYDROCHLORIDE 25 MG/1
12.5 TABLET ORAL 3 TIMES DAILY PRN
Status: DISCONTINUED | OUTPATIENT
Start: 2025-07-13 | End: 2025-07-14

## 2025-07-13 RX ORDER — MIDODRINE HYDROCHLORIDE 5 MG/1
10 TABLET ORAL DAILY PRN
Status: DISCONTINUED | OUTPATIENT
Start: 2025-07-14 | End: 2025-07-14 | Stop reason: HOSPADM

## 2025-07-13 RX ORDER — SEVELAMER CARBONATE 800 MG/1
1600 TABLET, FILM COATED ORAL 3 TIMES DAILY
Status: DISCONTINUED | OUTPATIENT
Start: 2025-07-14 | End: 2025-07-14

## 2025-07-13 RX ADMIN — SODIUM BICARBONATE 50 MEQ: 84 INJECTION, SOLUTION INTRAVENOUS at 20:53

## 2025-07-13 RX ADMIN — SODIUM CHLORIDE, PRESERVATIVE FREE 10 ML: 5 INJECTION INTRAVENOUS at 22:30

## 2025-07-13 RX ADMIN — DEXTROSE MONOHYDRATE 250 ML: 100 INJECTION, SOLUTION INTRAVENOUS at 21:32

## 2025-07-13 RX ADMIN — INSULIN HUMAN 10 UNITS: 100 INJECTION, SOLUTION PARENTERAL at 21:29

## 2025-07-13 RX ADMIN — CALCIUM GLUCONATE 1000 MG: 20 INJECTION, SOLUTION INTRAVENOUS at 21:33

## 2025-07-13 RX ADMIN — FUROSEMIDE 40 MG: 10 INJECTION, SOLUTION INTRAMUSCULAR; INTRAVENOUS at 21:32

## 2025-07-13 RX ADMIN — CLONIDINE HYDROCHLORIDE 0.1 MG: 0.1 TABLET ORAL at 20:56

## 2025-07-13 ASSESSMENT — LIFESTYLE VARIABLES
HOW OFTEN DO YOU HAVE A DRINK CONTAINING ALCOHOL: NEVER
HOW OFTEN DO YOU HAVE A DRINK CONTAINING ALCOHOL: NEVER
HOW MANY STANDARD DRINKS CONTAINING ALCOHOL DO YOU HAVE ON A TYPICAL DAY: PATIENT DOES NOT DRINK

## 2025-07-13 ASSESSMENT — PAIN - FUNCTIONAL ASSESSMENT: PAIN_FUNCTIONAL_ASSESSMENT: NONE - DENIES PAIN

## 2025-07-13 NOTE — ED PROVIDER NOTES
[facility]  EMERGENCY DEPARTMENT ENCOUNTER          PHYSICIAN ASSISTANT NOTE       Date of evaluation: 2025    Chief Complaint     Facial Swelling (Pt presents for swelling around eyes and face. States she has missed two rounds of dialysis. )    History of Present Illness     Scottie Chiang is a 55 y.o. female with a past medical history of end-stage renal disease on dialysis Monday, Wednesday and Friday who has not had dialysis since last Monday because she was out of town for a  presenting to the emergency department for facial swelling/edema.   She does make urine is good and has continued to take her diuretic.  Denies any chest pain, but has had pain in her right arm with plans to have a stent placed in her arm for arterial occlusion on Tuesday.  Also feels like her arm is increasingly swollen.  No fevers or chills, no nausea or vomiting.  Denies shortness of breath, but had slight hypoxia upon arrival and is on oxygen, does not wear oxygen at baseline.  Did take all of her medication today.    ASSESSMENT / PLAN  (MEDICAL DECISION MAKING)     INITIAL VITALS: BP: (!) 218/85, Temp: 97.9 °F (36.6 °C), Pulse: 68, Respirations: 16, SpO2: (!) 88 %    Scottie Chiang is a 55 y.o. female see department for evaluation of facial edema in the setting of end-stage renal disease with last dialysis 6 days ago.  She missed dialysis because she was out of town for a .  Upon presentation oxygen saturations in the mid 80s, stable on 2 L via nasal cannula.  She has obvious facial edema, no peripheral edema, no reported chest pain.     EKG completed which showed normal sinus rhythm, no peaked T waves.  Chest x-ray was read as normal.  Labs with potassium of 7.6, bicarb of 20, BUN of 125 and pH of 7.236.  I spoke with patient's nephrologist, Dr. Awad over the phone who will set arrange for emergent dialysis tonight.    Hyperkalemia treatment ordered including calcium gluconate, insulin with dextrose,

## 2025-07-14 ENCOUNTER — ANESTHESIA EVENT (OUTPATIENT)
Dept: INTERVENTIONAL RADIOLOGY/VASCULAR | Age: 55
End: 2025-07-14
Payer: COMMERCIAL

## 2025-07-14 VITALS
DIASTOLIC BLOOD PRESSURE: 75 MMHG | SYSTOLIC BLOOD PRESSURE: 133 MMHG | OXYGEN SATURATION: 93 % | HEART RATE: 68 BPM | WEIGHT: 220.02 LBS | HEIGHT: 61 IN | RESPIRATION RATE: 17 BRPM | TEMPERATURE: 98.2 F | BODY MASS INDEX: 41.54 KG/M2

## 2025-07-14 PROBLEM — E87.8 ELECTROLYTE IMBALANCE: Status: ACTIVE | Noted: 2025-07-14

## 2025-07-14 PROBLEM — E87.70 HYPERVOLEMIA: Status: ACTIVE | Noted: 2025-07-14

## 2025-07-14 PROBLEM — I10 PRIMARY HYPERTENSION: Status: RESOLVED | Noted: 2020-01-10 | Resolved: 2025-07-14

## 2025-07-14 LAB
ALBUMIN SERPL-MCNC: 3.8 G/DL (ref 3.4–5)
ALBUMIN SERPL-MCNC: 5.2 G/DL (ref 3.4–5)
ANION GAP SERPL CALCULATED.3IONS-SCNC: 16 MMOL/L (ref 3–16)
ANION GAP SERPL CALCULATED.3IONS-SCNC: 20 MMOL/L (ref 3–16)
BASOPHILS # BLD: 0 K/UL (ref 0–0.2)
BASOPHILS NFR BLD: 0.9 %
BUN SERPL-MCNC: 50 MG/DL (ref 7–20)
BUN SERPL-MCNC: 77 MG/DL (ref 7–20)
CALCIUM SERPL-MCNC: 8.6 MG/DL (ref 8.3–10.6)
CALCIUM SERPL-MCNC: 9.4 MG/DL (ref 8.3–10.6)
CHLORIDE SERPL-SCNC: 97 MMOL/L (ref 99–110)
CHLORIDE SERPL-SCNC: 97 MMOL/L (ref 99–110)
CO2 SERPL-SCNC: 17 MMOL/L (ref 21–32)
CO2 SERPL-SCNC: 25 MMOL/L (ref 21–32)
CREAT SERPL-MCNC: 10.4 MG/DL (ref 0.6–1.1)
CREAT SERPL-MCNC: 6.1 MG/DL (ref 0.6–1.1)
DEPRECATED RDW RBC AUTO: 19.6 % (ref 12.4–15.4)
EKG ATRIAL RATE: 68 BPM
EKG DIAGNOSIS: NORMAL
EKG P AXIS: 9 DEGREES
EKG P-R INTERVAL: 188 MS
EKG Q-T INTERVAL: 394 MS
EKG QRS DURATION: 82 MS
EKG QTC CALCULATION (BAZETT): 418 MS
EKG R AXIS: -9 DEGREES
EKG T AXIS: 19 DEGREES
EKG VENTRICULAR RATE: 68 BPM
EOSINOPHIL # BLD: 0.1 K/UL (ref 0–0.6)
EOSINOPHIL NFR BLD: 2 %
EST. AVERAGE GLUCOSE BLD GHB EST-MCNC: 205.9 MG/DL
GFR SERPLBLD CREATININE-BSD FMLA CKD-EPI: 4 ML/MIN/{1.73_M2}
GFR SERPLBLD CREATININE-BSD FMLA CKD-EPI: 8 ML/MIN/{1.73_M2}
GLUCOSE BLD-MCNC: 203 MG/DL (ref 70–99)
GLUCOSE BLD-MCNC: 242 MG/DL (ref 70–99)
GLUCOSE BLD-MCNC: 95 MG/DL (ref 70–99)
GLUCOSE BLD-MCNC: 95 MG/DL (ref 70–99)
GLUCOSE SERPL-MCNC: 109 MG/DL (ref 70–99)
GLUCOSE SERPL-MCNC: 83 MG/DL (ref 70–99)
HBA1C MFR BLD: 8.8 %
HBV SURFACE AB SERPL IA-ACNC: <3.5 MIU/ML
HBV SURFACE AG SERPL QL IA: NORMAL
HCT VFR BLD AUTO: 36.6 % (ref 36–48)
HGB BLD-MCNC: 12.1 G/DL (ref 12–16)
LYMPHOCYTES # BLD: 1 K/UL (ref 1–5.1)
LYMPHOCYTES NFR BLD: 23 %
MAGNESIUM SERPL-MCNC: 2.29 MG/DL (ref 1.8–2.4)
MCH RBC QN AUTO: 34.3 PG (ref 26–34)
MCHC RBC AUTO-ENTMCNC: 33.1 G/DL (ref 31–36)
MCV RBC AUTO: 103.7 FL (ref 80–100)
MONOCYTES # BLD: 0.5 K/UL (ref 0–1.3)
MONOCYTES NFR BLD: 10.7 %
NEUTROPHILS # BLD: 2.8 K/UL (ref 1.7–7.7)
NEUTROPHILS NFR BLD: 63.4 %
PERFORMED ON: ABNORMAL
PERFORMED ON: ABNORMAL
PERFORMED ON: NORMAL
PERFORMED ON: NORMAL
PHOSPHATE SERPL-MCNC: 2.9 MG/DL (ref 2.5–4.9)
PHOSPHATE SERPL-MCNC: 5.7 MG/DL (ref 2.5–4.9)
PLATELET # BLD AUTO: 143 K/UL (ref 135–450)
PMV BLD AUTO: 9.8 FL (ref 5–10.5)
POTASSIUM SERPL-SCNC: 3.2 MMOL/L (ref 3.5–5.1)
POTASSIUM SERPL-SCNC: 4.4 MMOL/L (ref 3.5–5.1)
POTASSIUM SERPL-SCNC: ABNORMAL MMOL/L (ref 3.5–5.1)
RBC # BLD AUTO: 3.53 M/UL (ref 4–5.2)
SODIUM SERPL-SCNC: 134 MMOL/L (ref 136–145)
SODIUM SERPL-SCNC: 138 MMOL/L (ref 136–145)
WBC # BLD AUTO: 4.4 K/UL (ref 4–11)

## 2025-07-14 PROCEDURE — 97166 OT EVAL MOD COMPLEX 45 MIN: CPT

## 2025-07-14 PROCEDURE — 97530 THERAPEUTIC ACTIVITIES: CPT

## 2025-07-14 PROCEDURE — 97163 PT EVAL HIGH COMPLEX 45 MIN: CPT

## 2025-07-14 PROCEDURE — 83036 HEMOGLOBIN GLYCOSYLATED A1C: CPT

## 2025-07-14 PROCEDURE — 6360000002 HC RX W HCPCS

## 2025-07-14 PROCEDURE — 80069 RENAL FUNCTION PANEL: CPT

## 2025-07-14 PROCEDURE — 97116 GAIT TRAINING THERAPY: CPT

## 2025-07-14 PROCEDURE — 99222 1ST HOSP IP/OBS MODERATE 55: CPT | Performed by: INTERNAL MEDICINE

## 2025-07-14 PROCEDURE — 85025 COMPLETE CBC W/AUTO DIFF WBC: CPT

## 2025-07-14 PROCEDURE — 6370000000 HC RX 637 (ALT 250 FOR IP)

## 2025-07-14 PROCEDURE — 94761 N-INVAS EAR/PLS OXIMETRY MLT: CPT

## 2025-07-14 PROCEDURE — 2700000000 HC OXYGEN THERAPY PER DAY

## 2025-07-14 PROCEDURE — 84132 ASSAY OF SERUM POTASSIUM: CPT

## 2025-07-14 PROCEDURE — 83735 ASSAY OF MAGNESIUM: CPT

## 2025-07-14 PROCEDURE — 90935 HEMODIALYSIS ONE EVALUATION: CPT

## 2025-07-14 PROCEDURE — 97535 SELF CARE MNGMENT TRAINING: CPT

## 2025-07-14 PROCEDURE — 36415 COLL VENOUS BLD VENIPUNCTURE: CPT

## 2025-07-14 PROCEDURE — 2500000003 HC RX 250 WO HCPCS

## 2025-07-14 RX ORDER — HYDRALAZINE HYDROCHLORIDE 20 MG/ML
5 INJECTION INTRAMUSCULAR; INTRAVENOUS EVERY 6 HOURS PRN
Status: DISCONTINUED | OUTPATIENT
Start: 2025-07-14 | End: 2025-07-14 | Stop reason: HOSPADM

## 2025-07-14 RX ORDER — TRAZODONE HYDROCHLORIDE 50 MG/1
50 TABLET ORAL NIGHTLY
COMMUNITY

## 2025-07-14 RX ORDER — BENZOYL PEROXIDE 10 G/100G
SUSPENSION TOPICAL DAILY
COMMUNITY

## 2025-07-14 RX ORDER — POTASSIUM CHLORIDE 1500 MG/1
40 TABLET, EXTENDED RELEASE ORAL ONCE
Status: COMPLETED | OUTPATIENT
Start: 2025-07-14 | End: 2025-07-14

## 2025-07-14 RX ORDER — INSULIN LISPRO 100 [IU]/ML
0-8 INJECTION, SOLUTION INTRAVENOUS; SUBCUTANEOUS
Status: DISCONTINUED | OUTPATIENT
Start: 2025-07-14 | End: 2025-07-14 | Stop reason: HOSPADM

## 2025-07-14 RX ORDER — SEVELAMER CARBONATE 800 MG/1
1600 TABLET, FILM COATED ORAL 2 TIMES DAILY WITH MEALS
Status: DISCONTINUED | OUTPATIENT
Start: 2025-07-14 | End: 2025-07-14 | Stop reason: HOSPADM

## 2025-07-14 RX ORDER — ALBUTEROL SULFATE 90 UG/1
2 INHALANT RESPIRATORY (INHALATION) EVERY 6 HOURS PRN
COMMUNITY

## 2025-07-14 RX ADMIN — HEPARIN SODIUM 5000 UNITS: 5000 INJECTION INTRAVENOUS; SUBCUTANEOUS at 06:00

## 2025-07-14 RX ADMIN — SODIUM CHLORIDE, PRESERVATIVE FREE 10 ML: 5 INJECTION INTRAVENOUS at 08:26

## 2025-07-14 RX ADMIN — INSULIN LISPRO 2 UNITS: 100 INJECTION, SOLUTION INTRAVENOUS; SUBCUTANEOUS at 12:22

## 2025-07-14 RX ADMIN — HEPARIN SODIUM 5000 UNITS: 5000 INJECTION INTRAVENOUS; SUBCUTANEOUS at 17:15

## 2025-07-14 RX ADMIN — PANTOPRAZOLE SODIUM 40 MG: 40 TABLET, DELAYED RELEASE ORAL at 05:47

## 2025-07-14 RX ADMIN — TORSEMIDE 100 MG: 100 TABLET ORAL at 10:21

## 2025-07-14 RX ADMIN — MEMANTINE 10 MG: 10 TABLET ORAL at 08:25

## 2025-07-14 RX ADMIN — OXYCODONE AND ACETAMINOPHEN 1 TABLET: 325; 10 TABLET ORAL at 06:06

## 2025-07-14 RX ADMIN — PANTOPRAZOLE SODIUM 40 MG: 40 TABLET, DELAYED RELEASE ORAL at 16:59

## 2025-07-14 RX ADMIN — ATORVASTATIN CALCIUM 80 MG: 40 TABLET, FILM COATED ORAL at 08:25

## 2025-07-14 RX ADMIN — DOCUSATE SODIUM 100 MG: 100 CAPSULE, LIQUID FILLED ORAL at 01:05

## 2025-07-14 RX ADMIN — CARVEDILOL 6.25 MG: 6.25 TABLET, FILM COATED ORAL at 08:25

## 2025-07-14 RX ADMIN — CARVEDILOL 6.25 MG: 6.25 TABLET, FILM COATED ORAL at 17:00

## 2025-07-14 RX ADMIN — METHOCARBAMOL 500 MG: 500 TABLET ORAL at 01:05

## 2025-07-14 RX ADMIN — METHOCARBAMOL 500 MG: 500 TABLET ORAL at 08:25

## 2025-07-14 RX ADMIN — HYDRALAZINE HYDROCHLORIDE 5 MG: 20 INJECTION, SOLUTION INTRAMUSCULAR; INTRAVENOUS at 03:50

## 2025-07-14 RX ADMIN — INSULIN LISPRO 2 UNITS: 100 INJECTION, SOLUTION INTRAVENOUS; SUBCUTANEOUS at 17:15

## 2025-07-14 RX ADMIN — DOCUSATE SODIUM 100 MG: 100 CAPSULE, LIQUID FILLED ORAL at 08:25

## 2025-07-14 RX ADMIN — LEVETIRACETAM 500 MG: 250 TABLET, FILM COATED ORAL at 08:24

## 2025-07-14 RX ADMIN — POTASSIUM CHLORIDE 40 MEQ: 1500 TABLET, EXTENDED RELEASE ORAL at 02:39

## 2025-07-14 RX ADMIN — HEPARIN SODIUM 5000 UNITS: 5000 INJECTION INTRAVENOUS; SUBCUTANEOUS at 00:30

## 2025-07-14 RX ADMIN — GABAPENTIN 300 MG: 300 CAPSULE ORAL at 08:25

## 2025-07-14 RX ADMIN — MEMANTINE 10 MG: 10 TABLET ORAL at 01:05

## 2025-07-14 ASSESSMENT — PAIN DESCRIPTION - ORIENTATION
ORIENTATION: RIGHT;LEFT
ORIENTATION: RIGHT;LEFT

## 2025-07-14 ASSESSMENT — ENCOUNTER SYMPTOMS
EYES NEGATIVE: 1
CHEST TIGHTNESS: 0
CONSTIPATION: 0
BLOOD IN STOOL: 0
APNEA: 0
ABDOMINAL DISTENTION: 0
ANAL BLEEDING: 0
CHOKING: 0
WHEEZING: 0
COUGH: 0
ABDOMINAL PAIN: 0

## 2025-07-14 ASSESSMENT — PAIN - FUNCTIONAL ASSESSMENT
PAIN_FUNCTIONAL_ASSESSMENT: PREVENTS OR INTERFERES SOME ACTIVE ACTIVITIES AND ADLS
PAIN_FUNCTIONAL_ASSESSMENT: ACTIVITIES ARE NOT PREVENTED
PAIN_FUNCTIONAL_ASSESSMENT: ACTIVITIES ARE NOT PREVENTED

## 2025-07-14 ASSESSMENT — PAIN DESCRIPTION - PAIN TYPE
TYPE: CHRONIC PAIN
TYPE: ACUTE PAIN

## 2025-07-14 ASSESSMENT — PAIN SCALES - GENERAL
PAINLEVEL_OUTOF10: 0
PAINLEVEL_OUTOF10: 3
PAINLEVEL_OUTOF10: 8
PAINLEVEL_OUTOF10: 0
PAINLEVEL_OUTOF10: 0

## 2025-07-14 ASSESSMENT — PAIN DESCRIPTION - DESCRIPTORS
DESCRIPTORS: ACHING;THROBBING
DESCRIPTORS: ACHING;THROBBING

## 2025-07-14 ASSESSMENT — PAIN DESCRIPTION - FREQUENCY
FREQUENCY: CONTINUOUS
FREQUENCY: CONTINUOUS

## 2025-07-14 ASSESSMENT — PAIN DESCRIPTION - LOCATION
LOCATION: HEAD
LOCATION: HEAD

## 2025-07-14 ASSESSMENT — PAIN DESCRIPTION - ONSET: ONSET: AWAKENED FROM SLEEP

## 2025-07-14 NOTE — CARE COORDINATION
Case Management Assessment            Discharge Note                    Date / Time of Note: 7/14/2025 2:00 PM                  Discharge Note Completed by: OMERO LINDO    Patient Name: Scottie Chiang   YOB: 1970  Diagnosis: Hyperkalemia [E87.5]  Electrolyte imbalance [E87.8]  ESRD needing dialysis (HCC) [N18.6, Z99.2]   Date / Time: 7/13/2025  7:10 PM    Current PCP: Unknown, Provider  Clinic patient: No    Hospitalization in the last 30 days: No       Advance Directives:  Code Status: Full Code  [place] DNR form completed and on chart: Not Indicated    Financial:  Payor: [employer] [place] / Plan: [employer] [place] DUAL / Product Type: *No Product type* /      Pharmacy:    [place] [address] [address] [facility] [phone] - [facility] [phone]  [facility] [address] [facility] [address]  Phone: [phone] Fax: [phone]      Assistance purchasing medications?: Potential Assistance Purchasing Medications: No  Assistance provided by Case Management: None at this time    Does patient want to participate in local refill/ meds to beds program?:      Meds To Beds General Rules:  1. Can ONLY be done Monday- Friday between 8:30am-5pm  2. Prescription(s) must be in pharmacy by 3pm to be filled same day  3.Copy of patient's insurance/ prescription drug card and patient face sheet must be sent along with the prescription(s)  4. Cost of Rx cannot be added to hospital bill. If financial assistance is needed, please contact unit  or ;  or  CANNOT provide pharmacy voucher for patients co-pays  5. Patients can then  the prescription on their way out of the hospital at discharge, or pharmacy can deliver to the bedside if staff is available. (payment due at time of pick-up or delivery - cash, check, or card accepted)     Able to afford home medications/ co-pay costs: Yes    ADLS:  Current PT AM-PAC Score: 16 /24  Current OT AM-PAC

## 2025-07-14 NOTE — FLOWSHEET NOTE
Pt arrived in 6314 from dialysis. VSS. Pt in RA. Skin assessed. Tele applied, fall precaution in place. Pt educated on safety precautions. Call light within reach. Will continue to monitor.     07/14/25 1535   Vitals   Temp 98.2 °F (36.8 °C)   Temp Source Axillary   Pulse 71   Heart Rate Source Monitor   Respirations 17   BP (!) 119/57   MAP (Calculated) 78   BP Location Left upper arm   BP Upper/Lower Upper   BP Method Automatic   Patient Position Semi fowlers   Pain Assessment   Pain Assessment None - Denies Pain   Opioid-Induced Sedation   POSS Score S   Oxygen Therapy   SpO2 93 %   Pulse Oximeter Device Mode Intermittent   Pulse Oximeter Device Location Left;Finger   O2 Device None (Room air)   Height and Weight   Height 1.549 m (5' 1\")

## 2025-07-14 NOTE — PROGRESS NOTES
4 Eyes Skin Assessment     NAME:  Scottie Chiang  YOB: 1970  MEDICAL RECORD NUMBER:  [id number]    The patient is being assessed for  Transfer to New Unit    I agree that at least one RN has performed a thorough Head to Toe Skin Assessment on the patient. ALL assessment sites listed below have been assessed.      Areas assessed by both nurses:    Head, Face, Ears, Shoulders, Back, Chest, Arms, Elbows, Hands, Sacrum. Buttock, Coccyx, Ischium, Legs. Feet and Heels, and Under Medical Devices         Does the Patient have a Wound? No noted wound(s)       Margarito Prevention initiated by RN: No  Wound Care Orders initiated by RN: No    Pressure Injury (Stage 1,2,3,4, Unstageable, DTI, NWPT, and Complex wounds) if present, place Wound referral order by RN under : No    New Ostomies, if present place, Ostomy referral order under : No     Nurse 1 eSignature: Electronically signed by SHANTELL CARTAGENA RN on 7/14/25 at 4:41 PM EDT    **SHARE this note so that the co-signing nurse can place an eSignature**    Nurse 2 eSignature: Electronically signed by Kelly Alexander RN on 7/14/25 at 4:42 PM EDT

## 2025-07-14 NOTE — PROGRESS NOTES
Clinical Pharmacy Consult Note  Medication History     Admit Date: 7/13/2025    List of current medications patient is taking is complete. Home Medication list in Epic updated to reflect changes noted below.    Source of information: Fill history, chart review     Patient's home pharmacy:   [facility] - [place] [address] - P [phone] - F [phone]  [facility] [address]  [facility] [address]  Phone: [phone] Fax: [phone]      Changes made to medication list:   Medications removed: (include reason, ex: therapy completed, patient no longer taking, etc.)  Loperamide 2 mg capsule - not taking   Sevelamer last filled 12/2024 for 30 days - not taking  Medications added:   Albuterol HFA inhaler - 2 puffs into the lungs Q6H PRN wheezing   Trazodone 50 mg tablets - 1 tablet by mouth nightly   Benzoyl peroxide 10% wash - Apply daily to wash face.   Medication doses adjusted:   Pantoprazole 40 mg tablets - Dose increased to BID from daily   Ondansetron 4 mg tablets - not taking   Other notes:   Torsemide has not been filled since 3/21/25 for #60 for 30 days. Unclear if patient currently taking.    Current Outpatient Medications   Medication Instructions    albuterol sulfate HFA (PROVENTIL;VENTOLIN;PROAIR) 108 (90 Base) MCG/ACT inhaler 2 puffs, EVERY 6 HOURS PRN    atorvastatin (LIPITOR) 80 mg, DAILY    B Complex-C-Folic Acid (DIALYVITE TABLET) TABS 1 tablet, Oral, DAILY    Benzoyl Peroxide (BENZAC AC) 10 % external wash DAILY    CALCITRIOL PO Take one capsule by mouth on dialysis days (MWF)    carvedilol (COREG) 6.25 mg, 2 TIMES DAILY WITH MEALS    docusate sodium (COLACE) 100 mg, Oral, 2 TIMES DAILY    gabapentin (NEURONTIN) 300 MG capsule TAKE 1 CAPSULE BY MOUTH IN THE MORNING AND AT BEDTIME FOR 90 DAYS.    levETIRAcetam (KEPPRA) 250 MG tablet Take one tablet by mouth three times a week after dialysis on Mondays, Wednesdays, and Fridays (500 mg daily, plus 250 mg on dialysis days).

## 2025-07-14 NOTE — PROGRESS NOTES
Nutrition    Nutrition screening referral was triggered based on results obtained during nursing admission assessment for Unintentional Weight Loss.    The patient's chart was reviewed and nutrition assessment is not indicated at this time.  Patient will be seen per nutrition standards of care.         Tonia Snow RD, LD  Malcolm:  [phone]

## 2025-07-14 NOTE — PROGRESS NOTES
PT left unit to dialysis, and will transfer to [address] [place] [address] after dialysis. Called report to nurse, and all patient belongings where sent with the patient.

## 2025-07-14 NOTE — PLAN OF CARE
Problem: Metabolic/Fluid and Electrolytes - Adult  Goal: Electrolytes maintained within normal limits  7/14/2025 1321 by Bj Wei, RN  Outcome: Not Progressing  7/13/2025 2339 by Jono Jacobs RN  Outcome: Progressing  Goal: Glucose maintained within prescribed range  7/14/2025 1321 by Bj Wei RN  Outcome: Not Progressing  7/13/2025 2339 by Jono Jacobs RN  Outcome: Progressing     Problem: Metabolic/Fluid and Electrolytes - Adult  Goal: Electrolytes maintained within normal limits  7/14/2025 1321 by Bj Wei, RN  Outcome: Not Progressing  7/13/2025 2339 by Jono Jacobs RN  Outcome: Progressing  Goal: Glucose maintained within prescribed range  7/14/2025 1321 by Bj Wei RN  Outcome: Not Progressing  7/13/2025 2339 by Jono Jacobs RN  Outcome: Progressing

## 2025-07-14 NOTE — PROGRESS NOTES
Pt admitted from ED to the unit at 2215 with hypokalemia & facial swelling. Alert & oriented x4 afebrile /68 HR 69 RR 12 SPO2 99 on 2 L O2 at baseline. Pt denies complaints. HD will start shortly by MACHO Hdz.

## 2025-07-14 NOTE — PROGRESS NOTES
Pt d/c home w/ all personal belongings. Discharge teaching completed. All questions answered. Pt verbalized understanding. Tele and IV removed with no complications. Pt follow w/out/pt HD.

## 2025-07-14 NOTE — CONSULTS
Nephrology Resident Consult Note      PCP: Unknown, Provider    Reason for consult: hyperkalemia needing dialysis     HPI: Scottie Chiang is a 55 y.o. female with a significant PMH of  ESRD on MWF dialysis, T2DM, HTN, HLD, seizures, JEAN presented to the ED for facial swelling/edema.     Patient reports being out of town and missing her Wednesday and Friday dialysis sessions. Patient reports that she was out of town and missed the appointments and subsequently developed swelling of her face and arms which prompted her to present to the hospital.     Today after having received dialysis in the hospital she reports feeling better with improvement in her swelling. Repeat potassium most recently down to 3.2.    She denies shortness of breath, chest pain/palpitations, lightheadedness/dizziness.         PMHx:   Past Medical History:   Diagnosis Date    Amputation of right great toe     Arthritis     Diabetes mellitus (HCC)     Diastolic CHF (HCC)     End stage renal disease (HCC)     Hemodialysis patient     MWF    History of blood transfusion     Hyperlipidemia     Hypertension     Migraine     On home O2     but does not use it    JEAN (obstructive sleep apnea)     currently not on cpap    Seizures (HCC)        Medications:   Prior to Admission medications    Medication Sig Start Date End Date Taking? Authorizing Provider   methocarbamol (ROBAXIN) 500 MG tablet TAKE 1 TABLET BY MOUTH 2 TIMES DAILY 7/2/25 8/1/25  Faby Lance APRN - CNP   gabapentin (NEURONTIN) 300 MG capsule TAKE 1 CAPSULE BY MOUTH IN THE MORNING AND AT BEDTIME FOR 90 DAYS. 6/23/25 9/21/25  Charu Nuñez PA-C   oxyCODONE-acetaminophen (ENDOCET)  MG per tablet Take 1 tablet by mouth every 6 hours as needed for Pain for up to 28 days. Max Daily Amount: 4 tablets 6/17/25 7/15/25  Faby Lance APRN - CNP   oxyCODONE-acetaminophen (PERCOCET)  MG per tablet Take 1 tablet by mouth every 4 hours as needed for Pain.    Provider, MD MELISSA Nicole

## 2025-07-14 NOTE — DISCHARGE SUMMARY
V2.0  Discharge Summary    Name:  Scottie Chiang /Age/Sex: 1970 (55 y.o. female)   Admit Date: 2025  Discharge Date: 25    MRN & CSN:  [id number] & [id number] Encounter Date and Time 25 1:59 PM EDT    Attending:  Bright Garcia MD Discharging Provider: Bright Garcia MD       Hospital Course:     Scottie Chiang is a 55 y.o. female with pmh of  ESRD on MWF dialysis, T2DM, HTN, HLD, seizures, JEAN who presents with p/c/o facial swelling/edema. She reports being out of town for the past few days for a  and missing her Wed and Fri dialysis appointments. She does make urine and has continued taking her diuretic.   desaturations into the 80s. Stabilized on 2L NC. BP elevated at 218/85  Labs were significant for: potassium of 7.6, BUN/Cr of 125/13.2, VBG that showed pH 7.236 and pCO2 of 53. Glucose of 292.   CXR was normal. EKG revealed peaked T waves.  While in the ED, she received hyperkalemia protocol (calcium gluconate, insulin w/ dextrose, albuterol, lasix and Na bicarb). Also received 0.1mg clonidine for htn.    Admitted to ICU for hypertensive emergency, hyperK needing urgent hemodialysis    Problem list with plan:  Active Hospital Problems    Diagnosis Date Noted    Hyperkalemia [E87.5] 2024     Priority: High    ESRD needing dialysis (HCC) [N18.6, Z99.2] 2022     Priority: High    Hypertensive emergency [I16.1] 2021     Priority: High     Blood pressure is much better controlled with dialysis session overnight  This morning lab did not report hypokalemia, patient was hypokalemic overnight labs were done right after dialysis so incorrect.  Please do not replace potassium.  Blood pressure is much better controlled, overall she is off oxygen  Recommended to be compliant with dialysis  I discussed with her that how she came in this could be life-threatening especially with the EKG changes present on admission  Discussed with nephrology, plan for another HD session  She

## 2025-07-14 NOTE — PROGRESS NOTES
Occupational Therapy  Facility/Department: [facility] ICU  Occupational Therapy Initial Assessment/Treatment    Name: Scottie Chiang  : 1970  MRN: [id number]  Date of Service: 2025    Discharge Recommendations:  24 hour supervision or assist  OT Equipment Recommendations  Other: Pt may benefit from a tub seat.       Patient Diagnosis(es): The primary encounter diagnosis was ESRD needing dialysis (HCC). A diagnosis of Hyperkalemia was also pertinent to this visit.  Past Medical History:  has a past medical history of Amputation of right great toe, Arthritis, Diabetes mellitus (HCC), Diastolic CHF (HCC), End stage renal disease (HCC), Hemodialysis patient, History of blood transfusion, Hyperlipidemia, Hypertension, Migraine, On home O2, JEAN (obstructive sleep apnea), and Seizures (HCC).  Past Surgical History:  has a past surgical history that includes Dialysis Catheter Insertion (N/A, 2021); Dialysis Catheter Removal (N/A, 2021); IR TUNNELED CVC PLACE WO SQ PORT/PUMP > 5 YEARS (11/15/2021); eye surgery (Bilateral, ); Cataract removal (Bilateral);  section; Toe amputation (Left); Dialysis Catheter Insertion (N/A, 2022); US ASP ABSCESS/HEMATOMA/BULLA/CYST (2022); Dialysis Catheter Removal (N/A, 2022); IR TUNNELED CVC PLACE WO SQ PORT/PUMP > 5 YEARS (2022); IR TUNNELED CVC PLACE WO SQ PORT/PUMP > 5 YEARS (2022); IR TUNNELED CVC PLACE WO SQ PORT/PUMP > 5 YEARS (2022); Dialysis fistula creation (Right, 2024); invasive vascular (N/A, 2024); invasive vascular (N/A, 2024); invasive vascular (N/A, 10/23/2024); joint replacement (Right); Dialysis fistula creation (Right, 3/7/2025); Dialysis fistula creation (Right, 3/7/2025); and invasive vascular (N/A, 2025).    Treatment Diagnosis: Impaired ADL and functional mobility      Assessment  Performance deficits / Impairments: Decreased functional mobility ;Decreased ADL status;Decreased

## 2025-07-14 NOTE — DISCHARGE INSTR - DIET

## 2025-07-14 NOTE — PROGRESS NOTES
Treatment time:  2.5 hrs    Net UF: 2000 ml     Pre weight: 103 kg  Post weight: 101 kg     Access used: Rtdc  Access function:  tolerated well,  BFR 400ml/min     Medications or blood products given: heparin dwells     Regular outpatient schedule: MWF     Summary of response to treatment: Pt tolerated well. Pt remained stable throughout entire treatment and upon exiting the hemodialysis suite. Post labs done. Renal panel and HepB test.     Copy of dialysis treatment record sent to ICU fax.

## 2025-07-14 NOTE — PROGRESS NOTES
Physical Therapy  Facility/Department: [facility] ICU  Physical Therapy Initial Assessment / Treatment    Name: Scottie Chiang  : 1970  MRN: [id number]  Date of Service: 2025    Discharge Recommendations:  24 hour supervision or assist, Home with Home health PT   PT Equipment Recommendations  Equipment Needed: No      Patient Diagnosis(es): The primary encounter diagnosis was ESRD needing dialysis (HCC). A diagnosis of Hyperkalemia was also pertinent to this visit.  Past Medical History:  has a past medical history of Amputation of right great toe, Arthritis, Diabetes mellitus (HCC), Diastolic CHF (HCC), End stage renal disease (HCC), Hemodialysis patient, History of blood transfusion, Hyperlipidemia, Hypertension, Migraine, On home O2, JEAN (obstructive sleep apnea), and Seizures (HCC).  Past Surgical History:  has a past surgical history that includes Dialysis Catheter Insertion (N/A, 2021); Dialysis Catheter Removal (N/A, 2021); IR TUNNELED CVC PLACE WO SQ PORT/PUMP > 5 YEARS (11/15/2021); eye surgery (Bilateral, ); Cataract removal (Bilateral);  section; Toe amputation (Left); Dialysis Catheter Insertion (N/A, 2022); US ASP ABSCESS/HEMATOMA/BULLA/CYST (2022); Dialysis Catheter Removal (N/A, 2022); IR TUNNELED CVC PLACE WO SQ PORT/PUMP > 5 YEARS (2022); IR TUNNELED CVC PLACE WO SQ PORT/PUMP > 5 YEARS (2022); IR TUNNELED CVC PLACE WO SQ PORT/PUMP > 5 YEARS (2022); Dialysis fistula creation (Right, 2024); invasive vascular (N/A, 2024); invasive vascular (N/A, 2024); invasive vascular (N/A, 10/23/2024); joint replacement (Right); Dialysis fistula creation (Right, 3/7/2025); Dialysis fistula creation (Right, 3/7/2025); and invasive vascular (N/A, 2025).    Assessment  Body Structures, Functions, Activity Limitations Requiring Skilled Therapeutic Intervention: Decreased functional mobility   Assessment: Pt is 55 y.o. female admit with

## 2025-07-14 NOTE — PROGRESS NOTES
Treatment time: 2 hours    Net UF: 1.5 liters doctor Nguyen ordered via pMD    Pre weight: 101.3 kg  Post weight: 99.8 kg    Access used: Left CVC  Access function: Access site located on the Left chest wall had clean dry and intact CVC dressing. No signs of infection noted. No redness, hematoma nor swelling was observed. No discharges was seen. Both ports had good flow. Cleaned site and changed dressing aseptically by Geno.     Medications or blood products given: heparin dwell    Regular outpatient schedule: M,W,F    Summary of response to treatment: 13:00: Treatment set at 1.5  liters for 2 hours as ordered by Doctor Nguyen through PMD, via Left CVC.  Access site located on the Left chest wall had clean dry and intact CVC dressing. No signs of infection noted. No redness, hematoma nor swelling was observed. No discharges was seen. Both ports had good flow. Cleaned site and changed dressing aseptically by Geno. Parameters set accordingly. Hooked to HD machine. Monitored from time to time. 15: 00: Treatment completed Returned blood aseptically. HD tolerated.    Copy of dialysis treatment record placed in chart, to be scanned into EMR.

## 2025-07-14 NOTE — PROGRESS NOTES
4 Eyes Skin Assessment     NAME:  Scottie Chiang  YOB: 1970  MEDICAL RECORD NUMBER:  [id number]    The patient is being assessed for  Admission    I agree that at least one RN has performed a thorough Head to Toe Skin Assessment on the patient. ALL assessment sites listed below have been assessed.      Areas assessed by both nurses:    Head, Face, Ears, Shoulders, Back, Chest, Arms, Elbows, Hands, Sacrum. Buttock, Coccyx, Ischium, and Legs. Feet and Heels        Does the Patient have a Wound? No noted wound(s)       Margarito Prevention initiated by RN: Yes  Wound Care Orders initiated by RN: Yes    Pressure Injury (Stage 1,2,3,4, Unstageable, DTI, NWPT, and Complex wounds) if present, place Wound referral order by RN under : No    New Ostomies, if present place, Ostomy referral order under : No     Nurse 1 eSignature: Electronically signed by Jono Jacobs RN on 7/14/25 at 6:02 AM EDT    **SHARE this note so that the co-signing nurse can place an eSignature**    Nurse 2 eSignature: Electronically signed by Mindy Florentino RN on 7/14/25 at 6:05 AM EDT

## 2025-07-14 NOTE — H&P
LAPAROSCOPIC PERITONEAL DIALYSIS CATHETER PLACEMENT performed by Henri Elizalde DO at [facility] OR    DIALYSIS CATHETER REMOVAL N/A 11/14/2021    CATHETER REMOVAL PERITONEAL DIALYSIS performed by Henri Elizalde DO at [facility] OR    DIALYSIS CATHETER REMOVAL N/A 06/28/2022    PERITONEAL DIALYSIS CATHETER REMOVAL performed by Henri Elizalde DO at [facility] OR    DIALYSIS FISTULA CREATION Right 02/02/2024    RIGHT ARTERIOVENOUS FISTULA CREATION performed by Valeria Kumar MD at [facility] OR    DIALYSIS FISTULA CREATION Right 3/7/2025    RIGHT ARTERIOVENOUS FISTULA CREATION performed by Valeria Kumar MD at [facility] OR    DIALYSIS FISTULA CREATION Right 3/7/2025    RIGHT UPPER EXTREMITY HEMATOMA EVACUATION performed by Valeria Kumar MD at [facility] OR    EYE SURGERY Bilateral 1988    muscle surgery     INVASIVE VASCULAR N/A 05/29/2024    Angioplasty fistula/dialysis circuit performed by Valeria Kumar MD at [facility] CARDIAC CATH LAB    INVASIVE VASCULAR N/A 08/21/2024    Fistulogram right performed by Valeria Kumar MD at [facility] CARDIAC CATH LAB    INVASIVE VASCULAR N/A 10/23/2024    Fistulogram right performed by Valeria Kumar MD at [facility] CARDIAC CATH LAB    INVASIVE VASCULAR N/A 5/28/2025    Fistulogram right performed by Valeria Kumar MD at [facility] CARDIAC CATH LAB    IR TUNNELED CVC PLACE WO SQ PORT/PUMP > 5 YEARS  11/15/2021    IR TUNNELED CATHETER PLACEMENT GREATER THAN 5 YEARS 11/15/2021 [facility] SPECIAL PROCEDURES    IR TUNNELED CVC PLACE WO SQ PORT/PUMP > 5 YEARS  06/28/2022    IR TUNNELED CATHETER PLACEMENT GREATER THAN 5 YEARS 6/28/2022 [facility] SPECIAL PROCEDURES    IR TUNNELED CVC PLACE WO SQ PORT/PUMP > 5 YEARS  07/07/2022    IR TUNNELED CATHETER PLACEMENT GREATER THAN 5 YEARS 7/7/2022 [facility] SPECIAL PROCEDURES    IR TUNNELED CVC PLACE WO SQ PORT/PUMP > 5 YEARS  09/21/2022    IR TUNNELED CATHETER PLACEMENT GREATER THAN 5 YEARS 9/21/2022 [facility] SPECIAL PROCEDURES    JOINT REPLACEMENT Right     right knee    TOE AMPUTATION Left     left great toe partial

## 2025-07-14 NOTE — DISCHARGE INSTR - COC
Fistulogram right performed by Valeria Kumar MD at [facility] CARDIAC CATH LAB    INVASIVE VASCULAR N/A 10/23/2024    Fistulogram right performed by Valeria Kumar MD at [facility] CARDIAC CATH LAB    INVASIVE VASCULAR N/A 5/28/2025    Fistulogram right performed by Valeria Kumar MD at [facility] CARDIAC CATH LAB    IR TUNNELED CVC PLACE WO SQ PORT/PUMP > 5 YEARS  11/15/2021    IR TUNNELED CATHETER PLACEMENT GREATER THAN 5 YEARS 11/15/2021 [facility] SPECIAL PROCEDURES    IR TUNNELED CVC PLACE WO SQ PORT/PUMP > 5 YEARS  06/28/2022    IR TUNNELED CATHETER PLACEMENT GREATER THAN 5 YEARS 6/28/2022 [facility] SPECIAL PROCEDURES    IR TUNNELED CVC PLACE WO SQ PORT/PUMP > 5 YEARS  07/07/2022    IR TUNNELED CATHETER PLACEMENT GREATER THAN 5 YEARS 7/7/2022 [facility] SPECIAL PROCEDURES    IR TUNNELED CVC PLACE WO SQ PORT/PUMP > 5 YEARS  09/21/2022    IR TUNNELED CATHETER PLACEMENT GREATER THAN 5 YEARS 9/21/2022 [facility] SPECIAL PROCEDURES    JOINT REPLACEMENT Right     right knee    TOE AMPUTATION Left     left great toe partial amputation    US ASP ABSCESS/HEMATOMA/BULLA/CYST  03/28/2022    US ASP ABSCESS/HEMATOMA/BULLA/CYST 3/28/2022 [facility] ULTRASOUND       Immunization History:   Immunization History   Administered Date(s) Administered    COVID-19, MODERNA BLUE border, Primary or Immunocompromised, (age 12y+), IM, 100 mcg/0.5mL 12/30/2020, 01/27/2021    COVID-19, MODERNA PURPLE border, (age 18y+ booster, 6y-11y series), IM, 50 mcg/0.5 mL 06/15/2022    Influenza, FLUARIX, FLULAVAL, FLUZONE (age 6 mo+) and AFLURIA, (age 3 y+), Quadv PF, 0.5mL 12/10/2019    Pneumococcal, PCV-13, PREVNAR 13, (age 6w+), IM, 0.5mL 07/14/2015       Active Problems:  Patient Active Problem List   Diagnosis Code    CHF (congestive heart failure), NYHA class I, acute on chronic, combined ([facility]) I50.43    Abnormal myocardial perfusion study R94.39    Type 2 diabetes mellitus with hyperglycemia, with long-term current use of insulin ([facility]) E11.65, Z79.4    JEAN (obstructive sleep apnea)

## 2025-07-14 NOTE — PLAN OF CARE
Problem: Chronic Conditions and Co-morbidities  Goal: Patient's chronic conditions and co-morbidity symptoms are monitored and maintained or improved  Recent Flowsheet Documentation  Taken 7/13/2025 2215 by Jono Jacobs RN  Care Plan - Patient's Chronic Conditions and Co-Morbidity Symptoms are Monitored and Maintained or Improved: Monitor and assess patient's chronic conditions and comorbid symptoms for stability, deterioration, or improvement     Problem: Discharge Planning  Goal: Discharge to home or other facility with appropriate resources  Recent Flowsheet Documentation  Taken 7/13/2025 2215 by Jono Jacobs RN  Discharge to home or other facility with appropriate resources: Identify barriers to discharge with patient and caregiver     Problem: Safety - Adult  Goal: Free from fall injury  Outcome: Progressing     Problem: Respiratory - Adult  Goal: Achieves optimal ventilation and oxygenation  Outcome: Progressing     Problem: Skin/Tissue Integrity - Adult  Goal: Skin integrity remains intact  Outcome: Progressing     Problem: Infection - Adult  Goal: Absence of infection at discharge  Outcome: Progressing  Goal: Absence of infection during hospitalization  Outcome: Progressing  Goal: Absence of fever/infection during anticipated neutropenic period  Outcome: Progressing     Problem: Metabolic/Fluid and Electrolytes - Adult  Goal: Electrolytes maintained within normal limits  Outcome: Progressing  Goal: Glucose maintained within prescribed range  Outcome: Progressing

## 2025-07-14 NOTE — PROGRESS NOTES
ICU Progress Note    Admit Date: 2025  Hospital Day:  Hospital Day: 2  ICU Day:      Interval History     HEIDI. Patient was hypertensive overnight 161/80 and received hydralize 5mg around 4am which resolved it now patient is now normotensive. Patient complained of migraine headache over her right temporal area, was given a dose of oxycodone which alleviate her pain going from 8/10 to 3/10.  She reports she has migraines daily. Patient will have dialysis today and well be transferred out of ICU to step-down unit.     As per H&P  \"HPI:     Scottie Chiang is a 55 y.o. female with pmhx ESRD on MWF dialysis, T2DM, HTN, HLD, seizures, JEAN presented to the ED for facial swelling/edema. She reports being out of town for the past few days for a  and missing her Wed and Fri dialysis appointments. She does make urine and has continued taking her diuretic.      ROS negative for chest pain, palpitations, or SOB.      ED Course:  On arrival to the ED, she was found to have obvious facial edema, and desaturations into the 80s. Stabilized on 2L NC. BP elevated at 218/85  Labs were significant for: potassium of 7.6, BUN/Cr of 125/13.2, VBG that showed pH 7.236 and pCO2 of 53. Glucose of 292.   CXR was normal. EKG revealed peaked T waves.  While in the ED, she received hyperkalemia protocol (calcium gluconate, insulin w/ dextrose, albuterol, lasix and Na bicarb). Also received 0.1mg clonidine for htn.        Upon admission to the ICU, vitals were more stable with sats in 90s and BP of 165/86. Patient was begun on emergent dialysis\"      ROS:   Review of Systems   Constitutional:  Positive for appetite change and fatigue. Negative for chills, diaphoresis and fever.        Does not feel hungry    Eyes: Negative.    Respiratory:  Negative for apnea, cough, choking, chest tightness and wheezing.    Cardiovascular:  Negative for chest pain, palpitations and leg swelling.   Gastrointestinal:  Negative for abdominal distention,

## 2025-07-15 ENCOUNTER — HOSPITAL ENCOUNTER (INPATIENT)
Dept: INTERVENTIONAL RADIOLOGY/VASCULAR | Age: 55
LOS: 3 days | Discharge: HOME | End: 2025-07-19
Attending: INTERNAL MEDICINE | Admitting: INTERNAL MEDICINE
Payer: COMMERCIAL

## 2025-07-15 ENCOUNTER — ANESTHESIA (OUTPATIENT)
Dept: INTERVENTIONAL RADIOLOGY/VASCULAR | Age: 55
End: 2025-07-15
Payer: COMMERCIAL

## 2025-07-15 DIAGNOSIS — I87.1 STENOSIS OF BRACHIOCEPHALIC VEIN: ICD-10-CM

## 2025-07-15 DIAGNOSIS — G89.4 CHRONIC PAIN SYNDROME: Primary | ICD-10-CM

## 2025-07-15 LAB
ANION GAP SERPL CALCULATED.3IONS-SCNC: 16 MMOL/L (ref 3–16)
ANTI-XA UNFRAC HEPARIN: <0.1 IU/ML (ref 0.3–0.7)
APTT BLD: >180 SEC (ref 22.8–35.8)
BUN SERPL-MCNC: 60 MG/DL (ref 7–20)
CALCIUM SERPL-MCNC: 9 MG/DL (ref 8.3–10.6)
CHLORIDE SERPL-SCNC: 98 MMOL/L (ref 99–110)
CO2 SERPL-SCNC: 22 MMOL/L (ref 21–32)
CREAT SERPL-MCNC: 9 MG/DL (ref 0.6–1.1)
DEPRECATED RDW RBC AUTO: 19.7 % (ref 12.4–15.4)
GFR SERPLBLD CREATININE-BSD FMLA CKD-EPI: 5 ML/MIN/{1.73_M2}
GLUCOSE BLD-MCNC: 111 MG/DL (ref 70–99)
GLUCOSE SERPL-MCNC: 157 MG/DL (ref 70–99)
HCT VFR BLD AUTO: 35.7 % (ref 36–48)
HGB BLD-MCNC: 11.5 G/DL (ref 12–16)
INR PPP: 1.23 (ref 0.86–1.14)
INR PPP: 1.32 (ref 0.86–1.14)
MCH RBC QN AUTO: 33.7 PG (ref 26–34)
MCHC RBC AUTO-ENTMCNC: 32.1 G/DL (ref 31–36)
MCV RBC AUTO: 105 FL (ref 80–100)
PERFORMED ON: ABNORMAL
PLATELET # BLD AUTO: 139 K/UL (ref 135–450)
PMV BLD AUTO: 9.2 FL (ref 5–10.5)
POTASSIUM SERPL-SCNC: 5.6 MMOL/L (ref 3.5–5.1)
PROTHROMBIN TIME: 15.7 SEC (ref 12.1–14.9)
PROTHROMBIN TIME: 16.6 SEC (ref 12.1–14.9)
RBC # BLD AUTO: 3.4 M/UL (ref 4–5.2)
SODIUM SERPL-SCNC: 136 MMOL/L (ref 136–145)
WBC # BLD AUTO: 6.7 K/UL (ref 4–11)

## 2025-07-15 PROCEDURE — 7100000001 HC PACU RECOVERY - ADDTL 15 MIN

## 2025-07-15 PROCEDURE — 057D3DZ DILATION OF RIGHT CEPHALIC VEIN WITH INTRALUMINAL DEVICE, PERCUTANEOUS APPROACH: ICD-10-PCS | Performed by: STUDENT IN AN ORGANIZED HEALTH CARE EDUCATION/TRAINING PROGRAM

## 2025-07-15 PROCEDURE — C1887 CATHETER, GUIDING: HCPCS

## 2025-07-15 PROCEDURE — 6360000002 HC RX W HCPCS

## 2025-07-15 PROCEDURE — 36592 COLLECT BLOOD FROM PICC: CPT

## 2025-07-15 PROCEDURE — 6360000002 HC RX W HCPCS: Performed by: STUDENT IN AN ORGANIZED HEALTH CARE EDUCATION/TRAINING PROGRAM

## 2025-07-15 PROCEDURE — 85520 HEPARIN ASSAY: CPT

## 2025-07-15 PROCEDURE — 6360000002 HC RX W HCPCS: Performed by: ANESTHESIOLOGY

## 2025-07-15 PROCEDURE — C1751 CATH, INF, PER/CENT/MIDLINE: HCPCS

## 2025-07-15 PROCEDURE — C1753 CATH, INTRAVAS ULTRASOUND: HCPCS

## 2025-07-15 PROCEDURE — 85730 THROMBOPLASTIN TIME PARTIAL: CPT

## 2025-07-15 PROCEDURE — 2709999900 HC NON-CHARGEABLE SUPPLY

## 2025-07-15 PROCEDURE — 36005 INJECTION EXT VENOGRAPHY: CPT

## 2025-07-15 PROCEDURE — G0379 DIRECT REFER HOSPITAL OBSERV: HCPCS

## 2025-07-15 PROCEDURE — 7100000000 HC PACU RECOVERY - FIRST 15 MIN

## 2025-07-15 PROCEDURE — 6360000002 HC RX W HCPCS: Performed by: INTERNAL MEDICINE

## 2025-07-15 PROCEDURE — 94761 N-INVAS EAR/PLS OXIMETRY MLT: CPT

## 2025-07-15 PROCEDURE — G0378 HOSPITAL OBSERVATION PER HR: HCPCS

## 2025-07-15 PROCEDURE — 3700000001 HC ADD 15 MINUTES (ANESTHESIA)

## 2025-07-15 PROCEDURE — 0JHF3XZ INSERTION OF TUNNELED VASCULAR ACCESS DEVICE INTO LEFT UPPER ARM SUBCUTANEOUS TISSUE AND FASCIA, PERCUTANEOUS APPROACH: ICD-10-PCS | Performed by: STUDENT IN AN ORGANIZED HEALTH CARE EDUCATION/TRAINING PROGRAM

## 2025-07-15 PROCEDURE — C1889 IMPLANT/INSERT DEVICE, NOC: HCPCS

## 2025-07-15 PROCEDURE — 2500000003 HC RX 250 WO HCPCS

## 2025-07-15 PROCEDURE — 6360000004 HC RX CONTRAST MEDICATION: Performed by: STUDENT IN AN ORGANIZED HEALTH CARE EDUCATION/TRAINING PROGRAM

## 2025-07-15 PROCEDURE — C1769 GUIDE WIRE: HCPCS

## 2025-07-15 PROCEDURE — 2700000000 HC OXYGEN THERAPY PER DAY

## 2025-07-15 PROCEDURE — C1874 STENT, COATED/COV W/DEL SYS: HCPCS

## 2025-07-15 PROCEDURE — C1725 CATH, TRANSLUMIN NON-LASER: HCPCS

## 2025-07-15 PROCEDURE — 80048 BASIC METABOLIC PNL TOTAL CA: CPT

## 2025-07-15 PROCEDURE — 05793DZ DILATION OF RIGHT BRACHIAL VEIN WITH INTRALUMINAL DEVICE, PERCUTANEOUS APPROACH: ICD-10-PCS | Performed by: STUDENT IN AN ORGANIZED HEALTH CARE EDUCATION/TRAINING PROGRAM

## 2025-07-15 PROCEDURE — 0JPV3XZ REMOVAL OF TUNNELED VASCULAR ACCESS DEVICE FROM UPPER EXTREMITY SUBCUTANEOUS TISSUE AND FASCIA, PERCUTANEOUS APPROACH: ICD-10-PCS | Performed by: STUDENT IN AN ORGANIZED HEALTH CARE EDUCATION/TRAINING PROGRAM

## 2025-07-15 PROCEDURE — B51M1ZZ FLUOROSCOPY OF RIGHT UPPER EXTREMITY VEINS USING LOW OSMOLAR CONTRAST: ICD-10-PCS | Performed by: STUDENT IN AN ORGANIZED HEALTH CARE EDUCATION/TRAINING PROGRAM

## 2025-07-15 PROCEDURE — 3700000000 HC ANESTHESIA ATTENDED CARE

## 2025-07-15 PROCEDURE — 2580000003 HC RX 258

## 2025-07-15 PROCEDURE — 2720000010 HC SURG SUPPLY STERILE

## 2025-07-15 PROCEDURE — 85027 COMPLETE CBC AUTOMATED: CPT

## 2025-07-15 PROCEDURE — 36415 COLL VENOUS BLD VENIPUNCTURE: CPT

## 2025-07-15 PROCEDURE — C1750 CATH, HEMODIALYSIS,LONG-TERM: HCPCS

## 2025-07-15 PROCEDURE — C1894 INTRO/SHEATH, NON-LASER: HCPCS

## 2025-07-15 PROCEDURE — 85610 PROTHROMBIN TIME: CPT

## 2025-07-15 RX ORDER — SODIUM CHLORIDE 0.9 % (FLUSH) 0.9 %
5-40 SYRINGE (ML) INJECTION PRN
Status: DISCONTINUED | OUTPATIENT
Start: 2025-07-15 | End: 2025-07-19 | Stop reason: HOSPADM

## 2025-07-15 RX ORDER — SODIUM CHLORIDE 9 MG/ML
INJECTION, SOLUTION INTRAVENOUS
Status: DISCONTINUED | OUTPATIENT
Start: 2025-07-15 | End: 2025-07-15 | Stop reason: SDUPTHER

## 2025-07-15 RX ORDER — PROPOFOL 10 MG/ML
INJECTION, EMULSION INTRAVENOUS
Status: DISCONTINUED | OUTPATIENT
Start: 2025-07-15 | End: 2025-07-15 | Stop reason: SDUPTHER

## 2025-07-15 RX ORDER — LABETALOL HYDROCHLORIDE 5 MG/ML
INJECTION, SOLUTION INTRAVENOUS
Status: DISCONTINUED | OUTPATIENT
Start: 2025-07-15 | End: 2025-07-15 | Stop reason: SDUPTHER

## 2025-07-15 RX ORDER — ENOXAPARIN SODIUM 100 MG/ML
1 INJECTION SUBCUTANEOUS 2 TIMES DAILY
Status: DISCONTINUED | OUTPATIENT
Start: 2025-07-15 | End: 2025-07-15 | Stop reason: ALTCHOICE

## 2025-07-15 RX ORDER — SODIUM CHLORIDE 9 MG/ML
INJECTION, SOLUTION INTRAVENOUS PRN
Status: DISCONTINUED | OUTPATIENT
Start: 2025-07-15 | End: 2025-07-19 | Stop reason: HOSPADM

## 2025-07-15 RX ORDER — MIDODRINE HYDROCHLORIDE 5 MG/1
10 TABLET ORAL DAILY PRN
Status: DISCONTINUED | OUTPATIENT
Start: 2025-07-15 | End: 2025-07-19 | Stop reason: HOSPADM

## 2025-07-15 RX ORDER — MEMANTINE HYDROCHLORIDE 10 MG/1
10 TABLET ORAL 2 TIMES DAILY
Status: DISCONTINUED | OUTPATIENT
Start: 2025-07-15 | End: 2025-07-19 | Stop reason: HOSPADM

## 2025-07-15 RX ORDER — METHOCARBAMOL 500 MG/1
500 TABLET, FILM COATED ORAL 2 TIMES DAILY
Status: DISCONTINUED | OUTPATIENT
Start: 2025-07-15 | End: 2025-07-18

## 2025-07-15 RX ORDER — FENTANYL CITRATE 50 UG/ML
INJECTION, SOLUTION INTRAMUSCULAR; INTRAVENOUS
Status: DISCONTINUED | OUTPATIENT
Start: 2025-07-15 | End: 2025-07-15 | Stop reason: SDUPTHER

## 2025-07-15 RX ORDER — LEVETIRACETAM 250 MG/1
250 TABLET ORAL
Status: DISCONTINUED | OUTPATIENT
Start: 2025-07-16 | End: 2025-07-16

## 2025-07-15 RX ORDER — FAMOTIDINE 10 MG/ML
INJECTION, SOLUTION INTRAVENOUS
Status: DISCONTINUED | OUTPATIENT
Start: 2025-07-15 | End: 2025-07-15 | Stop reason: SDUPTHER

## 2025-07-15 RX ORDER — HEPARIN SODIUM 10000 [USP'U]/100ML
5-30 INJECTION, SOLUTION INTRAVENOUS CONTINUOUS
Status: DISCONTINUED | OUTPATIENT
Start: 2025-07-15 | End: 2025-07-18

## 2025-07-15 RX ORDER — HEPARIN SODIUM 1000 [USP'U]/ML
INJECTION, SOLUTION INTRAVENOUS; SUBCUTANEOUS
Status: DISCONTINUED | OUTPATIENT
Start: 2025-07-15 | End: 2025-07-15 | Stop reason: SDUPTHER

## 2025-07-15 RX ORDER — SODIUM CHLORIDE 0.9 % (FLUSH) 0.9 %
5-40 SYRINGE (ML) INJECTION EVERY 12 HOURS SCHEDULED
Status: DISCONTINUED | OUTPATIENT
Start: 2025-07-15 | End: 2025-07-19 | Stop reason: HOSPADM

## 2025-07-15 RX ORDER — TRAZODONE HYDROCHLORIDE 50 MG/1
50 TABLET ORAL NIGHTLY
Status: DISCONTINUED | OUTPATIENT
Start: 2025-07-15 | End: 2025-07-19 | Stop reason: HOSPADM

## 2025-07-15 RX ORDER — ACETAMINOPHEN 325 MG/1
650 TABLET ORAL EVERY 6 HOURS PRN
Status: DISCONTINUED | OUTPATIENT
Start: 2025-07-15 | End: 2025-07-19 | Stop reason: HOSPADM

## 2025-07-15 RX ORDER — ACETAMINOPHEN 650 MG/1
650 SUPPOSITORY RECTAL EVERY 6 HOURS PRN
Status: DISCONTINUED | OUTPATIENT
Start: 2025-07-15 | End: 2025-07-19 | Stop reason: HOSPADM

## 2025-07-15 RX ORDER — ATORVASTATIN CALCIUM 80 MG/1
80 TABLET, FILM COATED ORAL DAILY
Status: DISCONTINUED | OUTPATIENT
Start: 2025-07-15 | End: 2025-07-19 | Stop reason: HOSPADM

## 2025-07-15 RX ORDER — HYDRALAZINE HYDROCHLORIDE 20 MG/ML
10 INJECTION INTRAMUSCULAR; INTRAVENOUS
Status: COMPLETED | OUTPATIENT
Start: 2025-07-15 | End: 2025-07-15

## 2025-07-15 RX ORDER — HEPARIN SODIUM 1000 [USP'U]/ML
INJECTION, SOLUTION INTRAVENOUS; SUBCUTANEOUS PRN
Status: COMPLETED | OUTPATIENT
Start: 2025-07-15 | End: 2025-07-15

## 2025-07-15 RX ORDER — LEVETIRACETAM 500 MG/5ML
250 INJECTION, SOLUTION, CONCENTRATE INTRAVENOUS ONCE
Status: COMPLETED | OUTPATIENT
Start: 2025-07-15 | End: 2025-07-15

## 2025-07-15 RX ORDER — LIDOCAINE HYDROCHLORIDE 20 MG/ML
INJECTION, SOLUTION EPIDURAL; INFILTRATION; INTRACAUDAL; PERINEURAL
Status: DISCONTINUED | OUTPATIENT
Start: 2025-07-15 | End: 2025-07-15 | Stop reason: SDUPTHER

## 2025-07-15 RX ORDER — HYDRALAZINE HYDROCHLORIDE 20 MG/ML
10 INJECTION INTRAMUSCULAR; INTRAVENOUS ONCE
Status: DISCONTINUED | OUTPATIENT
Start: 2025-07-15 | End: 2025-07-15

## 2025-07-15 RX ORDER — CEFAZOLIN SODIUM 1 G/3ML
INJECTION, POWDER, FOR SOLUTION INTRAMUSCULAR; INTRAVENOUS
Status: DISCONTINUED | OUTPATIENT
Start: 2025-07-15 | End: 2025-07-15 | Stop reason: SDUPTHER

## 2025-07-15 RX ORDER — LABETALOL HYDROCHLORIDE 5 MG/ML
10 INJECTION, SOLUTION INTRAVENOUS
Status: COMPLETED | OUTPATIENT
Start: 2025-07-15 | End: 2025-07-15

## 2025-07-15 RX ORDER — LEVETIRACETAM 500 MG/1
500 TABLET ORAL DAILY
Status: DISCONTINUED | OUTPATIENT
Start: 2025-07-15 | End: 2025-07-16

## 2025-07-15 RX ORDER — ONDANSETRON 2 MG/ML
4 INJECTION INTRAMUSCULAR; INTRAVENOUS
Status: COMPLETED | OUTPATIENT
Start: 2025-07-15 | End: 2025-07-15

## 2025-07-15 RX ORDER — CALCITRIOL 0.25 UG/1
0.25 CAPSULE, LIQUID FILLED ORAL
Status: DISCONTINUED | OUTPATIENT
Start: 2025-07-16 | End: 2025-07-19 | Stop reason: HOSPADM

## 2025-07-15 RX ORDER — LIDOCAINE HYDROCHLORIDE 10 MG/ML
INJECTION, SOLUTION EPIDURAL; INFILTRATION; INTRACAUDAL; PERINEURAL PRN
Status: COMPLETED | OUTPATIENT
Start: 2025-07-15 | End: 2025-07-15

## 2025-07-15 RX ORDER — PROCHLORPERAZINE EDISYLATE 5 MG/ML
5 INJECTION INTRAMUSCULAR; INTRAVENOUS
Status: COMPLETED | OUTPATIENT
Start: 2025-07-15 | End: 2025-07-15

## 2025-07-15 RX ORDER — ROCURONIUM BROMIDE 10 MG/ML
INJECTION, SOLUTION INTRAVENOUS
Status: DISCONTINUED | OUTPATIENT
Start: 2025-07-15 | End: 2025-07-15 | Stop reason: SDUPTHER

## 2025-07-15 RX ORDER — CARVEDILOL 6.25 MG/1
6.25 TABLET ORAL 2 TIMES DAILY WITH MEALS
Status: DISCONTINUED | OUTPATIENT
Start: 2025-07-15 | End: 2025-07-19 | Stop reason: HOSPADM

## 2025-07-15 RX ORDER — HEPARIN SODIUM 5000 [USP'U]/ML
5000 INJECTION, SOLUTION INTRAVENOUS; SUBCUTANEOUS EVERY 8 HOURS SCHEDULED
Status: DISCONTINUED | OUTPATIENT
Start: 2025-07-15 | End: 2025-07-15 | Stop reason: SDUPTHER

## 2025-07-15 RX ORDER — OXYCODONE AND ACETAMINOPHEN 10; 325 MG/1; MG/1
1 TABLET ORAL EVERY 6 HOURS PRN
Refills: 0 | Status: DISCONTINUED | OUTPATIENT
Start: 2025-07-15 | End: 2025-07-19 | Stop reason: HOSPADM

## 2025-07-15 RX ORDER — ONDANSETRON 2 MG/ML
4 INJECTION INTRAMUSCULAR; INTRAVENOUS EVERY 6 HOURS PRN
Status: DISCONTINUED | OUTPATIENT
Start: 2025-07-15 | End: 2025-07-19 | Stop reason: HOSPADM

## 2025-07-15 RX ORDER — SEVELAMER CARBONATE 800 MG/1
1600 TABLET, FILM COATED ORAL 2 TIMES DAILY WITH MEALS
Status: DISCONTINUED | OUTPATIENT
Start: 2025-07-15 | End: 2025-07-19 | Stop reason: HOSPADM

## 2025-07-15 RX ORDER — OXYCODONE HYDROCHLORIDE 5 MG/1
5 TABLET ORAL PRN
Status: ACTIVE | OUTPATIENT
Start: 2025-07-15 | End: 2025-07-15

## 2025-07-15 RX ORDER — PANTOPRAZOLE SODIUM 40 MG/1
40 TABLET, DELAYED RELEASE ORAL
Status: DISCONTINUED | OUTPATIENT
Start: 2025-07-16 | End: 2025-07-19 | Stop reason: HOSPADM

## 2025-07-15 RX ORDER — CALCIUM GLUCONATE 20 MG/ML
1000 INJECTION, SOLUTION INTRAVENOUS ONCE
Status: COMPLETED | OUTPATIENT
Start: 2025-07-15 | End: 2025-07-15

## 2025-07-15 RX ORDER — ONDANSETRON 4 MG/1
4 TABLET, ORALLY DISINTEGRATING ORAL EVERY 8 HOURS PRN
Status: DISCONTINUED | OUTPATIENT
Start: 2025-07-15 | End: 2025-07-19 | Stop reason: HOSPADM

## 2025-07-15 RX ORDER — DOCUSATE SODIUM 100 MG/1
100 CAPSULE, LIQUID FILLED ORAL 2 TIMES DAILY
Status: DISCONTINUED | OUTPATIENT
Start: 2025-07-15 | End: 2025-07-19 | Stop reason: HOSPADM

## 2025-07-15 RX ORDER — ONDANSETRON 2 MG/ML
INJECTION INTRAMUSCULAR; INTRAVENOUS
Status: DISCONTINUED | OUTPATIENT
Start: 2025-07-15 | End: 2025-07-15 | Stop reason: SDUPTHER

## 2025-07-15 RX ORDER — POLYETHYLENE GLYCOL 3350 17 G/17G
17 POWDER, FOR SOLUTION ORAL DAILY PRN
Status: DISCONTINUED | OUTPATIENT
Start: 2025-07-15 | End: 2025-07-19 | Stop reason: HOSPADM

## 2025-07-15 RX ORDER — TORSEMIDE 100 MG/1
100 TABLET ORAL 2 TIMES DAILY
Status: DISCONTINUED | OUTPATIENT
Start: 2025-07-16 | End: 2025-07-19 | Stop reason: HOSPADM

## 2025-07-15 RX ORDER — FENTANYL CITRATE 50 UG/ML
25 INJECTION, SOLUTION INTRAMUSCULAR; INTRAVENOUS EVERY 5 MIN PRN
Status: DISCONTINUED | OUTPATIENT
Start: 2025-07-15 | End: 2025-07-19 | Stop reason: HOSPADM

## 2025-07-15 RX ORDER — GABAPENTIN 300 MG/1
300 CAPSULE ORAL 2 TIMES DAILY
Status: DISCONTINUED | OUTPATIENT
Start: 2025-07-15 | End: 2025-07-18

## 2025-07-15 RX ORDER — OXYCODONE HYDROCHLORIDE 5 MG/1
10 TABLET ORAL PRN
Status: ACTIVE | OUTPATIENT
Start: 2025-07-15 | End: 2025-07-15

## 2025-07-15 RX ORDER — HYDRALAZINE HYDROCHLORIDE 20 MG/ML
INJECTION INTRAMUSCULAR; INTRAVENOUS
Status: COMPLETED
Start: 2025-07-15 | End: 2025-07-15

## 2025-07-15 RX ORDER — LABETALOL HYDROCHLORIDE 5 MG/ML
10 INJECTION, SOLUTION INTRAVENOUS EVERY 4 HOURS PRN
Status: DISCONTINUED | OUTPATIENT
Start: 2025-07-15 | End: 2025-07-19 | Stop reason: HOSPADM

## 2025-07-15 RX ORDER — IOPAMIDOL 612 MG/ML
INJECTION, SOLUTION INTRAVASCULAR PRN
Status: COMPLETED | OUTPATIENT
Start: 2025-07-15 | End: 2025-07-15

## 2025-07-15 RX ORDER — HYDROMORPHONE HYDROCHLORIDE 1 MG/ML
0.5 INJECTION, SOLUTION INTRAMUSCULAR; INTRAVENOUS; SUBCUTANEOUS EVERY 5 MIN PRN
Status: DISCONTINUED | OUTPATIENT
Start: 2025-07-15 | End: 2025-07-19 | Stop reason: HOSPADM

## 2025-07-15 RX ORDER — HYDRALAZINE HYDROCHLORIDE 20 MG/ML
10 INJECTION INTRAMUSCULAR; INTRAVENOUS ONCE
Status: COMPLETED | OUTPATIENT
Start: 2025-07-15 | End: 2025-07-15

## 2025-07-15 RX ORDER — GLYCOPYRROLATE 0.2 MG/ML
INJECTION INTRAMUSCULAR; INTRAVENOUS
Status: DISCONTINUED | OUTPATIENT
Start: 2025-07-15 | End: 2025-07-15 | Stop reason: SDUPTHER

## 2025-07-15 RX ADMIN — SUGAMMADEX 100 MG: 100 INJECTION, SOLUTION INTRAVENOUS at 15:57

## 2025-07-15 RX ADMIN — CEFAZOLIN SODIUM 2 G: 1 POWDER, FOR SOLUTION INTRAMUSCULAR; INTRAVENOUS at 14:55

## 2025-07-15 RX ADMIN — LIDOCAINE HYDROCHLORIDE 2 ML: 10 INJECTION, SOLUTION EPIDURAL; INFILTRATION; INTRACAUDAL; PERINEURAL at 10:14

## 2025-07-15 RX ADMIN — LABETALOL HYDROCHLORIDE 10 MG: 5 INJECTION, SOLUTION INTRAVENOUS at 17:25

## 2025-07-15 RX ADMIN — ONDANSETRON 4 MG: 2 INJECTION, SOLUTION INTRAMUSCULAR; INTRAVENOUS at 17:32

## 2025-07-15 RX ADMIN — ROCURONIUM BROMIDE 10 MG: 10 INJECTION, SOLUTION INTRAVENOUS at 13:53

## 2025-07-15 RX ADMIN — LIDOCAINE HYDROCHLORIDE 80 MG: 20 INJECTION, SOLUTION EPIDURAL; INFILTRATION; INTRACAUDAL; PERINEURAL at 09:07

## 2025-07-15 RX ADMIN — LABETALOL HYDROCHLORIDE 5 MG: 5 INJECTION, SOLUTION INTRAVENOUS at 16:01

## 2025-07-15 RX ADMIN — ROCURONIUM BROMIDE 20 MG: 10 INJECTION, SOLUTION INTRAVENOUS at 13:04

## 2025-07-15 RX ADMIN — FENTANYL CITRATE 25 MCG: 50 INJECTION INTRAMUSCULAR; INTRAVENOUS at 16:15

## 2025-07-15 RX ADMIN — ROCURONIUM BROMIDE 20 MG: 10 INJECTION, SOLUTION INTRAVENOUS at 09:52

## 2025-07-15 RX ADMIN — SODIUM CHLORIDE: 9 INJECTION, SOLUTION INTRAVENOUS at 14:11

## 2025-07-15 RX ADMIN — PHENYLEPHRINE HYDROCHLORIDE 50 MCG: 10 INJECTION INTRAVENOUS at 15:35

## 2025-07-15 RX ADMIN — ONDANSETRON 4 MG: 2 INJECTION, SOLUTION INTRAMUSCULAR; INTRAVENOUS at 09:19

## 2025-07-15 RX ADMIN — ROCURONIUM BROMIDE 20 MG: 10 INJECTION, SOLUTION INTRAVENOUS at 10:36

## 2025-07-15 RX ADMIN — LIDOCAINE HYDROCHLORIDE 2 ML: 10 INJECTION, SOLUTION EPIDURAL; INFILTRATION; INTRACAUDAL; PERINEURAL at 09:26

## 2025-07-15 RX ADMIN — FENTANYL CITRATE 25 MCG: 50 INJECTION INTRAMUSCULAR; INTRAVENOUS at 15:26

## 2025-07-15 RX ADMIN — HEPARIN SODIUM 1800 UNITS: 1000 INJECTION INTRAVENOUS; SUBCUTANEOUS at 15:27

## 2025-07-15 RX ADMIN — FENTANYL CITRATE 25 MCG: 50 INJECTION INTRAMUSCULAR; INTRAVENOUS at 16:10

## 2025-07-15 RX ADMIN — GLYCOPYRROLATE 0.2 MG: 0.2 INJECTION INTRAMUSCULAR; INTRAVENOUS at 10:09

## 2025-07-15 RX ADMIN — CALCIUM GLUCONATE 1000 MG: 20 INJECTION, SOLUTION INTRAVENOUS at 23:15

## 2025-07-15 RX ADMIN — PHENYLEPHRINE HYDROCHLORIDE 100 MCG: 10 INJECTION INTRAVENOUS at 15:42

## 2025-07-15 RX ADMIN — SUGAMMADEX 100 MG: 100 INJECTION, SOLUTION INTRAVENOUS at 16:01

## 2025-07-15 RX ADMIN — HEPARIN SODIUM 3000 UNITS: 1000 INJECTION INTRAVENOUS; SUBCUTANEOUS at 15:00

## 2025-07-15 RX ADMIN — HYDRALAZINE HYDROCHLORIDE 10 MG: 20 INJECTION, SOLUTION INTRAMUSCULAR; INTRAVENOUS at 18:16

## 2025-07-15 RX ADMIN — ROCURONIUM BROMIDE 20 MG: 10 INJECTION, SOLUTION INTRAVENOUS at 11:21

## 2025-07-15 RX ADMIN — PHENYLEPHRINE HYDROCHLORIDE 100 MCG: 10 INJECTION INTRAVENOUS at 14:36

## 2025-07-15 RX ADMIN — FAMOTIDINE 20 MG: 10 INJECTION, SOLUTION INTRAVENOUS at 12:28

## 2025-07-15 RX ADMIN — ROCURONIUM BROMIDE 60 MG: 10 INJECTION, SOLUTION INTRAVENOUS at 09:08

## 2025-07-15 RX ADMIN — IOPAMIDOL 200 ML: 612 INJECTION, SOLUTION INTRAVENOUS at 16:24

## 2025-07-15 RX ADMIN — ROCURONIUM BROMIDE 20 MG: 10 INJECTION, SOLUTION INTRAVENOUS at 12:13

## 2025-07-15 RX ADMIN — GLYCOPYRROLATE 0.2 MG: 0.2 INJECTION INTRAMUSCULAR; INTRAVENOUS at 12:49

## 2025-07-15 RX ADMIN — SODIUM CHLORIDE: 9 INJECTION, SOLUTION INTRAVENOUS at 09:02

## 2025-07-15 RX ADMIN — ROCURONIUM BROMIDE 10 MG: 10 INJECTION, SOLUTION INTRAVENOUS at 14:31

## 2025-07-15 RX ADMIN — LABETALOL HYDROCHLORIDE 5 MG: 5 INJECTION, SOLUTION INTRAVENOUS at 16:06

## 2025-07-15 RX ADMIN — LIDOCAINE HYDROCHLORIDE 2 ML: 10 INJECTION, SOLUTION EPIDURAL; INFILTRATION; INTRACAUDAL; PERINEURAL at 10:08

## 2025-07-15 RX ADMIN — HEPARIN SODIUM 5000 UNITS: 1000 INJECTION INTRAVENOUS; SUBCUTANEOUS at 14:33

## 2025-07-15 RX ADMIN — SODIUM CHLORIDE: 9 INJECTION, SOLUTION INTRAVENOUS at 10:39

## 2025-07-15 RX ADMIN — FENTANYL CITRATE 25 MCG: 50 INJECTION INTRAMUSCULAR; INTRAVENOUS at 15:19

## 2025-07-15 RX ADMIN — PHENYLEPHRINE HYDROCHLORIDE 50 MCG: 10 INJECTION INTRAVENOUS at 13:18

## 2025-07-15 RX ADMIN — HYDRALAZINE HYDROCHLORIDE 10 MG: 20 INJECTION INTRAMUSCULAR; INTRAVENOUS at 17:58

## 2025-07-15 RX ADMIN — PHENYLEPHRINE HYDROCHLORIDE 50 MCG: 10 INJECTION INTRAVENOUS at 14:32

## 2025-07-15 RX ADMIN — HYDRALAZINE HYDROCHLORIDE 10 MG: 20 INJECTION, SOLUTION INTRAMUSCULAR; INTRAVENOUS at 17:58

## 2025-07-15 RX ADMIN — FENTANYL CITRATE 50 MCG: 50 INJECTION INTRAMUSCULAR; INTRAVENOUS at 14:26

## 2025-07-15 RX ADMIN — PROCHLORPERAZINE EDISYLATE 5 MG: 5 INJECTION INTRAMUSCULAR; INTRAVENOUS at 19:51

## 2025-07-15 RX ADMIN — PHENYLEPHRINE HYDROCHLORIDE 20 MCG/MIN: 10 INJECTION INTRAVENOUS at 09:19

## 2025-07-15 RX ADMIN — LABETALOL HYDROCHLORIDE 10 MG: 5 INJECTION, SOLUTION INTRAVENOUS at 17:05

## 2025-07-15 RX ADMIN — PROPOFOL 110 MG: 10 INJECTION, EMULSION INTRAVENOUS at 09:07

## 2025-07-15 RX ADMIN — PROPOFOL 20 MG: 10 INJECTION, EMULSION INTRAVENOUS at 13:09

## 2025-07-15 RX ADMIN — ONDANSETRON 4 MG: 2 INJECTION, SOLUTION INTRAMUSCULAR; INTRAVENOUS at 15:19

## 2025-07-15 RX ADMIN — GLYCOPYRROLATE 0.2 MG: 0.2 INJECTION INTRAMUSCULAR; INTRAVENOUS at 14:38

## 2025-07-15 RX ADMIN — LEVETIRACETAM 250 MG: 100 INJECTION INTRAVENOUS at 23:15

## 2025-07-15 ASSESSMENT — PAIN - FUNCTIONAL ASSESSMENT: PAIN_FUNCTIONAL_ASSESSMENT: NONE - DENIES PAIN

## 2025-07-15 ASSESSMENT — ENCOUNTER SYMPTOMS: SHORTNESS OF BREATH: 1

## 2025-07-15 ASSESSMENT — PAIN SCALES - GENERAL: PAINLEVEL_OUTOF10: 0

## 2025-07-15 NOTE — PROGRESS NOTES
1630 Admitted to PACU from OR. Connected to monitor. Report at bedside. Heparin given. New CVC TL in left arm. Bloody drainage under dressing - stitch applied. Position correct per parviz. 3 attempts in groins (2x on left). Left dressing clean - tegaderm. Right clean - guaze and tegaderm. Venous so pulses not monitored. Palp (2) on assessment. Right arm venous site with clean clear dressing. - Stitched in cath lab. Hands cold and pulse 1+ right stronger than left. Dialysis access in left SC area - bloody - stitch and clotting pad applied. Dry dressing over clear dressing and 1000ml IVF bag for gentle pressure. Reported jugular stick as well no dressing noted.  All sites venous.Oral airway removed during report. . Temp 98.6 oral but feels cold to touch and pat reports feels cold. 3 warmed blankets x 2 with 2 additional warm blankets to hands with second batch. Approx 2500 IVF given. Thick secretions out with yanker. When asked if in pain  - reports throat sore.

## 2025-07-15 NOTE — PROGRESS NOTES
Full sensation x 4 extremities. Right foot itches frequently scratched per RN.   Mouth suctioned per RN.  Warm blankets frequently applied.

## 2025-07-15 NOTE — PROGRESS NOTES
Groin dressings and right arm - clean - no new drainage, swelling.  L CVC no increase in drainage.   L hemodialysis continues to bleed. 25% of 4x8 and ABD pad saturated with blood. New dressing applied.

## 2025-07-15 NOTE — PROGRESS NOTES
Dr Ojeda notified of BP after labetalol. Hydralazine ordered.  CBC APTT anti XA drawn from central line with IVF off and 10ml waste.

## 2025-07-15 NOTE — H&P
V2.0  History and Physical      Name:  Scottie Chiang /Age/Sex: 1970  (55 y.o. female)   MRN & CSN:  [id number] & [id number] Encounter Date/Time: 7/15/2025 7:39 PM EDT   Location:  [facility] PCP: Unknown, Provider       Hospital Day: 1    Assessment and Plan:     Hospital Problems           Last Modified POA    * (Principal) Venous stenosis of right upper extremity 7/15/2025 Yes     55-year-old with ESRD, central venous stenosis, came in for IR procedure, admitted for postoperative management    Hyperkalemia, potassium 5.6,, will give calcium gluconate, nephrology consulted for evaluation recommendations  Give a dose of sodium zirconium  Low potassium diet  Underwent HD yesterday, monitor telemetry will ask nephrology arrange HD for Guy in the morning    Hypertensive urgency, restart home medications, add IV labetalol for SBP greater than 160  Monitor on telemetry  Low-sodium diet    Nausea vomiting could be due to underlying hypertensive urgency as well as postanesthesia effect  As needed antiemetics    Bleeding from tunneled HD catheter  Manual pressure applied  Keep a close eye on bleeding may need to ask vascular surgery for evaluation if continues to have bleeding    Central venous stenosis, per IR, recommended heparin while inpatient, check factor Xa levels and dose heparin accordingly.  Currently unable to use heparin drip due to bleeding from vascular catheter site.  If bleeding stops x 1 hour then we can start heparin drip per protocol.    Will consider Coumadin versus apixaban on discharge    History of seizures, continue Keppra, I do not think she took the medication this morning so we will give to 50 mg dose once and then continue 500 mg daily and then to 50 mg after dialysis     Chronic pain, continue Robaxin, gabapentin    Disposition:   Current Living situation: Home  Expected Disposition: Home  Estimated D/C: 2 to 3 days once acute issues resolved    Diet ADULT DIET;

## 2025-07-15 NOTE — H&P
INTERVENTIONAL RADIOLOGY H&P NOTE    Subjective:    Scottie Chiang is a 55 y.o. female with history significant for ESRD requiring dialysis and central venous stenosis.    Review of Systems  All systems were reviewed and were negative unless mentioned in the above HPI.    PMH/PSH:  Past Medical History:   Diagnosis Date    Amputation of right great toe     Arthritis     Diabetes mellitus (HCC)     Diastolic CHF (HCC)     End stage renal disease (HCC)     Hemodialysis patient     MWF    History of blood transfusion     Hyperlipidemia     Hypertension     Migraine     On home O2     but does not use it    JEAN (obstructive sleep apnea)     currently not on cpap    Seizures (HCC)      Past Surgical History:   Procedure Laterality Date    CATARACT REMOVAL Bilateral      SECTION      DIALYSIS CATHETER INSERTION N/A 2021    LAPAROSCOPIC PERITONEAL DIALYSIS CATHETER INSERTION, OMENTOPLEXY performed by Henri Elizalde DO at [facility] OR    DIALYSIS CATHETER INSERTION N/A 2022    LAPAROSCOPIC PERITONEAL DIALYSIS CATHETER PLACEMENT performed by Henri Elizalde DO at [facility] OR    DIALYSIS CATHETER REMOVAL N/A 2021    CATHETER REMOVAL PERITONEAL DIALYSIS performed by Henri Elizalde DO at [facility] OR    DIALYSIS CATHETER REMOVAL N/A 2022    PERITONEAL DIALYSIS CATHETER REMOVAL performed by Henri Elizalde DO at [facility] OR    DIALYSIS FISTULA CREATION Right 2024    RIGHT ARTERIOVENOUS FISTULA CREATION performed by Valeria Kumar MD at [facility] OR    DIALYSIS FISTULA CREATION Right 3/7/2025    RIGHT ARTERIOVENOUS FISTULA CREATION performed by Valeria Kumar MD at [facility] OR    DIALYSIS FISTULA CREATION Right 3/7/2025    RIGHT UPPER EXTREMITY HEMATOMA EVACUATION performed by Valeria Kumar MD at [facility] OR    EYE SURGERY Bilateral 1988    muscle surgery     INVASIVE VASCULAR N/A 2024    Angioplasty fistula/dialysis circuit performed by Valeria Kumar MD at [facility] CARDIAC CATH LAB    INVASIVE VASCULAR N/A 2024    Fistulogram

## 2025-07-15 NOTE — ANESTHESIA PRE PROCEDURE
Department of Anesthesiology  Preprocedure Note       Name:  Scottie Chiang   Age:  55 y.o.  :  1970                                          MRN:  [id number]         Date:  7/15/2025      Surgeon: * No surgeons listed *    Procedure: * No procedures listed *    Medications prior to admission:   Prior to Admission medications    Medication Sig Start Date End Date Taking? Authorizing Provider   albuterol sulfate HFA (PROVENTIL;VENTOLIN;PROAIR) 108 (90 Base) MCG/ACT inhaler Inhale 2 puffs into the lungs every 6 hours as needed for Wheezing   Yes Corey Caceres MD   traZODone (DESYREL) 50 MG tablet Take 1 tablet by mouth nightly   Yes Corey Caceres MD   methocarbamol (ROBAXIN) 500 MG tablet TAKE 1 TABLET BY MOUTH 2 TIMES DAILY 25 Yes Faby Lance APRN - CNP   gabapentin (NEURONTIN) 300 MG capsule TAKE 1 CAPSULE BY MOUTH IN THE MORNING AND AT BEDTIME FOR 90 DAYS. 25 Yes Charu Nuñez PA-C   oxyCODONE-acetaminophen (ENDOCET)  MG per tablet Take 1 tablet by mouth every 6 hours as needed for Pain for up to 28 days. Max Daily Amount: 4 tablets 6/17/25 7/15/25 Yes Faby Lance APRN - CNP   B Complex-C-Folic Acid (DIALYVITE TABLET) TABS TAKE 1 TABLET BY MOUTH EVERY DAY 3/18/25  Yes Charu Nuñez PA-C   medroxyPROGESTERone (PROVERA) 10 MG tablet Take 1 tablet by mouth daily 1/15/25  Yes Corey Caceres MD   docusate sodium (COLACE) 100 MG capsule TAKE 1 CAPSULE BY MOUTH 2 TIMES DAILY 24  Yes Charu Nuñez PA-C   atorvastatin (LIPITOR) 80 MG tablet Take 1 tablet by mouth daily   Yes Corey Caceres MD   carvedilol (COREG) 6.25 MG tablet Take 1 tablet by mouth 2 times daily (with meals)   Yes Corey Caceres MD   levETIRAcetam (KEPPRA) 250 MG tablet Take one tablet by mouth three times a week after dialysis on , , and  (500 mg daily, plus 250 mg on dialysis days).   Yes Corey Caceres MD   midodrine (PROAMATINE) 10 MG

## 2025-07-15 NOTE — PROGRESS NOTES
Perfect served through web port. Dr Novoa to see patient and wants Dr Begum notified.  Here holding pressure.  Unable to perfect serve per web. Office called - scheduling office.To leave message.   Additional number from supervisor [phone]. Message left.  Dr Novoa removed dressing. New dressing applied.  OK to send to room.

## 2025-07-15 NOTE — PROGRESS NOTES
Groin dressings and right arm - clean - no new drainage, swelling.  L CVC no increase in drainage.   L hemodialysis continues to bleed. 3/4 abd and 4x8 saturated. New dressing applied.

## 2025-07-15 NOTE — ANESTHESIA POSTPROCEDURE EVALUATION
Department of Anesthesiology  Postprocedure Note    Patient: Scottie Chiang  MRN: [id number]  YOB: 1970  Date of evaluation: 7/15/2025    Procedure Summary       Date: 07/15/25 Room / Location: [facility] Special Procedures    Anesthesia Start: 0902 Anesthesia Stop: 1632    Procedure: IR VENOGRAM UPPER EXTREMITY RIGHT Diagnosis:       Stenosis of brachiocephalic vein      Stenosis of brachiocephalic vein      Venous stenosis of right upper extremity    Scheduled Providers: Kenney Elizabeth MD Responsible Provider: Kenney Elizabeth MD    Anesthesia Type: General ASA Status: 4            Anesthesia Type: General    Otto Phase I: Otto Score: 8    Otto Phase II:      Anesthesia Post Evaluation    Patient location during evaluation: PACU  Patient participation: complete - patient participated  Level of consciousness: awake and alert  Airway patency: patent  Nausea & Vomiting: no nausea and no vomiting  Cardiovascular status: blood pressure returned to baseline and hypertensive  Respiratory status: acceptable  Hydration status: euvolemic  Pain management: adequate    No notable events documented.

## 2025-07-15 NOTE — PROGRESS NOTES
Groin dressings and right arm - clean - no new drainage, swelling.  L CVC no increase in drainage.   L hemodialysis continues to bleed.

## 2025-07-15 NOTE — PROGRESS NOTES
Unable to perfect serve Dr Novoa.    called. Number does not reach hospitalist (message to call ).   Supervisor called.Same number given.   called again. Do not have direct number.   ED reports perfect serve only way to reach patient.

## 2025-07-15 NOTE — PROCEDURES
Interventional Radiology Post Procedure    Date: 7/15/2025    Physician: Armando Begum MD    Pre-op Diagnosis: central venous occlusion    Post-op Diagnosis: same    Variation from Planned Procedure: None       Findings:   Successful LUE triple lumen catheter placement secondary to poor venous access.  Successful sharp recanalization and stenting of the right brachiocephalic vein.  Successful stenting of the left brachiocephalic vein.  Successful replacement of left tunneled HD catheter.    Patient condition: Stable    Estimated Blood Loss: 15 cc    Specimens:  none sent      Plan:  - Admit to obs due to length of case and need for dialysis tomorrow.  - heparin while inpatient. Will need to transition to warfarin or eliquis as outpatient.    Signed,  Jeffrey Begum MD  4:04 PM  7/15/2025

## 2025-07-15 NOTE — PROGRESS NOTES
PACU Transfer Note    Vitals:    07/15/25 1915   BP: (!) 160/85   Pulse: 87   Resp: 13   Temp: 99.7 °F (37.6 °C)   SpO2: 95%     Pre-op /73  No intake/output data recorded.    Pain assessment:  not receiving treatment. Sore throat      Unable to call report r/t sterile cockpit.     7/15/2025 7:22 PM

## 2025-07-16 ENCOUNTER — APPOINTMENT (OUTPATIENT)
Dept: CT IMAGING | Age: 55
End: 2025-07-16
Attending: INTERNAL MEDICINE
Payer: COMMERCIAL

## 2025-07-16 PROBLEM — G93.40 ACUTE ENCEPHALOPATHY: Status: ACTIVE | Noted: 2025-07-16

## 2025-07-16 LAB
ANION GAP SERPL CALCULATED.3IONS-SCNC: 18 MMOL/L (ref 3–16)
ANION GAP SERPL CALCULATED.3IONS-SCNC: 19 MMOL/L (ref 3–16)
ANTI-XA UNFRAC HEPARIN: <0.1 IU/ML (ref 0.3–0.7)
BASOPHILS # BLD: 0 K/UL (ref 0–0.2)
BASOPHILS NFR BLD: 0.6 %
BUN SERPL-MCNC: 32 MG/DL (ref 7–20)
BUN SERPL-MCNC: 65 MG/DL (ref 7–20)
CALCIUM SERPL-MCNC: 8.4 MG/DL (ref 8.3–10.6)
CALCIUM SERPL-MCNC: 9 MG/DL (ref 8.3–10.6)
CHLORIDE SERPL-SCNC: 103 MMOL/L (ref 99–110)
CHLORIDE SERPL-SCNC: 98 MMOL/L (ref 99–110)
CO2 SERPL-SCNC: 18 MMOL/L (ref 21–32)
CO2 SERPL-SCNC: 20 MMOL/L (ref 21–32)
CREAT SERPL-MCNC: 5.7 MG/DL (ref 0.6–1.1)
CREAT SERPL-MCNC: 9.6 MG/DL (ref 0.6–1.1)
DEPRECATED RDW RBC AUTO: 20.4 % (ref 12.4–15.4)
ECHO BSA: 2.07 M2
EOSINOPHIL # BLD: 0 K/UL (ref 0–0.6)
EOSINOPHIL NFR BLD: 0.4 %
GFR SERPLBLD CREATININE-BSD FMLA CKD-EPI: 4 ML/MIN/{1.73_M2}
GFR SERPLBLD CREATININE-BSD FMLA CKD-EPI: 8 ML/MIN/{1.73_M2}
GLUCOSE SERPL-MCNC: 109 MG/DL (ref 70–99)
GLUCOSE SERPL-MCNC: 143 MG/DL (ref 70–99)
HCT VFR BLD AUTO: 32.4 % (ref 36–48)
HGB BLD-MCNC: 10.5 G/DL (ref 12–16)
LYMPHOCYTES # BLD: 0.5 K/UL (ref 1–5.1)
LYMPHOCYTES NFR BLD: 8.7 %
MCH RBC QN AUTO: 33.9 PG (ref 26–34)
MCHC RBC AUTO-ENTMCNC: 32.4 G/DL (ref 31–36)
MCV RBC AUTO: 104.6 FL (ref 80–100)
MONOCYTES # BLD: 0.5 K/UL (ref 0–1.3)
MONOCYTES NFR BLD: 8.3 %
NEUTROPHILS # BLD: 4.9 K/UL (ref 1.7–7.7)
NEUTROPHILS NFR BLD: 82 %
PLATELET # BLD AUTO: 145 K/UL (ref 135–450)
PMV BLD AUTO: 9.6 FL (ref 5–10.5)
POTASSIUM SERPL-SCNC: 3.9 MMOL/L (ref 3.5–5.1)
POTASSIUM SERPL-SCNC: 7.1 MMOL/L (ref 3.5–5.1)
RBC # BLD AUTO: 3.1 M/UL (ref 4–5.2)
SODIUM SERPL-SCNC: 137 MMOL/L (ref 136–145)
SODIUM SERPL-SCNC: 139 MMOL/L (ref 136–145)
WBC # BLD AUTO: 6 K/UL (ref 4–11)

## 2025-07-16 PROCEDURE — 70450 CT HEAD/BRAIN W/O DYE: CPT

## 2025-07-16 PROCEDURE — 99223 1ST HOSP IP/OBS HIGH 75: CPT | Performed by: INTERNAL MEDICINE

## 2025-07-16 PROCEDURE — 85025 COMPLETE CBC W/AUTO DIFF WBC: CPT

## 2025-07-16 PROCEDURE — 2500000003 HC RX 250 WO HCPCS

## 2025-07-16 PROCEDURE — 1200000000 HC SEMI PRIVATE

## 2025-07-16 PROCEDURE — 6370000000 HC RX 637 (ALT 250 FOR IP): Performed by: INTERNAL MEDICINE

## 2025-07-16 PROCEDURE — 6360000002 HC RX W HCPCS: Performed by: INTERNAL MEDICINE

## 2025-07-16 PROCEDURE — 5A1D70Z PERFORMANCE OF URINARY FILTRATION, INTERMITTENT, LESS THAN 6 HOURS PER DAY: ICD-10-PCS | Performed by: INTERNAL MEDICINE

## 2025-07-16 PROCEDURE — 85520 HEPARIN ASSAY: CPT

## 2025-07-16 PROCEDURE — 2500000003 HC RX 250 WO HCPCS: Performed by: INTERNAL MEDICINE

## 2025-07-16 PROCEDURE — 36592 COLLECT BLOOD FROM PICC: CPT

## 2025-07-16 PROCEDURE — 80048 BASIC METABOLIC PNL TOTAL CA: CPT

## 2025-07-16 PROCEDURE — 6360000002 HC RX W HCPCS

## 2025-07-16 PROCEDURE — 90935 HEMODIALYSIS ONE EVALUATION: CPT | Performed by: INTERNAL MEDICINE

## 2025-07-16 PROCEDURE — 90935 HEMODIALYSIS ONE EVALUATION: CPT

## 2025-07-16 RX ORDER — LEVETIRACETAM 500 MG/5ML
500 INJECTION, SOLUTION, CONCENTRATE INTRAVENOUS DAILY
Status: DISCONTINUED | OUTPATIENT
Start: 2025-07-16 | End: 2025-07-18

## 2025-07-16 RX ORDER — CALCIUM GLUCONATE 20 MG/ML
1000 INJECTION, SOLUTION INTRAVENOUS ONCE
Status: DISCONTINUED | OUTPATIENT
Start: 2025-07-16 | End: 2025-07-19 | Stop reason: HOSPADM

## 2025-07-16 RX ORDER — HEPARIN SODIUM 1000 [USP'U]/ML
3600 INJECTION, SOLUTION INTRAVENOUS; SUBCUTANEOUS PRN
Status: DISCONTINUED | OUTPATIENT
Start: 2025-07-16 | End: 2025-07-19 | Stop reason: HOSPADM

## 2025-07-16 RX ADMIN — LABETALOL HYDROCHLORIDE 10 MG: 5 INJECTION, SOLUTION INTRAVENOUS at 21:07

## 2025-07-16 RX ADMIN — SODIUM ZIRCONIUM CYCLOSILICATE 10 G: 10 POWDER, FOR SUSPENSION ORAL at 05:45

## 2025-07-16 RX ADMIN — SODIUM CHLORIDE, PRESERVATIVE FREE 10 ML: 5 INJECTION INTRAVENOUS at 20:40

## 2025-07-16 RX ADMIN — HEPARIN SODIUM 3600 UNITS: 1000 INJECTION INTRAVENOUS; SUBCUTANEOUS at 12:25

## 2025-07-16 RX ADMIN — OLANZAPINE 7.5 MG: 10 INJECTION, POWDER, FOR SOLUTION INTRAMUSCULAR at 22:39

## 2025-07-16 RX ADMIN — LEVETIRACETAM 500 MG: 100 INJECTION INTRAVENOUS at 15:35

## 2025-07-16 RX ADMIN — SODIUM CHLORIDE, PRESERVATIVE FREE 40 MG: 5 INJECTION INTRAVENOUS at 20:38

## 2025-07-16 RX ADMIN — PANTOPRAZOLE SODIUM 40 MG: 40 TABLET, DELAYED RELEASE ORAL at 05:45

## 2025-07-16 NOTE — PROGRESS NOTES
IR Progress Note:    Chief Complaint: central venous occlusion     24 hour Interval History: Patient seen at bedside while getting dialysis.    Patient underwent extensive IR procedure 7/51 with Dr Begum that included successful sharp recanalization and stenting of the right brachiocephalic vein as well as successful stenting of the left brachiocephalic vein. Patient also had tunneled HD cath and temp LUE line placed for access during procedure.     Patient with minimal response to questions, only with intermittent yes/no answers. Patient's RUE access site without bleeding or hematoma, tender per patient with light palpation. Patient's bilateral groin access sites nontender, without bleeding or hematoma. LUE with TDC and temp line in place. Difficult to assess patient at this time.     VITAL SIGNS   BP (!) 163/78   Pulse 93   Temp 98.4 °F (36.9 °C)   Resp 18   Ht 1.549 m (5' 1\")   Wt 96.1 kg (211 lb 13.8 oz)   SpO2 95%   BMI 40.03 kg/m²  O2 Flow Rate (L/min): 3 L/min       MEDICATIONS:      sodium zirconium cyclosilicate  10 g Oral Once    calcium gluconate  1,000 mg IntraVENous Once    sodium chloride flush  5-40 mL IntraVENous 2 times per day    sodium chloride flush  5-40 mL IntraVENous 2 times per day    atorvastatin  80 mg Oral Daily    calcitRIOL  0.25 mcg Oral Q MWF    carvedilol  6.25 mg Oral BID WC    docusate sodium  100 mg Oral BID    gabapentin  300 mg Oral BID    levETIRAcetam  500 mg Oral Daily    levETIRAcetam  250 mg Oral Q MWF    memantine  10 mg Oral BID    methocarbamol  500 mg Oral BID    pantoprazole  40 mg Oral BID AC    sevelamer  1,600 mg Oral BID WC    torsemide  100 mg Oral BID    traZODone  50 mg Oral Nightly    sodium zirconium cyclosilicate  10 g Oral Once         DATA REVIEW:   Labs:    CBC:  Recent Labs     07/14/25  0730 07/15/25  1808 07/16/25  0410   WBC 4.4 6.7 6.0   RBC 3.53* 3.40* 3.10*   HGB 12.1 11.5* 10.5*   HCT 36.6 35.7* 32.4*   .7* 105.0* 104.6*   RDW 19.6*  19.7* 20.4*    139 145     CHEMISTRIES:  Recent Labs     07/14/25  0104 07/14/25  0730 07/14/25  1523 07/15/25  0815 07/16/25  0410    134*  --  136 139   K 3.2* see below 4.4 5.6* 7.1*   CL 97* 97*  --  98* 103   CO2 25 17*  --  22 18*   BUN 50* 77*  --  60* 65*   CREATININE 6.1* 10.4*  --  9.0* 9.6*   GLUCOSE 83 109*  --  157* 143*   PHOS 2.9 5.7*  --   --   --    MG  --  2.29  --   --   --      PT/INR:   Recent Labs     07/15/25  0815 07/15/25  1808   PROTIME 15.7* 16.6*   INR 1.23* 1.32*       ASSESSMENT:     Neuro: awake,minimally alert   CV: regular rate, rhythm   Pulm:  normal work of breathing  Abd:  soft, non-tender  : no abernathy in place   PV: warm, no appreciable edema on exam; RUE and bilateral LE access sites without bleeding or hematoma     Drains: none    PLAN:   Heparin while inpatient for AC, with plan to covert to PO prior to discharge.  Will plan to see patient again following dialysis today.     Anjelica Vazquez, APRN - CNP  Please feel free to call [phone] with any questions or concerns.

## 2025-07-16 NOTE — PROGRESS NOTES
IR PROGRESS NOTE:    Patient seen at bedside post dialysis. Patient continues to respond minimally to questions. Patient's mother at bedside and notes that patient's facial swelling had improved. She states patient is not quite at baseline and patient is \"very dry.\" HD cath and CVC in E are not bleeding or oozing at this time, dressings dry and intact. Will discuss need to restart AC with hospital medicine.

## 2025-07-16 NOTE — PROGRESS NOTES
Renal Progress Note  Subjective:   Admit Date: 7/15/2025         Assessment/Plan   1. ESRD   - currently undergoing dialysis this morning  - monitor daily labs for electrolyte abnormalities  - monitor daily weights  - plan for HD again for Friday  - follow up with outpatient nephrology       2. Hypertensive emergency  - restart home medications, add IV labetalol for SBP greater than 160  - Monitor on telemetry  - Low-sodium diet    3. Hyperkalemia  - Low K+ diet  - HD today   - check K in am         HPI:   Scottie Chiang is a 55 y.o. female with a significant PMH of  ESRD on MWF dialysis, T2DM, HTN, HLD, seizures, JEAN. Readmitted due to stenosis of right brachiocephalic vein, IR performed venogram of upper right extremity. No post-operative complications, procedure was performed successfully with   Successful stenting of the left brachiocephalic vein and replacement of left tunneled HD catheter. Postoperatively, ongoing bleeding from vascular catheter.     Interval History:   Receives dialysis MWF though a left chest tunneled dialysis catheter. Reportedly missed 2x sessions last week. Potassium 7.1 taken this morning. Transferred from PCU to 6th floor overnight. BP was markedly elevated at around 210 systolic but went down and stabilized overnight. Tunneled line is still bleeding/oozing moderately, heparin is being held. Denies shortness of breath, chest pain/palpitations, lightheadedness/dizziness.      DIET ADULT DIET; Regular; Low Potassium (Less than 3000 mg/day); Low Phosphorus (Less than 1000 mg)  Medications:   Scheduled Meds:   sodium zirconium cyclosilicate  10 g Oral Once    calcium gluconate  1,000 mg IntraVENous Once    sodium chloride flush  5-40 mL IntraVENous 2 times per day    sodium chloride flush  5-40 mL IntraVENous 2 times per day    atorvastatin  80 mg Oral Daily    calcitRIOL  0.25 mcg Oral Q MWF    carvedilol  6.25 mg Oral BID WC    docusate sodium  100 mg Oral BID    gabapentin  300 mg Oral

## 2025-07-16 NOTE — PROGRESS NOTES
Nurse reached out to on call hospitalist via perfect serve regarding heparin gtt and bleeding/oozing tunneled line. Per order, hold heparin until bleeding stop x 1 hour. Heparin remains unstarted. Platelets elevated, line remains to ooze. Per on Call hospitalist hold heparin.

## 2025-07-16 NOTE — PROGRESS NOTES
4 Eyes Skin Assessment     NAME:  Scottie Chiang  YOB: 1970  MEDICAL RECORD NUMBER:  [id number]    The patient is being assessed for  Admission    I agree that at least one RN has performed a thorough Head to Toe Skin Assessment on the patient. ALL assessment sites listed below have been assessed.      Areas assessed by both nurses:    Head, Face, Ears, Shoulders, Back, Chest, Arms, Elbows, Hands, Sacrum. Buttock, Coccyx, Ischium, Legs. Feet and Heels, and Under Medical Devices         Does the Patient have a Wound? No noted wound(s)       Margarito Prevention initiated by RN: No  Wound Care Orders initiated by RN: No    Pressure Injury (Stage 1,2,3,4, Unstageable, DTI, NWPT, and Complex wounds) if present, place Wound referral order by RN under : No    New Ostomies, if present place, Ostomy referral order under : No     Nurse 1 eSignature: Electronically signed by Kasandra Umana RN on 7/15/25 at 11:27 PM EDT    **SHARE this note so that the co-signing nurse can place an eSignature**    Nurse 2 eSignature: Electronically signed by Simran Vega RN on 7/16/25 at 12:35 AM EDT

## 2025-07-16 NOTE — PROGRESS NOTES
Treatment time: 3 hrs    Net UF:  2000 ml     Pre weight: 99.8 kg  Post weight: 98 kg     Access used: Ltdc  Access function:  tolerated well,  BFR 350ml/min     Medications or blood products given: heparin dwells, calcium gluconate 50ml     Regular outpatient schedule: MWF     Summary of response to treatment: Pt tolerated well. Pt remained stable throughout entire treatment and upon exiting the hemodialysis suite.      Copy of dialysis treatment record placed in chart, to be scanned into EMR.

## 2025-07-17 ENCOUNTER — APPOINTMENT (OUTPATIENT)
Dept: CT IMAGING | Age: 55
End: 2025-07-17
Attending: INTERNAL MEDICINE
Payer: COMMERCIAL

## 2025-07-17 ENCOUNTER — APPOINTMENT (OUTPATIENT)
Dept: GENERAL RADIOLOGY | Age: 55
End: 2025-07-17
Attending: INTERNAL MEDICINE
Payer: COMMERCIAL

## 2025-07-17 LAB
AMMONIA PLAS-SCNC: 23 UMOL/L (ref 11–51)
ANTI-XA UNFRAC HEPARIN: 0.14 IU/ML (ref 0.3–0.7)
ANTI-XA UNFRAC HEPARIN: 0.3 IU/ML (ref 0.3–0.7)
GLUCOSE BLD-MCNC: 204 MG/DL (ref 70–99)
PERFORMED ON: ABNORMAL

## 2025-07-17 PROCEDURE — 82140 ASSAY OF AMMONIA: CPT

## 2025-07-17 PROCEDURE — 36415 COLL VENOUS BLD VENIPUNCTURE: CPT

## 2025-07-17 PROCEDURE — 80048 BASIC METABOLIC PNL TOTAL CA: CPT

## 2025-07-17 PROCEDURE — 85025 COMPLETE CBC W/AUTO DIFF WBC: CPT

## 2025-07-17 PROCEDURE — 92610 EVALUATE SWALLOWING FUNCTION: CPT

## 2025-07-17 PROCEDURE — 1200000000 HC SEMI PRIVATE

## 2025-07-17 PROCEDURE — 82330 ASSAY OF CALCIUM: CPT

## 2025-07-17 PROCEDURE — 99233 SBSQ HOSP IP/OBS HIGH 50: CPT | Performed by: INTERNAL MEDICINE

## 2025-07-17 PROCEDURE — 2500000003 HC RX 250 WO HCPCS: Performed by: INTERNAL MEDICINE

## 2025-07-17 PROCEDURE — 71045 X-RAY EXAM CHEST 1 VIEW: CPT

## 2025-07-17 PROCEDURE — 83735 ASSAY OF MAGNESIUM: CPT

## 2025-07-17 PROCEDURE — 94761 N-INVAS EAR/PLS OXIMETRY MLT: CPT

## 2025-07-17 PROCEDURE — 2700000000 HC OXYGEN THERAPY PER DAY

## 2025-07-17 PROCEDURE — 82803 BLOOD GASES ANY COMBINATION: CPT

## 2025-07-17 PROCEDURE — 6360000002 HC RX W HCPCS: Performed by: INTERNAL MEDICINE

## 2025-07-17 PROCEDURE — 85520 HEPARIN ASSAY: CPT

## 2025-07-17 PROCEDURE — 80177 DRUG SCRN QUAN LEVETIRACETAM: CPT

## 2025-07-17 PROCEDURE — 6370000000 HC RX 637 (ALT 250 FOR IP): Performed by: INTERNAL MEDICINE

## 2025-07-17 PROCEDURE — 36600 WITHDRAWAL OF ARTERIAL BLOOD: CPT

## 2025-07-17 PROCEDURE — 83605 ASSAY OF LACTIC ACID: CPT

## 2025-07-17 RX ORDER — IPRATROPIUM BROMIDE AND ALBUTEROL SULFATE 2.5; .5 MG/3ML; MG/3ML
1 SOLUTION RESPIRATORY (INHALATION)
Status: DISCONTINUED | OUTPATIENT
Start: 2025-07-17 | End: 2025-07-17

## 2025-07-17 RX ORDER — KETOROLAC TROMETHAMINE 15 MG/ML
15 INJECTION, SOLUTION INTRAMUSCULAR; INTRAVENOUS ONCE
Status: COMPLETED | OUTPATIENT
Start: 2025-07-17 | End: 2025-07-17

## 2025-07-17 RX ORDER — LEVETIRACETAM 500 MG/5ML
250 INJECTION, SOLUTION, CONCENTRATE INTRAVENOUS
Status: DISCONTINUED | OUTPATIENT
Start: 2025-07-18 | End: 2025-07-18

## 2025-07-17 RX ORDER — IPRATROPIUM BROMIDE AND ALBUTEROL SULFATE 2.5; .5 MG/3ML; MG/3ML
1 SOLUTION RESPIRATORY (INHALATION) EVERY 6 HOURS PRN
Status: DISCONTINUED | OUTPATIENT
Start: 2025-07-17 | End: 2025-07-19 | Stop reason: HOSPADM

## 2025-07-17 RX ADMIN — LEVETIRACETAM 500 MG: 100 INJECTION INTRAVENOUS at 10:45

## 2025-07-17 RX ADMIN — SODIUM CHLORIDE, PRESERVATIVE FREE 10 ML: 5 INJECTION INTRAVENOUS at 20:47

## 2025-07-17 RX ADMIN — SODIUM CHLORIDE, PRESERVATIVE FREE 40 MG: 5 INJECTION INTRAVENOUS at 06:21

## 2025-07-17 RX ADMIN — HEPARIN SODIUM 10 UNITS/KG/HR: 10000 INJECTION, SOLUTION INTRAVENOUS at 04:39

## 2025-07-17 RX ADMIN — LABETALOL HYDROCHLORIDE 10 MG: 5 INJECTION, SOLUTION INTRAVENOUS at 17:38

## 2025-07-17 RX ADMIN — SODIUM CHLORIDE, PRESERVATIVE FREE 10 ML: 5 INJECTION INTRAVENOUS at 11:37

## 2025-07-17 RX ADMIN — SEVELAMER CARBONATE 1600 MG: 800 TABLET, FILM COATED ORAL at 17:34

## 2025-07-17 RX ADMIN — SODIUM CHLORIDE, PRESERVATIVE FREE 40 MG: 5 INJECTION INTRAVENOUS at 17:30

## 2025-07-17 RX ADMIN — KETOROLAC TROMETHAMINE 15 MG: 15 INJECTION, SOLUTION INTRAMUSCULAR; INTRAVENOUS at 17:33

## 2025-07-17 ASSESSMENT — PAIN - FUNCTIONAL ASSESSMENT: PAIN_FUNCTIONAL_ASSESSMENT: ACTIVITIES ARE NOT PREVENTED

## 2025-07-17 ASSESSMENT — PAIN DESCRIPTION - FREQUENCY: FREQUENCY: INTERMITTENT

## 2025-07-17 ASSESSMENT — PAIN SCALES - WONG BAKER
WONGBAKER_NUMERICALRESPONSE: NO HURT

## 2025-07-17 ASSESSMENT — PAIN DESCRIPTION - PAIN TYPE: TYPE: ACUTE PAIN

## 2025-07-17 ASSESSMENT — PAIN SCALES - GENERAL
PAINLEVEL_OUTOF10: 5
PAINLEVEL_OUTOF10: 2

## 2025-07-17 ASSESSMENT — PAIN DESCRIPTION - ORIENTATION: ORIENTATION: RIGHT;LEFT;LOWER

## 2025-07-17 ASSESSMENT — PAIN DESCRIPTION - DESCRIPTORS: DESCRIPTORS: ACHING;DISCOMFORT

## 2025-07-17 ASSESSMENT — PAIN DESCRIPTION - ONSET: ONSET: ON-GOING

## 2025-07-17 ASSESSMENT — PAIN DESCRIPTION - LOCATION: LOCATION: BACK;CHEST

## 2025-07-17 NOTE — PROGRESS NOTES
Hospitalist Progress Note      Name:  Scottie Chiang /Age/Sex: 1970  (55 y.o. female)   MRN & CSN:  [id number] & [id number] Encounter Date/Time: 2025 1:48 PM EDT    Location:  6316/6316-01 PCP: Unknown, Provider       Hospital Day: 3         Date of Admission: 7/15/2025    Chief Complaint: AMS, Hyperkalemia    Hospital Course: Scottie Chiang is a 55 y.o. female with a significant PMH of  ESRD on MWF dialysis, T2DM, HTN, HLD, seizures, JEAN. Readmitted due to stenosis of right brachiocephalic vein, IR performed venogram of upper right extremity. No post-operative complications, procedure was performed successfully with successful stenting of the left brachiocephalic vein and replacement of left tunneled HD catheter. Postoperatively, ongoing bleeding from vascular catheter. Her catheter site bleeding has stopped with continued monitoring of site. Hgb is stable (baseline 10 -1.5).  She was found to have hyperkalemia (K+ 5.6 on admission) and was given calcium gluconate. Her potassium remained elevated > 7 and was given another dose of calcium gluconate and Lokelma. She underwent emergent HD. Nephrology has been consulted and is following with HD schedule MW. She was more lethargic and laying in bed with staring intermittently \"off into space.\" She has not been taking her Keppra. There was concern for PO medication administration and this was changed to IV. Speech consulted. Ammonia 23; UA ordered however, pt makes little urine output due to ESRD. CT Head did not show acute findings. She remains afebrile without leukocytosis. Repeat CXR () showed Increased pulmonary venous hypertension. She has had elevated BP with restarting her home medications; BP has shown improvement after HD.     Subjective: Mother at bedside; states she is at baseline mentation status; answers questions; remains lethargic     Assessment and Recommendations:    Hyperkalemia  ESRD   Potassium 5.7 ()   HD today; low K+

## 2025-07-17 NOTE — PROGRESS NOTES
Renal Progress Note  Subjective:   Admit Date: 7/15/2025         Assessment/Plan   1. ESRD   - HD MWF   - UF to dry wt   - HD In am tomorrow       2. Hypertensive emergency  - on home meds   - BP better     3. Hyperkalemia  - Low K+ diet  - HD today   - check K in am         HPI:   Scottie Chiang is a 55 y.o. female with a significant PMH of  ESRD on MWF dialysis, T2DM, HTN, HLD, seizures, JEAN. Readmitted due to stenosis of right brachiocephalic vein, IR performed venogram of upper right extremity. No post-operative complications, procedure was performed successfully with   Successful stenting of the left brachiocephalic vein and replacement of left tunneled HD catheter. Postoperatively, ongoing bleeding from vascular catheter.     Interval History:     Destiney HD   K better   No N/V/D         DIET ADULT DIET; Regular; Low Potassium (Less than 3000 mg/day); Low Phosphorus (Less than 1000 mg)  Medications:   Scheduled Meds:   ketorolac  15 mg IntraVENous Once    sodium zirconium cyclosilicate  10 g Oral Once    calcium gluconate  1,000 mg IntraVENous Once    levETIRAcetam  500 mg IntraVENous Daily    pantoprazole (PROTONIX) 40 mg in sodium chloride (PF) 0.9 % 10 mL injection  40 mg IntraVENous Q12H    sodium chloride flush  5-40 mL IntraVENous 2 times per day    sodium chloride flush  5-40 mL IntraVENous 2 times per day    [Held by provider] atorvastatin  80 mg Oral Daily    [Held by provider] calcitRIOL  0.25 mcg Oral Q MWF    [Held by provider] carvedilol  6.25 mg Oral BID WC    docusate sodium  100 mg Oral BID    [Held by provider] gabapentin  300 mg Oral BID    [Held by provider] memantine  10 mg Oral BID    [Held by provider] methocarbamol  500 mg Oral BID    [Held by provider] pantoprazole  40 mg Oral BID AC    sevelamer  1,600 mg Oral BID WC    [Held by provider] torsemide  100 mg Oral BID    [Held by provider] traZODone  50 mg Oral Nightly    sodium zirconium cyclosilicate  10 g Oral Once     Continuous

## 2025-07-17 NOTE — PROGRESS NOTES
PT Re-Evaluation     Today's date: 2/10/2020  Patient name: Imtiaz Ruiz  : 1982  MRN: [id number]  Referring provider: Tesfaye Zaragoza MD  Dx:   Encounter Diagnosis     ICD-10-CM    1  Cervical disc disorder with radiculopathy of mid-cervical region M50 120    2  Myofascial pain syndrome M79 18    3  Acute right-sided thoracic back pain M54 6        Start Time:   Stop Time:   Total time in clinic (min): 48 minutes    Assessment  Assessment details: Imtiaz Ruiz is a 44 y o  male who presents to therapy for his 8th visit and reassessment for R sided upper back pain  Since starting PT pt has made overall improvements in scapular strength, pain, and function  At this time he reports feeling 60% improved and max pain level has decreased from a 9/10 to a 4/10  Pt continues to present with R sided mid trap/rhomboid spasms and tightness  Pt will benefit from continued skilled outpatient PT to improve strength, to address therapy goals, to reduce pain, and improve function  Impairments: abnormal or restricted ROM, activity intolerance, impaired physical strength, lacks appropriate home exercise program and pain with function  Barriers to therapy: None   Understanding of Dx/Px/POC: good   Prognosis: good    Goals  Short term goals: to be met in 2 weeks:  Pt independent with initial HEP, rationale, technique and frequency, for ROM and pain control  MET  Pt will report an improvement in max pain score by at least 2 points on the numeric pain rating scale (NPRS) indicating a clinically important change and overall decrease in pain  MET  Pt will report at least a 25% reduction in subjective pain complaints/symptoms to manage ADLs with reduced pain  MET    Long term goals: to be met in 6-8 weeks  Pt will report at least a 75% reduction in subjective pain complaints/symptoms to manage ADLs with reduced pain   PROGRESSING  Improve mid and low trap MMT to at least 4/5 for improved posture and scapular stability to better manage work duties  PROGRESSING  Pt will improve FOTO score to better then expected outcome indicating an overall improvement in pain and function MET  Pt independent with rationale, technique and plan for performance of advanced HEP to ensure independent self-management of symptoms upon discharge  PROGRESSING  Achieve pts therapy goal: get rid of the pain [address] [address]  Patient would benefit from: skilled physical therapy  Planned modality interventions: TENS, thermotherapy: hydrocollator packs, traction, ultrasound, cryotherapy and low level laser therapy  Planned therapy interventions: body mechanics training, therapeutic training, therapeutic exercise, therapeutic activities, stretching, strengthening, neuromuscular re-education, patient education, home exercise program, functional ROM exercises, flexibility, manual therapy, Salas taping, joint mobilization and balance  Frequency: 2-3x/week  Duration in weeks: 8  Treatment plan discussed with: patient        Subjective Evaluation    History of Present Illness  Mechanism of injury: Pt reports 65-70% improvement in sx's since starting PT    Pain  Current pain ratin  At best pain ratin  At worst pain rating: 3          Objective     Strength/Myotome Testing     Left Shoulder     Isolated Muscles   Lower trapezius: 4+   Middle trapezius: 4+   Rhomboids: 4+     Right Shoulder     Isolated Muscles   Lower trapezius: 4   Middle trapezius: 3+   Rhomboids: 4+          Precautions: anxiety, latex allergy     FOTO   = 28/49  2/ = 71/49     Manual   2/3 2  2/10                               STM to R mid trap/rhomb/paraspinal DS DS DS DS DS 12' AFB 15' AFB   8'  DS 15'       Pa t-spine mobs   DS DS DS DS 3' VINCENT  2' DS  3'          Reassess               DS                                     Exercise Diary   2/3 2/6  210       UBE retro NV     L 4' L1 4' L1 4' L1 4'  L1 5'     Prone horzi abd 5"x10 1# 5" 2x10 1# 5" 2x10 2# 5" 2x10 2# 5" 2x10 2# 5" 2x10 2# 5" 2x10  2# 5" 2x10       Prone scaption NV 0# 5" 2x10 0# 5" 2x10 1# 5" 2x10 1# 5" 2x10 1# 5" 2x10 1# 5" 2x10  1# 5" 2x10       Prone shoulder ext NV 5" 2x10 2# 5" 2x10 3# 5" 2x10 3# 5" 2x10 3# 5" 2x10 3# 5" 2x10  3# 5" 2x10       Band row NV           XS  Purple  5"  2x10  XS  Purple  5"  2x10       Band horiz abd NV   XS purple  5" 2x10 XS purple  5" 2x10 XS purple  5" 2x10 XS purple  5" 2x10 XS purple  5" 2x10  XS  Purple  5"  2x10       Upper trap lifts on wall NV                     Band shoulder ext with ER NV XS purple  5" 2x10 XS purple  5" 2x10 XS purple  5" 2x10 XS purple  5" 2x10 XS purple  5" 2x10 XS purple  5" 2x10  XS  Purple  5"  2x10                                                                                                                                                                                                                                                                                                             Modalities                                                                                                    Electronically signed by Jeffrey Begum          Date of onset: 7/15/25    Current Diet:  ADULT DIET; Regular; Low Potassium (Less than 3000 mg/day); Low Phosphorus (Less than 1000 mg)      Treatment Diagnosis: dysphagia    Pain:  \"I hurt all over\"    General:  Chart reviewed: Yes  Behavior/Cognition: alert, fatigued, requires encouragement for participation  Communication Observation: WFL  Follows Directions: WFL  Dentition/Oral Mucosa: natural, some missing  Oral motor exam: WFL  Vocal Quality: WFL  Respiratory Status/oxygen requirements: room air  Vision/Hearing: DNT  Patient Complaint: odynophagia   Patient Positioning: upright in bed  Prior Level of Function: consumes regulars/thins    Prior Dysphagia History:   None per pt or chart review     Bedside Swallowing Evaluation Impression (7/17)  Pt presents with swallow function which may require further assessment. Pt seated upright in bed and required cues for encouragement to accept PO. Pt endorsing odynophagia. Upon initial presentation of ice chips pt with oral holding and stating \"I swallowed\", despite no initiation noted. When provided with initial tsp water, pt with immediate cough. Provided with additional straw sips thins and no further s/s associated with aspiration. ? If oral holding was initially behavioral. Pt appeared to swallow additional trials of puree. Slowed oral manipulation noted. Regular trial deferred 2/2 increased fatigue and limited acceptance of other trials. Based on assessment SLP recommends downgrade to full liquids and providing 1:1 feed assist for safety only when awake/alert. Relayed to RN and NP. Discussed potential for repeat bedside assessment vs need for further imaging via MBS.     Instrumental assessment results  TBD if indicated    Prognosis:  Prognosis for improvement: fair  Barriers to reach goals: participation, medical dx    Treatment:  Dysphagia Goals:  The pt will be seen  2-3 x to address the following goals:  Goal  1: Patient will tolerate least restrictive diet with adequate oral prep and without overt signs of aspiration or associated decline in respiratory status.  Goal 2: Patient/caregiver will demonstrate understanding of dysphagia recommendations/concerns.  Goal 3: Patient will participate in instrumental assessment of swallow function as appropriate.    Education  Provided education to patient re: role of SLP, purpose of visit, and recommendations. Patient with limited comprehension.    Pt goal: none stated  Pt discharge goal: none stated    Total treatment time: 15 minutes swallow evaluation     Plan: Continue per POC  Recommended Diet: Full Liquids   Medication administration: crushed in puree vs via alternative means     Strategies:   - 1:1 feed assist, only when awake/alert  - small bites/sips    Discharge Recommendation: TBD closer to DC  Discussed with RN, Guera Mckenzie sent to Lillie Rizo CNP  Needs met prior to leaving room, call light within reach    Electronically signed by:  Teresa Hirsch M.A., CCC-SLP  [id number]  Speech-Language Pathologist  Pg #: [phone]    This document will serve as a dc summary if pt dc prior to next visit

## 2025-07-17 NOTE — PROGRESS NOTES
D:  Please note, patient is attempting to exit bed, at risk for self-harm, pulls at lines/tubes.  Please provide an order for bilateral soft wrist restraints and/or sitter.  Please advise.  Thank you.  A:  Notified provider, JASIEL uJarez; and, notified Charge RN, Kofi Warren.

## 2025-07-17 NOTE — CARE COORDINATION
CM  following for  d/c planning:    Patient  from Home  ESRD  Has  Currently OP HD  :  MWF:    Admitted  for  IR cath  procedure:    Nephro  following :  PMH of ESRD on MWF dialysis   -  procedure was performed successfully with   Successful stenting of the left brachiocephalic vein and replacement of left tunneled HD catheter. Postoperatively, ongoing bleeding from vascular catheter.    Patient will resume  OP pHD  schedule as  Prior to Admisison  MWF  @ 600AM . Cm  confirmed  with Sheryl  at  facility .      [facility]  [address], [place] [address]  Phone: [phone]    CM cont to follow :      Electronically signed by Larisa Torres RN on 7/17/2025 at 1:59 PM       Larisa Torres RN Case Manager  [facility]  [address]  [facility] [address] [phone]  Fax [phone]

## 2025-07-17 NOTE — PLAN OF CARE
Problem: Safety - Adult  Goal: Free from fall injury  Outcome: Progressing  Note:  Remains free from falls, bed in low position, call light in reach, bed alarm and sitter monitoring for safety.     Problem: Pain  Goal: Verbalizes/displays adequate comfort level or baseline comfort level  Outcome: Progressing  Note:  Denies pain will continue to monitor.     Problem: Respiratory - Adult  Goal: Achieves optimal ventilation and oxygenation  Outcome: Progressing  Note:  O2 92 to 93% on 2L per NC.     Problem: Cardiovascular - Adult  Goal: Absence of cardiac dysrhythmias or at baseline  Outcome: Progressing  Note:  Sinus Rhythm with 1st Degree AVB rate 91 at 2201.

## 2025-07-17 NOTE — PROGRESS NOTES
V2.0    [facility] Progress Note      Name:  Scottie Chiang /Age/Sex: 1970  (55 y.o. female)   MRN & CSN:  [id number] & [id number] Encounter Date/Time: 2025 9:05 PM EDT   Location:  [facility]- PCP: Unknown, Provider     Attending:Bright Garcia MD       Hospital Day: 2    Assessment and Recommendations   55-year-old with ESRD, central venous stenosis, came in for IR procedure, admitted for postoperative management     Hyperkalemia, potassium 5.6 on admission given calcium gluconate  This morning potassium greater than 7 given calcium gluconate as well as a dose of Lokelma  Went for urgent dialysis  Nephrology following, unclear why potassium is getting so high  When on diet please consider low potassium diet    Acute encephalopathy, she appears to be more lethargic, mostly just laying in bed, intermittently stares in space  She has been like this since yesterday intermittently would not answer questions but on my evaluation she is awake talk to me and says she is tired and sleepy  She has not been taking her Keppra as nurse did not feel that she is alert enough to take oral medication  Will change Keppra to 5 mg IV once daily  After dialysis she will need extra dose 250 mg  CT head not show acute findings  If continued concern then I will ask neurology to see and also placed on continuous EEG but hold off for now     Hypertensive urgency, restart home medications, IV labetalol for SBP greater than 160  After HD blood pressure seems to be much improved  Monitor on telemetry  Low-sodium diet    Central venous stenosis, she had bleeding from her HD catheter postprocedure, now bleeding seems to have stopped, I discussed with infection radiology, see heparin drip will be started.  On admit on discharge we will consider low-dose apixaban or Coumadin       Bleeding from tunneled HD catheter postoperatively, Manual pressure applied  Overnight close monitoring of CBC  No bleeding stopped     Chronic pain,

## 2025-07-17 NOTE — PROGRESS NOTES
IR Progress Note:    Chief Complaint: central venous occlusion     24 hour Interval History: Patient's mental status improving today, able to answer questions and cooperate with assessment. LUE CVCs without bleeding/oozing. Patient's procedural access sites are nontender, without bleeding/hematoma. Facial edema continues to improve.     VITAL SIGNS   BP (!) 159/94   Pulse 95   Temp 98.5 °F (36.9 °C) (Oral)   Resp 18   Ht 1.549 m (5' 1\")   Wt 98.5 kg (217 lb 2.5 oz)   SpO2 97%   BMI 41.03 kg/m²  O2 Flow Rate (L/min): 5 L/min       MEDICATIONS:      ketorolac  15 mg IntraVENous Once    sodium zirconium cyclosilicate  10 g Oral Once    calcium gluconate  1,000 mg IntraVENous Once    levETIRAcetam  500 mg IntraVENous Daily    pantoprazole (PROTONIX) 40 mg in sodium chloride (PF) 0.9 % 10 mL injection  40 mg IntraVENous Q12H    sodium chloride flush  5-40 mL IntraVENous 2 times per day    sodium chloride flush  5-40 mL IntraVENous 2 times per day    [Held by provider] atorvastatin  80 mg Oral Daily    [Held by provider] calcitRIOL  0.25 mcg Oral Q MWF    [Held by provider] carvedilol  6.25 mg Oral BID WC    docusate sodium  100 mg Oral BID    [Held by provider] gabapentin  300 mg Oral BID    [Held by provider] memantine  10 mg Oral BID    [Held by provider] methocarbamol  500 mg Oral BID    [Held by provider] pantoprazole  40 mg Oral BID AC    sevelamer  1,600 mg Oral BID WC    [Held by provider] torsemide  100 mg Oral BID    [Held by provider] traZODone  50 mg Oral Nightly    sodium zirconium cyclosilicate  10 g Oral Once         DATA REVIEW:   Labs:    CBC:  Recent Labs     07/15/25  1808 07/16/25  0410   WBC 6.7 6.0   RBC 3.40* 3.10*   HGB 11.5* 10.5*   HCT 35.7* 32.4*   .0* 104.6*   RDW 19.7* 20.4*    145     CHEMISTRIES:  Recent Labs     07/15/25  0815 07/16/25  0410 07/16/25  1230    139 137   K 5.6* 7.1* 3.9   CL 98* 103 98*   CO2 22 18* 20*   BUN 60* 65* 32*   CREATININE 9.0* 9.6* 5.7*

## 2025-07-17 NOTE — PLAN OF CARE
Intervention: Swallow Evaluation/treatment  SLP completed evaluation. Please refer to notes in EMR.    Electronically signed by:  Teresa Hirsch M.A., CCC-SLP  [id number]  Speech-Language Pathologist  Pg #: [phone]

## 2025-07-17 NOTE — PROGRESS NOTES
D:  Please note, patient's Heparin order was held by provider earlier.  Clarification is needed for when to restart Heparin drip and Charge RN, Kofi Warren notified and aware.  Please note, additionally, patient had impulsivity/pulling at lines, requiring restraints/sitter monitoring for safety (due to risk for self-harm).  Please advise when/if Heparin should be restarted with above situations in mind.  Thank you.  A:  Notified provider, JASIEL Juarez.  R:  Per provider above, ok to restart Heparin drip.

## 2025-07-18 PROBLEM — I15.1 HYPERTENSION SECONDARY TO OTHER RENAL DISORDERS: Status: ACTIVE | Noted: 2024-02-24

## 2025-07-18 LAB
ALBUMIN SERPL-MCNC: 3.4 G/DL (ref 3.4–5)
ANION GAP SERPL CALCULATED.3IONS-SCNC: 15 MMOL/L (ref 3–16)
ANION GAP SERPL CALCULATED.3IONS-SCNC: 16 MMOL/L (ref 3–16)
ANTI-XA UNFRAC HEPARIN: 0.32 IU/ML (ref 0.3–0.7)
ANTI-XA UNFRAC HEPARIN: 0.33 IU/ML (ref 0.3–0.7)
BASE EXCESS BLDA CALC-SCNC: -2.5 MMOL/L (ref -3–3)
BASOPHILS # BLD: 0 K/UL (ref 0–0.2)
BASOPHILS # BLD: 0 K/UL (ref 0–0.2)
BASOPHILS NFR BLD: 0.5 %
BASOPHILS NFR BLD: 0.7 %
BUN SERPL-MCNC: 47 MG/DL (ref 7–20)
BUN SERPL-MCNC: 50 MG/DL (ref 7–20)
CA-I BLD-SCNC: 1.05 MMOL/L (ref 1.12–1.32)
CALCIUM SERPL-MCNC: 8.2 MG/DL (ref 8.3–10.6)
CALCIUM SERPL-MCNC: 8.3 MG/DL (ref 8.3–10.6)
CHLORIDE SERPL-SCNC: 97 MMOL/L (ref 99–110)
CHLORIDE SERPL-SCNC: 99 MMOL/L (ref 99–110)
CO2 BLDA-SCNC: 25 MMOL/L
CO2 SERPL-SCNC: 21 MMOL/L (ref 21–32)
CO2 SERPL-SCNC: 21 MMOL/L (ref 21–32)
COHGB MFR BLDA: 0.9 % (ref 0–1.5)
CREAT SERPL-MCNC: 9.2 MG/DL (ref 0.6–1.1)
CREAT SERPL-MCNC: 9.5 MG/DL (ref 0.6–1.1)
DEPRECATED RDW RBC AUTO: 19 % (ref 12.4–15.4)
DEPRECATED RDW RBC AUTO: 19.4 % (ref 12.4–15.4)
EOSINOPHIL # BLD: 0.1 K/UL (ref 0–0.6)
EOSINOPHIL # BLD: 0.2 K/UL (ref 0–0.6)
EOSINOPHIL NFR BLD: 3.9 %
EOSINOPHIL NFR BLD: 4.1 %
GFR SERPLBLD CREATININE-BSD FMLA CKD-EPI: 4 ML/MIN/{1.73_M2}
GFR SERPLBLD CREATININE-BSD FMLA CKD-EPI: 5 ML/MIN/{1.73_M2}
GLUCOSE SERPL-MCNC: 114 MG/DL (ref 70–99)
GLUCOSE SERPL-MCNC: 142 MG/DL (ref 70–99)
HCO3 BLDA-SCNC: 24 MMOL/L (ref 21–29)
HCT VFR BLD AUTO: 27.9 % (ref 36–48)
HCT VFR BLD AUTO: 28 % (ref 36–48)
HGB BLD-MCNC: 9.1 G/DL (ref 12–16)
HGB BLD-MCNC: 9.1 G/DL (ref 12–16)
HGB BLDA-MCNC: 9.9 G/DL
LACTATE BLDV-SCNC: 1.2 MMOL/L (ref 0.4–2)
LEVETIRACETAM SERPL-MCNC: 16.7 UG/ML (ref 6–46)
LYMPHOCYTES # BLD: 0.7 K/UL (ref 1–5.1)
LYMPHOCYTES # BLD: 0.8 K/UL (ref 1–5.1)
LYMPHOCYTES NFR BLD: 19.7 %
LYMPHOCYTES NFR BLD: 21.3 %
MAGNESIUM SERPL-MCNC: 2.16 MG/DL (ref 1.8–2.4)
MCH RBC QN AUTO: 34.2 PG (ref 26–34)
MCH RBC QN AUTO: 34.3 PG (ref 26–34)
MCHC RBC AUTO-ENTMCNC: 32.5 G/DL (ref 31–36)
MCHC RBC AUTO-ENTMCNC: 32.8 G/DL (ref 31–36)
MCV RBC AUTO: 104.7 FL (ref 80–100)
MCV RBC AUTO: 105.2 FL (ref 80–100)
MEDICATION DOSE-MCNC: NORMAL
METHGB MFR BLDA: 0.2 % (ref 0–1.4)
MONOCYTES # BLD: 0.5 K/UL (ref 0–1.3)
MONOCYTES # BLD: 0.5 K/UL (ref 0–1.3)
MONOCYTES NFR BLD: 13.4 %
MONOCYTES NFR BLD: 14 %
NEUTROPHILS # BLD: 2.3 K/UL (ref 1.7–7.7)
NEUTROPHILS # BLD: 2.3 K/UL (ref 1.7–7.7)
NEUTROPHILS NFR BLD: 60.3 %
NEUTROPHILS NFR BLD: 62.1 %
PCO2 BLDA: 45.5 MMHG (ref 35–45)
PH BLDA: 7.32 [PH] (ref 7.35–7.45)
PH VENOUS: 7.29 (ref 7.35–7.45)
PHOSPHATE SERPL-MCNC: 7.2 MG/DL (ref 2.5–4.9)
PLATELET # BLD AUTO: 105 K/UL (ref 135–450)
PLATELET # BLD AUTO: 110 K/UL (ref 135–450)
PMV BLD AUTO: 10.1 FL (ref 5–10.5)
PMV BLD AUTO: 9.9 FL (ref 5–10.5)
PO2 BLDA: 121 MMHG (ref 75–108)
POTASSIUM SERPL-SCNC: 4.6 MMOL/L (ref 3.5–5.1)
POTASSIUM SERPL-SCNC: 4.9 MMOL/L (ref 3.5–5.1)
RBC # BLD AUTO: 2.66 M/UL (ref 4–5.2)
RBC # BLD AUTO: 2.67 M/UL (ref 4–5.2)
SAO2 % BLDA: 98 % (ref 93–100)
SODIUM SERPL-SCNC: 134 MMOL/L (ref 136–145)
SODIUM SERPL-SCNC: 135 MMOL/L (ref 136–145)
WBC # BLD AUTO: 3.7 K/UL (ref 4–11)
WBC # BLD AUTO: 3.8 K/UL (ref 4–11)

## 2025-07-18 PROCEDURE — 90935 HEMODIALYSIS ONE EVALUATION: CPT

## 2025-07-18 PROCEDURE — 2500000003 HC RX 250 WO HCPCS: Performed by: INTERNAL MEDICINE

## 2025-07-18 PROCEDURE — 6370000000 HC RX 637 (ALT 250 FOR IP): Performed by: INTERNAL MEDICINE

## 2025-07-18 PROCEDURE — 99233 SBSQ HOSP IP/OBS HIGH 50: CPT | Performed by: HOSPITALIST

## 2025-07-18 PROCEDURE — 36415 COLL VENOUS BLD VENIPUNCTURE: CPT

## 2025-07-18 PROCEDURE — 80069 RENAL FUNCTION PANEL: CPT

## 2025-07-18 PROCEDURE — 92526 ORAL FUNCTION THERAPY: CPT

## 2025-07-18 PROCEDURE — 85520 HEPARIN ASSAY: CPT

## 2025-07-18 PROCEDURE — 6360000002 HC RX W HCPCS: Performed by: INTERNAL MEDICINE

## 2025-07-18 PROCEDURE — 1200000000 HC SEMI PRIVATE

## 2025-07-18 PROCEDURE — 85025 COMPLETE CBC W/AUTO DIFF WBC: CPT

## 2025-07-18 RX ORDER — LEVETIRACETAM 250 MG/1
250 TABLET ORAL
Status: DISCONTINUED | OUTPATIENT
Start: 2025-07-18 | End: 2025-07-19 | Stop reason: HOSPADM

## 2025-07-18 RX ORDER — OXYCODONE AND ACETAMINOPHEN 10; 325 MG/1; MG/1
1 TABLET ORAL EVERY 8 HOURS PRN
Qty: 9 TABLET | Refills: 0 | Status: SHIPPED | OUTPATIENT
Start: 2025-07-18 | End: 2025-07-21

## 2025-07-18 RX ORDER — LEVETIRACETAM 500 MG/1
500 TABLET ORAL DAILY
Status: DISCONTINUED | OUTPATIENT
Start: 2025-07-19 | End: 2025-07-19 | Stop reason: HOSPADM

## 2025-07-18 RX ORDER — HYDROXYZINE HYDROCHLORIDE 10 MG/1
10 TABLET, FILM COATED ORAL 3 TIMES DAILY PRN
Status: DISCONTINUED | OUTPATIENT
Start: 2025-07-18 | End: 2025-07-19 | Stop reason: HOSPADM

## 2025-07-18 RX ORDER — GABAPENTIN 100 MG/1
100 CAPSULE ORAL 2 TIMES DAILY
Status: DISCONTINUED | OUTPATIENT
Start: 2025-07-18 | End: 2025-07-19 | Stop reason: HOSPADM

## 2025-07-18 RX ORDER — METHOCARBAMOL 500 MG/1
250 TABLET, FILM COATED ORAL 2 TIMES DAILY
Status: DISCONTINUED | OUTPATIENT
Start: 2025-07-18 | End: 2025-07-19 | Stop reason: HOSPADM

## 2025-07-18 RX ADMIN — TORSEMIDE 100 MG: 100 TABLET ORAL at 20:19

## 2025-07-18 RX ADMIN — GABAPENTIN 100 MG: 100 CAPSULE ORAL at 20:18

## 2025-07-18 RX ADMIN — SODIUM CHLORIDE, PRESERVATIVE FREE 10 ML: 5 INJECTION INTRAVENOUS at 20:18

## 2025-07-18 RX ADMIN — OXYCODONE AND ACETAMINOPHEN 1 TABLET: 325; 10 TABLET ORAL at 11:34

## 2025-07-18 RX ADMIN — DOCUSATE SODIUM 100 MG: 100 CAPSULE, LIQUID FILLED ORAL at 20:19

## 2025-07-18 RX ADMIN — LEVETIRACETAM 500 MG: 100 INJECTION INTRAVENOUS at 11:34

## 2025-07-18 RX ADMIN — MEMANTINE 10 MG: 10 TABLET ORAL at 20:19

## 2025-07-18 RX ADMIN — SODIUM CHLORIDE, PRESERVATIVE FREE 40 MG: 5 INJECTION INTRAVENOUS at 06:38

## 2025-07-18 RX ADMIN — HEPARIN SODIUM 10 UNITS/KG/HR: 10000 INJECTION, SOLUTION INTRAVENOUS at 05:32

## 2025-07-18 RX ADMIN — OXYCODONE AND ACETAMINOPHEN 1 TABLET: 325; 10 TABLET ORAL at 21:32

## 2025-07-18 RX ADMIN — DOCUSATE SODIUM 100 MG: 100 CAPSULE, LIQUID FILLED ORAL at 11:34

## 2025-07-18 RX ADMIN — CALCITRIOL CAPSULES 0.25 MCG 0.25 MCG: 0.25 CAPSULE ORAL at 20:19

## 2025-07-18 RX ADMIN — SODIUM CHLORIDE, PRESERVATIVE FREE 10 ML: 5 INJECTION INTRAVENOUS at 11:34

## 2025-07-18 RX ADMIN — APIXABAN 2.5 MG: 2.5 TABLET, FILM COATED ORAL at 20:19

## 2025-07-18 RX ADMIN — HEPARIN SODIUM 3600 UNITS: 1000 INJECTION INTRAVENOUS; SUBCUTANEOUS at 11:00

## 2025-07-18 RX ADMIN — TRAZODONE HYDROCHLORIDE 50 MG: 50 TABLET ORAL at 20:19

## 2025-07-18 ASSESSMENT — PAIN DESCRIPTION - DESCRIPTORS
DESCRIPTORS: ACHING
DESCRIPTORS: ACHING

## 2025-07-18 ASSESSMENT — PAIN SCALES - GENERAL
PAINLEVEL_OUTOF10: 8
PAINLEVEL_OUTOF10: 8

## 2025-07-18 ASSESSMENT — PAIN DESCRIPTION - ORIENTATION
ORIENTATION: POSTERIOR
ORIENTATION: RIGHT;LEFT

## 2025-07-18 ASSESSMENT — PAIN DESCRIPTION - LOCATION
LOCATION: ABDOMEN;HEAD
LOCATION: HEAD

## 2025-07-18 ASSESSMENT — PAIN DESCRIPTION - FREQUENCY: FREQUENCY: CONTINUOUS

## 2025-07-18 ASSESSMENT — PAIN DESCRIPTION - PAIN TYPE: TYPE: ACUTE PAIN;CHRONIC PAIN

## 2025-07-18 ASSESSMENT — PAIN DESCRIPTION - DIRECTION: RADIATING_TOWARDS: NO

## 2025-07-18 ASSESSMENT — PAIN - FUNCTIONAL ASSESSMENT: PAIN_FUNCTIONAL_ASSESSMENT: PREVENTS OR INTERFERES SOME ACTIVE ACTIVITIES AND ADLS

## 2025-07-18 ASSESSMENT — PAIN DESCRIPTION - ONSET: ONSET: ON-GOING

## 2025-07-18 NOTE — PROGRESS NOTES
Pt. Is very lethargic, RN notified charge RN and MD. Stat lab orders have been inserted and sitter was also notified. Will continue to monitor pt.

## 2025-07-18 NOTE — PROGRESS NOTES
ICU Resident came to bedside to assess pt. States he will wait on labs to fully determine pt.s work up. Blood culture labs have been added for pt.s infection work up. Will continue to monitor pt.

## 2025-07-18 NOTE — DISCHARGE INSTRUCTIONS
Extra Heart Failure Education/ Tools/ Resources:     https://[employer].com/publication/?[email or web address]   --- this is American Heart Association interactive Healthier Living with Heart Failure guidebook.  Please click hyperlink or copy / paste link into search bar. The QR Code is also available below. Use your mouse to scroll through the pages.  Lots of information about weight monitoring, diet tips, activity, meds, etc    Heart Failure Tools and Resources QR Code is below. It includes multiple resources to include symptom tracker, med tracker, further HF info, and access to a HF Support Network online Community    HF Rye Janie  -- this is a free smart phone janie available for iPhone and Android download.  Use your phone to track sodium / fluid intake, zone tool symptom tracking, weights, medications, etc. Click on this hyperlink  HF Rye Janie   for QR code for easy download or the link is also found in the below HF Tools and Resources.      DASH (Dietary Approach to Stop Hypertension) diet --  https://www.nhlbi.nih.gov/education/dash-eating-plan -- this diet is a flexible eating plan that promotes heart healthy eating style.  Click on hyperlink or copy / paste link into search bar.  Lots of low sodium recipes and tips.    https://www.[employer]/recipes  -- more free recipes

## 2025-07-18 NOTE — PROGRESS NOTES
Renal Progress Note  Subjective:     07/18/25      Assessment/Plan     1. ESRD   - HD today   - HD MWF   - UF to dry wt     2. Hypertensive emergency  - on home meds   - BP better      3. Hyperkalemia  - Low K+ diet  - Continue to monitor serum K+ level.    4. CKD MBD   - HD     HPI:   Scottie Chiang is a 55-year-old female with a significant past medical history including ESRD, T2DM, hypertension, hyperlipidemia, seizure disorder, and obstructive sleep apnea. She was readmitted due to stenosis of the right brachiocephalic vein. IR performed a venogram of the right upper extremity - the procedure was completed successfully, including stenting of the left brachiocephalic vein and replacement of the left tunneled hemodialysis catheter. There were no immediate post-operative complications; however, she experienced ongoing bleeding from the vascular catheter site postoperatively.     Interval History:   Receives dialysis MWF though a left chest tunneled dialysis catheter. She finished her HD this morning and tolerated it well. She does not feel as nauseous as she does from previous HD sessions. Her serum potassium has significantly reduced to 4.6 taken this morning. Denies shortness of breath, chest pain/palpitations, lightheadedness/dizziness.     DIET ADULT DIET; Regular  Medications:   Scheduled Meds:   [START ON 7/19/2025] levETIRAcetam  500 mg Oral Daily    levETIRAcetam  250 mg Oral Once per day on Monday Wednesday Friday    apixaban  2.5 mg Oral BID    gabapentin  100 mg Oral BID    [Held by provider] methocarbamol  250 mg Oral BID    sodium zirconium cyclosilicate  10 g Oral Once    calcium gluconate  1,000 mg IntraVENous Once    sodium chloride flush  5-40 mL IntraVENous 2 times per day    sodium chloride flush  5-40 mL IntraVENous 2 times per day    atorvastatin  80 mg Oral Daily    calcitRIOL  0.25 mcg Oral Q MWF    carvedilol  6.25 mg Oral BID     docusate sodium  100 mg Oral BID    memantine  10 mg Oral

## 2025-07-18 NOTE — PROGRESS NOTES
Hospitalist Progress Note      Name:  Scottie Chiang /Age/Sex: 1970  (55 y.o. female)   MRN & CSN:  [id number] & [id number] Encounter Date/Time: 2025 1:48 PM EDT    Location:  6316/6316-01 PCP: Unknown, Provider       Hospital Day: 4         Date of Admission: 7/15/2025    Chief Complaint: AMS, Hyperkalemia    Hospital Course: Scottie Chiang is a 55 y.o. female with a significant PMH of  ESRD on MWF dialysis, T2DM, HTN, HLD, seizures, JEAN. Readmitted due to stenosis of right brachiocephalic vein, IR performed venogram of upper right extremity. No post-operative complications, procedure was performed successfully with successful stenting of the left brachiocephalic vein and replacement of left tunneled HD catheter. Postoperatively, ongoing bleeding from vascular catheter. Her catheter site bleeding has stopped with continued monitoring of site. Hgb is stable (baseline 10 -1.5).  She was found to have hyperkalemia (K+ 5.6 on admission) and was given calcium gluconate. Her potassium remained elevated > 7 and was given another dose of calcium gluconate and Lokelma. She underwent emergent HD. Nephrology has been consulted and is following with HD schedule MW. She was more lethargic and laying in bed with staring intermittently \"off into space.\" She has not been taking her Keppra. There was concern for PO medication administration and this was changed to IV. Speech consulted. Ammonia 23; UA ordered however, pt makes little urine output due to ESRD. CT Head did not show acute findings. She remains afebrile without leukocytosis. Repeat CXR () showed Increased pulmonary venous hypertension. She has had elevated BP with restarting her home medications; BP has shown improvement after HD.     Subjective: Mother at bedside; states she is at baseline mentation status; answers questions; remains lethargic     Assessment and Recommendations:    Active Hospital Problems    Diagnosis Date Noted    Stenosis of

## 2025-07-18 NOTE — PROGRESS NOTES
Treatment time: 3 hrs    Net UF:  2500 ml     Pre weight: 98.5 kg  Post weight: 96 kg     Access used: Ltdc  Access function:  tolerated well,  BFR 350ml/min     Medications or blood products given: heparin dwells     Regular outpatient schedule: MWF     Summary of response to treatment: Pt tolerated well. Pt remained stable throughout entire treatment and upon exiting the hemodialysis suite.      Copy of dialysis treatment record placed in chart, to be scanned into EMR.

## 2025-07-18 NOTE — PROGRESS NOTES
Speech Language Pathology  Facility/Department:[address] TELEMETRY  Dysphagia treat    Name: Scottie Chiang  : 1970  MRN: [id number]                                                     Patient Diagnosis(es):   Patient Active Problem List    Diagnosis Date Noted    Stenosis of brachiocephalic vein 07/15/2025    ESRD needing dialysis ([facility]) 2022    ESRD (end stage renal disease) ([facility])     Peritonitis ([facility]) 2022    Acute encephalopathy 2025    Hypervolemia 2025    Electrolyte imbalance 2025    Arteriovenous fistula 2025    Hemorrhage of arteriovenous fistula 2025    End stage renal disease ([facility]) 2025    Postmenopausal bleeding 2024    Acute gastrointestinal hemorrhage 2024    Melena 2024    Right sided weakness 10/24/2024    S/P total knee arthroplasty, right 2024    Abnormal vital signs 2024    Alternating constipation and diarrhea 2024    Hearing loss of left ear 2024    Insomnia 2024    Encephalopathy, toxic 2024    Weakness 2024    Closed displaced fracture of fourth metatarsal bone of left foot 04/15/2024    Displaced fracture of fifth metatarsal bone of left foot with routine healing 04/15/2024    H/O healed fragility fracture 04/15/2024    Hyperkalemia 2024    Renovascular hypertension 2024    Transient alteration of awareness 2024    Encephalopathy acute 2024    Closed fracture of bone of left foot 2024    Nausea 2024    Diabetic polyneuropathy associated with type 2 diabetes mellitus ([facility]) 10/04/2023    History of amputation of left great toe 10/04/2023    Morbid (severe) obesity due to excess calories ([facility]) 10/04/2023    Type 2 diabetes mellitus with diabetic peripheral angiopathy without gangrene ([facility]) 10/04/2023    Vitamin deficiency 10/04/2023    Acquired absence of left great toe 10/04/2023    Awaiting organ transplant status 10/04/2023    Long term

## 2025-07-19 VITALS
OXYGEN SATURATION: 96 % | SYSTOLIC BLOOD PRESSURE: 134 MMHG | WEIGHT: 211.64 LBS | HEART RATE: 70 BPM | HEIGHT: 61 IN | BODY MASS INDEX: 39.96 KG/M2 | RESPIRATION RATE: 18 BRPM | TEMPERATURE: 97.4 F | DIASTOLIC BLOOD PRESSURE: 75 MMHG

## 2025-07-19 PROBLEM — E87.20 METABOLIC ACIDOSIS: Status: ACTIVE | Noted: 2025-07-19

## 2025-07-19 LAB
ALBUMIN SERPL-MCNC: 3 G/DL (ref 3.4–5)
ANION GAP SERPL CALCULATED.3IONS-SCNC: 14 MMOL/L (ref 3–16)
BASOPHILS # BLD: 0.1 K/UL (ref 0–0.2)
BASOPHILS NFR BLD: 1.6 %
BUN SERPL-MCNC: 36 MG/DL (ref 7–20)
CALCIUM SERPL-MCNC: 8.1 MG/DL (ref 8.3–10.6)
CHLORIDE SERPL-SCNC: 96 MMOL/L (ref 99–110)
CO2 SERPL-SCNC: 18 MMOL/L (ref 21–32)
CREAT SERPL-MCNC: 7.8 MG/DL (ref 0.6–1.1)
DEPRECATED RDW RBC AUTO: 19.2 % (ref 12.4–15.4)
EOSINOPHIL # BLD: 0.2 K/UL (ref 0–0.6)
EOSINOPHIL NFR BLD: 4.8 %
GFR SERPLBLD CREATININE-BSD FMLA CKD-EPI: 6 ML/MIN/{1.73_M2}
GLUCOSE SERPL-MCNC: 108 MG/DL (ref 70–99)
HCT VFR BLD AUTO: 31.5 % (ref 36–48)
HGB BLD-MCNC: 9.8 G/DL (ref 12–16)
LYMPHOCYTES # BLD: 0.8 K/UL (ref 1–5.1)
LYMPHOCYTES NFR BLD: 22.8 %
MCH RBC QN AUTO: 33.9 PG (ref 26–34)
MCHC RBC AUTO-ENTMCNC: 31 G/DL (ref 31–36)
MCV RBC AUTO: 109.2 FL (ref 80–100)
MONOCYTES # BLD: 0.6 K/UL (ref 0–1.3)
MONOCYTES NFR BLD: 17 %
NEUTROPHILS # BLD: 1.9 K/UL (ref 1.7–7.7)
NEUTROPHILS NFR BLD: 53.8 %
PHOSPHATE SERPL-MCNC: 6 MG/DL (ref 2.5–4.9)
PLATELET # BLD AUTO: 101 K/UL (ref 135–450)
PMV BLD AUTO: 9.8 FL (ref 5–10.5)
POTASSIUM SERPL-SCNC: 4.9 MMOL/L (ref 3.5–5.1)
RBC # BLD AUTO: 2.89 M/UL (ref 4–5.2)
SODIUM SERPL-SCNC: 128 MMOL/L (ref 136–145)
WBC # BLD AUTO: 3.5 K/UL (ref 4–11)

## 2025-07-19 PROCEDURE — 85025 COMPLETE CBC W/AUTO DIFF WBC: CPT

## 2025-07-19 PROCEDURE — 36415 COLL VENOUS BLD VENIPUNCTURE: CPT

## 2025-07-19 PROCEDURE — 99232 SBSQ HOSP IP/OBS MODERATE 35: CPT | Performed by: INTERNAL MEDICINE

## 2025-07-19 PROCEDURE — 6370000000 HC RX 637 (ALT 250 FOR IP): Performed by: INTERNAL MEDICINE

## 2025-07-19 PROCEDURE — 80069 RENAL FUNCTION PANEL: CPT

## 2025-07-19 PROCEDURE — 94618 PULMONARY STRESS TESTING: CPT

## 2025-07-19 RX ORDER — GABAPENTIN 100 MG/1
100 CAPSULE ORAL 2 TIMES DAILY
Qty: 60 CAPSULE | Refills: 0 | Status: CANCELLED | OUTPATIENT
Start: 2025-07-19 | End: 2025-08-18

## 2025-07-19 RX ADMIN — APIXABAN 2.5 MG: 2.5 TABLET, FILM COATED ORAL at 10:25

## 2025-07-19 RX ADMIN — DOCUSATE SODIUM 100 MG: 100 CAPSULE, LIQUID FILLED ORAL at 10:25

## 2025-07-19 RX ADMIN — OXYCODONE AND ACETAMINOPHEN 1 TABLET: 325; 10 TABLET ORAL at 03:39

## 2025-07-19 RX ADMIN — MEMANTINE 10 MG: 10 TABLET ORAL at 10:25

## 2025-07-19 RX ADMIN — LEVETIRACETAM 500 MG: 500 TABLET, FILM COATED ORAL at 10:25

## 2025-07-19 RX ADMIN — TORSEMIDE 100 MG: 100 TABLET ORAL at 10:25

## 2025-07-19 RX ADMIN — SODIUM ZIRCONIUM CYCLOSILICATE 10 G: 10 POWDER, FOR SUSPENSION ORAL at 11:38

## 2025-07-19 RX ADMIN — PANTOPRAZOLE SODIUM 40 MG: 40 TABLET, DELAYED RELEASE ORAL at 06:20

## 2025-07-19 RX ADMIN — SEVELAMER CARBONATE 1600 MG: 800 TABLET, FILM COATED ORAL at 10:25

## 2025-07-19 RX ADMIN — GABAPENTIN 100 MG: 100 CAPSULE ORAL at 10:25

## 2025-07-19 RX ADMIN — ATORVASTATIN CALCIUM 80 MG: 80 TABLET, FILM COATED ORAL at 10:25

## 2025-07-19 ASSESSMENT — PAIN DESCRIPTION - ORIENTATION: ORIENTATION: POSTERIOR

## 2025-07-19 ASSESSMENT — PAIN DESCRIPTION - DESCRIPTORS: DESCRIPTORS: ACHING

## 2025-07-19 ASSESSMENT — PAIN DESCRIPTION - LOCATION: LOCATION: HEAD

## 2025-07-19 ASSESSMENT — PAIN SCALES - GENERAL: PAINLEVEL_OUTOF10: 6

## 2025-07-19 NOTE — DISCHARGE INSTR - COC
Continuity of Care Form    Patient Name: Scottie Chiang   :  1970  MRN:  [id number]    Admit date:  7/15/2025  Discharge date:  ***    Code Status Order: Full Code   Advance Directives:     Admitting Physician:  Bright Garcia MD  PCP: Unknown, Provider    Discharging Nurse: ***  Discharging Hospital Unit/Room#: 6316/6316-01  Discharging Unit Phone Number: ***    Emergency Contact:   Extended Emergency Contact Information  Primary Emergency Contact: Hayley Chiang  Home Phone: [phone]  Mobile Phone: [phone]  Relation: Parent  Secondary Emergency Contact: Yeimi Chiang  Home Phone: [phone]  Mobile Phone: [phone]  Relation: Brother/Sister    Past Surgical History:  Past Surgical History:   Procedure Laterality Date    CATARACT REMOVAL Bilateral      SECTION      DIALYSIS CATHETER INSERTION N/A 2021    LAPAROSCOPIC PERITONEAL DIALYSIS CATHETER INSERTION, OMENTOPLEXY performed by Henri Elizalde DO at [facility] OR    DIALYSIS CATHETER INSERTION N/A 2022    LAPAROSCOPIC PERITONEAL DIALYSIS CATHETER PLACEMENT performed by Henri Elizalde DO at [facility] OR    DIALYSIS CATHETER REMOVAL N/A 2021    CATHETER REMOVAL PERITONEAL DIALYSIS performed by Henri Elizalde DO at [facility] OR    DIALYSIS CATHETER REMOVAL N/A 2022    PERITONEAL DIALYSIS CATHETER REMOVAL performed by Henri Elizalde DO at [facility] OR    DIALYSIS FISTULA CREATION Right 2024    RIGHT ARTERIOVENOUS FISTULA CREATION performed by Valeria Kumar MD at [facility] OR    DIALYSIS FISTULA CREATION Right 3/7/2025    RIGHT ARTERIOVENOUS FISTULA CREATION performed by Valeria Kumar MD at [facility] OR    DIALYSIS FISTULA CREATION Right 3/7/2025    RIGHT UPPER EXTREMITY HEMATOMA EVACUATION performed by Valeria Kumar MD at [facility] OR    EYE SURGERY Bilateral 1988    muscle surgery     INVASIVE VASCULAR N/A 2024    Angioplasty fistula/dialysis circuit performed by Valeria Kumar MD at [facility] CARDIAC CATH LAB    INVASIVE VASCULAR N/A 2024

## 2025-07-19 NOTE — DISCHARGE SUMMARY
V2.0  Discharge Summary    Name:  Scottie Chiang /Age/Sex: 1970 (55 y.o. female)   Admit Date: 7/15/2025  Discharge Date: 25    MRN & CSN:  [id number] & [id number] Encounter Date and Time 25 8:25 AM EDT    Attending:  Bright Garcia MD Discharging Provider: Bright Garcia MD       Hospital Course:      Scottie Chiang is a 55 y.o. female with a significant PMH of  ESRD on MWF dialysis, T2DM, HTN, HLD, seizures, JEAN. Readmitted due to stenosis of right brachiocephalic vein, IR performed venogram of upper right extremity. No post-operative complications, procedure was performed successfully with successful stenting of the left brachiocephalic vein and replacement of left tunneled HD catheter. Postoperatively, ongoing bleeding from vascular catheter. Her catheter site bleeding has stopped with continued monitoring of site. Hgb is stable (baseline 10 -1.5).  She was found to have hyperkalemia (K+ 5.6 on admission) and was given calcium gluconate. Her potassium remained elevated > 7 and was given another dose of calcium gluconate and Lokelma. She underwent emergent HD. Nephrology has been consulted and is following with HD schedule MW. She was more lethargic and laying in bed with staring intermittently \"off into space.\" She has not been taking her Keppra. There was concern for PO medication administration and this was changed to IV. Speech consulted. Ammonia 23; UA ordered however, pt makes little urine output due to ESRD. CT Head did not show acute findings. She remains afebrile without leukocytosis. Repeat CXR () showed Increased pulmonary venous hypertension. She has had elevated BP with restarting her home medications; BP has shown improvement after HD.     55-year-old with ESRD, central venous stenosis, came in for IR procedure, admitted for postoperative management     Hyperkalemia, potassium 5.6 on admission given calcium gluconate  -Much improved with dialysis sessions, did undergo dialysis  catheter site to the new venotomy site and then advanced under fluoroscopic guidance through the peel-away sheath centrally. The tip of the catheter is within the right atrium. Both lumens aspirate and flush rapidly. The catheter was secured with nonabsorbable suture. An additional absorbable suture was placed at the skin entry site for oozing. Glue was placed at the venotomy site with satisfactory hemostasis. All remaining equipment was removed and hemostasis was achieved with manual compression. An absorbable stitch was placed in the right upper extremity soft tissues to assist with hemostasis. Sterile dressings were applied. The left upper extremity triple lumen catheter was left in place for medication administration overnight. The patient tolerated the procedure well and was transferred from the angiography suite in stable condition. Total fluoroscopy time: 91.2 minutes Cumulative Air Kerma: 4064.03 mGy     1.  Successful left upper extremity triple lumen catheter placement. 2.  Successful sharp recanalization of the right brachiocephalic vein with placement of bilateral brachiocephalic vein VBX covered stents postdilated to 12 mm. 3.  Successful removal and replacement of left chest tunneled hemodialysis catheter. This catheter is okay to use. Electronically signed by Jeffrey BRAVO CHEST (2 VW)  Result Date: 7/13/2025  TWO VIEWS OF THE CHEST HISTORY:shortness of breath, fluid overloaded COMPARISON: None PROCEDURE: PA and lateral views of the chest were obtained. FINDINGS: Dialysis catheter in place.The lungs are clear.  The cardiomediastinal contours are within normal limits There are no visible pleural abnormalities There are no acute osseous abnormalities.     No acute cardiopulmonary disease Electronically signed by Sean Sloan DO      CBC:   Recent Labs     07/17/25  2336 07/18/25  0649   WBC 3.8* 3.7*   HGB 9.1* 9.1*   * 105*     BMP:    Recent Labs     07/16/25  1230 07/17/25  2337

## 2025-07-19 NOTE — PROGRESS NOTES
Pt needing oxygen at home however pt is refusing O2. Pt weaned to room air with SpO2 between 87-89%. Pt denies shortness of breath. Pt educated on need for O2 and risks of not wearing at home, pt verbalized understanding and refused home oxygen. Dr Garcia aware.

## 2025-07-19 NOTE — PROGRESS NOTES
Renal Progress Note  Subjective:     07/19/25      Assessment/Plan     1. ESRD   - HD yesterday , mild acidosis , monitor no need for HD today    - HD MWF   - UF to dry wt     2. Hypertensive emergency- resolved ,   - on home meds   - BP better      3. Hyperkalemia- resolved   - Low K+ diet  - Continue to monitor serum K+ level.    4. CKD MBD   - HD     5/ met acidosis   Mild   Monitor     HPI:   Scottie Chiang is a 55-year-old female with a significant past medical history including ESRD, T2DM, hypertension, hyperlipidemia, seizure disorder, and obstructive sleep apnea. She was readmitted due to stenosis of the right brachiocephalic vein. IR performed a venogram of the right upper extremity - the procedure was completed successfully, including stenting of the left brachiocephalic vein and replacement of the left tunneled hemodialysis catheter. There were no immediate post-operative complications; however, she experienced ongoing bleeding from the vascular catheter site postoperatively.     Interval History:   Na 128  Bicarb 18   BP controlled  Pt denies CP/SOB/palpitations/abdominal pain/N/V.   S/p HD yesterday UF 2.5 L    DIET ADULT DIET; Regular; Low Potassium (Less than 3000 mg/day)  Medications:   Scheduled Meds:   sodium zirconium cyclosilicate  10 g Oral Once    levETIRAcetam  500 mg Oral Daily    levETIRAcetam  250 mg Oral Once per day on Monday Wednesday Friday    apixaban  2.5 mg Oral BID    gabapentin  100 mg Oral BID    [Held by provider] methocarbamol  250 mg Oral BID    sodium zirconium cyclosilicate  10 g Oral Once    calcium gluconate  1,000 mg IntraVENous Once    sodium chloride flush  5-40 mL IntraVENous 2 times per day    sodium chloride flush  5-40 mL IntraVENous 2 times per day    atorvastatin  80 mg Oral Daily    calcitRIOL  0.25 mcg Oral Q MWF    carvedilol  6.25 mg Oral BID WC    docusate sodium  100 mg Oral BID    memantine  10 mg Oral BID    pantoprazole  40 mg Oral BID AC    sevelamer

## 2025-07-19 NOTE — PROGRESS NOTES
Pt A&Ox4 and discharging home with mom. Pt's telemetry removed. Pt leaving with PICC line to receive outpatient antibiotics. Pt's questions and concerns addressed with RN. Pt had home O2 eval done and is refusing home oxygen. Pt left with all personal belongings, discharge instructions, and medications from outpatient pharmacy. Pt transported home via private car.

## 2025-07-19 NOTE — PROGRESS NOTES
07/19/25 1541   Resting (Room Air)   SpO2 86   HR 75   Resting (On O2)   SpO2 92   HR 82   O2 Device Nasal cannula   O2 Flow Rate (l/min) 1 l/min   During Walk (On O2)   SpO2 86   HR 83   O2 Device Nasal cannula   O2 Flow Rate (l/min) 1 l/min   Need Additional O2 Flow Rate Rows Yes   O2 Flow Rate (l/min) 2 l/min   O2 Saturation 92   Rate of Dyspnea 0   After Walk   SpO2 93   HR 85   O2 Device Nasal cannula   O2 Flow Rate (l/min) 2 l/min   Rate of Dyspnea 0

## 2025-07-19 NOTE — PLAN OF CARE
Problem: Pain  Goal: Verbalizes/displays adequate comfort level or baseline comfort level  Outcome: Adequate for Discharge     Problem: Chronic Conditions and Co-morbidities  Goal: Patient's chronic conditions and co-morbidity symptoms are monitored and maintained or improved  Outcome: Adequate for Discharge     Problem: Discharge Planning  Goal: Discharge to home or other facility with appropriate resources  Outcome: Adequate for Discharge     Problem: Safety - Adult  Goal: Free from fall injury  Outcome: Adequate for Discharge     Problem: Safety - Medical Restraint  Goal: Remains free of injury from restraints (Restraint for Interference with Medical Device)  Description: INTERVENTIONS:  1. Determine that other, less restrictive measures have been tried or would not be effective before applying the restraint  2. Evaluate the patient's condition at the time of restraint application  3. Inform patient/family regarding the reason for restraint  4. Q2H: Monitor safety, psychosocial status, comfort, nutrition and hydration  Outcome: Adequate for Discharge     Problem: Respiratory - Adult  Goal: Achieves optimal ventilation and oxygenation  Outcome: Adequate for Discharge     Problem: Cardiovascular - Adult  Goal: Absence of cardiac dysrhythmias or at baseline  Outcome: Adequate for Discharge  Goal: Maintains optimal cardiac output and hemodynamic stability  Outcome: Adequate for Discharge     Problem: Skin/Tissue Integrity  Goal: Skin integrity remains intact  Description: 1.  Monitor for areas of redness and/or skin breakdown  2.  Assess vascular access sites hourly  3.  Every 4-6 hours minimum:  Change oxygen saturation probe site  4.  Every 4-6 hours:  If on nasal continuous positive airway pressure, respiratory therapy assess nares and determine need for appliance change or resting period  Outcome: Adequate for Discharge     Problem: Metabolic/Fluid and Electrolytes - Adult  Goal: Electrolytes maintained within

## 2025-07-20 NOTE — PROGRESS NOTES
7/20 0930  Pt returned to unit for PICC line removal. Catheter intact, no complications, gauze dressing with clear tegaderm applied.

## 2025-07-24 ENCOUNTER — TELEPHONE (OUTPATIENT)
Dept: GENERAL RADIOLOGY | Age: 55
End: 2025-07-24

## 2025-07-24 DIAGNOSIS — I87.1: Primary | ICD-10-CM

## 2025-07-24 NOTE — PROGRESS NOTES
Physician Progress Note      PATIENT:               RAHUL FRENCH  CSN #:                  [id number]  :                       1970  ADMIT DATE:       7/15/2025 7:35 PM  DISCH DATE:        2025 4:12 PM  RESPONDING  PROVIDER #:        Bright Garcia MD          QUERY TEXT:    Encephalopathy is documented in the medical record in DC summary .    Please specify type:    The clinical indicators include:  -- Admission for central venous occlusion. S/P stenting of the left   brachiocephalic vein and TDC replacement.  -- DC summary  \"Acute encephalopathy, she appears to be more lethargic,   mostly just laying in bed, intermittently stares in space.....Given IV Keppra,   much improved possibly had nonepileptic seizures that could be leading to her   symptoms along with postanesthesia effect and in setting of ESRD  Acute encephalopathy post anesthesia\"  -- HD, Keppra, supportive care  Options provided:  -- Toxic encephalopathy related to anesthesia  -- Lethargy and AMS secondary to postictal state and encephalopathy ruled out  -- Other - I will add my own diagnosis  -- Disagree - Not applicable / Not valid  -- Disagree - Clinically unable to determine / Unknown  -- Refer to Clinical Documentation Reviewer    PROVIDER RESPONSE TEXT:    Encephalopathy possible due to anesthesia and seizures as missed her AEDS due   to procedure and non compliance    Query created by: Laura Kline on 2025 4:51 PM      Electronically signed by:  Bright Garcia MD 2025 8:09 AM

## 2025-07-24 NOTE — TELEPHONE ENCOUNTER
Called patient as follow up post venous stenting last week with Dr Begum. Patient needs follow up chest CTA next month and needs to continue taking AC as ordered. VM message left, with call back requested to [phone].

## 2025-07-25 ENCOUNTER — TELEPHONE (OUTPATIENT)
Dept: VASCULAR SURGERY | Age: 55
End: 2025-07-25

## 2025-07-25 DIAGNOSIS — T82.898A OTHER SPECIFIED COMPLICATION OF VASCULAR PROSTHETIC DEVICES, IMPLANTS AND GRAFTS, INITIAL ENCOUNTER: ICD-10-CM

## 2025-07-25 DIAGNOSIS — N18.6 ESRD (END STAGE RENAL DISEASE) (HCC): Primary | ICD-10-CM

## 2025-07-25 NOTE — TELEPHONE ENCOUNTER
Spoke with Sheryl at [facility] dialysis, Ms/ Scottie will be at office at 1:30p for HD duplex.

## 2025-07-25 NOTE — TELEPHONE ENCOUNTER
Spoke with Ms. Chiang, she is scheduled for right arm fistulagram on Monday 7/28 at  with a 1130a arrival time. Informed her to hold her Eliquis starting this evening and to hold her torsemide the morning of, nothing to eat or drink after midnight.

## 2025-07-25 NOTE — TELEPHONE ENCOUNTER
Spoke with Ms. Chiang who is currently at dialysis, the nurse is unable to hear anything and stent was placed last week. I messaged Dr. Kumar, HD duplex will be ordered.

## 2025-07-28 ENCOUNTER — HOSPITAL ENCOUNTER (OUTPATIENT)
Age: 55
Setting detail: OUTPATIENT SURGERY
Discharge: HOME | End: 2025-07-28
Attending: SURGERY | Admitting: SURGERY
Payer: COMMERCIAL

## 2025-07-28 VITALS
WEIGHT: 212.6 LBS | SYSTOLIC BLOOD PRESSURE: 135 MMHG | RESPIRATION RATE: 15 BRPM | TEMPERATURE: 98.3 F | DIASTOLIC BLOOD PRESSURE: 70 MMHG | BODY MASS INDEX: 40.14 KG/M2 | OXYGEN SATURATION: 94 % | HEIGHT: 61 IN | HEART RATE: 65 BPM

## 2025-07-28 DIAGNOSIS — N18.6 ESRD (END STAGE RENAL DISEASE) (HCC): ICD-10-CM

## 2025-07-28 LAB
ANION GAP SERPL CALCULATED.3IONS-SCNC: 14 MMOL/L (ref 3–16)
BUN SERPL-MCNC: 55 MG/DL (ref 7–20)
CALCIUM SERPL-MCNC: 9.2 MG/DL (ref 8.3–10.6)
CHLORIDE SERPL-SCNC: 97 MMOL/L (ref 99–110)
CO2 SERPL-SCNC: 27 MMOL/L (ref 21–32)
CREAT SERPL-MCNC: 9.7 MG/DL (ref 0.6–1.1)
DEPRECATED RDW RBC AUTO: 19 % (ref 12.4–15.4)
ECHO BSA: 2.04 M2
GFR SERPLBLD CREATININE-BSD FMLA CKD-EPI: 4 ML/MIN/{1.73_M2}
GLUCOSE SERPL-MCNC: 167 MG/DL (ref 70–99)
HCT VFR BLD AUTO: 33.4 % (ref 36–48)
HGB BLD-MCNC: 11 G/DL (ref 12–16)
INR PPP: 1.11 (ref 0.86–1.14)
MCH RBC QN AUTO: 33 PG (ref 26–34)
MCHC RBC AUTO-ENTMCNC: 32.8 G/DL (ref 31–36)
MCV RBC AUTO: 100.5 FL (ref 80–100)
PLATELET # BLD AUTO: 199 K/UL (ref 135–450)
PMV BLD AUTO: 8.4 FL (ref 5–10.5)
POTASSIUM SERPL-SCNC: 5.5 MMOL/L (ref 3.5–5.1)
PROTHROMBIN TIME: 14.6 SEC (ref 12.1–14.9)
RBC # BLD AUTO: 3.33 M/UL (ref 4–5.2)
SODIUM SERPL-SCNC: 138 MMOL/L (ref 136–145)
WBC # BLD AUTO: 5.1 K/UL (ref 4–11)

## 2025-07-28 PROCEDURE — C1894 INTRO/SHEATH, NON-LASER: HCPCS | Performed by: SURGERY

## 2025-07-28 PROCEDURE — 99152 MOD SED SAME PHYS/QHP 5/>YRS: CPT | Performed by: SURGERY

## 2025-07-28 PROCEDURE — C2623 CATH, TRANSLUMIN, DRUG-COAT: HCPCS | Performed by: SURGERY

## 2025-07-28 PROCEDURE — 36902 INTRO CATH DIALYSIS CIRCUIT: CPT | Performed by: SURGERY

## 2025-07-28 PROCEDURE — 36908 STENT PLMT CTR DIALYSIS SEG: CPT | Performed by: SURGERY

## 2025-07-28 PROCEDURE — C1887 CATHETER, GUIDING: HCPCS | Performed by: SURGERY

## 2025-07-28 PROCEDURE — 36901 INTRO CATH DIALYSIS CIRCUIT: CPT | Performed by: SURGERY

## 2025-07-28 PROCEDURE — C1725 CATH, TRANSLUMIN NON-LASER: HCPCS | Performed by: SURGERY

## 2025-07-28 PROCEDURE — 6360000002 HC RX W HCPCS: Performed by: SURGERY

## 2025-07-28 PROCEDURE — 80048 BASIC METABOLIC PNL TOTAL CA: CPT

## 2025-07-28 PROCEDURE — 99153 MOD SED SAME PHYS/QHP EA: CPT | Performed by: SURGERY

## 2025-07-28 PROCEDURE — 2500000003 HC RX 250 WO HCPCS: Performed by: SURGERY

## 2025-07-28 PROCEDURE — 7100000010 HC PHASE II RECOVERY - FIRST 15 MIN: Performed by: SURGERY

## 2025-07-28 PROCEDURE — 36011 PLACE CATHETER IN VEIN: CPT | Performed by: SURGERY

## 2025-07-28 PROCEDURE — 85027 COMPLETE CBC AUTOMATED: CPT

## 2025-07-28 PROCEDURE — C1876 STENT, NON-COA/NON-COV W/DEL: HCPCS | Performed by: SURGERY

## 2025-07-28 PROCEDURE — 76937 US GUIDE VASCULAR ACCESS: CPT

## 2025-07-28 PROCEDURE — C1769 GUIDE WIRE: HCPCS | Performed by: SURGERY

## 2025-07-28 PROCEDURE — 85610 PROTHROMBIN TIME: CPT

## 2025-07-28 PROCEDURE — 2709999900 HC NON-CHARGEABLE SUPPLY: Performed by: SURGERY

## 2025-07-28 PROCEDURE — 6360000004 HC RX CONTRAST MEDICATION: Performed by: SURGERY

## 2025-07-28 PROCEDURE — 36140 INTRO NDL ICATH UPR/LXTR ART: CPT | Performed by: SURGERY

## 2025-07-28 PROCEDURE — 7100000011 HC PHASE II RECOVERY - ADDTL 15 MIN: Performed by: SURGERY

## 2025-07-28 DEVICE — STENT AB9U12060090 ABRE V01
Type: IMPLANTABLE DEVICE | Status: FUNCTIONAL
Brand: ABRE™

## 2025-07-28 RX ORDER — DIPHENHYDRAMINE HYDROCHLORIDE 50 MG/ML
INJECTION, SOLUTION INTRAMUSCULAR; INTRAVENOUS PRN
Status: DISCONTINUED | OUTPATIENT
Start: 2025-07-28 | End: 2025-07-28 | Stop reason: HOSPADM

## 2025-07-28 RX ORDER — PROMETHAZINE HYDROCHLORIDE 12.5 MG/1
12.5 TABLET ORAL EVERY 6 HOURS PRN
Status: DISCONTINUED | OUTPATIENT
Start: 2025-07-28 | End: 2025-07-28 | Stop reason: HOSPADM

## 2025-07-28 RX ORDER — SODIUM CHLORIDE 0.9 % (FLUSH) 0.9 %
5-40 SYRINGE (ML) INJECTION EVERY 12 HOURS SCHEDULED
Status: DISCONTINUED | OUTPATIENT
Start: 2025-07-28 | End: 2025-07-28 | Stop reason: HOSPADM

## 2025-07-28 RX ORDER — ASPIRIN 81 MG/1
81 TABLET, CHEWABLE ORAL DAILY
Status: DISCONTINUED | OUTPATIENT
Start: 2025-07-28 | End: 2025-07-28 | Stop reason: HOSPADM

## 2025-07-28 RX ORDER — SODIUM CHLORIDE 0.9 % (FLUSH) 0.9 %
5-40 SYRINGE (ML) INJECTION PRN
Status: DISCONTINUED | OUTPATIENT
Start: 2025-07-28 | End: 2025-07-28 | Stop reason: HOSPADM

## 2025-07-28 RX ORDER — MORPHINE SULFATE 2 MG/ML
4 INJECTION, SOLUTION INTRAMUSCULAR; INTRAVENOUS
Status: DISCONTINUED | OUTPATIENT
Start: 2025-07-28 | End: 2025-07-28

## 2025-07-28 RX ORDER — LABETALOL HYDROCHLORIDE 5 MG/ML
10 INJECTION, SOLUTION INTRAVENOUS
Status: DISCONTINUED | OUTPATIENT
Start: 2025-07-28 | End: 2025-07-28 | Stop reason: HOSPADM

## 2025-07-28 RX ORDER — IOPAMIDOL 612 MG/ML
INJECTION, SOLUTION INTRAVASCULAR PRN
Status: DISCONTINUED | OUTPATIENT
Start: 2025-07-28 | End: 2025-07-28 | Stop reason: HOSPADM

## 2025-07-28 RX ORDER — HEPARIN SODIUM 1000 [USP'U]/ML
INJECTION, SOLUTION INTRAVENOUS; SUBCUTANEOUS PRN
Status: DISCONTINUED | OUTPATIENT
Start: 2025-07-28 | End: 2025-07-28 | Stop reason: HOSPADM

## 2025-07-28 RX ORDER — LIDOCAINE HYDROCHLORIDE 10 MG/ML
INJECTION, SOLUTION EPIDURAL; INFILTRATION; INTRACAUDAL; PERINEURAL PRN
Status: DISCONTINUED | OUTPATIENT
Start: 2025-07-28 | End: 2025-07-28 | Stop reason: HOSPADM

## 2025-07-28 RX ORDER — MORPHINE SULFATE 2 MG/ML
2 INJECTION, SOLUTION INTRAMUSCULAR; INTRAVENOUS
Status: DISCONTINUED | OUTPATIENT
Start: 2025-07-28 | End: 2025-07-28

## 2025-07-28 RX ORDER — MIDAZOLAM HYDROCHLORIDE 1 MG/ML
INJECTION, SOLUTION INTRAMUSCULAR; INTRAVENOUS PRN
Status: DISCONTINUED | OUTPATIENT
Start: 2025-07-28 | End: 2025-07-28 | Stop reason: HOSPADM

## 2025-07-28 RX ORDER — SODIUM CHLORIDE 9 MG/ML
INJECTION, SOLUTION INTRAVENOUS PRN
Status: DISCONTINUED | OUTPATIENT
Start: 2025-07-28 | End: 2025-07-28 | Stop reason: HOSPADM

## 2025-07-28 RX ORDER — ONDANSETRON 2 MG/ML
4 INJECTION INTRAMUSCULAR; INTRAVENOUS EVERY 6 HOURS PRN
Status: DISCONTINUED | OUTPATIENT
Start: 2025-07-28 | End: 2025-07-28 | Stop reason: HOSPADM

## 2025-07-28 NOTE — BRIEF OP NOTE
Brief Postoperative Note      Patient: Scottie Chiang  YOB: 1970  MRN: [id number]    Date of Procedure: 7/28/2025    Pre-Op Diagnosis Codes:      * ESRD (end stage renal disease) ([facility]) [N18.6]    Post-Op Diagnosis: Same       Procedure:  Ultrasound guided access right radial artery with fistulagram  Balloon angioplasty right brachiobasilic AV fistula  Balloon angioplasty and stent right subclavian vein with 12x60 Abre   Right femoral vein access    Surgeon(s):  Valeria Kumar MD Cole Bahakel, MD    Assistant:  * No surgical staff found *    Anesthesia: IV Sedation    Estimated Blood Loss (mL): less than 50     Complications: None    Specimens:   * No specimens in log *    Implants:  Implant Name Type Inv. Item Serial No.  Lot No. LRB No. Used Action   STENT VASC SELF EXPND 12X60 MM SKY NIT ABRE - [id number] Peripheral stents STENT VASC SELF EXPND 12X60 MM SKY NIT ABRE  [employer]-WD  Right 1 Implanted         Drains:   [REMOVED] External Urinary Catheter (Removed)   Site Assessment Clean,dry & intact 07/13/25 2215   Placement Initiated 07/13/25 2215   Securement Method Other (Comment) 07/13/25 2215   Perineal Care Yes 07/13/25 2215   Suction 40 mmgHg continuous 07/13/25 2215   Output (mL) 0 mL 07/14/25 0200       Findings:  Present At Time Of Surgery (PATOS) (choose all levels that have infection present):  No infection present  Other Findings: stenosis throughout right AVF with significant improvement following incremental balloon angioplasty, 3mmx 150, 4mm x 150 and 5mm x 150mm balloon.  Stenosis of distal edge of right brachiocephalic stent, extended by Dr. Begum into right subclavian vein with minimal residual stenosis.     Electronically signed by Valeria Kumar MD on 7/28/2025 at 3:03 PM

## 2025-07-28 NOTE — H&P
CREATION Right 02/02/2024    RIGHT ARTERIOVENOUS FISTULA CREATION performed by Valeria Kumar MD at [facility] OR    DIALYSIS FISTULA CREATION Right 3/7/2025    RIGHT ARTERIOVENOUS FISTULA CREATION performed by Valeria Kumar MD at [facility] OR    DIALYSIS FISTULA CREATION Right 3/7/2025    RIGHT UPPER EXTREMITY HEMATOMA EVACUATION performed by Valeria Kumar MD at [facility] OR    EYE SURGERY Bilateral 1988    muscle surgery     INVASIVE VASCULAR N/A 05/29/2024    Angioplasty fistula/dialysis circuit performed by Valeria Kumar MD at [facility] CARDIAC CATH LAB    INVASIVE VASCULAR N/A 08/21/2024    Fistulogram right performed by Valeria Kumar MD at [facility] CARDIAC CATH LAB    INVASIVE VASCULAR N/A 10/23/2024    Fistulogram right performed by Valeria Kumar MD at [facility] CARDIAC CATH LAB    INVASIVE VASCULAR N/A 5/28/2025    Fistulogram right performed by Valeria Kumar MD at [facility] CARDIAC CATH LAB    IR TUNNELED CVC PLACE WO SQ PORT/PUMP > 5 YEARS  11/15/2021    IR TUNNELED CATHETER PLACEMENT GREATER THAN 5 YEARS 11/15/2021 [facility] SPECIAL PROCEDURES    IR TUNNELED CVC PLACE WO SQ PORT/PUMP > 5 YEARS  06/28/2022    IR TUNNELED CATHETER PLACEMENT GREATER THAN 5 YEARS 6/28/2022 [facility] SPECIAL PROCEDURES    IR TUNNELED CVC PLACE WO SQ PORT/PUMP > 5 YEARS  07/07/2022    IR TUNNELED CATHETER PLACEMENT GREATER THAN 5 YEARS 7/7/2022 [facility] SPECIAL PROCEDURES    IR TUNNELED CVC PLACE WO SQ PORT/PUMP > 5 YEARS  09/21/2022    IR TUNNELED CATHETER PLACEMENT GREATER THAN 5 YEARS 9/21/2022 [facility] SPECIAL PROCEDURES    JOINT REPLACEMENT Right     right knee    TOE AMPUTATION Left     left great toe partial amputation    US ASP ABSCESS/HEMATOMA/BULLA/CYST  03/28/2022    US ASP ABSCESS/HEMATOMA/BULLA/CYST 3/28/2022 [facility] ULTRASOUND       Allergies:  Adhesive tape    Medications:   Home Meds  No current facility-administered medications on file prior to encounter.     Current Outpatient Medications on File Prior to Encounter   Medication Sig Dispense Refill

## 2025-07-28 NOTE — PROGRESS NOTES
Cath Lab Pre Procedure Flowsheet    Plan of Care:     Hemodynamics and cardiac rhythm will remain stable.   Comfort level will be maintained.   Respiratory function will remain adequate.   Pt/family will verbalize understanding of the procedure.   Procedure will be tolerated without complications.   Patient will recover from procedure without complications.   ID armband on patient and identification verified.   Informed consent obtained.   Non invasive blood pressure cuff applied, monitoring initiated.   EKG pads and pulse oximeter applied, monitoring initiated.   Instructions given. Patient and / or family verbalize understanding.   H&P will be documented by physician in [facility].     Pre-procedure:    NPO Status: Pt has been NPO since midnight. .    Contrast / IV Dye Allergy: None    Pregnancy Test: No.    Prep Sites: Groin(s)    Mehdi's Test:    Pulses: Bilat Femoral 2, Bilat DP/PT 2/1, Right AVF - Negative     Anticoagulants: Eliquis. Last Dose: 7/24/2025.  Any missed doses:  No..     Antiplatelets: None.     Chief Complaint:  Fistulogram    Diabetic: Yes, Diet controlled    Pre EKG Rhythm: Sinus rhythm    Pre SBP: 150    IV access: PIV #22 - US guided in LFA    Pre-procedure blood work collected by: MACHO Whitfield    NIH Scale: N/A

## 2025-07-28 NOTE — PROCEDURES
Interventional Radiology Post Procedure    Date: 7/28/2025    Physician: Armando Begum MD    Pre-op Diagnosis: central venous stenosis    Post-op Diagnosis: same    Variation from Planned Procedure: None       Findings: Fistulagram with angioplasty per vascular. Successful re-lining of the right brachiocephalic vein stent with a 12mm Abre (uncovered self expanding) stent via the right common femoral vein. Significantly improved flow through the stent at the end of the case with decreased collateral filling.     Patient condition: Stable    Estimated Blood Loss: 5 cc    Specimens:  none       Signed,  Jeffrey Begum MD  3:13 PM  7/28/2025

## 2025-07-28 NOTE — DISCHARGE INSTRUCTIONS
[facility]  Cardiovascular Special Procedures  Fistulogram  Patient Discharge Instructions      Day 1 (Procedure Day):  Rest for the remainder of the day.  Avoid excessive use of the affected arm.   Minimal stair climbing, if you must use stairs, lead with your unaffected leg.  Do not drive a car.  Do not be alone.  Avoid prolonged sitting, walk around occasionally until bedtime tonight.  Leave dressing intact.    Day 2:  You may climb stairs, begin slowly  You may drive a car, unless otherwise directed by physician.  Remove dressing.  You may bathe/shower, but wash gently around the puncture site and pat dry.  Place new band-aid on site daily until skin heals.      Things to Avoid:  You may not do any strenuous exercises for one week. (ex: golfing, bowling, tennis, vacuum, snow removal, jogging, and aerobic exercises).  No hot tubs, baths, or pools for 3-5 days.  Do not lift anything over 10 pounds for one week.   No lotions, powders, or ointments near site for 5 days.    Things to watch for (wrist):  You may have a small lump or a bruise.  This is common and will go away.  If bleeding occurs from the site, or a hematoma (lump) begins to increase in size, immediately apply pressure directly over the site and call 911 to return to the hospital.  Report any coolness or numbness in the arm or hand immediately.  Report any excessive pain of the arm or hand immediately.  If mild discomfort occurs at the puncture site you may place an ice pack on the site at 15 minute intervals..   Watch for signs and symptoms of an infection at the arm site (fever, local pain, redness, warmth or discharge from the puncture site), call your physician immediately if any develop.    Things to watch for (groin):  You may have a small lump or a bruise.  This is common and will go away.  If the lump increases and site becomes painful, call physician immediately.  If mild discomfort occurs at the puncture site you may place an ice

## 2025-07-29 ENCOUNTER — OFFICE VISIT (OUTPATIENT)
Dept: PAIN MANAGEMENT | Age: 55
End: 2025-07-29
Payer: COMMERCIAL

## 2025-07-29 VITALS
BODY MASS INDEX: 40.81 KG/M2 | HEART RATE: 77 BPM | WEIGHT: 216 LBS | DIASTOLIC BLOOD PRESSURE: 69 MMHG | SYSTOLIC BLOOD PRESSURE: 120 MMHG

## 2025-07-29 DIAGNOSIS — M54.12 CERVICAL RADICULOPATHY: Primary | ICD-10-CM

## 2025-07-29 DIAGNOSIS — G43.909 EPISODIC MIGRAINE: ICD-10-CM

## 2025-07-29 DIAGNOSIS — R11.0 CHRONIC NAUSEA: ICD-10-CM

## 2025-07-29 DIAGNOSIS — Z51.81 ENCOUNTER FOR THERAPEUTIC DRUG MONITORING: ICD-10-CM

## 2025-07-29 DIAGNOSIS — Z96.651 S/P TOTAL KNEE ARTHROPLASTY, RIGHT: ICD-10-CM

## 2025-07-29 DIAGNOSIS — M17.11 PRIMARY LOCALIZED OSTEOARTHROSIS OF THE KNEE, RIGHT: ICD-10-CM

## 2025-07-29 DIAGNOSIS — I50.43 CHF (CONGESTIVE HEART FAILURE), NYHA CLASS I, ACUTE ON CHRONIC, COMBINED (HCC): ICD-10-CM

## 2025-07-29 DIAGNOSIS — M47.812 CERVICAL SPONDYLOSIS: ICD-10-CM

## 2025-07-29 DIAGNOSIS — G89.4 CHRONIC PAIN SYNDROME: ICD-10-CM

## 2025-07-29 DIAGNOSIS — M47.817 LUMBOSACRAL SPONDYLOSIS WITHOUT MYELOPATHY: ICD-10-CM

## 2025-07-29 PROBLEM — G89.18 ACUTE POSTOPERATIVE PAIN: Status: ACTIVE | Noted: 2025-07-29

## 2025-07-29 PROCEDURE — 99214 OFFICE O/P EST MOD 30 MIN: CPT | Performed by: NURSE PRACTITIONER

## 2025-07-29 PROCEDURE — 3074F SYST BP LT 130 MM HG: CPT | Performed by: NURSE PRACTITIONER

## 2025-07-29 PROCEDURE — 3078F DIAST BP <80 MM HG: CPT | Performed by: NURSE PRACTITIONER

## 2025-07-29 PROCEDURE — 3017F COLORECTAL CA SCREEN DOC REV: CPT | Performed by: NURSE PRACTITIONER

## 2025-07-29 PROCEDURE — G8417 CALC BMI ABV UP PARAM F/U: HCPCS | Performed by: NURSE PRACTITIONER

## 2025-07-29 PROCEDURE — G8427 DOCREV CUR MEDS BY ELIG CLIN: HCPCS | Performed by: NURSE PRACTITIONER

## 2025-07-29 PROCEDURE — 1036F TOBACCO NON-USER: CPT | Performed by: NURSE PRACTITIONER

## 2025-07-29 PROCEDURE — 1111F DSCHRG MED/CURRENT MED MERGE: CPT | Performed by: NURSE PRACTITIONER

## 2025-07-29 RX ORDER — OXYCODONE AND ACETAMINOPHEN 10; 325 MG/1; MG/1
1 TABLET ORAL EVERY 6 HOURS PRN
Qty: 112 TABLET | Refills: 0 | Status: SHIPPED | OUTPATIENT
Start: 2025-07-29 | End: 2025-08-26

## 2025-07-29 RX ORDER — METHOCARBAMOL 500 MG/1
500 TABLET, FILM COATED ORAL 2 TIMES DAILY
Qty: 60 TABLET | Refills: 0 | Status: SHIPPED | OUTPATIENT
Start: 2025-07-29 | End: 2025-08-28

## 2025-07-29 NOTE — PROGRESS NOTES
Scottie Chiang  1970  [id number]      HISTORY OF PRESENT ILLNESS: Ms. Chiang is a 55 y.o. female returns for a follow up visit for pain management  She has a diagnosis of   1. Cervical radiculopathy    2. Primary localized osteoarthrosis of the knee, right    3. Cervical spondylosis    4. Episodic migraine    5. Lumbosacral spondylosis without myelopathy    6. S/P total knee arthroplasty, right    7. Encounter for therapeutic drug monitoring    8. Chronic pain syndrome    9. Chronic nausea    .      New Medications since Last Office visit have been reviewed with patient.     As per Information Obtained from the PADT (Patient Assessment and Documentation Tool)    She complains of pain in the head, neck, right lower arm, lower back, right leg. She rates the pain 4/10 and describes it as aching. Current treatment regimen has helped relieve about 40% of the pain since beginning treatment plan.  She denies any side effects from the current pain regimen. Patient reports that since implementation of their treatment plan; their physical functioning is better, family/social relationships are better, mood is unchanged sleep patterns are worse, and that the overall functioning is unchanged.  Patient denies/admits that any of the above have changed since last office visit. Patient denies misusing/abusing her narcotic pain medications or using any illegal drugs.      Upon obtaining medical history from Ms. Chiang    ALLERGIES: Patients list of allergies were reviewed     MEDICATIONS: Ms. Key list of medications were reviewed.Her current medications are   Outpatient Medications Prior to Visit   Medication Sig Dispense Refill    apixaban (ELIQUIS) 2.5 MG TABS tablet Take 1 tablet by mouth 2 times daily 60 tablet 2    albuterol sulfate HFA (PROVENTIL;VENTOLIN;PROAIR) 108 (90 Base) MCG/ACT inhaler Inhale 2 puffs into the lungs every 6 hours as needed for Wheezing      traZODone (DESYREL) 50 MG tablet Take 1 tablet by mouth nightly

## 2025-07-31 ENCOUNTER — TELEPHONE (OUTPATIENT)
Dept: VASCULAR SURGERY | Age: 55
End: 2025-07-31

## 2025-08-09 LAB
6-ACETYLMORPHINE, SALIVA: NOT DETECTED NG/ML
6MAM SAL QL CFM: NEGATIVE
ALPRAZOLAM, SALIVA: NOT DETECTED NG/ML
AMPHETAMINE, SALIVA: NOT DETECTED NG/ML
AMPHETAMINES SCREEN, SALIVA: NEGATIVE
ANTICONVULSANTS SCREEN, SALIVA: ABNORMAL
BENZODIAZEPINES SCREEN, SALIVA: NEGATIVE
BENZOYLECGONINE, SALIVA: NOT DETECTED NG/ML
BUPRENORPHINE SCREEN, SALIVA: NEGATIVE
BUPRENORPHINE, SALIVA: NOT DETECTED NG/ML
CANNABINOIDS SCREEN, SALIVA: NEGATIVE
CARISOPRODOL, SALIVA: NOT DETECTED NG/ML
CLONAZEPAM, SALIVA: NOT DETECTED NG/ML
COCAINE + METABOLITE, SALIVA: NEGATIVE
COCAINE, SALIVA: NOT DETECTED NG/ML
CODEINE, SALIVA: NOT DETECTED NG/ML
COTININE, SALIVA: NOT DETECTED NG/ML
CYCLOBENZAPRINE, SALIVA: NOT DETECTED NG/ML
DESMETHYLDIAZEPAM, SALIVA: NOT DETECTED NG/ML
DIAZEPAM, SALIVA: NOT DETECTED NG/ML
DIHYDROCODEINE, SALIVA: NOT DETECTED NG/ML
DULOXETINE SCREEN, SALIVA: NEGATIVE
DULOXETINE, SALIVA: NOT DETECTED NG/ML
FENTANYL SCREEN, SALIVA: NEGATIVE
FENTANYL, SALIVA: NOT DETECTED NG/ML
FLUNITRAZEPAM, SALIVA: NOT DETECTED NG/ML
FLURAZEPAM, SALIVA: NOT DETECTED NG/ML
GABAPENTIN, SALIVA: 9.6 UG/ML
HYDROCODONE SAL CFM-MCNC: NOT DETECTED NG/ML
HYDROMORPHONE, SALIVA: NOT DETECTED NG/ML
LORAZEPAM: NOT DETECTED NG/ML
MDMA, SALIVA: NOT DETECTED NG/ML
MEPERIDINE, SALIVA: NOT DETECTED NG/ML
MEPROBAMATE, SALIVA: NOT DETECTED NG/ML
METHADONE METABOLITE, SALIVA: NOT DETECTED NG/ML
METHADONE SCREEN, SALIVA: NEGATIVE
METHADONE, SALIVA: NOT DETECTED NG/ML
METHAMPHETAMINE, SALIVA: NOT DETECTED NG/ML
MIDAZOLAM, SALIVA: NOT DETECTED NG/ML
MORPHINE, SALIVA: NOT DETECTED NG/ML
MUSCLE RELAXANTS SCREEN, SALIVA: NEGATIVE
NICOTINE METABOLITE SCREEN, SALIVA: NEGATIVE
NORBUPRENORPHINE, SALIVA: NOT DETECTED NG/ML
NORDIAZEPAM IN SALIVA: NOT DETECTED NG/ML
NORHYDROCODONE, SALIVA: NOT DETECTED NG/ML
NOROXYCODONE, SALIVA: NOT DETECTED NG/ML
OPIATES SAL QL CFM: NEGATIVE
OTHER OPIOIDS SCREEN, SALIVA: NEGATIVE
OXYCODONE+OXYMORPHONE SCREEN, SALIVA: NEGATIVE
OXYCODONE, SALIVA: NOT DETECTED NG/ML
OXYMORPHONE, SALIVA: NOT DETECTED NG/ML
PHENCYCLIDINE SCREEN, SALIVA: NEGATIVE
PHENCYCLIDINE, SALIVA: NOT DETECTED NG/ML
PREGABALIN, SALIVA: NOT DETECTED UG/ML
PROPOXYPHENE, SALIVA: NOT DETECTED NG/ML
SEDATIVE/HYPNOTICS SCREEN, SALIVA: NEGATIVE
TAPENTADOL SCREEN, SALIVA: NEGATIVE
TAPENTADOL, SALIVA: NOT DETECTED NG/ML
TEMAZEPAM, SALIVA: NOT DETECTED NG/ML
TETRAHYDROCANNABINOL, SALIVA: NOT DETECTED NG/ML
TRAMADOL, SALIVA: NOT DETECTED NG/ML
TRIAZOLAM, SALIVA: NOT DETECTED NG/ML
ZOLPIDEM, SALIVA: NOT DETECTED NG/ML

## 2025-08-15 ENCOUNTER — HOSPITAL ENCOUNTER (OUTPATIENT)
Dept: INTERVENTIONAL RADIOLOGY/VASCULAR | Age: 55
End: 2025-08-15
Attending: RADIOLOGY
Payer: COMMERCIAL

## 2025-08-15 ENCOUNTER — HOSPITAL ENCOUNTER (OUTPATIENT)
Dept: CT IMAGING | Age: 55
Discharge: HOME | End: 2025-08-15
Payer: COMMERCIAL

## 2025-08-15 DIAGNOSIS — I87.1: ICD-10-CM

## 2025-08-15 PROCEDURE — 6360000004 HC RX CONTRAST MEDICATION

## 2025-08-15 PROCEDURE — 71275 CT ANGIOGRAPHY CHEST: CPT

## 2025-08-15 PROCEDURE — 76937 US GUIDE VASCULAR ACCESS: CPT

## 2025-08-15 RX ORDER — IOPAMIDOL 755 MG/ML
100 INJECTION, SOLUTION INTRAVASCULAR
Status: COMPLETED | OUTPATIENT
Start: 2025-08-15 | End: 2025-08-15

## 2025-08-15 RX ADMIN — IOPAMIDOL 100 ML: 755 INJECTION, SOLUTION INTRAVENOUS at 14:52

## 2025-08-18 ENCOUNTER — APPOINTMENT (OUTPATIENT)
Dept: GENERAL RADIOLOGY | Age: 55
End: 2025-08-18
Payer: COMMERCIAL

## 2025-08-18 ENCOUNTER — APPOINTMENT (OUTPATIENT)
Dept: CT IMAGING | Age: 55
End: 2025-08-18
Payer: COMMERCIAL

## 2025-08-18 ENCOUNTER — HOSPITAL ENCOUNTER (INPATIENT)
Age: 55
LOS: 2 days | Discharge: HOME | End: 2025-08-20
Attending: EMERGENCY MEDICINE | Admitting: INTERNAL MEDICINE
Payer: COMMERCIAL

## 2025-08-18 DIAGNOSIS — M17.11 PRIMARY LOCALIZED OSTEOARTHROSIS OF THE KNEE, RIGHT: ICD-10-CM

## 2025-08-18 DIAGNOSIS — M47.812 CERVICAL SPONDYLOSIS: ICD-10-CM

## 2025-08-18 DIAGNOSIS — M47.817 LUMBOSACRAL SPONDYLOSIS WITHOUT MYELOPATHY: ICD-10-CM

## 2025-08-18 DIAGNOSIS — M25.569 ACUTE KNEE PAIN, UNSPECIFIED LATERALITY: ICD-10-CM

## 2025-08-18 DIAGNOSIS — M25.561 CHRONIC PAIN OF RIGHT KNEE: ICD-10-CM

## 2025-08-18 DIAGNOSIS — Z96.651 S/P TOTAL KNEE ARTHROPLASTY, RIGHT: ICD-10-CM

## 2025-08-18 DIAGNOSIS — G89.29 CHRONIC PAIN OF RIGHT KNEE: ICD-10-CM

## 2025-08-18 DIAGNOSIS — Z51.81 ENCOUNTER FOR THERAPEUTIC DRUG MONITORING: ICD-10-CM

## 2025-08-18 DIAGNOSIS — G43.909 EPISODIC MIGRAINE: ICD-10-CM

## 2025-08-18 DIAGNOSIS — I21.4 NSTEMI (NON-ST ELEVATED MYOCARDIAL INFARCTION) (HCC): Primary | ICD-10-CM

## 2025-08-18 DIAGNOSIS — I50.43 CHF (CONGESTIVE HEART FAILURE), NYHA CLASS I, ACUTE ON CHRONIC, COMBINED (HCC): ICD-10-CM

## 2025-08-18 LAB
ANION GAP SERPL CALCULATED.3IONS-SCNC: 22 MMOL/L (ref 3–16)
ANTI-XA UNFRAC HEPARIN: >1.1 IU/ML (ref 0.3–0.7)
APTT BLD: 40.9 SEC (ref 22.8–35.8)
BASOPHILS # BLD: 0 K/UL (ref 0–0.2)
BASOPHILS NFR BLD: 0.7 %
BUN SERPL-MCNC: 43 MG/DL (ref 7–20)
CALCIUM SERPL-MCNC: 8 MG/DL (ref 8.3–10.6)
CHLORIDE SERPL-SCNC: 94 MMOL/L (ref 99–110)
CK SERPL-CCNC: 197 U/L (ref 26–192)
CO2 SERPL-SCNC: 20 MMOL/L (ref 21–32)
CREAT SERPL-MCNC: 9.3 MG/DL (ref 0.6–1.1)
DEPRECATED RDW RBC AUTO: 19.3 % (ref 12.4–15.4)
EKG ATRIAL RATE: 74 BPM
EKG DIAGNOSIS: NORMAL
EKG P AXIS: 76 DEGREES
EKG P-R INTERVAL: 208 MS
EKG Q-T INTERVAL: 376 MS
EKG QRS DURATION: 82 MS
EKG QTC CALCULATION (BAZETT): 417 MS
EKG R AXIS: 27 DEGREES
EKG T AXIS: 70 DEGREES
EKG VENTRICULAR RATE: 74 BPM
EOSINOPHIL # BLD: 0 K/UL (ref 0–0.6)
EOSINOPHIL NFR BLD: 0.8 %
GFR SERPLBLD CREATININE-BSD FMLA CKD-EPI: 5 ML/MIN/{1.73_M2}
GLUCOSE BLD-MCNC: 94 MG/DL (ref 70–99)
GLUCOSE SERPL-MCNC: 146 MG/DL (ref 70–99)
HCT VFR BLD AUTO: 29.6 % (ref 36–48)
HGB BLD-MCNC: 9.7 G/DL (ref 12–16)
INR PPP: 1.4 (ref 0.86–1.14)
LYMPHOCYTES # BLD: 1.2 K/UL (ref 1–5.1)
LYMPHOCYTES NFR BLD: 20.2 %
MCH RBC QN AUTO: 33 PG (ref 26–34)
MCHC RBC AUTO-ENTMCNC: 32.7 G/DL (ref 31–36)
MCV RBC AUTO: 101.1 FL (ref 80–100)
MONOCYTES # BLD: 0.7 K/UL (ref 0–1.3)
MONOCYTES NFR BLD: 11.1 %
NEUTROPHILS # BLD: 4.1 K/UL (ref 1.7–7.7)
NEUTROPHILS NFR BLD: 67.2 %
PERFORMED ON: NORMAL
PLATELET # BLD AUTO: 172 K/UL (ref 135–450)
PMV BLD AUTO: 9 FL (ref 5–10.5)
POTASSIUM SERPL-SCNC: 5.5 MMOL/L (ref 3.5–5.1)
PROTHROMBIN TIME: 17.3 SEC (ref 12.1–14.9)
RBC # BLD AUTO: 2.92 M/UL (ref 4–5.2)
SODIUM SERPL-SCNC: 136 MMOL/L (ref 136–145)
TROPONIN, HIGH SENSITIVITY: 318 NG/L (ref 0–14)
TROPONIN, HIGH SENSITIVITY: 333 NG/L (ref 0–14)
WBC # BLD AUTO: 6 K/UL (ref 4–11)

## 2025-08-18 PROCEDURE — 99223 1ST HOSP IP/OBS HIGH 75: CPT | Performed by: INTERNAL MEDICINE

## 2025-08-18 PROCEDURE — 93005 ELECTROCARDIOGRAM TRACING: CPT

## 2025-08-18 PROCEDURE — 6370000000 HC RX 637 (ALT 250 FOR IP): Performed by: INTERNAL MEDICINE

## 2025-08-18 PROCEDURE — 85730 THROMBOPLASTIN TIME PARTIAL: CPT

## 2025-08-18 PROCEDURE — 90935 HEMODIALYSIS ONE EVALUATION: CPT | Performed by: INTERNAL MEDICINE

## 2025-08-18 PROCEDURE — 83036 HEMOGLOBIN GLYCOSYLATED A1C: CPT

## 2025-08-18 PROCEDURE — 84484 ASSAY OF TROPONIN QUANT: CPT

## 2025-08-18 PROCEDURE — 1200000000 HC SEMI PRIVATE

## 2025-08-18 PROCEDURE — 6360000002 HC RX W HCPCS: Performed by: EMERGENCY MEDICINE

## 2025-08-18 PROCEDURE — 85025 COMPLETE CBC W/AUTO DIFF WBC: CPT

## 2025-08-18 PROCEDURE — 99222 1ST HOSP IP/OBS MODERATE 55: CPT | Performed by: STUDENT IN AN ORGANIZED HEALTH CARE EDUCATION/TRAINING PROGRAM

## 2025-08-18 PROCEDURE — 99285 EMERGENCY DEPT VISIT HI MDM: CPT

## 2025-08-18 PROCEDURE — 96374 THER/PROPH/DIAG INJ IV PUSH: CPT

## 2025-08-18 PROCEDURE — 71260 CT THORAX DX C+: CPT

## 2025-08-18 PROCEDURE — 80048 BASIC METABOLIC PNL TOTAL CA: CPT

## 2025-08-18 PROCEDURE — 6360000002 HC RX W HCPCS: Performed by: INTERNAL MEDICINE

## 2025-08-18 PROCEDURE — 85610 PROTHROMBIN TIME: CPT

## 2025-08-18 PROCEDURE — 73560 X-RAY EXAM OF KNEE 1 OR 2: CPT

## 2025-08-18 PROCEDURE — 6360000004 HC RX CONTRAST MEDICATION

## 2025-08-18 PROCEDURE — 6360000002 HC RX W HCPCS

## 2025-08-18 PROCEDURE — 90935 HEMODIALYSIS ONE EVALUATION: CPT

## 2025-08-18 PROCEDURE — 96375 TX/PRO/DX INJ NEW DRUG ADDON: CPT

## 2025-08-18 PROCEDURE — 6370000000 HC RX 637 (ALT 250 FOR IP)

## 2025-08-18 PROCEDURE — 2500000003 HC RX 250 WO HCPCS: Performed by: INTERNAL MEDICINE

## 2025-08-18 PROCEDURE — 82550 ASSAY OF CK (CPK): CPT

## 2025-08-18 PROCEDURE — 85520 HEPARIN ASSAY: CPT

## 2025-08-18 RX ORDER — IOPAMIDOL 755 MG/ML
75 INJECTION, SOLUTION INTRAVASCULAR
Status: COMPLETED | OUTPATIENT
Start: 2025-08-18 | End: 2025-08-18

## 2025-08-18 RX ORDER — TRAZODONE HYDROCHLORIDE 50 MG/1
50 TABLET ORAL NIGHTLY
Status: DISCONTINUED | OUTPATIENT
Start: 2025-08-18 | End: 2025-08-20 | Stop reason: HOSPADM

## 2025-08-18 RX ORDER — GABAPENTIN 300 MG/1
300 CAPSULE ORAL 2 TIMES DAILY
Status: DISCONTINUED | OUTPATIENT
Start: 2025-08-18 | End: 2025-08-20 | Stop reason: HOSPADM

## 2025-08-18 RX ORDER — HEPARIN SODIUM 1000 [USP'U]/ML
4000 INJECTION, SOLUTION INTRAVENOUS; SUBCUTANEOUS PRN
Status: DISCONTINUED | OUTPATIENT
Start: 2025-08-18 | End: 2025-08-18 | Stop reason: SDUPTHER

## 2025-08-18 RX ORDER — MIDODRINE HYDROCHLORIDE 5 MG/1
10 TABLET ORAL 3 TIMES DAILY PRN
Status: DISCONTINUED | OUTPATIENT
Start: 2025-08-18 | End: 2025-08-20 | Stop reason: HOSPADM

## 2025-08-18 RX ORDER — SODIUM CHLORIDE 0.9 % (FLUSH) 0.9 %
5-40 SYRINGE (ML) INJECTION EVERY 12 HOURS SCHEDULED
Status: DISCONTINUED | OUTPATIENT
Start: 2025-08-18 | End: 2025-08-20 | Stop reason: HOSPADM

## 2025-08-18 RX ORDER — SODIUM CHLORIDE 0.9 % (FLUSH) 0.9 %
5-40 SYRINGE (ML) INJECTION PRN
Status: DISCONTINUED | OUTPATIENT
Start: 2025-08-18 | End: 2025-08-20 | Stop reason: HOSPADM

## 2025-08-18 RX ORDER — ATORVASTATIN CALCIUM 80 MG/1
80 TABLET, FILM COATED ORAL NIGHTLY
Status: DISCONTINUED | OUTPATIENT
Start: 2025-08-18 | End: 2025-08-20 | Stop reason: HOSPADM

## 2025-08-18 RX ORDER — HEPARIN SODIUM 1000 [USP'U]/ML
4000 INJECTION, SOLUTION INTRAVENOUS; SUBCUTANEOUS ONCE
Status: COMPLETED | OUTPATIENT
Start: 2025-08-18 | End: 2025-08-18

## 2025-08-18 RX ORDER — GLUCAGON 1 MG/ML
1 KIT INJECTION PRN
Status: DISCONTINUED | OUTPATIENT
Start: 2025-08-18 | End: 2025-08-20 | Stop reason: HOSPADM

## 2025-08-18 RX ORDER — DEXTROSE MONOHYDRATE 100 MG/ML
INJECTION, SOLUTION INTRAVENOUS CONTINUOUS PRN
Status: DISCONTINUED | OUTPATIENT
Start: 2025-08-18 | End: 2025-08-20 | Stop reason: HOSPADM

## 2025-08-18 RX ORDER — HEPARIN SODIUM 1000 [USP'U]/ML
2000 INJECTION, SOLUTION INTRAVENOUS; SUBCUTANEOUS PRN
Status: DISCONTINUED | OUTPATIENT
Start: 2025-08-18 | End: 2025-08-18 | Stop reason: SDUPTHER

## 2025-08-18 RX ORDER — POLYETHYLENE GLYCOL 3350 17 G/17G
17 POWDER, FOR SOLUTION ORAL DAILY PRN
Status: DISCONTINUED | OUTPATIENT
Start: 2025-08-18 | End: 2025-08-20 | Stop reason: HOSPADM

## 2025-08-18 RX ORDER — ACETAMINOPHEN 325 MG/1
650 TABLET ORAL ONCE
Status: COMPLETED | OUTPATIENT
Start: 2025-08-18 | End: 2025-08-18

## 2025-08-18 RX ORDER — HEPARIN SODIUM 1000 [USP'U]/ML
4000 INJECTION, SOLUTION INTRAVENOUS; SUBCUTANEOUS PRN
Status: DISCONTINUED | OUTPATIENT
Start: 2025-08-18 | End: 2025-08-18

## 2025-08-18 RX ORDER — HYDROMORPHONE HYDROCHLORIDE 1 MG/ML
0.5 INJECTION, SOLUTION INTRAMUSCULAR; INTRAVENOUS; SUBCUTANEOUS ONCE
Status: COMPLETED | OUTPATIENT
Start: 2025-08-18 | End: 2025-08-18

## 2025-08-18 RX ORDER — HEPARIN SODIUM 10000 [USP'U]/100ML
5-30 INJECTION, SOLUTION INTRAVENOUS CONTINUOUS
Status: DISCONTINUED | OUTPATIENT
Start: 2025-08-18 | End: 2025-08-18 | Stop reason: SDUPTHER

## 2025-08-18 RX ORDER — ONDANSETRON 4 MG/1
4 TABLET, ORALLY DISINTEGRATING ORAL EVERY 8 HOURS PRN
Status: DISCONTINUED | OUTPATIENT
Start: 2025-08-18 | End: 2025-08-20 | Stop reason: HOSPADM

## 2025-08-18 RX ORDER — MEMANTINE HYDROCHLORIDE 10 MG/1
10 TABLET ORAL 2 TIMES DAILY
Status: DISCONTINUED | OUTPATIENT
Start: 2025-08-18 | End: 2025-08-20 | Stop reason: HOSPADM

## 2025-08-18 RX ORDER — ACETAMINOPHEN 650 MG/1
650 SUPPOSITORY RECTAL EVERY 6 HOURS PRN
Status: DISCONTINUED | OUTPATIENT
Start: 2025-08-18 | End: 2025-08-20 | Stop reason: HOSPADM

## 2025-08-18 RX ORDER — LEVETIRACETAM 250 MG/1
250 TABLET ORAL
Status: DISCONTINUED | OUTPATIENT
Start: 2025-08-18 | End: 2025-08-20 | Stop reason: HOSPADM

## 2025-08-18 RX ORDER — CARVEDILOL 6.25 MG/1
6.25 TABLET ORAL 2 TIMES DAILY WITH MEALS
Status: DISCONTINUED | OUTPATIENT
Start: 2025-08-18 | End: 2025-08-20 | Stop reason: HOSPADM

## 2025-08-18 RX ORDER — ACETAMINOPHEN 325 MG/1
650 TABLET ORAL EVERY 6 HOURS PRN
Status: DISCONTINUED | OUTPATIENT
Start: 2025-08-18 | End: 2025-08-20 | Stop reason: HOSPADM

## 2025-08-18 RX ORDER — SEVELAMER CARBONATE 800 MG/1
1600 TABLET, FILM COATED ORAL 2 TIMES DAILY WITH MEALS
Status: DISCONTINUED | OUTPATIENT
Start: 2025-08-18 | End: 2025-08-20 | Stop reason: HOSPADM

## 2025-08-18 RX ORDER — ASPIRIN 81 MG/1
324 TABLET, CHEWABLE ORAL ONCE
Status: COMPLETED | OUTPATIENT
Start: 2025-08-18 | End: 2025-08-18

## 2025-08-18 RX ORDER — INSULIN LISPRO 100 [IU]/ML
0-4 INJECTION, SOLUTION INTRAVENOUS; SUBCUTANEOUS
Status: DISCONTINUED | OUTPATIENT
Start: 2025-08-18 | End: 2025-08-20 | Stop reason: HOSPADM

## 2025-08-18 RX ORDER — SODIUM CHLORIDE 9 MG/ML
INJECTION, SOLUTION INTRAVENOUS PRN
Status: DISCONTINUED | OUTPATIENT
Start: 2025-08-18 | End: 2025-08-20 | Stop reason: HOSPADM

## 2025-08-18 RX ORDER — HEPARIN SODIUM 1000 [USP'U]/ML
2000 INJECTION, SOLUTION INTRAVENOUS; SUBCUTANEOUS PRN
Status: DISCONTINUED | OUTPATIENT
Start: 2025-08-18 | End: 2025-08-18

## 2025-08-18 RX ORDER — HEPARIN SODIUM 10000 [USP'U]/100ML
5-30 INJECTION, SOLUTION INTRAVENOUS CONTINUOUS
Status: DISCONTINUED | OUTPATIENT
Start: 2025-08-18 | End: 2025-08-18

## 2025-08-18 RX ORDER — HEPARIN SODIUM 1000 [USP'U]/ML
4000 INJECTION, SOLUTION INTRAVENOUS; SUBCUTANEOUS ONCE
Status: DISCONTINUED | OUTPATIENT
Start: 2025-08-18 | End: 2025-08-18 | Stop reason: SDUPTHER

## 2025-08-18 RX ORDER — LEVETIRACETAM 500 MG/1
500 TABLET ORAL DAILY
Status: DISCONTINUED | OUTPATIENT
Start: 2025-08-19 | End: 2025-08-20 | Stop reason: HOSPADM

## 2025-08-18 RX ORDER — NEPHROCAP 1 MG
1 CAP ORAL DAILY
Status: DISCONTINUED | OUTPATIENT
Start: 2025-08-19 | End: 2025-08-20 | Stop reason: HOSPADM

## 2025-08-18 RX ORDER — DOCUSATE SODIUM 100 MG/1
100 CAPSULE, LIQUID FILLED ORAL 2 TIMES DAILY
Status: DISCONTINUED | OUTPATIENT
Start: 2025-08-18 | End: 2025-08-20 | Stop reason: HOSPADM

## 2025-08-18 RX ORDER — ONDANSETRON 2 MG/ML
4 INJECTION INTRAMUSCULAR; INTRAVENOUS EVERY 6 HOURS PRN
Status: DISCONTINUED | OUTPATIENT
Start: 2025-08-18 | End: 2025-08-20 | Stop reason: HOSPADM

## 2025-08-18 RX ADMIN — LEVETIRACETAM 250 MG: 250 TABLET, FILM COATED ORAL at 21:29

## 2025-08-18 RX ADMIN — IOPAMIDOL 75 ML: 755 INJECTION, SOLUTION INTRAVENOUS at 13:49

## 2025-08-18 RX ADMIN — ATORVASTATIN CALCIUM 80 MG: 80 TABLET, FILM COATED ORAL at 21:10

## 2025-08-18 RX ADMIN — TRAZODONE HYDROCHLORIDE 50 MG: 50 TABLET ORAL at 21:10

## 2025-08-18 RX ADMIN — CARVEDILOL 6.25 MG: 6.25 TABLET, FILM COATED ORAL at 21:29

## 2025-08-18 RX ADMIN — ACETAMINOPHEN 650 MG: 325 TABLET ORAL at 21:10

## 2025-08-18 RX ADMIN — HEPARIN SODIUM 4000 UNITS: 1000 INJECTION, SOLUTION INTRAVENOUS; SUBCUTANEOUS at 12:59

## 2025-08-18 RX ADMIN — ACETAMINOPHEN 650 MG: 325 TABLET ORAL at 11:44

## 2025-08-18 RX ADMIN — ASPIRIN 324 MG: 81 TABLET, CHEWABLE ORAL at 12:13

## 2025-08-18 RX ADMIN — SODIUM CHLORIDE, PRESERVATIVE FREE 10 ML: 5 INJECTION INTRAVENOUS at 21:11

## 2025-08-18 RX ADMIN — HEPARIN SODIUM AND DEXTROSE 10 UNITS/KG/HR: 10000; 5 INJECTION INTRAVENOUS at 13:02

## 2025-08-18 RX ADMIN — HYDROMORPHONE HYDROCHLORIDE 0.5 MG: 1 INJECTION, SOLUTION INTRAMUSCULAR; INTRAVENOUS; SUBCUTANEOUS at 23:20

## 2025-08-18 RX ADMIN — GABAPENTIN 300 MG: 300 CAPSULE ORAL at 21:09

## 2025-08-18 RX ADMIN — MEMANTINE 10 MG: 10 TABLET ORAL at 23:20

## 2025-08-18 RX ADMIN — HYDROMORPHONE HYDROCHLORIDE 0.5 MG: 1 INJECTION, SOLUTION INTRAMUSCULAR; INTRAVENOUS; SUBCUTANEOUS at 11:44

## 2025-08-18 RX ADMIN — SEVELAMER CARBONATE 1600 MG: 800 TABLET, FILM COATED ORAL at 23:20

## 2025-08-18 RX ADMIN — DOCUSATE SODIUM 100 MG: 100 CAPSULE, LIQUID FILLED ORAL at 21:10

## 2025-08-18 ASSESSMENT — PAIN - FUNCTIONAL ASSESSMENT
PAIN_FUNCTIONAL_ASSESSMENT: 0-10
PAIN_FUNCTIONAL_ASSESSMENT: 0-10
PAIN_FUNCTIONAL_ASSESSMENT: PREVENTS OR INTERFERES SOME ACTIVE ACTIVITIES AND ADLS
PAIN_FUNCTIONAL_ASSESSMENT: PREVENTS OR INTERFERES SOME ACTIVE ACTIVITIES AND ADLS
PAIN_FUNCTIONAL_ASSESSMENT: 0-10

## 2025-08-18 ASSESSMENT — PAIN DESCRIPTION - LOCATION
LOCATION: KNEE;LEG
LOCATION: KNEE

## 2025-08-18 ASSESSMENT — PAIN SCALES - GENERAL
PAINLEVEL_OUTOF10: 9
PAINLEVEL_OUTOF10: 6
PAINLEVEL_OUTOF10: 0
PAINLEVEL_OUTOF10: 7
PAINLEVEL_OUTOF10: 0
PAINLEVEL_OUTOF10: 7

## 2025-08-18 ASSESSMENT — PAIN DESCRIPTION - ONSET: ONSET: ON-GOING

## 2025-08-18 ASSESSMENT — PAIN DESCRIPTION - ORIENTATION
ORIENTATION: RIGHT

## 2025-08-18 ASSESSMENT — PAIN DESCRIPTION - DESCRIPTORS
DESCRIPTORS: DISCOMFORT
DESCRIPTORS: DISCOMFORT

## 2025-08-18 ASSESSMENT — PAIN DESCRIPTION - PAIN TYPE
TYPE: ACUTE PAIN
TYPE: ACUTE PAIN

## 2025-08-18 ASSESSMENT — PAIN DESCRIPTION - FREQUENCY: FREQUENCY: CONTINUOUS

## 2025-08-19 PROBLEM — G89.29 CHRONIC PAIN OF RIGHT KNEE: Status: ACTIVE | Noted: 2021-01-30

## 2025-08-19 LAB
ANION GAP SERPL CALCULATED.3IONS-SCNC: 19 MMOL/L (ref 3–16)
BUN SERPL-MCNC: 36 MG/DL (ref 7–20)
CALCIUM SERPL-MCNC: 9.3 MG/DL (ref 8.3–10.6)
CHLORIDE SERPL-SCNC: 94 MMOL/L (ref 99–110)
CK SERPL-CCNC: 157 U/L (ref 26–192)
CO2 SERPL-SCNC: 20 MMOL/L (ref 21–32)
CREAT SERPL-MCNC: 7.9 MG/DL (ref 0.6–1.1)
EST. AVERAGE GLUCOSE BLD GHB EST-MCNC: 171.4 MG/DL
GFR SERPLBLD CREATININE-BSD FMLA CKD-EPI: 6 ML/MIN/{1.73_M2}
GLUCOSE BLD-MCNC: 101 MG/DL (ref 70–99)
GLUCOSE BLD-MCNC: 162 MG/DL (ref 70–99)
GLUCOSE BLD-MCNC: 174 MG/DL (ref 70–99)
GLUCOSE BLD-MCNC: 193 MG/DL (ref 70–99)
GLUCOSE BLD-MCNC: 223 MG/DL (ref 70–99)
GLUCOSE SERPL-MCNC: 79 MG/DL (ref 70–99)
HBA1C MFR BLD: 7.6 %
PERFORMED ON: ABNORMAL
POTASSIUM SERPL-SCNC: 5.4 MMOL/L (ref 3.5–5.1)
SODIUM SERPL-SCNC: 133 MMOL/L (ref 136–145)

## 2025-08-19 PROCEDURE — 94761 N-INVAS EAR/PLS OXIMETRY MLT: CPT

## 2025-08-19 PROCEDURE — 2700000000 HC OXYGEN THERAPY PER DAY

## 2025-08-19 PROCEDURE — 36415 COLL VENOUS BLD VENIPUNCTURE: CPT

## 2025-08-19 PROCEDURE — 82550 ASSAY OF CK (CPK): CPT

## 2025-08-19 PROCEDURE — 6370000000 HC RX 637 (ALT 250 FOR IP): Performed by: INTERNAL MEDICINE

## 2025-08-19 PROCEDURE — 6360000002 HC RX W HCPCS: Performed by: INTERNAL MEDICINE

## 2025-08-19 PROCEDURE — 2500000003 HC RX 250 WO HCPCS: Performed by: INTERNAL MEDICINE

## 2025-08-19 PROCEDURE — 80048 BASIC METABOLIC PNL TOTAL CA: CPT

## 2025-08-19 PROCEDURE — 1200000000 HC SEMI PRIVATE

## 2025-08-19 RX ORDER — OXYCODONE AND ACETAMINOPHEN 10; 325 MG/1; MG/1
1 TABLET ORAL EVERY 6 HOURS PRN
Refills: 0 | Status: DISCONTINUED | OUTPATIENT
Start: 2025-08-19 | End: 2025-08-20 | Stop reason: HOSPADM

## 2025-08-19 RX ORDER — METHOCARBAMOL 500 MG/1
250 TABLET, FILM COATED ORAL 2 TIMES DAILY
Status: DISCONTINUED | OUTPATIENT
Start: 2025-08-19 | End: 2025-08-20 | Stop reason: HOSPADM

## 2025-08-19 RX ADMIN — INSULIN LISPRO 1 UNITS: 100 INJECTION, SOLUTION INTRAVENOUS; SUBCUTANEOUS at 18:15

## 2025-08-19 RX ADMIN — DOCUSATE SODIUM 100 MG: 100 CAPSULE, LIQUID FILLED ORAL at 09:11

## 2025-08-19 RX ADMIN — CARVEDILOL 6.25 MG: 6.25 TABLET, FILM COATED ORAL at 09:11

## 2025-08-19 RX ADMIN — SEVELAMER CARBONATE 1600 MG: 800 TABLET, FILM COATED ORAL at 18:15

## 2025-08-19 RX ADMIN — ATORVASTATIN CALCIUM 80 MG: 80 TABLET, FILM COATED ORAL at 21:37

## 2025-08-19 RX ADMIN — APIXABAN 2.5 MG: 2.5 TABLET, FILM COATED ORAL at 21:37

## 2025-08-19 RX ADMIN — METHOCARBAMOL 250 MG: 500 TABLET ORAL at 15:32

## 2025-08-19 RX ADMIN — HYDROMORPHONE HYDROCHLORIDE 0.5 MG: 1 INJECTION, SOLUTION INTRAMUSCULAR; INTRAVENOUS; SUBCUTANEOUS at 05:18

## 2025-08-19 RX ADMIN — SEVELAMER CARBONATE 1600 MG: 800 TABLET, FILM COATED ORAL at 09:11

## 2025-08-19 RX ADMIN — MEMANTINE 10 MG: 10 TABLET ORAL at 09:11

## 2025-08-19 RX ADMIN — SODIUM CHLORIDE, PRESERVATIVE FREE 10 ML: 5 INJECTION INTRAVENOUS at 09:11

## 2025-08-19 RX ADMIN — GABAPENTIN 300 MG: 300 CAPSULE ORAL at 09:11

## 2025-08-19 RX ADMIN — SODIUM CHLORIDE, PRESERVATIVE FREE 10 ML: 5 INJECTION INTRAVENOUS at 21:38

## 2025-08-19 RX ADMIN — MEMANTINE 10 MG: 10 TABLET ORAL at 21:36

## 2025-08-19 RX ADMIN — DOCUSATE SODIUM 100 MG: 100 CAPSULE, LIQUID FILLED ORAL at 21:37

## 2025-08-19 RX ADMIN — HYDROMORPHONE HYDROCHLORIDE 0.5 MG: 1 INJECTION, SOLUTION INTRAMUSCULAR; INTRAVENOUS; SUBCUTANEOUS at 09:10

## 2025-08-19 RX ADMIN — TRAZODONE HYDROCHLORIDE 50 MG: 50 TABLET ORAL at 21:37

## 2025-08-19 RX ADMIN — LEVETIRACETAM 500 MG: 500 TABLET, FILM COATED ORAL at 09:11

## 2025-08-19 RX ADMIN — GABAPENTIN 300 MG: 300 CAPSULE ORAL at 21:36

## 2025-08-19 RX ADMIN — Medication 1 MG: at 09:11

## 2025-08-19 RX ADMIN — ACETAMINOPHEN 650 MG: 325 TABLET ORAL at 18:18

## 2025-08-19 RX ADMIN — ACETAMINOPHEN 650 MG: 325 TABLET ORAL at 11:03

## 2025-08-19 ASSESSMENT — PAIN SCALES - GENERAL
PAINLEVEL_OUTOF10: 4
PAINLEVEL_OUTOF10: 0
PAINLEVEL_OUTOF10: 8
PAINLEVEL_OUTOF10: 0
PAINLEVEL_OUTOF10: 10

## 2025-08-19 ASSESSMENT — PAIN DESCRIPTION - LOCATION
LOCATION: SHOULDER;NECK;HIP
LOCATION: HIP;KNEE
LOCATION: BACK;NECK
LOCATION: HEAD

## 2025-08-19 ASSESSMENT — PAIN DESCRIPTION - FREQUENCY
FREQUENCY: CONTINUOUS

## 2025-08-19 ASSESSMENT — PAIN - FUNCTIONAL ASSESSMENT
PAIN_FUNCTIONAL_ASSESSMENT: PREVENTS OR INTERFERES SOME ACTIVE ACTIVITIES AND ADLS
PAIN_FUNCTIONAL_ASSESSMENT: 0-10
PAIN_FUNCTIONAL_ASSESSMENT: PREVENTS OR INTERFERES SOME ACTIVE ACTIVITIES AND ADLS
PAIN_FUNCTIONAL_ASSESSMENT: 0-10
PAIN_FUNCTIONAL_ASSESSMENT: 0-10
PAIN_FUNCTIONAL_ASSESSMENT: PREVENTS OR INTERFERES SOME ACTIVE ACTIVITIES AND ADLS

## 2025-08-19 ASSESSMENT — PAIN DESCRIPTION - PAIN TYPE
TYPE: CHRONIC PAIN;ACUTE PAIN
TYPE: CHRONIC PAIN
TYPE: ACUTE PAIN
TYPE: CHRONIC PAIN;ACUTE PAIN

## 2025-08-19 ASSESSMENT — PAIN DESCRIPTION - ONSET
ONSET: ON-GOING

## 2025-08-19 ASSESSMENT — PAIN DESCRIPTION - ORIENTATION
ORIENTATION: RIGHT
ORIENTATION: LOWER;RIGHT

## 2025-08-19 ASSESSMENT — PAIN DESCRIPTION - DESCRIPTORS
DESCRIPTORS: ACHING
DESCRIPTORS: ACHING
DESCRIPTORS: ACHING;THROBBING
DESCRIPTORS: ACHING;THROBBING

## 2025-08-20 VITALS
RESPIRATION RATE: 18 BRPM | BODY MASS INDEX: 39.21 KG/M2 | TEMPERATURE: 97.5 F | WEIGHT: 207.67 LBS | HEIGHT: 61 IN | SYSTOLIC BLOOD PRESSURE: 138 MMHG | HEART RATE: 76 BPM | OXYGEN SATURATION: 93 % | DIASTOLIC BLOOD PRESSURE: 76 MMHG

## 2025-08-20 LAB
ALBUMIN SERPL-MCNC: 3.8 G/DL (ref 3.4–5)
ANION GAP SERPL CALCULATED.3IONS-SCNC: 16 MMOL/L (ref 3–16)
BUN SERPL-MCNC: 51 MG/DL (ref 7–20)
CALCIUM SERPL-MCNC: 9.1 MG/DL (ref 8.3–10.6)
CHLORIDE SERPL-SCNC: 90 MMOL/L (ref 99–110)
CO2 SERPL-SCNC: 25 MMOL/L (ref 21–32)
CREAT SERPL-MCNC: 9.6 MG/DL (ref 0.6–1.1)
DEPRECATED RDW RBC AUTO: 18.9 % (ref 12.4–15.4)
GFR SERPLBLD CREATININE-BSD FMLA CKD-EPI: 4 ML/MIN/{1.73_M2}
GLUCOSE BLD-MCNC: 166 MG/DL (ref 70–99)
GLUCOSE SERPL-MCNC: 168 MG/DL (ref 70–99)
HAV IGM SERPL QL IA: NORMAL
HBV CORE IGM SERPL QL IA: NORMAL
HBV SURFACE AB SERPL IA-ACNC: 3.92 MIU/ML
HBV SURFACE AG SERPL QL IA: NORMAL
HCT VFR BLD AUTO: 31.7 % (ref 36–48)
HCV AB SERPL QL IA: NORMAL
HGB BLD-MCNC: 10.4 G/DL (ref 12–16)
MCH RBC QN AUTO: 33.1 PG (ref 26–34)
MCHC RBC AUTO-ENTMCNC: 32.9 G/DL (ref 31–36)
MCV RBC AUTO: 100.4 FL (ref 80–100)
PERFORMED ON: ABNORMAL
PHOSPHATE SERPL-MCNC: 5 MG/DL (ref 2.5–4.9)
PLATELET # BLD AUTO: 183 K/UL (ref 135–450)
PMV BLD AUTO: 9.3 FL (ref 5–10.5)
POTASSIUM SERPL-SCNC: 5.4 MMOL/L (ref 3.5–5.1)
RBC # BLD AUTO: 3.16 M/UL (ref 4–5.2)
SODIUM SERPL-SCNC: 131 MMOL/L (ref 136–145)
WBC # BLD AUTO: 6.2 K/UL (ref 4–11)

## 2025-08-20 PROCEDURE — 36415 COLL VENOUS BLD VENIPUNCTURE: CPT

## 2025-08-20 PROCEDURE — 86706 HEP B SURFACE ANTIBODY: CPT

## 2025-08-20 PROCEDURE — 6370000000 HC RX 637 (ALT 250 FOR IP): Performed by: INTERNAL MEDICINE

## 2025-08-20 PROCEDURE — 97535 SELF CARE MNGMENT TRAINING: CPT

## 2025-08-20 PROCEDURE — 80069 RENAL FUNCTION PANEL: CPT

## 2025-08-20 PROCEDURE — 97530 THERAPEUTIC ACTIVITIES: CPT

## 2025-08-20 PROCEDURE — 97166 OT EVAL MOD COMPLEX 45 MIN: CPT

## 2025-08-20 PROCEDURE — 90935 HEMODIALYSIS ONE EVALUATION: CPT

## 2025-08-20 PROCEDURE — 97116 GAIT TRAINING THERAPY: CPT

## 2025-08-20 PROCEDURE — 85027 COMPLETE CBC AUTOMATED: CPT

## 2025-08-20 PROCEDURE — 2500000003 HC RX 250 WO HCPCS: Performed by: INTERNAL MEDICINE

## 2025-08-20 PROCEDURE — 80074 ACUTE HEPATITIS PANEL: CPT

## 2025-08-20 PROCEDURE — 97162 PT EVAL MOD COMPLEX 30 MIN: CPT

## 2025-08-20 RX ORDER — METHOCARBAMOL 500 MG/1
500 TABLET, FILM COATED ORAL 2 TIMES DAILY
Qty: 10 TABLET | Refills: 0 | Status: SHIPPED | OUTPATIENT
Start: 2025-08-20 | End: 2025-08-25

## 2025-08-20 RX ORDER — OXYCODONE AND ACETAMINOPHEN 10; 325 MG/1; MG/1
1 TABLET ORAL EVERY 6 HOURS PRN
Qty: 20 TABLET | Refills: 0 | Status: SHIPPED | OUTPATIENT
Start: 2025-08-20 | End: 2025-08-20 | Stop reason: HOSPADM

## 2025-08-20 RX ORDER — OXYCODONE AND ACETAMINOPHEN 5; 325 MG/1; MG/1
1 TABLET ORAL EVERY 12 HOURS PRN
Qty: 10 TABLET | Refills: 0 | Status: SHIPPED | OUTPATIENT
Start: 2025-08-20 | End: 2025-08-25

## 2025-08-20 RX ADMIN — METHOCARBAMOL 250 MG: 500 TABLET ORAL at 08:54

## 2025-08-20 RX ADMIN — CARVEDILOL 6.25 MG: 6.25 TABLET, FILM COATED ORAL at 08:54

## 2025-08-20 RX ADMIN — DOCUSATE SODIUM 100 MG: 100 CAPSULE, LIQUID FILLED ORAL at 08:53

## 2025-08-20 RX ADMIN — MEMANTINE 10 MG: 10 TABLET ORAL at 08:56

## 2025-08-20 RX ADMIN — LEVETIRACETAM 500 MG: 500 TABLET, FILM COATED ORAL at 08:53

## 2025-08-20 RX ADMIN — Medication 1 MG: at 08:53

## 2025-08-20 RX ADMIN — GABAPENTIN 300 MG: 300 CAPSULE ORAL at 08:54

## 2025-08-20 RX ADMIN — OXYCODONE AND ACETAMINOPHEN 1 TABLET: 325; 10 TABLET ORAL at 01:21

## 2025-08-20 RX ADMIN — SEVELAMER CARBONATE 1600 MG: 800 TABLET, FILM COATED ORAL at 08:53

## 2025-08-20 RX ADMIN — SODIUM CHLORIDE, PRESERVATIVE FREE 10 ML: 5 INJECTION INTRAVENOUS at 08:54

## 2025-08-20 RX ADMIN — APIXABAN 2.5 MG: 2.5 TABLET, FILM COATED ORAL at 08:54

## 2025-08-20 ASSESSMENT — PAIN SCALES - GENERAL
PAINLEVEL_OUTOF10: 0
PAINLEVEL_OUTOF10: 0
PAINLEVEL_OUTOF10: 9
PAINLEVEL_OUTOF10: 7
PAINLEVEL_OUTOF10: 0

## 2025-08-20 ASSESSMENT — PAIN - FUNCTIONAL ASSESSMENT
PAIN_FUNCTIONAL_ASSESSMENT: PREVENTS OR INTERFERES SOME ACTIVE ACTIVITIES AND ADLS
PAIN_FUNCTIONAL_ASSESSMENT: 0-10

## 2025-08-20 ASSESSMENT — PAIN DESCRIPTION - LOCATION
LOCATION: LEG;HEAD
LOCATION: KNEE

## 2025-08-20 ASSESSMENT — PAIN DESCRIPTION - ORIENTATION: ORIENTATION: RIGHT

## 2025-08-20 ASSESSMENT — PAIN DESCRIPTION - DESCRIPTORS: DESCRIPTORS: DISCOMFORT

## 2025-09-02 ENCOUNTER — OFFICE VISIT (OUTPATIENT)
Dept: PAIN MANAGEMENT | Age: 55
End: 2025-09-02
Payer: COMMERCIAL

## 2025-09-02 VITALS
BODY MASS INDEX: 39.11 KG/M2 | OXYGEN SATURATION: 97 % | DIASTOLIC BLOOD PRESSURE: 76 MMHG | HEART RATE: 67 BPM | WEIGHT: 207 LBS | SYSTOLIC BLOOD PRESSURE: 122 MMHG

## 2025-09-02 DIAGNOSIS — R11.0 CHRONIC NAUSEA: ICD-10-CM

## 2025-09-02 DIAGNOSIS — N18.6 ESRD (END STAGE RENAL DISEASE) ON DIALYSIS (HCC): ICD-10-CM

## 2025-09-02 DIAGNOSIS — Z99.2 ESRD (END STAGE RENAL DISEASE) ON DIALYSIS (HCC): ICD-10-CM

## 2025-09-02 DIAGNOSIS — M47.812 CERVICAL SPONDYLOSIS: ICD-10-CM

## 2025-09-02 DIAGNOSIS — Z96.651 S/P TOTAL KNEE ARTHROPLASTY, RIGHT: ICD-10-CM

## 2025-09-02 DIAGNOSIS — M54.12 CERVICAL RADICULOPATHY: Primary | ICD-10-CM

## 2025-09-02 DIAGNOSIS — G89.4 CHRONIC PAIN SYNDROME: ICD-10-CM

## 2025-09-02 DIAGNOSIS — M17.11 PRIMARY LOCALIZED OSTEOARTHROSIS OF THE KNEE, RIGHT: ICD-10-CM

## 2025-09-02 DIAGNOSIS — G43.909 EPISODIC MIGRAINE: ICD-10-CM

## 2025-09-02 DIAGNOSIS — M47.817 LUMBOSACRAL SPONDYLOSIS WITHOUT MYELOPATHY: ICD-10-CM

## 2025-09-02 DIAGNOSIS — I50.43 CHF (CONGESTIVE HEART FAILURE), NYHA CLASS I, ACUTE ON CHRONIC, COMBINED (HCC): ICD-10-CM

## 2025-09-02 DIAGNOSIS — Z51.81 ENCOUNTER FOR THERAPEUTIC DRUG MONITORING: ICD-10-CM

## 2025-09-02 PROCEDURE — G8417 CALC BMI ABV UP PARAM F/U: HCPCS | Performed by: NURSE PRACTITIONER

## 2025-09-02 PROCEDURE — 99214 OFFICE O/P EST MOD 30 MIN: CPT | Performed by: NURSE PRACTITIONER

## 2025-09-02 PROCEDURE — 3078F DIAST BP <80 MM HG: CPT | Performed by: NURSE PRACTITIONER

## 2025-09-02 PROCEDURE — 3017F COLORECTAL CA SCREEN DOC REV: CPT | Performed by: NURSE PRACTITIONER

## 2025-09-02 PROCEDURE — 1036F TOBACCO NON-USER: CPT | Performed by: NURSE PRACTITIONER

## 2025-09-02 PROCEDURE — 1111F DSCHRG MED/CURRENT MED MERGE: CPT | Performed by: NURSE PRACTITIONER

## 2025-09-02 PROCEDURE — 3074F SYST BP LT 130 MM HG: CPT | Performed by: NURSE PRACTITIONER

## 2025-09-02 PROCEDURE — G8427 DOCREV CUR MEDS BY ELIG CLIN: HCPCS | Performed by: NURSE PRACTITIONER

## 2025-09-02 RX ORDER — METHOCARBAMOL 500 MG/1
500 TABLET, FILM COATED ORAL 2 TIMES DAILY
Qty: 60 TABLET | Refills: 0 | Status: SHIPPED | OUTPATIENT
Start: 2025-09-02 | End: 2025-10-02

## 2025-09-02 RX ORDER — OXYCODONE AND ACETAMINOPHEN 10; 325 MG/1; MG/1
1 TABLET ORAL EVERY 6 HOURS PRN
Qty: 112 TABLET | Refills: 0 | Status: SHIPPED | OUTPATIENT
Start: 2025-09-02 | End: 2025-09-30

## (undated) DEVICE — ATLAS®  PTA BALLOON DILATATION CATHETER 12 MM X 40 MM, 75 CM CATHETER: Brand: ATLAS®

## (undated) DEVICE — PTA BALLOON DILATATION CATHETER: Brand: MUSTANG™

## (undated) DEVICE — RADIFOCUS GLIDECATH: Brand: GLIDECATH

## (undated) DEVICE — SURGICAL SET UP - SURE SET: Brand: MEDLINE INDUSTRIES, INC.

## (undated) DEVICE — PROVE COVER: Brand: UNBRANDED

## (undated) DEVICE — Device

## (undated) DEVICE — SUTURE N ABSRB L 36 IN SZ 5-0 NDL L 13 MM POLYPRO OR PPL BLU

## (undated) DEVICE — ADHESIVE SKIN CLSR 0.7ML TOP DERMBND ADV

## (undated) DEVICE — GARMENT,MEDLINE,DVT,INT,CALF,MED, GEN2: Brand: MEDLINE

## (undated) DEVICE — PTA BALLOON DILATATION CATHETER: Brand: STERLING® SL

## (undated) DEVICE — [HIGH FLOW INSUFFLATOR,  DO NOT USE IF PACKAGE IS DAMAGED,  KEEP DRY,  KEEP AWAY FROM SUNLIGHT,  PROTECT FROM HEAT AND RADIOACTIVE SOURCES.]: Brand: PNEUMOSURE

## (undated) DEVICE — GOWN,SIRUS,POLYRNF,BRTHSLV,LG,30/CS: Brand: MEDLINE

## (undated) DEVICE — LOOP,VESSEL,MAXI,BLUE,2/PK,STERILE: Brand: MEDLINE

## (undated) DEVICE — CATHETER ANGIO 5FR L100CM GWIRE 0.035IN 1CM SPC PGTL FLSH

## (undated) DEVICE — SUTURE COAT VCRL SZ 4-0 L18IN ABSRB UD L19MM PS-2 1/2 CIR J496G

## (undated) DEVICE — GENERAL LAPAROSCOPIC: Brand: MEDLINE INDUSTRIES, INC.

## (undated) DEVICE — FOGARTY ARTERIAL EMBOLECTOMY CATHETER 3F 80CM: Brand: FOGARTY

## (undated) DEVICE — SUTURE MONOCRYL + SZ 4-0 L27IN ABSRB UD L19MM PS-2 3/8 CIR MCP426H

## (undated) DEVICE — CANNULA PERF L1.3IN TIP L2MM S STL POLYUR TB ARTOTMY BLB

## (undated) DEVICE — GUIDEWIRE VASC L150CM DIA0.035IN STR TIP PTFE FIX COR

## (undated) DEVICE — GLOVE SURG SZ 6 L11.2IN FNGR THK9.8MIL STRW LTX POLYMER

## (undated) DEVICE — NEEDLE INSUF L120MM DIA2MM DISP FOR PNEUMOPERI ENDOPATH

## (undated) DEVICE — TOWEL,OR,DSP,ST,BLUE,DLX,8/PK,10PK/CS: Brand: MEDLINE

## (undated) DEVICE — TROCAR: Brand: KII FIOS FIRST ENTRY

## (undated) DEVICE — SUTURE VCRL SZ 3-0 L27IN ABSRB UD L26MM SH 1/2 CIR J416H

## (undated) DEVICE — TRAP FLUID

## (undated) DEVICE — KIT,ANTI FOG,W/SPONGE & FLUID,SOFT PACK: Brand: MEDLINE

## (undated) DEVICE — BINDER ABD UNIV H9IN WAIST 45-62IN E SFT COT PREM 3 PNL

## (undated) DEVICE — THIS ADAPTER IS A DOUBLE SEALING FEMALE LUER LOCK ADAPTER WITH A 2-PIECE, COMBINATION COMPRESSION FIT/BARBED CATHETER CONNECTOR. THE ADAPTER IS USED TO CONNECT THE PD CATHETER TO A SOLUTION TRANSFER SET WITH LOCKING CONNECTOR.: Brand: LOCKING TITANIUM ADAPTER FOR PERITONEAL DIALYSIS CATHETER

## (undated) DEVICE — 4-PORT MANIFOLD: Brand: NEPTUNE 2

## (undated) DEVICE — THIS DEVICE IS A DOUBLE SEALING MALE LUER LOCK INTENDED FOR USE WITH THE BAXTER LOCKING TITANIUM ADAPTER FOR PERITONEAL DIALYSIS.: Brand: LOCKING CAP FOR PERITONEAL DIALYSIS CATHETER ADAPTER

## (undated) DEVICE — SUTURE NONABSORBABLE MONOFILAMENT 7-0 BV-1 1X24 IN PROLENE 8702H

## (undated) DEVICE — SUTURE NONABSORBABLE MONOFILAMENT 6-0 BV-1 1X30 IN PROLENE 8709H

## (undated) DEVICE — AGENT HEMOSTATIC SURG ORIGINAL ABS 2X14IN LOOSE KNIT 12/CA

## (undated) DEVICE — SPONGE,DRAIN,NONWVN,4"X4",6PLY,STRL,LF: Brand: MEDLINE

## (undated) DEVICE — DRAPE,LAP,CHOLE,W/TROUGHS,STERILE: Brand: MEDLINE

## (undated) DEVICE — CATHETER PERI DLYS L62CM 2 CUF CURLED ARGY

## (undated) DEVICE — SOLUTION IV 1000 ML 0.9 NACL INJ USP EXCEL PLAS CONTAINER

## (undated) DEVICE — 3M™ IOBAN™ 2 ANTIMICROBIAL INCISE DRAPE 6648EZ: Brand: IOBAN™ 2

## (undated) DEVICE — APPLICATOR MEDICATED 26 CC SOLUTION HI LT ORNG CHLORAPREP

## (undated) DEVICE — SPONGE GZ W4XL8IN COT WVN 12 PLY

## (undated) DEVICE — PINNACLE INTRODUCER SHEATH: Brand: PINNACLE

## (undated) DEVICE — GLOVE ORANGE PI 7   MSG9070

## (undated) DEVICE — EP VASCULAR PACK: Brand: MEDLINE INDUSTRIES, INC.

## (undated) DEVICE — 3M™ TEGADERM™ TRANSPARENT FILM DRESSING FRAME STYLE, 1628, 6 IN X 8 IN (15 CM X 20 CM), 10/CT 8CT/CASE: Brand: 3M™ TEGADERM™

## (undated) DEVICE — SOLUTION IV 1000ML 0.9% SOD CHL

## (undated) DEVICE — AV FISTULA: Brand: MEDLINE INDUSTRIES, INC.

## (undated) DEVICE — SUTURE VCRL SZ 4-0 L18IN ABSRB UD L19MM PS-2 3/8 CIR PRIM J496H

## (undated) DEVICE — AGENT HEMSTAT W2XL4IN FIBRIN SEAL PTCH DSG EVARREST

## (undated) DEVICE — DRESSING HYDROFIBER AQUACEL AG ADVANTAGE 3.5X14 IN

## (undated) DEVICE — SYRINGE MED 10ML LUERLOCK TIP W/O SFTY DISP

## (undated) DEVICE — SUTURE VICRYL + SZ 2-0 L18IN ABSRB VLT L26MM SH 1/2 CIR VCP775D

## (undated) DEVICE — JEWISH HOSPITAL TURNOVER KIT: Brand: MEDLINE INDUSTRIES, INC.

## (undated) DEVICE — PACLITAXEL-COATED PTA BALLOON CATHETER: Brand: RANGER™

## (undated) DEVICE — CANISTER NEG PRSS 500ML WND THER W/ TBNG NO PRSS RANG W/

## (undated) DEVICE — SUTURE VICRYL + SZ 3-0 L27IN ABSRB UD L26MM SH 1/2 CIR VCP416H

## (undated) DEVICE — TRAY CATHETER 16FR F INCLUDE BARDX IC COMPLT CARE DRNGE BG

## (undated) DEVICE — STANDARD HYPODERMIC NEEDLE,POLYPROPYLENE HUB: Brand: MONOJECT

## (undated) DEVICE — ELECTROSURGICAL PENCIL ROCKER SWITCH NON COATED BLADE ELECTRODE 10 FT (3 M) CORD HOLSTER: Brand: MEGADYNE

## (undated) DEVICE — GLIDESHEATH SLENDER STAINLESS STEEL KIT: Brand: GLIDESHEATH SLENDER

## (undated) DEVICE — BENTSON WIRE GUIDE 20CM DISTAL FLEXIBILITY WITH SOFTENED TIP: Brand: BENTSON

## (undated) DEVICE — CATHETER ANGIO AD 5 FRX100 CM VERT TAPR TIP IMPRESS

## (undated) DEVICE — GLOVE SURG SZ 75 L12IN THK75MIL DK GRN LTX FREE

## (undated) DEVICE — FOGARTY - HYDRAGRIP SURGICAL - CLAMP INSERTS: Brand: FOGARTY SOFTJAW

## (undated) DEVICE — SOLUTION IRRIG 1000ML 09% SOD CHL USP PIC PLAS CONTAINER

## (undated) DEVICE — CANNULA VES L25IN RADPQ BODY W 1 W VLV 3MM BLNT TIP DLP

## (undated) DEVICE — SUTURE VCRL + SZ 2-0 L18IN ABSRB VLT L26MM SH 1/2 CIR VCP775D

## (undated) DEVICE — SUTURE PERMAHAND SZ 2-0 L18IN NONABSORBABLE BLK L26MM SH C012D

## (undated) DEVICE — SOLUTION IRRIG 3000ML 0.9% SOD CHL USP UROMATIC PLAS CONT

## (undated) DEVICE — SUTURE VCRL + SZ 3-0 L27IN ABSRB UD L26MM SH 1/2 CIR VCP416H

## (undated) DEVICE — GLOVE SURG SZ 65 L12IN FNGR THK79MIL GRN LTX FREE

## (undated) DEVICE — PENCIL ELECSURG HND CTRL ALL IN 1 MONOPOLAR LAP

## (undated) DEVICE — TOWEL,STOP FLAG GOLD N-W: Brand: MEDLINE

## (undated) DEVICE — PAD PREP L 2 PLY 70% ISO ALC NONWOVEN SFT ABSRB TOP ANTISEP

## (undated) DEVICE — COVER LT HNDL BLU PLAS

## (undated) DEVICE — APPLICATOR VISTASEAL DUAL

## (undated) DEVICE — TUBING IRRIG L77IN DIA0.241IN L BOR FOR CYSTO W/ NVENT

## (undated) DEVICE — Z INACTIVE USE 2660664 SOLUTION IRRIG 3000ML 0.9% SOD CHL USP UROMATIC PLAS CONT

## (undated) DEVICE — DISH PETRI ST LF

## (undated) DEVICE — STAPLER SKIN H3.9MM WIRE DIA0.58MM CRWN 6.9MM 35 STPL ROT

## (undated) DEVICE — RADIFOCUS GLIDEWIRE: Brand: GLIDEWIRE

## (undated) DEVICE — SUTURE NONABSORBABLE MONOFILAMENT 4-0 RB-1 36 IN BLU PROLENE 8557H

## (undated) DEVICE — GUIDEWIRE VASC L260CM DIA0.035IN L7CM DIA3MM J TIP PTFE S

## (undated) DEVICE — GENERAL: Brand: MEDLINE INDUSTRIES, INC.

## (undated) DEVICE — CLEANER,CAUTERY TIP,2X2",STERILE: Brand: MEDLINE

## (undated) DEVICE — TR BAND RADIAL ARTERY COMPRESSION DEVICE: Brand: TR BAND

## (undated) DEVICE — CATHETER PTCA L130CM BLLN L120MM DIA5MM SHTH 6FR DRUG COAT

## (undated) DEVICE — SUTURE VCRL SZ 0 L54IN ABSRB VLT W/O NDL POLYGLACTIN 910 J616H

## (undated) DEVICE — SUTURE N ABSRB MONOFILAMENT 6-0 BV1 30 IN BLU PROLENE EPM8709

## (undated) DEVICE — DRESSING NEG PRSS L W15XH3.3XL26CM BLK POLYUR DRP PD

## (undated) DEVICE — TOTAL TRAY, 16FR 10ML SIL FOLEY, URN: Brand: MEDLINE

## (undated) DEVICE — SURE SET-DOUBLE BASIN-LF: Brand: MEDLINE INDUSTRIES, INC.

## (undated) DEVICE — SUTURE MCRYL + SZ 4-0 L27IN ABSRB UD L19MM PS-2 3/8 CIR MCP426H

## (undated) DEVICE — AGENT HEMOSTATIC SURG ORIGINAL ABS 2X14IN LOOSE KNIT 12/BX

## (undated) DEVICE — PINNACLE R/O II INTRODUCER SHEATH WITH RADIOPAQUE MARKER: Brand: PINNACLE

## (undated) DEVICE — RADIFOCUS GLIDEWIRE ADVANTAGE GUIDEWIRE: Brand: GLIDEWIRE ADVANTAGE

## (undated) DEVICE — FLUID TRAP FOR MINIVAC ES EQUIP FLD TRAP

## (undated) DEVICE — PLATE ES AD W 9FT CRD 2

## (undated) DEVICE — Z INACTIVE USE 2735373 APPLICATOR FBR LAIN COT WOOD TIP ECONOMICAL

## (undated) DEVICE — CAUTERY TIP POLISHER: Brand: DEVON